# Patient Record
Sex: MALE | Race: WHITE | NOT HISPANIC OR LATINO | Employment: OTHER | URBAN - METROPOLITAN AREA
[De-identification: names, ages, dates, MRNs, and addresses within clinical notes are randomized per-mention and may not be internally consistent; named-entity substitution may affect disease eponyms.]

---

## 2017-01-03 ENCOUNTER — PATIENT OUTREACH (OUTPATIENT)
Dept: OTHER | Facility: OTHER | Age: 82
End: 2017-01-03
Payer: MEDICARE

## 2017-01-03 NOTE — PROGRESS NOTES
Last 5 Patient Entered Readings                                                               Current 30 Day Average: 140/72     Recent Readings 1/1/2017 12/31/2016 12/23/2016 12/21/2016 12/20/2016    Systolic BP (mmHg) 124 157 144 130 147    Diastolic BP (mmHg) 64 72 81 57 72        Outpatient Hypertension Medications as of 1/3/2017             amlodipine (NORVASC) 5 MG tablet Take 1 tablet (5 mg total) by mouth once daily.- QAM    losartan (COZAAR) 50 MG tablet Take 1 tablet (50 mg total) by mouth 2 (two) times daily.    nitroGLYCERIN (NITROSTAT) 0.4 MG SL tablet Place 1 tablet (0.4 mg total) under the tongue every 5 (five) minutes as needed for Chest pain.        Plan:   Called patient to follow up. Per current 30 day average, BP is controlled, borderline. BP has improved since patient restarted amlodipine 5mg about 1 month ago. Patient denies having questions or concerns. Will continue to monitor. WCB in 2 weeks.

## 2017-01-16 ENCOUNTER — PATIENT OUTREACH (OUTPATIENT)
Dept: OTHER | Facility: OTHER | Age: 82
End: 2017-01-16
Payer: MEDICARE

## 2017-01-16 NOTE — PROGRESS NOTES
Last 5 Patient Entered Readings                                                               Current 30 Day Average: 136/70     Recent Readings 1/13/2017 1/12/2017 1/10/2017 1/6/2017 1/1/2017    Systolic BP (mmHg) 138 119 131 143 124    Diastolic BP (mmHg) 64 68 92 71 64        Outpatient Hypertension Medications as of 1/16/2017             amlodipine (NORVASC) 5 MG tablet Take 1 tablet (5 mg total) by mouth once daily.- QAM    losartan (COZAAR) 50 MG tablet Take 1 tablet (50 mg total) by mouth 2 (two) times daily.    nitroGLYCERIN (NITROSTAT) 0.4 MG SL tablet Place 1 tablet (0.4 mg total) under the tongue every 5 (five) minutes as needed for Chest pain.         Plan:   Called patient to follow up. Per current 30 day average, BP is well controlled.   Patient admits to family stress.  Patient denies having questions or concerns. Will continue to monitor. WCB in 3 months, sooner if BP begins to trend up or down.

## 2017-01-19 ENCOUNTER — PATIENT OUTREACH (OUTPATIENT)
Dept: OTHER | Facility: OTHER | Age: 82
End: 2017-01-19
Payer: MEDICARE

## 2017-01-19 NOTE — PROGRESS NOTES
"Last 5 Patient Entered Readings                                                               Current 30 Day Average: 137/69     Recent Readings 1/18/2017 1/18/2017 1/17/2017 1/13/2017 1/12/2017    Systolic BP (mmHg) 120 142 146 138 119    Diastolic BP (mmHg) 76 64 73 64 68    Pulse 59 54 57 58 53        Follow up with Mr. Javier Valles completed. Patient is maintaining a low sodium diet and continuing his exercise regime. Mr. Valles states he "could not be better" and did not have any further questions or concerns. I will follow up in a few weeks to see how he is doing and progressing.      "

## 2017-01-20 ENCOUNTER — PATIENT MESSAGE (OUTPATIENT)
Dept: ORTHOPEDICS | Facility: CLINIC | Age: 82
End: 2017-01-20

## 2017-01-23 ENCOUNTER — PATIENT MESSAGE (OUTPATIENT)
Dept: ELECTROPHYSIOLOGY | Facility: CLINIC | Age: 82
End: 2017-01-23

## 2017-01-23 ENCOUNTER — PATIENT MESSAGE (OUTPATIENT)
Dept: CARDIOLOGY | Facility: CLINIC | Age: 82
End: 2017-01-23

## 2017-01-23 DIAGNOSIS — E78.5 HYPERLIPIDEMIA, UNSPECIFIED HYPERLIPIDEMIA TYPE: ICD-10-CM

## 2017-01-23 RX ORDER — ROSUVASTATIN CALCIUM 20 MG/1
20 TABLET, COATED ORAL NIGHTLY
Qty: 90 TABLET | Refills: 3
Start: 2017-01-23 | End: 2017-02-15 | Stop reason: SDUPTHER

## 2017-01-24 ENCOUNTER — OFFICE VISIT (OUTPATIENT)
Dept: ORTHOPEDICS | Facility: CLINIC | Age: 82
End: 2017-01-24
Payer: MEDICARE

## 2017-01-24 ENCOUNTER — TELEPHONE (OUTPATIENT)
Dept: ORTHOPEDICS | Facility: CLINIC | Age: 82
End: 2017-01-24

## 2017-01-24 VITALS — BODY MASS INDEX: 28.64 KG/M2 | WEIGHT: 189 LBS | HEIGHT: 68 IN

## 2017-01-24 DIAGNOSIS — G56.03 BILATERAL CARPAL TUNNEL SYNDROME: Primary | ICD-10-CM

## 2017-01-24 PROCEDURE — 1125F AMNT PAIN NOTED PAIN PRSNT: CPT | Mod: S$GLB,,, | Performed by: ORTHOPAEDIC SURGERY

## 2017-01-24 PROCEDURE — 1159F MED LIST DOCD IN RCRD: CPT | Mod: S$GLB,,, | Performed by: ORTHOPAEDIC SURGERY

## 2017-01-24 PROCEDURE — 99203 OFFICE O/P NEW LOW 30 MIN: CPT | Mod: S$GLB,,, | Performed by: ORTHOPAEDIC SURGERY

## 2017-01-24 PROCEDURE — 99999 PR PBB SHADOW E&M-EST. PATIENT-LVL II: CPT | Mod: PBBFAC,,, | Performed by: ORTHOPAEDIC SURGERY

## 2017-01-24 PROCEDURE — 1157F ADVNC CARE PLAN IN RCRD: CPT | Mod: S$GLB,,, | Performed by: ORTHOPAEDIC SURGERY

## 2017-01-24 PROCEDURE — 1160F RVW MEDS BY RX/DR IN RCRD: CPT | Mod: S$GLB,,, | Performed by: ORTHOPAEDIC SURGERY

## 2017-01-24 RX ORDER — GABAPENTIN 300 MG/1
300 CAPSULE ORAL NIGHTLY
Qty: 30 CAPSULE | Refills: 11 | Status: ON HOLD | OUTPATIENT
Start: 2017-01-24 | End: 2017-03-29

## 2017-01-24 NOTE — PROGRESS NOTES
INITIAL VISIT HISTORY:  An 86-year-old male presents for evaluation of bilateral   hand symptoms.  He is reporting stiffness and a numbness in both hands for the   past 9-10 months.  Symptoms are intermittent, seem to be worse with certain   activities, very occasionally at night, but not regularly at night.  He does   have difficulty gripping small objects.    PAST MEDICAL HISTORY:  Significant for atrial flutter, atrial fibrillation,   arthritis, carotid artery disease, kidney disease, DJD, elevated PSA, glaucoma,   hyperlipidemia, lumbar stenosis, previous pacemaker placement, peripheral   neuropathy.    PAST SURGICAL HISTORY:  Includes cardiac catheterization, pacemaker placement,   eye surgery, retinal surgery, tonsillectomy.    FAMILY HISTORY:  Positive for clotting disorder and diverticulitis.    SOCIAL HISTORY:  The patient is a former smoker, drinks 1-2 beers per week,   maybe twice a day sometimes.    REVIEW OF SYSTEMS:  Negative fever, chills, rashes.    CURRENT MEDICATIONS:  Reviewed on chart.    ALLERGIES:  ACE inhibitors, Lipitor, pravastatin and statins in general.    PHYSICAL EXAMINATION:  GENERAL:  Elderly male in no acute distress, alert and oriented x3.  EXTREMITIES:  Examination of the upper extremities significant for the hands,   demonstrating mild swelling volar compartment of each wrist.  Tinel's sign is   negative.  Phalen test is negative.  Range of motion of fingers is slightly   decreased.  He has difficulty making a composite fist in both hands.     strength decreased.  Sensation slightly decreased in all fingers.  Skin color   and temperature normal.  Pulses slightly diminished bilaterally.    X-RAYS REVIEWED:  AP and lateral hands demonstrates some mild degenerative   changes of both hands, mainly at the STT joint bilaterally, moderate in degree.    There is some involvement of the small joints as well.    IMPRESSION:  1.  Probable bilateral carpal tunnel syndrome.  2.   Osteoarthritis, bilateral hands, mild-to-moderate.    PLAN:  I think he is certainly having some stiffness related to the arthritis in   his fingers, but I think may be also some underlying carpal tunnel syndrome.    For treatment, I first recommended a nerve conduction study ordered today both   hands to check for carpal tunnel.  Additionally, we will start him on wrist   splints mainly for nighttime use and he might benefit from Neurontin at bedtime,   so I have ordered 300 mg at bedtime once a day.  Follow up after the nerve test   is complete.      EMILY  dd: 01/24/2017 08:41:12 (CST)  td: 01/24/2017 18:20:50 (CST)  Doc ID   #[id number]  Job ID #[id number]    CC:

## 2017-01-24 NOTE — LETTER
January 24, 2017        Thiago Gibbs MD  [address]  [facility] [address]             [facility]  [address] [place] [address] [address]  [facility] [address]  Phone: [phone]   Patient: Javier Valles   MR Number: [id number]   YOB: 1930   Date of Visit: 1/24/2017       Dear Dr. Gibbs:    Thank you for referring Javier Valles to me for evaluation. Below are the relevant portions of my assessment and plan of care.            If you have questions, please do not hesitate to call me. I look forward to following Javier along with you.    Sincerely,      Nam Chong Jr., MD           CC  No Recipients

## 2017-01-24 NOTE — TELEPHONE ENCOUNTER
----- Message from Kathya Gates sent at 1/24/2017 11:46 AM CST -----  Contact: [phone]/ self   Pt called stating the earliest appointment for a nerve study its 03/01/17 . Pt would like to know if there is something that can be done by Dr Chong to get him in sooner .Please advise

## 2017-01-24 NOTE — TELEPHONE ENCOUNTER
I spoke with the patient and informed him that we will have to give him a call back on tomorrow once we speak with Dr. Chong. He understood.

## 2017-01-30 ENCOUNTER — PATIENT MESSAGE (OUTPATIENT)
Dept: INTERNAL MEDICINE | Facility: CLINIC | Age: 82
End: 2017-01-30

## 2017-02-01 ENCOUNTER — PATIENT MESSAGE (OUTPATIENT)
Dept: OPHTHALMOLOGY | Facility: CLINIC | Age: 82
End: 2017-02-01

## 2017-02-02 ENCOUNTER — PATIENT MESSAGE (OUTPATIENT)
Dept: OPHTHALMOLOGY | Facility: CLINIC | Age: 82
End: 2017-02-02

## 2017-02-06 ENCOUNTER — PATIENT MESSAGE (OUTPATIENT)
Dept: OPHTHALMOLOGY | Facility: CLINIC | Age: 82
End: 2017-02-06

## 2017-02-08 ENCOUNTER — TELEPHONE (OUTPATIENT)
Dept: ORTHOPEDICS | Facility: CLINIC | Age: 82
End: 2017-02-08

## 2017-02-08 NOTE — TELEPHONE ENCOUNTER
----- Message from Tigist Otto sent at 2/8/2017  8:48 AM CST -----  Contact: self/[phone]  Patient would like to speak with you about information that he desperately----------- needs abut authorization for rehab on his hands and this is his third call.  Please advise

## 2017-02-08 NOTE — TELEPHONE ENCOUNTER
Spoke with pt and informed information was faxed [facility] to [facility] for [facility]. Understanding verbalized.

## 2017-02-13 ENCOUNTER — TELEPHONE (OUTPATIENT)
Dept: ORTHOPEDICS | Facility: CLINIC | Age: 82
End: 2017-02-13

## 2017-02-13 ENCOUNTER — TELEPHONE (OUTPATIENT)
Dept: ELECTROPHYSIOLOGY | Facility: CLINIC | Age: 82
End: 2017-02-13

## 2017-02-13 NOTE — TELEPHONE ENCOUNTER
Spoke with pt, pt informed [facility] rehab called and scheduled appt already. Understanding verbalized.

## 2017-02-14 ENCOUNTER — OFFICE VISIT (OUTPATIENT)
Dept: ELECTROPHYSIOLOGY | Facility: CLINIC | Age: 82
End: 2017-02-14
Payer: MEDICARE

## 2017-02-14 ENCOUNTER — HOSPITAL ENCOUNTER (OUTPATIENT)
Dept: CARDIOLOGY | Facility: CLINIC | Age: 82
Discharge: HOME | End: 2017-02-14
Payer: MEDICARE

## 2017-02-14 ENCOUNTER — CLINICAL SUPPORT (OUTPATIENT)
Dept: ELECTROPHYSIOLOGY | Facility: CLINIC | Age: 82
End: 2017-02-14
Payer: MEDICARE

## 2017-02-14 VITALS
SYSTOLIC BLOOD PRESSURE: 119 MMHG | BODY MASS INDEX: 30.27 KG/M2 | WEIGHT: 192.88 LBS | HEIGHT: 67 IN | DIASTOLIC BLOOD PRESSURE: 62 MMHG | HEART RATE: 71 BPM

## 2017-02-14 DIAGNOSIS — I48.91 ATRIAL FIBRILLATION: ICD-10-CM

## 2017-02-14 DIAGNOSIS — N18.30 CHRONIC KIDNEY DISEASE, STAGE III (MODERATE): ICD-10-CM

## 2017-02-14 DIAGNOSIS — I25.10 CAD IN NATIVE ARTERY: ICD-10-CM

## 2017-02-14 DIAGNOSIS — Z79.01 CURRENT USE OF LONG TERM ANTICOAGULATION: ICD-10-CM

## 2017-02-14 DIAGNOSIS — Z95.0 PACEMAKER: ICD-10-CM

## 2017-02-14 DIAGNOSIS — Z95.1 S/P CABG (CORONARY ARTERY BYPASS GRAFT): ICD-10-CM

## 2017-02-14 DIAGNOSIS — I49.5 SSS (SICK SINUS SYNDROME): ICD-10-CM

## 2017-02-14 DIAGNOSIS — Z98.890 S/P LUMBAR LAMINECTOMY: ICD-10-CM

## 2017-02-14 DIAGNOSIS — Z95.0 BIVENTRICULAR CARDIAC PACEMAKER IN SITU: ICD-10-CM

## 2017-02-14 DIAGNOSIS — E78.5 HYPERLIPIDEMIA, UNSPECIFIED HYPERLIPIDEMIA TYPE: ICD-10-CM

## 2017-02-14 DIAGNOSIS — I25.10 CORONARY ARTERY DISEASE INVOLVING NATIVE CORONARY ARTERY OF NATIVE HEART WITHOUT ANGINA PECTORIS: ICD-10-CM

## 2017-02-14 DIAGNOSIS — I73.9 PERIPHERAL VASCULAR DISEASE: ICD-10-CM

## 2017-02-14 DIAGNOSIS — I42.9 CARDIOMYOPATHY, UNSPECIFIED TYPE: ICD-10-CM

## 2017-02-14 DIAGNOSIS — I48.3 TYPICAL ATRIAL FLUTTER: Primary | ICD-10-CM

## 2017-02-14 DIAGNOSIS — I48.0 PAROXYSMAL ATRIAL FIBRILLATION: ICD-10-CM

## 2017-02-14 DIAGNOSIS — Z98.890 STATUS POST CATHETER ABLATION OF ATRIAL FLUTTER: ICD-10-CM

## 2017-02-14 DIAGNOSIS — I10 ESSENTIAL HYPERTENSION: ICD-10-CM

## 2017-02-14 DIAGNOSIS — E66.9 OBESITY, CLASS I, BMI 30-34.9: ICD-10-CM

## 2017-02-14 PROCEDURE — 99214 OFFICE O/P EST MOD 30 MIN: CPT | Mod: S$GLB,,, | Performed by: INTERNAL MEDICINE

## 2017-02-14 PROCEDURE — 99999 PR PBB SHADOW E&M-EST. PATIENT-LVL III: CPT | Mod: PBBFAC,,, | Performed by: INTERNAL MEDICINE

## 2017-02-14 PROCEDURE — 93000 ELECTROCARDIOGRAM COMPLETE: CPT | Mod: S$GLB,,, | Performed by: INTERNAL MEDICINE

## 2017-02-14 PROCEDURE — 93281 PM DEVICE PROGR EVAL MULTI: CPT | Mod: S$GLB,,, | Performed by: INTERNAL MEDICINE

## 2017-02-14 PROCEDURE — 1159F MED LIST DOCD IN RCRD: CPT | Mod: S$GLB,,, | Performed by: INTERNAL MEDICINE

## 2017-02-14 PROCEDURE — 1157F ADVNC CARE PLAN IN RCRD: CPT | Mod: S$GLB,,, | Performed by: INTERNAL MEDICINE

## 2017-02-14 PROCEDURE — 99499 UNLISTED E&M SERVICE: CPT | Mod: S$GLB,,, | Performed by: INTERNAL MEDICINE

## 2017-02-14 PROCEDURE — 1126F AMNT PAIN NOTED NONE PRSNT: CPT | Mod: S$GLB,,, | Performed by: INTERNAL MEDICINE

## 2017-02-14 PROCEDURE — 1160F RVW MEDS BY RX/DR IN RCRD: CPT | Mod: S$GLB,,, | Performed by: INTERNAL MEDICINE

## 2017-02-14 NOTE — MR AVS SNAPSHOT
[facility]  [address]  [place] [address]  Phone: [phone]  Fax: [phone]                  Javier Valles   2017 8:40 AM   Office Visit    Description:  Male : 1930   Provider:  Alcon Ly MD   Department:  [facility]           Reason for Visit     Atrial Fibrillation                To Do List           Future Appointments        Provider Department Dept Phone    2017 7:45 AM PARR, VISUAL BELLA [facility] [phone]    2017 8:15 AM Sena Schneider MD [facility] [phone]    3/1/2017 8:40 AM Thiago Chiu III, MD [facility] Neurology [phone]      Goals (5 Years of Data)              Today    17    Blood Pressure <= 140/90   119/62  145/82  157/86    Notes - Note created  2016  3:57 PM by Vannessa Torres, PharmD    It is important to consistently maintain a controlled blood pressure.      Exercise at least 150 minutes per week.           Maintain a low sodium diet           Take at least one BP reading per week at various times of the day           Weight (lb) < 83 kg (183 lb)   87.5 kg (192 lb 14.4 oz)        Notes - Note created  2016  2:35 PM by Vannessa Torres, PharmD    Goal: Lose 5% of body weight. Decrease weight by 5% in order to decrease cardiovasular risk factors.         [facility]     [facility] Care Line -  Assistance  Registered nurses in the [facility] provide clinical advisement, health education, appointment booking, and other advisory services.  Call for this free service at [phone].             Medications           Message regarding Medications     Verify the changes and/or additions to your medication regime listed below are the same as discussed with your clinician today.  If any of these changes or additions are incorrect, please notify your healthcare provider.             Verify that the below list of  medications is an accurate representation of the medications you are currently taking.  If none reported, the list may be blank. If incorrect, please contact your healthcare provider. Carry this list with you in case of emergency.           Current Medications     amlodipine (NORVASC) 5 MG tablet Take 1 tablet (5 mg total) by mouth once daily.    apixaban (ELIQUIS) 2.5 mg Tab Take 1 tablet (2.5 mg total) by mouth 2 (two) times daily.    ascorbic acid (VITAMIN C) 1000 MG tablet Take 1,000 mg by mouth every morning.     aspirin (ECOTRIN) 81 MG EC tablet Take 1 tablet (81 mg total) by mouth once daily.    brimonidine 0.2% (ALPHAGAN) 0.2 % Drop Place 1 drop into the left eye 2 (two) times daily. Disp 10 mls for 30 days    buPROPion (WELLBUTRIN XL) 150 MG TB24 tablet Take 1 tablet (150 mg total) by mouth once daily.    co-enzyme Q-10 30 mg capsule Take 30 mg by mouth once daily.     diclofenac sodium 1 % Gel Apply 2 g topically 4 (four) times daily.    donepezil (ARICEPT) 5 MG tablet Take 1 tablet (5 mg total) by mouth every evening.    erythromycin (ROMYCIN) ophthalmic ointment Apply to affected eye daily as needed.    gabapentin (NEURONTIN) 300 MG capsule Take 1 capsule (300 mg total) by mouth every evening.    ipratropium (ATROVENT) 0.03 % nasal spray 1-2 sprays by Nasal route 2 (two) times daily.    latanoprost 0.005 % ophthalmic solution Place 1 drop into the left eye every evening.    losartan (COZAAR) 50 MG tablet Take 1 tablet (50 mg total) by mouth 2 (two) times daily.    lutein 20 mg Cap Take by mouth.    MULTIVITAMINS,THER W-MINERALS (VITAMINS & MINERALS ORAL) Take 1 tablet by mouth every morning.     nitroGLYCERIN (NITROSTAT) 0.4 MG SL tablet Place 1 tablet (0.4 mg total) under the tongue every 5 (five) minutes as needed for Chest pain.    predniSONE (DELTASONE) 20 MG tablet 2 pills po daily for 4 days, then 1 pill po day for 4 days    rosuvastatin (CRESTOR) 20 MG tablet Take 1 tablet (20 mg total) by mouth  "every evening.           Clinical Reference Information           Your Vitals Were     BP Pulse Height Weight BMI    119/62 71 5' 7" (1.702 m) 87.5 kg (192 lb 14.4 oz) 30.21 kg/m2      Blood Pressure          Most Recent Value    BP  119/62      Allergies as of 2/14/2017     Ace Inhibitors    Lipitor [Atorvastatin]    Pravastatin    Statins-hmg-coa Reductase Inhibitors      Immunizations Administered on Date of Encounter - 2/14/2017     None      Language Assistance Services     ATTENTION: Language assistance services are available, free of charge. Please call [phone].      ATENCIÓN: Si habla español, tiene a arambula disposición servicios gratuitos de asistencia lingüística. Llame al [phone].     SARY Ý: N?u b?n nói Ti?ng Vi?t, có các d?ch v? h? tr? ngôn ng? mi?n phí dành cho b?n. G?i s? [phone].         [facility] complies with applicable Federal civil rights laws and does not discriminate on the basis of race, color, national origin, age, disability, or sex.        "

## 2017-02-14 NOTE — Clinical Note
La,  Could you help schedule an echo for Mr Valles. We placed in order in December for this visit but it never got scheduled. If you need another order let me know.  Thanks, MB

## 2017-02-14 NOTE — PROGRESS NOTES
"Subjective:    Patient ID:  Javier Valles is a 86 y.o. male who presents for follow-up of BiV Pacemaker Check.     HPI     Mr. Valles is an 86 year old M AFL, SSS s/p DC PM, here for follow up. 11/2/13 he was in the EP procedure room for AFL ablation. Prior to the ablation, he experience angina with anterior ST changes. He was sent urgently for a [facility] where he was found to have experienced an in-stent thrombosis of an OM1 stent. He underwent DC PPM placement prior to discharge secondary to bradycardic events on low dose beta blocker. He underwent DCCV post implantation. He was discharged on amiodarone, metoprolol, aspirin plavix, warfarin, rosuvastatin. His EF was normal on pre-CV MONICA. He subsequently developed L eye bruising and superficial bleeding, and warfarin was stopped 02/2014. He was subsequently started on rivaroxaban 15 mg qd without adverse effects until 06/2014 when he experienced excessive bleeding leading to its discontinuation. He subsequently went on a 3-week tour of [place] without any limitation. In November of 2015 his PPM LRL was set to 60 bpm with noted improvement in his energy level. At that time, Mr. Valles was noted to have an increase in his AF burden, and was also found to have AFL.     Mr. Valles has historically had a long hx of lower back pain; he underwent a successful lumbar laminectomy (04/25/16), which was complicated by persistent AFL post-op. At the time, he remained on aspirin, but had been on apixaban prior to his in-stent thrombosis event in 2013. Apixaban 2.5 mg twice daily was re-started. At that time, Mr. Valles reported experiencxing dyspnea and fatigue, with a significant decrease in his energy level. He was subsequently switched to coumadin (for the procedures), and underwent a successful combination of an AFL RFA and BiV PPM upgrade (11/01/16).     Since his last office visit, Mr. Valles reports feeling "great." He denies chest pain, SOB/ELIAS, dizziness, palpitations, or syncope. In " addition, he states that his energy level has been excellent since the procedures. He remains active without difficulty.     Recent cardiac studies:  Echo EF 35%, AFL since 4/16, 81% RV pacing  Device Interrogation (02/14/17) reveals stable lead and device function. AMS x 1 (episode duration 6 seconds); no NSVT noted. He RA paces 18% and BiV paces 88% of the time. Estimated battery longevity 5-6 years.     I reviewed today's ECG which demonstrated a BiV-paced rhythm at 71 bpm; , , and QTc 495.    Review of Systems   Constitution: Negative for diaphoresis, weakness and malaise/fatigue.   HENT: Negative for headaches and nosebleeds.    Eyes: Negative for double vision.   Cardiovascular: Negative for chest pain, claudication, cyanosis, dyspnea on exertion, irregular heartbeat, leg swelling, near-syncope, orthopnea, palpitations, paroxysmal nocturnal dyspnea and syncope.   Respiratory: Negative for shortness of breath.    Skin: Negative.    Musculoskeletal: Positive for stiffness.   Gastrointestinal: Negative for abdominal pain, hematemesis and hematochezia.   Genitourinary: Negative for hematuria.   Neurological: Negative for dizziness and light-headedness.   Psychiatric/Behavioral: Negative for altered mental status.        Objective:    Physical Exam   Constitutional: He is oriented to person, place, and time. He appears well-developed and well-nourished.   HENT:   Head: Normocephalic and atraumatic.   Eyes: Pupils are equal, round, and reactive to light.   Neck: Normal range of motion. Neck supple.   Cardiovascular: Normal rate, regular rhythm, normal heart sounds and intact distal pulses.    Pulmonary/Chest: Effort normal and breath sounds normal.   Abdominal: Soft. Bowel sounds are normal.   Musculoskeletal:   Antalgic gait noted.    Neurological: He is alert and oriented to person, place, and time.   Vitals reviewed.        Assessment:       1. Typical atrial flutter    2. Status post catheter  ablation of atrial flutter    3. Paroxysmal atrial fibrillation    4. Current use of long term anticoagulation    5. SSS (sick sinus syndrome): s/p PPM    6. Cardiomyopathy, unspecified type    7. Biventricular cardiac pacemaker in situ    8. Coronary artery disease involving native coronary artery of native heart without angina pectoris    9. S/P CABG (coronary artery bypass graft)    10. Peripheral vascular disease    11. Essential hypertension    12. Chronic kidney disease, stage III (moderate)    13. Hyperlipidemia, unspecified hyperlipidemia type    14. Obesity, Class I, BMI 30-34.9    15. S/P lumbar laminectomy         Plan:       In summary, Mr. Valles is a 86 y.o. male with a hx of AF, typical AFL s/p recent CTI RFA, CM, SSS s/p recent BiV PPM upgrade, CAD s/p CABG, PVD, HTN, CKD III, HLD, a BMI 29.80 kg/m2, and lumbar stenosis s/p lumbar laminectomy. Mr. Valles is doing well from a rhythm perspective with stable lead and device function without sustained arrhythmia noted; anticoagulated on Eliquis.     Echo now to evaluate EF s/p BiV PPM upgrade.   Continue current medication regimen and device settings.   Follow up in device clinic as scheduled.   Follow up in EP clinic in 1 year, sooner as needed.     Joi Ugarte, MN, APRN, FNP-C            86 yoM CAD s/p CABG, AFL s/p RFA, AVN disease s/p upgrade to CRT-P here for long term follow up. He has had excellent response to CRT pacing and NSR. Will reassess his EF via echo. RTC 6m.

## 2017-02-15 ENCOUNTER — PATIENT MESSAGE (OUTPATIENT)
Dept: CARDIOLOGY | Facility: CLINIC | Age: 82
End: 2017-02-15

## 2017-02-15 DIAGNOSIS — I25.10 CORONARY ARTERY DISEASE INVOLVING NATIVE CORONARY ARTERY WITHOUT ANGINA PECTORIS, UNSPECIFIED WHETHER NATIVE OR TRANSPLANTED HEART: ICD-10-CM

## 2017-02-15 DIAGNOSIS — E78.5 HYPERLIPIDEMIA, UNSPECIFIED HYPERLIPIDEMIA TYPE: ICD-10-CM

## 2017-02-15 RX ORDER — ROSUVASTATIN CALCIUM 20 MG/1
20 TABLET, COATED ORAL NIGHTLY
Qty: 90 TABLET | Refills: 3 | Status: SHIPPED | OUTPATIENT
Start: 2017-02-15 | End: 2018-02-08 | Stop reason: SDUPTHER

## 2017-02-15 RX ORDER — LOSARTAN POTASSIUM 50 MG/1
50 TABLET ORAL 2 TIMES DAILY
Qty: 180 TABLET | Refills: 3 | Status: SHIPPED | OUTPATIENT
Start: 2017-02-15 | End: 2018-01-05 | Stop reason: SDUPTHER

## 2017-02-16 RX ORDER — AMLODIPINE BESYLATE 5 MG/1
5 TABLET ORAL DAILY
Qty: 30 TABLET | Refills: 5 | Status: SHIPPED | OUTPATIENT
Start: 2017-02-16 | End: 2017-02-17 | Stop reason: SDUPTHER

## 2017-02-17 ENCOUNTER — PATIENT OUTREACH (OUTPATIENT)
Dept: OTHER | Facility: OTHER | Age: 82
End: 2017-02-17
Payer: MEDICARE

## 2017-02-17 DIAGNOSIS — H40.10X2 OPEN-ANGLE GLAUCOMA, MODERATE STAGE, UNSPECIFIED LATERALITY, UNSPECIFIED OPEN-ANGLE GLAUCOMA TYPE: ICD-10-CM

## 2017-02-17 NOTE — PROGRESS NOTES
"Last 5 Patient Entered Readings                                                               Current 30 Day Average: 134/70     Recent Readings 2/14/2017 2/13/2017 2/13/2017 2/11/2017 2/9/2017    Systolic BP (mmHg) 122 145 157 127 139    Diastolic BP (mmHg) 75 82 86 61 66    Pulse 59 49 49 32 31        Follow up with Mr. Javier Valles completed. Patient is maintaining a low sodium diet and continuing his activities, "couldn't be better". Patient did not have any further questions or concerns. I will follow up in a few weeks to see how he is doing and progressing.      "

## 2017-02-18 RX ORDER — AMLODIPINE BESYLATE 5 MG/1
5 TABLET ORAL DAILY
Qty: 30 TABLET | Refills: 5 | Status: SHIPPED | OUTPATIENT
Start: 2017-02-18 | End: 2017-05-15 | Stop reason: SDUPTHER

## 2017-02-20 ENCOUNTER — CLINICAL SUPPORT (OUTPATIENT)
Dept: OPHTHALMOLOGY | Facility: CLINIC | Age: 82
End: 2017-02-20
Payer: MEDICARE

## 2017-02-20 ENCOUNTER — OFFICE VISIT (OUTPATIENT)
Dept: OPHTHALMOLOGY | Facility: CLINIC | Age: 82
End: 2017-02-20
Payer: MEDICARE

## 2017-02-20 DIAGNOSIS — H40.1132 PRIMARY OPEN ANGLE GLAUCOMA OF BOTH EYES, MODERATE STAGE: Primary | ICD-10-CM

## 2017-02-20 DIAGNOSIS — H43.812 POSTERIOR VITREOUS DETACHMENT OF LEFT EYE: ICD-10-CM

## 2017-02-20 DIAGNOSIS — Z97.0 PROSTHETIC EYE GLOBE: ICD-10-CM

## 2017-02-20 DIAGNOSIS — H35.372 MACULAR PUCKERING OF RETINA, LEFT: ICD-10-CM

## 2017-02-20 DIAGNOSIS — H33.002 RHEGMATOGENOUS RETINAL DETACHMENT OF LEFT EYE: ICD-10-CM

## 2017-02-20 DIAGNOSIS — H40.10X2 OPEN-ANGLE GLAUCOMA, MODERATE STAGE: ICD-10-CM

## 2017-02-20 DIAGNOSIS — H35.342 LAMELLAR MACULAR HOLE, LEFT: ICD-10-CM

## 2017-02-20 DIAGNOSIS — H35.372 EPIRETINAL MEMBRANE, LEFT EYE: ICD-10-CM

## 2017-02-20 DIAGNOSIS — Z96.1 PSEUDOPHAKIA OF LEFT EYE: ICD-10-CM

## 2017-02-20 PROCEDURE — 92012 INTRM OPH EXAM EST PATIENT: CPT | Mod: S$GLB,,, | Performed by: OPHTHALMOLOGY

## 2017-02-20 PROCEDURE — 92134 CPTRZ OPH DX IMG PST SGM RTA: CPT | Mod: S$GLB,,, | Performed by: OPHTHALMOLOGY

## 2017-02-20 PROCEDURE — 99499 UNLISTED E&M SERVICE: CPT | Mod: S$GLB,,, | Performed by: OPHTHALMOLOGY

## 2017-02-20 PROCEDURE — 99999 PR PBB SHADOW E&M-EST. PATIENT-LVL III: CPT | Mod: PBBFAC,,, | Performed by: OPHTHALMOLOGY

## 2017-02-20 RX ORDER — BRIMONIDINE TARTRATE 2 MG/ML
1 SOLUTION/ DROPS OPHTHALMIC 3 TIMES DAILY
Qty: 10 ML | Refills: 12
Start: 2017-02-20 | End: 2017-07-24 | Stop reason: SDUPTHER

## 2017-02-20 RX ORDER — LATANOPROST 50 UG/ML
1 SOLUTION/ DROPS OPHTHALMIC NIGHTLY
Qty: 1 BOTTLE | Refills: 12 | Status: SHIPPED | OUTPATIENT
Start: 2017-02-20 | End: 2017-06-30 | Stop reason: SDUPTHER

## 2017-02-20 RX ORDER — BRIMONIDINE TARTRATE 2 MG/ML
1 SOLUTION/ DROPS OPHTHALMIC 3 TIMES DAILY
Qty: 10 ML | Refills: 12 | Status: SHIPPED | OUTPATIENT
Start: 2017-02-20 | End: 2017-03-28 | Stop reason: CLARIF

## 2017-02-20 NOTE — PROGRESS NOTES
HPI     Glaucoma    Additional comments: HRT review today           Comments   DLS: 8/18/16    1) POAG  2) PCIOL OS  3) Prosthesis OD  4) ERM OS  5) Hx Rhegmatogenous RD OS  6) Psuedo Mac Hole OS    MEDS:  Brimonidine TID OS = PT MOSTLY USES BID  Latanoprost QHS OS         Last edited by Teresa Gonzalez MA on 2/20/2017  8:15 AM. (History)            Assessment /Plan     For exam results, see Encounter Report.    Primary open angle glaucoma of both eyes, moderate stage    Epiretinal membrane, left eye    Posterior vitreous detachment of left eye    Prosthetic eye globe    Lamellar macular hole, left    Macular puckering of retina, left    Rhegmatogenous retinal detachment of left eye    Pseudophakia of left eye      Old pt of Dr. Goodrich    1. POAG   Followed at [facility] since 1997   followed by S[facility] for 30 years  First HVF 2014  gtts started - [facility] - 2012  First photos - ? 1997 - for RD os     Glaucoma meds -  latanoprost qhs os  H/O adverse rxn to glaucoma drops none  LASERS none  GLAUCOMA SURGERIES none  OTHER EYE SURGERIES - prosthesis od - (2/2 injury - 1940's) // Pnematic retinopexy OS 1997 - [place] // PC iol os - [place]  CDR - prosthesis / 0.75  Tbase   Tmax    Ttarget    HVF 3  test 2014 to 2016 OS:  ? Paracentral defect with early SAD/IAD  Gonio  +3-4 os   CCT - xx/492  OCT 2 test 2014 to 2016- RNFL - OD:not done // OS:dec s/bord T  HRT 3 test 2015 - 2017 -  MR -  xx od // xx os /// CDR dec S/I/N bord T  od // 0.80 os   Disc photos - mult old slides 1997 - 2006 // 2015 - OIS    Ttoday - prosthetic // 18  Test done today IOP/HRT/Gonio    2.  psk os  - monitor    3.  Monocular precautions  - prothetic od    4.  erm os  - stable, follows with arend  -on nevanac q day os - feels it helps vision - ?     5.  Hx of rhegmatogenous rrd os  - flat, follows with arend    6.  Pseudo hole os  - monitor     Pt on metoprolol xl    On xal qhs os,   alphagan os  - increase for bid to tid os (change to 2% generic 2/2  cost)   - (may be able to use BB as now has a pacemaker, but has a H/O low heart rate / and/or arrythmia)     -add EES ointment od - prosthesis - prn irritation     Saw [facility] - new glasses - has been restricted to daytime driving     F/U  6 months with HVF/DFE/Photos     Keep regular F/U with arend (november)     Glasses - [facility]     I have seen and personally examined the patient.  I agree with the findings, assessment and plan of the resident and/or fellow.     Sena Schneider MD

## 2017-02-20 NOTE — PROGRESS NOTES
HRT done-[facility]    Assessment /Plan     For exam results, see Encounter Report.    Open-angle glaucoma, moderate stage  -     [place] Retina Tomography (HRT) - OU - Both Eyes

## 2017-02-21 ENCOUNTER — PATIENT MESSAGE (OUTPATIENT)
Dept: CARDIOLOGY | Facility: CLINIC | Age: 82
End: 2017-02-21

## 2017-02-21 ENCOUNTER — HOSPITAL ENCOUNTER (OUTPATIENT)
Dept: CARDIOLOGY | Facility: CLINIC | Age: 82
Discharge: HOME | End: 2017-02-21
Payer: MEDICARE

## 2017-02-21 PROCEDURE — 93306 TTE W/DOPPLER COMPLETE: CPT | Mod: S$GLB,,, | Performed by: INTERNAL MEDICINE

## 2017-02-22 ENCOUNTER — TELEPHONE (OUTPATIENT)
Dept: ELECTROPHYSIOLOGY | Facility: CLINIC | Age: 82
End: 2017-02-22

## 2017-02-22 NOTE — TELEPHONE ENCOUNTER
----- Message from Joi Ugarte, KEITH sent at 2/22/2017 10:19 AM CST -----  Sujatha,   Please let Mr. Valles know that his recent Echo revealed an improved EF of 43% (up from 35%, 3 months ago). Thank you.     CM

## 2017-02-28 ENCOUNTER — PATIENT MESSAGE (OUTPATIENT)
Dept: INTERNAL MEDICINE | Facility: CLINIC | Age: 82
End: 2017-02-28

## 2017-03-01 ENCOUNTER — PATIENT MESSAGE (OUTPATIENT)
Dept: ORTHOPEDICS | Facility: CLINIC | Age: 82
End: 2017-03-01

## 2017-03-01 ENCOUNTER — PROCEDURE VISIT (OUTPATIENT)
Dept: NEUROLOGY | Facility: CLINIC | Age: 82
End: 2017-03-01
Payer: MEDICARE

## 2017-03-01 DIAGNOSIS — G56.03 BILATERAL CARPAL TUNNEL SYNDROME: ICD-10-CM

## 2017-03-01 PROCEDURE — 95911 NRV CNDJ TEST 9-10 STUDIES: CPT | Mod: S$GLB,,,

## 2017-03-01 PROCEDURE — 95886 MUSC TEST DONE W/N TEST COMP: CPT | Mod: S$GLB,,,

## 2017-03-01 NOTE — PROCEDURES
Procedures     See the copy of this report that has been scanned into patient's Epic Chart.     KEENAN LUCAS III, MD

## 2017-03-02 RX ORDER — BUPROPION HYDROCHLORIDE 150 MG/1
150 TABLET ORAL DAILY
Qty: 30 TABLET | Refills: 11 | Status: SHIPPED | OUTPATIENT
Start: 2017-03-02 | End: 2017-05-15 | Stop reason: SDUPTHER

## 2017-03-02 RX ORDER — DONEPEZIL HYDROCHLORIDE 5 MG/1
5 TABLET, FILM COATED ORAL NIGHTLY
Qty: 30 TABLET | Refills: 11 | Status: SHIPPED | OUTPATIENT
Start: 2017-03-02 | End: 2017-05-15 | Stop reason: SDUPTHER

## 2017-03-06 ENCOUNTER — PATIENT MESSAGE (OUTPATIENT)
Dept: OPHTHALMOLOGY | Facility: CLINIC | Age: 82
End: 2017-03-06

## 2017-03-10 ENCOUNTER — OFFICE VISIT (OUTPATIENT)
Dept: INTERNAL MEDICINE | Facility: CLINIC | Age: 82
End: 2017-03-10
Payer: MEDICARE

## 2017-03-10 VITALS
BODY MASS INDEX: 29.25 KG/M2 | HEIGHT: 68 IN | WEIGHT: 193 LBS | HEART RATE: 44 BPM | SYSTOLIC BLOOD PRESSURE: 119 MMHG | DIASTOLIC BLOOD PRESSURE: 62 MMHG

## 2017-03-10 DIAGNOSIS — I83.811 VARICOSE VEINS OF LEG WITH PAIN, RIGHT: ICD-10-CM

## 2017-03-10 DIAGNOSIS — M79.604 LEG PAIN, ANTERIOR, RIGHT: Primary | ICD-10-CM

## 2017-03-10 PROCEDURE — 1160F RVW MEDS BY RX/DR IN RCRD: CPT | Mod: S$GLB,,, | Performed by: INTERNAL MEDICINE

## 2017-03-10 PROCEDURE — 99999 PR PBB SHADOW E&M-EST. PATIENT-LVL IV: CPT | Mod: PBBFAC,,, | Performed by: INTERNAL MEDICINE

## 2017-03-10 PROCEDURE — 1157F ADVNC CARE PLAN IN RCRD: CPT | Mod: S$GLB,,, | Performed by: INTERNAL MEDICINE

## 2017-03-10 PROCEDURE — 1125F AMNT PAIN NOTED PAIN PRSNT: CPT | Mod: S$GLB,,, | Performed by: INTERNAL MEDICINE

## 2017-03-10 PROCEDURE — 1159F MED LIST DOCD IN RCRD: CPT | Mod: S$GLB,,, | Performed by: INTERNAL MEDICINE

## 2017-03-10 PROCEDURE — 99214 OFFICE O/P EST MOD 30 MIN: CPT | Mod: S$GLB,,, | Performed by: INTERNAL MEDICINE

## 2017-03-10 RX ORDER — HYDROCODONE BITARTRATE AND ACETAMINOPHEN 5; 325 MG/1; MG/1
1 TABLET ORAL EVERY 6 HOURS PRN
Qty: 30 TABLET | Refills: 0 | Status: SHIPPED | OUTPATIENT
Start: 2017-03-10 | End: 2017-06-09

## 2017-03-10 NOTE — MR AVS SNAPSHOT
[facility] [address]  [address]  [address]  [facility] [address]  Phone: [phone]                  Javier Valles   3/10/2017 3:45 PM   Office Visit    Description:  Male : 1930   Provider:  Thiago Gibbs MD   Department:  [facility] [address]           Reason for Visit     Leg Pain           Diagnoses this Visit        Comments    Leg pain, anterior, right    -  Primary     Varicose veins of leg with pain, right                To Do List           Future Appointments        Provider Department Dept Phone    3/13/2017 8:15 AM [facility] XR3 450 LB LIMIT [facility] [phone]    3/13/2017 3:00 PM Nam Chong Jr., MD [facility] [phone]    2017 8:00 AM [facility] [phone]      Goals (5 Years of Data)              Today    3/8/17    3/7/17    Blood Pressure <= 140/90   119/62  119/62  119/62    Notes - Note created  2016  3:57 PM by Vannessa Torres, PharmD    It is important to consistently maintain a controlled blood pressure.      Exercise at least 150 minutes per week.           Maintain a low sodium diet           Take at least one BP reading per week at various times of the day           Weight (lb) < 83 kg (183 lb)   87.5 kg (193 lb)        Notes - Note created  2016  2:35 PM by Vannessa Torres, PharmD    Goal: Lose 5% of body weight. Decrease weight by 5% in order to decrease cardiovasular risk factors.          These Medications        Disp Refills Start End    hydrocodone-acetaminophen 5-325mg (NORCO) 5-325 mg per tablet 30 tablet 0 3/10/2017     Take 1 tablet by mouth every 6 (six) hours as needed for Pain. - Oral    Pharm[facility] [address] Ph #: [phone]         [facility]     [facility] On Call Nurse Care Line -  Assistance  Registered nurses in the [facility] On Call Center provide clinical  advisement, health education, appointment booking, and other advisory services.  Call for this free service at [phone].             Medications           Message regarding Medications     Verify the changes and/or additions to your medication regime listed below are the same as discussed with your clinician today.  If any of these changes or additions are incorrect, please notify your healthcare provider.        START taking these NEW medications        Refills    hydrocodone-acetaminophen 5-325mg (NORCO) 5-325 mg per tablet 0    Sig: Take 1 tablet by mouth every 6 (six) hours as needed for Pain.    Class: Print    Route: Oral           Verify that the below list of medications is an accurate representation of the medications you are currently taking.  If none reported, the list may be blank. If incorrect, please contact your healthcare provider. Carry this list with you in case of emergency.           Current Medications     amlodipine (NORVASC) 5 MG tablet Take 1 tablet (5 mg total) by mouth once daily.    apixaban (ELIQUIS) 2.5 mg Tab Take 1 tablet (2.5 mg total) by mouth 2 (two) times daily.    ascorbic acid (VITAMIN C) 1000 MG tablet Take 1,000 mg by mouth every morning.     aspirin (ECOTRIN) 81 MG EC tablet Take 1 tablet (81 mg total) by mouth once daily.    brimonidine 0.2% (ALPHAGAN) 0.2 % Drop Place 1 drop into the left eye 3 (three) times daily. Disp 10 mls for 30 days    brimonidine 0.2% (ALPHAGAN) 0.2 % Drop Place 1 drop into both eyes 3 (three) times daily. Disp 10 mls for 30 days    buPROPion (WELLBUTRIN XL) 150 MG TB24 tablet Take 1 tablet (150 mg total) by mouth once daily.    co-enzyme Q-10 30 mg capsule Take 30 mg by mouth once daily.     diclofenac sodium 1 % Gel Apply 2 g topically 4 (four) times daily.    donepezil (ARICEPT) 5 MG tablet Take 1 tablet (5 mg total) by mouth every evening.    erythromycin (ROMYCIN) ophthalmic ointment Apply to affected eye daily as needed.    gabapentin  "(NEURONTIN) 300 MG capsule Take 1 capsule (300 mg total) by mouth every evening.    ipratropium (ATROVENT) 0.03 % nasal spray 1-2 sprays by Nasal route 2 (two) times daily.    latanoprost 0.005 % ophthalmic solution Place 1 drop into both eyes every evening.    losartan (COZAAR) 50 MG tablet Take 1 tablet (50 mg total) by mouth 2 (two) times daily.    lutein 20 mg Cap Take 20 mg by mouth once daily.     MULTIVITAMINS,THER W-MINERALS (VITAMINS & MINERALS ORAL) Take 1 tablet by mouth every morning.     nitroGLYCERIN (NITROSTAT) 0.4 MG SL tablet Place 1 tablet (0.4 mg total) under the tongue every 5 (five) minutes as needed for Chest pain.    rosuvastatin (CRESTOR) 20 MG tablet Take 1 tablet (20 mg total) by mouth every evening.    hydrocodone-acetaminophen 5-325mg (NORCO) 5-325 mg per tablet Take 1 tablet by mouth every 6 (six) hours as needed for Pain.           Clinical Reference Information           Your Vitals Were     BP Pulse Height Weight BMI    119/62 44 5' 8" (1.727 m) 87.5 kg (193 lb) 29.35 kg/m2      Blood Pressure          Most Recent Value    BP  119/62      Allergies as of 3/10/2017     Ace Inhibitors    Lipitor [Atorvastatin]    Pravastatin    Statins-hmg-coa Reductase Inhibitors      Immunizations Administered on Date of Encounter - 3/10/2017     None      Orders Placed During Today's Visit     Future Labs/Procedures Expected by Expires    X-Ray Tibia Fibula 2 View Right  3/10/2017 3/10/2018      Language Assistance Services     ATTENTION: Language assistance services are available, free of charge. Please call [phone].      ATENCIÓN: Si habla pako, tiene a arambula disposición servicios gratuitos de asistencia lingüística. Llame al [phone].     CHÚ Ý: N?u b?n nói Ti?ng Vi?t, có các d?ch v? h? tr? ngôn ng? mi?n phí dành cho b?n. G?i s? [phone].         [facility] [address] complies with applicable Federal civil rights laws and does not discriminate on the basis of race, " color, national origin, age, disability, or sex.
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
## 2017-03-13 ENCOUNTER — HOSPITAL ENCOUNTER (OUTPATIENT)
Dept: RADIOLOGY | Facility: HOSPITAL | Age: 82
Discharge: HOME | End: 2017-03-13
Attending: INTERNAL MEDICINE
Payer: MEDICARE

## 2017-03-13 ENCOUNTER — OFFICE VISIT (OUTPATIENT)
Dept: ORTHOPEDICS | Facility: CLINIC | Age: 82
End: 2017-03-13
Payer: MEDICARE

## 2017-03-13 VITALS — WEIGHT: 193 LBS | HEIGHT: 68 IN | BODY MASS INDEX: 29.25 KG/M2

## 2017-03-13 DIAGNOSIS — G56.03 BILATERAL CARPAL TUNNEL SYNDROME: Primary | ICD-10-CM

## 2017-03-13 DIAGNOSIS — I83.811 VARICOSE VEINS OF LEG WITH PAIN, RIGHT: ICD-10-CM

## 2017-03-13 DIAGNOSIS — M79.604 LEG PAIN, ANTERIOR, RIGHT: ICD-10-CM

## 2017-03-13 PROCEDURE — 73590 X-RAY EXAM OF LOWER LEG: CPT | Mod: 26,RT,, | Performed by: RADIOLOGY

## 2017-03-13 PROCEDURE — 1160F RVW MEDS BY RX/DR IN RCRD: CPT | Mod: S$GLB,,, | Performed by: ORTHOPAEDIC SURGERY

## 2017-03-13 PROCEDURE — 99214 OFFICE O/P EST MOD 30 MIN: CPT | Mod: 57,S$GLB,, | Performed by: ORTHOPAEDIC SURGERY

## 2017-03-13 PROCEDURE — 1157F ADVNC CARE PLAN IN RCRD: CPT | Mod: S$GLB,,, | Performed by: ORTHOPAEDIC SURGERY

## 2017-03-13 PROCEDURE — 99999 PR PBB SHADOW E&M-EST. PATIENT-LVL III: CPT | Mod: PBBFAC,,, | Performed by: ORTHOPAEDIC SURGERY

## 2017-03-13 PROCEDURE — 1159F MED LIST DOCD IN RCRD: CPT | Mod: S$GLB,,, | Performed by: ORTHOPAEDIC SURGERY

## 2017-03-13 PROCEDURE — 1125F AMNT PAIN NOTED PAIN PRSNT: CPT | Mod: S$GLB,,, | Performed by: ORTHOPAEDIC SURGERY

## 2017-03-13 RX ORDER — TRAMADOL HYDROCHLORIDE 50 MG/1
50 TABLET ORAL EVERY 6 HOURS PRN
Qty: 30 TABLET | Refills: 0 | Status: SHIPPED | OUTPATIENT
Start: 2017-03-13 | End: 2017-03-23

## 2017-03-13 NOTE — PROGRESS NOTES
OFFICE VISIT AND PREOP H AND P    CHIEF COMPLAINT:  Bilateral carpal tunnel syndrome.    HISTORY OF PRESENT ILLNESS:  An 86-year-old male with ongoing symptoms both   hands related to arthritis and superimposed carpal tunnel syndrome.  He recently   had nerve conduction studies both hands, which confirmed moderate bilateral   carpal tunnel syndrome.    PAST MEDICAL HISTORY:  Significant for atrial flutter, arthritis, atrial   fibrillation, kidney disease, carotid artery disease, degenerative disc disease,   glaucoma, hyperlipidemia, hypertension.    PAST SURGICAL HISTORY:  Includes heart bypass, eye surgery, skin biopsy,   tonsillectomy and cataract surgery.    FAMILY HISTORY:  Positive for clotting disorder, hypertension and   diverticulitis.    SOCIAL HISTORY:  The patient is a former smoker, drinks maybe 5-7 drinks per   week.    REVIEW OF SYSTEMS:  Negative fever, chills, rashes.    CURRENT MEDICATIONS:  Reviewed on chart.    ALLERGIES:  ACE inhibitors, Lipitor, pravastatin.    PHYSICAL EXAMINATION:  GENERAL:  Elderly male in no acute distress, alert and oriented x3.  HEENT:  Unremarkable.  LUNGS:  Clear to auscultation.  HEART:  Regular rate and rhythm.  ABDOMEN:  Soft, nontender.  EXTREMITIES:  Significant for the hands, demonstrating mild swelling volar   compartment and wrist bilaterally.  Positive Tinel sign.  Positive Phalen test   bilateral.    Nerve conduction study as noted above.    IMPRESSION:  Bilateral carpal tunnel syndrome.    PLAN:  The patient would like to go ahead and set up surgery for bilateral   carpal tunnel release done as a simultaneous procedure.  The risks and benefits   of surgery explained to the patient as well as the need for some assistance for   the first few days with eating and going to the bathroom, he understands.    Additionally, he does have some underlying arthritis in both hands, which will   continue to cause some symptoms of pain and stiffness even after surgery.  He    understands that as well.      MEILY  dd: 03/13/2017 16:10:58 (CDT)  td: 03/14/2017 08:10:41 (CDT)  Doc ID   #[id number]  Job ID #[id number]    CC:

## 2017-03-20 ENCOUNTER — TELEPHONE (OUTPATIENT)
Dept: NEUROSURGERY | Facility: CLINIC | Age: 82
End: 2017-03-20

## 2017-03-20 NOTE — TELEPHONE ENCOUNTER
----- Message from Henrietta Kapoor sent at 3/17/2017  2:40 PM CDT -----  Contact: Pt  Pt is calling to schedule an appt for continuous issues from previous procedure.  Pt can be reached at [phone].    Thank you

## 2017-03-20 NOTE — TELEPHONE ENCOUNTER
RN placed f/u call to patient; no answer. RN left message advising it is almost time for his 1 year f/u and appointment has been secured for 4/18/2017. Encouraged to call if the appointment needs to be changed.Clinic number provided. Appointment letter mailed.

## 2017-03-20 NOTE — TELEPHONE ENCOUNTER
----- Message from Andria Zeng sent at 3/20/2017  8:04 AM CDT -----  Contact: Patient [phone]  Patient is requesting a call back from nurse, to discuss some back problems he is having and he would like to get an appt to see him as soon as possible.

## 2017-03-27 ENCOUNTER — TELEPHONE (OUTPATIENT)
Dept: ORTHOPEDICS | Facility: CLINIC | Age: 82
End: 2017-03-27

## 2017-03-27 ENCOUNTER — PATIENT OUTREACH (OUTPATIENT)
Dept: OTHER | Facility: OTHER | Age: 82
End: 2017-03-27
Payer: MEDICARE

## 2017-03-27 NOTE — PROGRESS NOTES
Last 5 Patient Entered Readings                                                               Current 30 Day Average: 129/77     Recent Readings 3/14/2017 3/13/2017 3/8/2017 3/7/2017 2/28/2017    Systolic BP (mmHg) 137 134 132 132 121    Diastolic BP (mmHg) 66 76 88 80 70    Pulse 57 49 49 54 59        Follow up with Mr. Javier Valles completed. Patient is maintaining a low sodium diet and continuing his activities. He and his wife have been out of town for the last 10 days without his cuff, but he will resume readings soon. His wrist surgery is coming up this week as well. Patient did not have any further questions or concerns. I will follow up in a few weeks to see how he is doing and progressing.

## 2017-03-27 NOTE — TELEPHONE ENCOUNTER
I spoke with the patient and informed him that we needed him at the hospital for 6am. He was also instructed to d/c his blood thinners on today. He understood.

## 2017-03-28 ENCOUNTER — ANESTHESIA EVENT (OUTPATIENT)
Dept: SURGERY | Facility: OTHER | Age: 82
End: 2017-03-28
Payer: MEDICARE

## 2017-03-28 ENCOUNTER — HOSPITAL ENCOUNTER (OUTPATIENT)
Dept: PREADMISSION TESTING | Facility: OTHER | Age: 82
Discharge: HOME | End: 2017-03-28
Attending: ORTHOPAEDIC SURGERY
Payer: MEDICARE

## 2017-03-28 ENCOUNTER — DOCUMENTATION ONLY (OUTPATIENT)
Dept: ORTHOPEDICS | Facility: CLINIC | Age: 82
End: 2017-03-28

## 2017-03-28 VITALS
HEIGHT: 68 IN | HEART RATE: 57 BPM | DIASTOLIC BLOOD PRESSURE: 66 MMHG | OXYGEN SATURATION: 96 % | BODY MASS INDEX: 28.04 KG/M2 | TEMPERATURE: 98 F | WEIGHT: 185 LBS | SYSTOLIC BLOOD PRESSURE: 120 MMHG

## 2017-03-28 RX ORDER — SODIUM CHLORIDE, SODIUM LACTATE, POTASSIUM CHLORIDE, CALCIUM CHLORIDE 600; 310; 30; 20 MG/100ML; MG/100ML; MG/100ML; MG/100ML
INJECTION, SOLUTION INTRAVENOUS CONTINUOUS
Status: CANCELLED | OUTPATIENT
Start: 2017-03-28

## 2017-03-28 RX ORDER — LIDOCAINE HYDROCHLORIDE 10 MG/ML
1 INJECTION, SOLUTION EPIDURAL; INFILTRATION; INTRACAUDAL; PERINEURAL ONCE
Status: CANCELLED | OUTPATIENT
Start: 2017-03-28 | End: 2017-03-28

## 2017-03-28 NOTE — DISCHARGE INSTRUCTIONS
PRE-ADMIT TESTING -  [phone]    [address]  [facility]        OUTPATIENT SURGERY UNIT - [phone]    Your surgery has been scheduled at [facility]. We are pleased to have the opportunity to serve you. For Further Information please call [phone].    On the day of surgery please report to the Information Desk on the 1st floor.    CONTACT YOUR PHYSICIAN'S OFFICE THE DAY PRIOR TO YOUR SURGERY TO OBTAIN YOUR ARRIVAL TIME.     The evening before surgery do not eat anything after 9 p.m. ( this includes hard candy, chewing gum and mints).  You may have GATORADE, POWERADE AND WATER FROM 9 p.m. until leaving home to come to the hospital.   DO NOT DRINK ANY LIQUIDS ON THE WAY TO THE HOSPITAL.     SPECIAL MEDICATION INSTRUCTIONS: TAKE medications checked off by the Anesthesiologist on your Medication List.    Angiogram Patients: Take medications as instructed by your physician, including aspirin.     Surgery Patients:    If you take ASPIRIN - Your PHYSICIAN/SURGEON will need to inform you IF/OR when you need to stop taking aspirin prior to your surgery.     Do Not take any medications containing IBUPROFEN.  Do Not Wear any make-up or dark nail polish   (especially eye make-up) to surgery. If you come to surgery with makeup on you will be required to remove the makeup or nail polish.    Do not shave your surgical area at least 5 days prior to your surgery. The surgical prep will be performed at the hospital according to Infection Control regulations.    Leave all valuables at home.   Do Not wear any jewelry or watches, including any metal in body piercings.  Contact Lens must be removed before surgery. Either do not wear the contact lens or bring a case and solution for storage.  Please bring a container for eyeglasses or dentures as required.  Bring any paperwork your physician has provided, such as consent forms,  history and physicals, doctor's orders, etc.   Bring comfortable  clothes that are loose fitting to wear upon discharge. Take into consideration the type of surgery being performed.  Maintain your diet as advised per your physician the day prior to surgery.      Adequate rest the night before surgery is advised.   Park in the Parking lot behind the hospital or in the [facility] across the street from the parking lot. Parking is complimentary.  If you will be discharged the same day as your procedure, please arrange for a responsible adult to drive you home or to accompany you if traveling by taxi.   YOU WILL NOT BE PERMITTED TO DRIVE OR TO LEAVE THE HOSPITAL ALONE AFTER SURGERY.   It is strongly recommended that you arrange for someone to remain with you for the first 24 hrs following your surgery.       Thank you for your cooperation.  The Staff of [facility].        Bathing Instructions                                                                 Please shower the evening before and morning of your procedure with    ANTIBACTERIAL SOAP. ( DIAL, etc )  Concentrate on the surgical area   for at least 3 minutes and rinse completely. Dry off as usual.   Do not use any deodorant, powder, body lotions, perfume, after shave or    cologne.

## 2017-03-28 NOTE — ANESTHESIA PREPROCEDURE EVALUATION
03/28/2017  Javier Valles is a 86 y.o., male.    [facility] Anesthesia Evaluation    I have reviewed the Patient Summary Reports.    I have reviewed the Nursing Notes.   I have reviewed the Medications.     Review of Systems  Anesthesia Hx:  No problems with previous Anesthesia  History of prior surgery of interest to airway management or planning: Previous anesthesia: MAC  Ablation with MAC.    Social:  Non-Smoker    Hematology/Oncology:  Hematology Normal   Oncology Normal     EENT/Dental:EENT/Dental Normal   Cardiovascular:   Pacemaker Hypertension, well controlled Past MI CAD asymptomatic Dysrhythmias atrial fibrillation Ablation 1.0 yrs ago,now pacer rhythm.MI 1.5 yrs ago   Pulmonary:  Pulmonary Normal    Musculoskeletal:   Arthritis     Neurological:  Neurology Normal    Endocrine:  Endocrine Normal    Dermatological:  Skin Normal    Psych:  Psychiatric Normal           Physical Exam  General:  Well nourished      Dental:  Dental Findings: molar caps             Anesthesia Plan  Type of Anesthesia, risks & benefits discussed:  Anesthesia Type:  MAC  Patient's Preference:   Intra-op Monitoring Plan:   Intra-op Monitoring Plan Comments:   Post Op Pain Control Plan:   Post Op Pain Control Plan Comments:   Induction:    Beta Blocker:         Informed Consent: Patient understands risks and agrees with Anesthesia plan.  Questions answered. Anesthesia consent signed with patient.  ASA Score: 3     Day of Surgery Review of History & Physical:    H&P update referred to the surgeon.     Anesthesia Plan Notes: Neel CTR,needs antcub IV        Ready For Surgery From Anesthesia Perspective.

## 2017-03-28 NOTE — PROGRESS NOTES
"Spoke with MD, aware pt had not stopped taking ASA before s/p. Stated "ok" and wants to proceed with s/p. Dorene informed.   "

## 2017-03-28 NOTE — IP AVS SNAPSHOT
[facility] [place], operated by [facility]  [address] [place], [place] [address]  Phone: [phone]           Patient Discharge Instructions    Our goal is to set you up for success. This packet includes information on your condition, medications, and your home care. It will help you to care for yourself so you don't get sicker.     Please ask your nurse if you have any questions.        There are many details to remember when preparing for your surgery. Here is what you will need to do, please ask your nurse if there are more specific instructions and if you have any questions:    1. 24 hours before procedure Do not smoke or drink alcoholic beverages 24 hours prior to your procedure    2. Eating before procedure Do not eat or drink anything 8 hours before your procedure - this includes gum, mints, and candy.     3. Day of procedure Please remove all jewelry for the procedure. If you wear contact lenses, dentures, hearing aids or glasses, bring a container to put them in during your surgery and give to a family member for safekeeping.  If your doctor has scheduled you for an overnight stay, bring a small overnight bag with any personal items that you need.    4. After procedure Make arrangements in advance for transportation home by a responsible adult. It is not safe to drive a vehicle during the 24 hours following surgery.     PLEASE NOTE: You may be contacted the day before your surgery to confirm your surgery date and arrival time. The Surgery schedule has many variables which may affect the time of your surgery case. Family members should be available if your surgery time changes.                [facility]  Unless otherwise directed by your provider, please contact [facility], our nurse care line that is available for 24/7 assistance.     [phone] (toll-free)    Registered nurses in the [facility] provide clinical advisement, health education, appointment booking, and other  advisory services.                    ** Verify the list of medication(s) below is accurate and up to date. Carry this with you in case of emergency. If your medications have changed, please notify your healthcare provider.             Medication List      TAKE these medications        Additional Info                      amlodipine 5 MG tablet   Commonly known as:  NORVASC   Quantity:  30 tablet   Refills:  5   Dose:  5 mg    Instructions:  Take 1 tablet (5 mg total) by mouth once daily.     Begin Date    AM    Noon    PM    Bedtime       apixaban 2.5 mg Tab   Commonly known as:  ELIQUIS   Quantity:  180 tablet   Refills:  3   Dose:  2.5 mg    Instructions:  Take 1 tablet (2.5 mg total) by mouth 2 (two) times daily.     Begin Date    AM    Noon    PM    Bedtime       aspirin 81 MG EC tablet   Commonly known as:  ECOTRIN   Refills:  0   Dose:  81 mg    Instructions:  Take 1 tablet (81 mg total) by mouth once daily.     Begin Date    AM    Noon    PM    Bedtime       brimonidine 0.2% 0.2 % Drop   Commonly known as:  ALPHAGAN   Quantity:  10 mL   Refills:  12   Dose:  1 drop   What changed:  Another medication with the same name was removed. Continue taking this medication, and follow the directions you see here.    Instructions:  Place 1 drop into the left eye 3 (three) times daily. Disp 10 mls for 30 days     Begin Date    AM    Noon    PM    Bedtime       buPROPion 150 MG TB24 tablet   Commonly known as:  WELLBUTRIN XL   Quantity:  30 tablet   Refills:  11   Dose:  150 mg    Instructions:  Take 1 tablet (150 mg total) by mouth once daily.     Begin Date    AM    Noon    PM    Bedtime       co-enzyme Q-10 30 mg capsule   Refills:  0   Dose:  30 mg    Instructions:  Take 30 mg by mouth once daily.     Begin Date    AM    Noon    PM    Bedtime       donepezil 5 MG tablet   Commonly known as:  ARICEPT   Quantity:  30 tablet   Refills:  11   Dose:  5 mg    Instructions:  Take 1 tablet (5 mg total) by mouth every  evening.     Begin Date    AM    Noon    PM    Bedtime       erythromycin ophthalmic ointment   Commonly known as:  ROMYCIN   Quantity:  1 Tube   Refills:  6    Instructions:  Apply to affected eye daily as needed.     Begin Date    AM    Noon    PM    Bedtime       gabapentin 300 MG capsule   Commonly known as:  NEURONTIN   Quantity:  30 capsule   Refills:  11   Dose:  300 mg    Instructions:  Take 1 capsule (300 mg total) by mouth every evening.     Begin Date    AM    Noon    PM    Bedtime       hydrocodone-acetaminophen 5-325mg 5-325 mg per tablet   Commonly known as:  NORCO   Quantity:  30 tablet   Refills:  0   Dose:  1 tablet    Instructions:  Take 1 tablet by mouth every 6 (six) hours as needed for Pain.     Begin Date    AM    Noon    PM    Bedtime       ipratropium 0.03 % nasal spray   Commonly known as:  ATROVENT   Quantity:  30 mL   Refills:  1   Dose:  1-2 spray    Instructions:  1-2 sprays by Nasal route 2 (two) times daily.     Begin Date    AM    Noon    PM    Bedtime       latanoprost 0.005 % ophthalmic solution   Quantity:  1 Bottle   Refills:  12   Dose:  1 drop    Instructions:  Place 1 drop into both eyes every evening.     Begin Date    AM    Noon    PM    Bedtime       losartan 50 MG tablet   Commonly known as:  COZAAR   Quantity:  180 tablet   Refills:  3   Dose:  50 mg   Indications:  Hypertension    Instructions:  Take 1 tablet (50 mg total) by mouth 2 (two) times daily.     Begin Date    AM    Noon    PM    Bedtime       lutein 20 mg Cap   Refills:  0   Dose:  20 mg    Instructions:  Take 20 mg by mouth once daily.     Begin Date    AM    Noon    PM    Bedtime       nitroGLYCERIN 0.4 MG SL tablet   Commonly known as:  NITROSTAT   Quantity:  30 tablet   Refills:  5   Dose:  0.4 mg   Comments:  3 bottles per box    Instructions:  Place 1 tablet (0.4 mg total) under the tongue every 5 (five) minutes as needed for Chest pain.     Begin Date    AM    Noon    PM    Bedtime       rosuvastatin 20  MG tablet   Commonly known as:  CRESTOR   Quantity:  90 tablet   Refills:  3   Dose:  20 mg   Comments:  Patient wants generic    Instructions:  Take 1 tablet (20 mg total) by mouth every evening.     Begin Date    AM    Noon    PM    Bedtime       VITAMIN C 1000 MG tablet   Refills:  0   Dose:  1000 mg   Generic drug:  ascorbic acid (vitamin C)    Instructions:  Take 1,000 mg by mouth every morning.     Begin Date    AM    Noon    PM    Bedtime       VITAMINS & MINERALS ORAL   Refills:  0   Dose:  1 tablet    Instructions:  Take 1 tablet by mouth every morning.     Begin Date    AM    Noon    PM    Bedtime                  Please bring to all follow up appointments:    1. A copy of your discharge instructions.  2. All medicines you are currently taking in their original bottles.  3. Identification and insurance card.    Please arrive 15 minutes ahead of scheduled appointment time.    Please call 24 hours in advance if you must reschedule your appointment and/or time.        Your Scheduled Appointments     Apr 18, 2017  3:30 PM CDT   Back & Spine Established Patient with Florentin Stanford MD   [facility]    [address], [address]  [facility] [address]   [phone]            May 04, 2017  9:00 AM CDT   Established Patient Visit with Kaley Barlow MD   [place] [facility])    [address]  [place] [id number] [phone]            May 17, 2017  8:00 AM CDT   Remote Interrogation with HOME MONITOR DEVICE CHECK, [facility] ([facility] )    [address]  [place] [id number] [phone]            Jun 06, 2017  7:50 AM CDT   Fasting Lab with LAB, APPOINTMENT [place] [facility] ([facility])    [address] [id number] [phone]              Your Future Surgeries/Procedures     Mar 29, 2017   Surgery with Nam Chong Jr., MD   [facility])    [address]  [facility] [address]   [phone]                  Discharge Instructions       PRE-ADMIT TESTING -  [phone]    [address]  [facility]        OUTPATIENT SURGERY UNIT - [phone]    Your surgery has been scheduled at [facility]. We are pleased to have the opportunity to serve you. For Further Information please call [phone].    On the day of surgery please report to the Information Desk on the 1st floor.    CONTACT YOUR PHYSICIAN'S OFFICE THE DAY PRIOR TO YOUR SURGERY TO OBTAIN YOUR ARRIVAL TIME.     The evening before surgery do not eat anything after 9 p.m. ( this includes hard candy, chewing gum and mints).  You may have GATORADE, POWERADE AND WATER FROM 9 p.m. until leaving home to come to the hospital.   DO NOT DRINK ANY LIQUIDS ON THE WAY TO THE HOSPITAL.     SPECIAL MEDICATION INSTRUCTIONS: TAKE medications checked off by the Anesthesiologist on your Medication List.    Angiogram Patients: Take medications as instructed by your physician, including aspirin.     Surgery Patients:    If you take ASPIRIN - Your PHYSICIAN/SURGEON will need to inform you IF/OR when you need to stop taking aspirin prior to your surgery.     Do Not take any medications containing IBUPROFEN.  Do Not Wear any make-up or dark nail polish   (especially eye make-up) to surgery. If you come to surgery with makeup on you will be required to remove the makeup or nail polish.    Do not shave your surgical area at least 5 days prior to your surgery. The surgical prep will be performed at the hospital according to Infection Control regulations.    Leave all valuables at home.   Do Not wear any jewelry or watches, including any metal in body piercings.  Contact Lens must be removed before surgery. Either do not wear the contact lens or bring a case and solution for storage.  Please bring a container for eyeglasses or dentures as  "required.  Bring any paperwork your physician has provided, such as consent forms,  history and physicals, doctor's orders, etc.   Bring comfortable clothes that are loose fitting to wear upon discharge. Take into consideration the type of surgery being performed.  Maintain your diet as advised per your physician the day prior to surgery.      Adequate rest the night before surgery is advised.   Park in the Parking lot behind the hospital or in the [facility] across the street from the parking lot. Parking is complimentary.  If you will be discharged the same day as your procedure, please arrange for a responsible adult to drive you home or to accompany you if traveling by taxi.   YOU WILL NOT BE PERMITTED TO DRIVE OR TO LEAVE THE HOSPITAL ALONE AFTER SURGERY.   It is strongly recommended that you arrange for someone to remain with you for the first 24 hrs following your surgery.       Thank you for your cooperation.  The Staff of [facility].        Bathing Instructions                                                                 Please shower the evening before and morning of your procedure with    ANTIBACTERIAL SOAP. ( DIAL, etc )  Concentrate on the surgical area   for at least 3 minutes and rinse completely. Dry off as usual.   Do not use any deodorant, powder, body lotions, perfume, after shave or    cologne.                                                Admission Information     Date & Time Provider Department CSN    3/28/2017  8:30 AM Nam Chong Jr., MD [facility] [id number]      Care Providers     Provider Role Specialty Primary office phone    Nam Chong Jr., MD Attending Provider Hand Surgery [phone]      Your Vitals Were     BP Pulse Temp Height Weight SpO2    120/66 57 98.3 °F (36.8 °C) (Oral) 5' 8" (1.727 m) 83.9 kg (185 lb) 96%    BMI                28.13 kg/m2          Recent Lab Values        3/6/2008                           " 2:17 PM           A1C 6.2                       Allergies as of 3/28/2017        Reactions    Ace Inhibitors Other (See Comments)    Cough    Lipitor [Atorvastatin]     Myositis/elevated CPK    Pravastatin Other (See Comments)    Severe myalgias/elevated CPK    Statins-hmg-coa Reductase Inhibitors     Muscle pain and loss of muscle      Advance Directives     An advance directive is a document which, in the event you are no longer able to make decisions for yourself, tells your healthcare team what kind of treatment you do or do not want to receive, or who you would like to make those decisions for you.  If you do not currently have an advance directive, [facility] encourages you to create one.  For more information call:  [phone]), [phone] ([phone]),  or log on to [email or web address].        Smoking Cessation     If you would like to quit smoking:   You may be eligible for free services if you are a [place] resident and started smoking cigarettes before September 1, 1988.  Call the Smoking Cessation Trust (SCT) toll free at (535) 639-6178 or (476) 772-3883.   Call 5-820-QUIT-NOW if you do not meet the above criteria.            Language Assistance Services     ATTENTION: Language assistance services are available, free of charge. Please call [phone].      ATENCIÓN: Si habla español, tiene a arambula disposición servicios gratuitos de asistencia lingüística. Llame al [phone].     CHÚ Ý: N?u b?n nói Ti?ng Vi?t, có các d?ch v? h? tr? ngôn ng? mi?n phí dành cho b?n. G?i s? [phone].        Chronic Kindey Disease Education             [employer] [facility] complies with applicable Federal civil rights laws and does not discriminate on the basis of race, color, national origin, age, disability, or sex.

## 2017-03-29 ENCOUNTER — ANESTHESIA (OUTPATIENT)
Dept: SURGERY | Facility: OTHER | Age: 82
End: 2017-03-29
Payer: MEDICARE

## 2017-03-29 ENCOUNTER — SURGERY (OUTPATIENT)
Age: 82
End: 2017-03-29

## 2017-03-29 ENCOUNTER — HOSPITAL ENCOUNTER (OUTPATIENT)
Facility: OTHER | Age: 82
Discharge: HOME | End: 2017-03-29
Attending: ORTHOPAEDIC SURGERY | Admitting: ORTHOPAEDIC SURGERY
Payer: MEDICARE

## 2017-03-29 VITALS
TEMPERATURE: 98 F | DIASTOLIC BLOOD PRESSURE: 78 MMHG | SYSTOLIC BLOOD PRESSURE: 160 MMHG | HEART RATE: 50 BPM | RESPIRATION RATE: 16 BRPM | OXYGEN SATURATION: 100 % | WEIGHT: 185 LBS | BODY MASS INDEX: 28.04 KG/M2 | HEIGHT: 68 IN

## 2017-03-29 DIAGNOSIS — G56.03 BILATERAL CARPAL TUNNEL SYNDROME: ICD-10-CM

## 2017-03-29 PROCEDURE — 36000707: Performed by: ORTHOPAEDIC SURGERY

## 2017-03-29 PROCEDURE — 25000003 PHARM REV CODE 250: Performed by: ORTHOPAEDIC SURGERY

## 2017-03-29 PROCEDURE — 25000003 PHARM REV CODE 250: Performed by: ANESTHESIOLOGY

## 2017-03-29 PROCEDURE — 37000009 HC ANESTHESIA EA ADD 15 MINS: Performed by: ORTHOPAEDIC SURGERY

## 2017-03-29 PROCEDURE — 25000003 PHARM REV CODE 250: Performed by: NURSE ANESTHETIST, CERTIFIED REGISTERED

## 2017-03-29 PROCEDURE — 71000015 HC POSTOP RECOV 1ST HR: Performed by: ORTHOPAEDIC SURGERY

## 2017-03-29 PROCEDURE — 64721 CARPAL TUNNEL SURGERY: CPT | Mod: 50,,, | Performed by: ORTHOPAEDIC SURGERY

## 2017-03-29 PROCEDURE — 37000008 HC ANESTHESIA 1ST 15 MINUTES: Performed by: ORTHOPAEDIC SURGERY

## 2017-03-29 PROCEDURE — 63600175 PHARM REV CODE 636 W HCPCS: Performed by: NURSE ANESTHETIST, CERTIFIED REGISTERED

## 2017-03-29 PROCEDURE — 36000706: Performed by: ORTHOPAEDIC SURGERY

## 2017-03-29 PROCEDURE — 71000016 HC POSTOP RECOV ADDL HR: Performed by: ORTHOPAEDIC SURGERY

## 2017-03-29 PROCEDURE — 63600175 PHARM REV CODE 636 W HCPCS: Performed by: ORTHOPAEDIC SURGERY

## 2017-03-29 RX ORDER — LIDOCAINE HYDROCHLORIDE 10 MG/ML
1 INJECTION, SOLUTION EPIDURAL; INFILTRATION; INTRACAUDAL; PERINEURAL ONCE
Status: DISCONTINUED | OUTPATIENT
Start: 2017-03-29 | End: 2017-03-29 | Stop reason: HOSPADM

## 2017-03-29 RX ORDER — OXYCODONE HYDROCHLORIDE 5 MG/1
10 TABLET ORAL EVERY 4 HOURS PRN
Status: DISCONTINUED | OUTPATIENT
Start: 2017-03-29 | End: 2017-03-29 | Stop reason: HOSPADM

## 2017-03-29 RX ORDER — SODIUM CHLORIDE, SODIUM LACTATE, POTASSIUM CHLORIDE, CALCIUM CHLORIDE 600; 310; 30; 20 MG/100ML; MG/100ML; MG/100ML; MG/100ML
INJECTION, SOLUTION INTRAVENOUS CONTINUOUS
Status: DISCONTINUED | OUTPATIENT
Start: 2017-03-29 | End: 2017-03-29 | Stop reason: HOSPADM

## 2017-03-29 RX ORDER — LIDOCAINE HCL/PF 100 MG/5ML
SYRINGE (ML) INTRAVENOUS
Status: DISCONTINUED | OUTPATIENT
Start: 2017-03-29 | End: 2017-03-29

## 2017-03-29 RX ORDER — FENTANYL CITRATE 50 UG/ML
25 INJECTION, SOLUTION INTRAMUSCULAR; INTRAVENOUS EVERY 5 MIN PRN
Status: DISCONTINUED | OUTPATIENT
Start: 2017-03-29 | End: 2017-03-29 | Stop reason: HOSPADM

## 2017-03-29 RX ORDER — FENTANYL CITRATE 50 UG/ML
INJECTION, SOLUTION INTRAMUSCULAR; INTRAVENOUS
Status: DISCONTINUED | OUTPATIENT
Start: 2017-03-29 | End: 2017-03-29

## 2017-03-29 RX ORDER — CEFAZOLIN SODIUM 2 G/50ML
2 SOLUTION INTRAVENOUS
Status: DISCONTINUED | OUTPATIENT
Start: 2017-03-29 | End: 2017-03-29 | Stop reason: HOSPADM

## 2017-03-29 RX ORDER — KETOROLAC TROMETHAMINE 30 MG/ML
15 INJECTION, SOLUTION INTRAMUSCULAR; INTRAVENOUS ONCE
Status: DISCONTINUED | OUTPATIENT
Start: 2017-03-29 | End: 2017-03-29 | Stop reason: HOSPADM

## 2017-03-29 RX ORDER — MEPERIDINE HYDROCHLORIDE 50 MG/ML
12.5 INJECTION INTRAMUSCULAR; INTRAVENOUS; SUBCUTANEOUS ONCE AS NEEDED
Status: DISCONTINUED | OUTPATIENT
Start: 2017-03-29 | End: 2017-03-29 | Stop reason: HOSPADM

## 2017-03-29 RX ORDER — PROPOFOL 10 MG/ML
VIAL (ML) INTRAVENOUS CONTINUOUS PRN
Status: DISCONTINUED | OUTPATIENT
Start: 2017-03-29 | End: 2017-03-29

## 2017-03-29 RX ORDER — SODIUM CHLORIDE 9 MG/ML
INJECTION, SOLUTION INTRAVENOUS CONTINUOUS
Status: DISCONTINUED | OUTPATIENT
Start: 2017-03-29 | End: 2017-03-29 | Stop reason: HOSPADM

## 2017-03-29 RX ORDER — BUPIVACAINE HYDROCHLORIDE 5 MG/ML
INJECTION, SOLUTION EPIDURAL; INTRACAUDAL
Status: DISCONTINUED | OUTPATIENT
Start: 2017-03-29 | End: 2017-03-29 | Stop reason: HOSPADM

## 2017-03-29 RX ORDER — ONDANSETRON 2 MG/ML
INJECTION INTRAMUSCULAR; INTRAVENOUS
Status: DISCONTINUED | OUTPATIENT
Start: 2017-03-29 | End: 2017-03-29

## 2017-03-29 RX ORDER — HYDROCODONE BITARTRATE AND ACETAMINOPHEN 5; 325 MG/1; MG/1
1 TABLET ORAL EVERY 4 HOURS PRN
Qty: 30 TABLET | Refills: 0 | Status: SHIPPED | OUTPATIENT
Start: 2017-03-29 | End: 2017-05-01 | Stop reason: SDUPTHER

## 2017-03-29 RX ORDER — MIDAZOLAM HYDROCHLORIDE 1 MG/ML
INJECTION INTRAMUSCULAR; INTRAVENOUS
Status: DISCONTINUED | OUTPATIENT
Start: 2017-03-29 | End: 2017-03-29

## 2017-03-29 RX ORDER — ONDANSETRON 8 MG/1
8 TABLET, ORALLY DISINTEGRATING ORAL EVERY 8 HOURS PRN
Status: DISCONTINUED | OUTPATIENT
Start: 2017-03-29 | End: 2017-03-29 | Stop reason: HOSPADM

## 2017-03-29 RX ORDER — HYDROMORPHONE HYDROCHLORIDE 2 MG/ML
0.4 INJECTION, SOLUTION INTRAMUSCULAR; INTRAVENOUS; SUBCUTANEOUS EVERY 5 MIN PRN
Status: DISCONTINUED | OUTPATIENT
Start: 2017-03-29 | End: 2017-03-29 | Stop reason: HOSPADM

## 2017-03-29 RX ORDER — SODIUM CHLORIDE 0.9 % (FLUSH) 0.9 %
3 SYRINGE (ML) INJECTION
Status: DISCONTINUED | OUTPATIENT
Start: 2017-03-29 | End: 2017-03-29 | Stop reason: HOSPADM

## 2017-03-29 RX ORDER — ONDANSETRON 2 MG/ML
4 INJECTION INTRAMUSCULAR; INTRAVENOUS ONCE AS NEEDED
Status: DISCONTINUED | OUTPATIENT
Start: 2017-03-29 | End: 2017-03-29 | Stop reason: HOSPADM

## 2017-03-29 RX ORDER — ACETAMINOPHEN 325 MG/1
650 TABLET ORAL EVERY 4 HOURS PRN
Status: DISCONTINUED | OUTPATIENT
Start: 2017-03-29 | End: 2017-03-29 | Stop reason: HOSPADM

## 2017-03-29 RX ORDER — OXYCODONE HYDROCHLORIDE 5 MG/1
5 TABLET ORAL
Status: DISCONTINUED | OUTPATIENT
Start: 2017-03-29 | End: 2017-03-29 | Stop reason: HOSPADM

## 2017-03-29 RX ADMIN — ONDANSETRON 4 MG: 2 INJECTION INTRAMUSCULAR; INTRAVENOUS at 09:03

## 2017-03-29 RX ADMIN — MIDAZOLAM HYDROCHLORIDE 2 MG: 1 INJECTION, SOLUTION INTRAMUSCULAR; INTRAVENOUS at 08:03

## 2017-03-29 RX ADMIN — SODIUM CHLORIDE, SODIUM LACTATE, POTASSIUM CHLORIDE, AND CALCIUM CHLORIDE: 600; 310; 30; 20 INJECTION, SOLUTION INTRAVENOUS at 07:03

## 2017-03-29 RX ADMIN — LIDOCAINE HYDROCHLORIDE 50 MG: 20 INJECTION, SOLUTION INTRAVENOUS at 08:03

## 2017-03-29 RX ADMIN — BUPIVACAINE HYDROCHLORIDE 10 ML: 5 INJECTION, SOLUTION EPIDURAL; INTRACAUDAL; PERINEURAL at 08:03

## 2017-03-29 RX ADMIN — PROPOFOL 25 MCG/KG/MIN: 10 INJECTION, EMULSION INTRAVENOUS at 08:03

## 2017-03-29 RX ADMIN — FENTANYL CITRATE 50 MCG: 50 INJECTION, SOLUTION INTRAMUSCULAR; INTRAVENOUS at 08:03

## 2017-03-29 RX ADMIN — CEFAZOLIN SODIUM 2 G: 2 SOLUTION INTRAVENOUS at 08:03

## 2017-03-29 NOTE — ANESTHESIA POSTPROCEDURE EVALUATION
"Anesthesia Post Evaluation    Patient: Javier Valles    Procedure(s) Performed: Procedure(s) (LRB):  RELEASE-CARPAL TUNNEL (Bilateral)    Final Anesthesia Type: general  Patient location during evaluation: [facility]  Patient participation: Yes- Able to Participate  Level of consciousness: awake and alert  Post-procedure vital signs: reviewed and stable  Airway patency: patent  PONV status at discharge: No PONV  Anesthetic complications: no      Cardiovascular status: hemodynamically stable  Respiratory status: unassisted, spontaneous ventilation and room air  Hydration status: euvolemic  Follow-up not needed.        Visit Vitals    BP (!) 189/70 (BP Location: Left arm, Patient Position: Lying, BP Method: Automatic)    Pulse (!) 49    Temp 36.8 °C (98.3 °F) (Oral)    Resp 16    Ht 5' 8" (1.727 m)    Wt 83.9 kg (185 lb)    SpO2 98%    BMI 28.13 kg/m2       Pain/Yojana Score: Pain Assessment Performed: Yes (3/29/2017  6:30 AM)  Presence of Pain: complains of pain/discomfort (3/29/2017  6:30 AM)      "

## 2017-03-29 NOTE — DISCHARGE INSTRUCTIONS
Anesthesia: Monitored Anesthesia Care (MAC)  Youre due to have surgery. During surgery, youll be given medication called anesthesia. This will keep you comfortable and pain-free. Your surgeon will use monitored anesthesia care (MAC). This sheet tells you more about this type of anesthesia.    Discharge Instructions for Carpal Tunnel Release  You had a carpal tunnel release procedure to help relieve the symptoms of carpal tunnel syndrome. In carpal tunnel syndrome, a nerve in the wrist is compressed and irritated. This causes numbness and pain in the fingers and hand. Carpal tunnel release relieves the compression of the nerve. Here are instructions that will help you care for your arm and wrist when you are at home.  Home care  · Avoid gripping objects tightly or lifting with your affected arm.  · Wear your bandage, splint, or cast as directed by your doctor.  · Always keep the dressing, splint, or cast dry and clean.  · When showering, cover your hand and  wrist with plastic and use tape or rubberbands to keep the dressing, splint, or cast dry. Shower as necessary.    · Use an ice pack or bag of frozen peas -- or something similar -- wrapped in a thin towel on your wrist to reduce swelling for the first 48 hours. Leave the ice pack on for 20 minutes; then take it off for 20 minutes. Repeat as needed.  · Keep your arm elevated above your heart for 24 to 48 hours after surgery.  · Do the exercises you learned in the hospital, or as instructed by your doctor.  · Take pain medicine as directed.  · Dont drive until your doctor says its OK. Never drive while you are taking opioid pain medicine.  · Ask your doctor when you can return to work. If your job requires heavy lifting, you may not be able to begin working again for several weeks.  Follow-up care  Make a follow-up appointment as directed by your doctor.     When to seek medical care  Call 911 right away if you have any of the following:  · Chest  pain  · Shortness of breath  Otherwise, call your doctor immediately if you have any of the following:  · A splint, cast, or dressing that has gotten wet  · Increased bleeding or drainage from the incision (cut)  · Opening of the incision  · Fever above 100.4°F (38.9°C) taken by mouth, or shaking chills  · Any new numbness in the fingers or thumb  · Blue hand or fingers  · Increased pain with or without activity  · Increased redness, tenderness, or swelling of the incision   Date Last Reviewed: 11/15/2015  © 8230-6932 [employer]. [address] [place] [address]. All rights reserved. This information is not intended as a substitute for professional medical care. Always follow your healthcare professional's instructions.        What is monitored anesthesia care?  MAC keeps you very drowsy during surgery. You may be awake, but you will likely not remember much. And you wont feel pain. With MAC, medications are given through an IV line into a vein in your arm or hand. A local anesthetic will usually be injected into the skin and muscle around the surgical site to numb it. The anesthesia provider monitors you during the procedure. He or she checks your heart rate and rhythm, blood pressure, and blood oxygen level.  Anesthesia tools and medications that may be near you during your procedure  You will likely have:  · A pulse oximeter on the end of your finger. This measures your blood oxygen level.  · Electrocardiography leads (electrodes) on your chest. These record your heart rate and rhythm.  · Medications given through an IV. These relax you and prevent pain. You may be awake or sleep lightly. If you have local anesthetic, it is injected directly into your skin.  · A facemask to give you oxygen, if needed.  Risks and Possible Complications  MAC has some risks. These include:  · Breathing problems  · Nausea and vomiting  · Allergic reaction to the anesthetic    Anesthesia safety  · Follow all  instructions you are given for how long not to eat or drink before your procedure.  · Be sure your doctor knows what medications you take, especially any anti-inflammatory medication or blood thinners. This includes aspirin and any other over-the-counter medications, herbs, and supplements.  · Have an adult family member or friend drive you home after the procedure.  · For the first 24 hours after your surgery:  ¨ Do not drive or use heavy equipment.  ¨ Do not make important decisions or sign documents.  ¨ Avoid alcohol.  ¨ Have someone stay with you, if possible. They can watch for problems and help keep you safe.  Date Last Reviewed: 10/16/2014  © 5905-1557 [employer]. [address] [place] [address], [place] [address]. All rights reserved. This information is not intended as a substitute for professional medical care. Always follow your healthcare professional's instructions.

## 2017-03-29 NOTE — IP AVS SNAPSHOT
[facility] ([facility])  [address] [place], [place] [address]  Phone: [phone]           Patient Discharge Instructions   Our goal is to set you up for success. This packet includes information on your condition, medications, and your home care.  It will help you care for yourself to prevent having to return to the hospital.     Please ask your nurse if you have any questions.      There are many details to remember when preparing to leave the hospital. Here is what you will need to do:    1. Take your medicine. If you are prescribed medications, review your Medication List on the following pages. You may have new medications to  at the pharmacy and others that you'll need to stop taking. Review the instructions for how and when to take your medications. Talk with your doctor or nurses if you are unsure of what to do.     2. Go to your follow-up appointments. Specific follow-up information is listed in the following pages. Your may be contacted by a nurse or clinical provider about future appointments. Be sure we have all of the phone numbers to reach you. Please contact your provider's office if you are unable to make an appointment.     3. Watch for warning signs. Your doctor or nurse will give you detailed warning signs to watch for and when to call for assistance. These instructions may also include educational information about your condition. If you experience any of warning signs to your health, call your doctor.               ** Verify the list of medication(s) below is accurate and up to date. Carry this with you in case of emergency. If your medications have changed, please notify your healthcare provider.             Medication List      CHANGE how you take these medications        Additional Info                      * hydrocodone-acetaminophen 5-325mg 5-325 mg per tablet   Commonly known as:  NORCO   Quantity:  30 tablet   Refills:  0   Dose:  1 tablet   What changed:   Another medication with the same name was added. Make sure you understand how and when to take each.    Instructions:  Take 1 tablet by mouth every 6 (six) hours as needed for Pain.     Begin Date    AM    Noon    PM    Bedtime       * hydrocodone-acetaminophen 5-325mg 5-325 mg per tablet   Commonly known as:  NORCO   Quantity:  30 tablet   Refills:  0   Dose:  1 tablet   What changed:  You were already taking a medication with the same name, and this prescription was added. Make sure you understand how and when to take each.    Instructions:  Take 1 tablet by mouth every 4 (four) hours as needed for Pain.     Begin Date    AM    Noon    PM    Bedtime       * Notice:  This list has 2 medication(s) that are the same as other medications prescribed for you. Read the directions carefully, and ask your doctor or other care provider to review them with you.      CONTINUE taking these medications        Additional Info                      amlodipine 5 MG tablet   Commonly known as:  NORVASC   Quantity:  30 tablet   Refills:  5   Dose:  5 mg    Instructions:  Take 1 tablet (5 mg total) by mouth once daily.     Begin Date    AM    Noon    PM    Bedtime       apixaban 2.5 mg Tab   Commonly known as:  ELIQUIS   Quantity:  180 tablet   Refills:  3   Dose:  2.5 mg    Instructions:  Take 1 tablet (2.5 mg total) by mouth 2 (two) times daily.     Begin Date    AM    Noon    PM    Bedtime       aspirin 81 MG EC tablet   Commonly known as:  ECOTRIN   Refills:  0   Dose:  81 mg    Instructions:  Take 1 tablet (81 mg total) by mouth once daily.     Begin Date    AM    Noon    PM    Bedtime       brimonidine 0.2% 0.2 % Drop   Commonly known as:  ALPHAGAN   Quantity:  10 mL   Refills:  12   Dose:  1 drop    Instructions:  Place 1 drop into the left eye 3 (three) times daily. Disp 10 mls for 30 days     Begin Date    AM    Noon    PM    Bedtime       buPROPion 150 MG TB24 tablet   Commonly known as:  WELLBUTRIN XL   Quantity:  30 tablet    Refills:  11   Dose:  150 mg    Instructions:  Take 1 tablet (150 mg total) by mouth once daily.     Begin Date    AM    Noon    PM    Bedtime       co-enzyme Q-10 30 mg capsule   Refills:  0   Dose:  30 mg    Instructions:  Take 30 mg by mouth once daily.     Begin Date    AM    Noon    PM    Bedtime       donepezil 5 MG tablet   Commonly known as:  ARICEPT   Quantity:  30 tablet   Refills:  11   Dose:  5 mg    Instructions:  Take 1 tablet (5 mg total) by mouth every evening.     Begin Date    AM    Noon    PM    Bedtime       ipratropium 0.03 % nasal spray   Commonly known as:  ATROVENT   Quantity:  30 mL   Refills:  1   Dose:  1-2 spray    Instructions:  1-2 sprays by Nasal route 2 (two) times daily.     Begin Date    AM    Noon    PM    Bedtime       latanoprost 0.005 % ophthalmic solution   Quantity:  1 Bottle   Refills:  12   Dose:  1 drop    Instructions:  Place 1 drop into both eyes every evening.     Begin Date    AM    Noon    PM    Bedtime       losartan 50 MG tablet   Commonly known as:  COZAAR   Quantity:  180 tablet   Refills:  3   Dose:  50 mg   Indications:  Hypertension    Instructions:  Take 1 tablet (50 mg total) by mouth 2 (two) times daily.     Begin Date    AM    Noon    PM    Bedtime       lutein 20 mg Cap   Refills:  0   Dose:  20 mg    Instructions:  Take 20 mg by mouth once daily.     Begin Date    AM    Noon    PM    Bedtime       nitroGLYCERIN 0.4 MG SL tablet   Commonly known as:  NITROSTAT   Quantity:  30 tablet   Refills:  5   Dose:  0.4 mg   Comments:  3 bottles per box    Instructions:  Place 1 tablet (0.4 mg total) under the tongue every 5 (five) minutes as needed for Chest pain.     Begin Date    AM    Noon    PM    Bedtime       rosuvastatin 20 MG tablet   Commonly known as:  CRESTOR   Quantity:  90 tablet   Refills:  3   Dose:  20 mg   Comments:  Patient wants generic    Instructions:  Take 1 tablet (20 mg total) by mouth every evening.     Begin Date    AM    Noon    PM     Bedtime       VITAMIN C 1000 MG tablet   Refills:  0   Dose:  1000 mg   Generic drug:  ascorbic acid (vitamin C)    Instructions:  Take 1,000 mg by mouth every morning.     Begin Date    AM    Noon    PM    Bedtime       VITAMINS & MINERALS ORAL   Refills:  0   Dose:  1 tablet    Instructions:  Take 1 tablet by mouth every morning.     Begin Date    AM    Noon    PM    Bedtime            Where to Get Your Medications      You can get these medications from any pharmacy     Bring a paper prescription for each of these medications     hydrocodone-acetaminophen 5-325mg 5-325 mg per tablet                  Please bring to all follow up appointments:    1. A copy of your discharge instructions.  2. All medicines you are currently taking in their original bottles.  3. Identification and insurance card.    Please arrive 15 minutes ahead of scheduled appointment time.    Please call 24 hours in advance if you must reschedule your appointment and/or time.        Your Scheduled Appointments     Apr 18, 2017  3:30 PM CDT   Back & Spine Established Patient with Florentin Stanford MD   [facility]    [address], [address]  [facility] [address]   [phone]            May 04, 2017  9:00 AM CDT   Established Patient Visit with Kaley Barlow MD   [place] [facility] [place]    [address] [facility] [address] [facility]  [place] [id number] [phone]            May 17, 2017  8:00 AM CDT   Remote Interrogation with HOME MONITOR DEVICE CHECK, [facility] ([facility] )    [address]  [place] [address] [phone]            Jun 06, 2017  7:50 AM CDT   Fasting Lab with LAB, APPOINTMENT [place] [facility] ([facility])    [address] [facility] [address] [phone]              Follow-up Information     Follow up with [facility] In 1 week.    Specialty:  Orthopedics    Why:  For wound re-check     Contact information:    [address], [address]  [facility] [address] [phone]    Additional information:    [facility], [address]th [address], [address]        Discharge Instructions     Future Orders    Call MD for:  persistent nausea and vomiting     Call MD for:  severe uncontrolled pain     Call MD for:  temperature >100.4     Diet general     Questions:    Total calories:      Fat restriction, if any:      Protein restriction, if any:      Na restriction, if any:      Fluid restriction:      Additional restrictions:      Keep surgical extremity elevated     Remove dressing in 24 hours     Shower on day dressing removed (No bath)         Discharge Instructions         Anesthesia: Monitored Anesthesia Care (MAC)  Youre due to have surgery. During surgery, youll be given medication called anesthesia. This will keep you comfortable and pain-free. Your surgeon will use monitored anesthesia care (MAC). This sheet tells you more about this type of anesthesia.    Discharge Instructions for Carpal Tunnel Release  You had a carpal tunnel release procedure to help relieve the symptoms of carpal tunnel syndrome. In carpal tunnel syndrome, a nerve in the wrist is compressed and irritated. This causes numbness and pain in the fingers and hand. Carpal tunnel release relieves the compression of the nerve. Here are instructions that will help you care for your arm and wrist when you are at home.  Home care  · Avoid gripping objects tightly or lifting with your affected arm.  · Wear your bandage, splint, or cast as directed by your doctor.  · Always keep the dressing, splint, or cast dry and clean.  · When showering, cover your hand and  wrist with plastic and use tape or rubberbands to keep the dressing, splint, or cast dry. Shower as necessary.    · Use an ice pack or bag of frozen peas -- or something similar -- wrapped in a thin towel on your wrist to reduce swelling for the first 48 hours. Leave the  ice pack on for 20 minutes; then take it off for 20 minutes. Repeat as needed.  · Keep your arm elevated above your heart for 24 to 48 hours after surgery.  · Do the exercises you learned in the hospital, or as instructed by your doctor.  · Take pain medicine as directed.  · Dont drive until your doctor says its OK. Never drive while you are taking opioid pain medicine.  · Ask your doctor when you can return to work. If your job requires heavy lifting, you may not be able to begin working again for several weeks.  Follow-up care  Make a follow-up appointment as directed by your doctor.     When to seek medical care  Call 911 right away if you have any of the following:  · Chest pain  · Shortness of breath  Otherwise, call your doctor immediately if you have any of the following:  · A splint, cast, or dressing that has gotten wet  · Increased bleeding or drainage from the incision (cut)  · Opening of the incision  · Fever above 100.4°F (38.9°C) taken by mouth, or shaking chills  · Any new numbness in the fingers or thumb  · Blue hand or fingers  · Increased pain with or without activity  · Increased redness, tenderness, or swelling of the incision   Date Last Reviewed: 11/15/2015  © 6912-8652 [employer]. [address] [place] [address], [place] [address]. All rights reserved. This information is not intended as a substitute for professional medical care. Always follow your healthcare professional's instructions.        What is monitored anesthesia care?  MAC keeps you very drowsy during surgery. You may be awake, but you will likely not remember much. And you wont feel pain. With MAC, medications are given through an IV line into a vein in your arm or hand. A local anesthetic will usually be injected into the skin and muscle around the surgical site to numb it. The anesthesia provider monitors you during the procedure. He or she checks your heart rate and rhythm, blood pressure, and blood oxygen  level.  Anesthesia tools and medications that may be near you during your procedure  You will likely have:  · A pulse oximeter on the end of your finger. This measures your blood oxygen level.  · Electrocardiography leads (electrodes) on your chest. These record your heart rate and rhythm.  · Medications given through an IV. These relax you and prevent pain. You may be awake or sleep lightly. If you have local anesthetic, it is injected directly into your skin.  · A facemask to give you oxygen, if needed.  Risks and Possible Complications  MAC has some risks. These include:  · Breathing problems  · Nausea and vomiting  · Allergic reaction to the anesthetic    Anesthesia safety  · Follow all instructions you are given for how long not to eat or drink before your procedure.  · Be sure your doctor knows what medications you take, especially any anti-inflammatory medication or blood thinners. This includes aspirin and any other over-the-counter medications, herbs, and supplements.  · Have an adult family member or friend drive you home after the procedure.  · For the first 24 hours after your surgery:  ¨ Do not drive or use heavy equipment.  ¨ Do not make important decisions or sign documents.  ¨ Avoid alcohol.  ¨ Have someone stay with you, if possible. They can watch for problems and help keep you safe.  Date Last Reviewed: 10/16/2014  © 3637-4608 [employer]. [address] [place] [address], [place] [address]. All rights reserved. This information is not intended as a substitute for professional medical care. Always follow your healthcare professional's instructions.            Primary Diagnosis     Your primary diagnosis was:  Bilateral Carpal Tunnel Syndrome      Admission Information     Date & Time Provider Department [facility]    3/29/2017  6:14 AM Nam Chong Jr., MD [facility] [phone]      Care Providers     Provider Role Specialty Primary office phone    Nam Chong Jr., MD  "Attending Provider Hand Surgery [phone]    Nam Chong Jr., MD Surgeon  Hand Surgery [phone]      Your Vitals Were     BP Pulse Temp Resp Height Weight    154/83 (BP Location: Right arm, Patient Position: Lying, BP Method: Automatic) 50 98 °F (36.7 °C) (Oral) 16 5' 8" (1.727 m) 83.9 kg (185 lb)    SpO2 BMI             100% 28.13 kg/m2         Recent Lab Values        3/6/2008                           2:17 PM           A1C 6.2                       Allergies as of 3/29/2017        Reactions    Ace Inhibitors Other (See Comments)    Cough    Lipitor [Atorvastatin]     Myositis/elevated CPK    Pravastatin Other (See Comments)    Severe myalgias/elevated CPK    Statins-hmg-coa Reductase Inhibitors     Muscle pain and loss of muscle      [facility]     [facility] Nurse Care Line - 24/7 Assistance  Unless otherwise directed by your provider, please contact [facility], our nurse care line that is available for 24/7 assistance.     Registered nurses in the [facility] provide clinical advisement, health education, appointment booking, and other advisory services.  Call for this free service at [phone].        Advance Directives     An advance directive is a document which, in the event you are no longer able to make decisions for yourself, tells your healthcare team what kind of treatment you do or do not want to receive, or who you would like to make those decisions for you.  If you do not currently have an advance directive, [facility] encourages you to create one.  For more information call:  [phone] ([phone]), [phone] ([phone]),  or log on to [email or web address].        Smoking Cessation     If you would like to quit smoking:   You may be eligible for free services if you are a [place] resident and started smoking cigarettes before September 1, 1988.  Call the Smoking Cessation Trust (SCT) toll free at (721) 266-3730 or (825) 157-7320.   Call " 1-800-QUIT-NOW if you do not meet the above criteria.            Language Assistance Services     ATTENTION: Language assistance services are available, free of charge. Please call [phone].      ATENCIÓN: Si habjose e min, tiene a arambula disposición servicios gratuitos de asistencia lingüística. Llame al [phone].     CHÚ Ý: N?u b?n nói Ti?ng Vi?t, có các d?ch v? h? tr? ngôn ng? mi?n phí dành cho b?n. G?i s? [phone].        Chronic Kindey Disease Education             [employer] [facility] complies with applicable Federal civil rights laws and does not discriminate on the basis of race, color, national origin, age, disability, or sex.

## 2017-03-29 NOTE — BRIEF OP NOTE
[facility]  Brief Operative Note     SUMMARY     Surgery Date: 3/29/2017     Surgeon(s) and Role:     * Nam Chong Jr., MD - Primary    Assisting Surgeon: None    Pre-op Diagnosis:  Bilateral carpal tunnel syndrome [G56.03]    Post-op Diagnosis:  Post-Op Diagnosis Codes:     * Bilateral carpal tunnel syndrome [G56.03]    Procedure(s) (LRB):  RELEASE-CARPAL TUNNEL (Bilateral)    Anesthesia: Local MAC    Description of the findings of the procedure: Neel CTS    Findings/Key Components: NEEL CTR    Estimated Blood Loss: * No values recorded between 3/29/2017  8:24 AM and 3/29/2017  9:26 AM *         Specimens:   Specimen     None          Discharge Note    SUMMARY     Admit Date: 3/29/2017    Discharge Date and Time:  03/29/2017 9:26 AM    Hospital Course (synopsis of major diagnoses, care, treatment, and services provided during the course of the hospital stay): see op note     Final Diagnosis: Post-Op Diagnosis Codes:     * Bilateral carpal tunnel syndrome [G56.03]    Disposition: Home or Self Care    Follow Up/Patient Instructions:     Medications:  Reconciled Home Medications:   Current Discharge Medication List      START taking these medications    Details   !! hydrocodone-acetaminophen 5-325mg (NORCO) 5-325 mg per tablet Take 1 tablet by mouth every 4 (four) hours as needed for Pain.  Qty: 30 tablet, Refills: 0       !! - Potential duplicate medications found. Please discuss with provider.      CONTINUE these medications which have NOT CHANGED    Details   amlodipine (NORVASC) 5 MG tablet Take 1 tablet (5 mg total) by mouth once daily.  Qty: 30 tablet, Refills: 5      apixaban (ELIQUIS) 2.5 mg Tab Take 1 tablet (2.5 mg total) by mouth 2 (two) times daily.  Qty: 180 tablet, Refills: 3      ascorbic acid (VITAMIN C) 1000 MG tablet Take 1,000 mg by mouth every morning.       aspirin (ECOTRIN) 81 MG EC tablet Take 1 tablet (81 mg total) by mouth once daily.  Refills: 0      brimonidine 0.2%  (ALPHAGAN) 0.2 % Drop Place 1 drop into the left eye 3 (three) times daily. Disp 10 mls for 30 days  Qty: 10 mL, Refills: 12    Associated Diagnoses: Open-angle glaucoma, moderate stage, unspecified laterality, unspecified open-angle glaucoma type      buPROPion (WELLBUTRIN XL) 150 MG TB24 tablet Take 1 tablet (150 mg total) by mouth once daily.  Qty: 30 tablet, Refills: 11      co-enzyme Q-10 30 mg capsule Take 30 mg by mouth once daily.       donepezil (ARICEPT) 5 MG tablet Take 1 tablet (5 mg total) by mouth every evening.  Qty: 30 tablet, Refills: 11      !! hydrocodone-acetaminophen 5-325mg (NORCO) 5-325 mg per tablet Take 1 tablet by mouth every 6 (six) hours as needed for Pain.  Qty: 30 tablet, Refills: 0      ipratropium (ATROVENT) 0.03 % nasal spray 1-2 sprays by Nasal route 2 (two) times daily.  Qty: 30 mL, Refills: 1    Associated Diagnoses: Rhinorrhea      latanoprost 0.005 % ophthalmic solution Place 1 drop into both eyes every evening.  Qty: 1 Bottle, Refills: 12    Associated Diagnoses: Primary open angle glaucoma of both eyes, moderate stage      losartan (COZAAR) 50 MG tablet Take 1 tablet (50 mg total) by mouth 2 (two) times daily.  Qty: 180 tablet, Refills: 3    Associated Diagnoses: Coronary artery disease involving native coronary artery without angina pectoris, unspecified whether native or transplanted heart      lutein 20 mg Cap Take 20 mg by mouth once daily.       MULTIVITAMINS,THER W-MINERALS (VITAMINS & MINERALS ORAL) Take 1 tablet by mouth every morning.       rosuvastatin (CRESTOR) 20 MG tablet Take 1 tablet (20 mg total) by mouth every evening.  Qty: 90 tablet, Refills: 3    Comments: Patient wants generic  Associated Diagnoses: Hyperlipidemia, unspecified hyperlipidemia type      nitroGLYCERIN (NITROSTAT) 0.4 MG SL tablet Place 1 tablet (0.4 mg total) under the tongue every 5 (five) minutes as needed for Chest pain.  Qty: 30 tablet, Refills: 5    Comments: 3 bottles per box       !! -  Potential duplicate medications found. Please discuss with provider.          Discharge Procedure Orders  Diet general     Shower on day dressing removed (No bath)     Call MD for:  temperature >100.4     Call MD for:  persistent nausea and vomiting     Call MD for:  severe uncontrolled pain     Keep surgical extremity elevated     Remove dressing in 24 hours       Follow-up Information     Follow up with [facility] In 1 week.    Specialty:  Orthopedics    Why:  For wound re-check    Contact information:    [address], [address]  [facility] [address] [phone]    Additional information:    [facility], [address], [address]

## 2017-03-29 NOTE — OP NOTE
DATE OF PROCEDURE:  03/29/2017.    PREOPERATIVE DIAGNOSIS:  Bilateral carpal tunnel syndrome.    POSTOPERATIVE DIAGNOSIS:  Bilateral carpal tunnel syndrome.    OPERATIVE PROCEDURE:  Bilateral carpal tunnel release.    SURGEON:  Nam Chong Jr., M.D.    ANESTHESIA:  MAC.    ESTIMATED BLOOD LOSS:  Minimal.    COMPLICATIONS:  None.    SPECIMENS:  None.    BRIEF INDICATIONS:  An 86-year-old male with bilateral carpal tunnel syndrome,   taken to surgery for simultaneous procedures.    OPERATIVE PROCEDURE IN DETAIL:  Follows; after operative consent was obtained,   the patient was brought to the Operating Room, placed supine on the operating   room table.  Anesthesia by IV sedation followed by injection of Xylocaine and   Marcaine combination into each wrists.  Following this, tourniquet applied to   the left arm, but not utilized on the right arm because of the IV site.  Both   arms and prepped and draped out in the normal sterile fashion.  The left arm was   approached first.  The Esmarch used to exsanguinate the limb and the tourniquet   inflated to 225 mmHg.  A 2.5 cm incision made thenar crease left palm with a   #15 blade.  Palmar fascia divided and deep retractors placed.  Transverse carpal   ligament identified and carefully divided with the [place] blade.  The median   nerve identified and protected with a [place] elevator followed by division of   ligament proximally and distally under direct visualization.  After complete   release, the contents of the carpal canal were inspected and noted to be intact   including the recurrent branch.  The wound irrigated with antibiotic saline   solution.  Hemostasis achieved with the [place]ie.  Subcutaneous tissue closed with   4-0 Monocryl.  Dermabond on the skin.  A light wrap applied.  The tourniquet   deflated.  Attention then turned to the right wrist where the Esmarch was used   to create a tourniquet around the right forearm below the IV site.  Similar   incision made  thenar crease right palm a #15 blade.  Palmar fascia divided, deep   retractors placed.  Transverse carpal ligament identified and carefully divided   with the [place] blade.  The median nerve protected with a [place] elevator and   division ligament continued proximally and distally under direct visualization.    After complete release, contents were inspected including the recurrent branch.    The wound irrigated and closed with 4-0 Monocryl on the subcutaneous layer and   Dermabond on the skin.  Sterile dressing applied followed by light wrap.    Tourniquet/Esmarch removed.  The patient was brought to the Recovery Room in   stable condition.  All sponge and needle counts reported as correct.  No   complications.      ZULAY/  dd: 03/29/2017 09:29:17 (CDT)  td: 03/29/2017 10:41:40 (CDT)  Doc ID   #[id number]  Job ID #[id number]    CC:

## 2017-03-29 NOTE — INTERVAL H&P NOTE
The patient has been examined and the H&P has been reviewed:    I concur with the findings and no changes have occurred since H&P was written.    Anesthesia/Surgery risks, benefits and alternative options discussed and understood by patient/family.          Active Hospital Problems    Diagnosis  POA    Bilateral carpal tunnel syndrome [G56.03]  Yes      Resolved Hospital Problems    Diagnosis Date Resolved POA   No resolved problems to display.

## 2017-03-29 NOTE — PLAN OF CARE
Javier Valles has met all discharge criteria from Phase II. Vital Signs are stable, ambulating  without difficulty. Voided x1. Discharge instructions given, patient verbalized understanding. Discharged from facility via wheelchair in stable condition.

## 2017-03-29 NOTE — H&P (VIEW-ONLY)
OFFICE VISIT AND PREOP H AND P    CHIEF COMPLAINT:  Bilateral carpal tunnel syndrome.    HISTORY OF PRESENT ILLNESS:  An 86-year-old male with ongoing symptoms both   hands related to arthritis and superimposed carpal tunnel syndrome.  He recently   had nerve conduction studies both hands, which confirmed moderate bilateral   carpal tunnel syndrome.    PAST MEDICAL HISTORY:  Significant for atrial flutter, arthritis, atrial   fibrillation, kidney disease, carotid artery disease, degenerative disc disease,   glaucoma, hyperlipidemia, hypertension.    PAST SURGICAL HISTORY:  Includes heart bypass, eye surgery, skin biopsy,   tonsillectomy and cataract surgery.    FAMILY HISTORY:  Positive for clotting disorder, hypertension and   diverticulitis.    SOCIAL HISTORY:  The patient is a former smoker, drinks maybe 5-7 drinks per   week.    REVIEW OF SYSTEMS:  Negative fever, chills, rashes.    CURRENT MEDICATIONS:  Reviewed on chart.    ALLERGIES:  ACE inhibitors, Lipitor, pravastatin.    PHYSICAL EXAMINATION:  GENERAL:  Elderly male in no acute distress, alert and oriented x3.  HEENT:  Unremarkable.  LUNGS:  Clear to auscultation.  HEART:  Regular rate and rhythm.  ABDOMEN:  Soft, nontender.  EXTREMITIES:  Significant for the hands, demonstrating mild swelling volar   compartment and wrist bilaterally.  Positive Tinel sign.  Positive Phalen test   bilateral.    Nerve conduction study as noted above.    IMPRESSION:  Bilateral carpal tunnel syndrome.    PLAN:  The patient would like to go ahead and set up surgery for bilateral   carpal tunnel release done as a simultaneous procedure.  The risks and benefits   of surgery explained to the patient as well as the need for some assistance for   the first few days with eating and going to the bathroom, he understands.    Additionally, he does have some underlying arthritis in both hands, which will   continue to cause some symptoms of pain and stiffness even after surgery.  He    understands that as well.      EMILY  dd: 03/13/2017 16:10:58 (CDT)  td: 03/14/2017 08:10:41 (CDT)  Doc ID   #[id number]  Job ID #[id number]    CC:

## 2017-03-30 ENCOUNTER — TELEPHONE (OUTPATIENT)
Dept: ORTHOPEDICS | Facility: CLINIC | Age: 82
End: 2017-03-30

## 2017-03-30 NOTE — TELEPHONE ENCOUNTER
I spoke with the patient and made him a post op appointment. He was made aware of date, time and location.

## 2017-03-30 NOTE — TELEPHONE ENCOUNTER
----- Message from Aysha Diaz sent at 3/30/2017  9:40 AM CDT -----  Contact: MARGRET MARTIN [[id number]]  x_  1st Request  _  2nd Request  _  3rd Request        Who: MARGRET MARTIN [[id number]]    Why: Patient would like to schedule Post-Op appt. Thanks!    What Number to Call Back: [phone]    When to Expect a call back: (Before the end of the day)   -- if the call is after 12:00, the call back will be tomorrow.

## 2017-04-05 ENCOUNTER — OFFICE VISIT (OUTPATIENT)
Dept: ORTHOPEDICS | Facility: CLINIC | Age: 82
End: 2017-04-05
Attending: ORTHOPAEDIC SURGERY
Payer: MEDICARE

## 2017-04-05 VITALS — WEIGHT: 185 LBS | BODY MASS INDEX: 28.04 KG/M2 | HEIGHT: 68 IN

## 2017-04-05 DIAGNOSIS — G56.03 BILATERAL CARPAL TUNNEL SYNDROME: Primary | ICD-10-CM

## 2017-04-05 PROCEDURE — 99024 POSTOP FOLLOW-UP VISIT: CPT | Mod: S$GLB,,, | Performed by: ORTHOPAEDIC SURGERY

## 2017-04-05 PROCEDURE — 99999 PR PBB SHADOW E&M-EST. PATIENT-LVL III: CPT | Mod: PBBFAC,,, | Performed by: ORTHOPAEDIC SURGERY

## 2017-04-05 NOTE — PROGRESS NOTES
Mr. Valles is one week postop, bilateral carpal tunnel release.  He is doing very   well.  No problems reported.  No numbness reported.    PHYSICAL EXAMINATION:  Both hands look good.  Incisions are healing well   bilaterally.  A little bit of mild swelling.  A little bit of mild tenderness.    Range of motion of fingers good.  Sensation intact in all digits.  No evidence   of infection.    PLAN:  Routine wound care, soap and water is fine.  Neosporin as needed.  Light   bandage.  He can discontinue the wrist splints.  Start back regular activities   slowly.  No heavy lifting.  Follow up in two to three weeks.      ZULAY/STANLEY  dd: 04/05/2017 16:18:49 (CDT)  td: 04/06/2017 09:46:46 (CDT)  Doc ID   #[id number]  Job ID #[id number]    CC:

## 2017-04-06 ENCOUNTER — OFFICE VISIT (OUTPATIENT)
Dept: OPHTHALMOLOGY | Facility: CLINIC | Age: 82
End: 2017-04-06
Payer: MEDICARE

## 2017-04-06 VITALS — SYSTOLIC BLOOD PRESSURE: 121 MMHG | HEART RATE: 56 BPM | DIASTOLIC BLOOD PRESSURE: 74 MMHG

## 2017-04-06 DIAGNOSIS — H35.342 LAMELLAR MACULAR HOLE, LEFT: ICD-10-CM

## 2017-04-06 DIAGNOSIS — Z97.0 PROSTHETIC EYE GLOBE: ICD-10-CM

## 2017-04-06 DIAGNOSIS — H43.812 POSTERIOR VITREOUS DETACHMENT OF LEFT EYE: ICD-10-CM

## 2017-04-06 DIAGNOSIS — H33.312 RETINAL TEAR, LEFT: ICD-10-CM

## 2017-04-06 DIAGNOSIS — H35.372 EPIRETINAL MEMBRANE (ERM) OF LEFT EYE: Primary | ICD-10-CM

## 2017-04-06 DIAGNOSIS — Z96.1 PSEUDOPHAKIA OF LEFT EYE: ICD-10-CM

## 2017-04-06 PROCEDURE — 92014 COMPRE OPH EXAM EST PT 1/>: CPT | Mod: S$GLB,,, | Performed by: OPHTHALMOLOGY

## 2017-04-06 PROCEDURE — 92226 PR SPECIAL EYE EXAM, SUBSEQUENT: CPT | Mod: LT,S$GLB,, | Performed by: OPHTHALMOLOGY

## 2017-04-06 PROCEDURE — 99999 PR PBB SHADOW E&M-EST. PATIENT-LVL II: CPT | Mod: PBBFAC,,, | Performed by: OPHTHALMOLOGY

## 2017-04-06 PROCEDURE — 92134 CPTRZ OPH DX IMG PST SGM RTA: CPT | Mod: S$GLB,,, | Performed by: OPHTHALMOLOGY

## 2017-04-06 NOTE — PROGRESS NOTES
"HPI     epiretinal membrane    Additional comments: Pt here for follow up of epiretinal membrane           Comments   DLS: 11/7/2016 ---Dr Finnegan       Pt states" Vision is doing about the same no change since last exam.     POHX:  Pneumatic retinopexy - '97 [place]. Last seen 4/6/05 [facility] 20/30 (+2)  [place] with IOL - [facility].  Trauma - Handball injury -'48 while in college.      Meds: Alphagan TID OS              Xalatan OS QHS          Last edited by Shwetha Campbell MA on 4/6/2017  9:10 AM. (History)        OCT  OS: good quality, lamellar hole with intra-retinal cystic changes; stable from prior studies    Fundus photo OS: disc hemorrhage    Assessment /Plan     For exam results, see Encounter Report.    Epiretinal membrane (ERM) of left eye  -     OCT- Retina; Future    Posterior vitreous detachment of left eye    Pseudophakia of left eye    Prosthetic eye globe    Retinal tear, left    Lamellar macular hole, left        Retina stable OS, OCT with lamellar hole without sig change    Disc heme OS today.  Recommend revisit with Dr. Schneider.  ? If disc heme OS needs readdress of IOP  CPM with gtts until then  RTC retina 6 months sooner PRN             "

## 2017-04-07 ENCOUNTER — PATIENT OUTREACH (OUTPATIENT)
Dept: OTHER | Facility: OTHER | Age: 82
End: 2017-04-07
Payer: MEDICARE

## 2017-04-07 NOTE — PROGRESS NOTES
Last 5 Patient Entered Readings                                                               Current 30 Day Average: 132/74     Recent Readings 4/5/2017 4/4/2017 3/28/2017 3/27/2017 3/14/2017    Systolic BP (mmHg) 127 146 123 127 137    Diastolic BP (mmHg) 77 77 70 67 66    Pulse 60 56 55 56 57        Hypertension Medications             amlodipine (NORVASC) 5 MG tablet Take 1 tablet (5 mg total) by mouth once daily.    losartan (COZAAR) 50 MG tablet Take 1 tablet (50 mg total) by mouth 2 (two) times daily.    nitroGLYCERIN (NITROSTAT) 0.4 MG SL tablet Place 1 tablet (0.4 mg total) under the tongue every 5 (five) minutes as needed for Chest pain.        Plan:   Called patient to follow up. Per current 30 day average, BP is well controlled. LVM.   Will continue to monitor. WCB in 3 months, sooner if BP begins to trend up or down.

## 2017-04-10 ENCOUNTER — PATIENT MESSAGE (OUTPATIENT)
Dept: SPINE | Facility: CLINIC | Age: 82
End: 2017-04-10

## 2017-04-13 ENCOUNTER — PATIENT MESSAGE (OUTPATIENT)
Dept: OPHTHALMOLOGY | Facility: CLINIC | Age: 82
End: 2017-04-13

## 2017-04-18 ENCOUNTER — OFFICE VISIT (OUTPATIENT)
Dept: SPINE | Facility: CLINIC | Age: 82
End: 2017-04-18
Attending: NEUROLOGICAL SURGERY
Payer: MEDICARE

## 2017-04-18 VITALS
HEART RATE: 57 BPM | WEIGHT: 185 LBS | SYSTOLIC BLOOD PRESSURE: 144 MMHG | HEIGHT: 68 IN | BODY MASS INDEX: 28.04 KG/M2 | DIASTOLIC BLOOD PRESSURE: 65 MMHG

## 2017-04-18 DIAGNOSIS — Z98.890 S/P LUMBAR LAMINECTOMY: ICD-10-CM

## 2017-04-18 DIAGNOSIS — M48.061 LUMBAR SPINAL STENOSIS: ICD-10-CM

## 2017-04-18 DIAGNOSIS — M54.16 ACUTE LUMBAR RADICULOPATHY: Primary | ICD-10-CM

## 2017-04-18 PROCEDURE — 1159F MED LIST DOCD IN RCRD: CPT | Mod: S$GLB,,, | Performed by: NEUROLOGICAL SURGERY

## 2017-04-18 PROCEDURE — 1157F ADVNC CARE PLAN IN RCRD: CPT | Mod: S$GLB,,, | Performed by: NEUROLOGICAL SURGERY

## 2017-04-18 PROCEDURE — 99999 PR PBB SHADOW E&M-EST. PATIENT-LVL II: CPT | Mod: PBBFAC,,, | Performed by: NEUROLOGICAL SURGERY

## 2017-04-18 PROCEDURE — 1160F RVW MEDS BY RX/DR IN RCRD: CPT | Mod: S$GLB,,, | Performed by: NEUROLOGICAL SURGERY

## 2017-04-18 PROCEDURE — 99213 OFFICE O/P EST LOW 20 MIN: CPT | Mod: S$GLB,,, | Performed by: NEUROLOGICAL SURGERY

## 2017-04-18 PROCEDURE — 1125F AMNT PAIN NOTED PAIN PRSNT: CPT | Mod: S$GLB,,, | Performed by: NEUROLOGICAL SURGERY

## 2017-04-18 RX ORDER — GABAPENTIN 100 MG/1
100 CAPSULE ORAL 3 TIMES DAILY
Qty: 90 CAPSULE | Refills: 5 | Status: SHIPPED | OUTPATIENT
Start: 2017-04-18 | End: 2017-06-06

## 2017-04-18 NOTE — PROGRESS NOTES
CHIEF COMPLAINT:    1 year follow-up    HPI:    Javier Valles is a 86 y.o.-year-old male who presents today for post-operative follow-up. He is s/p L3/4 and ;4/5 laminectomy that was done by  on 4/25/16. Currently, the patient's symptoms are better since surgery. The patient is complaining of pain in the right hip. He reports any new onset of weakness. The patient describes the pain as sharp. The patient rates the pain 8 on the pain scale. The pain is worse with walking and better with sitting. Post-op medications the patient is currently taking are:medication list.    ROS:    NAD    PE:    AAOX3  PERRL  EOMI    Lumbar incision:  C/D/I    ROM:   Decreased secondary to pain    Sensation:  Intact to light touch (All 4 extremities)    Strength: Full strength      Deltoids Biceps Triceps Wrist Ext. Wrist Flex. Hand    RUE         LUE          Hip Flex. Knee Flex. Knee Ext. Dorsi Flex Plantar Flex EHL   RLE         LLE           Gait:  antalgic    DTR:  2+ and symmetric bilaterally    no abnormal reflexes    SLR- negative    IMAGING:    XRays: none    CT: none    MRI: none      ASSESSMENT:   Recurrent back pain, lumbar radiculopathy    PLAN:   Trial of spinal injection. Surgery is not an option given his age.

## 2017-04-21 ENCOUNTER — PATIENT MESSAGE (OUTPATIENT)
Dept: SPINE | Facility: CLINIC | Age: 82
End: 2017-04-21

## 2017-04-24 ENCOUNTER — TELEPHONE (OUTPATIENT)
Dept: ELECTROPHYSIOLOGY | Facility: CLINIC | Age: 82
End: 2017-04-24

## 2017-04-24 ENCOUNTER — PATIENT OUTREACH (OUTPATIENT)
Dept: OTHER | Facility: OTHER | Age: 82
End: 2017-04-24
Payer: MEDICARE

## 2017-04-24 ENCOUNTER — PATIENT MESSAGE (OUTPATIENT)
Dept: ELECTROPHYSIOLOGY | Facility: CLINIC | Age: 82
End: 2017-04-24

## 2017-04-24 NOTE — TELEPHONE ENCOUNTER
----- Message from Alcon Ly MD sent at 4/24/2017  7:44 AM CDT -----  I would hold for 2 days prior. MB  ----- Message -----     From: Sujatha Garcia RN     Sent: 4/20/2017  11:40 AM       To: Alcon Ly MD     Please advise on holding Eliquis for MARY with Dr. Staley--not scheduled yet.  ----- Message -----     From: Cathleen Peters MA     Sent: 4/20/2017   9:42 AM       To: Sujatha Garcia RN        ----- Message -----     From: Rosie Jackman     Sent: 4/20/2017   9:41 AM       To: Malachi Rondon Staff    Pt. Need clearance of Eliquis to schedule Lumbar Transforaminal Mary w/ Dr. oHdges, please advise.

## 2017-04-24 NOTE — PROGRESS NOTES
Last 5 Patient Entered Readings                                                               Current 30 Day Average: 129/73     Recent Readings 4/21/2017 4/19/2017 4/11/2017 4/5/2017 4/4/2017    Systolic BP (mmHg) 136 125 124 127 146    Diastolic BP (mmHg) 76 75 73 77 77    Pulse 56 64 60 60 56        Follow up with Mr. Javier Valles completed. Patient is maintaining a low sodium diet and continuing his exercise regime.He requested to be put on hiatus when they travel out of the country from [place]26 to [place]27. Patient did not have any further questions or concerns. I will follow up in a few weeks to see how he is doing and progressing.

## 2017-04-25 ENCOUNTER — TELEPHONE (OUTPATIENT)
Dept: PAIN MEDICINE | Facility: CLINIC | Age: 82
End: 2017-04-25

## 2017-04-25 NOTE — TELEPHONE ENCOUNTER
----- Message from Judit Feliz RN sent at 4/24/2017  2:59 PM CDT -----  Good Afternoon,     Please see below. OK for Pt to hold Eliquis two days prior to MARY with Dr. Hodges. Please call the Pt to inform him. Let us know if you need anything else.       Thanks!    Judit  ----- Message -----     From: Alcon Ly MD     Sent: 4/24/2017   7:44 AM       To: Sujatha Garcia RN    I would hold for 2 days prior. MB  ----- Message -----     From: Sujatha Garcia RN     Sent: 4/20/2017  11:40 AM       To: Alcon Ly MD     Please advise on holding Eliquis for MARY with Dr. Staley--not scheduled yet.  ----- Message -----     From: Cathleen Peters MA     Sent: 4/20/2017   9:42 AM       To: Sujatha Garcia RN        ----- Message -----     From: Rosie Jackman     Sent: 4/20/2017   9:41 AM       To: Malachi Rondon Staff    Pt. Need clearance of Eliquis to schedule Lumbar Transforaminal Mary w/ Dr. Hodges, please advise.

## 2017-04-26 ENCOUNTER — OFFICE VISIT (OUTPATIENT)
Dept: ORTHOPEDICS | Facility: CLINIC | Age: 82
End: 2017-04-26
Attending: ORTHOPAEDIC SURGERY
Payer: MEDICARE

## 2017-04-26 VITALS — WEIGHT: 185 LBS | HEIGHT: 68 IN | BODY MASS INDEX: 28.04 KG/M2

## 2017-04-26 DIAGNOSIS — M19.049 HAND ARTHRITIS: Primary | ICD-10-CM

## 2017-04-26 DIAGNOSIS — G56.03 BILATERAL CARPAL TUNNEL SYNDROME: ICD-10-CM

## 2017-04-26 PROCEDURE — 99999 PR PBB SHADOW E&M-EST. PATIENT-LVL III: CPT | Mod: PBBFAC,,, | Performed by: ORTHOPAEDIC SURGERY

## 2017-04-26 PROCEDURE — 99024 POSTOP FOLLOW-UP VISIT: CPT | Mod: S$GLB,,, | Performed by: ORTHOPAEDIC SURGERY

## 2017-04-26 RX ORDER — DICLOFENAC SODIUM 10 MG/G
2 GEL TOPICAL 2 TIMES DAILY
Qty: 1 TUBE | Refills: 3 | Status: SHIPPED | OUTPATIENT
Start: 2017-04-26 | End: 2017-06-09

## 2017-04-26 NOTE — PROGRESS NOTES
Mr. Valles in followup of bilateral carpal tunnel release.  He is about one month   postop.  He is doing excellent.  No problems reported.    PHYSICAL EXAMINATION:  Both hands look pretty good.  Incisions are well healed,   had a little bit of swelling in the left arm.  Mild tenderness.  Range of motion   of fingers slightly decreased.    PLAN:  I have given him a squeeze ball, recommended range of motion exercises   both hands.  He does have a little stiffness, probably related to some arthritic   changes of the fingers, I have also prescribed some Voltaren gel for both hands   for topical use b.i.d.  Follow up in one month.      EMILY  dd: 04/26/2017 15:32:26 (CDT)  td: 04/27/2017 09:24:55 (CDT)  Doc ID   #[id number]  Job ID #[id number]    CC:

## 2017-04-27 ENCOUNTER — TELEPHONE (OUTPATIENT)
Dept: PAIN MEDICINE | Facility: CLINIC | Age: 82
End: 2017-04-27

## 2017-04-27 NOTE — TELEPHONE ENCOUNTER
----- Message from Steff Francis sent at 4/27/2017  8:13 AM CDT -----  _  1st Request  _  2nd Request  _  3rd Request        Who: patient    Why: pt is calling to see if it is at all possible to move his procedure up sooner, he is in severe pain. Please call pt    What Number to Call Back:[phone]    When to Expect a call back: (Before the end of the day)   -- if the call is after 12:00, the call back will be tomorrow.

## 2017-04-27 NOTE — TELEPHONE ENCOUNTER
Spoke with patient and he rescheduled his procedure to 4-28-17 from 5-12-17.  Patient stated he last took his Eliquist on 4-25-17.

## 2017-04-28 ENCOUNTER — HOSPITAL ENCOUNTER (OUTPATIENT)
Facility: OTHER | Age: 82
Discharge: HOME | End: 2017-04-28
Attending: ANESTHESIOLOGY | Admitting: ANESTHESIOLOGY
Payer: MEDICARE

## 2017-04-28 VITALS
HEART RATE: 50 BPM | WEIGHT: 185 LBS | TEMPERATURE: 98 F | OXYGEN SATURATION: 95 % | BODY MASS INDEX: 28.04 KG/M2 | DIASTOLIC BLOOD PRESSURE: 80 MMHG | SYSTOLIC BLOOD PRESSURE: 142 MMHG | HEIGHT: 68 IN | RESPIRATION RATE: 18 BRPM

## 2017-04-28 DIAGNOSIS — M54.17 LUMBOSACRAL RADICULOPATHY: ICD-10-CM

## 2017-04-28 DIAGNOSIS — G89.29 CHRONIC PAIN: ICD-10-CM

## 2017-04-28 DIAGNOSIS — M51.36 DDD (DEGENERATIVE DISC DISEASE), LUMBAR: Primary | ICD-10-CM

## 2017-04-28 PROCEDURE — 99152 MOD SED SAME PHYS/QHP 5/>YRS: CPT | Mod: ,,, | Performed by: ANESTHESIOLOGY

## 2017-04-28 PROCEDURE — 63600175 PHARM REV CODE 636 W HCPCS: Performed by: ANESTHESIOLOGY

## 2017-04-28 PROCEDURE — 25000003 PHARM REV CODE 250: Performed by: ANESTHESIOLOGY

## 2017-04-28 PROCEDURE — 64493 INJ PARAVERT F JNT L/S 1 LEV: CPT | Performed by: ANESTHESIOLOGY

## 2017-04-28 PROCEDURE — 64483 NJX AA&/STRD TFRM EPI L/S 1: CPT | Mod: RT,,, | Performed by: ANESTHESIOLOGY

## 2017-04-28 PROCEDURE — 64483 NJX AA&/STRD TFRM EPI L/S 1: CPT | Performed by: ANESTHESIOLOGY

## 2017-04-28 PROCEDURE — 25500020 PHARM REV CODE 255: Performed by: ANESTHESIOLOGY

## 2017-04-28 RX ORDER — SODIUM CHLORIDE 9 MG/ML
500 INJECTION, SOLUTION INTRAVENOUS CONTINUOUS
Status: DISCONTINUED | OUTPATIENT
Start: 2017-04-28 | End: 2017-04-28 | Stop reason: HOSPADM

## 2017-04-28 RX ORDER — TRIAMCINOLONE ACETONIDE 40 MG/ML
INJECTION, SUSPENSION INTRA-ARTICULAR; INTRAMUSCULAR
Status: DISCONTINUED | OUTPATIENT
Start: 2017-04-28 | End: 2017-04-28 | Stop reason: HOSPADM

## 2017-04-28 RX ORDER — LIDOCAINE HYDROCHLORIDE 10 MG/ML
INJECTION INFILTRATION; PERINEURAL
Status: DISCONTINUED | OUTPATIENT
Start: 2017-04-28 | End: 2017-04-28 | Stop reason: HOSPADM

## 2017-04-28 RX ORDER — LIDOCAINE HYDROCHLORIDE 5 MG/ML
INJECTION, SOLUTION INFILTRATION; INTRAVENOUS
Status: DISCONTINUED | OUTPATIENT
Start: 2017-04-28 | End: 2017-04-28 | Stop reason: HOSPADM

## 2017-04-28 RX ORDER — MIDAZOLAM HYDROCHLORIDE 1 MG/ML
INJECTION INTRAMUSCULAR; INTRAVENOUS
Status: DISCONTINUED | OUTPATIENT
Start: 2017-04-28 | End: 2017-04-28 | Stop reason: HOSPADM

## 2017-04-28 RX ORDER — FENTANYL CITRATE 50 UG/ML
INJECTION, SOLUTION INTRAMUSCULAR; INTRAVENOUS
Status: DISCONTINUED | OUTPATIENT
Start: 2017-04-28 | End: 2017-04-28 | Stop reason: HOSPADM

## 2017-04-28 RX ADMIN — SODIUM CHLORIDE 500 ML: 0.9 INJECTION, SOLUTION INTRAVENOUS at 01:04

## 2017-04-28 NOTE — DISCHARGE INSTRUCTIONS
Home Care Instructions Pain Management:    1. DIET:   You may resume your normal diet today.   2. BATHING:   You may shower with luke warm water. No tub baths or anything that will soak injection sites under water for 24 hours.  3. DRESSING:   You may remove your bandage today.   4. ACTIVITY LEVEL:   You may resume your normal activities 24 hrs after your procedure. Nothing strenuous today.  5. MEDICATIONS:   You may resume your normal medications today. To restart blood thinners, ask your doctor.  6. SPECIAL INSTRUCTIONS:   No heat to the injection site for 24 hrs including, bath or shower, heating pad, moist heat, or hot tubs.    Use ice pack to injection site for any pain or discomfort.  Apply ice packs for 20 minute intervals as needed.     If you have received any sedatives by mouth today you may not drive for 12 hours.    If you have received any sedation through your IV, you may not drive for 24 hrs.     PLEASE CALL YOUR DOCTOR IF:  1. Redness or swelling around the injection site.  2. Fever of 101 degrees or more  3. Drainage (pus) from the injection site.  4. For any continuous bleeding (some dried blood over the incision is normal.)    FOR EMERGENCIES:   If any unusual problems or difficulties occur during clinic hours, call [phone] or [phone].

## 2017-04-28 NOTE — OP NOTE
Patient Name: Javier Valles  MRN: [id number]    INFORMED CONSENT: The procedure, risks, benefits and options were discussed with patient. There are no contraindications to the procedure. The patient expressed understanding and agreed to proceed. The personnel performing the procedure was discussed. I verify that I personally obtained Javier's consent prior to the start of the procedure and the signed consent can be found on the patient's chart.    Procedure Date: 04/28/2017    Anesthesia: Topical    Pre Procedure diagnosis:   1. DDD (degenerative disc disease), lumbar    2. Lumbosacral radiculopathy    3. Chronic pain        Post-Procedure diagnosis: same      Sedation: Yes - Fentanyl 50 mcg and Midazolam 2 mg    PROCEDURE:Right L5 SELECTIVE NERVE ROOT BLOCK INJECTION        DESCRIPTION OF PROCEDURE: The patient was brought to the procedure room. After performing time out IV access was obtained prior to the procedure. The patient was positioned prone on the fluoroscopy table. Continuous hemodynamic monitoring was initiated including blood pressure, EKG, and pulse oximetry. . The skin was prepped with chlorhexidine three times and draped in a sterile fashion. Skin anesthesia was achieved using 3 mL of lidocaine 1% over the respective injection site.     An oblique fluoroscopic view was obtained, with the superior articular process of the inferior vertebral body aligned with the pedicle. The tip of a 22-gauge 5-inch Quincke-type spinal needle was advanced toward the 6 oclock position of the pedicle under intermittent fluoroscopic guidance. Confirmation of proper needle position was made with AP, oblique, and lateral fluoroscopic views. Negative aspiration for blood or CSF was confirmed. 2 mL of Omnipaque 300 was injected. Live fluoroscopic imaging revealed a clear outline of the spinal nerve . A total combination of 3 mL of Lidocaine 0.5% and 40 mg kenalog was injected . There was no pain on injection. The needle was  removed and bleeding was nil.  A sterile dressing was applied. Javier was taken back to the recovery room for further observation.     Blood Loss: Nill  Specimen: None    James Hodges MD

## 2017-04-28 NOTE — IP AVS SNAPSHOT
[facility] Location ([facility])  [address] [place], [place] [address]  Phone: [phone]           Patient Discharge Instructions   Our goal is to set you up for success. This packet includes information on your condition, medications, and your home care.  It will help you care for yourself to prevent having to return to the hospital.     Please ask your nurse if you have any questions.      There are many details to remember when preparing to leave the hospital. Here is what you will need to do:    1. Take your medicine. If you are prescribed medications, review your Medication List on the following pages. You may have new medications to  at the pharmacy and others that you'll need to stop taking. Review the instructions for how and when to take your medications. Talk with your doctor or nurses if you are unsure of what to do.     2. Go to your follow-up appointments. Specific follow-up information is listed in the following pages. Your may be contacted by a nurse or clinical provider about future appointments. Be sure we have all of the phone numbers to reach you. Please contact your provider's office if you are unable to make an appointment.     3. Watch for warning signs. Your doctor or nurse will give you detailed warning signs to watch for and when to call for assistance. These instructions may also include educational information about your condition. If you experience any of warning signs to your health, call your doctor.           [facility]  Unless otherwise directed by your provider, please   contact [facility], our nurse care line   that is available for 24/7 assistance.     [phone] (toll-free)     Registered nurses in the [facility]   provide: appointment scheduling, clinical advisement, health education, and other advisory services.                  ** Verify the list of medication(s) below is accurate and up to date. Carry this with you in case of  emergency. If your medications have changed, please notify your healthcare provider.             Medication List      ASK your doctor about these medications        Additional Info                      amlodipine 5 MG tablet   Commonly known as:  NORVASC   Quantity:  30 tablet   Refills:  5   Dose:  5 mg    Instructions:  Take 1 tablet (5 mg total) by mouth once daily.     Begin Date    AM    Noon    PM    Bedtime       apixaban 2.5 mg Tab   Commonly known as:  ELIQUIS   Quantity:  180 tablet   Refills:  3   Dose:  2.5 mg    Instructions:  Take 1 tablet (2.5 mg total) by mouth 2 (two) times daily.     Begin Date    AM    Noon    PM    Bedtime       aspirin 81 MG EC tablet   Commonly known as:  ECOTRIN   Refills:  0   Dose:  81 mg    Instructions:  Take 1 tablet (81 mg total) by mouth once daily.     Begin Date    AM    Noon    PM    Bedtime       brimonidine 0.2% 0.2 % Drop   Commonly known as:  ALPHAGAN   Quantity:  10 mL   Refills:  12   Dose:  1 drop    Instructions:  Place 1 drop into the left eye 3 (three) times daily. Disp 10 mls for 30 days     Begin Date    AM    Noon    PM    Bedtime       buPROPion 150 MG TB24 tablet   Commonly known as:  WELLBUTRIN XL   Quantity:  30 tablet   Refills:  11   Dose:  150 mg    Instructions:  Take 1 tablet (150 mg total) by mouth once daily.     Begin Date    AM    Noon    PM    Bedtime       co-enzyme Q-10 30 mg capsule   Refills:  0   Dose:  30 mg    Instructions:  Take 30 mg by mouth once daily.     Begin Date    AM    Noon    PM    Bedtime       diclofenac sodium 1 % Gel   Commonly known as:  VOLTAREN   Quantity:  1 Tube   Refills:  3   Dose:  2 g    Instructions:  Apply 2 g topically 2 (two) times daily.     Begin Date    AM    Noon    PM    Bedtime       donepezil 5 MG tablet   Commonly known as:  ARICEPT   Quantity:  30 tablet   Refills:  11   Dose:  5 mg    Instructions:  Take 1 tablet (5 mg total) by mouth every evening.     Begin Date    AM    Noon    PM    Bedtime        gabapentin 100 MG capsule   Commonly known as:  NEURONTIN   Quantity:  90 capsule   Refills:  5   Dose:  100 mg    Instructions:  Take 1 capsule (100 mg total) by mouth 3 (three) times daily.     Begin Date    AM    Noon    PM    Bedtime       * hydrocodone-acetaminophen 5-325mg 5-325 mg per tablet   Commonly known as:  NORCO   Quantity:  30 tablet   Refills:  0   Dose:  1 tablet    Instructions:  Take 1 tablet by mouth every 6 (six) hours as needed for Pain.     Begin Date    AM    Noon    PM    Bedtime       * hydrocodone-acetaminophen 5-325mg 5-325 mg per tablet   Commonly known as:  NORCO   Quantity:  30 tablet   Refills:  0   Dose:  1 tablet    Instructions:  Take 1 tablet by mouth every 4 (four) hours as needed for Pain.     Begin Date    AM    Noon    PM    Bedtime       ipratropium 0.03 % nasal spray   Commonly known as:  ATROVENT   Quantity:  30 mL   Refills:  1   Dose:  1-2 spray    Instructions:  1-2 sprays by Nasal route 2 (two) times daily.     Begin Date    AM    Noon    PM    Bedtime       latanoprost 0.005 % ophthalmic solution   Quantity:  1 Bottle   Refills:  12   Dose:  1 drop    Instructions:  Place 1 drop into both eyes every evening.     Begin Date    AM    Noon    PM    Bedtime       losartan 50 MG tablet   Commonly known as:  COZAAR   Quantity:  180 tablet   Refills:  3   Dose:  50 mg   Indications:  Hypertension    Instructions:  Take 1 tablet (50 mg total) by mouth 2 (two) times daily.     Begin Date    AM    Noon    PM    Bedtime       lutein 20 mg Cap   Refills:  0   Dose:  20 mg    Instructions:  Take 20 mg by mouth once daily.     Begin Date    AM    Noon    PM    Bedtime       nitroGLYCERIN 0.4 MG SL tablet   Commonly known as:  NITROSTAT   Quantity:  30 tablet   Refills:  5   Dose:  0.4 mg   Comments:  3 bottles per box    Instructions:  Place 1 tablet (0.4 mg total) under the tongue every 5 (five) minutes as needed for Chest pain.     Begin Date    AM    Noon    PM    Bedtime        rosuvastatin 20 MG tablet   Commonly known as:  CRESTOR   Quantity:  90 tablet   Refills:  3   Dose:  20 mg   Comments:  Patient wants generic    Instructions:  Take 1 tablet (20 mg total) by mouth every evening.     Begin Date    AM    Noon    PM    Bedtime       VITAMIN C 1000 MG tablet   Refills:  0   Dose:  1000 mg   Generic drug:  ascorbic acid (vitamin C)    Instructions:  Take 1,000 mg by mouth every morning.     Begin Date    AM    Noon    PM    Bedtime       VITAMINS & MINERALS ORAL   Refills:  0   Dose:  1 tablet    Instructions:  Take 1 tablet by mouth every morning.     Begin Date    AM    Noon    PM    Bedtime       * Notice:  This list has 2 medication(s) that are the same as other medications prescribed for you. Read the directions carefully, and ask your doctor or other care provider to review them with you.               Please bring to all follow up appointments:    1. A copy of your discharge instructions.  2. All medicines you are currently taking in their original bottles.  3. Identification and insurance card.    Please arrive 15 minutes ahead of scheduled appointment time.    Please call 24 hours in advance if you must reschedule your appointment and/or time.        Your Scheduled Appointments     May 01, 2017  8:00 AM CDT   Established Patient Visit with MD Dane Victor - Ophthalmology ([facility] )    [address]  [place] [address] [phone]            May 04, 2017  9:00 AM CDT   Established Patient Visit with Kaley Barlow MD   [place] [facility]at[facility] ([facility] [place])    [address] [facility]  [place] [address] [phone]            May 17, 2017  8:00 AM CDT   Remote Interrogation with HOME MONITOR DEVICE CHECK, [facility]   Dane Hylton - Arrhythmia ([facility] )    [address]  [place] [address]   [phone]            May 31, 2017  1:40 PM CDT   Post OP with Nam Chong Jr., MD   [facility]  Hand Clinic ([facility])    [address], [address]  [facility] [id number] [phone]            Jun 06, 2017  7:50 AM CDT   Fasting Lab with LAB, APPOINTMENT [place] [facility] ([facility])    [address]  [facility] [address] [phone]              Follow-up Information     Call Mary Ford PA-C.    Specialties:  Neurosurgery, Spine Surgery    Why:  for follow up    Contact information:    [address]  [facility] [address]  [phone]            Discharge Instructions       Home Care Instructions Pain Management:    1. DIET:   You may resume your normal diet today.   2. BATHING:   You may shower with luke warm water. No tub baths or anything that will soak injection sites under water for 24 hours.  3. DRESSING:   You may remove your bandage today.   4. ACTIVITY LEVEL:   You may resume your normal activities 24 hrs after your procedure. Nothing strenuous today.  5. MEDICATIONS:   You may resume your normal medications today. To restart blood thinners, ask your doctor.  6. SPECIAL INSTRUCTIONS:   No heat to the injection site for 24 hrs including, bath or shower, heating pad, moist heat, or hot tubs.    Use ice pack to injection site for any pain or discomfort.  Apply ice packs for 20 minute intervals as needed.     If you have received any sedatives by mouth today you may not drive for 12 hours.    If you have received any sedation through your IV, you may not drive for 24 hrs.     PLEASE CALL YOUR DOCTOR IF:  1. Redness or swelling around the injection site.  2. Fever of 101 degrees or more  3. Drainage (pus) from the injection site.  4. For any continuous bleeding (some dried blood over the incision is normal.)    FOR EMERGENCIES:   If any unusual problems or difficulties occur during clinic hours, call [phone] or [phone].       Admission Information     Date & Time Provider Department CSN    4/28/2017  1:05 PM  "Maribel Cheney MD [facility] [id number]      Care Providers     Provider Role Specialty Primary office phone    Maribel Cheney MD Attending Provider Pain Medicine [phone]    James Hodges MD Surgeon  Pain Medicine [phone]      Your Vitals Were     BP Pulse Temp Resp Height Weight    164/68 57 98.4 °F (36.9 °C) (Oral) 17 5' 8" (1.727 m) 83.9 kg (185 lb)    SpO2 BMI             100% 28.13 kg/m2         Recent Lab Values        3/6/2008                           2:17 PM           A1C 6.2                       Allergies as of 4/28/2017        Reactions    Ace Inhibitors Other (See Comments)    Cough    Lipitor [Atorvastatin]     Myositis/elevated CPK    Pravastatin Other (See Comments)    Severe myalgias/elevated CPK    Statins-hmg-coa Reductase Inhibitors     Muscle pain and loss of muscle      Advance Directives     An advance directive is a document which, in the event you are no longer able to make decisions for yourself, tells your healthcare team what kind of treatment you do or do not want to receive, or who you would like to make those decisions for you.  If you do not currently have an advance directive, [facility] encourages you to create one.  For more information call:  [phone]), [phone] ([phone]),  or log on to [email or web address].        Smoking Cessation     If you would like to quit smoking:   You may be eligible for free services if you are a [place] resident and started smoking cigarettes before September 1, 1988.  Call the Smoking Cessation Trust (SCT) toll free at [phone] or [phone].   Call [phone] if you do not meet the above criteria.   Contact us via email: [email or web address]   View our website for more information: [email or web address]        Language Assistance Services     ATTENTION: Language assistance services are available, free of charge. Please call [phone].      ATENCIÓN: " Si habla español, tiene a arambula disposición servicios gratuitos de asistencia lingüística. Llmurray al [phone].     CHÚ Ý: N?u b?n nói Ti?ng Vi?t, có các d?ch v? h? tr? ngôn ng? mi?n phí dành cho b?n. G?i s? [phone].        Chronic Kindey Disease Education             [employer] [facility] complies with applicable Federal civil rights laws and does not discriminate on the basis of race, color, national origin, age, disability, or sex.

## 2017-04-28 NOTE — H&P
[facility]  History & Physical      SUBJECTIVE:     Chief Complaint/Reason for Admission: LBP    History of Present Illness: patient with known lumbar radiculopathy here today for rt L5 selective nerve root block    PTA Medications   Medication Sig    amlodipine (NORVASC) 5 MG tablet Take 1 tablet (5 mg total) by mouth once daily.    ascorbic acid (VITAMIN C) 1000 MG tablet Take 1,000 mg by mouth every morning.     aspirin (ECOTRIN) 81 MG EC tablet Take 1 tablet (81 mg total) by mouth once daily.    brimonidine 0.2% (ALPHAGAN) 0.2 % Drop Place 1 drop into the left eye 3 (three) times daily. Disp 10 mls for 30 days    buPROPion (WELLBUTRIN XL) 150 MG TB24 tablet Take 1 tablet (150 mg total) by mouth once daily.    co-enzyme Q-10 30 mg capsule Take 30 mg by mouth once daily.     diclofenac sodium (VOLTAREN) 1 % Gel Apply 2 g topically 2 (two) times daily.    donepezil (ARICEPT) 5 MG tablet Take 1 tablet (5 mg total) by mouth every evening.    gabapentin (NEURONTIN) 100 MG capsule Take 1 capsule (100 mg total) by mouth 3 (three) times daily.    ipratropium (ATROVENT) 0.03 % nasal spray 1-2 sprays by Nasal route 2 (two) times daily.    latanoprost 0.005 % ophthalmic solution Place 1 drop into both eyes every evening.    losartan (COZAAR) 50 MG tablet Take 1 tablet (50 mg total) by mouth 2 (two) times daily.    lutein 20 mg Cap Take 20 mg by mouth once daily.     MULTIVITAMINS,THER W-MINERALS (VITAMINS & MINERALS ORAL) Take 1 tablet by mouth every morning.     rosuvastatin (CRESTOR) 20 MG tablet Take 1 tablet (20 mg total) by mouth every evening.    apixaban (ELIQUIS) 2.5 mg Tab Take 1 tablet (2.5 mg total) by mouth 2 (two) times daily.    hydrocodone-acetaminophen 5-325mg (NORCO) 5-325 mg per tablet Take 1 tablet by mouth every 6 (six) hours as needed for Pain.    hydrocodone-acetaminophen 5-325mg (NORCO) 5-325 mg per tablet Take 1 tablet by mouth every 4 (four) hours as needed for  Pain.    nitroGLYCERIN (NITROSTAT) 0.4 MG SL tablet Place 1 tablet (0.4 mg total) under the tongue every 5 (five) minutes as needed for Chest pain.       Review of patient's allergies indicates:   Allergen Reactions    Ace inhibitors Other (See Comments)     Cough    Lipitor [atorvastatin]      Myositis/elevated CPK    Pravastatin Other (See Comments)     Severe myalgias/elevated CPK    Statins-hmg-coa reductase inhibitors      Muscle pain and loss of muscle        Past Medical History:   Diagnosis Date    *Atrial flutter 10/3/13    Anticoagulant long-term use     Aspirin only at this time    Arthritis     Atrial fibrillation     Atrial flutter     Blood transfusion     ?    BPH (benign prostatic hypertrophy)     Carotid artery disease     2008: [place] There is 40 - 49% right Internal Carotid stenosis.  There is 20 - 39% left Internal Carotid stenosis.       Chronic kidney disease     Coronary artery disease     DDD (degenerative disc disease) 6/25/2015    Degenerative disc disease     Elevated PSA     Glaucoma     Hyperlipidemia     Hypertension     Lumbar stenosis     lumbar spinal stenosis    NSTEMI (non-ST elevated myocardial infarction) 11/3/2013    Pacemaker 11/2013    Peripheral neuropathy     - S/P right ILIAC stent 1993;      Retinal detachment     Squamous cell carcinoma     left leg    Syncope and collapse: orthostatic with hypotension 10/9/2015     Past Surgical History:   Procedure Laterality Date    BACK SURGERY  04/25/2016    bypass 1991      CARDIAC CATHETERIZATION      CARDIAC ELECTROPHYSIOLOGY MAPPING AND ABLATION  11/2016    CARDIAC PACEMAKER PLACEMENT      CARDIAC PACEMAKER PLACEMENT  11/2016    CATARACT EXTRACTION W/  INTRAOCULAR LENS IMPLANT Left 1997    OS with     ENUCLEATION Right n/a    EYE SURGERY      retinal detachment repair left eye      RETINAL DETACHMENT SURGERY Left 1997    Dr. Cosme    SKIN BIOPSY      TONSILLECTOMY       Family  History   Problem Relation Age of Onset    Stroke Mother 69    Clotting disorder Mother      per patient no clotting disorder    Hypertension Mother     Diverticulitis Son     Cancer Neg Hx     Diabetes Neg Hx     Heart disease Neg Hx     Glaucoma Neg Hx     Amblyopia Neg Hx     Blindness Neg Hx     Cataracts Neg Hx     Macular degeneration Neg Hx     Retinal detachment Neg Hx     Strabismus Neg Hx      Social History   Substance Use Topics    Smoking status: Former Smoker     Quit date: 8/19/1963    Smokeless tobacco: None    Alcohol use 1.8 oz/week     1 Glasses of wine, 1 Cans of beer, 1 Shots of liquor per week      Comment: 2 a day       Review of Systems:  Constitution: Negative for chills, fever, malaise/fatigue, weight gain and weight loss.   HENT: Positive for hearing loss. Negative for ear pain, headaches and hoarse voice.   Eyes: Negative for blurred vision, pain and visual disturbance.   Cardiovascular: Negative for chest pain, dyspnea on exertion and irregular heartbeat.   Respiratory: Negative for cough, shortness of breath and wheezing.   Endocrine: Negative for cold intolerance and heat intolerance.   Hematologic/Lymphatic: Negative for adenopathy and bleeding problem. Does not bruise/bleed easily.   Skin: Negative for color change, itching and rash.   Musculoskeletal: Positive for back pain.   Gastrointestinal: Negative for change in bowel habit, diarrhea, hematemesis, hematochezia, melena and vomiting.   Genitourinary: Negative for flank pain, frequency, hematuria and urgency.   Neurological: Negative for difficulty with concentration, dizziness, loss of balance and seizures.   Psychiatric/Behavioral: Negative for altered mental status, depression and suicidal ideas. The patient is not nervous/anxious.   Allergic/Immunologic: Negative for HIV exposure.   All other systems reviewed and are negative.       OBJECTIVE:     Vital Signs (Most Recent):  Temp: 98.4 °F (36.9 °C) (04/28/17  1329)  Pulse: (!) 48 (17 1446)  Resp: 18 (17 1446)  BP: 139/83 (17 1446)  SpO2: 95 % (17 1446)    Physical Exam:  General     Constitutional: He is oriented to person, place, and time. He appears well-developed and well-nourished. No distress.   HENT:   Head: Normocephalic and atraumatic.   Cardiovascular:   No edema   Pulmonary/Chest: Effort normal. No respiratory distress.   Neurological: He is alert and oriented to person, place, and time.   Psychiatric: He has a normal mood and affect. His behavior is normal.      General Musculoskeletal Exam   Gait: normal         Right Hip Exam      Tenderness   The patient tender to palpation of the trochanteric bursa.     Tests   Pain w/ forced internal rotation (ABEBA): absent  Pain w/ forced external rotation (FADIR): absent     Comments: No SI joint tenderness   Left Hip Exam      Tenderness   The patient tender to palpation of the trochanteric bursa.     Tests   Pain w/ forced internal rotation (ABEBA): absent  Pain w/ forced external rotation (FADIR): absent     Comments: No SI joint tenderness        Back (L-Spine & T-Spine) / Neck (C-Spine) Exam      Back (L-Spine & T-Spine) Range of Motion   Extension: abnormal Back extension: limited, mild pain.   Flexion: normal      Spinal Sensation   Right Side Sensation  L-Spine Level: normal  S-Spine Level: normal  Left Side Sensation  L-Spine Level: normal  S-Spine Level: normal     Back (L-Spine & T-Spine) Tests   Right Side Tests  Straight leg raise:   Sitting SLR: > 70 degrees        Left Side Tests  Straight leg raise:   Sitting SLR: > 70 degrees              Comments: No tenderness with palpation in lumbar spine region     Muscle Strength   Right Lower Extremity   Hip Abduction: 5/5   Hip Flexion: 5/5   Quadriceps: 5/5   Hamstrin/5   Anterior tibial: 5/5/5  Gastrocsoleus: 5/5/5  Left Lower Extremity   Hip Abduction: 5/5   Hip Flexion: 5/5   Quadriceps: 5/5   Hamstrin/5   Anterior tibial:  5/5/5   Gastrocsoleus: 5/5/5     Reflexes      Left Side  Quadriceps: 1+  Achilles: 1+  Ankle Clonus: absent     Right Side   Quadriceps: 0  Achilles: 1+  Ankle Clonus: absent       ASSESSMENT/PLAN:     Active Hospital Problems    Diagnosis  POA    Chronic pain [G89.29]  Yes      Resolved Hospital Problems    Diagnosis Date Resolved POA   No resolved problems to display.           Plan: proceed with RT L5 SNRB    James Hodges MD

## 2017-05-01 ENCOUNTER — OFFICE VISIT (OUTPATIENT)
Dept: OPHTHALMOLOGY | Facility: CLINIC | Age: 82
End: 2017-05-01
Payer: MEDICARE

## 2017-05-01 DIAGNOSIS — Z96.1 PSEUDOPHAKIA OF LEFT EYE: ICD-10-CM

## 2017-05-01 DIAGNOSIS — Z97.0 PROSTHETIC EYE GLOBE: ICD-10-CM

## 2017-05-01 DIAGNOSIS — H35.372 EPIRETINAL MEMBRANE (ERM) OF LEFT EYE: ICD-10-CM

## 2017-05-01 DIAGNOSIS — H40.1122 PRIMARY OPEN-ANGLE GLAUCOMA, LEFT EYE, MODERATE STAGE: Primary | ICD-10-CM

## 2017-05-01 DIAGNOSIS — H33.002 RHEGMATOGENOUS RETINAL DETACHMENT OF LEFT EYE: ICD-10-CM

## 2017-05-01 DIAGNOSIS — H47.392 OPTIC DISC HEMORRHAGE, LEFT: ICD-10-CM

## 2017-05-01 DIAGNOSIS — H35.342 LAMELLAR MACULAR HOLE, LEFT: ICD-10-CM

## 2017-05-01 PROCEDURE — 99499 UNLISTED E&M SERVICE: CPT | Mod: S$GLB,,, | Performed by: OPHTHALMOLOGY

## 2017-05-01 PROCEDURE — 92012 INTRM OPH EXAM EST PATIENT: CPT | Mod: S$GLB,,, | Performed by: OPHTHALMOLOGY

## 2017-05-01 PROCEDURE — 99999 PR PBB SHADOW E&M-EST. PATIENT-LVL III: CPT | Mod: PBBFAC,,, | Performed by: OPHTHALMOLOGY

## 2017-05-01 NOTE — PROGRESS NOTES
HPI     Glaucoma    Additional comments: IOP ck today per Dr. Jacob           Comments   DLS: 2/20/17  **Pt saw Dr. Jacob on 4/6/17 and had disc heme OS. Dr. Jacob   requested pt be seen sooner.    1) POAG  2) PCIOL OS  3) Prosthesis OD  4) ERM OS  5) Hx Rhegmatogenous RD OS  6) Psuedo Mac Hole OS    MEDS:  Brimonidine TID OS   Latanoprost QHS OS         Last edited by Teresa Gonzalez MA on 5/1/2017  8:08 AM. (History)            Assessment /Plan     For exam results, see Encounter Report.    Primary open-angle glaucoma, left eye, moderate stage    Optic disc hemorrhage, left    Epiretinal membrane (ERM) of left eye    Rhegmatogenous retinal detachment of left eye    Lamellar macular hole, left    Pseudophakia of left eye    Prosthetic eye globe        Old pt of Dr. Goodrich    1. POAG   Follow[facility] at [facility]   followed by Sonia for 30 years  First HVF 2014  gtts started - [facility] - 2012  First photos - ? 1997 - for RD os     Glaucoma meds -  latanoprost qhs os / brimonidine os   H/O adverse rxn to glaucoma drops none  LASERS none  GLAUCOMA SURGERIES none  OTHER EYE SURGERIES - prosthesis od - (2/2 injury - 1940's) // Pnematic retinopexy OS 1997 - [place] // PC iol os - [place]  CDR - prosthesis / 0.75  Tbase  - ?? On gtts when started here  Tmax  - ?? Off gtts   Ttarget  - ?? 13 or less if possible // had a disc heme with IOP 15-18   HVF 3  test 2014 to 2016 OS:  ? Paracentral defect with early SAD/IAD  Gonio  +3-4 os   CCT - xx/492  OCT 2 test 2014 to 2016- RNFL - OD:not done // OS:dec s/bord T  HRT 3 test 2015 - 2017 -  MR -  xx od // xx os /// CDR dec S/I/N bord T  od // 0.80 os   Disc photos - mult old slides 1997 - 2006 // 2015, 2017 - w/ IT disc heme - OIS    Ttoday - prosthetic // 15  Test done today IOP/HRT/Gonio    2.  disc heme os    See photos 4/17/2017 - occurred with IOP's of  13-18    3.  Monocular precautions  - prothetic od    4.  erm os  - stable, follows with arend  -on  nevanac q day os - feels it helps vision - ?     5.  Hx of rhegmatogenous rrd os  - flat, follows with arend    6.  Pseudo hole os  - monitor     7. PC IOL OS     Pt on metoprolol xl    On xal and brimonidine   IOP seems ok / but has a new disc heme - rec lower IOP - try slt os (( ?? Target 13))   - (may be able to use BB as now has a pacemaker, but has a H/O low heart rate / and/or arrythmia)   - also can try topical SILVERIO prn     -add EES ointment od - prosthesis - prn irritation     Saw [facility] - new glasses - has been restricted to daytime driving     F/U  6 months with HVF/DFE/Photos     Keep regular F/U with arend (november)     Glasses - colgrove     I have seen and personally examined the patient.  I agree with the findings, assessment and plan of the resident and/or fellow.     Sena Schneider MD

## 2017-05-01 NOTE — LETTER
May 1, 2017      Thiago Jacob MD  [address]  [facility] [address]           [facility]  [address]  [facility] [address]  Phone: [phone]  Fax: [phone]          Patient: Javier Valles   MR Number: [id number]   YOB: 1930   Date of Visit: 5/1/2017       Dear Dr. Thiago Jacob:    Thank you for referring Javier Valles to me for evaluation. Attached you will find relevant portions of my assessment and plan of care.    If you have questions, please do not hesitate to call me. I look forward to following Javier Valles along with you.    Sincerely,    Sena Schnieder MD    Enclosure  CC:  No Recipients    If you would like to receive this communication electronically, please contact [email or web address] or [phone] to request more information on netomat Link access.    For providers and/or their staff who would like to refer a patient to [facility], please contact us through our one-stop-shop provider referral line, [facility], at [phone].    If you feel you have received this communication in error or would no longer like to receive these types of communications, please e-mail [email or web address]

## 2017-05-02 ENCOUNTER — PATIENT MESSAGE (OUTPATIENT)
Dept: PAIN MEDICINE | Facility: CLINIC | Age: 82
End: 2017-05-02

## 2017-05-02 PROBLEM — M54.16 ACUTE LUMBAR RADICULOPATHY: Status: ACTIVE | Noted: 2017-05-02

## 2017-05-02 PROBLEM — M48.061 LUMBAR SPINAL STENOSIS: Status: ACTIVE | Noted: 2017-05-02

## 2017-05-04 ENCOUNTER — OFFICE VISIT (OUTPATIENT)
Dept: DERMATOLOGY | Facility: CLINIC | Age: 82
End: 2017-05-04
Payer: MEDICARE

## 2017-05-04 VITALS — BODY MASS INDEX: 28.13 KG/M2 | WEIGHT: 185 LBS

## 2017-05-04 DIAGNOSIS — L82.1 SEBORRHEIC KERATOSES: Primary | ICD-10-CM

## 2017-05-04 DIAGNOSIS — L72.0 EPIDERMAL INCLUSION CYST: ICD-10-CM

## 2017-05-04 DIAGNOSIS — Z85.828 HISTORY OF SKIN CANCER: ICD-10-CM

## 2017-05-04 PROCEDURE — 1159F MED LIST DOCD IN RCRD: CPT | Mod: S$GLB,,, | Performed by: DERMATOLOGY

## 2017-05-04 PROCEDURE — 1160F RVW MEDS BY RX/DR IN RCRD: CPT | Mod: S$GLB,,, | Performed by: DERMATOLOGY

## 2017-05-04 PROCEDURE — 99999 PR PBB SHADOW E&M-EST. PATIENT-LVL II: CPT | Mod: PBBFAC,,, | Performed by: DERMATOLOGY

## 2017-05-04 PROCEDURE — 1157F ADVNC CARE PLAN IN RCRD: CPT | Mod: S$GLB,,, | Performed by: DERMATOLOGY

## 2017-05-04 PROCEDURE — 99213 OFFICE O/P EST LOW 20 MIN: CPT | Mod: S$GLB,,, | Performed by: DERMATOLOGY

## 2017-05-04 PROCEDURE — 1125F AMNT PAIN NOTED PAIN PRSNT: CPT | Mod: S$GLB,,, | Performed by: DERMATOLOGY

## 2017-05-04 NOTE — MR AVS SNAPSHOT
[place] - Dermatology   [facility]  [place] [address]  Phone: [phone]  Fax: [phone]                  Javier Valles   2017 9:00 AM   Office Visit    Description:  Male : 1930   Provider:  Kaley Barlow MD   Department:  [place] - Dermatology           Reason for Visit     Follow-up     Skin Check           Diagnoses this Visit        Comments    Seborrheic keratoses    -  Primary     Epidermal inclusion cyst         History of skin cancer     scc left leg removed mohs surgery            To Do List           Future Appointments        Provider Department Dept Phone    2017 8:00 AM HOME MONITOR DEVICE CHECK, [facility] - Arrhythmia [phone]    2017 10:30 AM Sena Schneider MD [facility] - Ophthalmology [phone]    2017 1:40 PM Nam Chong Jr., MD [facility] - Hand Clinic [phone]    2017 7:50 AM LAB, APPOINTMENT [place] [facility] [phone]    2017 9:20 AM Tad Curtis MD [facility]-Interventional Card [phone]      Goals (5 Years of Data)              Today    17    Blood Pressure <= 140/90     126/74  126/74    Notes - Note created  2016  3:57 PM by Vannessa Torres, PharmD    It is important to consistently maintain a controlled blood pressure.      Exercise at least 150 minutes per week.           Maintain a low sodium diet           Take at least one BP reading per week at various times of the day           Weight (lb) < 83 kg (183 lb)   83.9 kg (185 lb)    83.9 kg (185 lb)    Notes - Note created  2016  2:35 PM by Vannessa Torres, PharmD    Goal: Lose 5% of body weight. Decrease weight by 5% in order to decrease cardiovasular risk factors.         Follow-Up and Disposition     Return in about 6 months (around 2017).      [facility]     [facility] -  Assistance  Unless otherwise directed by your provider, please contact  [facility] On[facility], our nurse care line that is available for 24/7 assistance.     Registered nurses in the [facility] provide: appointment scheduling, clinical advisement, health education, and other advisory services.  Call: [phone] (toll free)               Medications           Message regarding Medications     Verify the changes and/or additions to your medication regime listed below are the same as discussed with your clinician today.  If any of these changes or additions are incorrect, please notify your healthcare provider.             Verify that the below list of medications is an accurate representation of the medications you are currently taking.  If none reported, the list may be blank. If incorrect, please contact your healthcare provider. Carry this list with you in case of emergency.           Current Medications     amlodipine (NORVASC) 5 MG tablet Take 1 tablet (5 mg total) by mouth once daily.    apixaban (ELIQUIS) 2.5 mg Tab Take 1 tablet (2.5 mg total) by mouth 2 (two) times daily.    ascorbic acid (VITAMIN C) 1000 MG tablet Take 1,000 mg by mouth every morning.     aspirin (ECOTRIN) 81 MG EC tablet Take 1 tablet (81 mg total) by mouth once daily.    brimonidine 0.2% (ALPHAGAN) 0.2 % Drop Place 1 drop into the left eye 3 (three) times daily. Disp 10 mls for 30 days    buPROPion (WELLBUTRIN XL) 150 MG TB24 tablet Take 1 tablet (150 mg total) by mouth once daily.    co-enzyme Q-10 30 mg capsule Take 30 mg by mouth once daily.     diclofenac sodium (VOLTAREN) 1 % Gel Apply 2 g topically 2 (two) times daily.    donepezil (ARICEPT) 5 MG tablet Take 1 tablet (5 mg total) by mouth every evening.    gabapentin (NEURONTIN) 100 MG capsule Take 1 capsule (100 mg total) by mouth 3 (three) times daily.    hydrocodone-acetaminophen 5-325mg (NORCO) 5-325 mg per tablet Take 1 tablet by mouth every 6 (six) hours as needed for Pain.    ipratropium (ATROVENT) 0.03 % nasal spray 1-2 sprays by Nasal  route 2 (two) times daily.    latanoprost 0.005 % ophthalmic solution Place 1 drop into both eyes every evening.    losartan (COZAAR) 50 MG tablet Take 1 tablet (50 mg total) by mouth 2 (two) times daily.    lutein 20 mg Cap Take 20 mg by mouth once daily.     MULTIVITAMINS,THER W-MINERALS (VITAMINS & MINERALS ORAL) Take 1 tablet by mouth every morning.     rosuvastatin (CRESTOR) 20 MG tablet Take 1 tablet (20 mg total) by mouth every evening.    nitroGLYCERIN (NITROSTAT) 0.4 MG SL tablet Place 1 tablet (0.4 mg total) under the tongue every 5 (five) minutes as needed for Chest pain.           Clinical Reference Information           Your Vitals Were     Weight BMI             83.9 kg (185 lb) 28.13 kg/m2         Allergies as of 5/4/2017     Ace Inhibitors    Lipitor [Atorvastatin]    Pravastatin    Statins-hmg-coa Reductase Inhibitors      Immunizations Administered on Date of Encounter - 5/4/2017     None      Language Assistance Services     ATTENTION: Language assistance services are available, free of charge. Please call [phone].      ATENCIÓN: Si yovanyla pako, tiene a arambula disposición servicios gratuitos de asistencia lingüística. Llame al [phone].     SARY Ý: N?u b?n nói Ti?ng Vi?t, có các d?ch v? h? tr? ngôn ng? mi?n phí dành cho b?n. G?i s? [phone].         [place] [facility] complies with applicable Federal civil rights laws and does not discriminate on the basis of race, color, national origin, age, disability, or sex.

## 2017-05-04 NOTE — PROGRESS NOTES
Subjective:       Patient ID:  Javier Valles is a 86 y.o. male who presents for   Chief Complaint   Patient presents with    Follow-up    Skin Check     UBSE     HPI Comments: History of Present Illness: The patient presents with chief complaint of spot.  Location: back  Duration: years  Signs/Symptoms: none    Prior treatments: none    Wife conerned about the lesion on his back not painful present for years.         Review of Systems   Constitutional: Negative for fever.   Skin: Negative for itching and rash.   Hematologic/Lymphatic: Bruises/bleeds easily.        Objective:    Physical Exam   Constitutional: He appears well-developed and well-nourished. No distress.   Neurological: He is alert and oriented to person, place, and time. He is not disoriented.   Psychiatric: He has a normal mood and affect.   Skin:   Areas Examined (abnormalities noted in diagram):   Scalp / Hair Palpated and Inspected  Head / Face Inspection Performed  Neck Inspection Performed  Chest / Axilla Inspection Performed  Abdomen Inspection Performed  Back Inspection Performed  RUE Inspected  LUE Inspection Performed              Diagram Legend       Movable subcutaneous cyst with punctum c/w epidermal inclusion cyst         Assessment / Plan:        Seborrheic keratoses  reassurance      Epidermal inclusion cyst  reassurance      History of skin cancer  Comments:  scc left leg removed mohs surgery              Return in about 6 months (around 11/4/2017).

## 2017-05-10 ENCOUNTER — PATIENT MESSAGE (OUTPATIENT)
Dept: SPINE | Facility: CLINIC | Age: 82
End: 2017-05-10

## 2017-05-10 ENCOUNTER — PATIENT MESSAGE (OUTPATIENT)
Dept: PAIN MEDICINE | Facility: CLINIC | Age: 82
End: 2017-05-10

## 2017-05-13 ENCOUNTER — PATIENT MESSAGE (OUTPATIENT)
Dept: INTERNAL MEDICINE | Facility: CLINIC | Age: 82
End: 2017-05-13

## 2017-05-13 ENCOUNTER — PATIENT MESSAGE (OUTPATIENT)
Dept: ELECTROPHYSIOLOGY | Facility: CLINIC | Age: 82
End: 2017-05-13

## 2017-05-13 DIAGNOSIS — I48.3 TYPICAL ATRIAL FLUTTER: Primary | ICD-10-CM

## 2017-05-15 ENCOUNTER — PATIENT MESSAGE (OUTPATIENT)
Dept: ADMINISTRATIVE | Facility: OTHER | Age: 82
End: 2017-05-15

## 2017-05-15 RX ORDER — AMLODIPINE BESYLATE 5 MG/1
5 TABLET ORAL DAILY
Qty: 30 TABLET | Refills: 11 | Status: SHIPPED | OUTPATIENT
Start: 2017-05-15 | End: 2018-04-24 | Stop reason: SDUPTHER

## 2017-05-15 RX ORDER — DONEPEZIL HYDROCHLORIDE 5 MG/1
5 TABLET, FILM COATED ORAL NIGHTLY
Qty: 30 TABLET | Refills: 11 | Status: SHIPPED | OUTPATIENT
Start: 2017-05-15 | End: 2018-03-16 | Stop reason: SDUPTHER

## 2017-05-15 RX ORDER — BUPROPION HYDROCHLORIDE 150 MG/1
150 TABLET ORAL DAILY
Qty: 30 TABLET | Refills: 11 | Status: SHIPPED | OUTPATIENT
Start: 2017-05-15 | End: 2018-04-26 | Stop reason: SDUPTHER

## 2017-05-17 ENCOUNTER — CLINICAL SUPPORT (OUTPATIENT)
Dept: ELECTROPHYSIOLOGY | Facility: CLINIC | Age: 82
End: 2017-05-17
Payer: MEDICARE

## 2017-05-17 DIAGNOSIS — I49.5 SSS (SICK SINUS SYNDROME): ICD-10-CM

## 2017-05-17 DIAGNOSIS — I48.91 ATRIAL FIBRILLATION, UNSPECIFIED TYPE: ICD-10-CM

## 2017-05-17 DIAGNOSIS — Z95.0 CARDIAC PACEMAKER IN SITU: ICD-10-CM

## 2017-05-17 DIAGNOSIS — I42.9 CARDIOMYOPATHY: ICD-10-CM

## 2017-05-17 PROCEDURE — 93294 REM INTERROG EVL PM/LDLS PM: CPT | Mod: S$GLB,,, | Performed by: INTERNAL MEDICINE

## 2017-05-17 PROCEDURE — 93296 REM INTERROG EVL PM/IDS: CPT | Mod: S$GLB,,, | Performed by: INTERNAL MEDICINE

## 2017-05-18 ENCOUNTER — OFFICE VISIT (OUTPATIENT)
Dept: PAIN MEDICINE | Facility: CLINIC | Age: 82
End: 2017-05-18
Payer: MEDICARE

## 2017-05-18 VITALS
WEIGHT: 192.88 LBS | HEIGHT: 68 IN | DIASTOLIC BLOOD PRESSURE: 77 MMHG | HEART RATE: 49 BPM | SYSTOLIC BLOOD PRESSURE: 133 MMHG | BODY MASS INDEX: 29.23 KG/M2 | TEMPERATURE: 98 F

## 2017-05-18 DIAGNOSIS — M47.816 LUMBAR FACET ARTHROPATHY: ICD-10-CM

## 2017-05-18 DIAGNOSIS — M54.16 BILATERAL LUMBAR RADICULOPATHY: ICD-10-CM

## 2017-05-18 DIAGNOSIS — M54.17 LUMBOSACRAL RADICULOPATHY: Primary | ICD-10-CM

## 2017-05-18 DIAGNOSIS — M51.36 DDD (DEGENERATIVE DISC DISEASE), LUMBAR: ICD-10-CM

## 2017-05-18 PROCEDURE — 1125F AMNT PAIN NOTED PAIN PRSNT: CPT | Mod: S$GLB,,, | Performed by: NURSE PRACTITIONER

## 2017-05-18 PROCEDURE — 99213 OFFICE O/P EST LOW 20 MIN: CPT | Mod: S$GLB,,, | Performed by: NURSE PRACTITIONER

## 2017-05-18 PROCEDURE — 99499 UNLISTED E&M SERVICE: CPT | Mod: S$GLB,,, | Performed by: NURSE PRACTITIONER

## 2017-05-18 PROCEDURE — 1157F ADVNC CARE PLAN IN RCRD: CPT | Mod: S$GLB,,, | Performed by: NURSE PRACTITIONER

## 2017-05-18 PROCEDURE — 1160F RVW MEDS BY RX/DR IN RCRD: CPT | Mod: S$GLB,,, | Performed by: NURSE PRACTITIONER

## 2017-05-18 PROCEDURE — 1159F MED LIST DOCD IN RCRD: CPT | Mod: S$GLB,,, | Performed by: NURSE PRACTITIONER

## 2017-05-18 PROCEDURE — 99999 PR PBB SHADOW E&M-EST. PATIENT-LVL III: CPT | Mod: PBBFAC,,, | Performed by: NURSE PRACTITIONER

## 2017-05-18 NOTE — PROGRESS NOTES
Chronic patient Established Note (Follow up visit)      SUBJECTIVE:    Javier Valles presents to the clinic for a follow-up appointment for lower back and bilateral leg pain.  He is s/p right L5 TF JOMAR on 4/28/17 with 100% relief of right leg pain.   The procedure was ordered by Dr. Stanford.  He is now reporting pain in similar location on the left side.  His pain is mainly in the left calf and lateral ankle.  The pain is burning and electric.  He denies numbness or tingling.  He denies any back pain at this time.  He also states that Dr. Hodges mentioned a spinal cord stimulator trial to him at his previous injection.  He would like information on this today.  Since the last visit, Javier Valles states the pain has been improving.  Current pain intensity is 7/10.    Pain Medications:  None    Tried in past: gabapentin 100 mg TID    - Anti-Coagulants: Eliquis (pacemaker)    Opioid Contract: no     report:  Not applicable    Pain Procedures:  Multiple JOMAR's with Dr. Lopez 4 yrs ago  4/28/17 Right L4-5 TF JOMAR- 100% relief     Spinal surgeries:  MIS laminectomy L3-4 and L4-5.     Physical Therapy/Home Exercise: no    Imaging:   3/24/16 Lumbar CT    Narrative   Comparison: 8/11/14    Technique: 2.5 mm axial images obtained of the lumbar spine without contrast with sagittal and coronal reformats.    Findings: There is no evidence of acute fracture or bone destruction.  There is intervertebral disc space narrowing at multiple levels which is most significant at L2-3 with prominent marginal osteophyte formation.  There is vacuum disc formation at L1-2 and L5-S1.  There is mild, 4 mm of anterolisthesis of L4 on L5 with partial uncovering of the disc.    T10-11: There is prominent facet arthropathy on the right which causes mass effect on the right posterior lateral aspect of the thecal sac.  This level is only partially imaged.    T11-12, T12-L1: There are degenerative Schmorl's nodes.  There is no significant  central canal stenosis or neural foraminal narrowing.    L1-L2: There is a circumferential disc bulge with ligamentum flavum thickening and facet arthropathy resulting in moderate central canal stenosis.  There is moderate bilateral neural foraminal narrowing left greater than right.    L2-3: There is a broad-based circumferential disc bulge with ligamentum flavum thickening and facet arthropathy which in combination results in moderate central canal stenosis.  There is severe bilateral neural foraminal narrowing.    L3-4: There is a diffuse disc bulge with facet arthropathy and ligamentum flavum thickening resulting in severe central canal stenosis and moderate bilateral neural foraminal narrowing.    L4-5: There is a circumferential posterior disc osteophyte complex with bilateral facet arthropathy and ligamentum flavum thickening resulting in moderate central canal stenosis.  There is mild anterolisthesis which is likely secondary to the prominent facet degenerative change.  The posterior disc osteophyte complex extends into the left neural foramen causing severe left neural foraminal narrowing.  There is moderate right neural foraminal narrowing.    L5-S1: There is a mild diffuse disc bulge with facet arthropathy resulting in no significant central canal stenosis.  There is a posterior disc osteophyte which causes severe right neural foraminal narrowing and moderate left neural foraminal narrowing.    Limited review of the retroperitoneal structures demonstrates advanced atherosclerosis of the aorta and branch vessels.   Impression       Advanced multilevel degenerative changes of the lumbar spine.  Specific details at each level are discussed above           REVIEW OF SYSTEMS:    GENERAL:  No weight loss, malaise or fevers.  HEENT:  Negative for frequent or significant headaches.  NECK:  Negative for lumps, goiter, pain and significant neck swelling.  RESPIRATORY:  Negative for cough, wheezing or shortness of  breath.  CARDIOVASCULAR:  Negative for chest pain, leg swelling or palpitations. Pacemaker placement.  A-fib.  GI:  Negative for abdominal discomfort, blood in stools or black stools or change in bowel habits.  MUSCULOSKELETAL:  See HPI.  SKIN:  Negative for lesions, rash, and itching.  PSYCH:  Negative for sleep disturbance, mood disorder and recent psychosocial stressors.  HEMATOLOGY/LYMPHOLOGY:  Negative for prolonged bleeding, bruising easily or swollen nodes.  NEURO:   No history of headaches, syncope, paralysis, seizures or tremors.  NEPH: CKD.  All other reviewed and negative other than HPI.    Past Medical History:  Past Medical History:   Diagnosis Date    *Atrial flutter 10/3/13    Anticoagulant long-term use     Aspirin only at this time    Arthritis     Atrial fibrillation     Atrial flutter     Blood transfusion     ?    BPH (benign prostatic hypertrophy)     Carotid artery disease     2008: [place] There is 40 - 49% right Internal Carotid stenosis.  There is 20 - 39% left Internal Carotid stenosis.       Chronic kidney disease     Coronary artery disease     DDD (degenerative disc disease) 6/25/2015    Degenerative disc disease     Elevated PSA     Glaucoma     Hyperlipidemia     Hypertension     Lumbar stenosis     lumbar spinal stenosis    NSTEMI (non-ST elevated myocardial infarction) 11/3/2013    Pacemaker 11/2013    Peripheral neuropathy     - S/P right ILIAC stent 1993;      Retinal detachment     Squamous cell carcinoma     left leg    Syncope and collapse: orthostatic with hypotension 10/9/2015       Past Surgical History:  Past Surgical History:   Procedure Laterality Date    BACK SURGERY  04/25/2016    bypass 1991      CARDIAC CATHETERIZATION      CARDIAC ELECTROPHYSIOLOGY MAPPING AND ABLATION  11/2016    CARDIAC PACEMAKER PLACEMENT      CARDIAC PACEMAKER PLACEMENT  11/2016    CATARACT EXTRACTION W/  INTRAOCULAR LENS IMPLANT Left 1997    OS with      "ENUCLEATION Right n/a    EYE SURGERY      retinal detachment repair left eye      RETINAL DETACHMENT SURGERY Left 1997    Dr. Cosme    SKIN BIOPSY      TONSILLECTOMY         Family History:  Family History   Problem Relation Age of Onset    Stroke Mother 69    Clotting disorder Mother      per patient no clotting disorder    Hypertension Mother     Diverticulitis Son     Cancer Neg Hx     Diabetes Neg Hx     Heart disease Neg Hx     Glaucoma Neg Hx     Amblyopia Neg Hx     Blindness Neg Hx     Cataracts Neg Hx     Macular degeneration Neg Hx     Retinal detachment Neg Hx     Strabismus Neg Hx        Social History:  Social History     Social History    Marital status:      Spouse name: Joanie    Number of children: N/A    Years of education: N/A     Occupational History    retired      Social History Main Topics    Smoking status: Former Smoker     Quit date: 8/19/1963    Smokeless tobacco: None    Alcohol use 1.8 oz/week     1 Glasses of wine, 1 Cans of beer, 1 Shots of liquor per week      Comment: 2 a day    Drug use: No    Sexual activity: Yes     Partners: Female     Other Topics Concern    None     Social History Narrative       OBJECTIVE:    /77  Pulse (!) 49  Temp 98.3 °F (36.8 °C)  Ht 5' 8" (1.727 m)  Wt 87.5 kg (192 lb 14.4 oz)  BMI 29.33 kg/m2    PHYSICAL EXAMINATION:    General appearance: Well appearing, in no acute distress, alert and oriented x3.  Psych: Mood and affect appropriate.  Skin: Skin color, texture, turgor normal, no rashes or lesions, in both upper and lower body.  Head/face: Normocephalic, atraumatic. No palpable lymph nodes.  Cor: RRR  Pulm: CTA  Back: Straight leg raising in the supine position is negative to radicular pain bilaterally.  Moderate pain to palpation over the spine. Normal range of motion with pain on flexion.  Negative facet loading.  Extremities: Peripheral joint ROM is full and pain free without obvious instability or " laxity in all four extremities. No deformities, edema, or skin discoloration. Good capillary refill.  Musculoskeletal: Hip and knee provocative maneuvers are negative. Bilateral lower extremity strength is normal and symmetric. No atrophy or tone abnormalities are noted.  Neuro: Bilateral lower extremity coordination and muscle stretch reflexes are physiologic and symmetric. Plantar response are downgoing. No loss of sensation is noted.  Gait: Normal.    ASSESSMENT: 87 y.o. year old male with lower back and leg pain, consistent with lumbar radiculopathy.     1. Lumbosacral radiculopathy     2. DDD (degenerative disc disease), lumbar     3. Lumbar facet arthropathy     4. Bilateral lumbar radiculopathy           PLAN:     - I have stressed the importance of physical activity and a home exercise plan to help with pain and improve health.    - Previous imaging was reviewed and discussed with the patient today.    - Will schedule for left L5-S1 TF JOMAR and symptoms are identical to those on right side relieved by procedure.    - I educated the patient about [facility] lumbar SCS today which he will consider if limited benefit from procedure.    - RTC 2 weeks following injection.    - Counseled patient regarding the importance of activity modification, constant sleeping habits and physical therapy.        The above plan and management options were discussed at length with patient. Patient is in agreement with the above and verbalized understanding.    Jeniffer Mueller  05/18/2017     I have personally reviewed the history and exam of this patient and agree with the resident's note as stated above.

## 2017-05-18 NOTE — MR AVS SNAPSHOT
[facility]  [address]  [place] [address]  Phone: [phone]  Fax: [phone]                  Javier Valles   2017 9:00 AM   Office Visit    Description:  Male : 1930   Provider:  KEITH Mcgraw   Department:  [facility]           Diagnoses this Visit        Comments    Lumbosacral radiculopathy    -  Primary     DDD (degenerative disc disease), lumbar         Lumbar facet arthropathy         Bilateral lumbar radiculopathy                To Do List           Future Appointments        Provider Department Dept Phone    2017 10:30 AM Sena Schneider MD [facility] - Ophthalmology [phone]    2017 7:50 AM LAB, APPOINT[place] [facility] [phone]    2017 9:20 AM Tad Curtis MD [facility]-Interventional Card [phone]      Goals (5 Years of Data)              Today    5/17/17    5/10/17    Blood Pressure <= 140/90   133/77  133/77  133/77    Notes - Note created  2016  3:57 PM by Vannessa Torres, De[place]    It is important to consistently maintain a controlled blood pressure.      Exercise at least 150 minutes per week.           Maintain a low sodium diet           Take at least one BP reading per week at various times of the day           Weight (lb) < 83 kg (183 lb)   87.5 kg (192 lb 14.4 oz)        Notes - Note created  2016  2:35 PM by Ean MelgozaD    Goal: Lose 5% of body weight. Decrease weight by 5% in order to decrease cardiovasular risk factors.         [facility]     [facility] Nurse Care Line -  Assistance  Unless otherwise directed by your provider, please contact [facility], our nurse care line that is available for  assistance.     Registered nurses in the [facility] provide: appointment scheduling, clinical advisement, health education, and other advisory services.  Call: [phone] (toll free)                Medications           Message regarding Medications     Verify the changes and/or additions to your medication regime listed below are the same as discussed with your clinician today.  If any of these changes or additions are incorrect, please notify your healthcare provider.             Verify that the below list of medications is an accurate representation of the medications you are currently taking.  If none reported, the list may be blank. If incorrect, please contact your healthcare provider. Carry this list with you in case of emergency.           Current Medications     amlodipine (NORVASC) 5 MG tablet Take 1 tablet (5 mg total) by mouth once daily.    apixaban (ELIQUIS) 2.5 mg Tab Take 1 tablet (2.5 mg total) by mouth 2 (two) times daily.    ascorbic acid (VITAMIN C) 1000 MG tablet Take 1,000 mg by mouth every morning.     aspirin (ECOTRIN) 81 MG EC tablet Take 1 tablet (81 mg total) by mouth once daily.    brimonidine 0.2% (ALPHAGAN) 0.2 % Drop Place 1 drop into the left eye 3 (three) times daily. Disp 10 mls for 30 days    buPROPion (WELLBUTRIN XL) 150 MG TB24 tablet Take 1 tablet (150 mg total) by mouth once daily.    co-enzyme Q-10 30 mg capsule Take 30 mg by mouth once daily.     donepezil (ARICEPT) 5 MG tablet Take 1 tablet (5 mg total) by mouth every evening.    gabapentin (NEURONTIN) 100 MG capsule Take 1 capsule (100 mg total) by mouth 3 (three) times daily.    hydrocodone-acetaminophen 5-325mg (NORCO) 5-325 mg per tablet Take 1 tablet by mouth every 6 (six) hours as needed for Pain.    ipratropium (ATROVENT) 0.03 % nasal spray 1-2 sprays by Nasal route 2 (two) times daily.    latanoprost 0.005 % ophthalmic solution Place 1 drop into both eyes every evening.    losartan (COZAAR) 50 MG tablet Take 1 tablet (50 mg total) by mouth 2 (two) times daily.    lutein 20 mg Cap Take 20 mg by mouth once daily.     MULTIVITAMINS,THER W-MINERALS (VITAMINS & MINERALS ORAL) Take 1 tablet by mouth every morning.   "   nitroGLYCERIN (NITROSTAT) 0.4 MG SL tablet Place 1 tablet (0.4 mg total) under the tongue every 5 (five) minutes as needed for Chest pain.    rosuvastatin (CRESTOR) 20 MG tablet Take 1 tablet (20 mg total) by mouth every evening.    diclofenac sodium (VOLTAREN) 1 % Gel Apply 2 g topically 2 (two) times daily.           Clinical Reference Information           Your Vitals Were     BP Pulse Temp Height Weight BMI    133/77 49 98.3 °F (36.8 °C) 5' 8" (1.727 m) 87.5 kg (192 lb 14.4 oz) 29.33 kg/m2      Blood Pressure          Most Recent Value    BP  133/77      Allergies as of 5/18/2017     Ace Inhibitors    Lipitor [Atorvastatin]    Pravastatin    Statins-hmg-coa Reductase Inhibitors      Immunizations Administered on Date of Encounter - 5/18/2017     None      Language Assistance Services     ATTENTION: Language assistance services are available, free of charge. Please call [phone].      ATENCIÓN: Si habla pako, tiene a arambula disposición servicios gratuitos de asistencia lingüística. Llame al [phone].     SARY Ý: N?u b?n nói Ti?ng Vi?t, có các d?ch v? h? tr? ngôn ng? mi?n phí dành cho b?n. G?i s? [phone].         [facility] complies with applicable Federal civil rights laws and does not discriminate on the basis of race, color, national origin, age, disability, or sex.        "

## 2017-05-19 ENCOUNTER — TELEPHONE (OUTPATIENT)
Dept: ELECTROPHYSIOLOGY | Facility: CLINIC | Age: 82
End: 2017-05-19

## 2017-05-19 DIAGNOSIS — I42.9 CARDIOMYOPATHY, UNSPECIFIED TYPE: Primary | ICD-10-CM

## 2017-05-20 ENCOUNTER — PATIENT MESSAGE (OUTPATIENT)
Dept: PAIN MEDICINE | Facility: CLINIC | Age: 82
End: 2017-05-20

## 2017-05-22 ENCOUNTER — TELEPHONE (OUTPATIENT)
Dept: PAIN MEDICINE | Facility: CLINIC | Age: 82
End: 2017-05-22

## 2017-05-22 NOTE — TELEPHONE ENCOUNTER
Spoke with patient and he did not want to wait until 6-16-17 to have procedure at [facility] with Dr. Cotto.  Patient offered [place] location and he has scheduled his procedure on 6-6-07 in [place] with Dr. Hodges Left L5-S1 TF JOMAR.  Message sent to Dr. Ly requesting clearance for patient to hold his Eliquis 3 days prior to procedure date.

## 2017-05-22 NOTE — TELEPHONE ENCOUNTER
----- Message from Steff Francis sent at 5/19/2017  1:38 PM CDT -----  _  1st Request  _  2nd Request  _  3rd Request        Who: patient    Why: please call pt concerning scheduling a procedure with Dr bean, pt was told someone from your office would be calling him  What Number to Call Back:[phone]    When to Expect a call back: (Before the end of the day)   -- if the call is after 12:00, the call back will be tomorrow.

## 2017-05-23 ENCOUNTER — PATIENT OUTREACH (OUTPATIENT)
Dept: OTHER | Facility: OTHER | Age: 82
End: 2017-05-23
Payer: MEDICARE

## 2017-05-23 NOTE — PROGRESS NOTES
Last 5 Patient Entered Readings                                                               Current 30 Day Average: 129/74     Recent Readings 5/19/2017 5/17/2017 5/10/2017 5/9/2017 5/8/2017    Systolic BP (mmHg) 136 111 127 137 129    Diastolic BP (mmHg) 81 77 75 69 55    Pulse 53 52 54 65 56        Follow up with Mr. Javier Valles completed. Patient is maintaining a low sodium diet and continuing his exercise regime. Patient did not have any further questions or concerns. I will follow up in a few weeks to see how he is doing and progressing.

## 2017-05-24 ENCOUNTER — TELEPHONE (OUTPATIENT)
Dept: PAIN MEDICINE | Facility: CLINIC | Age: 82
End: 2017-05-24

## 2017-05-24 NOTE — TELEPHONE ENCOUNTER
----- Message from Nikia Mansfield sent at 5/24/2017  8:47 AM CDT -----  _x  1st Request  _  2nd Request  _  3rd Request        Who: nichol    Why: pt. Returning phone call to speak with rosalio.please call to discuss    What Number to Call Back:[phone]    When to Expect a call back: (Before the end of the day)   -- if the call is after 12:00, the call back will be tomorrow.

## 2017-05-24 NOTE — TELEPHONE ENCOUNTER
Spoke with patient.  He would like his procedure as soon as possible with a different physician, as Dr. Hodges is out of town.  We will reschedule him to another available physician.

## 2017-05-24 NOTE — TELEPHONE ENCOUNTER
----- Message from Nikia Mansfield sent at 5/23/2017  4:27 PM CDT -----  _x  1st Request  _  2nd Request  _  3rd Request        Who: nichol    Why: pt. Stating he Would like to speak with rosalio only.    What Number to Call Back:[phone]    When to Expect a call back: (Before the end of the day)   -- if the call is after 12:00, the call back will be tomorrow.

## 2017-05-25 ENCOUNTER — OFFICE VISIT (OUTPATIENT)
Dept: OPHTHALMOLOGY | Facility: CLINIC | Age: 82
End: 2017-05-25
Payer: MEDICARE

## 2017-05-25 VITALS — HEART RATE: 59 BPM | SYSTOLIC BLOOD PRESSURE: 124 MMHG | DIASTOLIC BLOOD PRESSURE: 70 MMHG

## 2017-05-25 DIAGNOSIS — H33.002 RHEGMATOGENOUS RETINAL DETACHMENT OF LEFT EYE: ICD-10-CM

## 2017-05-25 DIAGNOSIS — H40.1122 PRIMARY OPEN-ANGLE GLAUCOMA, LEFT EYE, MODERATE STAGE: Primary | ICD-10-CM

## 2017-05-25 DIAGNOSIS — H35.372 EPIRETINAL MEMBRANE (ERM) OF LEFT EYE: ICD-10-CM

## 2017-05-25 DIAGNOSIS — Z97.0 PROSTHETIC EYE GLOBE: ICD-10-CM

## 2017-05-25 DIAGNOSIS — H35.342 LAMELLAR MACULAR HOLE, LEFT: ICD-10-CM

## 2017-05-25 DIAGNOSIS — H47.392 OPTIC DISC HEMORRHAGE, LEFT: ICD-10-CM

## 2017-05-25 DIAGNOSIS — Z96.1 PSEUDOPHAKIA OF LEFT EYE: ICD-10-CM

## 2017-05-25 PROCEDURE — 99499 UNLISTED E&M SERVICE: CPT | Mod: S$GLB,,, | Performed by: OPHTHALMOLOGY

## 2017-05-25 PROCEDURE — 65855 TRABECULOPLASTY LASER SURG: CPT | Mod: LT,S$GLB,, | Performed by: OPHTHALMOLOGY

## 2017-05-25 PROCEDURE — 99999 PR PBB SHADOW E&M-EST. PATIENT-LVL III: CPT | Mod: PBBFAC,,, | Performed by: OPHTHALMOLOGY

## 2017-05-25 NOTE — PROGRESS NOTES
HPI     DLS: 5/01/17    Pt here for SLT OS    Meds: Brimonidine tid os             Latanoprost qhs os    1. Primary open angle glaucoma, left eye, moderate stage  2. Optic disc hemorrhage, left  3. Epiretinal membrane (ERM) of left eye  4. Rhegmatogenous retinal detachment of left eye  5. Lamellar macular hole, left  6. Pseudophakia, left eye   7. Prosthetic eye globe    Last edited by Mariya Dixon on 5/25/2017 10:43 AM. (History)                     Assessment /Plan     For exam results, see Encounter Report.    Primary open-angle glaucoma, left eye, moderate stage  -     Argon Trabeculoplasty - OS - Left Eye    Optic disc hemorrhage, left    Epiretinal membrane (ERM) of left eye    Rhegmatogenous retinal detachment of left eye    Lamellar macular hole, left    Pseudophakia of left eye    Prosthetic eye globe        Old pt of Dr. Goodrich    1. POAG   Followed at [facility] since 1997   followed by [facility] for 30 years  First HVF 2014  gtts started - [facility] - 2012  First photos - ? 1997 - for RD os     Glaucoma meds -  latanoprost qhs os / brimonidine os   H/O adverse rxn to glaucoma drops none  LASERS - SLT os 5/25/2017 (270 degrees - too narrow inf.)   GLAUCOMA SURGERIES none  OTHER EYE SURGERIES - prosthesis od - (2/2 injury - 1940's) // Pnematic retinopexy OS 1997 - [place] // PC iol os - [place]  CDR - prosthesis / 0.75  Tbase  - ?? On gtts when started here  Tmax  - ?? Off gtts   Ttarget  - ?? 13 or less if possible // had a disc heme with IOP 15-18   HVF 3  test 2014 to 2016 OS:  ? Paracentral defect with early SAD/IAD  Gonio  +3-4 S/T/N // very narrow inf os   CCT - xx/492  OCT 2 test 2014 to 2016- RNFL - OD:not done // OS:dec s/bord T  HRT 3 test 2015 - 2017 -  MR -  xx od // xx os /// CDR dec S/I/N bord T  od // 0.80 os   Disc photos - mult old slides 1997 - 2006 // 2015, 2017 - w/ IT disc heme - OIS    Ttoday - prosthetic // 15  Test done today SLT - OS - to all open angle     2.  disc heme os    See  "photos 4/17/2017 - occurred with IOP's of  13-18    3.  Monocular precautions  - prothetic od    4.  erm os  - stable, follows with arend  -on nevanac q day os - feels it helps vision - ?     5.  Hx of rhegmatogenous rrd os  - flat, follows with arend    6.  Pseudo hole os  - monitor     7. PC IOL OS     Pt on metoprolol xl      Glaucoma os - monocular   Cont  xal  Cont brimonidine   IOP seems ok / but has a new disc heme - rec lower IOP - try slt os (( ?? Target 14))   SLT os - 5/25/2017 - S/T/N (too narrow inf. ) - steroid taper     - (may be able to use BB as now has a pacemaker, but has a H/O low heart rate / and/or arrythmia)   - also can try topical SILVERIO prn     -add EES ointment od - prosthesis - prn irritation     Sees [facility] - new glasses - has been restricted to daytime driving   Keep regular F/U with [facility] / [facility]    F/U 6 weeks for IOP check os post slt - update flow sheet if there is a photo file    Has gotten steroid injecton to lower back (10 days ago) - needs the "other side done"    Since steroids are already on board - ok to go ahead with the 2nd side  Monitor for increase in IOP 2/2 steroid injections         I have seen and personally examined the patient.  I agree with the findings, assessment and plan of the resident and/or fellow.     Sena Schneider MD  "

## 2017-05-29 ENCOUNTER — APPOINTMENT (OUTPATIENT)
Dept: RADIOLOGY | Facility: OTHER | Age: 82
End: 2017-05-29
Attending: ANESTHESIOLOGY
Payer: MEDICARE

## 2017-05-29 ENCOUNTER — SURGERY (OUTPATIENT)
Age: 82
End: 2017-05-29

## 2017-05-29 PROBLEM — M47.26 OSTEOARTHRITIS OF SPINE WITH RADICULOPATHY, LUMBAR REGION: Status: ACTIVE | Noted: 2017-05-29

## 2017-05-29 PROCEDURE — 25000003 PHARM REV CODE 250: Performed by: PHYSICAL MEDICINE & REHABILITATION

## 2017-05-29 PROCEDURE — 25500020 PHARM REV CODE 255: Performed by: ANESTHESIOLOGY

## 2017-05-29 PROCEDURE — 63600175 PHARM REV CODE 636 W HCPCS: Performed by: ANESTHESIOLOGY

## 2017-05-29 PROCEDURE — 64483 NJX AA&/STRD TFRM EPI L/S 1: CPT | Performed by: ANESTHESIOLOGY

## 2017-05-29 PROCEDURE — 25000003 PHARM REV CODE 250: Performed by: ANESTHESIOLOGY

## 2017-05-29 RX ADMIN — IOHEXOL 50 ML: 300 INJECTION, SOLUTION INTRAVENOUS at 08:05

## 2017-05-29 RX ADMIN — FENTANYL CITRATE 25 MCG: 50 INJECTION, SOLUTION INTRAMUSCULAR; INTRAVENOUS at 08:05

## 2017-05-29 RX ADMIN — MIDAZOLAM HYDROCHLORIDE 1 MG: 1 INJECTION, SOLUTION INTRAMUSCULAR; INTRAVENOUS at 08:05

## 2017-05-29 RX ADMIN — LIDOCAINE HYDROCHLORIDE 5 ML: 10 INJECTION, SOLUTION EPIDURAL; INFILTRATION; INTRACAUDAL; PERINEURAL at 08:05

## 2017-05-29 RX ADMIN — DEXAMETHASONE SODIUM PHOSPHATE 10 MG: 10 INJECTION INTRAMUSCULAR; INTRAVENOUS at 08:05

## 2017-05-31 ENCOUNTER — PATIENT MESSAGE (OUTPATIENT)
Dept: ELECTROPHYSIOLOGY | Facility: CLINIC | Age: 82
End: 2017-05-31

## 2017-06-05 ENCOUNTER — PATIENT MESSAGE (OUTPATIENT)
Dept: PAIN MEDICINE | Facility: CLINIC | Age: 82
End: 2017-06-05

## 2017-06-06 ENCOUNTER — LAB VISIT (OUTPATIENT)
Dept: LAB | Facility: HOSPITAL | Age: 82
End: 2017-06-06
Attending: INTERNAL MEDICINE
Payer: MEDICARE

## 2017-06-06 DIAGNOSIS — E78.5 HYPERLIPIDEMIA: ICD-10-CM

## 2017-06-06 LAB
CHOLEST/HDLC SERPL: 2.5 {RATIO}
HDL/CHOLESTEROL RATIO: 40.6 %
HDLC SERPL-MCNC: 128 MG/DL
HDLC SERPL-MCNC: 52 MG/DL
LDLC SERPL CALC-MCNC: 66 MG/DL
NONHDLC SERPL-MCNC: 76 MG/DL
TRIGL SERPL-MCNC: 50 MG/DL

## 2017-06-06 PROCEDURE — 36415 COLL VENOUS BLD VENIPUNCTURE: CPT

## 2017-06-06 PROCEDURE — 80061 LIPID PANEL: CPT

## 2017-06-06 RX ORDER — GABAPENTIN 300 MG/1
600 CAPSULE ORAL 3 TIMES DAILY
Qty: 180 CAPSULE | Refills: 1 | Status: SHIPPED | OUTPATIENT
Start: 2017-06-06 | End: 2017-06-12

## 2017-06-07 PROBLEM — I25.5 CARDIOMYOPATHY, ISCHEMIC: Chronic | Status: ACTIVE | Noted: 2017-06-07

## 2017-06-07 PROBLEM — I25.5 CARDIOMYOPATHY, ISCHEMIC: Status: ACTIVE | Noted: 2017-06-07

## 2017-06-09 ENCOUNTER — TELEPHONE (OUTPATIENT)
Dept: INTERNAL MEDICINE | Facility: CLINIC | Age: 82
End: 2017-06-09

## 2017-06-09 ENCOUNTER — OFFICE VISIT (OUTPATIENT)
Dept: CARDIOLOGY | Facility: CLINIC | Age: 82
End: 2017-06-09
Payer: MEDICARE

## 2017-06-09 ENCOUNTER — TELEPHONE (OUTPATIENT)
Dept: PAIN MEDICINE | Facility: CLINIC | Age: 82
End: 2017-06-09

## 2017-06-09 VITALS
SYSTOLIC BLOOD PRESSURE: 127 MMHG | BODY MASS INDEX: 28.54 KG/M2 | WEIGHT: 192.69 LBS | OXYGEN SATURATION: 98 % | HEIGHT: 69 IN | HEART RATE: 69 BPM | DIASTOLIC BLOOD PRESSURE: 59 MMHG

## 2017-06-09 DIAGNOSIS — Z95.0 CARDIAC PACEMAKER IN SITU: Primary | ICD-10-CM

## 2017-06-09 DIAGNOSIS — E78.5 HYPERLIPIDEMIA, UNSPECIFIED HYPERLIPIDEMIA TYPE: Primary | ICD-10-CM

## 2017-06-09 DIAGNOSIS — I10 ESSENTIAL HYPERTENSION: ICD-10-CM

## 2017-06-09 DIAGNOSIS — I25.10 CORONARY ARTERY DISEASE INVOLVING NATIVE CORONARY ARTERY OF NATIVE HEART WITHOUT ANGINA PECTORIS: Primary | Chronic | ICD-10-CM

## 2017-06-09 DIAGNOSIS — I25.5 ISCHEMIC CARDIOMYOPATHY: ICD-10-CM

## 2017-06-09 DIAGNOSIS — N18.30 CHRONIC KIDNEY DISEASE, STAGE III (MODERATE): ICD-10-CM

## 2017-06-09 DIAGNOSIS — I48.91 ATRIAL FIBRILLATION, UNSPECIFIED TYPE: ICD-10-CM

## 2017-06-09 DIAGNOSIS — E78.2 MIXED HYPERLIPIDEMIA: Chronic | ICD-10-CM

## 2017-06-09 DIAGNOSIS — I25.5 CARDIOMYOPATHY, ISCHEMIC: Chronic | ICD-10-CM

## 2017-06-09 DIAGNOSIS — I73.9 PERIPHERAL VASCULAR DISEASE: ICD-10-CM

## 2017-06-09 DIAGNOSIS — I49.5 SSS (SICK SINUS SYNDROME): ICD-10-CM

## 2017-06-09 PROCEDURE — 1125F AMNT PAIN NOTED PAIN PRSNT: CPT | Mod: S$GLB,,, | Performed by: INTERNAL MEDICINE

## 2017-06-09 PROCEDURE — 99213 OFFICE O/P EST LOW 20 MIN: CPT | Mod: S$GLB,,, | Performed by: INTERNAL MEDICINE

## 2017-06-09 PROCEDURE — 99499 UNLISTED E&M SERVICE: CPT | Mod: S$GLB,,, | Performed by: INTERNAL MEDICINE

## 2017-06-09 PROCEDURE — 1159F MED LIST DOCD IN RCRD: CPT | Mod: S$GLB,,, | Performed by: INTERNAL MEDICINE

## 2017-06-09 PROCEDURE — 1157F ADVNC CARE PLAN IN RCRD: CPT | Mod: S$GLB,,, | Performed by: INTERNAL MEDICINE

## 2017-06-09 PROCEDURE — 99999 PR PBB SHADOW E&M-EST. PATIENT-LVL V: CPT | Mod: PBBFAC,,, | Performed by: INTERNAL MEDICINE

## 2017-06-09 NOTE — TELEPHONE ENCOUNTER
----- Message from Marleen Coulter sent at 6/9/2017  2:51 PM CDT -----  Contact: Self/ [phone]   Type: Sooner appointment than  is able to schedule    When is the first available appointment? 06/21/2017     What is the nature of the appointment? Leg pain     What appointment type: ep     Comments: pt is calling to have a sooner appt. Please call and advise     Thank you

## 2017-06-09 NOTE — TELEPHONE ENCOUNTER
----- Message from Shady Mckeon sent at 6/9/2017 10:13 AM CDT -----  Contact: pt  x_ 1st Request   _ 2nd Request   _ 3rd Request     Who: MARGRET MARTIN [[id number]]    Why: pt is requesting a call back in reference to his pain and scheduling another procedure.    What Number to Call Back: [phone]    When to Expect a call back: (Before the end of the day)   -- if call after 3:00 call back will be tomorrow.

## 2017-06-09 NOTE — TELEPHONE ENCOUNTER
Left pt a  stating i have scheduled him an 11:30am appt on Monday per .       Please advise pt that were are at a new location , [facility] on the 4th floor.       Thanks you.    Please advise

## 2017-06-09 NOTE — PROGRESS NOTES
Subjective:   Patient ID:  Javier Valles is a 87 y.o. male who presents for follow up of Coronary Artery Disease; Cardiomyopathy; Atrial Fibrillation; Hypertension; and Hyperlipidemia      Assessment:     1. Coronary artery disease involving native coronary artery of native heart without angina pectoris    2. Cardiomyopathy, ischemic: Prior MI, CABG, EF 40-45%    3. Mixed hyperlipidemia : at goal   4. Chronic kidney disease, stage III (moderate)    5. Essential hypertension : controlled   6. Ischemic cardiomyopathy    7. Peripheral vascular disease        Plan:   Continue meds  RTC 1 year with fasting lipids and BMP      HPI: No angina or heart failure.  Is limited by LBP and has failed conservative therapy.  Discussed options including stimulator.    Review of Systems   Musculoskeletal: Positive for back pain.       Past Medical History:   Diagnosis Date    *Atrial flutter 10/3/13    Anticoagulant long-term use     Aspirin only at this time    Arthritis     Atrial fibrillation     Atrial flutter     Blood transfusion     ?    BPH (benign prostatic hypertrophy)     Carotid artery disease     2008: [place] There is 40 - 49% right Internal Carotid stenosis.  There is 20 - 39% left Internal Carotid stenosis.       Chronic kidney disease     Coronary artery disease     DDD (degenerative disc disease) 6/25/2015    Degenerative disc disease     Elevated PSA     Glaucoma     Hyperlipidemia     Hypertension     Lumbar stenosis     lumbar spinal stenosis    NSTEMI (non-ST elevated myocardial infarction) 11/3/2013    Pacemaker 11/2013    Peripheral neuropathy     - S/P right ILIAC stent 1993;      Retinal detachment     Squamous cell carcinoma     left leg    Syncope and collapse: orthostatic with hypotension 10/9/2015       Past Surgical History:   Procedure Laterality Date    BACK SURGERY  04/25/2016    bypass 1991      CARDIAC CATHETERIZATION      CARDIAC ELECTROPHYSIOLOGY MAPPING AND ABLATION   11/2016    CARDIAC PACEMAKER PLACEMENT      CARDIAC PACEMAKER PLACEMENT  11/2016    CATARACT EXTRACTION W/  INTRAOCULAR LENS IMPLANT Left 1997    OS with     ENUCLEATION Right n/a    EYE SURGERY      retinal detachment repair left eye      RETINAL DETACHMENT SURGERY Left 1997    Dr. Cosme    SKIN BIOPSY      TONSILLECTOMY         Social History   Substance Use Topics    Smoking status: Former Smoker     Quit date: 8/19/1963    Smokeless tobacco: Not on file    Alcohol use 1.8 oz/week     1 Glasses of wine, 1 Cans of beer, 1 Shots of liquor per week      Comment: 2 a day       Family History   Problem Relation Age of Onset    Stroke Mother 69    Clotting disorder Mother      per patient no clotting disorder    Hypertension Mother     Diverticulitis Son     Cancer Neg Hx     Diabetes Neg Hx     Heart disease Neg Hx     Glaucoma Neg Hx     Amblyopia Neg Hx     Blindness Neg Hx     Cataracts Neg Hx     Macular degeneration Neg Hx     Retinal detachment Neg Hx     Strabismus Neg Hx        Current Outpatient Prescriptions   Medication Sig    amlodipine (NORVASC) 5 MG tablet Take 1 tablet (5 mg total) by mouth once daily.    apixaban (ELIQUIS) 2.5 mg Tab Take 1 tablet (2.5 mg total) by mouth 2 (two) times daily.    ascorbic acid (VITAMIN C) 1000 MG tablet Take 1,000 mg by mouth every morning.     aspirin (ECOTRIN) 81 MG EC tablet Take 1 tablet (81 mg total) by mouth once daily.    brimonidine 0.2% (ALPHAGAN) 0.2 % Drop Place 1 drop into the left eye 3 (three) times daily. Disp 10 mls for 30 days    buPROPion (WELLBUTRIN XL) 150 MG TB24 tablet Take 1 tablet (150 mg total) by mouth once daily.    co-enzyme Q-10 30 mg capsule Take 30 mg by mouth once daily.     donepezil (ARICEPT) 5 MG tablet Take 1 tablet (5 mg total) by mouth every evening.    gabapentin (NEURONTIN) 300 MG capsule Take 2 capsules (600 mg total) by mouth 3 (three) times daily.    ipratropium (ATROVENT) 0.03 %  "nasal spray 1-2 sprays by Nasal route 2 (two) times daily.    latanoprost 0.005 % ophthalmic solution Place 1 drop into both eyes every evening.    losartan (COZAAR) 50 MG tablet Take 1 tablet (50 mg total) by mouth 2 (two) times daily.    lutein 20 mg Cap Take 20 mg by mouth once daily.     MULTIVITAMINS,THER W-MINERALS (VITAMINS & MINERALS ORAL) Take 1 tablet by mouth every morning.     rosuvastatin (CRESTOR) 20 MG tablet Take 1 tablet (20 mg total) by mouth every evening.    nitroGLYCERIN (NITROSTAT) 0.4 MG SL tablet Place 1 tablet (0.4 mg total) under the tongue every 5 (five) minutes as needed for Chest pain.     No current facility-administered medications for this visit.        Review of patient's allergies indicates:   Allergen Reactions    Ace inhibitors Other (See Comments)     Cough    Lipitor [atorvastatin]      Myositis/elevated CPK    Pravastatin Other (See Comments)     Severe myalgias/elevated CPK    Statins-hmg-coa reductase inhibitors      Muscle pain and loss of muscle       Objective:     BP (!) 127/59 (BP Location: Right arm, Patient Position: Sitting, BP Method: Automatic)   Pulse 69   Ht 5' 8.5" (1.74 m)   Wt 87.4 kg (192 lb 10.9 oz)   SpO2 98%   BMI 28.87 kg/m²     Physical Exam   Constitutional: He is oriented to person, place, and time. He appears well-developed and well-nourished.   HENT:   Head: Normocephalic and atraumatic.   Eyes: Conjunctivae and EOM are normal. Pupils are equal, round, and reactive to light.   Neck: Normal range of motion. Neck supple. No thyromegaly present.   Cardiovascular: Normal rate, regular rhythm and normal heart sounds.  Exam reveals no gallop and no friction rub.    No murmur heard.  Pulses:       Carotid pulses are on the right side with bruit.       Popliteal pulses are 1+ on the right side, and 1+ on the left side.        Dorsalis pedis pulses are 1+ on the right side, and 1+ on the left side.        Posterior tibial pulses are 1+ on the " right side, and 1+ on the left side.   Pulmonary/Chest: Effort normal and breath sounds normal. No respiratory distress. He has no wheezes. He has no rales. He exhibits no tenderness.   Abdominal: Soft. Bowel sounds are normal. He exhibits no distension. There is no tenderness.   Musculoskeletal: Normal range of motion.   Neurological: He is alert and oriented to person, place, and time. He has normal reflexes. No cranial nerve deficit. Coordination normal.   Skin: Skin is warm and dry.   Psychiatric: He has a normal mood and affect. His behavior is normal. Judgment and thought content normal.   Nursing note and vitals reviewed.        Chemistry        Component Value Date/Time     11/29/2016 0955    K 4.1 11/29/2016 0955     (H) 11/29/2016 0955    CO2 24 11/29/2016 0955    BUN 22 11/29/2016 0955    CREATININE 1.5 (H) 11/29/2016 0955    GLU 88 11/29/2016 0955        Component Value Date/Time    CALCIUM 9.4 11/29/2016 0955    ALKPHOS 74 11/29/2016 0955    AST 36 11/29/2016 0955    ALT 18 11/29/2016 0955    BILITOT 1.3 (H) 11/29/2016 0955            Lab Results   Component Value Date    CHOL 128 06/06/2017    CHOL 120 11/29/2016    CHOL 164 10/02/2015     Lab Results   Component Value Date    HDL 52 06/06/2017    HDL 36 (L) 11/29/2016    HDL 68 10/02/2015     Lab Results   Component Value Date    LDLCALC 66.0 06/06/2017    LDLCALC 66.8 11/29/2016    LDLCALC 84.2 10/02/2015     Lab Results   Component Value Date    TRIG 50 06/06/2017    TRIG 86 11/29/2016    TRIG 59 10/02/2015     Lab Results   Component Value Date    CHOLHDL 40.6 06/06/2017    CHOLHDL 30.0 11/29/2016    CHOLHDL 41.5 10/02/2015         Lab Results   Component Value Date     11/29/2016    K 4.1 11/29/2016     (H) 11/29/2016    CO2 24 11/29/2016    BUN 22 11/29/2016    CREATININE 1.5 (H) 11/29/2016    GLU 88 11/29/2016    HGBA1C 6.2 03/06/2008    MG 2.2 04/08/2015    AST 36 11/29/2016    ALT 18 11/29/2016    ALBUMIN 3.4 (L)  11/29/2016    PROT 6.5 11/29/2016    BILITOT 1.3 (H) 11/29/2016    WBC 6.68 11/29/2016    HGB 12.4 (L) 11/29/2016    HCT 38.2 (L) 11/29/2016    HCT 38 11/01/2013     (H) 11/29/2016     (L) 11/29/2016    INR 3.0 (H) 11/29/2016    PSA 10 (H) 06/30/2010    TSH 3.739 11/29/2016    CHOL 128 06/06/2017    HDL 52 06/06/2017    LDLCALC 66.0 06/06/2017    TRIG 50 06/06/2017     I provided a written prescription for Viagra 50 mg and instructed the patient about the dangers of nitroglycerin with the medication.

## 2017-06-09 NOTE — TELEPHONE ENCOUNTER
He want to talk to Dr Gibbs about some personal issues and about his treatment with his back pain and pain management. Refused appointment at the [facility] clinic.

## 2017-06-12 ENCOUNTER — LAB VISIT (OUTPATIENT)
Dept: LAB | Facility: HOSPITAL | Age: 82
End: 2017-06-12
Attending: INTERNAL MEDICINE
Payer: MEDICARE

## 2017-06-12 ENCOUNTER — OFFICE VISIT (OUTPATIENT)
Dept: INTERNAL MEDICINE | Facility: CLINIC | Age: 82
End: 2017-06-12
Payer: MEDICARE

## 2017-06-12 ENCOUNTER — PATIENT MESSAGE (OUTPATIENT)
Dept: ADMINISTRATIVE | Facility: OTHER | Age: 82
End: 2017-06-12

## 2017-06-12 VITALS
BODY MASS INDEX: 29.3 KG/M2 | DIASTOLIC BLOOD PRESSURE: 66 MMHG | SYSTOLIC BLOOD PRESSURE: 118 MMHG | HEIGHT: 68 IN | WEIGHT: 193.31 LBS | HEART RATE: 52 BPM

## 2017-06-12 DIAGNOSIS — R14.3 PASSING FLATUS: ICD-10-CM

## 2017-06-12 DIAGNOSIS — R14.3 PASSING FLATUS: Primary | ICD-10-CM

## 2017-06-12 DIAGNOSIS — G62.9 PERIPHERAL POLYNEUROPATHY: ICD-10-CM

## 2017-06-12 DIAGNOSIS — M54.16 CHRONIC LEFT LUMBAR RADICULOPATHY: ICD-10-CM

## 2017-06-12 DIAGNOSIS — R19.5 HEME POSITIVE STOOL: ICD-10-CM

## 2017-06-12 LAB
ALBUMIN SERPL BCP-MCNC: 3.7 G/DL
ALP SERPL-CCNC: 72 U/L
ALT SERPL W/O P-5'-P-CCNC: 18 U/L
ANION GAP SERPL CALC-SCNC: 8 MMOL/L
AST SERPL-CCNC: 30 U/L
BASOPHILS # BLD AUTO: 0.03 K/UL
BASOPHILS NFR BLD: 0.3 %
BILIRUB SERPL-MCNC: 1.1 MG/DL
BUN SERPL-MCNC: 27 MG/DL
CALCIUM SERPL-MCNC: 10 MG/DL
CHLORIDE SERPL-SCNC: 109 MMOL/L
CO2 SERPL-SCNC: 24 MMOL/L
CREAT SERPL-MCNC: 1.8 MG/DL
DIFFERENTIAL METHOD: ABNORMAL
EOSINOPHIL # BLD AUTO: 0.4 K/UL
EOSINOPHIL NFR BLD: 4.3 %
ERYTHROCYTE [DISTWIDTH] IN BLOOD BY AUTOMATED COUNT: 14.3 %
EST. GFR  (AFRICAN AMERICAN): 38.3 ML/MIN/1.73 M^2
EST. GFR  (NON AFRICAN AMERICAN): 33.1 ML/MIN/1.73 M^2
GLUCOSE SERPL-MCNC: 76 MG/DL
HCT VFR BLD AUTO: 39.6 %
HGB BLD-MCNC: 12.9 G/DL
LYMPHOCYTES # BLD AUTO: 1.2 K/UL
LYMPHOCYTES NFR BLD: 12 %
MCH RBC QN AUTO: 33.5 PG
MCHC RBC AUTO-ENTMCNC: 32.6 %
MCV RBC AUTO: 103 FL
MONOCYTES # BLD AUTO: 0.7 K/UL
MONOCYTES NFR BLD: 7 %
NEUTROPHILS # BLD AUTO: 7.4 K/UL
NEUTROPHILS NFR BLD: 76.4 %
PLATELET # BLD AUTO: 111 K/UL
PMV BLD AUTO: 10.3 FL
POTASSIUM SERPL-SCNC: 5.2 MMOL/L
PROT SERPL-MCNC: 6.7 G/DL
RBC # BLD AUTO: 3.85 M/UL
SODIUM SERPL-SCNC: 141 MMOL/L
TSH SERPL DL<=0.005 MIU/L-ACNC: 2.21 UIU/ML
VIT B12 SERPL-MCNC: 527 PG/ML
WBC # BLD AUTO: 9.68 K/UL

## 2017-06-12 PROCEDURE — 36415 COLL VENOUS BLD VENIPUNCTURE: CPT | Mod: PO

## 2017-06-12 PROCEDURE — 99214 OFFICE O/P EST MOD 30 MIN: CPT | Mod: S$GLB,,, | Performed by: INTERNAL MEDICINE

## 2017-06-12 PROCEDURE — 1125F AMNT PAIN NOTED PAIN PRSNT: CPT | Mod: S$GLB,,, | Performed by: INTERNAL MEDICINE

## 2017-06-12 PROCEDURE — 85025 COMPLETE CBC W/AUTO DIFF WBC: CPT | Mod: PO

## 2017-06-12 PROCEDURE — 1159F MED LIST DOCD IN RCRD: CPT | Mod: S$GLB,,, | Performed by: INTERNAL MEDICINE

## 2017-06-12 PROCEDURE — 82607 VITAMIN B-12: CPT

## 2017-06-12 PROCEDURE — 99999 PR PBB SHADOW E&M-EST. PATIENT-LVL III: CPT | Mod: PBBFAC,,, | Performed by: INTERNAL MEDICINE

## 2017-06-12 PROCEDURE — 80053 COMPREHEN METABOLIC PANEL: CPT

## 2017-06-12 PROCEDURE — 1157F ADVNC CARE PLAN IN RCRD: CPT | Mod: S$GLB,,, | Performed by: INTERNAL MEDICINE

## 2017-06-12 PROCEDURE — 99499 UNLISTED E&M SERVICE: CPT | Mod: ,,, | Performed by: INTERNAL MEDICINE

## 2017-06-12 PROCEDURE — 84443 ASSAY THYROID STIM HORMONE: CPT

## 2017-06-12 PROCEDURE — 99499 UNLISTED E&M SERVICE: CPT | Mod: S$GLB,,, | Performed by: INTERNAL MEDICINE

## 2017-06-12 RX ORDER — DULOXETIN HYDROCHLORIDE 30 MG/1
30 CAPSULE, DELAYED RELEASE ORAL DAILY
Qty: 30 CAPSULE | Refills: 11 | Status: SHIPPED | OUTPATIENT
Start: 2017-06-12 | End: 2017-11-01

## 2017-06-12 NOTE — PROGRESS NOTES
REASON FOR VISIT:  This is an 87-year-old male.  The reason for his appointment   is that recently he has just been noticing gas coming out of his rectum.  He has   no control, it just happens.  It is just a small amount.  It is something that   he feels, but he does not hear it and it has no smell.  It does not occur with a   bowel function.  There is no abdominal pain, belching, or burping.      He also notes that he still has ongoing pain involving his distal left lower   extremity, even despite getting an epidural steroid injection at L5.  It helped   his right leg, but not his left.  He does have lumbar spinal stenosis and he has   undergone a laminectomy back in 2016.    PAST MEDICAL HISTORY:   Coronary artery disease.  Hypertension.  Hyperlipidemia.  Peripheral vascular disease with stent involving the right iliac artery.  Chronic kidney disease.  BPH.  Carotid artery disease.  Lumbar spinal stenosis.  Pacemaker for sick sinus syndrome.    MEDICATIONS:  List reviewed and as per [employer].  He is on gabapentin 300 mg two   to three times a day.    PHYSICAL EXAMINATION:  VITAL SIGNS:  Weight is 193, pulse 60, blood pressure by me 136/62.  LUNGS:  Clear.  HEART:  Regular rate and rhythm.  ABDOMEN:  Normoactive bowel sounds, soft, is nontender.  EXTREMITIES:  He has 2+ pedal pulses and dorsalis pedis pulses.  Absent knee and   ankle jerk reflexes.  RECTAL:  On digital rectal examination, stool is brown, heme positive.    IMPRESSION:  1.  Increased flatus.  2.  Chronic lumbar radiculopathy.  3.  Probable peripheral neuropathy.  4.  Heme positive stool.    PLAN:  He is emphatic that his gabapentin has not been helpful.  We will put him   on the Duloxetine 30 mg to take once a day at night.  Today we will get labs of   chemistry, TSH, and CBC, which he has not had in a while.  I asked him to start   taking a probiotic once a day such as Culturelle.  Phone review to follow up   and he can stop the gabapentin.    /sc  [id number] review      JAM/IN  dd: 06/12/2017 12:45:32 (CDT)  td: 06/13/2017 08:20:21 (CDT)  Doc ID   #[id number]  Job ID #[id number]    CC:

## 2017-06-13 NOTE — PROGRESS NOTES
Patient, Javier Valles (MRN #[id number]), presented with a recent Platelet count less than 150 K/uL consistent with the definition of thrombocytopenia (ICD10 - D69.6).    Platelets   Date Value Ref Range Status   06/12/2017 111 (L) 150 - 350 K/uL Final     The patient's thrombocytopenia was monitored, evaluated, addressed and/or treated. This addendum to the medical record is made on 06/13/2017.

## 2017-06-15 ENCOUNTER — OFFICE VISIT (OUTPATIENT)
Dept: PAIN MEDICINE | Facility: CLINIC | Age: 82
End: 2017-06-15
Attending: ANESTHESIOLOGY
Payer: MEDICARE

## 2017-06-15 VITALS
HEART RATE: 35 BPM | HEIGHT: 68 IN | SYSTOLIC BLOOD PRESSURE: 144 MMHG | DIASTOLIC BLOOD PRESSURE: 83 MMHG | WEIGHT: 189 LBS | BODY MASS INDEX: 28.64 KG/M2 | TEMPERATURE: 98 F

## 2017-06-15 DIAGNOSIS — G89.4 CHRONIC PAIN SYNDROME: Primary | ICD-10-CM

## 2017-06-15 DIAGNOSIS — M96.1 POST LAMINECTOMY SYNDROME: ICD-10-CM

## 2017-06-15 DIAGNOSIS — M54.16 LEFT LUMBAR RADICULOPATHY: ICD-10-CM

## 2017-06-15 DIAGNOSIS — M47.816 LUMBAR FACET ARTHROPATHY: ICD-10-CM

## 2017-06-15 DIAGNOSIS — M51.36 LUMBAR DEGENERATIVE DISC DISEASE: ICD-10-CM

## 2017-06-15 PROCEDURE — 99499 UNLISTED E&M SERVICE: CPT | Mod: S$GLB,,, | Performed by: ANESTHESIOLOGY

## 2017-06-15 PROCEDURE — 99214 OFFICE O/P EST MOD 30 MIN: CPT | Mod: GC,S$GLB,, | Performed by: ANESTHESIOLOGY

## 2017-06-15 PROCEDURE — 1125F AMNT PAIN NOTED PAIN PRSNT: CPT | Mod: S$GLB,,, | Performed by: ANESTHESIOLOGY

## 2017-06-15 PROCEDURE — 99999 PR PBB SHADOW E&M-EST. PATIENT-LVL III: CPT | Mod: PBBFAC,,, | Performed by: ANESTHESIOLOGY

## 2017-06-15 PROCEDURE — 1157F ADVNC CARE PLAN IN RCRD: CPT | Mod: S$GLB,,, | Performed by: ANESTHESIOLOGY

## 2017-06-15 PROCEDURE — 1159F MED LIST DOCD IN RCRD: CPT | Mod: S$GLB,,, | Performed by: ANESTHESIOLOGY

## 2017-06-15 NOTE — PROGRESS NOTES
Chronic patient Established Note (Follow up visit)      SUBJECTIVE:    Javier Valles presents to the clinic for a follow-up appointment for low back pain. Since the last visit, Javier Valles states the pain has been worsening. Current pain intensity is 10/10. S/p Right TFESI (17) some improvement in Left upper leg pain however pain in left lower leg and calf has been persistent. Describes as burning pain feels radiates from his ankle up to his knee. Is constant 7-8/10 with intermittent sharp episodes that are 10/10 pain but only exist between knee and ankle. Pain is aggravated with most activity and interferes with ADLs. Relieved with rest and injections.     Pain Disability Index Review:  Last 3 PDI Scores 6/15/2017 2017 2015   Pain Disability Index (PDI) 29 0 20       Pain Medications:    - Adjuvant Medications: Cymbalta ( Duloxetine)  - Anti-Coagulants: Aspirin and Eliquis    Opioid Contract: no     report:  Reviewed and consistent with medication use as prescribed.    Pain Procedures:   2017:PROCEDURE: Left L5 transforaminal epidural steroid injection under fluoroscopy  2017: Right  L5 transforaminal epidural steroid injection under fluoroscopy       Physical Therapy/Home Exercise: no    Imagin2017 CT of L-Spine:Impression         Advanced multilevel degenerative changes of the lumbar spine.  Specific details at each level are discussed above.  ______________________________________          Allergies:   Review of patient's allergies indicates:   Allergen Reactions    Ace inhibitors Other (See Comments)     Cough    Lipitor [atorvastatin]      Myositis/elevated CPK    Pravastatin Other (See Comments)     Severe myalgias/elevated CPK    Statins-hmg-coa reductase inhibitors      Muscle pain and loss of muscle       Current Medications:   Current Outpatient Prescriptions   Medication Sig Dispense Refill    amlodipine (NORVASC) 5 MG tablet Take 1 tablet (5 mg total) by  mouth once daily. 30 tablet 11    apixaban (ELIQUIS) 2.5 mg Tab Take 1 tablet (2.5 mg total) by mouth 2 (two) times daily. 180 tablet 3    ascorbic acid (VITAMIN C) 1000 MG tablet Take 1,000 mg by mouth every morning.       aspirin (ECOTRIN) 81 MG EC tablet Take 1 tablet (81 mg total) by mouth once daily.  0    brimonidine 0.2% (ALPHAGAN) 0.2 % Drop Place 1 drop into the left eye 3 (three) times daily. Disp 10 mls for 30 days 10 mL 12    buPROPion (WELLBUTRIN XL) 150 MG TB24 tablet Take 1 tablet (150 mg total) by mouth once daily. 30 tablet 11    co-enzyme Q-10 30 mg capsule Take 30 mg by mouth once daily.       donepezil (ARICEPT) 5 MG tablet Take 1 tablet (5 mg total) by mouth every evening. 30 tablet 11    duloxetine (CYMBALTA) 30 MG capsule Take 1 capsule (30 mg total) by mouth once daily. 30 capsule 11    ipratropium (ATROVENT) 0.03 % nasal spray 1-2 sprays by Nasal route 2 (two) times daily. 30 mL 1    latanoprost 0.005 % ophthalmic solution Place 1 drop into both eyes every evening. 1 Bottle 12    losartan (COZAAR) 50 MG tablet Take 1 tablet (50 mg total) by mouth 2 (two) times daily. 180 tablet 3    lutein 20 mg Cap Take 20 mg by mouth once daily.       MULTIVITAMINS,THER W-MINERALS (VITAMINS & MINERALS ORAL) Take 1 tablet by mouth every morning.       nitroGLYCERIN (NITROSTAT) 0.4 MG SL tablet Place 1 tablet (0.4 mg total) under the tongue every 5 (five) minutes as needed for Chest pain. 30 tablet 5    rosuvastatin (CRESTOR) 20 MG tablet Take 1 tablet (20 mg total) by mouth every evening. 90 tablet 3     No current facility-administered medications for this visit.        REVIEW OF SYSTEMS:    GENERAL:  No weight loss, malaise or fevers.  HEENT:  Negative for frequent or significant headaches.  NECK:  Negative for lumps, goiter, pain and significant neck swelling.  RESPIRATORY:  Negative for cough, wheezing or shortness of breath.  CARDIOVASCULAR:  Negative for chest pain, leg swelling or  palpitations.  GI:  Negative for abdominal discomfort, blood in stools or black stools or change in bowel habits.  MUSCULOSKELETAL:  See HPI.  SKIN:  Negative for lesions, rash, and itching.  PSYCH: + for sleep disturbance, mood disorder and recent psychosocial stressors.  HEMATOLOGY/LYMPHOLOGY:  Negative for prolonged bleeding, bruising easily or swollen nodes.  NEURO:   No history of headaches, syncope, paralysis, seizures or tremors.  All other reviewed and negative other than HPI.    Past Medical History:  Past Medical History:   Diagnosis Date    *Atrial flutter 10/3/13    Anticoagulant long-term use     Aspirin only at this time    Arthritis     Atrial fibrillation     Atrial flutter     Blood transfusion     ?    BPH (benign prostatic hypertrophy)     Carotid artery disease     2008: [place] There is 40 - 49% right Internal Carotid stenosis.  There is 20 - 39% left Internal Carotid stenosis.       Chronic kidney disease     Coronary artery disease     DDD (degenerative disc disease) 6/25/2015    Degenerative disc disease     Elevated PSA     Glaucoma     Hyperlipidemia     Hypertension     Lumbar stenosis     lumbar spinal stenosis    NSTEMI (non-ST elevated myocardial infarction) 11/3/2013    Pacemaker 11/2013    Peripheral neuropathy     - S/P right ILIAC stent 1993;      Retinal detachment     Squamous cell carcinoma     left leg    Syncope and collapse: orthostatic with hypotension 10/9/2015       Past Surgical History:  Past Surgical History:   Procedure Laterality Date    BACK SURGERY  04/25/2016    bypass 1991      CARDIAC CATHETERIZATION      CARDIAC ELECTROPHYSIOLOGY MAPPING AND ABLATION  11/2016    CARDIAC PACEMAKER PLACEMENT      CARDIAC PACEMAKER PLACEMENT  11/2016    CATARACT EXTRACTION W/  INTRAOCULAR LENS IMPLANT Left 1997    OS with     ENUCLEATION Right n/a    EYE SURGERY      retinal detachment repair left eye      RETINAL DETACHMENT SURGERY Left 1997     "Dr. Cosme    SKIN BIOPSY      TONSILLECTOMY         Family History:  Family History   Problem Relation Age of Onset    Stroke Mother 69    Clotting disorder Mother      per patient no clotting disorder    Hypertension Mother     Diverticulitis Son     Cancer Neg Hx     Diabetes Neg Hx     Heart disease Neg Hx     Glaucoma Neg Hx     Amblyopia Neg Hx     Blindness Neg Hx     Cataracts Neg Hx     Macular degeneration Neg Hx     Retinal detachment Neg Hx     Strabismus Neg Hx        Social History:  Social History     Social History    Marital status:      Spouse name: Joanie    Number of children: N/A    Years of education: N/A     Occupational History    retired      Social History Main Topics    Smoking status: Former Smoker     Quit date: 8/19/1963    Smokeless tobacco: None    Alcohol use 1.8 oz/week     1 Glasses of wine, 1 Cans of beer, 1 Shots of liquor per week      Comment: 2 a day    Drug use: No    Sexual activity: Yes     Partners: Female     Other Topics Concern    None     Social History Narrative    None       OBJECTIVE:    BP (!) 144/83   Pulse (!) 35   Temp 98.1 °F (36.7 °C) (Oral)   Ht 5' 8" (1.727 m)   Wt 85.7 kg (189 lb)   BMI 28.74 kg/m²     PHYSICAL EXAMINATION:    General appearance: Well appearing, in no acute distress, alert and oriented x3.  Psych:  Mood and affect appropriate.  Skin: Skin color, texture, turgor normal, no rashes or lesions, in both upper and lower body.  Head/face:  Atraumatic, normocephalic. No palpable lymph nodes  Neck: No pain to palpation over the cervical paraspinous muscles. Spurling Negative. No pain with neck flexion, extension, or lateral flexion. .  Cor: RRR  Pulm: CTA  GI: Abdomen soft and non-tender.  Back: Straight leg raising in the sitting and supine positions is +ve to radicular pain. Moderate  pain to palpation over the spine or costovertebral angles. Normal range of motion without pain reproduction.Positive axial " loading test bilateral.  Positive FABERE,Ganselin and Yeoman's test on the both side.negative FADIR  Extremities: Peripheral joint ROM is full and pain free without obvious instability or laxity in all four extremities. No deformities, edema, or skin discoloration. Good capillary refill.  Musculoskeletal: Shoulder, hip, and knee provocative maneuvers are negative. Bilateral upper and lower extremity strength is normal and symmetric.  No atrophy or tone abnormalities are noted.  Neuro: Bilateral upper and lower extremity coordination and muscle stretch reflexes are physiologic and symmetric.  Plantar response are downgoing. No loss of sensation is noted.  Gait: Normal.    ASSESSMENT: 87 y.o. year old male with low back pain, consistent with      1. Chronic pain syndrome  Ambulatory consult to Psychology   2. Left lumbar radiculopathy     3. Lumbar facet arthropathy     4. Lumbar degenerative disc disease     5. Post laminectomy syndrome  Ambulatory consult to Psychology         PLAN:     - I have stressed the importance of physical activity and a home exercise plan to help with pain and improve health.  - Schedule for a Caudal Epidural Steroid Injection 1 week prior to trip to help with pain and progress with a Home exercise program.  - RTC when he returns from trip for potential SCS trial and implantation  - Counseled patient regarding the importance of activity modification and physical therapy.    The above plan and management options were discussed at length with patient. Patient is in agreement with the above and verbalized understanding.    Yury Valdez  06/15/2017   I have personally reviewed the history and exam of this patient and agree with the resident's note as stated above.    James Hodges MD

## 2017-06-19 ENCOUNTER — PATIENT MESSAGE (OUTPATIENT)
Dept: PAIN MEDICINE | Facility: CLINIC | Age: 82
End: 2017-06-19

## 2017-06-28 ENCOUNTER — PATIENT MESSAGE (OUTPATIENT)
Dept: INTERNAL MEDICINE | Facility: CLINIC | Age: 82
End: 2017-06-28

## 2017-06-28 ENCOUNTER — PATIENT MESSAGE (OUTPATIENT)
Dept: ADMINISTRATIVE | Facility: OTHER | Age: 82
End: 2017-06-28

## 2017-06-28 DIAGNOSIS — J34.89 RHINORRHEA: ICD-10-CM

## 2017-06-28 RX ORDER — IPRATROPIUM BROMIDE 21 UG/1
1-2 SPRAY, METERED NASAL 2 TIMES DAILY
Qty: 30 ML | Refills: 1 | Status: SHIPPED | OUTPATIENT
Start: 2017-06-28 | End: 2018-07-13 | Stop reason: SDUPTHER

## 2017-06-30 ENCOUNTER — PATIENT MESSAGE (OUTPATIENT)
Dept: OPHTHALMOLOGY | Facility: CLINIC | Age: 82
End: 2017-06-30

## 2017-06-30 DIAGNOSIS — H40.1132 PRIMARY OPEN ANGLE GLAUCOMA OF BOTH EYES, MODERATE STAGE: ICD-10-CM

## 2017-06-30 RX ORDER — LATANOPROST 50 UG/ML
1 SOLUTION/ DROPS OPHTHALMIC NIGHTLY
Qty: 1 BOTTLE | Refills: 12 | Status: SHIPPED | OUTPATIENT
Start: 2017-06-30 | End: 2017-07-24 | Stop reason: SDUPTHER

## 2017-07-05 ENCOUNTER — TELEPHONE (OUTPATIENT)
Dept: INTERNAL MEDICINE | Facility: CLINIC | Age: 82
End: 2017-07-05

## 2017-07-05 NOTE — TELEPHONE ENCOUNTER
----- Message from Dorenehank Uriarte sent at 7/5/2017  2:06 PM CDT -----  Contact: Pt [phone]  Pt called requesting to speak to you concerning a private matter.  Pt would not elaborate

## 2017-07-07 ENCOUNTER — PATIENT OUTREACH (OUTPATIENT)
Dept: OTHER | Facility: OTHER | Age: 82
End: 2017-07-07

## 2017-07-07 NOTE — PROGRESS NOTES
Last 5 Patient Entered Readings                                                               Current 30 Day Average: 118/70     Recent Readings 7/6/2017 6/28/2017 6/22/2017 6/19/2017 6/15/2017    Systolic BP (mmHg) 120 134 111 103 120    Diastolic BP (mmHg) 64 56 69 79 78    Pulse 65 31 57 65 65        Mr. Valles remains well controlled w/a BP average of 118/70. LVM requesting call back to discuss how he is doing. Will also check in again in a few weeks.

## 2017-07-07 NOTE — PROGRESS NOTES
Last 5 Patient Entered Readings                                                               Current 30 Day Average: 118/69     Recent Readings 7/6/2017 6/28/2017 6/22/2017 6/19/2017 6/15/2017    Systolic BP (mmHg) 120 134 111 103 120    Diastolic BP (mmHg) 64 56 69 79 78    Pulse 65 31 57 65 65        Hypertension Medications             amlodipine (NORVASC) 5 MG tablet Take 1 tablet (5 mg total) by mouth once daily.    losartan (COZAAR) 50 MG tablet Take 1 tablet (50 mg total) by mouth 2 (two) times daily.    nitroGLYCERIN (NITROSTAT) 0.4 MG SL tablet Place 1 tablet (0.4 mg total) under the tongue every 5 (five) minutes as needed for Chest pain.        Plan:   Called patient to follow up. Per current 30 day average, BP is well controlled. LVM.  Will continue to monitor. WCB in 4 months, sooner if BP begins to trend up or down.

## 2017-07-10 ENCOUNTER — PATIENT MESSAGE (OUTPATIENT)
Dept: PAIN MEDICINE | Facility: OTHER | Age: 82
End: 2017-07-10

## 2017-07-11 ENCOUNTER — CLINICAL SUPPORT (OUTPATIENT)
Dept: ELECTROPHYSIOLOGY | Facility: CLINIC | Age: 82
End: 2017-07-11
Payer: MEDICARE

## 2017-07-11 DIAGNOSIS — Z95.0 CARDIAC PACEMAKER IN SITU: ICD-10-CM

## 2017-07-11 DIAGNOSIS — I49.5 SSS (SICK SINUS SYNDROME): ICD-10-CM

## 2017-07-11 DIAGNOSIS — I48.91 ATRIAL FIBRILLATION, UNSPECIFIED TYPE: ICD-10-CM

## 2017-07-11 PROCEDURE — 93294 REM INTERROG EVL PM/LDLS PM: CPT | Mod: S$GLB,,, | Performed by: INTERNAL MEDICINE

## 2017-07-11 PROCEDURE — 93296 REM INTERROG EVL PM/IDS: CPT | Mod: S$GLB,,, | Performed by: INTERNAL MEDICINE

## 2017-07-17 ENCOUNTER — OFFICE VISIT (OUTPATIENT)
Dept: PSYCHIATRY | Facility: CLINIC | Age: 82
End: 2017-07-17
Payer: MEDICARE

## 2017-07-17 ENCOUNTER — TELEPHONE (OUTPATIENT)
Dept: PSYCHIATRY | Facility: CLINIC | Age: 82
End: 2017-07-17

## 2017-07-17 DIAGNOSIS — F43.21 ADJUSTMENT DISORDER WITH DEPRESSED MOOD: ICD-10-CM

## 2017-07-17 DIAGNOSIS — G89.4 CHRONIC PAIN SYNDROME: ICD-10-CM

## 2017-07-17 DIAGNOSIS — M96.1 POST LAMINECTOMY SYNDROME: ICD-10-CM

## 2017-07-17 DIAGNOSIS — Z01.818 PRE-OP EXAM: Primary | ICD-10-CM

## 2017-07-17 DIAGNOSIS — R41.3 MEMORY IMPAIRMENT: ICD-10-CM

## 2017-07-17 PROCEDURE — 96101 PR PSYCHOLOGIC TESTING BY PSYCH/PHYS: CPT | Mod: S$GLB,,, | Performed by: PSYCHOLOGIST

## 2017-07-17 PROCEDURE — 96102 PR PSYCHOLOGIC TESTING BY TECHNICIAN: CPT | Mod: 59,S$GLB,, | Performed by: PSYCHOLOGIST

## 2017-07-17 PROCEDURE — 90791 PSYCH DIAGNOSTIC EVALUATION: CPT | Mod: S$GLB,,, | Performed by: PSYCHOLOGIST

## 2017-07-17 NOTE — TELEPHONE ENCOUNTER
Report of Psychological Evaluation is completed. It can be accessed through the Chart Review activity under the Notes tab (11th tab to the right of the Encounters tab).  It is titled Psych Testing.    There are no contraindications to the spinal cord stimulator from the psychological perspective. PCP will continue to manage meds for memory and depression.  Thanks. YANG

## 2017-07-17 NOTE — PSYCH TESTING
[facility]  [address] [place]  [address]  [phone]    REPORT OF PSYCHOLOGICAL TESTING    NAME:   Javier Valles  OC #: [id number]  : 1930    REFERRED BY:  James Hodges M.D.    EVALUATED BY:  Roxy Arizmendi, Ph.D., Clinical Psychologist  Jam Costello M.S., Psychometrician    DATES OF EVALUATION: 2017 & 2017    EVALUATION PROCEDURES AND TIMES:  Conducted by Psychologist:  Interpretation and report of test data  Integration of information from diagnostic interview, medical record, and testing data  Conducted by Technician:  [place] Multiphasic Personality Xdrahkgnk-1-Iepwssioxkdl Form (MMPI-2-RF)  [facility] Behavioral Medicine Diagnostic (MBMD)  Ayala Depression Inventory - II (BDI-II)  CPT Codes and Time:  47398 - 2 hours; 60717 - 3 hours    EVALUATION FINDINGS:  The diagnostic interview revealed that Mr. Javier Valles is an 87 year-old white  male referred for Psychological Evaluation prior to surgical implantation of a spinal cord stimulator to address chronic pain. He reported he has had chronic pain in his lower back and legs for 2 ½ years. At this point the left lower leg is of most concern to him. He has had one surgery but is not a candidate for further surgery. Medications and injections have failed to provide sufficient relief. His activities are greatly limited. He often has to sit or lie down to get some relief. He is unable to play golf and cannot lift anything. On a recent trip with his wife to [facility], he had to sit while she went to the [employer]. He is now considering in a trial of the spinal cord stimulator device. He is familiar with the procedures involved.    Mr. Goins wife reported to his PCP Dr. Gibbs that he was forgetful and depressed about a year ago. As a result, Dr. Gibbs prescribed Wellbutrin and Aricept. Mr. Valles admits he may be mildly depressed related to limitations due to pain and significant visual problems (right  eye blindness, glaucoma in left). He has not noticed any change following initiation of medications. He was pleasant and cooperative in interview and appeared to be in good spirits. The Mental Status Exam suggested reduced recent memory and concentration. However, he drives and manages his medications and all the family finances without difficulty and reports no subjective memory impairment.    The medical record also revealed history of post-laminectomy syndrome, chronic pain syndrome, left lumbar radiculopathy, lumbar facet arthropathy, lumbar degenerative disc disease, atrial flutter, arthritis, BPH, carotid artery disease, chronic kidney disease, coronary artery disease, glaucoma, hyperlipidemia, HTN, NSTEMI, pacemaker, retinal detachment, right eye blindness, squamous cell carcinoma left leg, and syncope. CT of the head completed on 2/22/2016 revealed chronic microvascular changes.    TEST DATA:  Psychological Testing data revealed that he was fully cooperative and engaged in the assessment process.  He is a high school graduate with two additional years of college education. He is a retired independent . Effort on all tests was satisfactory to produce valid results. However, visual problems interfered with his ability to accurately read test items and record his answers on the answer sheet. He is blind in his right eye and has glaucoma in the left.    Review of some critical items from the MMPI-2-RF indicated that his poor eyesight interfered with his ability to complete this test in an accurate manner. No conclusions will be drawn from this profile as a result.    The MBMD suggested he was not indicating significant psychiatric distress.    The BDI-II revealed the presence of minimal depression with a total score of 6.     DIAGNOSTIC IMPRESSIONS:  Z01.818 Pre-op Exam  M96.1 Post laminectomy syndrome  G89.4 Chronic pain syndrome  R41.3 Memory impairment  F43.21 Adjustment disorder with  depressed mood    SUMMARY AND RECOMMENDATIONS:  Mr. Valles has a very recent history of mild depression and possible memory decline, which are being managed appropriately by his PCP. Poor eyesight limited his ability to complete one test validly, but other tests did not suggest the presence of adjustment difficulty. This evaluation revealed no contraindications to the spinal cord stimulator from the psychological perspective. His PCP will continue to manage medications for memory and depression.

## 2017-07-17 NOTE — PROGRESS NOTES
Psychiatry Initial Visit (PhD/LCSW)    Date: 7/17/2017    CPT Code: 28513    Referred by: James Hodges M.D.    Chief complaint/reason for encounter:  Psychological Evaluation prior to surgical implantation of a spinal cord stimulator to address chronic pain    Clinical status of patient:  Outpatient    Met with:  Patient    History of present illness: Mr. Javier Valles is an 87 year-old white  male referred for Psychological Evaluation prior to surgical implantation of a spinal cord stimulator to address chronic pain. He reported he has had chronic pain in his lower back and legs for 2 ½ years. At this point the left lower leg is of most concern. He has had one surgery but is not a candidate for further surgery. Medications and injections have failed to provide sufficient relief. His activities are greatly limited. He often has to sit or lie down to get some relief. He is unable to play golf and cannot lift anything. On a recent trip with his wife to [facility], he had to sit while she went to the [employer]. He is now considering in a trial of the spinal cord stimulator device. He is familiar with the procedures involved.    Mr. Goins wife reported to his PCP Dr. Gibbs that he was forgetful and depressed about a year ago. As a result, Dr. Gibbs prescribed Wellbutrin and Aricept. Mr. Valles admits he may be mildly depressed related to limitations due to pain and significant visual problems. He has not noticed any change following initiation of medications. He was pleasant and cooperative in interview and appeared to be in good spirits.     Pain scale:    5/10     Symptoms:  Mood: mildly depressed mood  Anxiety:  denied  Substance abuse: denied  Cognitive functioning: he thinks he is fine but wife thinks memory has declined    Psychiatric history: as above - PCP prescribes meds for memory and depression - past year      Medical history:  post-laminectomy syndrome, chronic pain syndrome, left lumbar  radiculopathy, lumbar facet arthropathy, lumbar degenerative disc disease, atrial flutter, arthritis, BPH, carotid artery disease, chronic kidney disease, coronary artery disease, glaucoma, hyperlipidemia, HTN, NSTEMI, pacemaker, retinal detachment, right eye blindness, squamous cell carcinoma left leg, and syncope. CT of the head completed on 2016 revealed chronic microvascular changes.                 Family history of psychiatric illness:  none    Social history (marriage, employment, etc.):  High school graduate with two years of college. Lost right eye in handball accident in college so did not complete college. No  service. 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  3 times.  First marriage produced 4 children and ended in divorce. Second wife had 4 children, which he took as his own. That wife . Now  to third wife. Live with wife. Helps wife in the garden, tinkers around the house, sees friends, cooks, goes out to eat. Cannot golf or do anything strenuous due to pain; cannot read due to poor vision.    Substance use:   Alcohol: 2 drinks per day   Drugs: none   Tobacco: quit smoking 1963   Caffeine: 1-2 cups coffee per day    Strengths and Liabilities:   Strength:  Pt is expressive/articulate. Pt has good social support.   Liability:  Pt has chronic pain.    Mental Status Exam:  General appearance:  appears stated age, neatly dressed, well groomed  Speech:  normal rate and tone  Level of cooperation:  cooperative  Thought processes:  logical, goal-directed  Mood:  euthymic  Thought content:  no illusions, no visual hallucinations, no auditory hallucinations, no delusions, no active or passive homicidal thoughts, no active or passive suicidal ideation, no obsessions, no compulsions, no violence  Affect:  appropriate  Orientation:  oriented to person, place, and time  Memory:  Recent memory:  2 of 3 objects  after brief delay.    Remote memory - intact  Attention span and concentration:  unable to spell HOUSE backwards  Fund of general knowledge: 4 of 4 recent presidents  Abstract reasoning:    Similarities: abstract.    Proverbs: abstract.  Judgment and insight: fair  Language:  intact    Diagnostic impressions:  Z01.818 Pre-op exam  M96.1 Post laminectomy syndrome  G89.4 Chronic pain syndrome  R41.3 Memory impairment  F43.21 Adjustment disorder with depressed mood    Plan:  Pt will complete Psychological testing.  Report of Psychological Evaluation will follow in the Notes folder in the patients chart in the encounter titled Psychological Testing.  His PCP will continue to manage medications for memory and depression.    Length of time:  45 minutes

## 2017-07-18 ENCOUNTER — TELEPHONE (OUTPATIENT)
Dept: PAIN MEDICINE | Facility: CLINIC | Age: 82
End: 2017-07-18

## 2017-07-21 ENCOUNTER — HOSPITAL ENCOUNTER (OUTPATIENT)
Facility: OTHER | Age: 82
Discharge: HOME | End: 2017-07-21
Attending: ANESTHESIOLOGY | Admitting: ANESTHESIOLOGY
Payer: MEDICARE

## 2017-07-21 ENCOUNTER — SURGERY (OUTPATIENT)
Age: 82
End: 2017-07-21

## 2017-07-21 VITALS
WEIGHT: 187 LBS | OXYGEN SATURATION: 98 % | HEIGHT: 68 IN | TEMPERATURE: 98 F | SYSTOLIC BLOOD PRESSURE: 148 MMHG | BODY MASS INDEX: 28.34 KG/M2 | HEART RATE: 50 BPM | RESPIRATION RATE: 16 BRPM | DIASTOLIC BLOOD PRESSURE: 96 MMHG

## 2017-07-21 DIAGNOSIS — G89.29 CHRONIC PAIN: ICD-10-CM

## 2017-07-21 DIAGNOSIS — G89.4 CHRONIC PAIN SYNDROME: ICD-10-CM

## 2017-07-21 DIAGNOSIS — M96.1 POST LAMINECTOMY SYNDROME: ICD-10-CM

## 2017-07-21 DIAGNOSIS — Z98.890 S/P LUMBAR LAMINECTOMY: Primary | ICD-10-CM

## 2017-07-21 DIAGNOSIS — M48.061 LUMBAR SPINAL STENOSIS: ICD-10-CM

## 2017-07-21 PROCEDURE — 63600175 PHARM REV CODE 636 W HCPCS: Performed by: ANESTHESIOLOGY

## 2017-07-21 PROCEDURE — 25000003 PHARM REV CODE 250: Performed by: ANESTHESIOLOGY

## 2017-07-21 PROCEDURE — 25000003 PHARM REV CODE 250

## 2017-07-21 PROCEDURE — 25500020 PHARM REV CODE 255: Performed by: ANESTHESIOLOGY

## 2017-07-21 PROCEDURE — 62322 NJX INTERLAMINAR LMBR/SAC: CPT | Performed by: ANESTHESIOLOGY

## 2017-07-21 PROCEDURE — 62323 NJX INTERLAMINAR LMBR/SAC: CPT | Mod: ,,, | Performed by: ANESTHESIOLOGY

## 2017-07-21 PROCEDURE — 62323 NJX INTERLAMINAR LMBR/SAC: CPT | Performed by: ANESTHESIOLOGY

## 2017-07-21 RX ORDER — LIDOCAINE HYDROCHLORIDE 10 MG/ML
INJECTION INFILTRATION; PERINEURAL
Status: DISCONTINUED | OUTPATIENT
Start: 2017-07-21 | End: 2017-07-21 | Stop reason: HOSPADM

## 2017-07-21 RX ORDER — SODIUM CHLORIDE 9 MG/ML
500 INJECTION, SOLUTION INTRAVENOUS CONTINUOUS
Status: DISCONTINUED | OUTPATIENT
Start: 2017-07-21 | End: 2017-07-21 | Stop reason: HOSPADM

## 2017-07-21 RX ORDER — LIDOCAINE HYDROCHLORIDE 5 MG/ML
INJECTION, SOLUTION INFILTRATION; INTRAVENOUS
Status: DISCONTINUED | OUTPATIENT
Start: 1840-12-31 | End: 2017-07-21 | Stop reason: HOSPADM

## 2017-07-21 RX ORDER — MIDAZOLAM HYDROCHLORIDE 1 MG/ML
INJECTION INTRAMUSCULAR; INTRAVENOUS
Status: DISCONTINUED | OUTPATIENT
Start: 2017-07-21 | End: 2017-07-21 | Stop reason: HOSPADM

## 2017-07-21 RX ORDER — TRIAMCINOLONE ACETONIDE 40 MG/ML
INJECTION, SUSPENSION INTRA-ARTICULAR; INTRAMUSCULAR
Status: DISCONTINUED | OUTPATIENT
Start: 2017-07-21 | End: 2017-07-21 | Stop reason: HOSPADM

## 2017-07-21 RX ADMIN — LIDOCAINE HYDROCHLORIDE 10 ML: 10 INJECTION, SOLUTION INFILTRATION; PERINEURAL at 08:07

## 2017-07-21 RX ADMIN — TRIAMCINOLONE ACETONIDE 40 MG: 40 INJECTION, SUSPENSION INTRA-ARTICULAR; INTRAMUSCULAR at 08:07

## 2017-07-21 RX ADMIN — IOHEXOL 5 ML: 300 INJECTION, SOLUTION INTRAVENOUS at 08:07

## 2017-07-21 RX ADMIN — MIDAZOLAM HYDROCHLORIDE 2 MG: 1 INJECTION, SOLUTION INTRAMUSCULAR; INTRAVENOUS at 08:07

## 2017-07-21 RX ADMIN — LIDOCAINE HYDROCHLORIDE 10 ML: 5 INJECTION, SOLUTION INFILTRATION; INTRAVENOUS at 08:07

## 2017-07-21 RX ADMIN — SODIUM CHLORIDE 500 ML: 0.9 INJECTION, SOLUTION INTRAVENOUS at 08:07

## 2017-07-21 NOTE — H&P
"HPI    88yo M who presents for caudal JOMAR. Last took Eliquis 7/18.    PMHx, PSHx, Allergies, Medications reviewed in epic    ROS negative except pain complaints in HPI    OBJECTIVE:    BP (!) 185/74   Pulse (!) 55   Temp 98.2 °F (36.8 °C) (Oral)   Resp 18   Ht 5' 8" (1.727 m)   Wt 84.8 kg (187 lb)   SpO2 95%   BMI 28.43 kg/m²     PHYSICAL EXAMINATION:    GENERAL: Well appearing, in no acute distress, alert and oriented x3.  PSYCH:  Mood and affect appropriate.  SKIN: Skin color, texture, turgor normal, no rashes or lesions.  CV: RRR with palpation of the radial artery.  PULM: No evidence of respiratory difficulty, symmetric chest rise. Clear to auscultation.  NEURO: Cranial nerves grossly intact.    Plan: Caudal JOMAR    Proceed with procedure as planned    Shawn Baron  07/21/2017    "

## 2017-07-21 NOTE — DISCHARGE SUMMARY
Discharge Note  Short Stay      SUMMARY     Admit Date: 7/21/2017    Attending Physician: James Hodges      Discharge Physician: James Hodges      Discharge Date: 7/21/2017 9:02 AM    Final Diagnosis:   1. S/P lumbar laminectomy    2. Chronic pain syndrome    3. Lumbar spinal stenosis    4. Post laminectomy syndrome    5. Chronic pain        Disposition: Home or self care    Patient Instructions:   Current Discharge Medication List      CONTINUE these medications which have NOT CHANGED    Details   amlodipine (NORVASC) 5 MG tablet Take 1 tablet (5 mg total) by mouth once daily.  Qty: 30 tablet, Refills: 11      apixaban (ELIQUIS) 2.5 mg Tab Take 1 tablet (2.5 mg total) by mouth 2 (two) times daily.  Qty: 180 tablet, Refills: 3    Associated Diagnoses: Typical atrial flutter      ascorbic acid (VITAMIN C) 1000 MG tablet Take 1,000 mg by mouth every morning.       aspirin (ECOTRIN) 81 MG EC tablet Take 1 tablet (81 mg total) by mouth once daily.  Refills: 0      brimonidine 0.2% (ALPHAGAN) 0.2 % Drop Place 1 drop into the left eye 3 (three) times daily. Disp 10 mls for 30 days  Qty: 10 mL, Refills: 12    Associated Diagnoses: Open-angle glaucoma, moderate stage, unspecified laterality, unspecified open-angle glaucoma type      buPROPion (WELLBUTRIN XL) 150 MG TB24 tablet Take 1 tablet (150 mg total) by mouth once daily.  Qty: 30 tablet, Refills: 11      co-enzyme Q-10 30 mg capsule Take 30 mg by mouth once daily.       donepezil (ARICEPT) 5 MG tablet Take 1 tablet (5 mg total) by mouth every evening.  Qty: 30 tablet, Refills: 11      duloxetine (CYMBALTA) 30 MG capsule Take 1 capsule (30 mg total) by mouth once daily.  Qty: 30 capsule, Refills: 11      ipratropium (ATROVENT) 0.03 % nasal spray 1-2 sprays by Nasal route 2 (two) times daily.  Qty: 30 mL, Refills: 1    Associated Diagnoses: Rhinorrhea      latanoprost 0.005 % ophthalmic solution Place 1 drop into both eyes every evening.  Qty: 1 Bottle, Refills:  12    Associated Diagnoses: Primary open angle glaucoma of both eyes, moderate stage      losartan (COZAAR) 50 MG tablet Take 1 tablet (50 mg total) by mouth 2 (two) times daily.  Qty: 180 tablet, Refills: 3    Associated Diagnoses: Coronary artery disease involving native coronary artery without angina pectoris, unspecified whether native or transplanted heart      lutein 20 mg Cap Take 20 mg by mouth once daily.       MULTIVITAMINS,THER W-MINERALS (VITAMINS & MINERALS ORAL) Take 1 tablet by mouth every morning.       nitroGLYCERIN (NITROSTAT) 0.4 MG SL tablet Place 1 tablet (0.4 mg total) under the tongue every 5 (five) minutes as needed for Chest pain.  Qty: 30 tablet, Refills: 5    Comments: 3 bottles per box      rosuvastatin (CRESTOR) 20 MG tablet Take 1 tablet (20 mg total) by mouth every evening.  Qty: 90 tablet, Refills: 3    Comments: Patient wants generic  Associated Diagnoses: Hyperlipidemia, unspecified hyperlipidemia type                 Discharge Diagnosis: Same as Pre and Post Procedure  Condition on Discharge: Stable.  Diet on Discharge: Same as before.  Activity: as per instruction sheet.  Discharge to: Home with a responsible adult.  Follow up: as per Discharge instructions.

## 2017-07-23 ENCOUNTER — PATIENT MESSAGE (OUTPATIENT)
Dept: PAIN MEDICINE | Facility: OTHER | Age: 82
End: 2017-07-23

## 2017-07-24 ENCOUNTER — TELEPHONE (OUTPATIENT)
Dept: ELECTROPHYSIOLOGY | Facility: CLINIC | Age: 82
End: 2017-07-24

## 2017-07-24 ENCOUNTER — PATIENT MESSAGE (OUTPATIENT)
Dept: PAIN MEDICINE | Facility: CLINIC | Age: 82
End: 2017-07-24

## 2017-07-24 ENCOUNTER — OFFICE VISIT (OUTPATIENT)
Dept: OPHTHALMOLOGY | Facility: CLINIC | Age: 82
End: 2017-07-24
Payer: MEDICARE

## 2017-07-24 DIAGNOSIS — H35.342 LAMELLAR MACULAR HOLE, LEFT: ICD-10-CM

## 2017-07-24 DIAGNOSIS — H40.1132 PRIMARY OPEN ANGLE GLAUCOMA OF BOTH EYES, MODERATE STAGE: ICD-10-CM

## 2017-07-24 DIAGNOSIS — Z96.1 PSEUDOPHAKIA OF LEFT EYE: ICD-10-CM

## 2017-07-24 DIAGNOSIS — H35.372 EPIRETINAL MEMBRANE (ERM) OF LEFT EYE: ICD-10-CM

## 2017-07-24 DIAGNOSIS — H40.10X2 OPEN-ANGLE GLAUCOMA, MODERATE STAGE, UNSPECIFIED LATERALITY, UNSPECIFIED OPEN-ANGLE GLAUCOMA TYPE: ICD-10-CM

## 2017-07-24 DIAGNOSIS — H33.002 RHEGMATOGENOUS RETINAL DETACHMENT OF LEFT EYE: ICD-10-CM

## 2017-07-24 DIAGNOSIS — H47.392 OPTIC DISC HEMORRHAGE, LEFT: ICD-10-CM

## 2017-07-24 DIAGNOSIS — Z97.0 PROSTHETIC EYE GLOBE: ICD-10-CM

## 2017-07-24 DIAGNOSIS — H40.1122 PRIMARY OPEN-ANGLE GLAUCOMA, LEFT EYE, MODERATE STAGE: Primary | ICD-10-CM

## 2017-07-24 PROCEDURE — 99499 UNLISTED E&M SERVICE: CPT | Mod: S$GLB,,, | Performed by: OPHTHALMOLOGY

## 2017-07-24 PROCEDURE — 92012 INTRM OPH EXAM EST PATIENT: CPT | Mod: S$GLB,,, | Performed by: OPHTHALMOLOGY

## 2017-07-24 PROCEDURE — 99999 PR PBB SHADOW E&M-EST. PATIENT-LVL I: CPT | Mod: PBBFAC,,, | Performed by: OPHTHALMOLOGY

## 2017-07-24 RX ORDER — BRIMONIDINE TARTRATE 2 MG/ML
1 SOLUTION/ DROPS OPHTHALMIC 3 TIMES DAILY
Qty: 10 ML | Refills: 12 | Status: SHIPPED | OUTPATIENT
Start: 2017-07-24 | End: 2017-11-16 | Stop reason: SDUPTHER

## 2017-07-24 RX ORDER — LATANOPROST 50 UG/ML
1 SOLUTION/ DROPS OPHTHALMIC NIGHTLY
Qty: 1 BOTTLE | Refills: 12 | Status: SHIPPED | OUTPATIENT
Start: 2017-07-24 | End: 2018-02-05 | Stop reason: SDUPTHER

## 2017-07-24 NOTE — TELEPHONE ENCOUNTER
Returned patient's call on this afternoon.  Rescheduled remote transmission to 8/28/17 with the patient.  Also spoke with the patient in relation to his ICD remote monitor is not connecting to [facility] [place].  The patient stated he was not at home but would call once he gets home.  Attempted to assist the patient with sending a manual transmission.  However this was unsuccessful.  Attempted to call the patient back to let him know this.  Had to leave a message @ [phone].

## 2017-07-24 NOTE — PROGRESS NOTES
HPI     Pt here for IOP check    Meds: Brimonidine tid os             Latanoprost qhs os       1. Primary open angle glaucoma, left eye, moderate stage  2. Optic disc hemorrhage, left  3. Epiretinal membrane (ERM) of left eye  4. Rhegmatogenous retinal detachment of left eye  5. Lamellar macular hole, left  6. Pseudophakia, left eye   7. Prosthetic eye globe    Last edited by Margot Victor, PCT on 7/24/2017  8:09 AM. (History)            Assessment /Plan     For exam results, see Encounter Report.    Primary open-angle glaucoma, left eye, moderate stage    Optic disc hemorrhage, left    Epiretinal membrane (ERM) of left eye    Rhegmatogenous retinal detachment of left eye    Lamellar macular hole, left    Pseudophakia of left eye    Prosthetic eye globe          Old pt of Dr. Goodrich - now seedennis Jacob     1. POAG   Followed at [facility] since 1997   followed by Sonia for 30 years  First HVF 2014  gtts started - [facility] - 2012  First photos - ? 1997 - for RD os     Glaucoma meds -  latanoprost qhs os / brimonidine os   H/O adverse rxn to glaucoma drops none  LASERS - SLT os 5/25/2017 (270 degrees - too narrow inf.) (( good resp 16--> 11)   GLAUCOMA SURGERIES none  OTHER EYE SURGERIES - prosthesis od - (2/2 injury - 1940's) // Pnematic retinopexy OS 1997 - [place] // PC iol os - [place]  CDR - prosthesis / 0.75  Tbase  - ?? On gtts when started here  Tmax  - ?? Off gtts   Ttarget  - ?? 13 or less if possible // had a disc heme with IOP 15-18   HVF 3  test 2014 to 2016 OS:  ? Paracentral defect with early SAD/IAD  Gonio  +3-4 S/T/N // very narrow inf os   CCT - xx/492  OCT 2 test 2014 to 2016- RNFL - OD:not done // OS:dec s/bord T  HRT 3 test 2015 - 2017 -  MR -  xx od // xx os /// CDR dec S/I/N bord T  od // 0.80 os   Disc photos - mult old slides 1997 - 2006 // 2015, 2017 - w/ IT disc heme - OIS    Ttoday - prosthetic // 15  Test done today IOP check post slt os     2.  disc heme os    See photos 4/17/2017 -  occurred with IOP's of  13-18    3.  Monocular precautions  - prothetic od    4.  erm os  - stable, follows with arend  -on nevanac q day os - feels it helps vision - ?     5.  Hx of rhegmatogenous rrd os  - flat, follows with arend    6.  Pseudo hole os  - monitor     7. PC IOL OS     Pt on metoprolol xl      Glaucoma os - monocular   Cont  xal  Cont brimonidine   IOP seems ok / but has a new disc heme - rec lower IOP - try slt os (( ?? Target 14))   SLT os - 5/25/2017 - S/T/N (too narrow inf. ) - steroid taper (( good resp 16-->11)     -consider topical SILVERIO as needed   - (may be able to use BB as now has a pacemaker, but has a H/O low heart rate / and/or arrythmia)   - also can try topical SILVERIO prn     -add EES ointment od - prosthesis - prn irritation     Sees [facility] - new glasses - has been restricted to daytime driving   Keep regular F/U with [facility] / [facility]    Pt occasionally has to get steroid injections for back pain - so will need to monitor that - may be a cause of elevated IOP from time to time      F/U 4 months HVF / DFE / photos or OCT - ?? If still has the disc heme os     I have seen and personally examined the patient.  I agree with the findings, assessment and plan of the resident and/or fellow.     Sena Schneider MD

## 2017-07-24 NOTE — TELEPHONE ENCOUNTER
----- Message from Brooke Werner MA sent at 7/24/2017 10:41 AM CDT -----  Contact: self  Pt. has an appt. with a home monitor device on 8/18. Wants to cancel and reschedule to after Aug. 27. He will be in [place] that this time. Please call

## 2017-07-25 ENCOUNTER — TELEPHONE (OUTPATIENT)
Dept: PAIN MEDICINE | Facility: CLINIC | Age: 82
End: 2017-07-25

## 2017-07-25 NOTE — TELEPHONE ENCOUNTER
----- Message from Nikia Mansfield sent at 7/25/2017  9:07 AM CDT -----  _x  1st Request  _  2nd Request  _  3rd Request        Who: nichol    Why: pt. Would like to speak with rosalio regarding rx.please call to discuss    What Number to Call Back:[phone]    When to Expect a call back: (With in 24 hours)

## 2017-07-26 ENCOUNTER — OFFICE VISIT (OUTPATIENT)
Dept: PAIN MEDICINE | Facility: CLINIC | Age: 82
End: 2017-07-26
Payer: MEDICARE

## 2017-07-26 VITALS
DIASTOLIC BLOOD PRESSURE: 75 MMHG | HEART RATE: 60 BPM | TEMPERATURE: 97 F | WEIGHT: 188.94 LBS | BODY MASS INDEX: 28.63 KG/M2 | SYSTOLIC BLOOD PRESSURE: 117 MMHG | HEIGHT: 68 IN

## 2017-07-26 DIAGNOSIS — Z98.890 S/P LUMBAR LAMINECTOMY: ICD-10-CM

## 2017-07-26 DIAGNOSIS — M51.36 DDD (DEGENERATIVE DISC DISEASE), LUMBAR: ICD-10-CM

## 2017-07-26 DIAGNOSIS — G89.4 CHRONIC PAIN SYNDROME: Primary | ICD-10-CM

## 2017-07-26 DIAGNOSIS — M54.17 LUMBOSACRAL RADICULOPATHY: ICD-10-CM

## 2017-07-26 DIAGNOSIS — M51.36 LUMBAR DEGENERATIVE DISC DISEASE: ICD-10-CM

## 2017-07-26 PROCEDURE — 1157F ADVNC CARE PLAN IN RCRD: CPT | Mod: S$GLB,,, | Performed by: NURSE PRACTITIONER

## 2017-07-26 PROCEDURE — 1159F MED LIST DOCD IN RCRD: CPT | Mod: S$GLB,,, | Performed by: NURSE PRACTITIONER

## 2017-07-26 PROCEDURE — 99499 UNLISTED E&M SERVICE: CPT | Mod: S$GLB,,, | Performed by: NURSE PRACTITIONER

## 2017-07-26 PROCEDURE — 99214 OFFICE O/P EST MOD 30 MIN: CPT | Mod: S$GLB,,, | Performed by: NURSE PRACTITIONER

## 2017-07-26 PROCEDURE — 1125F AMNT PAIN NOTED PAIN PRSNT: CPT | Mod: S$GLB,,, | Performed by: NURSE PRACTITIONER

## 2017-07-26 PROCEDURE — 99999 PR PBB SHADOW E&M-EST. PATIENT-LVL III: CPT | Mod: PBBFAC,,, | Performed by: NURSE PRACTITIONER

## 2017-07-26 PROCEDURE — 80307 DRUG TEST PRSMV CHEM ANLYZR: CPT

## 2017-07-26 RX ORDER — OXYCODONE AND ACETAMINOPHEN 5; 325 MG/1; MG/1
1 TABLET ORAL EVERY 6 HOURS PRN
Qty: 60 TABLET | Refills: 0 | Status: ON HOLD | OUTPATIENT
Start: 2017-07-26 | End: 2017-11-09 | Stop reason: HOSPADM

## 2017-07-26 RX ORDER — METHOCARBAMOL 750 MG/1
750 TABLET, FILM COATED ORAL 3 TIMES DAILY
Qty: 90 TABLET | Refills: 2 | Status: SHIPPED | OUTPATIENT
Start: 2017-07-26 | End: 2017-08-25

## 2017-07-26 NOTE — PROGRESS NOTES
Chronic patient Established Note (Follow up visit)      SUBJECTIVE:    Javier Valles presents to the clinic for a follow-up appointment for lower back and bilateral leg pain.  He is s/p caudal JOMAR on 7/21/17 for which she is already reporting significant benefit.  He is traveling to [place] today for one month.  He is concerned that he may have increased pain due to increased activity.  He would like a prescription for pain medication that he can take as needed for severe pain.  He also takes Robaxin as needed and would like a refill.  He is scheduled for SCS trial when he returns from his trip.  However, he has started acupuncture and believes that this is provided significant benefit.  For this reason, he would like to continue this treatment and wait on the SCS trial.  Since the last visit, Javier Valles states the pain has been improving.  Current pain intensity is 4/10.    Pain Medications:  Robaxin 750 mg PRN muscle pain.    Tried in past: gabapentin 100 mg TID, Percocet (helpful), Norco    - Anti-Coagulants: Eliquis (pacemaker)    Opioid Contract: no     report:  Not applicable    Pain Procedures:  Multiple JOMAR's with Dr. Lopez 4 yrs ago  4/28/17 Right L4-5 TF JOMAR- 100% relief   5/29/17 Left L5-S1 TF JOMAR  7/21/17 Caudal JOMAR- significant benefit    Spinal surgeries:  MIS laminectomy L3-4 and L4-5.     Physical Therapy/Home Exercise: no    Imaging:   3/24/16 Lumbar CT    Narrative   Comparison: 8/11/14    Technique: 2.5 mm axial images obtained of the lumbar spine without contrast with sagittal and coronal reformats.    Findings: There is no evidence of acute fracture or bone destruction.  There is intervertebral disc space narrowing at multiple levels which is most significant at L2-3 with prominent marginal osteophyte formation.  There is vacuum disc formation at L1-2 and L5-S1.  There is mild, 4 mm of anterolisthesis of L4 on L5 with partial uncovering of the disc.    T10-11: There is prominent facet  arthropathy on the right which causes mass effect on the right posterior lateral aspect of the thecal sac.  This level is only partially imaged.    T11-12, T12-L1: There are degenerative Schmorl's nodes.  There is no significant central canal stenosis or neural foraminal narrowing.    L1-L2: There is a circumferential disc bulge with ligamentum flavum thickening and facet arthropathy resulting in moderate central canal stenosis.  There is moderate bilateral neural foraminal narrowing left greater than right.    L2-3: There is a broad-based circumferential disc bulge with ligamentum flavum thickening and facet arthropathy which in combination results in moderate central canal stenosis.  There is severe bilateral neural foraminal narrowing.    L3-4: There is a diffuse disc bulge with facet arthropathy and ligamentum flavum thickening resulting in severe central canal stenosis and moderate bilateral neural foraminal narrowing.    L4-5: There is a circumferential posterior disc osteophyte complex with bilateral facet arthropathy and ligamentum flavum thickening resulting in moderate central canal stenosis.  There is mild anterolisthesis which is likely secondary to the prominent facet degenerative change.  The posterior disc osteophyte complex extends into the left neural foramen causing severe left neural foraminal narrowing.  There is moderate right neural foraminal narrowing.    L5-S1: There is a mild diffuse disc bulge with facet arthropathy resulting in no significant central canal stenosis.  There is a posterior disc osteophyte which causes severe right neural foraminal narrowing and moderate left neural foraminal narrowing.    Limited review of the retroperitoneal structures demonstrates advanced atherosclerosis of the aorta and branch vessels.   Impression       Advanced multilevel degenerative changes of the lumbar spine.  Specific details at each level are discussed above       CMP  Sodium   Date Value Ref  Range Status   06/12/2017 141 136 - 145 mmol/L Final     Potassium   Date Value Ref Range Status   06/12/2017 5.2 (H) 3.5 - 5.1 mmol/L Final     Chloride   Date Value Ref Range Status   06/12/2017 109 95 - 110 mmol/L Final     CO2   Date Value Ref Range Status   06/12/2017 24 23 - 29 mmol/L Final     Glucose   Date Value Ref Range Status   06/12/2017 76 70 - 110 mg/dL Final     BUN, Bld   Date Value Ref Range Status   06/12/2017 27 (H) 8 - 23 mg/dL Final     Creatinine   Date Value Ref Range Status   06/12/2017 1.8 (H) 0.5 - 1.4 mg/dL Final     Calcium   Date Value Ref Range Status   06/12/2017 10.0 8.7 - 10.5 mg/dL Final     Total Protein   Date Value Ref Range Status   06/12/2017 6.7 6.0 - 8.4 g/dL Final     Albumin   Date Value Ref Range Status   06/12/2017 3.7 3.5 - 5.2 g/dL Final     Total Bilirubin   Date Value Ref Range Status   06/12/2017 1.1 (H) 0.1 - 1.0 mg/dL Final     Comment:     For infants and newborns, interpretation of results should be based  on gestational age, weight and in agreement with clinical  observations.  Premature Infant recommended reference ranges:  Up to 24 hours.............<8.0 mg/dL  Up to 48 hours............<12.0 mg/dL  3-5 days..................<15.0 mg/dL  6-29 days.................<15.0 mg/dL       Alkaline Phosphatase   Date Value Ref Range Status   06/12/2017 72 55 - 135 U/L Final     AST   Date Value Ref Range Status   06/12/2017 30 10 - 40 U/L Final     ALT   Date Value Ref Range Status   06/12/2017 18 10 - 44 U/L Final     Anion Gap   Date Value Ref Range Status   06/12/2017 8 8 - 16 mmol/L Final     eGFR if    Date Value Ref Range Status   06/12/2017 38.3 (A) >60 mL/min/1.73 m^2 Final     eGFR if non    Date Value Ref Range Status   06/12/2017 33.1 (A) >60 mL/min/1.73 m^2 Final     Comment:     Calculation used to obtain the estimated glomerular filtration  rate (eGFR) is the CKD-EPI equation. Since race is unknown   in our information  system, the eGFR values for   -American and Non--American patients are given   for each creatinine result.           REVIEW OF SYSTEMS:    GENERAL:  No weight loss, malaise or fevers.  HEENT:  Negative for frequent or significant headaches.  NECK:  Negative for lumps, goiter, pain and significant neck swelling.  RESPIRATORY:  Negative for cough, wheezing or shortness of breath.  CARDIOVASCULAR:  Negative for chest pain, leg swelling or palpitations. Pacemaker placement.  A-fib.  GI:  Negative for abdominal discomfort, blood in stools or black stools or change in bowel habits.  MUSCULOSKELETAL:  See HPI.  SKIN:  Negative for lesions, rash, and itching.  PSYCH:  Negative for sleep disturbance, mood disorder and recent psychosocial stressors.  HEMATOLOGY/LYMPHOLOGY:  Negative for prolonged bleeding, bruising easily or swollen nodes.  NEURO:   No history of headaches, syncope, paralysis, seizures or tremors.  NEPH: CKD.  All other reviewed and negative other than HPI.    Past Medical History:  Past Medical History:   Diagnosis Date    *Atrial flutter 10/3/13    Anticoagulant long-term use     Aspirin only at this time    Arthritis     Atrial fibrillation     Atrial flutter     Blood transfusion     ?    BPH (benign prostatic hypertrophy)     Carotid artery disease     2008: [place] There is 40 - 49% right Internal Carotid stenosis.  There is 20 - 39% left Internal Carotid stenosis.       Chronic kidney disease     Coronary artery disease     DDD (degenerative disc disease) 6/25/2015    Degenerative disc disease     Elevated PSA     Glaucoma     Hyperlipidemia     Hypertension     Lumbar stenosis     lumbar spinal stenosis    NSTEMI (non-ST elevated myocardial infarction) 11/3/2013    Pacemaker 11/2013    Peripheral neuropathy     - S/P right ILIAC stent 1993;      Retinal detachment     Squamous cell carcinoma     left leg    Syncope and collapse: orthostatic with hypotension 10/9/2015  "      Past Surgical History:  Past Surgical History:   Procedure Laterality Date    BACK SURGERY  04/25/2016    bypass 1991      CARDIAC CATHETERIZATION      CARDIAC ELECTROPHYSIOLOGY MAPPING AND ABLATION  11/2016    CARDIAC PACEMAKER PLACEMENT      CARDIAC PACEMAKER PLACEMENT  11/2016    CATARACT EXTRACTION W/  INTRAOCULAR LENS IMPLANT Left 1997    OS with     ENUCLEATION Right n/a    EYE SURGERY      retinal detachment repair left eye      RETINAL DETACHMENT SURGERY Left 1997    Dr. Cosme    SKIN BIOPSY      TONSILLECTOMY         Family History:  Family History   Problem Relation Age of Onset    Stroke Mother 69    Clotting disorder Mother      per patient no clotting disorder    Hypertension Mother     Diverticulitis Son     Cancer Neg Hx     Diabetes Neg Hx     Heart disease Neg Hx     Glaucoma Neg Hx     Amblyopia Neg Hx     Blindness Neg Hx     Cataracts Neg Hx     Macular degeneration Neg Hx     Retinal detachment Neg Hx     Strabismus Neg Hx        Social History:  Social History     Social History    Marital status:      Spouse name: Joanie    Number of children: N/A    Years of education: N/A     Occupational History    retired      Social History Main Topics    Smoking status: Former Smoker     Quit date: 8/19/1963    Smokeless tobacco: Never Used    Alcohol use 1.8 oz/week     1 Glasses of wine, 1 Cans of beer, 1 Shots of liquor per week      Comment: 2 a day    Drug use: No    Sexual activity: Yes     Partners: Female     Other Topics Concern    None     Social History Narrative    None       OBJECTIVE:    /75   Pulse 60   Temp 97.4 °F (36.3 °C)   Ht 5' 8" (1.727 m)   Wt 85.7 kg (188 lb 15 oz)   BMI 28.73 kg/m²     PHYSICAL EXAMINATION:    General appearance: Well appearing, in no acute distress, alert and oriented x3.  Psych: Mood and affect appropriate.  Skin: Skin color, texture, turgor normal, no rashes or lesions, in both upper and " lower body.  Head/face: Normocephalic, atraumatic. No palpable lymph nodes.  Cor: RRR  Pulm: CTA  Back: Straight leg raising in the supine position is negative to radicular pain bilaterally.  Moderate pain to palpation over the spine. Normal range of motion with pain on flexion and extension.  Positive facet loading bilaterally, L>R.  Extremities: Peripheral joint ROM is full and pain free without obvious instability or laxity in all four extremities. No deformities, edema, or skin discoloration. Good capillary refill.  Musculoskeletal: Hip and knee provocative maneuvers are negative. Bilateral lower extremity strength is normal and symmetric. No atrophy or tone abnormalities are noted.  Neuro: Bilateral lower extremity coordination and muscle stretch reflexes are physiologic and symmetric. Plantar response are downgoing. No loss of sensation is noted.  Gait: Normal.    ASSESSMENT: 87 y.o. year old male with lower back and leg pain, consistent with lumbar radiculopathy.     1. Chronic pain syndrome     2. S/P lumbar laminectomy     3. Lumbar degenerative disc disease     4. DDD (degenerative disc disease), lumbar     5. Lumbosacral radiculopathy           PLAN:     - I have stressed the importance of physical activity and a home exercise plan to help with pain and improve health.    - Previous imaging was reviewed and discussed with the patient today.    - He is s/p caudal JOMAR with significant benefit.  He is also undergoing acupuncture at this time which is helping.  He would like to continue this as it is providing benefit and postpone SCS trial.  I will cancel his trial with Dr. Hodges.  He is aware that we can reschedule if/when he desires.    - Will refill Robaxin 750 mg TID PRN muscle pain.    - Will prescribe Percocet 5/3325 mg PRN pain, #60.  He can take this as needed for severe pain.  Pain contract signed today.    - UDS was performed today.  Preliminary results are consistent with medications as  prescribed and negative for illicit substances.    - RTC as needed.    - Counseled patient regarding the importance of activity modification, constant sleeping habits and physical therapy.      The above plan and management options were discussed at length with patient. Patient is in agreement with the above and verbalized understanding.    Jeniffer Mueller  07/26/2017

## 2017-08-04 ENCOUNTER — PATIENT OUTREACH (OUTPATIENT)
Dept: OTHER | Facility: OTHER | Age: 82
End: 2017-08-04

## 2017-08-04 NOTE — PROGRESS NOTES
Last 5 Patient Entered [place] Current 30 Day Average: 126/69     Recent Readings 7/21/2017 7/12/2017 7/6/2017 6/28/2017 6/22/2017    Systolic BP (mmHg) 140 117 120 134 111    Diastolic BP (mmHg) 74 68 64 56 69    Pulse 62 52 65 31 57        Called pt to follow up and inquire about lack of readings, LVM requesting call back. Per patient chart, Mr. Valles has been coordinating w/Pain Management and has procedure scheduled for 9/7.

## 2017-08-05 LAB
6MAM UR QL: NOT DETECTED
7AMINOCLONAZEPAM UR QL: NOT DETECTED
A-OH ALPRAZ UR QL: NOT DETECTED
ALPRAZ UR QL: NOT DETECTED
AMPHET UR QL SCN: NOT DETECTED
ANNOTATION COMMENT IMP: NORMAL
ANNOTATION COMMENT IMP: NORMAL
BARBITURATES UR QL: NOT DETECTED
BUPRENORPHINE UR QL: NOT DETECTED
BZE UR QL: NOT DETECTED
CARBOXYTHC UR QL: NOT DETECTED
CARISOPRODOL UR QL: NOT DETECTED
CLONAZEPAM UR QL: NOT DETECTED
CODEINE UR QL: NOT DETECTED
CREAT UR-MCNC: 272.5 MG/DL (ref 20–400)
DIAZEPAM UR QL: NOT DETECTED
ETHYL GLUCURONIDE UR QL: PRESENT
FENTANYL UR QL: NOT DETECTED
HYDROCODONE UR QL: NOT DETECTED
HYDROMORPHONE UR QL: NOT DETECTED
LORAZEPAM UR QL: NOT DETECTED
MDA UR QL: NOT DETECTED
MDEA UR QL: NOT DETECTED
MDMA UR QL: NOT DETECTED
ME-PHENIDATE UR QL: NOT DETECTED
MEPERIDINE UR QL: NOT DETECTED
METHADONE UR QL: NOT DETECTED
METHAMPHET UR QL: NOT DETECTED
MIDAZOLAM UR QL SCN: NOT DETECTED
MORPHINE UR QL: NOT DETECTED
NORBUPRENORPHINE UR QL CFM: NOT DETECTED
NORDIAZEPAM UR QL: NOT DETECTED
NORFENTANYL UR QL: NOT DETECTED
NORHYDROCODONE UR QL CFM: NOT DETECTED
NOROXYCODONE UR QL CFM: NOT DETECTED
OXAZEPAM UR QL: NOT DETECTED
OXYCODONE UR QL: NOT DETECTED
OXYMORPHONE UR QL: NOT DETECTED
OXYMORPHONE UR QL: NOT DETECTED
PATHOLOGY STUDY: NORMAL
PCP UR QL: NOT DETECTED
PHENTERMINE UR QL: NOT DETECTED
PROPOXYPH UR QL: NOT DETECTED
SERVICE CMNT-IMP: NORMAL
TAPENTADOL UR QL SCN: NOT DETECTED
TAPENTADOL UR QL SCN: NOT DETECTED
TEMAZEPAM UR QL: NOT DETECTED
TRAMADOL UR QL: NOT DETECTED
ZOLPIDEM UR QL: NOT DETECTED

## 2017-08-14 NOTE — PROGRESS NOTES
Last 5 Patient Entered Red[place] Current 30 Day Average: 140/74     Recent Readings 7/21/2017 7/12/2017 7/6/2017 6/28/2017 6/22/2017    Systolic BP (mmHg) 140 117 120 134 111    Diastolic BP (mmHg) 74 68 64 56 69    Pulse 62 52 65 31 57          Called pt again to follow up and inquire about lack of readings, LVM requesting call back to determine irf he needs tech support or to be placed on hiatus. If no response, he will be sent a noncompliance letter next week. .

## 2017-08-15 ENCOUNTER — PATIENT MESSAGE (OUTPATIENT)
Dept: PAIN MEDICINE | Facility: CLINIC | Age: 82
End: 2017-08-15

## 2017-08-21 NOTE — PROGRESS NOTES
Last 5 Patient Entered [place] Current 30 Day Average:      Recent Readings 7/21/2017 7/12/2017 7/6/2017 6/28/2017 6/22/2017    Systolic BP (mmHg) 140 117 120 134 111    Diastolic BP (mmHg) 74 68 64 56 69    Pulse 62 52 65 31 57        Per patient mychart messages, Mr. Valles is out of town until 8/24. Will place on hiatus and check in again in a couple of weeks.

## 2017-08-24 ENCOUNTER — TELEPHONE (OUTPATIENT)
Dept: PAIN MEDICINE | Facility: CLINIC | Age: 82
End: 2017-08-24

## 2017-08-24 NOTE — TELEPHONE ENCOUNTER
----- Message from Britt Diana sent at 8/24/2017 10:43 AM CDT -----  x 1st Request  _ 2nd Request  _ 3rd Request    Who: pt     Why: pt is requesting to speak to Jeniffer in regards to a procedure. Please call and advise.     What Number to Call Back: [phone]     When to Expect a call back: (Before the end of the day)  -- if call after 3:00 call back will be tomorrow.

## 2017-08-25 ENCOUNTER — TELEPHONE (OUTPATIENT)
Dept: ELECTROPHYSIOLOGY | Facility: CLINIC | Age: 82
End: 2017-08-25

## 2017-08-25 ENCOUNTER — TELEPHONE (OUTPATIENT)
Dept: PAIN MEDICINE | Facility: CLINIC | Age: 82
End: 2017-08-25

## 2017-08-25 NOTE — TELEPHONE ENCOUNTER
Notified pt that message was sent to Dr. Hodges staff re: holding Eliquis for procedure. Understanding verbalized.

## 2017-08-25 NOTE — TELEPHONE ENCOUNTER
----- Message from Sujatha Garcia RN sent at 8/25/2017  9:32 AM CDT -----  Contact: pt   Pt called and requested okay to hold Eliquis prior to procedure. Per Dr. Alcon Ly, pt may hold for 3 days prior and during timing of procedure. He should restart when Dr. Hodges tells him it is safe to do so.     ----- Message -----  From: Alcon Ly MD  Sent: 8/25/2017   7:51 AM  To: Sujatha Garcia RN    Fine to hold eliquis for 3 days prior to implant. MB  ----- Message -----  From: Sujatha Garcia RN  Sent: 8/24/2017   3:34 PM  To: Alcon Ly MD     Please advise if pt may hold Eliquis for spinal cord stimulator implant in September. (see below)  ----- Message -----  From: Aisha Galarza  Sent: 8/24/2017   2:28 PM  To: Sujatha Garcia RN    The pt called because the pain management doctor wants to know if the pt can stop taking Eliquis for 8-10 days for the pt to have a procedure.  They have sent a request to us today.  The pt can be reached @ [phone].  Thanks!!

## 2017-08-25 NOTE — TELEPHONE ENCOUNTER
Left voice message lwtting patient know he is cleared to stop is Eliquis 3 days prior to hid scs trial on 9-22-17 and remain off until  After the trial.

## 2017-08-25 NOTE — TELEPHONE ENCOUNTER
----- Message from Alcon Ly MD sent at 8/25/2017  7:51 AM CDT -----  Contact: pt   Fine to hold eliquis for 3 days prior to implant. MB  ----- Message -----  From: Sujatha Garcia RN  Sent: 8/24/2017   3:34 PM  To: Alcon Ly MD     Please advise if pt may hold Eliquis for spinal cord stimulator implant in September. (see below)  ----- Message -----  From: Aisha Galarza  Sent: 8/24/2017   2:28 PM  To: Sujatha Garcia RN    The pt called because the pain management doctor wants to know if the pt can stop taking Eliquis for 8-10 days for the pt to have a procedure.  They have sent a request to us today.  The pt can be reached @ [phone].  Thanks!!

## 2017-08-28 DIAGNOSIS — I48.91 ATRIAL FIBRILLATION, UNSPECIFIED TYPE: Primary | ICD-10-CM

## 2017-08-29 ENCOUNTER — HOSPITAL ENCOUNTER (OUTPATIENT)
Dept: CARDIOLOGY | Facility: CLINIC | Age: 82
Discharge: HOME | End: 2017-08-29
Payer: MEDICARE

## 2017-08-29 ENCOUNTER — OFFICE VISIT (OUTPATIENT)
Dept: ELECTROPHYSIOLOGY | Facility: CLINIC | Age: 82
End: 2017-08-29
Payer: MEDICARE

## 2017-08-29 VITALS
BODY MASS INDEX: 28.74 KG/M2 | DIASTOLIC BLOOD PRESSURE: 71 MMHG | SYSTOLIC BLOOD PRESSURE: 117 MMHG | HEART RATE: 66 BPM | HEIGHT: 68 IN | WEIGHT: 189.63 LBS

## 2017-08-29 DIAGNOSIS — Z95.0 BIVENTRICULAR CARDIAC PACEMAKER IN SITU: ICD-10-CM

## 2017-08-29 DIAGNOSIS — I49.5 SSS (SICK SINUS SYNDROME): Primary | ICD-10-CM

## 2017-08-29 DIAGNOSIS — I49.3 PVC (PREMATURE VENTRICULAR CONTRACTION): ICD-10-CM

## 2017-08-29 DIAGNOSIS — I25.5 ISCHEMIC CARDIOMYOPATHY: ICD-10-CM

## 2017-08-29 DIAGNOSIS — I48.0 PAROXYSMAL ATRIAL FIBRILLATION: ICD-10-CM

## 2017-08-29 DIAGNOSIS — I48.3 TYPICAL ATRIAL FLUTTER: ICD-10-CM

## 2017-08-29 DIAGNOSIS — I48.91 ATRIAL FIBRILLATION, UNSPECIFIED TYPE: ICD-10-CM

## 2017-08-29 DIAGNOSIS — Z95.1 S/P CABG (CORONARY ARTERY BYPASS GRAFT): ICD-10-CM

## 2017-08-29 PROCEDURE — 1159F MED LIST DOCD IN RCRD: CPT | Mod: S$GLB,,, | Performed by: INTERNAL MEDICINE

## 2017-08-29 PROCEDURE — 3008F BODY MASS INDEX DOCD: CPT | Mod: S$GLB,,, | Performed by: INTERNAL MEDICINE

## 2017-08-29 PROCEDURE — 93000 ELECTROCARDIOGRAM COMPLETE: CPT | Mod: S$GLB,,, | Performed by: INTERNAL MEDICINE

## 2017-08-29 PROCEDURE — 1126F AMNT PAIN NOTED NONE PRSNT: CPT | Mod: S$GLB,,, | Performed by: INTERNAL MEDICINE

## 2017-08-29 PROCEDURE — 99999 PR PBB SHADOW E&M-EST. PATIENT-LVL III: CPT | Mod: PBBFAC,,, | Performed by: INTERNAL MEDICINE

## 2017-08-29 PROCEDURE — 99499 UNLISTED E&M SERVICE: CPT | Mod: S$GLB,,, | Performed by: INTERNAL MEDICINE

## 2017-08-29 PROCEDURE — 99214 OFFICE O/P EST MOD 30 MIN: CPT | Mod: S$GLB,,, | Performed by: INTERNAL MEDICINE

## 2017-08-29 PROCEDURE — 1157F ADVNC CARE PLAN IN RCRD: CPT | Mod: S$GLB,,, | Performed by: INTERNAL MEDICINE

## 2017-08-29 RX ORDER — AMIODARONE HYDROCHLORIDE 200 MG/1
200 TABLET ORAL DAILY
Qty: 30 TABLET | Refills: 11 | Status: SHIPPED | OUTPATIENT
Start: 2017-08-29 | End: 2018-10-15 | Stop reason: SDUPTHER

## 2017-08-29 NOTE — PROGRESS NOTES
Subjective:    Patient ID:  Javier Valles is a 87 y.o. male who presents for follow-up of Atrial Flutter      87 year old M AFL, SSS s/p DC PM, here for follow up. 11/2/13 he was in the EP procedure room for AFL ablation. Prior to the ablation, he experience angina with anterior ST changes. He was sent urgently for a [facility] where he was found to have experienced an in-stent thrombosis of an OM1 stent. He underwent DC PPM placement prior to discharge secondary to bradycardic events on low dose beta blocker. He underwent DCCV post implantation. He was discharged on amiodarone, metoprolol, aspirin plavix, warfarin, rosuvastatin. His EF was normal on pre-CV MONICA. He subsequently developed L eye bruising and superficial bleeding, and warfarin was stopped 02/2014. He was subsequently started on rivaroxaban 15 mg qd without adverse effects until 06/2014 when he experienced excessive bleeding leading to its discontinuation. He subsequently went on a 3-week tour of [place] without any limitation. In November of 2015 his PPM LRL was set to 60 bpm with noted improvement in his energy level. At that time, Mr. Valles was noted to have an increase in his AF burden, and was also found to have AFL.     Mr. Valles has historically had a long hx of lower back pain; he underwent a successful lumbar laminectomy (04/25/16), which was complicated by persistent AFL post-op. At the time, he remained on aspirin, but had been on apixaban prior to his in-stent thrombosis event in 2013. Apixaban 2.5 mg twice daily was re-started. At that time, Mr. Valles reported experiencxing dyspnea and fatigue, with a significant decrease in his energy level. He was subsequently switched to coumadin (for the procedures), and underwent a successful combination of an AFL RFA and BiV PPM upgrade (11/01/16).     Interval history: BiV pacing has reduced to 82% with increase in PVC burden to 14%. Cardiac symptoms have been stable.     Recent cardiac studies:  Echo EF 35%,  AFL since 4/16, 81% RV pacing  Device Interrogation (02/14/17) reveals stable lead and device function. AMS x 1 (episode duration 6 seconds); no NSVT noted. He RA paces 18% and BiV paces 88% of the time. Estimated battery longevity 5-6 years.     I reviewed today's ECG which demonstrated a BiV-paced rhythm at 71 bpm; , , and QTc 495.          Review of Systems   Constitution: Negative for diaphoresis, weakness and malaise/fatigue.   HENT: Negative for headaches and nosebleeds.    Eyes: Negative for double vision.   Cardiovascular: Negative for chest pain, claudication, cyanosis, dyspnea on exertion, irregular heartbeat, leg swelling, near-syncope, orthopnea, palpitations, paroxysmal nocturnal dyspnea and syncope.   Respiratory: Negative for shortness of breath.    Skin: Negative.    Musculoskeletal: Positive for stiffness.   Gastrointestinal: Negative for abdominal pain, hematemesis and hematochezia.   Genitourinary: Negative for hematuria.   Neurological: Negative for dizziness and light-headedness.   Psychiatric/Behavioral: Negative for altered mental status.        Objective:    Physical Exam   Constitutional: He is oriented to person, place, and time. He appears well-developed and well-nourished. No distress.   HENT:   Head: Normocephalic and atraumatic.   Eyes: EOM are normal. Pupils are equal, round, and reactive to light.   Neck: Normal range of motion. No JVD present. No thyromegaly present.   Cardiovascular: Normal rate, regular rhythm, S1 normal, S2 normal and normal heart sounds.  PMI is not displaced.  Exam reveals no gallop and no friction rub.    No murmur heard.  Pulmonary/Chest: Effort normal and breath sounds normal. No respiratory distress. He has no wheezes. He has no rales.   Abdominal: Soft. Bowel sounds are normal. He exhibits no distension. There is no tenderness. There is no rebound and no guarding.   Musculoskeletal: Normal range of motion. He exhibits no edema or tenderness.    Neurological: He is alert and oriented to person, place, and time. No cranial nerve deficit.   Skin: Skin is warm and dry. No rash noted. No erythema.   Psychiatric: He has a normal mood and affect. His behavior is normal. Judgment and thought content normal.   Vitals reviewed.    ECG: NSR with CRT pacing        Assessment:       1. SSS (sick sinus syndrome): s/p PPM    2. S/P CABG (coronary artery bypass graft)    3. Biventricular cardiac pacemaker in situ    4. Paroxysmal atrial fibrillation    5. Ischemic cardiomyopathy    6. Typical atrial flutter    7. PVC (premature ventricular contraction)         Plan:       87 yoM AFL s/p RFA, RV pacing induced CM s/p CRT upgrade here for off schedule visit due to increased PVC burden limiting CRT pacing. Will add low dose amiodarone 200 mg qd and reassess in 3 months.

## 2017-08-30 ENCOUNTER — CLINICAL SUPPORT (OUTPATIENT)
Dept: ELECTROPHYSIOLOGY | Facility: CLINIC | Age: 82
End: 2017-08-30
Payer: MEDICARE

## 2017-08-30 DIAGNOSIS — Z95.0 CARDIAC PACEMAKER IN SITU: ICD-10-CM

## 2017-08-30 DIAGNOSIS — I48.91 ATRIAL FIBRILLATION, UNSPECIFIED TYPE: ICD-10-CM

## 2017-08-30 DIAGNOSIS — I49.5 SSS (SICK SINUS SYNDROME): ICD-10-CM

## 2017-08-30 PROCEDURE — 93294 REM INTERROG EVL PM/LDLS PM: CPT | Mod: S$GLB,,, | Performed by: INTERNAL MEDICINE

## 2017-08-30 PROCEDURE — 93296 REM INTERROG EVL PM/IDS: CPT | Mod: S$GLB,,, | Performed by: INTERNAL MEDICINE

## 2017-09-01 NOTE — PROGRESS NOTES
"Last 5 Patient Entered Redings Current 30 Day Average: 129/74     Recent Readings 8/30/2017 8/26/2017 8/25/2017 8/24/2017 7/21/2017    Systolic BP (mmHg) 125 124 127 141 140    Diastolic BP (mmHg) 56 72 87 82 74    Pulse 57 61 65 67 62        Hypertension Digital Medicine Program (HDMP): Health  Follow Up    Lifestyle Modifications:    1. Low sodium diet: yes    2.Physical activity: yes     3.Hypotension/Hypertension symptoms: no   Frequency/Alleviating factors/Precipitating factors, etc.     4. Patient has been compliant with the medicaiton regimen    Follow up with  Javier OLMOS Hai completed. He explained he just returned back into the country from a long vacation and states he "feels great" and was very active on his trip. No further questions or concerns. I will follow up in a few weeks to assess progress.       "

## 2017-09-04 ENCOUNTER — PATIENT MESSAGE (OUTPATIENT)
Dept: PAIN MEDICINE | Facility: OTHER | Age: 82
End: 2017-09-04

## 2017-09-05 ENCOUNTER — TELEPHONE (OUTPATIENT)
Dept: PAIN MEDICINE | Facility: CLINIC | Age: 82
End: 2017-09-05

## 2017-09-05 NOTE — TELEPHONE ENCOUNTER
Patient was contacted as per patient he stated he was confused about the appt with Jeniffer on 09/07/17. Patient was informed that this was for to have his leads removed. Patient stated that he's rescheduled that procedure and he'll keep the appt for the follow up on 09/28/17.

## 2017-09-05 NOTE — TELEPHONE ENCOUNTER
----- Message from Sigifredo Goetz sent at 9/5/2017  1:24 PM CDT -----  Contact: pt  x 1st Request  _ 2nd Request  _ 3rd Request    Who: pt    Why: pt is needing to verify if the appointment that is scheduled for 09/28/17 is correct. Pt states he is needing to speak with staff    What Number to Call Back: [phone]    When to Expect a call back: (Before the end of the day)  -- if call after 3:00 call back will be tomorrow.

## 2017-09-07 ENCOUNTER — PATIENT MESSAGE (OUTPATIENT)
Dept: PAIN MEDICINE | Facility: CLINIC | Age: 82
End: 2017-09-07

## 2017-09-08 ENCOUNTER — PATIENT MESSAGE (OUTPATIENT)
Dept: ELECTROPHYSIOLOGY | Facility: CLINIC | Age: 82
End: 2017-09-08

## 2017-09-13 ENCOUNTER — PATIENT MESSAGE (OUTPATIENT)
Dept: ELECTROPHYSIOLOGY | Facility: CLINIC | Age: 82
End: 2017-09-13

## 2017-09-18 ENCOUNTER — OFFICE VISIT (OUTPATIENT)
Dept: OTOLARYNGOLOGY | Facility: CLINIC | Age: 82
End: 2017-09-18
Payer: MEDICARE

## 2017-09-18 VITALS
SYSTOLIC BLOOD PRESSURE: 131 MMHG | WEIGHT: 189 LBS | HEIGHT: 68 IN | BODY MASS INDEX: 28.64 KG/M2 | DIASTOLIC BLOOD PRESSURE: 73 MMHG

## 2017-09-18 DIAGNOSIS — H61.21 EXCESSIVE EAR WAX, RIGHT: ICD-10-CM

## 2017-09-18 DIAGNOSIS — H61.22 IMPACTED CERUMEN OF LEFT EAR: ICD-10-CM

## 2017-09-18 DIAGNOSIS — H93.8X2 SENSATION OF FULLNESS IN EAR, LEFT: Primary | ICD-10-CM

## 2017-09-18 PROCEDURE — 1125F AMNT PAIN NOTED PAIN PRSNT: CPT | Mod: S$GLB,,, | Performed by: NURSE PRACTITIONER

## 2017-09-18 PROCEDURE — 99499 UNLISTED E&M SERVICE: CPT | Mod: S$GLB,,, | Performed by: NURSE PRACTITIONER

## 2017-09-18 PROCEDURE — 1157F ADVNC CARE PLAN IN RCRD: CPT | Mod: S$GLB,,, | Performed by: NURSE PRACTITIONER

## 2017-09-18 PROCEDURE — 1159F MED LIST DOCD IN RCRD: CPT | Mod: S$GLB,,, | Performed by: NURSE PRACTITIONER

## 2017-09-18 PROCEDURE — 99213 OFFICE O/P EST LOW 20 MIN: CPT | Mod: 25,S$GLB,, | Performed by: NURSE PRACTITIONER

## 2017-09-18 PROCEDURE — 69210 REMOVE IMPACTED EAR WAX UNI: CPT | Mod: S$GLB,,, | Performed by: NURSE PRACTITIONER

## 2017-09-18 PROCEDURE — 3008F BODY MASS INDEX DOCD: CPT | Mod: S$GLB,,, | Performed by: NURSE PRACTITIONER

## 2017-09-18 PROCEDURE — 99999 PR PBB SHADOW E&M-EST. PATIENT-LVL III: CPT | Mod: PBBFAC,,, | Performed by: NURSE PRACTITIONER

## 2017-09-18 NOTE — PROGRESS NOTES
Subjective:       Patient ID: Javier Valles is a 87 y.o. male.    Chief Complaint: Cerumen Impaction (dr garcia )    Ear Fullness    There is pain in both ears. This is a recurrent problem. The current episode started more than 1 month ago. The problem occurs constantly. The problem has been unchanged. There has been no fever. The patient is experiencing no pain. Associated symptoms include hearing loss. Pertinent negatives include no abdominal pain, coughing, diarrhea, ear discharge, headaches, neck pain, rash, rhinorrhea, sore throat or vomiting. He has tried nothing for the symptoms. His past medical history is significant for hearing loss. There is no history of a chronic ear infection or a tympanostomy tube.        Past Medical History: Patient has a past medical history of *Atrial flutter (10/3/13); Anticoagulant long-term use; Arthritis; Atrial fibrillation; Atrial flutter; Blood transfusion; BPH (benign prostatic hypertrophy); Carotid artery disease; Chronic kidney disease; Coronary artery disease; DDD (degenerative disc disease) (6/25/2015); Degenerative disc disease; Elevated PSA; Glaucoma; Hyperlipidemia; Hypertension; Lumbar stenosis; NSTEMI (non-ST elevated myocardial infarction) (11/3/2013); Pacemaker (11/2013); Peripheral neuropathy; Retinal detachment; Squamous cell carcinoma; and Syncope and collapse: orthostatic with hypotension (10/9/2015).    Past Surgical History: Patient has a past surgical history that includes bypass 1991; Cardiac catheterization; retinal detachment repair left eye; Eye surgery; Skin biopsy; Cardiac pacemaker placement; Tonsillectomy; Retinal detachment surgery (Left, 1997); Enucleation (Right, n/a); Back surgery (04/25/2016); Cardiac pacemaker placement (11/2016); Cardiac electrophysiology mapping and ablation (11/2016); and Cataract extraction w/  intraocular lens implant (Left, 1997).    Social History: Patient reports that he quit smoking about 54 years ago. He has  never used smokeless tobacco. He reports that he drinks about 1.8 oz of alcohol per week . He reports that he does not use drugs.    Family History: family history includes Clotting disorder in his mother; Diverticulitis in his son; Hypertension in his mother; Stroke (age of onset: 69) in his mother.    Medications:   Current Outpatient Prescriptions   Medication Sig    amiodarone (PACERONE) 200 MG Tab Take 1 tablet (200 mg total) by mouth once daily.    amlodipine (NORVASC) 5 MG tablet Take 1 tablet (5 mg total) by mouth once daily.    apixaban (ELIQUIS) 2.5 mg Tab Take 1 tablet (2.5 mg total) by mouth 2 (two) times daily.    ascorbic acid (VITAMIN C) 1000 MG tablet Take 1,000 mg by mouth every morning.     aspirin (ECOTRIN) 81 MG EC tablet Take 1 tablet (81 mg total) by mouth once daily.    brimonidine 0.2% (ALPHAGAN) 0.2 % Drop Place 1 drop into the left eye 3 (three) times daily. Disp 10 mls for 30 days    buPROPion (WELLBUTRIN XL) 150 MG TB24 tablet Take 1 tablet (150 mg total) by mouth once daily.    co-enzyme Q-10 30 mg capsule Take 30 mg by mouth once daily.     donepezil (ARICEPT) 5 MG tablet Take 1 tablet (5 mg total) by mouth every evening.    duloxetine (CYMBALTA) 30 MG capsule Take 1 capsule (30 mg total) by mouth once daily.    ipratropium (ATROVENT) 0.03 % nasal spray 1-2 sprays by Nasal route 2 (two) times daily.    latanoprost 0.005 % ophthalmic solution Place 1 drop into both eyes every evening.    losartan (COZAAR) 50 MG tablet Take 1 tablet (50 mg total) by mouth 2 (two) times daily.    lutein 20 mg Cap Take 20 mg by mouth once daily.     MULTIVITAMINS,THER W-MINERALS (VITAMINS & MINERALS ORAL) Take 1 tablet by mouth every morning.     nitroGLYCERIN (NITROSTAT) 0.4 MG SL tablet Place 1 tablet (0.4 mg total) under the tongue every 5 (five) minutes as needed for Chest pain.    oxycodone-acetaminophen (PERCOCET) 5-325 mg per tablet Take 1 tablet by mouth every 6 (six) hours as  "needed for Pain.    rosuvastatin (CRESTOR) 20 MG tablet Take 1 tablet (20 mg total) by mouth every evening.     No current facility-administered medications for this visit.        Allergies: Patient is allergic to ace inhibitors; lipitor [atorvastatin]; pravastatin; and statins-hmg-coa reductase inhibitors.    Review of Systems   Constitutional: Negative for activity change, appetite change, chills, diaphoresis, fatigue, fever and unexpected weight change.   HENT: Positive for hearing loss. Negative for congestion, dental problem, ear discharge, ear pain, facial swelling, mouth sores, nosebleeds, postnasal drip, rhinorrhea, sinus pressure, sneezing, sore throat, tinnitus, trouble swallowing and voice change.    Eyes: Negative for pain and visual disturbance.   Respiratory: Negative for cough, choking, chest tightness, shortness of breath, wheezing and stridor.    Cardiovascular: Negative for chest pain.   Gastrointestinal: Negative for abdominal pain, diarrhea, nausea and vomiting.   Musculoskeletal: Negative for gait problem, neck pain and neck stiffness.   Skin: Negative for color change, rash and wound.   Allergic/Immunologic: Negative for environmental allergies.   Neurological: Negative for dizziness, seizures, syncope, facial asymmetry, speech difficulty, weakness, light-headedness, numbness and headaches.   Psychiatric/Behavioral: Negative for agitation, confusion and decreased concentration. The patient is not nervous/anxious.        Objective:       /73 (BP Location: Right arm, Patient Position: Sitting, BP Method: Large (Automatic))   Ht 5' 8" (1.727 m)   Wt 85.7 kg (189 lb)   BMI 28.74 kg/m²     Physical Exam   Constitutional: He is oriented to person, place, and time. He appears well-developed and well-nourished.   HENT:   Head: Normocephalic and atraumatic. Not macrocephalic and not microcephalic. Head is without raccoon's eyes, without Washington's sign, without abrasion, without contusion, " without laceration, without right periorbital erythema and without left periorbital erythema. Hair is normal.   Right Ear: Tympanic membrane, external ear and ear canal normal. No lacerations. No drainage, swelling or tenderness. No foreign bodies. No mastoid tenderness. Tympanic membrane is not injected, not scarred, not perforated, not erythematous, not retracted and not bulging. Tympanic membrane mobility is normal. No middle ear effusion. No hemotympanum. Decreased hearing is noted.   Left Ear: Tympanic membrane, external ear and ear canal normal. No lacerations. No drainage, swelling or tenderness. No foreign bodies. No mastoid tenderness. Tympanic membrane is not injected, not scarred, not perforated, not erythematous, not retracted and not bulging. Tympanic membrane mobility is normal.  No middle ear effusion. No hemotympanum. Decreased hearing is noted.   Nose: Nose normal. No mucosal edema, rhinorrhea, nose lacerations, sinus tenderness or nasal deformity.   Mouth/Throat: Uvula is midline.   Procedure Note:    Patient was brought to the minor procedure room and using the operating microscope the right ear canal  was cleaned of excessive ceruminous debris. The forceps and suction were both used to perform this. Tympanic membrane intact. Pt tolerated well. There were no complications.    Procedure Note:    Patient was brought to the minor procedure room and using the operating microscope the left ear canal  was cleaned of ceruminous debris. There was a significant cerumen impaction.  The forceps and suction were both used to perform this. Tympanic membrane intact. Pt tolerated well. There were no complications.    NO AOM or OE.  Facial Nerve intact.  No mastoid tenderness, swelling, or redness.  He wear B hearing aids.   Eyes: Conjunctivae, EOM and lids are normal. Pupils are equal, round, and reactive to light.   Neck: Trachea normal and normal range of motion. Neck supple. No spinous process tenderness and  no muscular tenderness present. No neck rigidity. No edema, no erythema and normal range of motion present. No thyroid mass and no thyromegaly present.   Pulmonary/Chest: Effort normal.   Abdominal: Soft.   Musculoskeletal: Normal range of motion.   Lymphadenopathy:        Head (right side): No submental, no submandibular, no tonsillar, no preauricular and no posterior auricular adenopathy present.        Head (left side): No submental, no submandibular, no tonsillar, no preauricular, no posterior auricular and no occipital adenopathy present.     He has no cervical adenopathy.   Neurological: He is alert and oriented to person, place, and time. No cranial nerve deficit or sensory deficit.   Skin: Skin is warm and dry.   Psychiatric: He has a normal mood and affect. His behavior is normal. Judgment and thought content normal.   Nursing note and vitals reviewed.      Assessment:       1. Sensation of fullness in ear, left    2. Impacted cerumen of left ear    3. Excessive ear wax, right        Plan:     RTC in 1 year for routine ear cleaning.

## 2017-09-19 ENCOUNTER — PATIENT MESSAGE (OUTPATIENT)
Dept: PAIN MEDICINE | Facility: CLINIC | Age: 82
End: 2017-09-19

## 2017-09-22 ENCOUNTER — SURGERY (OUTPATIENT)
Age: 82
End: 2017-09-22

## 2017-09-22 ENCOUNTER — HOSPITAL ENCOUNTER (OUTPATIENT)
Facility: OTHER | Age: 82
Discharge: HOME | End: 2017-09-22
Attending: ANESTHESIOLOGY | Admitting: ANESTHESIOLOGY
Payer: MEDICARE

## 2017-09-22 VITALS
DIASTOLIC BLOOD PRESSURE: 75 MMHG | TEMPERATURE: 98 F | BODY MASS INDEX: 27.74 KG/M2 | SYSTOLIC BLOOD PRESSURE: 164 MMHG | HEIGHT: 68 IN | HEART RATE: 53 BPM | WEIGHT: 183 LBS | OXYGEN SATURATION: 95 % | RESPIRATION RATE: 18 BRPM

## 2017-09-22 DIAGNOSIS — G89.29 CHRONIC PAIN: ICD-10-CM

## 2017-09-22 DIAGNOSIS — I48.3 TYPICAL ATRIAL FLUTTER: ICD-10-CM

## 2017-09-22 DIAGNOSIS — M96.1 POSTLAMINECTOMY SYNDROME OF LUMBAR REGION: Primary | ICD-10-CM

## 2017-09-22 DIAGNOSIS — G89.4 CHRONIC PAIN SYNDROME: ICD-10-CM

## 2017-09-22 PROCEDURE — 99152 MOD SED SAME PHYS/QHP 5/>YRS: CPT | Mod: ,,, | Performed by: ANESTHESIOLOGY

## 2017-09-22 PROCEDURE — 25000003 PHARM REV CODE 250: Performed by: ANESTHESIOLOGY

## 2017-09-22 PROCEDURE — 63600175 PHARM REV CODE 636 W HCPCS: Performed by: ANESTHESIOLOGY

## 2017-09-22 PROCEDURE — 63650 IMPLANT NEUROELECTRODES: CPT | Performed by: ANESTHESIOLOGY

## 2017-09-22 PROCEDURE — 95971 ALYS SMPL SP/PN NPGT W/PRGRM: CPT | Mod: 59,,, | Performed by: ANESTHESIOLOGY

## 2017-09-22 PROCEDURE — 63650 IMPLANT NEUROELECTRODES: CPT | Mod: ,,, | Performed by: ANESTHESIOLOGY

## 2017-09-22 PROCEDURE — C1778 LEAD, NEUROSTIMULATOR: HCPCS | Performed by: ANESTHESIOLOGY

## 2017-09-22 RX ORDER — MIDAZOLAM HYDROCHLORIDE 1 MG/ML
INJECTION INTRAMUSCULAR; INTRAVENOUS
Status: DISCONTINUED | OUTPATIENT
Start: 2017-09-22 | End: 2017-09-22 | Stop reason: HOSPADM

## 2017-09-22 RX ORDER — LIDOCAINE HYDROCHLORIDE 10 MG/ML
INJECTION INFILTRATION; PERINEURAL
Status: DISCONTINUED | OUTPATIENT
Start: 2017-09-22 | End: 2017-09-22 | Stop reason: HOSPADM

## 2017-09-22 RX ORDER — CEFAZOLIN SODIUM 2 G/50ML
2 SOLUTION INTRAVENOUS ONCE
Status: COMPLETED | OUTPATIENT
Start: 2017-09-22 | End: 2017-09-22

## 2017-09-22 RX ORDER — SODIUM CHLORIDE 9 MG/ML
500 INJECTION, SOLUTION INTRAVENOUS CONTINUOUS
Status: DISCONTINUED | OUTPATIENT
Start: 2017-09-22 | End: 2017-09-22 | Stop reason: HOSPADM

## 2017-09-22 RX ORDER — BUPIVACAINE HYDROCHLORIDE 2.5 MG/ML
INJECTION, SOLUTION EPIDURAL; INFILTRATION; INTRACAUDAL
Status: DISCONTINUED | OUTPATIENT
Start: 2017-09-22 | End: 2017-09-22 | Stop reason: HOSPADM

## 2017-09-22 RX ORDER — FENTANYL CITRATE 50 UG/ML
INJECTION, SOLUTION INTRAMUSCULAR; INTRAVENOUS
Status: DISCONTINUED | OUTPATIENT
Start: 2017-09-22 | End: 2017-09-22 | Stop reason: HOSPADM

## 2017-09-22 RX ADMIN — MIDAZOLAM HYDROCHLORIDE 1 MG: 1 INJECTION, SOLUTION INTRAMUSCULAR; INTRAVENOUS at 09:09

## 2017-09-22 RX ADMIN — BUPIVACAINE HYDROCHLORIDE 10 ML: 2.5 INJECTION, SOLUTION EPIDURAL; INFILTRATION; INTRACAUDAL; PERINEURAL at 09:09

## 2017-09-22 RX ADMIN — SODIUM CHLORIDE 500 ML: 900 INJECTION, SOLUTION INTRAVENOUS at 08:09

## 2017-09-22 RX ADMIN — LIDOCAINE HYDROCHLORIDE 10 ML: 10 INJECTION, SOLUTION INFILTRATION; PERINEURAL at 09:09

## 2017-09-22 RX ADMIN — FENTANYL CITRATE 50 MCG: 50 INJECTION, SOLUTION INTRAMUSCULAR; INTRAVENOUS at 09:09

## 2017-09-22 RX ADMIN — CEFAZOLIN SODIUM 2 G: 2 SOLUTION INTRAVENOUS at 08:09

## 2017-09-22 NOTE — H&P
CC: back pain    HPI 88yo WM with PMH of CAD s/p CABG, ischemic cardiomyopathy ( EF 45%), Afib, HTN, PVD, who presents for SCS trial for post laminectomy syndrome.  Pt last took his Eliquis on Monday and denies any recent fever/abx use/infection/hospitalizations.      Plt 111 from 6/12/17.       PMHx, PSHx, Allergies, Medications reviewed in epic    ROS negative except pain complaints in HPI    OBJECTIVE:    Vitals:    09/22/17 0831   BP: (!) 194/80   Pulse: (!) 50   Resp: 18   Temp: 98.4 °F (36.9 °C)         PHYSICAL EXAMINATION:    GENERAL: Well appearing, in no acute distress, alert and oriented x3.  PSYCH:  Mood and affect appropriate.  SKIN: Skin color, texture, turgor normal, no rashes or lesions.  CV: RRR with palpation of the radial artery.  PULM: No evidence of respiratory difficulty, symmetric chest rise. Clear to auscultation.  NEURO: Cranial nerves grossly intact.    Plan:    Proceed with procedure as planned    Aysha Child  09/22/2017

## 2017-09-22 NOTE — OP NOTE
Patient Name: Javier Valles  MRN: [id number]    INFORMED CONSENT: The procedure, risks, benefits and options were discussed with patient. There are no contraindications to the procedure. The patient expressed understanding and agreed to proceed. The personnel performing the procedure was discussed. I verify that I personally obtained Javier's consent prior to the start of the procedure and the signed consent can be found on the patient's chart.        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 Postlaminectomy syndrome of lumbar region    2. Chronic pain syndrome    3. Chronic pain    4. Typical atrial flutter      Post-Procedure diagnosis: SAME    Sedation: Yes - Fentanyl 50 mcg and Midazolam 2 mg    PROCEDURE:Thoracolumbar SPINAL CORD STIMULATOR TRIAL (S[facility])    PREOPERATIVE ANTIBIOTICS: 2 g ancef IV given 30 minutes prior to procedure start, see anesthesia record for time.  DESCRIPTION OF PROCEDURE:  The patient was brought to the fluoroscopy suite. IV access was obtained prior to the procedure. The patient was positioned prone on the fluoroscopy table. Continuous hemodynamic monitoring was initiated including blood pressure and pulse oximetry. IV sedation was administered incrementally to allow the patient to remain comfortable and conversant throughout the procedure. The lumbar spine area was prepped with chlorhexidine  three times and draped in a sterile fashion. Skin anesthesia was achieved using 5 cc of lidocaine 1%. A 14 3 1/2 inch Tuohy needle was inserted at the L1-2 interspace using a paramedian approach. The needle was slowly advanced in a 45-degree angle down towards the ligamentum flavum.  Once the ligamentum flavum was reached, a loss of resistance syringe was attached.  The needle was slowly advanced into the dorsal epidural space.  After loss of resistance was  noted, negative aspiration for blood or CSF was confirmed . A 8 contacts percutaneous lead was inserted through the needle and slowly advanced in the epidural space under fluoroscopy. The lead was eventually placed at top of T8. A second lead was then placed in a similar fashion and placed immediately adjacent to the first lead. Both leads were then connected to allow for test stimulation. The patient reported stimulation covering the painful areas.  The lead was sutured in place with a Silk suture. A sterile dressing was applied. The patient was transferred to the postoperative area in stable condition.  The patient's postoperative neurological examination showed no evidence of lower extremity weakness with normal bowel and bladder function. no specimens collected. PATIENT was taken to the Post-block Recovery Area for further observation.       Blood Loss: Nill  Specimen: None    James Hodges MD

## 2017-09-22 NOTE — DISCHARGE SUMMARY
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  Qty: 30 tablet, Refills: 11    Associated Diagnoses: SSS (sick sinus syndrome); S/P CABG (coronary artery bypass graft); Ischemic cardiomyopathy; PVC (premature ventricular contraction)      amlodipine (NORVASC) 5 MG tablet Take 1 tablet (5 mg total) by mouth once daily.  Qty: 30 tablet, Refills: 11      ascorbic acid (VITAMIN C) 1000 MG tablet Take 1,000 mg by mouth every morning.       aspirin (ECOTRIN) 81 MG EC tablet Take 1 tablet (81 mg total) by mouth once daily.  Refills: 0      brimonidine 0.2% (ALPHAGAN) 0.2 % Drop Place 1 drop into the left eye 3 (three) times daily. Disp 10 mls for 30 days  Qty: 10 mL, Refills: 12    Associated Diagnoses: Primary open-angle glaucoma, left eye, moderate stage      buPROPion (WELLBUTRIN XL) 150 MG TB24 tablet Take 1 tablet (150 mg total) by mouth once daily.  Qty: 30 tablet, Refills: 11      co-enzyme Q-10 30 mg capsule Take 30 mg by mouth once daily.       donepezil (ARICEPT) 5 MG tablet Take 1 tablet (5 mg total) by mouth every evening.  Qty: 30 tablet, Refills: 11      duloxetine (CYMBALTA) 30 MG capsule Take 1 capsule (30 mg total) by mouth once daily.  Qty: 30 capsule, Refills: 11      ipratropium (ATROVENT) 0.03 % nasal spray 1-2 sprays by Nasal route 2 (two) times daily.  Qty: 30 mL, Refills: 1    Associated Diagnoses: Rhinorrhea      latanoprost 0.005 % ophthalmic solution Place 1 drop into both eyes every evening.  Qty: 1 Bottle, Refills: 12     Associated Diagnoses: Primary open-angle glaucoma, left eye, moderate stage      losartan (COZAAR) 50 MG tablet Take 1 tablet (50 mg total) by mouth 2 (two) times daily.  Qty: 180 tablet, Refills: 3    Associated Diagnoses: Coronary artery disease involving native coronary artery without angina pectoris, unspecified whether native or transplanted heart      lutein 20 mg Cap Take 20 mg by mouth once daily.       MULTIVITAMINS,THER W-MINERALS (VITAMINS & MINERALS ORAL) Take 1 tablet by mouth every morning.       nitroGLYCERIN (NITROSTAT) 0.4 MG SL tablet Place 1 tablet (0.4 mg total) under the tongue every 5 (five) minutes as needed for Chest pain.  Qty: 30 tablet, Refills: 5    Comments: 3 bottles per box      oxycodone-acetaminophen (PERCOCET) 5-325 mg per tablet Take 1 tablet by mouth every 6 (six) hours as needed for Pain.  Qty: 60 tablet, Refills: 0      rosuvastatin (CRESTOR) 20 MG tablet Take 1 tablet (20 mg total) by mouth every evening.  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  Diet on Discharge: Same as before.  Activity: as per instruction sheet.  Discharge to: Home with a responsible adult.  Follow up: as per Discharge instructions.

## 2017-09-22 NOTE — DISCHARGE INSTRUCTIONS
Home Care Instructions Pain Management:    1. DIET:   You may resume your normal diet today.   2. BATHING:   Sponge baths ONLY. No showers or tub baths with Spinal Cord Stimulator (SCS) in.   3. DRESSING:   Do not remove bandage. Doctor will remove at follow up visit. You can reinforce edges with band aids or skin tape if they start to come up.  4. ACTIVITY LEVEL:   Nothing strenuous with SCS in place. No bending of the back, no lifting anything heavier than a gallon of milk.    5. MEDICATIONS:   You may resume your normal medications today. DO NOT take any blood thinners with SCS in place.    6. SPECIAL INSTRUCTIONS:   No heat or ice  to the injection site while SCS is in place     If you have received any sedation through your IV, you may not drive for 24 hrs.     PLEASE CALL YOUR DOCTOR IF:  1. Redness or swelling around the injection site.  2. Fever of 101 degrees or more  3. Drainage (pus) from the injection site.  4. For any continuous bleeding (some dried blood over the incision is normal.)    FOR EMERGENCIES:   If any unusual problems or difficulties occur during clinic hours, call [phone] or [phone].   Procedural Sedation  Procedural sedation is medicine to ease discomfort, pain, and anxiety during a procedure. The medicine is often given through an intravenous (IV) line in your arm or hand. In some cases, the medicine may be taken by mouth or inhaled. While you are under sedation, you will likely be awake. But you may not remember anything afterward.  Why procedural sedation is used  Sedation is used for many types of procedures. The goal is to reduce pain, anxiety, and stressful memories of a procedure. It can also help your health care provider treat you. For example, having a broken bone fixed may be easier if you feel relaxed.  Procedural sedation is used only for short, basic procedures. It is not used for complex surgeries. Some procedures that use this type of sedation include:  · Dental  surgery  · Breast biopsy, to take a sample of breast tissue  · Endoscopy, to look at gastrointestinal problems  · Bronchoscopy, to check for lung problems  · Bone or joint realignment, to fix a broken bone or dislocated joint  · Minor foot or skin surgery  · Electrical cardioversion, to restore a normal heart rhythm  · Lumbar puncture, to assess neurological disease  Risks of procedural sedation  Procedural sedation has some risks and possible side effects, such as:  · Headache  · Nausea and vomiting  · Unpleasant memory of the procedure  · Lowered rate of breathing  · Changes in heart rate and blood pressure (rare)  · Inhalation of stomach contents into your lungs (rare)  Side effects will likely go away shortly after the procedure. Your health care team will watch your heart rate and breathing during your sedation. This is to help prevent problems.  Your own risks may vary based on your age and your overall health. They also depend on the type of sedation you are given. Talk with your health care provider about the risks that apply most to you.  Getting ready for procedural sedation  Talk with your health care provider how to get ready for your procedure. Tell him or her about all the medicines you take. This includes over-the-counter medicines such as ibuprofen. It also includes vitamins, herbs, and other supplements. You may need to stop taking some medicines before the procedure, such as blood thinners and aspirin. If you smoke, you may need to stop. Talk with your health care provider if you need help to stop smoking.  Tell your health care provider if you:  · Have had any problems in the past with sedation or anesthesia  · Have had any recent changes in your health, such as an infection or fever  · Are pregnant or think you may be pregnant  Also, make sure to:  · Ask a family member or friend to take you home after the procedure. You cannot drive on the day you receive sedation.  · Not eat or drink after  midnight the night before your procedure, if advised.  · Follow all other instructions from your health care provider.  During your procedural sedation  You may have your procedure in a hospital or a medical clinic. Sedation is done by a trained health care provider. In general, you can expect the following:  · You will be given medicine through an IV line in your arm or hand. Or you may receive a shot. The medicine may also be given by mouth. Or you may inhale it through a mask.  · If you receive medicine through an IV, you may feel the effects very quickly. You will start to feel relaxed and drowsy.  · During the procedure, your heart rate, breathing, and blood pressure will be closely watched. Your breathing and blood pressure may decrease a little. But you will likely not need help with your breathing. You may receive a little extra oxygen through a mask.  · You will probably be awake the entire time. If you do fall asleep, you should be easy to wake up, if needed. You should feel little or no pain.  · When your procedure is over, the sedative medicine will be stopped.  After your procedural sedation  You will begin to feel more awake and aware. But you will likely be drowsy for a while afterward. You will be closely watched as you become more alert. You may have a faint memory of the procedure. Or you may not remember it at all.  You should be able to return home within an hour or two after your procedure. Plan to have someone stay with you for a few hours. Side effects such as headache and nausea may go away quickly. Tell your health care provider if they continue.  Dont drive or make any important decisions for at least 24 hours. Be sure to follow all after-care instructions.      When to call your health care provider  Have someone call your health care provider right away if you have any of these:  · Drowsiness that gets worse  · Weakness or dizziness that gets worse  · Repeated vomiting  · You cant be  awakened   Date Last Reviewed: 2/6/2015  © 1841-4804 [email or web address]. [address] [place] [address], [place] [address]. All rights reserved. This information is not intended as a substitute for professional medical care. Always follow your healthcare professional's instructions.

## 2017-09-25 ENCOUNTER — PATIENT MESSAGE (OUTPATIENT)
Dept: PAIN MEDICINE | Facility: CLINIC | Age: 82
End: 2017-09-25

## 2017-09-28 ENCOUNTER — TELEPHONE (OUTPATIENT)
Dept: PAIN MEDICINE | Facility: CLINIC | Age: 82
End: 2017-09-28

## 2017-09-28 ENCOUNTER — PATIENT MESSAGE (OUTPATIENT)
Dept: OPHTHALMOLOGY | Facility: CLINIC | Age: 82
End: 2017-09-28

## 2017-09-28 ENCOUNTER — PATIENT OUTREACH (OUTPATIENT)
Dept: OTHER | Facility: OTHER | Age: 82
End: 2017-09-28

## 2017-09-28 ENCOUNTER — OFFICE VISIT (OUTPATIENT)
Dept: PAIN MEDICINE | Facility: CLINIC | Age: 82
End: 2017-09-28
Payer: MEDICARE

## 2017-09-28 VITALS
BODY MASS INDEX: 28.95 KG/M2 | HEART RATE: 57 BPM | TEMPERATURE: 98 F | SYSTOLIC BLOOD PRESSURE: 135 MMHG | DIASTOLIC BLOOD PRESSURE: 69 MMHG | WEIGHT: 191 LBS | HEIGHT: 68 IN

## 2017-09-28 DIAGNOSIS — Z98.890 S/P LUMBAR LAMINECTOMY: ICD-10-CM

## 2017-09-28 DIAGNOSIS — M51.36 DDD (DEGENERATIVE DISC DISEASE), LUMBAR: ICD-10-CM

## 2017-09-28 DIAGNOSIS — M96.1 FAILED BACK SURGICAL SYNDROME: ICD-10-CM

## 2017-09-28 DIAGNOSIS — G89.4 CHRONIC PAIN SYNDROME: Primary | ICD-10-CM

## 2017-09-28 DIAGNOSIS — M51.36 LUMBAR DEGENERATIVE DISC DISEASE: ICD-10-CM

## 2017-09-28 DIAGNOSIS — M96.1 POSTLAMINECTOMY SYNDROME OF LUMBAR REGION: ICD-10-CM

## 2017-09-28 DIAGNOSIS — M54.16 LUMBAR RADICULOPATHY: ICD-10-CM

## 2017-09-28 DIAGNOSIS — M54.17 LUMBOSACRAL RADICULOPATHY: ICD-10-CM

## 2017-09-28 PROCEDURE — 99499 UNLISTED E&M SERVICE: CPT | Mod: S$GLB,,, | Performed by: NURSE PRACTITIONER

## 2017-09-28 PROCEDURE — 99999 PR PBB SHADOW E&M-EST. PATIENT-LVL III: CPT | Mod: PBBFAC,,, | Performed by: NURSE PRACTITIONER

## 2017-09-28 PROCEDURE — 99024 POSTOP FOLLOW-UP VISIT: CPT | Mod: S$GLB,,, | Performed by: NURSE PRACTITIONER

## 2017-09-28 NOTE — PROGRESS NOTES
"Last 5 Patient Entered Redings Current 30 Day Average: 131/70     Recent Readings 9/25/2017 9/23/2017 9/23/2017 9/15/2017 9/13/2017    Systolic BP (mmHg) 127 146 150 121 125    Diastolic BP (mmHg) 71 73 76 71 70    Pulse 59 58 57 54 59        Hypertension Digital Medicine Program (HDMP): Health  Follow Up    Lifestyle Modifications:    1.Low sodium diet: no - states he hasn't changed his diet "in many, many years and whatever is it is, it is". He states he's "doing just fine".      2.Physical activity: yes - pt states he stays active each day    3.Hypotension/Hypertension symptoms: no   Frequency/Alleviating factors/Precipitating factors, etc.     4.Patient has been compliant with the medication regimen.     Follow up with Mr. Javier Valles completed. No further questions or concerns. I will follow up in a few weeks to assess progress.       "

## 2017-09-28 NOTE — PROGRESS NOTES
Chronic patient Established Note (Follow up visit)      SUBJECTIVE:    Javier Valles presents to the clinic for a follow-up appointment for lower back and bilateral leg pain.  He is s/p lumbar SCS trial on 9/22/17 with 90% pain relief.  He is very satisfied with the results.  During his trial period, he was able to get around better with minimal pain.  He went to the grocery store multiple times without difficulty.  He denies any drainage from the area.  He denies any fever or malaise.  He has held his Eliquis during the trial period.  Since the last visit, Javier Valles states the pain has been improving.  Current pain intensity is 4/10.    Pain Medications:  Robaxin 750 mg PRN muscle pain.    Tried in past: gabapentin 100 mg TID, Percocet (helpful), Norco    - Anti-Coagulants: Eliquis (pacemaker)    Opioid Contract: no     report:  Not applicable    Pain Procedures:  Multiple JOMAR's with Dr. Lopez 4 yrs ago  4/28/17 Right L4-5 TF JOMAR- 100% relief   5/29/17 Left L5-S1 TF JOMAR  7/21/17 Caudal JOMAR- significant benefit  9/22/17 Lumbar SCS trial- 90% relief    Spinal surgeries:  MIS laminectomy L3-4 and L4-5.     Physical Therapy/Home Exercise: no    Imaging:   3/24/16 Lumbar CT    Narrative   Comparison: 8/11/14    Technique: 2.5 mm axial images obtained of the lumbar spine without contrast with sagittal and coronal reformats.    Findings: There is no evidence of acute fracture or bone destruction.  There is intervertebral disc space narrowing at multiple levels which is most significant at L2-3 with prominent marginal osteophyte formation.  There is vacuum disc formation at L1-2 and L5-S1.  There is mild, 4 mm of anterolisthesis of L4 on L5 with partial uncovering of the disc.    T10-11: There is prominent facet arthropathy on the right which causes mass effect on the right posterior lateral aspect of the thecal sac.  This level is only partially imaged.    T11-12, T12-L1: There are degenerative Schmorl's nodes.   There is no significant central canal stenosis or neural foraminal narrowing.    L1-L2: There is a circumferential disc bulge with ligamentum flavum thickening and facet arthropathy resulting in moderate central canal stenosis.  There is moderate bilateral neural foraminal narrowing left greater than right.    L2-3: There is a broad-based circumferential disc bulge with ligamentum flavum thickening and facet arthropathy which in combination results in moderate central canal stenosis.  There is severe bilateral neural foraminal narrowing.    L3-4: There is a diffuse disc bulge with facet arthropathy and ligamentum flavum thickening resulting in severe central canal stenosis and moderate bilateral neural foraminal narrowing.    L4-5: There is a circumferential posterior disc osteophyte complex with bilateral facet arthropathy and ligamentum flavum thickening resulting in moderate central canal stenosis.  There is mild anterolisthesis which is likely secondary to the prominent facet degenerative change.  The posterior disc osteophyte complex extends into the left neural foramen causing severe left neural foraminal narrowing.  There is moderate right neural foraminal narrowing.    L5-S1: There is a mild diffuse disc bulge with facet arthropathy resulting in no significant central canal stenosis.  There is a posterior disc osteophyte which causes severe right neural foraminal narrowing and moderate left neural foraminal narrowing.    Limited review of the retroperitoneal structures demonstrates advanced atherosclerosis of the aorta and branch vessels.   Impression       Advanced multilevel degenerative changes of the lumbar spine.  Specific details at each level are discussed above       CMP  Sodium   Date Value Ref Range Status   06/12/2017 141 136 - 145 mmol/L Final     Potassium   Date Value Ref Range Status   06/12/2017 5.2 (H) 3.5 - 5.1 mmol/L Final     Chloride   Date Value Ref Range Status   06/12/2017 109 95 -  110 mmol/L Final     CO2   Date Value Ref Range Status   06/12/2017 24 23 - 29 mmol/L Final     Glucose   Date Value Ref Range Status   06/12/2017 76 70 - 110 mg/dL Final     BUN, Bld   Date Value Ref Range Status   06/12/2017 27 (H) 8 - 23 mg/dL Final     Creatinine   Date Value Ref Range Status   06/12/2017 1.8 (H) 0.5 - 1.4 mg/dL Final     Calcium   Date Value Ref Range Status   06/12/2017 10.0 8.7 - 10.5 mg/dL Final     Total Protein   Date Value Ref Range Status   06/12/2017 6.7 6.0 - 8.4 g/dL Final     Albumin   Date Value Ref Range Status   06/12/2017 3.7 3.5 - 5.2 g/dL Final     Total Bilirubin   Date Value Ref Range Status   06/12/2017 1.1 (H) 0.1 - 1.0 mg/dL Final     Comment:     For infants and newborns, interpretation of results should be based  on gestational age, weight and in agreement with clinical  observations.  Premature Infant recommended reference ranges:  Up to 24 hours.............<8.0 mg/dL  Up to 48 hours............<12.0 mg/dL  3-5 days..................<15.0 mg/dL  6-29 days.................<15.0 mg/dL       Alkaline Phosphatase   Date Value Ref Range Status   06/12/2017 72 55 - 135 U/L Final     AST   Date Value Ref Range Status   06/12/2017 30 10 - 40 U/L Final     ALT   Date Value Ref Range Status   06/12/2017 18 10 - 44 U/L Final     Anion Gap   Date Value Ref Range Status   06/12/2017 8 8 - 16 mmol/L Final     eGFR if    Date Value Ref Range Status   06/12/2017 38.3 (A) >60 mL/min/1.73 m^2 Final     eGFR if non    Date Value Ref Range Status   06/12/2017 33.1 (A) >60 mL/min/1.73 m^2 Final     Comment:     Calculation used to obtain the estimated glomerular filtration  rate (eGFR) is the CKD-EPI equation. Since race is unknown   in our information system, the eGFR values for   -American and Non--American patients are given   for each creatinine result.           REVIEW OF SYSTEMS:    GENERAL:  No weight loss, malaise or fevers.  HEENT:   Negative for frequent or significant headaches.  NECK:  Negative for lumps, goiter, pain and significant neck swelling.  RESPIRATORY:  Negative for cough, wheezing or shortness of breath.  CARDIOVASCULAR:  Negative for chest pain, leg swelling or palpitations. Pacemaker placement.  A-fib.  GI:  Negative for abdominal discomfort, blood in stools or black stools or change in bowel habits.  MUSCULOSKELETAL:  See HPI.  SKIN:  Negative for lesions, rash, and itching.  PSYCH:  Negative for sleep disturbance, mood disorder and recent psychosocial stressors.  HEMATOLOGY/LYMPHOLOGY:  Negative for prolonged bleeding, bruising easily or swollen nodes.  NEURO:   No history of headaches, syncope, paralysis, seizures or tremors.  NEPH: CKD.  All other reviewed and negative other than HPI.    Past Medical History:  Past Medical History:   Diagnosis Date    *Atrial flutter 10/3/13    Anticoagulant long-term use     Aspirin only at this time    Arthritis     Atrial fibrillation     Atrial flutter     Blood transfusion     ?    BPH (benign prostatic hypertrophy)     Carotid artery disease     2008: [place] There is 40 - 49% right Internal Carotid stenosis.  There is 20 - 39% left Internal Carotid stenosis.       Chronic kidney disease     Coronary artery disease     DDD (degenerative disc disease) 6/25/2015    Degenerative disc disease     Elevated PSA     Glaucoma     Hyperlipidemia     Hypertension     Lumbar stenosis     lumbar spinal stenosis    NSTEMI (non-ST elevated myocardial infarction) 11/3/2013    Pacemaker 11/2013    Peripheral neuropathy     - S/P right ILIAC stent 1993;      Retinal detachment     Squamous cell carcinoma     left leg    Syncope and collapse: orthostatic with hypotension 10/9/2015       Past Surgical History:  Past Surgical History:   Procedure Laterality Date    BACK SURGERY  04/25/2016    bypass 1991      CARDIAC CATHETERIZATION      CARDIAC ELECTROPHYSIOLOGY MAPPING AND ABLATION   "11/2016    CARDIAC PACEMAKER PLACEMENT      CARDIAC PACEMAKER PLACEMENT  11/2016    CATARACT EXTRACTION W/  INTRAOCULAR LENS IMPLANT Left 1997    OS with     ENUCLEATION Right n/a    EYE SURGERY      retinal detachment repair left eye      RETINAL DETACHMENT SURGERY Left 1997    Dr. Cosme    SKIN BIOPSY      TONSILLECTOMY         Family History:  Family History   Problem Relation Age of Onset    Stroke Mother 69    Clotting disorder Mother      per patient no clotting disorder    Hypertension Mother     Diverticulitis Son     Cancer Neg Hx     Diabetes Neg Hx     Heart disease Neg Hx     Glaucoma Neg Hx     Amblyopia Neg Hx     Blindness Neg Hx     Cataracts Neg Hx     Macular degeneration Neg Hx     Retinal detachment Neg Hx     Strabismus Neg Hx        Social History:  Social History     Social History    Marital status:      Spouse name: Joanie    Number of children: N/A    Years of education: N/A     Occupational History    retired      Social History Main Topics    Smoking status: Former Smoker     Quit date: 8/19/1963    Smokeless tobacco: Never Used    Alcohol use 1.8 oz/week     1 Glasses of wine, 1 Cans of beer, 1 Shots of liquor per week      Comment: 2 a day    Drug use: No    Sexual activity: Yes     Partners: Female     Other Topics Concern    None     Social History Narrative    None       OBJECTIVE:    /69   Pulse (!) 57   Temp 98 °F (36.7 °C) (Oral)   Ht 5' 8" (1.727 m)   Wt 86.6 kg (191 lb)   BMI 29.04 kg/m²     PHYSICAL EXAMINATION:    General appearance: Well appearing, in no acute distress, alert and oriented x3.  Psych: Mood and affect appropriate.  Skin: Skin color, texture, turgor normal, no rashes or lesions, in both upper and lower body.  SCS site without drainage or signs of infection.  Leads removed without difficulty with tips intact.  Area cleaned with alcohol and Bacitracin applied.  Head/face: Normocephalic, atraumatic. No " palpable lymph nodes.  Cor: RRR  Pulm: CTA  Neuro: Bilateral lower extremity coordination and muscle stretch reflexes are physiologic and symmetric. Plantar response are downgoing. No loss of sensation is noted.  Gait: Normal.            ASSESSMENT: 87 y.o. year old male with lower back and leg pain, consistent with lumbar radiculopathy.     1. Chronic pain syndrome     2. S/P lumbar laminectomy     3. DDD (degenerative disc disease), lumbar     4. Lumbosacral radiculopathy     5. Lumbar degenerative disc disease     6. Postlaminectomy syndrome of lumbar region           PLAN:     - I have stressed the importance of physical activity and a home exercise plan to help with pain and improve health.    - He is s/p lumbar SCS trial with 90% relief.  Will schedule for implant with Dr. Hodges.  Leads removed today without difficulty.  No sings of infection or complication.  He can resume Eliquis at his night time dose today.    - RTC after implant.    - Counseled patient regarding the importance of activity modification, constant sleeping habits and physical therapy.    - Dr. Hodges was consulted on the patient and agrees with this plan.      The above plan and management options were discussed at length with patient. Patient is in agreement with the above and verbalized understanding.    Jeniffer Mueller  09/28/2017

## 2017-09-28 NOTE — TELEPHONE ENCOUNTER
Spoke with patient and scheduled him for a consult with Dr. Andrzej Toribio on 10-9-17,per Dr. Hodges patient needs paddle lead with his scs implant  So he was referred to neurosurgery.

## 2017-10-02 ENCOUNTER — OFFICE VISIT (OUTPATIENT)
Dept: OPHTHALMOLOGY | Facility: CLINIC | Age: 82
End: 2017-10-02
Payer: MEDICARE

## 2017-10-02 DIAGNOSIS — H35.342 LAMELLAR MACULAR HOLE, LEFT: ICD-10-CM

## 2017-10-02 DIAGNOSIS — H47.392 OPTIC DISC HEMORRHAGE, LEFT: ICD-10-CM

## 2017-10-02 DIAGNOSIS — Z97.0 PROSTHETIC EYE GLOBE: ICD-10-CM

## 2017-10-02 DIAGNOSIS — H40.1122 PRIMARY OPEN-ANGLE GLAUCOMA, LEFT EYE, MODERATE STAGE: Primary | ICD-10-CM

## 2017-10-02 DIAGNOSIS — H33.002 RHEGMATOGENOUS RETINAL DETACHMENT OF LEFT EYE: ICD-10-CM

## 2017-10-02 DIAGNOSIS — H35.372 EPIRETINAL MEMBRANE (ERM) OF LEFT EYE: ICD-10-CM

## 2017-10-02 DIAGNOSIS — Z96.1 PSEUDOPHAKIA OF LEFT EYE: ICD-10-CM

## 2017-10-02 PROCEDURE — 99499 UNLISTED E&M SERVICE: CPT | Mod: S$GLB,,, | Performed by: OPHTHALMOLOGY

## 2017-10-02 PROCEDURE — 99999 PR PBB SHADOW E&M-EST. PATIENT-LVL II: CPT | Mod: PBBFAC,,, | Performed by: OPHTHALMOLOGY

## 2017-10-02 PROCEDURE — 92012 INTRM OPH EXAM EST PATIENT: CPT | Mod: S$GLB,,, | Performed by: OPHTHALMOLOGY

## 2017-10-02 NOTE — PROGRESS NOTES
HPI     Glaucoma    Additional comments: IOP ck today           Comments   DLS: 7/24/17    1) POAG  2) PCIOL OS  3) Prosthesis OD  4) ERM OS  5) Hx Rhegmatogenous RD OS  6) Psuedo Mac Hole OS    MEDS:  Brimonidine TID OS   Latanoprost QHS OS         Last edited by Teresa Gonzalez MA on 10/2/2017  8:10 AM. (History)          Assessment /Plan     For exam results, see Encounter Report.    Primary open-angle glaucoma, left eye, moderate stage    Optic disc hemorrhage, left    Epiretinal membrane (ERM) of left eye    Rhegmatogenous retinal detachment of left eye    Lamellar macular hole, left    Pseudophakia of left eye    Prosthetic eye globe        Old pt of Dr. Goodrich - now seedennis Jacob     1. POAG   Followed at [facility] since 1997   followed by [facility] for 30 years  First HVF 2014  gtts started - [facility] - 2012  First photos - ? 1997 - for RD os     Glaucoma meds -  latanoprost qhs os / brimonidine os   H/O adverse rxn to glaucoma drops none  LASERS - SLT os 5/25/2017 (270 degrees - too narrow inf.) (( good resp 16--> 11)   GLAUCOMA SURGERIES none  OTHER EYE SURGERIES - prosthesis od - (2/2 injury - 1940's) // Pnematic retinopexy OS 1997 - [place] // PC iol os - [place]  CDR - prosthesis / 0.75  Tbase  - ?? On gtts when started here  Tmax  - ?? Off gtts   Ttarget  - ?? 13 or less if possible // had a disc heme with IOP 15-18   HVF 3  test 2014 to 2016 OS:  ? Paracentral defect with early SAD/IAD  Gonio  +3-4 S/T/N // very narrow inf os   CCT - xx/492  OCT 2 test 2014 to 2016- RNFL - OD:not done // OS:dec s/bord T  HRT 3 test 2015 - 2017 -  MR -  xx od // xx os /// CDR dec S/I/N bord T  od // 0.80 os   Disc photos - mult old slides 1997 - 2006 // 2015, 2017 - w/ IT disc heme - OIS    Ttoday - prosthetic // 09  Test done today IOP check post slt os     2.  disc heme os    See photos 4/17/2017 - occurred with IOP's of  13-18   Resolved by 10/2/107    3.  Monocular precautions  - prothetic od    4.  erm os  - stable,  follows with arend  -on nevanac q day os - feels it helps vision - ?     5.  Hx of rhegmatogenous rrd os  - flat, follows with arend    6.  Pseudo hole os  - monitor     7. PC IOL OS     Pt on metoprolol xl      Glaucoma os - monocular   Cont  xal  Cont brimonidine   IOP seems ok / but has a new disc heme - rec lower IOP - try slt os (( ?? Target 14))   SLT os - 5/25/2017 - S/T/N (too narrow inf. ) - steroid taper (( good resp 16-->11)     -consider topical SILVERIO as needed   - (may be able to use BB as now has a pacemaker, but has a H/O low heart rate / and/or arrythmia)   - also can try topical SILVERIO prn     -add EES ointment od - prosthesis - prn irritation     Sees [facility] - new glasses - has been restricted to daytime driving   Keep regular F/U with [facility] / [facility]    Pt occasionally has to get steroid injections for back pain - so will need to monitor that - may be a cause of elevated IOP from time to time      F/U 4 months HVF - OS / DFE /  OCT -     I have seen and personally examined the patient.  I agree with the findings, assessment and plan of the resident and/or fellow.     Sena Schneider MD

## 2017-10-03 ENCOUNTER — PATIENT MESSAGE (OUTPATIENT)
Dept: NEUROSURGERY | Facility: CLINIC | Age: 82
End: 2017-10-03

## 2017-10-03 ENCOUNTER — TELEPHONE (OUTPATIENT)
Dept: OTOLARYNGOLOGY | Facility: CLINIC | Age: 82
End: 2017-10-03

## 2017-10-09 ENCOUNTER — TELEPHONE (OUTPATIENT)
Dept: NEUROSURGERY | Facility: CLINIC | Age: 82
End: 2017-10-09

## 2017-10-09 ENCOUNTER — PATIENT MESSAGE (OUTPATIENT)
Dept: INTERNAL MEDICINE | Facility: CLINIC | Age: 82
End: 2017-10-09

## 2017-10-09 ENCOUNTER — SURGICAL CONSULT (OUTPATIENT)
Dept: NEUROSURGERY | Facility: CLINIC | Age: 82
End: 2017-10-09
Payer: MEDICARE

## 2017-10-09 VITALS
DIASTOLIC BLOOD PRESSURE: 81 MMHG | HEART RATE: 57 BPM | BODY MASS INDEX: 28.64 KG/M2 | SYSTOLIC BLOOD PRESSURE: 130 MMHG | WEIGHT: 189 LBS | HEIGHT: 68 IN

## 2017-10-09 DIAGNOSIS — G89.4 CHRONIC PAIN SYNDROME: Primary | ICD-10-CM

## 2017-10-09 DIAGNOSIS — M96.1 POST LAMINECTOMY SYNDROME: ICD-10-CM

## 2017-10-09 DIAGNOSIS — Z96.89 STATUS POST INSERTION OF SPINAL CORD STIMULATOR: ICD-10-CM

## 2017-10-09 PROCEDURE — 99499 UNLISTED E&M SERVICE: CPT | Mod: S$GLB,,, | Performed by: NEUROLOGICAL SURGERY

## 2017-10-09 PROCEDURE — 99215 OFFICE O/P EST HI 40 MIN: CPT | Mod: S$GLB,,, | Performed by: NEUROLOGICAL SURGERY

## 2017-10-09 NOTE — H&P
NEUROSURGICAL OUTPATIENT CONSULTATION NOTE    DATE OF SERVICE:  10/09/2017    ATTENDING PHYSICIAN:  Andrzej Toribio MD    CONSULT REQUESTED BY:  Dr Hodges    REASON FOR CONSULT:  Chronic pain syndrome    SUBJECTIVE:    HISTORY OF PRESENT ILLNESS:  This is a very pleasant 87 y.o. male who has lumbar spondylosis with foraminal stenosis. He had a L3-4 and L4-5 MIS laminectomy with Dr Stanford about 2 years ago with leg pain improvement. The leg pain has worsened and now he has low back pain. The low back pain is constant and worse when standing. He walks leaning forward. The bilateral legs pain are triggered by standing up and relieved by sitting. He had a recent trial of spinal cord stimulator. His low back and legs pain decreased from 10/10 to 1.5/10 and he was able to walk more and be more active. Denies having significant legs weakness or numbness.             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    Chronic kidney disease, stage III (moderate) 11/27/2012    Benign hypertensive renal disease without renal failure 11/27/2012    Gout, unspecified 11/27/2012    Acquired cyst of kidney 11/27/2012    Idiopathic progressive polyneuropathy 12/17/2012    Atherosclerosis of aorta 10/09/2013    Epiretinal membrane, left eye 10/28/2013    Posterior vitreous detachment of left eye 10/28/2013    Pseudophakia of left eye 10/28/2013    Prosthetic eye globe 10/28/2013    Open-angle glaucoma 10/28/2013    Retinal tear 10/28/2013    Rhegmatogenous retinal detachment 10/28/2013    Atrial fibrillation 11/03/2013    History of Urinary  retention 11/07/2013    Biventricular cardiac pacemaker in situ 12/12/2013    SSS (sick sinus syndrome): s/p PPM 12/12/2013    Macular edema, cystoid 06/11/2014    Lamellar macular hole 06/11/2014    Left lumbar radiculopathy 06/25/2015    Lumbar facet arthropathy 06/25/2015    Spondylosis without myelopathy 06/25/2015    Lumbar degenerative disc disease 06/25/2015    Gait apraxia 10/09/2015    Contusion of rib on left side 10/28/2015    Spinal stenosis, lumbar 04/13/2016    Obesity, Class I, BMI 30-34.9 04/21/2016    Coronary artery disease involving native coronary artery of native heart without angina pectoris 04/21/2016    S/P CABG (coronary artery bypass graft) 04/21/2016    Varicose veins of both lower extremities- Rt more than Left 04/21/2016    Edema 04/21/2016    Thrombocytopenia 04/21/2016    Total bilirubin, elevated 04/21/2016    Lumbar stenosis with neurogenic claudication 04/25/2016    S/P lumbar laminectomy     Encounter for postoperative wound check 05/24/2016    Peripheral venous insufficiency     Typical atrial flutter 09/21/2016    Current use of long term anticoagulation 09/21/2016    Cardiomyopathy 09/21/2016    Status post catheter ablation of atrial flutter 12/14/2016    Bilateral carpal tunnel syndrome 01/24/2017    Chronic pain 04/28/2017    Lumbar spinal stenosis 05/02/2017    Acute lumbar radiculopathy 05/02/2017    Osteoarthritis of spine with radiculopathy, lumbar region 05/29/2017    Cardiomyopathy, ischemic: Prior MI, CABG, EF 40-45% 06/07/2017    Post laminectomy syndrome 06/15/2017    PVC (premature ventricular contraction) 08/29/2017     Resolved Ambulatory Problems     Diagnosis Date Noted    Coronary artery disease     Atrial flutter 10/03/2013    Lamellar macular hole of left eye 10/28/2013    Lamellar macular hole 10/28/2013    Unstable angina 11/01/2013    NSTEMI (non-ST elevated myocardial infarction) 11/03/2013    Cardiogenic shock  11/03/2013    Ventricular fibrillation 11/03/2013    Acute systolic heart failure 11/03/2013    Syncope and collapse: orthostatic with hypotension 10/09/2015    Lumbar myelopathy 04/29/2016     Past Medical History:   Diagnosis Date    *Atrial flutter 10/3/13    Anticoagulant long-term use     Arthritis     Atrial fibrillation     Atrial flutter     Blood transfusion     BPH (benign prostatic hypertrophy)     Carotid artery disease     Chronic kidney disease     Coronary artery disease     DDD (degenerative disc disease) 6/25/2015    Degenerative disc disease     Elevated PSA     Glaucoma     Hyperlipidemia     Hypertension     Lumbar stenosis     NSTEMI (non-ST elevated myocardial infarction) 11/3/2013    Pacemaker 11/2013    Peripheral neuropathy     Retinal detachment     Squamous cell carcinoma     Syncope and collapse: orthostatic with hypotension 10/9/2015       PAST SURGICAL HISTORY:  Past Surgical History:   Procedure Laterality Date    BACK SURGERY  04/25/2016    bypass 1991      CARDIAC CATHETERIZATION      CARDIAC ELECTROPHYSIOLOGY MAPPING AND ABLATION  11/2016    CARDIAC PACEMAKER PLACEMENT      CARDIAC PACEMAKER PLACEMENT  11/2016    CATARACT EXTRACTION W/  INTRAOCULAR LENS IMPLANT Left 1997    OS with     ENUCLEATION Right n/a    EYE SURGERY      retinal detachment repair left eye      RETINAL DETACHMENT SURGERY Left 1997    Dr. Cosme    SKIN BIOPSY      TONSILLECTOMY         SOCIAL HISTORY:   Social History     Social History    Marital status:      Spouse name: Joanie    Number of children: N/A    Years of education: N/A     Occupational History    retired      Social History Main Topics    Smoking status: Former Smoker     Quit date: 8/19/1963    Smokeless tobacco: Never Used    Alcohol use 1.8 oz/week     1 Glasses of wine, 1 Cans of beer, 1 Shots of liquor per week      Comment: 2 a day    Drug use: No    Sexual activity: Yes      Partners: Female     Other Topics Concern    Not on file     Social History Narrative    No narrative on file       FAMILY HISTORY:  Family History   Problem Relation Age of Onset    Stroke Mother 69    Clotting disorder Mother      per patient no clotting disorder    Hypertension Mother     Diverticulitis Son     Cancer Neg Hx     Diabetes Neg Hx     Heart disease Neg Hx     Glaucoma Neg Hx     Amblyopia Neg Hx     Blindness Neg Hx     Cataracts Neg Hx     Macular degeneration Neg Hx     Retinal detachment Neg Hx     Strabismus Neg Hx        CURRENTS MEDICATIONS:  Current Outpatient Prescriptions on File Prior to Visit   Medication Sig Dispense Refill    amiodarone (PACERONE) 200 MG Tab Take 1 tablet (200 mg total) by mouth once daily. 30 tablet 11    amlodipine (NORVASC) 5 MG tablet Take 1 tablet (5 mg total) by mouth once daily. 30 tablet 11    ascorbic acid (VITAMIN C) 1000 MG tablet Take 1,000 mg by mouth every morning.       aspirin (ECOTRIN) 81 MG EC tablet Take 1 tablet (81 mg total) by mouth once daily.  0    brimonidine 0.2% (ALPHAGAN) 0.2 % Drop Place 1 drop into the left eye 3 (three) times daily. Disp 10 mls for 30 days 10 mL 12    buPROPion (WELLBUTRIN XL) 150 MG TB24 tablet Take 1 tablet (150 mg total) by mouth once daily. 30 tablet 11    co-enzyme Q-10 30 mg capsule Take 30 mg by mouth once daily.       donepezil (ARICEPT) 5 MG tablet Take 1 tablet (5 mg total) by mouth every evening. 30 tablet 11    duloxetine (CYMBALTA) 30 MG capsule Take 1 capsule (30 mg total) by mouth once daily. 30 capsule 11    ipratropium (ATROVENT) 0.03 % nasal spray 1-2 sprays by Nasal route 2 (two) times daily. 30 mL 1    latanoprost 0.005 % ophthalmic solution Place 1 drop into both eyes every evening. 1 Bottle 12    losartan (COZAAR) 50 MG tablet Take 1 tablet (50 mg total) by mouth 2 (two) times daily. 180 tablet 3    lutein 20 mg Cap Take 20 mg by mouth once daily.       MULTIVITAMINS,THER  W-MINERALS (VITAMINS & MINERALS ORAL) Take 1 tablet by mouth every morning.       nitroGLYCERIN (NITROSTAT) 0.4 MG SL tablet Place 1 tablet (0.4 mg total) under the tongue every 5 (five) minutes as needed for Chest pain. 30 tablet 5    oxycodone-acetaminophen (PERCOCET) 5-325 mg per tablet Take 1 tablet by mouth every 6 (six) hours as needed for Pain. 60 tablet 0    rosuvastatin (CRESTOR) 20 MG tablet Take 1 tablet (20 mg total) by mouth every evening. 90 tablet 3     No current facility-administered medications on file prior to visit.        ALLERGIES:  Review of patient's allergies indicates:   Allergen Reactions    Pravastatin Other (See Comments)     Severe myalgias/elevated CPK    Lipitor [atorvastatin]      Myositis/elevated CPK    Statins-hmg-coa reductase inhibitors      Muscle pain and loss of muscle    Ace inhibitors      Cough       REVIEW OF SYSTEMS:  Review of Systems   Constitutional: Negative for diaphoresis, fever and weight loss.   Respiratory: Negative for shortness of breath.    Cardiovascular: Negative for chest pain.   Gastrointestinal: Negative for blood in stool.   Genitourinary: Negative for hematuria.   Endo/Heme/Allergies: Does not bruise/bleed easily.   All other systems reviewed and are negative.      OBJECTIVE:    PHYSICAL EXAMINATION:   Vitals:    10/09/17 0801   BP: 130/81   Pulse: (!) 57       Physical Exam:  Vitals reviewed.    Constitutional: He appears well-developed and well-nourished.     Eyes: Pupils are equal, round, and reactive to light. Conjunctivae and EOM are normal.     Cardiovascular: Normal distal pulses and no edema.     Abdominal: Soft.     Skin: Skin displays no rash on trunk and no rash on extremities. Skin displays no lesions on trunk and no lesions on extremities.     Psych/Behavior: He is alert. He is oriented to person, place, and time. He has a normal mood and affect.     Musculoskeletal:        Neck: Range of motion is full.     Neurological:         DTRs: Tricep reflexes are 2+ on the right side and 2+ on the left side. Bicep reflexes are 2+ on the right side and 2+ on the left side. Brachioradialis reflexes are 2+ on the right side and 2+ on the left side. Patellar reflexes are 2+ on the right side and 2+ on the left side. Achilles reflexes are 0 on the right side and 0 on the left side.       Back Exam     Tenderness   The patient is experiencing tenderness in the lumbar (L5-S1 bilateral tenderness to palpation).    Range of Motion   Extension: abnormal   Flexion: normal     Muscle Strength   Right Quadriceps:  5/5   Left Quadriceps:  5/5   Right Hamstrings:  5/5   Left Hamstrings:  5/5     Other   Toe Walk: normal  Heel Walk: normal            SI joint:   Palpation at the right and left SI joints not painful  ABEBA test is negative bilaterally  Gaenslen test is negative bilaterally  Thigh thrust test is negative bilaterally    Neurologic Exam     Mental Status   Oriented to person, place, and time.   Speech: speech is normal   Level of consciousness: alert    Cranial Nerves   Cranial nerves II through XII intact.     CN III, IV, VI   Pupils are equal, round, and reactive to light.  Extraocular motions are normal.     Motor Exam   Muscle bulk: normal  Overall muscle tone: normal    Strength   Right deltoid: 5/5  Left deltoid: 5/5  Right biceps: 5/5  Left biceps: 5/5  Right triceps: 5/5  Left triceps: 5/5  Right wrist flexion: 5/5  Left wrist flexion: 5/5  Right wrist extension: 5/5  Left wrist extension: 5/5  Right interossei: 5/5  Left interossei: 5/5  Right iliopsoas: 5/5  Left iliopsoas: 5/5  Right quadriceps: 5/5  Left quadriceps: 5/5  Right hamstrin/5  Left hamstrin/5  Right anterior tibial: 5/5  Left anterior tibial: 5/5  Right posterior tibial: 5/5  Left posterior tibial: 5/5  Right peroneal: 5/5  Left peroneal: 5/5  Right gastroc: 5/5  Left gastroc: 5/5    Sensory Exam   Light touch normal.   Pinprick normal.     Gait, Coordination, and  Reflexes     Gait  Gait: (leaning forward)    Coordination   Finger to nose coordination: normal  Tandem walking coordination: normal    Reflexes   Right brachioradialis: 2+  Left brachioradialis: 2+  Right biceps: 2+  Left biceps: 2+  Right triceps: 2+  Left triceps: 2+  Right patellar: 2+  Left patellar: 2+  Right achilles: 0  Left achilles: 0  Right plantar: normal  Left plantar: normal  Right Tucker: absent  Left Tucker: absent  Right ankle clonus: absent  Left ankle clonus: absent        DIAGNOSTIC DATA:  I personally reviewed the following imaging:   CT lumbar 2014 pre-laminectomy: diffuse severe stenosis worse at L3-4 and L4-5, left L4-5 and right L5-S1 severe foraminal stenosis    ASSESMENT:  This is a 87 y.o. male with     Problem List Items Addressed This Visit        Neuro    Chronic pain - Primary       Orthopedic    Post laminectomy syndrome      Other Visit Diagnoses    None.         PLAN:  I explained the natural history of the disease and all treatment options. I recommended a T10-11 laminotomy and insertion of T9-10 paddle lead spinal cord stimulator and left, below the belt, pulse generator placement using the [employer] system.     We have discussed the risks of surgery including bleeding, infection, failure of surgery, CSF leak, nerve root injury, spinal cord injury, weakness, paralysis, peripheral neuropathy, malplaced hardware, migration of hardware, need for reoperation. Patient understands the risks and would like to proceed with surgery.      The patient has increased perioperative risks because of these comorbidities: CAD, hypertension.         Andrzej Toribio MD  Pager: [phone]

## 2017-10-10 ENCOUNTER — TELEPHONE (OUTPATIENT)
Dept: INTERNAL MEDICINE | Facility: CLINIC | Age: 82
End: 2017-10-10

## 2017-10-10 NOTE — TELEPHONE ENCOUNTER
----- Message from Bri Cardenas sent at 10/10/2017  4:00 PM CDT -----  Contact: self/[phone]  Patient called in regards needing to talk with Dr Gibbs about his [facility] notice from last week - pre surgery examination. Thank you!!!

## 2017-10-27 ENCOUNTER — PATIENT MESSAGE (OUTPATIENT)
Dept: SURGERY | Facility: HOSPITAL | Age: 82
End: 2017-10-27

## 2017-10-31 ENCOUNTER — PATIENT OUTREACH (OUTPATIENT)
Dept: OTHER | Facility: OTHER | Age: 82
End: 2017-10-31

## 2017-10-31 NOTE — PROGRESS NOTES
"Last 5 Patient Entered Readings Current 30 Day Average: 138/79     Recent Readings 10/25/2017 10/17/2017 10/13/2017 10/12/2017 10/9/2017    Systolic BP (mmHg) 136 138 134 140 142    Diastolic BP (mmHg) 81 74 76 90 76    Pulse 51 55 55 52 54          Hypertension Digital Medicine Program (HDMP): Health  Follow Up    Lifestyle Modifications:    1.Low sodium diet: no - pt has stated he "eats what he eats" and does not intend to change his diet. "Doing just fine".     2.Physical activity: yes - stays active at home.     3.Hypotension/Hypertension symptoms: no   Frequency/Alleviating factors/Precipitating factors, etc.     4.Patient has been compliant with the medication regimen.     Follow up with Mr. Javier Valles completed. No further questions or concerns. I will follow up in a few weeks to assess progress.     "

## 2017-11-01 ENCOUNTER — HOSPITAL ENCOUNTER (OUTPATIENT)
Dept: RADIOLOGY | Facility: HOSPITAL | Age: 82
Discharge: HOME | End: 2017-11-01
Attending: INTERNAL MEDICINE
Payer: MEDICARE

## 2017-11-01 ENCOUNTER — OFFICE VISIT (OUTPATIENT)
Dept: INTERNAL MEDICINE | Facility: CLINIC | Age: 82
End: 2017-11-01
Payer: MEDICARE

## 2017-11-01 ENCOUNTER — TELEPHONE (OUTPATIENT)
Dept: INTERNAL MEDICINE | Facility: CLINIC | Age: 82
End: 2017-11-01

## 2017-11-01 VITALS
DIASTOLIC BLOOD PRESSURE: 62 MMHG | BODY MASS INDEX: 29.47 KG/M2 | SYSTOLIC BLOOD PRESSURE: 109 MMHG | HEIGHT: 68 IN | HEART RATE: 56 BPM | WEIGHT: 194.44 LBS

## 2017-11-01 DIAGNOSIS — I10 ESSENTIAL HYPERTENSION: ICD-10-CM

## 2017-11-01 DIAGNOSIS — Z95.0 BIVENTRICULAR CARDIAC PACEMAKER IN SITU: ICD-10-CM

## 2017-11-01 DIAGNOSIS — I48.20 CHRONIC ATRIAL FIBRILLATION: ICD-10-CM

## 2017-11-01 DIAGNOSIS — I49.5 SSS (SICK SINUS SYNDROME): ICD-10-CM

## 2017-11-01 DIAGNOSIS — N18.30 CHRONIC KIDNEY DISEASE, STAGE 3 (MODERATE): ICD-10-CM

## 2017-11-01 DIAGNOSIS — I25.810 CORONARY ARTERY DISEASE INVOLVING CORONARY BYPASS GRAFT OF NATIVE HEART WITHOUT ANGINA PECTORIS: Primary | ICD-10-CM

## 2017-11-01 DIAGNOSIS — I25.810 CORONARY ARTERY DISEASE INVOLVING CORONARY BYPASS GRAFT OF NATIVE HEART WITHOUT ANGINA PECTORIS: ICD-10-CM

## 2017-11-01 LAB
BACTERIA #/AREA URNS AUTO: NORMAL /HPF
BILIRUB UR QL STRIP: NEGATIVE
CLARITY UR REFRACT.AUTO: ABNORMAL
COLOR UR AUTO: ABNORMAL
GLUCOSE UR QL STRIP: NEGATIVE
HGB UR QL STRIP: NEGATIVE
HYALINE CASTS UR QL AUTO: 0 /LPF
KETONES UR QL STRIP: ABNORMAL
LEUKOCYTE ESTERASE UR QL STRIP: NEGATIVE
MICROSCOPIC COMMENT: NORMAL
NITRITE UR QL STRIP: NEGATIVE
PH UR STRIP: 5 [PH] (ref 5–8)
PROT UR QL STRIP: ABNORMAL
RBC #/AREA URNS AUTO: 0 /HPF (ref 0–4)
SP GR UR STRIP: 1.03 (ref 1–1.03)
SQUAMOUS #/AREA URNS AUTO: 0 /HPF
URN SPEC COLLECT METH UR: ABNORMAL
UROBILINOGEN UR STRIP-ACNC: 2 EU/DL
WBC #/AREA URNS AUTO: 1 /HPF (ref 0–5)

## 2017-11-01 PROCEDURE — 99999 PR PBB SHADOW E&M-EST. PATIENT-LVL IV: CPT | Mod: PBBFAC,,, | Performed by: INTERNAL MEDICINE

## 2017-11-01 PROCEDURE — 71020 XR CHEST PA AND LATERAL: CPT | Mod: TC,PO

## 2017-11-01 PROCEDURE — 99214 OFFICE O/P EST MOD 30 MIN: CPT | Mod: S$GLB,,, | Performed by: INTERNAL MEDICINE

## 2017-11-01 PROCEDURE — 81001 URINALYSIS AUTO W/SCOPE: CPT

## 2017-11-01 PROCEDURE — 71020 XR CHEST PA AND LATERAL: CPT | Mod: 26,,, | Performed by: RADIOLOGY

## 2017-11-01 PROCEDURE — 99499 UNLISTED E&M SERVICE: CPT | Mod: S$GLB,,, | Performed by: INTERNAL MEDICINE

## 2017-11-01 PROCEDURE — 93010 ELECTROCARDIOGRAM REPORT: CPT | Mod: S$GLB,,, | Performed by: INTERNAL MEDICINE

## 2017-11-01 PROCEDURE — 93005 ELECTROCARDIOGRAM TRACING: CPT | Mod: S$GLB,,, | Performed by: INTERNAL MEDICINE

## 2017-11-01 NOTE — PROGRESS NOTES
REASON FOR VISIT:  Mr. Valles is 87 and is scheduled on November 9 with Dr. Toribio   to have a spinal cord stimulator implant.  Last month he underwent a spinal cord   stimulator trial and it relieved his pain 95% of the time, which is chronic low   back pain that extends down the buttocks and legs.  He has had a history of   lumbar spinal stenosis with status post lumbar surgery-laminectomy in April 2016   and then recently underwent a number of epidural steroid procedures.    He says overall he feels well.  The only limiting factor is his back pain and   leg pain and the fact that for the past two years, he has been in chronic pain.    He has not been able to do much, so he also feels that his leg muscles are   weak.    PAST MEDICAL HISTORY:   Coronary artery disease with previous bypass surgery, stents in 2013, and then a   subsequent non-ST MI due to in-stent stenosis of obtuse marginal for which   angioplasty was performed.  Hypertension.  Hyperlipidemia.  Peripheral vascular disease with stent of the right iliac.  Chronic kidney disease with secondary hyperparathyroid.  BPH with elevated PSA.  Carotid artery disease with left 67% stenosis.  Osteoarthritis of the knees  Arrhythmia disorder for which he is status post pacemaker and defibrillator for   sick sinus syndrome, status post radiofrequency ablation for atrial flutter, and   on amiodarone for atrial flutter/fibrillation/PVCs.  Gout.  Bilateral inguinal hernia repair.  Left knee surgery.  Glaucoma.    SOCIAL HISTORY:  Tobacco use, none.  Alcohol use, a couple of drinks a day.    CURRENT MEDICATIONS:  Amiodarone 200 mg daily.  Amlodipine 5 mg daily.  Aspirin 81 mg daily.  Bupropion 150 mg daily.  Aricept 5 mg daily.  Atrovent nasal spray as needed.  Losartan 50 mg daily.  Crestor 20 mg daily.  He has a prescription for oxycodone, which he has not been taking.  He is also on Eliquis 2.5 mg twice a day, which he was told to stop a few days   prior to  procedure.    REVIEW OF SYMPTOMS:  He reports no pains in the chest, palpation, shortness of   breath, or abdominal pain.  The patient has regular bowel function.  No   difficulty urination, though it is frequent.  Urine flow is good.    PHYSICAL EXAMINATION:  VITAL SIGNS:  His weight is 194, pulse rate 56, blood pressure 110/62.  NECK:  No thyromegaly.  No masses.  LUNGS:  Clear breath sounds, good effort.  HEART:  Regular rate and rhythm, I/VI systolic murmur at the base.  ABDOMEN:  Active bowel sounds, soft, nontender.  No hepatosplenomegaly or   abdominal masses.  PULSES:  2+ carotid pulses, no bruits.  EXTREMITIES:  No edema.    CHART REVIEW:  A 2D echo in February 2017 showed ejection fraction of 43%, which   was improved compared to before and actually carotid Doppler in May 2016   revealed bilateral 20-39% stenosis.    IMPRESSION:  1.  Hypertension, optimal control.  2.  Coronary artery disease, clinically stable.  3.  Arrhythmia disorder with pacemaker/defibrillator.  4.  Chronic kidney disease.    PLAN:  Today we will get a chemistry, CBC, urinalysis, coagulation factors,   chest x-ray, and EKG.  He has to go through anesthesia in surgery.  Unless   otherwise recommended by Anesthesia, we will ask him to only take his amiodarone   and amlodipine medicines on the morning of surgery and resume the other ones   postop.      JAM/HN  dd: 11/01/2017 10:39:12 (CDT)  td: 11/02/2017 01:51:47 (CDT)  Doc ID   #[id number]  Job ID #[id number]    CC:

## 2017-11-01 NOTE — TELEPHONE ENCOUNTER
Called pt back and informed him that they did not collect enough blood for his PT and APTT so he needs to com back and get it drawn again he said that he would back to get it redrawn

## 2017-11-06 ENCOUNTER — ANESTHESIA EVENT (OUTPATIENT)
Dept: SURGERY | Facility: HOSPITAL | Age: 82
End: 2017-11-06
Payer: MEDICARE

## 2017-11-06 ENCOUNTER — PATIENT MESSAGE (OUTPATIENT)
Dept: SURGERY | Facility: HOSPITAL | Age: 82
End: 2017-11-06

## 2017-11-06 ENCOUNTER — HOSPITAL ENCOUNTER (OUTPATIENT)
Dept: PREADMISSION TESTING | Facility: HOSPITAL | Age: 82
Discharge: HOME | End: 2017-11-06
Attending: NEUROLOGICAL SURGERY
Payer: MEDICARE

## 2017-11-06 VITALS
HEART RATE: 64 BPM | OXYGEN SATURATION: 99 % | RESPIRATION RATE: 16 BRPM | DIASTOLIC BLOOD PRESSURE: 60 MMHG | SYSTOLIC BLOOD PRESSURE: 116 MMHG | BODY MASS INDEX: 28.79 KG/M2 | WEIGHT: 190 LBS | HEIGHT: 68 IN

## 2017-11-06 DIAGNOSIS — Z01.818 PRE-OP TESTING: Primary | ICD-10-CM

## 2017-11-06 RX ORDER — LIDOCAINE HYDROCHLORIDE 10 MG/ML
1 INJECTION, SOLUTION EPIDURAL; INFILTRATION; INTRACAUDAL; PERINEURAL ONCE
Status: CANCELLED | OUTPATIENT
Start: 2017-11-06 | End: 2017-11-06

## 2017-11-06 RX ORDER — SODIUM CHLORIDE, SODIUM LACTATE, POTASSIUM CHLORIDE, CALCIUM CHLORIDE 600; 310; 30; 20 MG/100ML; MG/100ML; MG/100ML; MG/100ML
INJECTION, SOLUTION INTRAVENOUS CONTINUOUS
Status: CANCELLED | OUTPATIENT
Start: 2017-11-06

## 2017-11-06 NOTE — PRE-PROCEDURE INSTRUCTIONS
Wife - [phone] - Joanie    Allergies, medical, surgical, family and psychosocial histories reviewed with patient. Periop plan of care reviewed. Preop instructions given, including medications to take and to hold. Time allotted for questions to be addressed.  Patient verbalized understanding.

## 2017-11-06 NOTE — DISCHARGE INSTRUCTIONS
Your surgery is scheduled for 11/9/17.    Please report to Outpatient Surgery Intake Office on the 2nd FLOOR at 10:00 a.m.          INSTRUCTIONS IMPORTANT!!!  ¨ Do not eat or drink after 12 midnight-including water. OK to brush teeth, no   gum, candy or mints!    ¨ Take only these medicines with a small swallow of water-morning of surgery: Losartan, amioderone and amlodipine        ____  Proceed to [facility] on 11/6/17 for additional blood test.        ____  No powder, lotions or creams to surgical area.  ____  Please remove all jewelry, including piercings and leave at home.  ____  No money or valuables needed. Please leave at home.  ____  Please bring any documents given by your doctor.  ____  If going home the same day, arrange for a ride home. You will not be able to             drive if Anesthesia was used.  ____  Wear loose fitting clothing. Allow for dressings, bandages.  ____  Stop Aspirin, Ibuprofen, Motrin and Aleve at least 3-5 days before surgery, unless otherwise instructed by your doctor, or the nurse.   You MAY use Tylenol/acetaminophen until day of surgery.  ____  Wash the surgical area with Hibiclens the night before surgery, and again the             morning of surgery.  Be sure to rinse hibiclens off completely (if instructed by   nurse).  ____  If you take diabetic medication, do not take am of surgery unless instructed by Doctor.  ____  Call MD for temperature above 101 degrees.  ____ Stop taking any Fish Oil supplement or any Vitamins that contain Vitamin E at least 5 days prior to surgery.  ____ Do Not wear your contact lenses the day of your procedure.  You may wear your glasses.        I have read or had read and explained to me, and understand the above information.  Additional comments or instructions:  For additional questions call [phone]     Take a Hibiclens shower twice a day for 3 days prior to surgery, including the morning of surgery.   Gargle with Listerine twice  a day for 3 days prior to surgery, including the morning of surgery.      Pre-Op Bathing Instructions    Before surgery, you can play an important role in your own health.    Because skin is not sterile, we need to be sure that your skin is as free of germs as possible. By following the instructions below, you can reduce the number of germs on your skin before surgery.    IMPORTANT: You will need to shower with a special soap called Hibiclens*, available at any pharmacy.  If you are allergic to Chlorhexidine (the antiseptic in Hibiclens), use an antibacterial soap such as Dial Soap for your preoperative shower.  You will shower with Hibiclens both the night before your surgery and the morning of your surgery.  Do not use Hibiclens on the head, face or genitals to avoid injury to those areas.    STEP #1: THE NIGHT BEFORE YOUR SURGERY     1. Do not shave the area of your body where your surgery will be performed.  2. Shower and wash your hair and body as usual with your normal soap and shampoo.  3. Rinse your hair and body thoroughly after you shower to remove all soap residue.  4. With your hand, apply one packet of Hibiclens soap to the surgical site.   5. Wash the site gently for five (5) minutes. Do not scrub your skin too hard.   6. Do not wash with your regular soap after Hibiclens is used.  7. Rinse your body thoroughly.  8. Pat yourself dry with a clean, soft towel.  9. Do not use lotion, cream, or powder.  10. Wear clean clothes.    STEP #2: THE MORNING OF YOUR SURGERY     1. Repeat Step #1.    * Not to be used by people allergic to Chlorhexidine.        Spinal Cord Stimulation    Pain messages travel over nerve pathways to the spinal cord, inside the spine. The spinal cord carries the messages to the brain. Constant pain messages can cause long-term pain that is hard to treat. This is known as chronic pain. Spinal cord stimulation uses a medical device to send signals to the nerve pathways inside your figueroa  cord. These signals help block the pain.  Keeping a pain log  Your doctor may ask you to keep a pain log for a certain amount of time. In it, you may answer certain questions: When do you feel pain? What does it feel like? How long does the pain last? What makes it better or worse? When the stimulation is on, is your pain relieved? Your answers help show how well spinal cord stimulation may work for you. Your doctor will give you guidelines for your pain log. During the time you write the log, you may not be able to take pain medications. Be sure to discuss this with your doctor.  Spinal cord stimulation may help  Spinal cord stimulation is one treatment for chronic pain. Certain criteria need to be met to be a good candidate for spinal cord stimulation. A small medical device sends signals to your spinal cord. These signals keep the chronic pain messages from being sent to your brain. Instead, you may feel tingling from the electrical signals. A trial stimulator that is worn outside the body in tried first to see if it will work. If it does, the permanent stimulator system may be used. This device can be removed at any time.  The stimulator system  The stimulator system has several parts. A power source makes the signals. This power source may be worn outside the body or implanted under the skin on your abdomen or buttocks. One or more leads (flexible, plastic-covered wires or paddle) are placed inside the body to carry the signals to the spinal cord. Your doctor can explain the system youll be using in more detail.  Risks and possible complications  · Infection  · Bleeding  · Nerve damage  · Spinal cord damage  · Failure to relieve pain   Date Last Reviewed: 10/19/2015  © 6040-6130 [employer]. [address] [place] [address], [place] [address]. All rights reserved. This information is not intended as a substitute for professional medical care. Always follow your healthcare professional's  instructions.

## 2017-11-06 NOTE — ANESTHESIA PREPROCEDURE EVALUATION
11/06/2017  Javier Valles is a 87 y.o., male with history of AFl s/p ablation, SSS with biventricular pacer in placed 11/2016 and with hx of CABG as well as LHC in 2016.  Pt is scheduled for placement of SCS T10-T11 under GETA on 11/09/2017.    PCP H&P with recommendation for continuing amiodarone and amlodipine DOS in EPIC on 11/01/2017 and 11/07/2017.       [employer] Pacemaker Pace Quadra Allure MP RF  S/N: [id number]  Date of placement:  11/01/2016  Indication for placement:  SSS, ischemic cardiomyopathy  Last interrogation: 8/30/2017 (reviewed by Alcon Ly MD, 9/18/2017 11:33)  Paced RV/LV  Leads 95%  [employer] representative Vipul Georges contacted and a rep will be present for procedure.    Past Surgical History:   Procedure Laterality Date    BACK SURGERY  04/25/2016    bypass 1991      CARDIAC CATHETERIZATION      CARDIAC ELECTROPHYSIOLOGY MAPPING AND ABLATION  11/2016    CARDIAC PACEMAKER PLACEMENT      CARDIAC PACEMAKER PLACEMENT  11/2016    CATARACT EXTRACTION W/  INTRAOCULAR LENS IMPLANT Left 1997    OS with     ENUCLEATION Right n/a    EYE SURGERY      retinal detachment repair left eye      RETINAL DETACHMENT SURGERY Left 1997    Dr. Cosme    SKIN BIOPSY      TONSILLECTOMY           Anesthesia Evaluation    I have reviewed the Patient Summary Reports.    I have reviewed the Nursing Notes.   I have reviewed the Medications.   Steroids Taken In Past Year: Cortisone    Review of Systems  Anesthesia Hx:  No problems with previous Anesthesia  History of prior surgery of interest to airway management or planning: Previous anesthesia: General, MAC  colonoscopy with MAC.  Procedure performed at an [facility].  Denies Personal Hx of Anesthesia complications.   Social:  Former Smoker, Alcohol Use    Hematology/Oncology:  Hematology Normal       -- Thrombocytopenia:  chronic  Hematology Comments: On eliquis and pt holding per Dr. Toribio since 11/05/2017.  Will hold ASA starting 11/06/2017; pt will resume 24 hours after procedure or per Dr. 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 biventricular pacemaker; indication for placement: SSS) Hypertension, well controlled Past MI CAD asymptomatic CABG/stent Dysrhythmias (hx of atrial flutter)   Denies Angina. hyperlipidemia     NYHA Classification II ECG has been reviewed.   Functional Capacity 4 METS, limited by low back pain    Pulmonary:   Denies Shortness of breath.    Renal/:   Chronic Renal Disease, CRI  Kidney Function/Disease, Chronic Kidney Disease (CKD) , CKD Stage III (GFR 30-59)    Hepatic/GI:   GERD, well controlled    Musculoskeletal:   Arthritis   Spine Disorders: lumbar Disc disease, Degenerative disease and Chronic Pain    Neurological:  Dementia mild    Endocrine:  Endocrine Normal        Physical Exam  General:  Well nourished, Large Beard    Airway/Jaw/Neck:  Airway Findings: Mouth Opening: Normal Tongue: Normal  General Airway Assessment: Adult  Mallampati: II  TM Distance: Normal, at least 6 cm  Jaw/Neck Findings:  Neck ROM: Extension Decreased, Mild  Neck Findings: Normal    Eyes/Ears/Nose:  Eyes/Ears/Nose Findings: Right eye prosthesis    Dental:  Dental Findings: In tact   Chest/Lungs:  Chest/Lungs Clear    Heart/Vascular:  Heart Findings: Normal Heart Murmur  Systolic Heart Murmur Grade: Grade I  Other Heart Findings: Pacemaker left chest  Vascular Findings:     Abdomen:  Abdomen Findings: Normal      Mental Status:  Mental Status Findings: Normal      EKG 11/01/2017  Sinus rhythm  Atrial-sensed ventricular-paced rhythm  Biventricular pacemaker detected  Abnormal ECG  When compared with ECG of 29-AUG-2017  09:13,  Vent. rate has decreased BY  15 BPM  Confirmed by RIAN CARABALLO, ALPHONSO ([id number]) on 11/2/2017 11:37:00 AM    2D Echo w/CFD 2/21/2017   8mo ago   EF 43     Mitral Valve Regurgitation  TRIVIAL TO MILD    Diastolic Dysfunction No    Est. PA Systolic Pressure 28    Mitral Valve Mobility  SYSTOLIC FLATTENING    Tricuspid Valve Regurgitation MILD    Resulting Agency [facility]     Date of Procedure: 02/21/2017  This document has been electronically    SIGNED BY: Jose Pimentel MD On: 02/21/2017 15:32                Anesthesia Plan  Type of Anesthesia, risks & benefits discussed:  Anesthesia Type:  general  Patient's Preference:   Intra-op Monitoring Plan:   Intra-op Monitoring Plan Comments:   Post Op Pain Control Plan:   Post Op Pain Control Plan Comments:   Induction:    Beta Blocker:  Patient is not currently on a Beta-Blocker (No further documentation required).       Informed Consent: Patient understands risks and agrees with Anesthesia plan.  Questions answered.   ASA Score: 3     Day of Surgery Review of History & Physical: I have interviewed and examined the patient. I have reviewed the patient's H&P dated:        Anesthesia Plan Notes: Anesthesia consent will be obtained prior to procedure on 11/09/2017.    [employer] Pacemaker Pace Quadra Allure MP RF  S/N: [id number]  Date of placement:  11/01/2016  Indication for placement:  SSS, ischemic cardiomyopathy  Last interrogation: 8/30/2017 (reviewed by Alcon Ly MD, 9/18/2017 11:33)  Paced RV/LV  Leads 95%  [employer] representative Vipul Georges contacted and a rep will be present for procedure.    PCP H&P with recommendation for continuing amiodarone and amlodipine DOS in EPIC on 11/01/2017 and 11/07/2017.             Ready For Surgery From Anesthesia Perspective.

## 2017-11-07 ENCOUNTER — PATIENT OUTREACH (OUTPATIENT)
Dept: OTHER | Facility: OTHER | Age: 82
End: 2017-11-07

## 2017-11-07 ENCOUNTER — DOCUMENTATION ONLY (OUTPATIENT)
Dept: INTERNAL MEDICINE | Facility: CLINIC | Age: 82
End: 2017-11-07

## 2017-11-07 NOTE — PROGRESS NOTES
Last 5 Patient Entered Readings Current 30 Day Average: 135/74     Recent Readings 11/7/2017 10/31/2017 10/25/2017 10/17/2017 10/13/2017    Systolic BP (mmHg) 119 133 136 138 134    Diastolic BP (mmHg) 56 63 81 74 76    Pulse 53 54 51 55 55        Hypertension Medications             amlodipine (NORVASC) 5 MG tablet Take 1 tablet (5 mg total) by mouth once daily.    losartan (COZAAR) 50 MG tablet Take 1 tablet (50 mg total) by mouth 2 (two) times daily.    nitroGLYCERIN (NITROSTAT) 0.4 MG SL tablet Place 1 tablet (0.4 mg total) under the tongue every 5 (five) minutes as needed for Chest pain.        Assessment/ Plan:   Called patient to follow up. Per current 30 day average, BP is well controlled.  Patient requests to be placed on hiatus for the rest of the month, going out of town.  Patient denies having questions or concerns. Instructed patient to call if he has any questions or concerns, patient confirms understanding.   Will continue to monitor. WCB in 4 months, sooner if BP begins to trend up or down.

## 2017-11-09 ENCOUNTER — HOSPITAL ENCOUNTER (OUTPATIENT)
Facility: HOSPITAL | Age: 82
Discharge: HOME | End: 2017-11-09
Attending: NEUROLOGICAL SURGERY | Admitting: NEUROLOGICAL SURGERY
Payer: MEDICARE

## 2017-11-09 ENCOUNTER — ANESTHESIA (OUTPATIENT)
Dept: SURGERY | Facility: HOSPITAL | Age: 82
End: 2017-11-09
Payer: MEDICARE

## 2017-11-09 DIAGNOSIS — G89.4 CHRONIC PAIN SYNDROME: Primary | ICD-10-CM

## 2017-11-09 DIAGNOSIS — M96.1 POST LAMINECTOMY SYNDROME: ICD-10-CM

## 2017-11-09 LAB
ABO + RH BLD: NORMAL
BLD GP AB SCN CELLS X3 SERPL QL: NORMAL

## 2017-11-09 PROCEDURE — 71000016 HC POSTOP RECOV ADDL HR: Performed by: NEUROLOGICAL SURGERY

## 2017-11-09 PROCEDURE — C1778 LEAD, NEUROSTIMULATOR: HCPCS | Performed by: NEUROLOGICAL SURGERY

## 2017-11-09 PROCEDURE — 86900 BLOOD TYPING SEROLOGIC ABO: CPT

## 2017-11-09 PROCEDURE — 71000033 HC RECOVERY, INTIAL HOUR: Performed by: NEUROLOGICAL SURGERY

## 2017-11-09 PROCEDURE — 63600175 PHARM REV CODE 636 W HCPCS: Performed by: NEUROLOGICAL SURGERY

## 2017-11-09 PROCEDURE — 63685 INS/RPLC SPI NPG/RCVR POCKET: CPT | Mod: 51,AS,, | Performed by: PHYSICIAN ASSISTANT

## 2017-11-09 PROCEDURE — C1751 CATH, INF, PER/CENT/MIDLINE: HCPCS | Performed by: NURSE ANESTHETIST, CERTIFIED REGISTERED

## 2017-11-09 PROCEDURE — C1767 GENERATOR, NEURO NON-RECHARG: HCPCS | Performed by: NEUROLOGICAL SURGERY

## 2017-11-09 PROCEDURE — 63600175 PHARM REV CODE 636 W HCPCS: Performed by: NURSE ANESTHETIST, CERTIFIED REGISTERED

## 2017-11-09 PROCEDURE — 36415 COLL VENOUS BLD VENIPUNCTURE: CPT

## 2017-11-09 PROCEDURE — 63600175 PHARM REV CODE 636 W HCPCS: Performed by: PHYSICIAN ASSISTANT

## 2017-11-09 PROCEDURE — 27201423 OPTIME MED/SURG SUP & DEVICES STERILE SUPPLY: Performed by: NEUROLOGICAL SURGERY

## 2017-11-09 PROCEDURE — 71000015 HC POSTOP RECOV 1ST HR: Performed by: NEUROLOGICAL SURGERY

## 2017-11-09 PROCEDURE — 25000003 PHARM REV CODE 250: Performed by: NURSE PRACTITIONER

## 2017-11-09 PROCEDURE — 27200677 HC TRANSDUCER MONITOR KIT SINGLE: Performed by: NURSE ANESTHETIST, CERTIFIED REGISTERED

## 2017-11-09 PROCEDURE — 36000710: Performed by: NEUROLOGICAL SURGERY

## 2017-11-09 PROCEDURE — 63655 IMPLANT NEUROELECTRODES: CPT | Mod: ,,, | Performed by: NEUROLOGICAL SURGERY

## 2017-11-09 PROCEDURE — 25000003 PHARM REV CODE 250: Performed by: NURSE ANESTHETIST, CERTIFIED REGISTERED

## 2017-11-09 PROCEDURE — 37000008 HC ANESTHESIA 1ST 15 MINUTES: Performed by: NEUROLOGICAL SURGERY

## 2017-11-09 PROCEDURE — 25000003 PHARM REV CODE 250: Performed by: PHYSICIAN ASSISTANT

## 2017-11-09 PROCEDURE — 36000711: Performed by: NEUROLOGICAL SURGERY

## 2017-11-09 PROCEDURE — 25000003 PHARM REV CODE 250: Performed by: NEUROLOGICAL SURGERY

## 2017-11-09 PROCEDURE — 37000009 HC ANESTHESIA EA ADD 15 MINS: Performed by: NEUROLOGICAL SURGERY

## 2017-11-09 PROCEDURE — 63685 INS/RPLC SPI NPG/RCVR POCKET: CPT | Mod: 51,,, | Performed by: NEUROLOGICAL SURGERY

## 2017-11-09 PROCEDURE — 63655 IMPLANT NEUROELECTRODES: CPT | Mod: AS,,, | Performed by: PHYSICIAN ASSISTANT

## 2017-11-09 PROCEDURE — 86850 RBC ANTIBODY SCREEN: CPT

## 2017-11-09 DEVICE — PROCLAIM ELITE 5 PATIENT CTRL: Type: IMPLANTABLE DEVICE | Site: SPINE THORACIC | Status: FUNCTIONAL

## 2017-11-09 DEVICE — LEAD 3MM 60CM PENTA: Type: IMPLANTABLE DEVICE | Site: SPINE THORACIC | Status: FUNCTIONAL

## 2017-11-09 RX ORDER — ONDANSETRON 2 MG/ML
INJECTION INTRAMUSCULAR; INTRAVENOUS
Status: DISCONTINUED | OUTPATIENT
Start: 2017-11-09 | End: 2017-11-09

## 2017-11-09 RX ORDER — BACITRACIN 50000 [IU]/1
INJECTION, POWDER, FOR SOLUTION INTRAMUSCULAR
Status: DISCONTINUED | OUTPATIENT
Start: 2017-11-09 | End: 2017-11-09 | Stop reason: HOSPADM

## 2017-11-09 RX ORDER — ROCURONIUM BROMIDE 10 MG/ML
INJECTION, SOLUTION INTRAVENOUS
Status: DISCONTINUED | OUTPATIENT
Start: 2017-11-09 | End: 2017-11-09

## 2017-11-09 RX ORDER — GLYCOPYRROLATE 0.2 MG/ML
INJECTION INTRAMUSCULAR; INTRAVENOUS
Status: DISCONTINUED | OUTPATIENT
Start: 2017-11-09 | End: 2017-11-09

## 2017-11-09 RX ORDER — LIDOCAINE HYDROCHLORIDE 10 MG/ML
1 INJECTION, SOLUTION EPIDURAL; INFILTRATION; INTRACAUDAL; PERINEURAL ONCE
Status: COMPLETED | OUTPATIENT
Start: 2017-11-09 | End: 2017-11-09

## 2017-11-09 RX ORDER — ETOMIDATE 2 MG/ML
INJECTION INTRAVENOUS
Status: DISCONTINUED | OUTPATIENT
Start: 2017-11-09 | End: 2017-11-09

## 2017-11-09 RX ORDER — HYDROMORPHONE HYDROCHLORIDE 2 MG/ML
0.5 INJECTION, SOLUTION INTRAMUSCULAR; INTRAVENOUS; SUBCUTANEOUS EVERY 5 MIN PRN
Status: DISCONTINUED | OUTPATIENT
Start: 2017-11-09 | End: 2017-11-09 | Stop reason: HOSPADM

## 2017-11-09 RX ORDER — OXYCODONE HYDROCHLORIDE 5 MG/1
5 TABLET ORAL EVERY 6 HOURS PRN
Qty: 30 TABLET | Refills: 0 | Status: SHIPPED | OUTPATIENT
Start: 2017-11-09 | End: 2018-02-05

## 2017-11-09 RX ORDER — NEOSTIGMINE METHYLSULFATE 1 MG/ML
INJECTION, SOLUTION INTRAVENOUS
Status: DISCONTINUED | OUTPATIENT
Start: 2017-11-09 | End: 2017-11-09

## 2017-11-09 RX ORDER — OXYCODONE HCL 10 MG/1
10 TABLET, FILM COATED, EXTENDED RELEASE ORAL
Status: COMPLETED | OUTPATIENT
Start: 2017-11-09 | End: 2017-11-09

## 2017-11-09 RX ORDER — ONDANSETRON 2 MG/ML
4 INJECTION INTRAMUSCULAR; INTRAVENOUS DAILY PRN
Status: DISCONTINUED | OUTPATIENT
Start: 2017-11-09 | End: 2017-11-09 | Stop reason: HOSPADM

## 2017-11-09 RX ORDER — LIDOCAINE HCL/PF 100 MG/5ML
SYRINGE (ML) INTRAVENOUS
Status: DISCONTINUED | OUTPATIENT
Start: 2017-11-09 | End: 2017-11-09

## 2017-11-09 RX ORDER — VANCOMYCIN HYDROCHLORIDE 1 G/20ML
INJECTION, POWDER, LYOPHILIZED, FOR SOLUTION INTRAVENOUS
Status: DISCONTINUED | OUTPATIENT
Start: 2017-11-09 | End: 2017-11-09 | Stop reason: HOSPADM

## 2017-11-09 RX ORDER — ACETAMINOPHEN 10 MG/ML
1000 INJECTION, SOLUTION INTRAVENOUS
Status: COMPLETED | OUTPATIENT
Start: 2017-11-09 | End: 2017-11-09

## 2017-11-09 RX ORDER — BUPIVACAINE HYDROCHLORIDE 2.5 MG/ML
INJECTION, SOLUTION EPIDURAL; INFILTRATION; INTRACAUDAL
Status: DISCONTINUED | OUTPATIENT
Start: 2017-11-09 | End: 2017-11-09 | Stop reason: HOSPADM

## 2017-11-09 RX ORDER — PHENYLEPHRINE HYDROCHLORIDE 10 MG/ML
INJECTION INTRAVENOUS
Status: DISCONTINUED | OUTPATIENT
Start: 2017-11-09 | End: 2017-11-09

## 2017-11-09 RX ORDER — FENTANYL CITRATE 50 UG/ML
INJECTION, SOLUTION INTRAMUSCULAR; INTRAVENOUS
Status: DISCONTINUED | OUTPATIENT
Start: 2017-11-09 | End: 2017-11-09

## 2017-11-09 RX ORDER — SODIUM CHLORIDE 0.9 % (FLUSH) 0.9 %
3 SYRINGE (ML) INJECTION
Status: DISCONTINUED | OUTPATIENT
Start: 2017-11-09 | End: 2017-11-09 | Stop reason: HOSPADM

## 2017-11-09 RX ORDER — DEXAMETHASONE SODIUM PHOSPHATE 4 MG/ML
INJECTION, SOLUTION INTRA-ARTICULAR; INTRALESIONAL; INTRAMUSCULAR; INTRAVENOUS; SOFT TISSUE
Status: DISCONTINUED | OUTPATIENT
Start: 2017-11-09 | End: 2017-11-09

## 2017-11-09 RX ORDER — ONDANSETRON 8 MG/1
8 TABLET, ORALLY DISINTEGRATING ORAL EVERY 6 HOURS PRN
Status: DISCONTINUED | OUTPATIENT
Start: 2017-11-09 | End: 2017-11-09 | Stop reason: HOSPADM

## 2017-11-09 RX ORDER — MUPIROCIN 20 MG/G
1 OINTMENT TOPICAL
Status: COMPLETED | OUTPATIENT
Start: 2017-11-09 | End: 2017-11-09

## 2017-11-09 RX ORDER — SODIUM CHLORIDE 0.9 % (FLUSH) 0.9 %
3 SYRINGE (ML) INJECTION EVERY 8 HOURS
Status: DISCONTINUED | OUTPATIENT
Start: 2017-11-09 | End: 2017-11-09 | Stop reason: HOSPADM

## 2017-11-09 RX ORDER — GENTAMICIN SULFATE 80 MG/100ML
80 INJECTION, SOLUTION INTRAVENOUS
Status: DISCONTINUED | OUTPATIENT
Start: 2017-11-09 | End: 2017-11-09 | Stop reason: HOSPADM

## 2017-11-09 RX ORDER — SODIUM CHLORIDE, SODIUM LACTATE, POTASSIUM CHLORIDE, CALCIUM CHLORIDE 600; 310; 30; 20 MG/100ML; MG/100ML; MG/100ML; MG/100ML
INJECTION, SOLUTION INTRAVENOUS CONTINUOUS
Status: DISCONTINUED | OUTPATIENT
Start: 2017-11-09 | End: 2017-11-09 | Stop reason: HOSPADM

## 2017-11-09 RX ORDER — OXYCODONE HYDROCHLORIDE 5 MG/1
5 TABLET ORAL EVERY 6 HOURS PRN
Status: DISCONTINUED | OUTPATIENT
Start: 2017-11-09 | End: 2017-11-09 | Stop reason: HOSPADM

## 2017-11-09 RX ADMIN — MUPIROCIN 1 G: 20 OINTMENT TOPICAL at 10:11

## 2017-11-09 RX ADMIN — ETOMIDATE 20 MG: 2 INJECTION, SOLUTION INTRAVENOUS at 01:11

## 2017-11-09 RX ADMIN — PHENYLEPHRINE HYDROCHLORIDE 50 MCG: 10 INJECTION INTRAVENOUS at 03:11

## 2017-11-09 RX ADMIN — FENTANYL CITRATE 50 MCG: 50 INJECTION, SOLUTION INTRAMUSCULAR; INTRAVENOUS at 03:11

## 2017-11-09 RX ADMIN — PHENYLEPHRINE HYDROCHLORIDE 100 MCG: 10 INJECTION INTRAVENOUS at 02:11

## 2017-11-09 RX ADMIN — GENTAMICIN SULFATE 80 MG: 80 INJECTION, SOLUTION INTRAVENOUS at 01:11

## 2017-11-09 RX ADMIN — LIDOCAINE HYDROCHLORIDE 80 MG: 20 INJECTION, SOLUTION INTRAVENOUS at 01:11

## 2017-11-09 RX ADMIN — PHENYLEPHRINE HYDROCHLORIDE 50 MCG: 10 INJECTION INTRAVENOUS at 02:11

## 2017-11-09 RX ADMIN — ACETAMINOPHEN 1000 MG: 10 INJECTION, SOLUTION INTRAVENOUS at 09:11

## 2017-11-09 RX ADMIN — OXYCODONE HYDROCHLORIDE 10 MG: 10 TABLET, FILM COATED, EXTENDED RELEASE ORAL at 09:11

## 2017-11-09 RX ADMIN — FENTANYL CITRATE 100 MCG: 50 INJECTION, SOLUTION INTRAMUSCULAR; INTRAVENOUS at 01:11

## 2017-11-09 RX ADMIN — SODIUM CHLORIDE, SODIUM LACTATE, POTASSIUM CHLORIDE, AND CALCIUM CHLORIDE: .6; .31; .03; .02 INJECTION, SOLUTION INTRAVENOUS at 09:11

## 2017-11-09 RX ADMIN — NEOSTIGMINE METHYLSULFATE 3 MG: 1 INJECTION INTRAVENOUS at 03:11

## 2017-11-09 RX ADMIN — SODIUM CHLORIDE, SODIUM LACTATE, POTASSIUM CHLORIDE, AND CALCIUM CHLORIDE: .6; .31; .03; .02 INJECTION, SOLUTION INTRAVENOUS at 12:11

## 2017-11-09 RX ADMIN — PHENYLEPHRINE HYDROCHLORIDE 50 MCG: 10 INJECTION INTRAVENOUS at 01:11

## 2017-11-09 RX ADMIN — LIDOCAINE HYDROCHLORIDE 10 MG: 10 INJECTION, SOLUTION EPIDURAL; INFILTRATION; INTRACAUDAL; PERINEURAL at 09:11

## 2017-11-09 RX ADMIN — DEXAMETHASONE SODIUM PHOSPHATE 4 MG: 4 INJECTION, SOLUTION INTRAMUSCULAR; INTRAVENOUS at 01:11

## 2017-11-09 RX ADMIN — ONDANSETRON 4 MG: 2 INJECTION, SOLUTION INTRAMUSCULAR; INTRAVENOUS at 03:11

## 2017-11-09 RX ADMIN — ONDANSETRON 4 MG: 2 INJECTION, SOLUTION INTRAMUSCULAR; INTRAVENOUS at 01:11

## 2017-11-09 RX ADMIN — Medication 1000 MG: at 01:11

## 2017-11-09 RX ADMIN — GLYCOPYRROLATE 0.4 MG: 0.2 INJECTION, SOLUTION INTRAMUSCULAR; INTRAVENOUS at 03:11

## 2017-11-09 RX ADMIN — ROCURONIUM BROMIDE 30 MG: 10 INJECTION, SOLUTION INTRAVENOUS at 01:11

## 2017-11-09 NOTE — TRANSFER OF CARE
"Anesthesia Transfer of Care Note    Patient: Javier Valles    Procedure(s) Performed: Procedure(s) (LRB):  PLACEMENT OF SPINAL CORD STIMULATOR T10-T11 laminotomyh for placement of spinal cord stimulator at T9-10, left below the belt line pulse generator (N/A)  LAMINOTOMY    Patient location: PACU    Anesthesia Type: general    Transport from OR: Transported from OR on 6-10 L/min O2 by face mask with adequate spontaneous ventilation    Post pain: adequate analgesia    Post assessment: no apparent anesthetic complications and tolerated procedure well    Post vital signs: stable    Level of consciousness: awake, oriented and alert    Nausea/Vomiting: no nausea/vomiting    Complications: none    Transfer of care protocol was followed      Last vitals:   Visit Vitals  BP (!) 185/86   Pulse 68   Temp 36.5 °C (97.7 °F) (Skin)   Resp 13   Ht 5' 8" (1.727 m)   Wt 86.2 kg (190 lb)   SpO2 99%   BMI 28.89 kg/m²     "

## 2017-11-09 NOTE — OP NOTE
DATE OF PROCEDURE: 11/09/2017      PREOPERATIVE DIAGNOSES:     1. Chronic pain syndrome  2. Post-laminectomy syndrome      POSTOPERATIVE DIAGNOSES:     1. Chronic pain syndrome  2. Post-laminectomy syndrome      NAME OF OPERATION:      1.  T9-10 left laminotomy for paddle lead and spinal cord stimulator placement   2. Placement of a Abbott spinal cord stimulator paddle centered at T8  3. Placement of a left-sided below the belt line Abbott implantable pulse generator (IPG)  4. Fluoroscopic localization.      PRIMARY SURGEON: Andrzej Toribio MD      ASSISTANT: Tomeka VILLA who was the first assistant because no qualified resident was available to assist.       INDICATION: This is a 87 male who has refractory low back pain and bilateral legs pain. He had a previous laminectomy. He had a SCS trial with more than 75% pain relief with increased functional status. Risks, benefits, alternatives and limitation were discussed with the patient and her family. The risks bleeding, infections, hardware failure and subsidence that requires reoperation, as well as hardware malpositioning. The patient wanted to proceed, and the consent was obtained.      DESCRIPTION OF PROCEDURES: The patient was intubated under general anesthesia    and was placed prone on the Gino table. Using fluoroscopy, 1 x 18 mm incision was planned at between T9-10 15 mm left of the midline. All pressure points were  carefully padded. The patient was prepped and draped in a typical sterile fashion. Local anesthesia with 0.25% marcaine mixed with epi (10cc). The incision was made with a #15 blade. Subcutaneous tissue hemostasis was completed and the thoracolumbar fascia was opened with a #15 blade. We then inserted the serial dilators and a final 18 mm x 5 cm tubular retractor was docked at the junction of the T9 and T10 lamina  and the retractor was fixed on the table and then using a microscope, the multifidus muscle was subperiosteally dissected using  the  monopolar. Using the high speed navneet, we drilled the base of the spinous process of T9, the superior T10 lamina to reveal the yellow ligament. The yellow ligament inferior insertion was exposed and the ligament was resected using Kerrison bilaterally. The dura was exposed and hemostasis was  completed by using gelfoam powder soaked in thrombin in the epidural space.       We copiously irrigated with physiological  solution and hemostasis was completed with gel foam powder. We then inserted the Abbott paddle lead under the lamina of T8 and the paddle was pushed rostrally and then pulled caudally and centered at T8. A pocket site was identified just below the left iliac crest on the left sidet.  Local anesthesia with 0.25% marcaine mixed with epi (10cc).  A 4 centimeter incision was made vertically posterolaterally on the left side. A subcutaneous pocket was then created with the Bovie and blunt dissection technique for placement of the implantable pulse generator. The tunneling was then done between the lead and the pocket site. The tunneling tool was introduced subcutaneously. The leads were inserted into the tunneling catheter and advanced to the pocket site. The leads were then inserted into the implantable pulse generator and the screws were tightened with the hexwrench. Impedance check was done by the representative. The generator was then introduced into the pocket and remote tested to ensure adequate impedance and placement of the leads into the IPG . We then secured the lead to the fascia by closing the fascia with a 0 silk. Both surgical wounds were then irrigated with antibiotic solution and the thoracolumbar fascia was closed with interrupted 0 vicryl. We put vancomycin powder in each incision. The dermal layer were closed with interrupted inverted #2-0 Vicryl suture. This was followed-up with a subcuticular #4-0 Monocryl running stitch. A sterile dressing was applied. No specimens collected.      The  blood loss was 20 mL. The final count was completed and nothing was missing.

## 2017-11-09 NOTE — PLAN OF CARE
Patient aaox3. Vss. Denies any pain. Patient wide awake since entering pacu. Dr. Sameera gomez to release patient from PACU. Will assume care for patient in phase 2 recovery.

## 2017-11-09 NOTE — DISCHARGE SUMMARY
[facility]  Neurosurgery  Discharge Summary      Patient Name: Javier Valles  MRN: [id number]  Admission Date: 11/9/2017  Hospital Length of Stay: 0 days  Discharge Date and Time: 11/9/2017  4:59 PM  Attending Physician: Andrzej Toribio MD   Discharging Provider: Tomeka Garcia PA-C  Primary Care Provider: Thiago Gibbs MD     HPI: Javier Valles is a 87 y.o. male who has refractory low back pain and bilateral legs pain. He had a previous laminectomy. He had a SCS trial with more than 75% pain relief with increased functional status. Risks, benefits, alternatives and limitation were discussed with the patient and her family. The risks bleeding, infections, hardware failure and subsidence that requires reoperation, as well as hardware malpositioning. The patient wanted to proceed, and the consent was obtained.    Procedure(s) (LRB):  PLACEMENT OF SPINAL CORD STIMULATOR T10-T11 laminotomyh for placement of spinal cord stimulator at T9-10, left below the belt line pulse generator (N/A)  LAMINOTOMY     DATE OF PROCEDURE: 11/09/2017      PREOPERATIVE DIAGNOSES:     1. Chronic pain syndrome  2. Post-laminectomy syndrome      POSTOPERATIVE DIAGNOSES:     1. Chronic pain syndrome  2. Post-laminectomy syndrome      NAME OF OPERATION:      1.  T9-10 left laminotomy for paddle lead and spinal cord stimulator placement   2. Placement of a Abbott spinal cord stimulator paddle centered at T8  3. Placement of a left-sided below the belt line Abbott implantable pulse generator (IPG)  4. Fluoroscopic localization.    Hospital Course: Javier Valles presented to [facility] on 11/9/2017 for the above stated procedure. he tolerated the procedure well and there were no intra-operative complications. he recovered in PACU and was then transferred to outpatient recovery, where his pain was controlled. On 11/9/2017, he was discharged home with pain medication and follow up appointments. Regular diet. Activity as tolerated. At  the time of discharge, vital signs were stable, patient was afebrile and neurologically stable. Discharge instructions were given verbally/written to the patient and his family and all of their questions were answered. Patient and family voiced understanding. They were encouraged to call the clinic with any questions they might have prior to the follow up appointments.    Consults: None    Significant Diagnostic Studies: None    Pending Diagnostic Studies:     None        Final Active Diagnoses:    Diagnosis Date Noted POA    PRINCIPAL PROBLEM:  Chronic pain syndrome [G89.4] 11/09/2017 Yes      Problems Resolved During this Admission:    Diagnosis Date Noted Date Resolved POA      Discharged Condition: good    Disposition: Home or Self Care    Follow Up:  Follow-up Information     Tomeka Garcia PA-C On 11/28/2017.    Specialty:  Neurosurgery  Why:  xray prior to appt  Contact information:  [address] [address] [phone]                 Patient Instructions:     Diet general     Call MD for:  temperature >100.4     Call MD for:  persistent nausea and vomiting     Call MD for:  severe uncontrolled pain     Call MD for:  difficulty breathing, headache or visual disturbances     Call MD for:  redness, tenderness, or signs of infection (pain, swelling, redness, odor or green/yellow discharge around incision site)     Call MD for:  hives     Call MD for:  persistent dizziness or light-headedness     Call MD for:  extreme fatigue     Remove dressing in 48 hours       Medications:  Reconciled Home Medications:   Current Discharge Medication List      START taking these medications    Details   oxyCODONE (ROXICODONE) 5 MG immediate release tablet Take 1 tablet (5 mg total) by mouth every 6 (six) hours as needed for Pain.  Qty: 30 tablet, Refills: 0         CONTINUE these medications which have NOT CHANGED    Details   amiodarone (PACERONE) 200 MG Tab Take 1 tablet (200 mg total) by mouth  once daily.  Qty: 30 tablet, Refills: 11    Associated Diagnoses: SSS (sick sinus syndrome); S/P CABG (coronary artery bypass graft); Ischemic cardiomyopathy; PVC (premature ventricular contraction)      ascorbic acid (VITAMIN C) 1000 MG tablet Take 1,000 mg by mouth every morning.       brimonidine 0.2% (ALPHAGAN) 0.2 % Drop Place 1 drop into the left eye 3 (three) times daily. Disp 10 mls for 30 days  Qty: 10 mL, Refills: 12    Associated Diagnoses: Primary open-angle glaucoma, left eye, moderate stage      buPROPion (WELLBUTRIN XL) 150 MG TB24 tablet Take 1 tablet (150 mg total) by mouth once daily.  Qty: 30 tablet, Refills: 11      co-enzyme Q-10 30 mg capsule Take 30 mg by mouth once daily.       donepezil (ARICEPT) 5 MG tablet Take 1 tablet (5 mg total) by mouth every evening.  Qty: 30 tablet, Refills: 11      MULTIVITAMINS,THER W-MINERALS (VITAMINS & MINERALS ORAL) Take 1 tablet by mouth every morning.       rosuvastatin (CRESTOR) 20 MG tablet Take 1 tablet (20 mg total) by mouth every evening.  Qty: 90 tablet, Refills: 3    Comments: Patient wants generic  Associated Diagnoses: Hyperlipidemia, unspecified hyperlipidemia type      amlodipine (NORVASC) 5 MG tablet Take 1 tablet (5 mg total) by mouth once daily.  Qty: 30 tablet, Refills: 11      FLUZONE HIGH-DOSE 2017-18, PF, 180 mcg/0.5 mL vaccine       ipratropium (ATROVENT) 0.03 % nasal spray 1-2 sprays by Nasal route 2 (two) times daily.  Qty: 30 mL, Refills: 1    Associated Diagnoses: Rhinorrhea      latanoprost 0.005 % ophthalmic solution Place 1 drop into both eyes every evening.  Qty: 1 Bottle, Refills: 12    Associated Diagnoses: Primary open-angle glaucoma, left eye, moderate stage      losartan (COZAAR) 50 MG tablet Take 1 tablet (50 mg total) by mouth 2 (two) times daily.  Qty: 180 tablet, Refills: 3    Associated Diagnoses: Coronary artery disease involving native coronary artery without angina pectoris, unspecified whether native or transplanted  heart      lutein 20 mg Cap Take 20 mg by mouth once daily.       nitroGLYCERIN (NITROSTAT) 0.4 MG SL tablet Place 1 tablet (0.4 mg total) under the tongue every 5 (five) minutes as needed for Chest pain.  Qty: 30 tablet, Refills: 5    Comments: 3 bottles per box         STOP taking these medications       oxycodone-acetaminophen (PERCOCET) 5-325 mg per tablet Comments:   Reason for Stopping:               Tomeka Garcia PA-C  Neurosurgery  [facility]

## 2017-11-09 NOTE — DISCHARGE INSTRUCTIONS
Patient Information  -No driving while taking narcotic pain medications  -Do not take any OTC products containing acetaminophen at the same time as you take your narcotic pain medication. Medications that may contain acetaminophen include but are not limited to: Excedrin and other headache medications, arthritis medications, cold and sinus medications, etc. Please review the list of active ingredients on any OTC medication prior to taking it.  -Do not take any Aspirin or Aspirin containing products for 2 weeks  -Do not take any Aleeve, Naprosyn, Naproxen, Ibuprofen, Advil or any other NSAID for 6 weeks.   -Do not consume any alcoholic beverages until released by your Neurosurgeon  -Do not perform any excessive bending over or leaning forward as this is a fall hazard.  -Do not perform any heavy lifting or lifting more than 10 lbs from the ground level as this is a fall hazard.    Contact the Neurosurgery Office immediately if:  -If you begin to notice any neurologic changes such as:           -Sudden onset of lethargy or sleepiness           -Sudden confusion, trouble speaking, or understanding            -Sudden trouble seeing in one or both eyes            -Sudden trouble walking, dizziness, loss of coordination            -Sudden severe headache with no known cause            -Sudden onset of numbness or weakness     Wound Care:  Remove bandage on the 2nd day after surgery, then keep your incision open to air. There are white tape strips called steri strips under your bandage. These will fall off on their own. Do not remove them. You may shower on Day 2. Have the stream of water hit you opposite from the incision. Do not scrub the incision. Pat the incision dry after your shower. You cannot take a bath/swim/submerge the incision until 8 weeks after surgery.        Call your doctor or go to the Emergency Room for any signs of infection including: increased redness, drainage, pain or fever (temperature greater than  or equal to 101.4).       Miscellaneous:  -Follow up with Neurosurgery in 2 weeks for wound check      Neurosurgery Office:   [address] [facility], [address]  [place] [address]  Telephone [phone]  DIET: You may resume your home diet. If nausea is present, increase your diet gradually with fluids and bland foods    ACTIVITY LEVEL: You have received sedation or an anesthetic, you may feel sleepy for several hours. Rest until you are more awake. Gradually resume your normal activities    Medications: Pain medication should be taken only if needed and as directed. If antibiotics are prescribed, the medication should be taken until completed. You will be given an updated list of you medications.    No driving, alcoholic beverages or signing legal documents for next 24 hours or while taking pain medication.       CALL THE DOCTOR:    For any obvious bleeding (some dried blood over the incision is normal).      Redness, swelling, foul smell around incision or fever over 101.   Shortness of breath, Coughing up Bloody sputum, Pains or Swelling in your Calves .   Persistent pain or nausea not relieved by medication.    If any unusual problems or difficulties occur contact your doctor. If you cannot contact your doctor but feel your signs and symptoms warrant a physicians attention return to the emergency room.      Discharge Instructions: After Your Surgery  Youve just had surgery. During surgery, you were given medicine called anesthesia to keep you relaxed and free of pain. After surgery, you may have some pain or nausea. This is common. Here are some tips for feeling better and getting well after surgery.     Stay on schedule with your medicine.   Going home  Your healthcare provider will show you how to take care of yourself when you go home. He or she will also answer your questions. Have an adult family member or friend drive you home. For the first 24 hours after your surgery:  · Do not drive or use heavy  equipment.  · Do not make important decisions or sign legal papers.  · Do not drink alcohol.  · Have someone stay with you, if needed. He or she can watch for problems and help keep you safe.  Be sure to go to all follow-up visits with your healthcare provider. And rest after your surgery for as long as your healthcare provider tells you to.  Coping with pain  If you have pain after surgery, pain medicine will help you feel better. Take it as told, before pain becomes severe. Also, ask your healthcare provider or pharmacist about other ways to control pain. This might be with heat, ice, or relaxation. And follow any other instructions your surgeon or nurse gives you.  Tips for taking pain medicine  To get the best relief possible, remember these points:  · Pain medicines can upset your stomach. Taking them with a little food may help.  · Most pain relievers taken by mouth need at least 20 to 30 minutes to start to work.  · Taking medicine on a schedule can help you remember to take it. Try to time your medicine so that you can take it before starting an activity. This might be before you get dressed, go for a walk, or sit down for dinner.  · Constipation is a common side effect of pain medicines. Call your healthcare provider before taking any medicines such as laxatives or stool softeners to help ease constipation. Also ask if you should skip any foods. Drinking lots of fluids and eating foods such as fruits and vegetables that are high in fiber can also help. Remember, do not take laxatives unless your surgeon has prescribed them.  · Drinking alcohol and taking pain medicine can cause dizziness and slow your breathing. It can even be deadly. Do not drink alcohol while taking pain medicine.  · Pain medicine can make you react more slowly to things. Do not drive or run machinery while taking pain medicine.  Your healthcare provider may tell you to take acetaminophen to help ease your pain. Ask him or her how much  you are supposed to take each day. Acetaminophen or other pain relievers may interact with your prescription medicines or other over-the-counter (OTC) medicines. Some prescription medicines have acetaminophen and other ingredients. Using both prescription and OTC acetaminophen for pain can cause you to overdose. Read the labels on your OTC medicines with care. This will help you to clearly know the list of ingredients, how much to take, and any warnings. It may also help you not take too much acetaminophen. If you have questions or do not understand the information, ask your pharmacist or healthcare provider to explain it to you before you take the OTC medicine.  Managing nausea  Some people have an upset stomach after surgery. This is often because of anesthesia, pain, or pain medicine, or the stress of surgery. These tips will help you handle nausea and eat healthy foods as you get better. If you were on a special food plan before surgery, ask your healthcare provider if you should follow it while you get better. These tips may help:  · Do not push yourself to eat. Your body will tell you when to eat and how much.  · Start off with clear liquids and soup. They are easier to digest.  · Next try semi-solid foods, such as mashed potatoes, applesauce, and gelatin, as you feel ready.  · Slowly move to solid foods. Dont eat fatty, rich, or spicy foods at first.  · Do not force yourself to have 3 large meals a day. Instead eat smaller amounts more often.  · Take pain medicines with a small amount of solid food, such as crackers or toast, to avoid nausea.     Call your surgeon if  · You still have pain an hour after taking medicine. The medicine may not be strong enough.  · You feel too sleepy, dizzy, or groggy. The medicine may be too strong.  · You have side effects like nausea, vomiting, or skin changes, such as rash, itching, or hives.       If you have obstructive sleep apnea  You were given anesthesia medicine  during surgery to keep you comfortable and free of pain. After surgery, you may have more apnea spells because of this medicine and other medicines you were given. The spells may last longer than usual.   At home:  · Keep using the continuous positive airway pressure (CPAP) device when you sleep. Unless your healthcare provider tells you not to, use it when you sleep, day or night. CPAP is a common device used to treat obstructive sleep apnea.  · Talk with your provider before taking any pain medicine, muscle relaxants, or sedatives. Your provider will tell you about the possible dangers of taking these medicines.  Date Last Reviewed: 12/1/2016  © 1301-5635 [employer]. [address] [place] [address], [place] [address]. All rights reserved. This information is not intended as a substitute for professional medical care. Always follow your healthcare professional's instructions.

## 2017-11-09 NOTE — PLAN OF CARE
Patient ambulated in the room and dressed, spouse at bedside. AAOX3. VSS. Denies any pain. Provided paper prescription to patients spouse. Instructed the patients wife to call cardiologist tomorrow and ask when to resume eliquis, verbalized understanding. Instructions given to the patient and his spouse per MD orders with time for questions, verbalized understanding. Patient discharged via wheelchair to the car with spouse.

## 2017-11-09 NOTE — H&P
[facility]  Neurosurgery  History & Physical    Patient Name: Javier Valles  MRN: [id number]  Admission Date: 11/9/2017  Attending Physician: Andrzej Toribio MD   Primary Care Provider: Thiago Gibbs MD    Patient information was obtained from patient and past medical records.     Subjective:     Chief Complaint/Reason for Admission: chronic pain syndrome     History of Present Illness: Javier Valles is a 87 y.o. male who has lumbar spondylosis with foraminal stenosis. He had a L3-4 and L4-5 MIS laminectomy with Dr Stanford about 2 years ago with leg pain improvement. The leg pain has worsened and now he has low back pain. The low back pain is constant and worse when standing. He walks leaning forward. The bilateral legs pain are triggered by standing up and relieved by sitting. He had a recent trial of spinal cord stimulator. His low back and legs pain decreased from 10/10 to 1.5/10 and he was able to walk more and be more active. Denies having significant legs weakness or numbness.     PTA Medications   Medication Sig    amiodarone (PACERONE) 200 MG Tab Take 1 tablet (200 mg total) by mouth once daily.    ascorbic acid (VITAMIN C) 1000 MG tablet Take 1,000 mg by mouth every morning.     brimonidine 0.2% (ALPHAGAN) 0.2 % Drop Place 1 drop into the left eye 3 (three) times daily. Disp 10 mls for 30 days    buPROPion (WELLBUTRIN XL) 150 MG TB24 tablet Take 1 tablet (150 mg total) by mouth once daily.    co-enzyme Q-10 30 mg capsule Take 30 mg by mouth once daily.     donepezil (ARICEPT) 5 MG tablet Take 1 tablet (5 mg total) by mouth every evening.    MULTIVITAMINS,THER W-MINERALS (VITAMINS & MINERALS ORAL) Take 1 tablet by mouth every morning.     rosuvastatin (CRESTOR) 20 MG tablet Take 1 tablet (20 mg total) by mouth every evening.    amlodipine (NORVASC) 5 MG tablet Take 1 tablet (5 mg total) by mouth once daily.    FLUZONE HIGH-DOSE 2017-18, PF, 180 mcg/0.5 mL vaccine      ipratropium (ATROVENT) 0.03 % nasal spray 1-2 sprays by Nasal route 2 (two) times daily.    latanoprost 0.005 % ophthalmic solution Place 1 drop into both eyes every evening.    losartan (COZAAR) 50 MG tablet Take 1 tablet (50 mg total) by mouth 2 (two) times daily.    lutein 20 mg Cap Take 20 mg by mouth once daily.     nitroGLYCERIN (NITROSTAT) 0.4 MG SL tablet Place 1 tablet (0.4 mg total) under the tongue every 5 (five) minutes as needed for Chest pain.    oxycodone-acetaminophen (PERCOCET) 5-325 mg per tablet Take 1 tablet by mouth every 6 (six) hours as needed for Pain.       Review of patient's allergies indicates:   Allergen Reactions    Pravastatin Other (See Comments)     Severe myalgias/elevated CPK    Lipitor [atorvastatin]      Myositis/elevated CPK    Statins-hmg-coa reductase inhibitors      Muscle pain and loss of muscle    Ace inhibitors      Cough       Past Medical History:   Diagnosis Date    *Atrial flutter 10/3/13    Anticoagulant long-term use     Aspirin only at this time    Arthritis     Atrial fibrillation     Atrial flutter     Blood transfusion     ?    BPH (benign prostatic hypertrophy)     Carotid artery disease     2008: [place] There is 40 - 49% right Internal Carotid stenosis.  There is 20 - 39% left Internal Carotid stenosis.       Chronic kidney disease     Coronary artery disease     DDD (degenerative disc disease) 6/25/2015    Degenerative disc disease     Elevated PSA     Glaucoma     Hyperlipidemia     Hypertension     Lumbar stenosis     lumbar spinal stenosis    NSTEMI (non-ST elevated myocardial infarction) 11/3/2013    Pacemaker 11/2013    Peripheral neuropathy     - S/P right ILIAC stent 1993;      Retinal detachment     Squamous cell carcinoma     left leg    Syncope and collapse: orthostatic with hypotension 10/9/2015     Past Surgical History:   Procedure Laterality Date    [facility]TERIZATION      [facility]    CARDIAC ELECTROPHYSIOLOGY  MAPPING AND ABLATION  11/2016    CARDIAC PACEMAKER PLACEMENT  11/2016    CATARACT EXTRACTION W/  INTRAOCULAR LENS IMPLANT Left 1997    OS with     CORONARY ARTERY BYPASS GRAFT  1991    ENUCLEATION Right 1949    LUMBAR LAMINECTOMY  04/25/2016    RETINAL DETACHMENT SURGERY Left 1997    Dr. Cosme    SKIN BIOPSY      SPINAL CORD STIMULATOR TRIAL W/ LAMINOTOMY  10/2017    TONSILLECTOMY       Family History     Problem Relation (Age of Onset)    Clotting disorder Mother    Diverticulitis Son    Hypertension Mother    Stroke Mother (69)        Social History Main Topics    Smoking status: Former Smoker     Quit date: 8/19/1963    Smokeless tobacco: Never Used    Alcohol use 1.8 oz/week     1 Glasses of wine, 1 Cans of beer, 1 Shots of liquor per week      Comment: 2 a day    Drug use: No    Sexual activity: Yes     Partners: Female     Review of Systems   Constitutional: Negative for diaphoresis, fever and weight loss.   Respiratory: Negative for shortness of breath.    Cardiovascular: Negative for chest pain.   Gastrointestinal: Negative for blood in stool.   Genitourinary: Negative for hematuria.   Endo/Heme/Allergies: Does not bruise/bleed easily.   All other systems reviewed and are negative.  Objective:     Weight: 86.2 kg (190 lb)  Body mass index is 28.89 kg/m².  Vital Signs (Most Recent):  Temp: 97.7 °F (36.5 °C) (11/09/17 0921)  Pulse: (!) 18 (11/09/17 0921)  Resp: 18 (11/09/17 0921)  BP: 135/67 (11/09/17 0921)  SpO2: 97 % (11/09/17 0921) Vital Signs (24h Range):  Temp:  [97.7 °F (36.5 °C)] 97.7 °F (36.5 °C)  Pulse:  [18] 18  Resp:  [18] 18  SpO2:  [97 %] 97 %  BP: (135)/(67) 135/67         Neurosurgery Physical Exam     Constitutional: He appears well-developed and well-nourished.      Eyes: Pupils are equal, round, and reactive to light. Conjunctivae and EOM are normal.      Cardiovascular: Normal distal pulses and no edema.      Abdominal: Soft.      Skin: Skin displays no rash on trunk  and no rash on extremities. Skin displays no lesions on trunk and no lesions on extremities.     Psych/Behavior: He is alert. He is oriented to person, place, and time. He has a normal mood and affect.     Musculoskeletal:        Neck: Range of motion is full.     Neurological:        DTRs: Tricep reflexes are 2+ on the right side and 2+ on the left side. Bicep reflexes are 2+ on the right side and 2+ on the left side. Brachioradialis reflexes are 2+ on the right side and 2+ on the left side. Patellar reflexes are 2+ on the right side and 2+ on the left side. Achilles reflexes are 0 on the right side and 0 on the left side.         Back Exam      Tenderness   The patient is experiencing tenderness in the lumbar (L5-S1 bilateral tenderness to palpation).     Range of Motion   Extension: abnormal   Flexion: normal      Muscle Strength   Right Quadriceps:  5/5   Left Quadriceps:  5/5   Right Hamstrings:  5/5   Left Hamstrings:  5/5      Other   Toe Walk: normal  Heel Walk: normal              SI joint:   Palpation at the right and left SI joints not painful  ABEBA test is negative bilaterally  Gaenslen test is negative bilaterally  Thigh thrust test is negative bilaterally     Neurologic Exam      Mental Status   Oriented to person, place, and time.   Speech: speech is normal   Level of consciousness: alert     Cranial Nerves   Cranial nerves II through XII intact.      CN III, IV, VI   Pupils are equal, round, and reactive to light.  Extraocular motions are normal.      Motor Exam   Muscle bulk: normal  Overall muscle tone: normal     Strength   Right deltoid: 5/5  Left deltoid: 5/5  Right biceps: 5/5  Left biceps: 5/5  Right triceps: 5/5  Left triceps: 5/5  Right wrist flexion: 5/5  Left wrist flexion: 5/5  Right wrist extension: 5/5  Left wrist extension: 5/5  Right interossei: 5/5  Left interossei: 5/5  Right iliopsoas: 5/5  Left iliopsoas: 5/5  Right quadriceps: 5/5  Left quadriceps: 5/5  Right hamstring:  5/5  Left hamstrin/5  Right anterior tibial: 5/5  Left anterior tibial: 5/5  Right posterior tibial: 5/5  Left posterior tibial: 5/5  Right peroneal: 5/5  Left peroneal: 5/5  Right gastroc: 5/5  Left gastroc: 5/5     Sensory Exam   Light touch normal.   Pinprick normal.      Gait, Coordination, and Reflexes      Gait  Gait: (leaning forward)     Coordination   Finger to nose coordination: normal  Tandem walking coordination: normal     Reflexes   Right brachioradialis: 2+  Left brachioradialis: 2+  Right biceps: 2+  Left biceps: 2+  Right triceps: 2+  Left triceps: 2+  Right patellar: 2+  Left patellar: 2+  Right achilles: 0  Left achilles: 0  Right plantar: normal  Left plantar: normal  Right Tucker: absent  Left Tucker: absent  Right ankle clonus: absent  Left ankle clonus: absent    Significant Labs:  No results for input(s): GLU, NA, K, CL, CO2, BUN, CREATININE, CALCIUM, MG in the last 48 hours.  No results for input(s): WBC, HGB, HCT, PLT in the last 48 hours.  No results for input(s): LABPT, INR, APTT in the last 48 hours.  Microbiology Results (last 7 days)     ** No results found for the last 168 hours. **          Significant Diagnostics:  N/A     Assessment/Plan:     86 yo male with chronic pain syndrome     Dr. Toribio explained the natural history of the disease and all treatment options. He recommended a T10-11 laminotomy and insertion of T9-10 paddle lead spinal cord stimulator and left, below the belt, pulse generator placement using the [employer] system.      Dr. Toribio and the patient have discussed the risks of surgery including bleeding, infection, failure of surgery, CSF leak, nerve root injury, spinal cord injury, weakness, paralysis, peripheral neuropathy, malplaced hardware, migration of hardware, need for reoperation. Patient understands the risks and would like to proceed with surgery.      The patient has increased perioperative risks because of these comorbidities: CAD, hypertension.      Tomeka Garcia PA-C  Neurosurgery  [facility]

## 2017-11-09 NOTE — ANESTHESIA POSTPROCEDURE EVALUATION
"Anesthesia Post Evaluation    Patient: Javier Valles    Procedure(s) Performed: Procedure(s) (LRB):  PLACEMENT OF SPINAL CORD STIMULATOR T10-T11 laminotomyh for placement of spinal cord stimulator at T9-10, left below the belt line pulse generator (N/A)  LAMINOTOMY    Final Anesthesia Type: general  Patient location during evaluation: PACU  Level of consciousness: awake  Post-procedure vital signs: reviewed and stable  Pain management: adequate  Airway patency: patent    Anesthetic complications: no      Cardiovascular status: stable  Respiratory status: room air  Hydration status: euvolemic  Follow-up not needed.        Visit Vitals  BP (!) 166/78   Pulse (!) 53   Temp 36.6 °C (97.9 °F) (Oral)   Resp 16   Ht 5' 8" (1.727 m)   Wt 86.2 kg (190 lb)   SpO2 99%   BMI 28.89 kg/m²       Pain/Yojana Score: Pain Assessment Performed: Yes (11/9/2017  4:36 PM)  Presence of Pain: denies (11/9/2017  4:36 PM)  Pain Rating Prior to Med Admin: 3 (11/9/2017  9:55 AM)  Yojana Score: 10 (11/9/2017  4:36 PM)      "

## 2017-11-09 NOTE — ANESTHESIA PROCEDURE NOTES
Arterial    Diagnosis: Chronic Pain  Doctor requesting consult: Catarino    Patient location during procedure: done in OR  Procedure start time: 11/9/2017 1:10 PM  Timeout: 11/9/2017 1:09 PM  Procedure end time: 11/9/2017 1:12 PM  Staffing  Anesthesiologist: NEELAM DURAND  Resident/CRNA: FLORI CHANDLER  Performed: resident/CRNA   Anesthesiologist was present at the time of the procedure.  Preanesthetic Checklist  Completed: patient identified, site marked, surgical consent, pre-op evaluation, timeout performed, IV checked, risks and benefits discussed, monitors and equipment checked and anesthesia consent givenArterial  Skin Prep: chlorhexidine gluconate  Local Infiltration: none  Orientation: right  Location: radial  Catheter Size: 20 G  Catheter placement by Anatomical landmarks. Heme positive aspiration all ports.Insertion Attempts: 1  Assessment  Dressing: secured with tape and tegaderm

## 2017-11-09 NOTE — H&P
NEUROSURGICAL H&P NOTE     DATE OF SERVICE:  11/09/2017     ATTENDING PHYSICIAN:  Andrzej Toribio MD     CONSULT REQUESTED BY:  Dr Hodges     REASON FOR CONSULT:  Chronic pain syndrome     SUBJECTIVE:     HISTORY OF PRESENT ILLNESS:  This is a very pleasant 87 y.o. male who has lumbar spondylosis with foraminal stenosis. He had a L3-4 and L4-5 MIS laminectomy with Dr Stanford about 2 years ago with leg pain improvement. The leg pain has worsened and now he has low back pain. The low back pain is constant and worse when standing. He walks leaning forward. The bilateral legs pain are triggered by standing up and relieved by sitting. He had a recent trial of spinal cord stimulator. His low back and legs pain decreased from 10/10 to 1.5/10 and he was able to walk more and be more active. Denies having significant legs weakness or numbness.               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    Chronic kidney disease, stage III (moderate) 11/27/2012    Benign hypertensive renal disease without renal failure 11/27/2012    Gout, unspecified 11/27/2012    Acquired cyst of kidney 11/27/2012    Idiopathic progressive polyneuropathy 12/17/2012    Atherosclerosis of aorta 10/09/2013    Epiretinal membrane, left eye 10/28/2013    Posterior vitreous detachment of left eye 10/28/2013    Pseudophakia of left eye 10/28/2013    Prosthetic eye globe 10/28/2013    Open-angle glaucoma 10/28/2013    Retinal tear 10/28/2013    Rhegmatogenous retinal detachment 10/28/2013    Atrial fibrillation 11/03/2013    History of Urinary  retention 11/07/2013    Biventricular cardiac pacemaker in situ 12/12/2013    SSS (sick sinus syndrome): s/p PPM 12/12/2013    Macular edema, cystoid 06/11/2014    Lamellar macular hole 06/11/2014    Left lumbar radiculopathy 06/25/2015    Lumbar facet arthropathy 06/25/2015    Spondylosis without myelopathy 06/25/2015    Lumbar degenerative disc disease 06/25/2015    Gait apraxia 10/09/2015    Contusion of rib on left side 10/28/2015    Spinal stenosis, lumbar 04/13/2016    Obesity, Class I, BMI 30-34.9 04/21/2016    Coronary artery disease involving native coronary artery of native heart without angina pectoris 04/21/2016    S/P CABG (coronary artery bypass graft) 04/21/2016    Varicose veins of both lower extremities- Rt more than Left 04/21/2016    Edema 04/21/2016    Thrombocytopenia 04/21/2016    Total bilirubin, elevated 04/21/2016    Lumbar stenosis with neurogenic claudication 04/25/2016    S/P lumbar laminectomy      Encounter for postoperative wound check 05/24/2016    Peripheral venous insufficiency      Typical atrial flutter 09/21/2016    Current use of long term anticoagulation 09/21/2016    Cardiomyopathy 09/21/2016    Status post catheter ablation of atrial flutter 12/14/2016    Bilateral carpal tunnel syndrome 01/24/2017    Chronic pain 04/28/2017    Lumbar spinal stenosis 05/02/2017    Acute lumbar radiculopathy 05/02/2017    Osteoarthritis of spine with radiculopathy, lumbar region 05/29/2017    Cardiomyopathy, ischemic: Prior MI, CABG, EF 40-45% 06/07/2017    Post laminectomy syndrome 06/15/2017    PVC (premature ventricular contraction) 08/29/2017      Resolved Ambulatory Problems     Diagnosis Date Noted    Coronary artery disease      Atrial flutter 10/03/2013    Lamellar macular hole of left eye 10/28/2013    Lamellar macular hole 10/28/2013    Unstable angina 11/01/2013    NSTEMI (non-ST elevated myocardial infarction) 11/03/2013    Cardiogenic shock  11/03/2013    Ventricular fibrillation 11/03/2013    Acute systolic heart failure 11/03/2013    Syncope and collapse: orthostatic with hypotension 10/09/2015    Lumbar myelopathy 04/29/2016           Past Medical History:   Diagnosis Date    *Atrial flutter 10/3/13    Anticoagulant long-term use      Arthritis      Atrial fibrillation      Atrial flutter      Blood transfusion      BPH (benign prostatic hypertrophy)      Carotid artery disease      Chronic kidney disease      Coronary artery disease      DDD (degenerative disc disease) 6/25/2015    Degenerative disc disease      Elevated PSA      Glaucoma      Hyperlipidemia      Hypertension      Lumbar stenosis      NSTEMI (non-ST elevated myocardial infarction) 11/3/2013    Pacemaker 11/2013    Peripheral neuropathy      Retinal detachment      Squamous cell carcinoma      Syncope and collapse: orthostatic with hypotension 10/9/2015         PAST SURGICAL HISTORY:        Past Surgical History:   Procedure Laterality Date    BACK SURGERY   04/25/2016    bypass 1991        CARDIAC CATHETERIZATION        CARDIAC ELECTROPHYSIOLOGY MAPPING AND ABLATION   11/2016    CARDIAC PACEMAKER PLACEMENT        CARDIAC PACEMAKER PLACEMENT   11/2016    CATARACT EXTRACTION W/  INTRAOCULAR LENS IMPLANT Left 1997     OS with     ENUCLEATION Right n/a    EYE SURGERY        retinal detachment repair left eye        RETINAL DETACHMENT SURGERY Left 1997     Dr. Cosme    SKIN BIOPSY        TONSILLECTOMY             SOCIAL HISTORY:   Social History   Social History            Social History    Marital status:        Spouse name: Joanie    Number of children: N/A    Years of education: N/A           Occupational History    retired               Social History Main Topics    Smoking status: Former Smoker       Quit date: 8/19/1963    Smokeless tobacco: Never Used    Alcohol use 1.8 oz/week       1 Glasses of wine, 1 Cans of beer,  1 Shots of liquor per week         Comment: 2 a day    Drug use: No    Sexual activity: Yes       Partners: Female           Other Topics Concern    Not on file          Social History Narrative    No narrative on file            FAMILY HISTORY:         Family History   Problem Relation Age of Onset    Stroke Mother 69    Clotting disorder Mother         per patient no clotting disorder    Hypertension Mother      Diverticulitis Son      Cancer Neg Hx      Diabetes Neg Hx      Heart disease Neg Hx      Glaucoma Neg Hx      Amblyopia Neg Hx      Blindness Neg Hx      Cataracts Neg Hx      Macular degeneration Neg Hx      Retinal detachment Neg Hx      Strabismus Neg Hx           CURRENTS MEDICATIONS:         Current Outpatient Prescriptions on File Prior to Visit   Medication Sig Dispense Refill    amiodarone (PACERONE) 200 MG Tab Take 1 tablet (200 mg total) by mouth once daily. 30 tablet 11    amlodipine (NORVASC) 5 MG tablet Take 1 tablet (5 mg total) by mouth once daily. 30 tablet 11    ascorbic acid (VITAMIN C) 1000 MG tablet Take 1,000 mg by mouth every morning.         aspirin (ECOTRIN) 81 MG EC tablet Take 1 tablet (81 mg total) by mouth once daily.   0    brimonidine 0.2% (ALPHAGAN) 0.2 % Drop Place 1 drop into the left eye 3 (three) times daily. Disp 10 mls for 30 days 10 mL 12    buPROPion (WELLBUTRIN XL) 150 MG TB24 tablet Take 1 tablet (150 mg total) by mouth once daily. 30 tablet 11    co-enzyme Q-10 30 mg capsule Take 30 mg by mouth once daily.         donepezil (ARICEPT) 5 MG tablet Take 1 tablet (5 mg total) by mouth every evening. 30 tablet 11    duloxetine (CYMBALTA) 30 MG capsule Take 1 capsule (30 mg total) by mouth once daily. 30 capsule 11    ipratropium (ATROVENT) 0.03 % nasal spray 1-2 sprays by Nasal route 2 (two) times daily. 30 mL 1    latanoprost 0.005 % ophthalmic solution Place 1 drop into both eyes every evening. 1 Bottle 12    losartan (COZAAR) 50 MG  tablet Take 1 tablet (50 mg total) by mouth 2 (two) times daily. 180 tablet 3    lutein 20 mg Cap Take 20 mg by mouth once daily.         MULTIVITAMINS,THER W-MINERALS (VITAMINS & MINERALS ORAL) Take 1 tablet by mouth every morning.         nitroGLYCERIN (NITROSTAT) 0.4 MG SL tablet Place 1 tablet (0.4 mg total) under the tongue every 5 (five) minutes as needed for Chest pain. 30 tablet 5    oxycodone-acetaminophen (PERCOCET) 5-325 mg per tablet Take 1 tablet by mouth every 6 (six) hours as needed for Pain. 60 tablet 0    rosuvastatin (CRESTOR) 20 MG tablet Take 1 tablet (20 mg total) by mouth every evening. 90 tablet 3      No current facility-administered medications on file prior to visit.          ALLERGIES:        Review of patient's allergies indicates:   Allergen Reactions    Pravastatin Other (See Comments)       Severe myalgias/elevated CPK    Lipitor [atorvastatin]         Myositis/elevated CPK    Statins-hmg-coa reductase inhibitors         Muscle pain and loss of muscle    Ace inhibitors         Cough         REVIEW OF SYSTEMS:  Review of Systems   Constitutional: Negative for diaphoresis, fever and weight loss.   Respiratory: Negative for shortness of breath.    Cardiovascular: Negative for chest pain.   Gastrointestinal: Negative for blood in stool.   Genitourinary: Negative for hematuria.   Endo/Heme/Allergies: Does not bruise/bleed easily.   All other systems reviewed and are negative.        OBJECTIVE:     PHYSICAL EXAMINATION:       Vitals:     10/09/17 0801   BP: 130/81   Pulse: (!) 57         Physical Exam:  Vitals reviewed.     Constitutional: He appears well-developed and well-nourished.      Eyes: Pupils are equal, round, and reactive to light. Conjunctivae and EOM are normal.      Cardiovascular: Normal distal pulses and no edema.      Abdominal: Soft.      Skin: Skin displays no rash on trunk and no rash on extremities. Skin displays no lesions on trunk and no lesions on  extremities.     Psych/Behavior: He is alert. He is oriented to person, place, and time. He has a normal mood and affect.     Musculoskeletal:        Neck: Range of motion is full.     Neurological:        DTRs: Tricep reflexes are 2+ on the right side and 2+ on the left side. Bicep reflexes are 2+ on the right side and 2+ on the left side. Brachioradialis reflexes are 2+ on the right side and 2+ on the left side. Patellar reflexes are 2+ on the right side and 2+ on the left side. Achilles reflexes are 0 on the right side and 0 on the left side.         Back Exam      Tenderness   The patient is experiencing tenderness in the lumbar (L5-S1 bilateral tenderness to palpation).     Range of Motion   Extension: abnormal   Flexion: normal      Muscle Strength   Right Quadriceps:  5/5   Left Quadriceps:  5/5   Right Hamstrings:  5/5   Left Hamstrings:  5/5      Other   Toe Walk: normal  Heel Walk: normal              SI joint:   Palpation at the right and left SI joints not painful  ABEBA test is negative bilaterally  Gaenslen test is negative bilaterally  Thigh thrust test is negative bilaterally     Neurologic Exam      Mental Status   Oriented to person, place, and time.   Speech: speech is normal   Level of consciousness: alert     Cranial Nerves   Cranial nerves II through XII intact.      CN III, IV, VI   Pupils are equal, round, and reactive to light.  Extraocular motions are normal.      Motor Exam   Muscle bulk: normal  Overall muscle tone: normal     Strength   Right deltoid: 5/5  Left deltoid: 5/5  Right biceps: 5/5  Left biceps: 5/5  Right triceps: 5/5  Left triceps: 5/5  Right wrist flexion: 5/5  Left wrist flexion: 5/5  Right wrist extension: 5/5  Left wrist extension: 5/5  Right interossei: 5/5  Left interossei: 5/5  Right iliopsoas: 5/5  Left iliopsoas: 5/5  Right quadriceps: 5/5  Left quadriceps: 5/5  Right hamstrin/5  Left hamstrin/5  Right anterior tibial: 5/5  Left anterior tibial: 5/5  Right  posterior tibial: 5/5  Left posterior tibial: 5/5  Right peroneal: 5/5  Left peroneal: 5/5  Right gastroc: 5/5  Left gastroc: 5/5     Sensory Exam   Light touch normal.   Pinprick normal.      Gait, Coordination, and Reflexes      Gait  Gait: (leaning forward)     Coordination   Finger to nose coordination: normal  Tandem walking coordination: normal     Reflexes   Right brachioradialis: 2+  Left brachioradialis: 2+  Right biceps: 2+  Left biceps: 2+  Right triceps: 2+  Left triceps: 2+  Right patellar: 2+  Left patellar: 2+  Right achilles: 0  Left achilles: 0  Right plantar: normal  Left plantar: normal  Right Tucker: absent  Left Tucker: absent  Right ankle clonus: absent  Left ankle clonus: absent           DIAGNOSTIC DATA:  I personally reviewed the following imaging:   CT lumbar 2014 pre-laminectomy: diffuse severe stenosis worse at L3-4 and L4-5, left L4-5 and right L5-S1 severe foraminal stenosis     ASSESMENT:  This is a 87 y.o. male with          Problem List Items Addressed This Visit               Neuro     Chronic pain - Primary          Orthopedic     Post laminectomy syndrome       Other Visit Diagnoses    None.            PLAN:  I explained the natural history of the disease and all treatment options. I recommended a T9-10 laminotomy and insertion of T8-9 paddle lead spinal cord stimulator and left, below the belt, pulse generator placement using the [employer] system.      We have discussed the risks of surgery including bleeding, infection, failure of surgery, CSF leak, nerve root injury, spinal cord injury, weakness, paralysis, peripheral neuropathy, malplaced hardware, migration of hardware, need for reoperation. Patient understands the risks and would like to proceed with surgery.        The patient has increased perioperative risks because of these comorbidities: CAD, hypertension.           Andrzej Toribio MD  Pager: [phone]

## 2017-11-10 ENCOUNTER — PATIENT MESSAGE (OUTPATIENT)
Dept: OPHTHALMOLOGY | Facility: CLINIC | Age: 82
End: 2017-11-10

## 2017-11-10 ENCOUNTER — PATIENT MESSAGE (OUTPATIENT)
Dept: NEUROSURGERY | Facility: CLINIC | Age: 82
End: 2017-11-10

## 2017-11-10 VITALS
SYSTOLIC BLOOD PRESSURE: 166 MMHG | RESPIRATION RATE: 16 BRPM | DIASTOLIC BLOOD PRESSURE: 78 MMHG | HEIGHT: 68 IN | OXYGEN SATURATION: 99 % | WEIGHT: 190 LBS | HEART RATE: 53 BPM | TEMPERATURE: 98 F | BODY MASS INDEX: 28.79 KG/M2

## 2017-11-10 RX ORDER — TAMSULOSIN HYDROCHLORIDE 0.4 MG/1
0.4 CAPSULE ORAL DAILY
Qty: 30 CAPSULE | Refills: 11 | Status: SHIPPED | OUTPATIENT
Start: 2017-11-10 | End: 2017-11-15 | Stop reason: SDUPTHER

## 2017-11-10 NOTE — TELEPHONE ENCOUNTER
Dr. Schneider spoke to pt and advised pt that if floaters were that bad, then he can go to the ED and see the on call resident. Pt stated that he wants to wait until Monday and see if it gets better or worse. Pt will have to see triage or retina. Pt is an established retina pt already. Pt advised that if problem worsens then he should not wait until Monday since it is his only eye. Pt should go to the ED immediately. Pt understood.

## 2017-11-13 ENCOUNTER — INITIAL CONSULT (OUTPATIENT)
Dept: OPHTHALMOLOGY | Facility: CLINIC | Age: 82
End: 2017-11-13
Payer: MEDICARE

## 2017-11-13 ENCOUNTER — TELEPHONE (OUTPATIENT)
Dept: OPHTHALMOLOGY | Facility: CLINIC | Age: 82
End: 2017-11-13

## 2017-11-13 ENCOUNTER — PATIENT MESSAGE (OUTPATIENT)
Dept: NEUROSURGERY | Facility: CLINIC | Age: 82
End: 2017-11-13

## 2017-11-13 DIAGNOSIS — H35.372 EPIRETINAL MEMBRANE, LEFT EYE: ICD-10-CM

## 2017-11-13 DIAGNOSIS — H35.342 LAMELLAR MACULAR HOLE OF LEFT EYE: ICD-10-CM

## 2017-11-13 DIAGNOSIS — H43.812 POSTERIOR VITREOUS DETACHMENT OF LEFT EYE: Primary | ICD-10-CM

## 2017-11-13 PROCEDURE — 92225 PR SPECIAL EYE EXAM, INITIAL: CPT | Mod: LT,S$GLB,, | Performed by: OPHTHALMOLOGY

## 2017-11-13 PROCEDURE — 92014 COMPRE OPH EXAM EST PT 1/>: CPT | Mod: S$GLB,,, | Performed by: OPHTHALMOLOGY

## 2017-11-13 PROCEDURE — 99999 PR PBB SHADOW E&M-EST. PATIENT-LVL III: CPT | Mod: PBBFAC,,, | Performed by: OPHTHALMOLOGY

## 2017-11-13 NOTE — TELEPHONE ENCOUNTER
"New onset black floaters. Pt states that looks like a "swarm" of black akash's. Apt booked today with Dr. Diallo.   Pt verbalized understanding.   "

## 2017-11-13 NOTE — PROGRESS NOTES
HPI     Eye Problem    Additional comments: check per [place]/ floaters           Comments   DLS 4/6/17 Cecil -- 2 weeks ago he started noticing increase in floaters OS. At times it looks like a swarm of birds. He also is noticing that he needs more light for reading    1) ERM OS    2) PVD OS  Vitreous base dehisced ST - no new breaks or tears    3) PCIOL OS    4) Prosthetic eye OD    5) Retinal tear OS    6) Lamellar macular hole OS        RD precautions      6 months OCT

## 2017-11-13 NOTE — PROGRESS NOTES
HPI     Eye Problem    Additional comments: check per [place]/ floaters           Comments   DLS 4/6/17 José Luiso -- 2 weeks ago he started noticing increase in   floaters OS. At times it looks like a swarm of birds. He also is noticing   that he needs more light for reading    1) ERM OS    2) PVD OS    3) PCIOL OS    4) Prosthetic eye OD    5) Retinal tear OS    6) Lamellar macular hole OS       Last edited by Noemí Bowden on 11/13/2017  1:54 PM. (History)            Assessment /Plan     For exam results, see Encounter Report.    There are no diagnoses linked to this encounter.  ***

## 2017-11-13 NOTE — TELEPHONE ENCOUNTER
----- Message from Cleo Minor sent at 11/13/2017  8:04 AM CST -----  Contact: Nichol  [facility]  OPHTHALMOLOGY TRIAGE CALL    Date:  11/13/2017   Time:  8:04 AM  Person Calling: nichol  Phone Number:  [phone] (home)   Call received by:  Cleo Minor  Patient in Clinic now:  No  Which eye:  Left  Name of Patient's Eye :  Jennie  Date Last Seen:    Disposition of patient:pt was advise by Dr. Schneider office to call the triage nurse to get scheduled with retina  Massive floaters  For How Long:10 days    Additional Comments:

## 2017-11-13 NOTE — LETTER
November 13, 2017      Sena Schneider MD  [address]  [facility] [address]           [facility]hthal[facility]  [address]  [facility] [address]  Phone: [phone]  Fax: [phone]          Patient: Javier Valles   MR Number: [id number]   YOB: 1930   Date of Visit: 11/13/2017       Dear Dr. Sena Schneider:    Thank you for referring Javier Valles to me for evaluation. Attached you will find relevant portions of my assessment and plan of care.    If you have questions, please do not hesitate to call me. I look forward to following Javier Valles along with you.    Sincerely,    JORGE Sandoval MD    Enclosure  CC:  No Recipients    If you would like to receive this communication electronically, please contact [email or web address] or [phone] to request more information on [employer] Link access.    For providers and/or their staff who would like to refer a patient to [facility], please contact us through our one-stop-shop provider referral line, [facility], at [phone].    If you feel you have received this communication in error or would no longer like to receive these types of communications, please e-mail [email or web address]

## 2017-11-15 ENCOUNTER — OFFICE VISIT (OUTPATIENT)
Dept: UROLOGY | Facility: CLINIC | Age: 82
End: 2017-11-15
Payer: MEDICARE

## 2017-11-15 VITALS
SYSTOLIC BLOOD PRESSURE: 121 MMHG | DIASTOLIC BLOOD PRESSURE: 66 MMHG | HEART RATE: 55 BPM | HEIGHT: 68 IN | RESPIRATION RATE: 18 BRPM | WEIGHT: 194 LBS | BODY MASS INDEX: 29.4 KG/M2

## 2017-11-15 DIAGNOSIS — R33.9 URINARY RETENTION: Primary | ICD-10-CM

## 2017-11-15 DIAGNOSIS — R39.198 DIFFICULTY URINATING: ICD-10-CM

## 2017-11-15 DIAGNOSIS — R39.14 BENIGN PROSTATIC HYPERPLASIA WITH INCOMPLETE BLADDER EMPTYING: ICD-10-CM

## 2017-11-15 DIAGNOSIS — R33.9 URINARY RETENTION: ICD-10-CM

## 2017-11-15 DIAGNOSIS — N40.1 BENIGN PROSTATIC HYPERPLASIA WITH INCOMPLETE BLADDER EMPTYING: ICD-10-CM

## 2017-11-15 LAB
BILIRUB SERPL-MCNC: NORMAL MG/DL
BLOOD URINE, POC: NORMAL
COLOR, POC UA: NORMAL
GLUCOSE UR QL STRIP: NORMAL
KETONES UR QL STRIP: NORMAL
LEUKOCYTE ESTERASE URINE, POC: NORMAL
NITRITE, POC UA: NORMAL
PH, POC UA: 6
POC RESIDUAL URINE VOLUME: 832 ML (ref 0–100)
PROTEIN, POC: NORMAL
SPECIFIC GRAVITY, POC UA: 1.01
UROBILINOGEN, POC UA: NORMAL

## 2017-11-15 PROCEDURE — 81002 URINALYSIS NONAUTO W/O SCOPE: CPT | Mod: S$GLB,,, | Performed by: NURSE PRACTITIONER

## 2017-11-15 PROCEDURE — 99499 UNLISTED E&M SERVICE: CPT | Mod: S$GLB,,, | Performed by: NURSE PRACTITIONER

## 2017-11-15 PROCEDURE — 99213 OFFICE O/P EST LOW 20 MIN: CPT | Mod: 25,S$GLB,, | Performed by: NURSE PRACTITIONER

## 2017-11-15 PROCEDURE — 99999 PR PBB SHADOW E&M-EST. PATIENT-LVL V: CPT | Mod: PBBFAC,,, | Performed by: NURSE PRACTITIONER

## 2017-11-15 PROCEDURE — 51798 US URINE CAPACITY MEASURE: CPT | Mod: S$GLB,,, | Performed by: NURSE PRACTITIONER

## 2017-11-15 RX ORDER — SULFAMETHOXAZOLE AND TRIMETHOPRIM 800; 160 MG/1; MG/1
1 TABLET ORAL ONCE
Status: CANCELLED | OUTPATIENT
Start: 2017-11-15 | End: 2017-11-15

## 2017-11-15 RX ORDER — TAMSULOSIN HYDROCHLORIDE 0.4 MG/1
0.4 CAPSULE ORAL DAILY
Qty: 30 CAPSULE | Refills: 11 | Status: SHIPPED | OUTPATIENT
Start: 2017-11-15 | End: 2018-08-16

## 2017-11-15 RX ORDER — LIDOCAINE HYDROCHLORIDE 20 MG/ML
JELLY TOPICAL ONCE
Status: CANCELLED | OUTPATIENT
Start: 2017-11-15 | End: 2017-11-15

## 2017-11-15 NOTE — PATIENT INSTRUCTIONS
Urodynamics Studies     The bladder holds urine until it leaves the body through the urethra.     Urodynamics studies are a series of tests that give your doctor a close look at the working of your bladder and urethra. The tests can help your doctor learn about any problems storing urine or voiding (eliminating) urine from your body.  Understanding the lower urinary tract  The lower part of the urinary tract has several parts.  · The bladder stores urine until youre ready to release it.  · The urethra is the tube that carries urine from the bladder out of the body.  · The sphincter is made up of muscles around the opening of the bladder. The sphincter muscles tighten to hold urine in the bladder. They relax to let urine flow. Signals from the brain tell the sphincter when to tighten and relax. These signals also tell the bladder when to contract to let urine flow out of the body.  Why you need a urodynamics study  This test may be ordered if you:  · Are incontinent (leak urine)  · Have a bladder that does not empty all the way.  · Have symptoms such as the need to urinate often or a constant strong need to urinate  · Have intermittent or weak urine stream  · Have persistent urinary tract infections  Preparing for the study  · Tell your doctor about any medicine youre taking. Ask if you should stop them before the study.  · Keep a diary of your bathroom habits. Do this for a few days before the study. This diary can be a helpful part of the evaluation.  · Ask if you need to arrive for the study with a full bladder.  Date Last Reviewed: 1/1/2017  © 6045-1022 [employer]. [address] [place] [address], [place] [address]. All rights reserved. This information is not intended as a substitute for professional medical care. Always follow your healthcare professional's instructions.        Cystoscopy    Cystoscopy is a procedure that lets your doctor look directly inside your urethra and bladder. It can be used  to:  · Help diagnose a problem with your urethra, bladder, or kidneys.  · Take a sample (biopsy) of bladder or urethral tissue.  · Treat certain problems (such as removing kidney stones).  · Place a stent to bypass an obstruction.  · Take special X-rays of the kidneys.  Based on the findings, your doctor may recommend other tests or treatments.  What is a cystoscope?  A cystoscope is a telescope-like instrument that contains lenses and fiberoptics (small glass wires that make bright light). The cystoscope may be straight and rigid, or flexible to bend around curves in the urethra. The doctor may look directly into the cystoscope, or project the image onto a monitor.  Getting ready  · Ask your doctor if you should stop taking any medicines before the procedure.  · Ask whether you should avoid eating or drinking anything after midnight before the procedure.  · Follow any other instructions your doctor gives you.  Tell your doctor before the exam if you:  · Take any medicines, such as aspirin or blood thinners  · Have allergies to any medicines  · Are pregnant   The procedure  Cystoscopy is done in the doctors office, surgery center, or hospital. The doctor and a nurse are present during the procedure. It takes only a few minutes, longer if a biopsy, X-ray, or treatment needs to be done.  During the procedure:  · You lie on an exam table on your back, knees bent and legs apart. You are covered with a drape.  · Your urethra and the area around it are washed. Anesthetic jelly may be applied to numb the urethra. Other pain medicine is usually not needed. In some cases, you may be offered a mild sedative to help you relax. If a more extensive procedure is to be done, such as a biopsy or kidney stone removal, general anesthesia may be needed.  · The cystoscope is inserted. A sterile fluid is put into the bladder to expand it. You may feel pressure from this fluid.  · When the procedure is done, the cystoscope is  removed.  After the procedure  If you had a sedative, general anesthesia, or spinal anesthesia, you must have someone drive you home. Once youre home:  · Drink plenty of fluids.  · You may have burning or light bleeding when you urinate--this is normal.  · Medicines may be prescribed to ease any discomfort or prevent infection. Take these as directed.  · Call your doctor if you have heavy bleeding or blood clots, burning that lasts more than a day, a fever over 100°F  (38° C), or trouble urinating.  Date Last Reviewed: 1/1/2017  © 6294-3529 [employer]. [address] [place] [address], [place] [address]. All rights reserved. This information is not intended as a substitute for professional medical care. Always follow your healthcare professional's instructions.

## 2017-11-15 NOTE — PROGRESS NOTES
CHIEF COMPLAINT:    Mr. Valles is a 87 y.o. male presenting for difficulty urinating.   PRESENTING ILLNESS:    Javier Valles is a 87 y.o. male with a PMH of BPH, elevated PSA, and urinary retention who presents for difficulty urianting.   Last seen in 2013 w/ ISRAEL Hallman and Dr. Barba.     He presents today for difficulty urinating after his laminotomy and spinal cord stimulator T9-T10 placement on 11/10/17. Denies dysuria and hematuria. Denies fever and chills. He restarted flomax on 11/13/17. He reports having a hx of urinary retention following surgery and he had to perform CIC. This occurred 2 years ago. No suds/ cysto performed at that time.   Prior to surgery, he had nocturia x 1-2. Reported a good FOS and compete bladder emptying.     11/10/17 Procedure(s) (LRB):  PLACEMENT OF SPINAL CORD STIMULATOR T10-T11 laminotomyh for placement of spinal cord stimulator at T9-10, left below the belt line pulse generator (N/A)  LAMINOTOMY      REVIEW OF SYSTEMS:    Review of Systems    Constitutional: Negative for fever and chills.   HENT: Negative for hearing loss.   Eyes: Negative for visual disturbance.   Respiratory: Negative for shortness of breath.   Cardiovascular: Negative for chest pain.   Gastrointestinal: Negative for nausea, vomiting, and constipation.   Genitourinary:  See above  Neurological: Negative for dizziness.   Hematological: Does not bruise/bleed easily.   Psychiatric/Behavioral: Negative for confusion.       PATIENT HISTORY:    Past Medical History:   Diagnosis Date    *Atrial flutter 10/3/13    Anticoagulant long-term use     Aspirin only at this time    Arthritis     Atrial fibrillation     Atrial flutter     Blood transfusion     ?    BPH (benign prostatic hypertrophy)     Carotid artery disease     2008: [place] There is 40 - 49% right Internal Carotid stenosis.  There is 20 - 39% left Internal Carotid stenosis.       Chronic kidney disease     Coronary artery disease     DDD (degenerative disc  disease) 6/25/2015    Degenerative disc disease     Elevated PSA     Glaucoma     Hyperlipidemia     Hypertension     Lumbar stenosis     lumbar spinal stenosis    NSTEMI (non-ST elevated myocardial infarction) 11/3/2013    Pacemaker 11/2013    Peripheral neuropathy     - S/P right ILIAC stent 1993;      Retinal detachment     Squamous cell carcinoma     left leg    Syncope and collapse: orthostatic with hypotension 10/9/2015       Past Surgical History:   Procedure Laterality Date    [facility]      [facility]    CARDIAC ELECTROPHYSIOLOGY MAPPING AND ABLATION  11/2016    CARDIAC PACEMAKER PLACEMENT  11/2016    CATARACT EXTRACTION W/  INTRAOCULAR LENS IMPLANT Left 1997    OS with     CORONARY ARTERY BYPASS GRAFT  1991    ENUCLEATION Right 1949    LUMBAR LAMINECTOMY  04/25/2016    RETINAL DETACHMENT SURGERY Left 1997    Dr. Cosme    SKIN BIOPSY      SPINAL CORD STIMULATOR TRIAL W/ LAMINOTOMY  10/2017    TONSILLECTOMY         Family History   Problem Relation Age of Onset    Stroke Mother 69    Clotting disorder Mother      per patient no clotting disorder    Hypertension Mother     Diverticulitis Son     Cancer Neg Hx     Diabetes Neg Hx     Heart disease Neg Hx     Glaucoma Neg Hx     Amblyopia Neg Hx     Blindness Neg Hx     Cataracts Neg Hx     Macular degeneration Neg Hx     Retinal detachment Neg Hx     Strabismus Neg Hx        Social History     Social History    Marital status:      Spouse name: Joanie    Number of children: N/A    Years of education: N/A     Occupational History    retired      Social History Main Topics    Smoking status: Former Smoker     Quit date: 8/19/1963    Smokeless tobacco: Never Used    Alcohol use 1.8 oz/week     1 Glasses of wine, 1 Cans of beer, 1 Shots of liquor per week      Comment: 2 a day    Drug use: No    Sexual activity: Yes     Partners: Female     Other Topics Concern    Not on file     Social History  Narrative    No narrative on file       Allergies:  Pravastatin; Lipitor [atorvastatin]; Statins-hmg-coa reductase inhibitors; and Ace inhibitors    Medications:    Current Outpatient Prescriptions:     amiodarone (PACERONE) 200 MG Tab, Take 1 tablet (200 mg total) by mouth once daily., Disp: 30 tablet, Rfl: 11    amlodipine (NORVASC) 5 MG tablet, Take 1 tablet (5 mg total) by mouth once daily., Disp: 30 tablet, Rfl: 11    ascorbic acid (VITAMIN C) 1000 MG tablet, Take 1,000 mg by mouth every morning. , Disp: , Rfl:     brimonidine 0.2% (ALPHAGAN) 0.2 % Drop, Place 1 drop into the left eye 3 (three) times daily. Disp 10 mls for 30 days, Disp: 10 mL, Rfl: 12    buPROPion (WELLBUTRIN XL) 150 MG TB24 tablet, Take 1 tablet (150 mg total) by mouth once daily., Disp: 30 tablet, Rfl: 11    co-enzyme Q-10 30 mg capsule, Take 30 mg by mouth once daily. , Disp: , Rfl:     donepezil (ARICEPT) 5 MG tablet, Take 1 tablet (5 mg total) by mouth every evening., Disp: 30 tablet, Rfl: 11    FLUZONE HIGH-DOSE 2017-18, PF, 180 mcg/0.5 mL vaccine, , Disp: , Rfl:     ipratropium (ATROVENT) 0.03 % nasal spray, 1-2 sprays by Nasal route 2 (two) times daily., Disp: 30 mL, Rfl: 1    latanoprost 0.005 % ophthalmic solution, Place 1 drop into both eyes every evening., Disp: 1 Bottle, Rfl: 12    losartan (COZAAR) 50 MG tablet, Take 1 tablet (50 mg total) by mouth 2 (two) times daily., Disp: 180 tablet, Rfl: 3    lutein 20 mg Cap, Take 20 mg by mouth once daily. , Disp: , Rfl:     MULTIVITAMINS,THER W-MINERALS (VITAMINS & MINERALS ORAL), Take 1 tablet by mouth every morning. , Disp: , Rfl:     nitroGLYCERIN (NITROSTAT) 0.4 MG SL tablet, Place 1 tablet (0.4 mg total) under the tongue every 5 (five) minutes as needed for Chest pain., Disp: 30 tablet, Rfl: 5    oxyCODONE (ROXICODONE) 5 MG immediate release tablet, Take 1 tablet (5 mg total) by mouth every 6 (six) hours as needed for Pain., Disp: 30 tablet, Rfl: 0    rosuvastatin  (CRESTOR) 20 MG tablet, Take 1 tablet (20 mg total) by mouth every evening., Disp: 90 tablet, Rfl: 3    tamsulosin (FLOMAX) 0.4 mg Cp24, Take 1 capsule (0.4 mg total) by mouth once daily., Disp: 30 capsule, Rfl: 11    PHYSICAL EXAMINATION:    Constitutional: He is oriented to person, place, and time. He appears well-developed and well-nourished.  He is in no apparent distress.    Neck: No tracheal deviation present.     Cardiovascular: Normal rate.      Pulmonary/Chest: Effort normal. No respiratory distress.     Abdominal:  He exhibits no distension.  There is no CVA tenderness.     Lymphadenopathy:        Right: No supraclavicular adenopathy present.        Left: No supraclavicular adenopathy present.     Neurological: He is alert and oriented to person, place, and time.     Skin: Skin is warm and dry.     Extremities: No pitting edema noted in lower extremities bilaterally    Psych: Cooperative with normal affect.    Genitourinary: The prostate is ~50 g.  Prostate is smooth, non tender with no nodules noted.    Physical Exam      LABS:  PVR today was 832 cc.  U/a today showed no blood or bacteria.   Lab Results   Component Value Date    PSA 10 (H) 06/30/2010    PSA 8.2 (H) 07/08/2009    PSA 7.6 (H) 08/22/2008    PSATOTAL 4.6 06/26/2007    PSATOTAL text 07/20/2006    PSATOTAL 5.2 (H) 05/03/2005    PSAFREE 1.3 06/26/2007    PSAFREE text 07/20/2006    PSAFREE 2.30 05/03/2005    PSAFREEPCT 0.29 06/26/2007    PSAFREEPCT text 07/20/2006    PSAFREEPCT 44.23 05/03/2005       IMPRESSION:    Encounter Diagnoses   Name Primary?    History of Urinary retention Yes    Difficulty urinating     Benign prostatic hyperplasia with incomplete bladder emptying     Urinary retention          PLAN:  -Discussed urinary retention,   -Educated and discussed CIC. Pt demonstrated CIC without problems.   -Continue CIC with 14 fr cath 3-4 x daily for urinary retention indefinitely.   -Discussed suds/cysto to be scheduled.  -Discussed  side effects, indications, and MOA for flomax. Prescription sent to the pharmacy. Pt verbalized understanding.   -RTC 10 days prior to  Suds/cysto for UC or prn.     I encouraged him or any of his family members to call or email me with questions and/or concerns.      Lilia Chase, KEITH-C

## 2017-11-16 ENCOUNTER — PATIENT MESSAGE (OUTPATIENT)
Dept: OPHTHALMOLOGY | Facility: CLINIC | Age: 82
End: 2017-11-16

## 2017-11-16 DIAGNOSIS — H40.1122 PRIMARY OPEN-ANGLE GLAUCOMA, LEFT EYE, MODERATE STAGE: ICD-10-CM

## 2017-11-16 RX ORDER — BRIMONIDINE TARTRATE 2 MG/ML
1 SOLUTION/ DROPS OPHTHALMIC 3 TIMES DAILY
Qty: 10 ML | Refills: 12 | Status: SHIPPED | OUTPATIENT
Start: 2017-11-16 | End: 2018-02-05 | Stop reason: SDUPTHER

## 2017-11-17 ENCOUNTER — PATIENT MESSAGE (OUTPATIENT)
Dept: UROLOGY | Facility: CLINIC | Age: 82
End: 2017-11-17

## 2017-11-28 ENCOUNTER — HOSPITAL ENCOUNTER (OUTPATIENT)
Dept: RADIOLOGY | Facility: HOSPITAL | Age: 82
Discharge: HOME | End: 2017-11-28
Attending: NEUROLOGICAL SURGERY
Payer: MEDICARE

## 2017-11-28 ENCOUNTER — OFFICE VISIT (OUTPATIENT)
Dept: NEUROSURGERY | Facility: CLINIC | Age: 82
End: 2017-11-28
Payer: MEDICARE

## 2017-11-28 VITALS
SYSTOLIC BLOOD PRESSURE: 109 MMHG | DIASTOLIC BLOOD PRESSURE: 61 MMHG | HEART RATE: 57 BPM | WEIGHT: 194 LBS | BODY MASS INDEX: 29.5 KG/M2

## 2017-11-28 DIAGNOSIS — Z96.89 STATUS POST INSERTION OF SPINAL CORD STIMULATOR: ICD-10-CM

## 2017-11-28 DIAGNOSIS — Z96.89 S/P INSERTION OF SPINAL CORD STIMULATOR: Primary | ICD-10-CM

## 2017-11-28 PROCEDURE — 99024 POSTOP FOLLOW-UP VISIT: CPT | Mod: S$GLB,,, | Performed by: PHYSICIAN ASSISTANT

## 2017-11-28 PROCEDURE — 72070 X-RAY EXAM THORAC SPINE 2VWS: CPT | Mod: 26,,, | Performed by: RADIOLOGY

## 2017-11-28 PROCEDURE — 99999 PR PBB SHADOW E&M-EST. PATIENT-LVL III: CPT | Mod: PBBFAC,,, | Performed by: PHYSICIAN ASSISTANT

## 2017-11-28 PROCEDURE — 72070 X-RAY EXAM THORAC SPINE 2VWS: CPT | Mod: TC

## 2017-11-28 NOTE — PROGRESS NOTES
[facility] - Neurosurgery  Progress Note      SUBJECTIVE:     Chief Complaint/Reason for Visit: 2 week post op follow up     History of Present Illness:  Javier Valles is a 87 y.o. male who is 2 weeks status post SCS placement at T8. Patient reports he is doing well since surgery. Reports pain relief above 90%. He is becoming more active since surgery. He still has some low back pain, but reports he can live with that. Overall, very satisfied with the outcome.     Low Back Pain Scale  R Low Back-Pain Score: 3  R Low Back-Pain Intensity: Pain killers give complete relief from pain  R Low Back-Pain Score: I can look after myself normally without causing extra pain  Low Back-Lifting: I can only lift very light weights   Low Back-Walking: Pain does not prevent me from walking any distance   Low Back-Sitting: I can sit in any chair as long as I like   Low Back-Standing: I can stand as long as I want without pain   Low Back-Sleeping: I have no pain in bed   Low Back-Social Life: My social life is normal and give me no pain   Low Back-Traveling: I have no pain when traveling   Low Back-Changing Degree of Pain: My pain is rapidly getting better         Review of patient's allergies indicates:   Allergen Reactions    Pravastatin Other (See Comments)     Severe myalgias/elevated CPK    Lipitor [atorvastatin]      Myositis/elevated CPK    Statins-hmg-coa reductase inhibitors      Muscle pain and loss of muscle    Ace inhibitors      Cough         OBJECTIVE:     Vital Signs (Most Recent):  Pulse: (!) 57 (11/28/17 1543)  BP: 109/61 (11/28/17 1543)    Physical Exam:  General: well developed, well nourished, no distress  Neurologic: Alert and oriented. Thought content appropriate.   GCS: Motor: 6/Verbal: 5/Eyes: 4 GCS Total: 15   Mental Status: Awake, Alert, Oriented x3   Motor Strength: moves all extremities with good strength and tone   Sensation: response to light touch throughout  No gait disturbances   Incision is clean, dry  and intact with no signs of erythema, swelling or purulent drainage. Dissolvable sutures are intact. All skin edges are completely approximated.       Diagnostic Results:  I have independently reviewed the following imaging:  X-ray thoracic spine: stable positioning of paddle lead at T8     ASSESSMENT/PLAN:     87 y.o. male 2 weeks s/p SCS placement with significant pain relief.     - Follow up in 4 weeks with Dr. Toribio   - Keep incision open to air.  - Can shower and get incision wet, just pat dry and no vigorous scrubbing. Do not submerge incision for another 6 weeks.   - No lifting more than 10 lbs or excessive bending/twisting.   - Encouraged patient to call if they have any questions or concerns prior to next follow up appt.      Tomeka Garcia PA-C  Neurosurgery

## 2017-11-30 ENCOUNTER — OFFICE VISIT (OUTPATIENT)
Dept: DERMATOLOGY | Facility: CLINIC | Age: 82
End: 2017-11-30
Payer: MEDICARE

## 2017-11-30 VITALS — WEIGHT: 194 LBS | BODY MASS INDEX: 29.5 KG/M2

## 2017-11-30 DIAGNOSIS — Z85.828 HISTORY OF SKIN CANCER: ICD-10-CM

## 2017-11-30 DIAGNOSIS — L81.4 LENTIGINES: ICD-10-CM

## 2017-11-30 DIAGNOSIS — L82.1 SEBORRHEIC KERATOSES: Primary | ICD-10-CM

## 2017-11-30 PROCEDURE — 99999 PR PBB SHADOW E&M-EST. PATIENT-LVL III: CPT | Mod: PBBFAC,,, | Performed by: DERMATOLOGY

## 2017-11-30 PROCEDURE — 99213 OFFICE O/P EST LOW 20 MIN: CPT | Mod: S$GLB,,, | Performed by: DERMATOLOGY

## 2017-11-30 NOTE — PROGRESS NOTES
Subjective:       Patient ID:  Javier Valles is a 87 y.o. male who presents for   Chief Complaint   Patient presents with    Follow-up     UBSE    Spot     under left arm      History of Present Illness: The patient presents with chief complaint of spot.  Location: left leg  Duration: months  Signs/Symptoms: irritated    Prior treatments: none          Review of Systems   Constitutional: Negative for fever.   Skin: Negative for itching and rash.   Hematologic/Lymphatic: Does not bruise/bleed easily.        Objective:    Physical Exam   Constitutional: He appears well-developed and well-nourished. No distress.   Neurological: He is alert and oriented to person, place, and time. He is not disoriented.   Psychiatric: He has a normal mood and affect.   Skin:   Areas Examined (abnormalities noted in diagram):   Head / Face Inspection Performed  Neck Inspection Performed  Chest / Axilla Inspection Performed  Back Inspection Performed  RUE Inspected  LUE Inspection Performed              Diagram Legend     Erythematous scaling macule/papule c/w actinic keratosis       Vascular papule c/w angioma      Pigmented verrucoid papule/plaque c/w seborrheic keratosis      Yellow umbilicated papule c/w sebaceous hyperplasia      Irregularly shaped tan macule c/w lentigo     1-2 mm smooth white papules consistent with Milia      Movable subcutaneous cyst with punctum c/w epidermal inclusion cyst      Subcutaneous movable cyst c/w pilar cyst      Firm pink to brown papule c/w dermatofibroma      Pedunculated fleshy papule(s) c/w skin tag(s)      Evenly pigmented macule c/w junctional nevus     Mildly variegated pigmented, slightly irregular-bordered macule c/w mildly atypical nevus      Flesh colored to evenly pigmented papule c/w intradermal nevus       Pink pearly papule/plaque c/w basal cell carcinoma      Erythematous hyperkeratotic cursted plaque c/w SCC      Surgical scar with no sign of skin cancer recurrence      Open and  closed comedones      Inflammatory papules and pustules      Verrucoid papule consistent consistent with wart     Erythematous eczematous patches and plaques     Dystrophic onycholytic nail with subungual debris c/w onychomycosis     Umbilicated papule    Erythematous-base heme-crusted tan verrucoid plaque consistent with inflamed seborrheic keratosis     Erythematous Silvery Scaling Plaque c/w Psoriasis     See annotation      Assessment / Plan:        Seborrheic keratoses  reassurance      History of skin cancer  Comments:  scc left leg removed mohs surgery 2016    Lentigines             Return in about 6 months (around 5/30/2018).

## 2017-12-04 ENCOUNTER — CLINICAL SUPPORT (OUTPATIENT)
Dept: ELECTROPHYSIOLOGY | Facility: CLINIC | Age: 82
End: 2017-12-04
Payer: MEDICARE

## 2017-12-04 DIAGNOSIS — I49.5 SSS (SICK SINUS SYNDROME): ICD-10-CM

## 2017-12-04 DIAGNOSIS — I48.91 ATRIAL FIBRILLATION, UNSPECIFIED TYPE: ICD-10-CM

## 2017-12-04 DIAGNOSIS — Z95.0 CARDIAC PACEMAKER IN SITU: ICD-10-CM

## 2017-12-04 PROCEDURE — 93296 REM INTERROG EVL PM/IDS: CPT | Mod: S$GLB,,, | Performed by: INTERNAL MEDICINE

## 2017-12-04 PROCEDURE — 93294 REM INTERROG EVL PM/LDLS PM: CPT | Mod: S$GLB,,, | Performed by: INTERNAL MEDICINE

## 2017-12-05 ENCOUNTER — OFFICE VISIT (OUTPATIENT)
Dept: ELECTROPHYSIOLOGY | Facility: CLINIC | Age: 82
End: 2017-12-05
Payer: MEDICARE

## 2017-12-05 ENCOUNTER — HOSPITAL ENCOUNTER (OUTPATIENT)
Dept: CARDIOLOGY | Facility: CLINIC | Age: 82
Discharge: HOME | End: 2017-12-05
Payer: MEDICARE

## 2017-12-05 VITALS
DIASTOLIC BLOOD PRESSURE: 61 MMHG | SYSTOLIC BLOOD PRESSURE: 129 MMHG | WEIGHT: 189 LBS | BODY MASS INDEX: 27.99 KG/M2 | HEART RATE: 54 BPM | HEIGHT: 69 IN

## 2017-12-05 DIAGNOSIS — I48.0 PAROXYSMAL ATRIAL FIBRILLATION: ICD-10-CM

## 2017-12-05 DIAGNOSIS — I49.5 SSS (SICK SINUS SYNDROME): ICD-10-CM

## 2017-12-05 DIAGNOSIS — I48.3 TYPICAL ATRIAL FLUTTER: Primary | ICD-10-CM

## 2017-12-05 DIAGNOSIS — Z95.0 BIVENTRICULAR CARDIAC PACEMAKER IN SITU: ICD-10-CM

## 2017-12-05 DIAGNOSIS — Z95.1 S/P CABG (CORONARY ARTERY BYPASS GRAFT): ICD-10-CM

## 2017-12-05 DIAGNOSIS — I49.3 PVC (PREMATURE VENTRICULAR CONTRACTION): ICD-10-CM

## 2017-12-05 DIAGNOSIS — I48.91 ATRIAL FIBRILLATION, UNSPECIFIED TYPE: ICD-10-CM

## 2017-12-05 DIAGNOSIS — I25.5 CARDIOMYOPATHY, ISCHEMIC: Chronic | ICD-10-CM

## 2017-12-05 PROCEDURE — 99999 PR PBB SHADOW E&M-EST. PATIENT-LVL III: CPT | Mod: PBBFAC,,, | Performed by: INTERNAL MEDICINE

## 2017-12-05 PROCEDURE — 99214 OFFICE O/P EST MOD 30 MIN: CPT | Mod: S$GLB,,, | Performed by: INTERNAL MEDICINE

## 2017-12-05 PROCEDURE — 93000 ELECTROCARDIOGRAM COMPLETE: CPT | Mod: S$GLB,,, | Performed by: INTERNAL MEDICINE

## 2017-12-05 PROCEDURE — 99499 UNLISTED E&M SERVICE: CPT | Mod: S$GLB,,, | Performed by: INTERNAL MEDICINE

## 2017-12-05 NOTE — ASSESSMENT & PLAN NOTE
Worsened with RV pacing 2016, upgrade to CRT-P 11/16 with return to 40-45% EF  Managed medically by referring providers

## 2017-12-05 NOTE — PROGRESS NOTES
Subjective:    Patient ID:  Javier Valles is a 87 y.o. male who presents for follow-up of sick sinus syndrome      87 year old M AFL, SSS s/p DC PM, here for follow up. 11/2/13 he was in the EP procedure room for AFL ablation. Prior to the ablation, he experience angina with anterior ST changes. He was sent urgently for a [place] where he was found to have experienced an in-stent thrombosis of an OM1 stent. He underwent DC PPM placement prior to discharge secondary to bradycardic events on low dose beta blocker. He underwent DCCV post implantation. He was discharged on amiodarone, metoprolol, aspirin plavix, warfarin, rosuvastatin. His EF was normal on pre-CV MONICA. He subsequently developed L eye bruising and superficial bleeding, and warfarin was stopped 02/2014. He was subsequently started on rivaroxaban 15 mg qd without adverse effects until 06/2014 when he experienced excessive bleeding leading to its discontinuation. He subsequently went on a 3-week tour of [place] without any limitation. In November of 2015 his PPM LRL was set to 60 bpm with noted improvement in his energy level. At that time, Mr. Valles was noted to have an increase in his AF burden, and was also found to have AFL.     Mr. Valles has historically had a long hx of lower back pain; he underwent a successful lumbar laminectomy (04/25/16), which was complicated by persistent AFL post-op. At the time, he remained on aspirin, but had been on apixaban prior to his in-stent thrombosis event in 2013. Apixaban 2.5 mg twice daily was re-started. At that time, Mr. Valles reported experiencxing dyspnea and fatigue, with a significant decrease in his energy level. He was subsequently switched to coumadin (for the procedures), and underwent a successful combination of an AFL RFA and BiV PPM upgrade (11/01/16).     8/17: BiV pacing has reduced to 82% with increase in PVC burden to 14%. Cardiac symptoms have been stable.     Interval history: Amiodarone started with CRT  paced burden 92%, PVCs ~4%. Symptoms are stable.     Past Medical History:  10/3/13: *Atrial flutter  No date: Anticoagulant long-term use      Comment: Aspirin only at this time  No date: Arthritis  No date: Atrial fibrillation  No date: Atrial flutter  No date: Blood transfusion      Comment: ?  No date: BPH (benign prostatic hypertrophy)  No date: Carotid artery disease      Comment: 2008: [place] There is 40 - 49% right Internal                Carotid stenosis.  There is 20 - 39% left                Internal Carotid stenosis.     No date: Chronic kidney disease  No date: Coronary artery disease  6/25/2015: DDD (degenerative disc disease)  No date: Degenerative disc disease  No date: Elevated PSA  No date: Glaucoma  No date: Hyperlipidemia  No date: Hypertension  No date: Lumbar stenosis      Comment: lumbar spinal stenosis  11/3/2013: NSTEMI (non-ST elevated myocardial infarction)  11/2013: Pacemaker  No date: Peripheral neuropathy      Comment: - S/P right ILIAC stent 1993;    No date: Retinal detachment  No date: Squamous cell carcinoma      Comment: left leg  10/9/2015: Syncope and collapse: orthostatic with hypoten*    Past Surgical History:  No date: CARDIAC CATHETERIZATION      Comment: [facility]  11/2016: CARDIAC ELECTROPHYSIOLOGY MAPPING AND ABLATION  11/2016: CARDIAC PACEMAKER PLACEMENT  1997: CATARACT EXTRACTION W/  INTRAOCULAR LENS IMPLA* Left      Comment: OS with   1991: CORONARY ARTERY BYPASS GRAFT  1949: ENUCLEATION Right  04/25/2016: LUMBAR LAMINECTOMY  1997: RETINAL DETACHMENT SURGERY Left      Comment: Dr. Cosme  No date: SKIN BIOPSY  10/2017: SPINAL CORD STIMULATOR TRIAL W/ LAMINOTOMY  No date: TONSILLECTOMY    Social History    Marital status:              Spouse name: Joanie                  Years of education:                 Number of children:               Occupational History  Occupation          Employer            Comment               retired                                      Social History Main Topics    Smoking status: Former Smoker                                                                Packs/day: 0.00      Years: 0.00           Quit date: 8/19/1963    Smokeless tobacco: Never Used                        Alcohol use: Yes           1.8 oz/week       Glasses of wine: 1, Cans of beer: 1, Shots of liquor: 1 per week       Comment: 2 a day    Drug use: No              Sexual activity: Yes               Partners with: Female    Other Topics            Concern    None on file    Social History Narrative    None on file    Review of patient's family history indicates:    Stroke                         Mother                    Clotting disorder              Mother                      Comment: per patient no clotting disorder    Hypertension                   Mother                    Diverticulitis                 Son                       Cancer                         Neg Hx                    Diabetes                       Neg Hx                    Heart disease                  Neg Hx                    Glaucoma                       Neg Hx                    Amblyopia                      Neg Hx                    Blindness                      Neg Hx                    Cataracts                      Neg Hx                    Macular degeneration           Neg Hx                    Retinal detachment             Neg Hx                    Strabismus                     Neg Hx                            Review of Systems   Constitution: Negative for diaphoresis, weakness and malaise/fatigue.   HENT: Negative for nosebleeds.    Eyes: Negative for double vision.   Cardiovascular: Negative for chest pain, claudication, cyanosis, dyspnea on exertion, irregular heartbeat, leg swelling, near-syncope, orthopnea, palpitations, paroxysmal nocturnal dyspnea and syncope.   Respiratory: Negative for shortness of breath.    Skin: Negative.    Musculoskeletal: Positive for stiffness.    Gastrointestinal: Negative for abdominal pain, hematemesis and hematochezia.   Genitourinary: Negative for hematuria.   Neurological: Negative for dizziness, headaches and light-headedness.   Psychiatric/Behavioral: Negative for altered mental status.        Objective:    Physical Exam   Constitutional: He is oriented to person, place, and time. He appears well-developed and well-nourished. No distress.   HENT:   Head: Normocephalic and atraumatic.   Eyes: EOM are normal. Pupils are equal, round, and reactive to light.   Neck: Normal range of motion. No JVD present. No thyromegaly present.   Cardiovascular: Normal rate, regular rhythm, S1 normal, S2 normal and normal heart sounds.  PMI is not displaced.  Exam reveals no gallop and no friction rub.    No murmur heard.  Pulmonary/Chest: Effort normal and breath sounds normal. No respiratory distress. He has no wheezes. He has no rales.   L upper chest pocket with well healed wound and underlying generator   Abdominal: Soft. Bowel sounds are normal. He exhibits no distension. There is no tenderness. There is no rebound and no guarding.   Musculoskeletal: Normal range of motion. He exhibits no edema or tenderness.   Neurological: He is alert and oriented to person, place, and time. No cranial nerve deficit.   Skin: Skin is warm and dry. No rash noted. No erythema.   Psychiatric: He has a normal mood and affect. His behavior is normal. Judgment and thought content normal.   Vitals reviewed.    ECG: NSR nl MT, CRT paced QRS        Assessment:       1. Typical atrial flutter    2. SSS (sick sinus syndrome): s/p PPM    3. PVC (premature ventricular contraction)    4. S/P CABG (coronary artery bypass graft)    5. Biventricular cardiac pacemaker in situ    6. Paroxysmal atrial fibrillation    7. Cardiomyopathy, ischemic: Prior MI, CABG, EF 40-45%         Plan:       Typical atrial flutter  Ablation attempt 11/13 aborted pre-procedure due to in-stent restenosis  Repeat  ablation performed 11/1/16 in combination with CRT upgrade  Not on OAC due to recurrent bleeding/bruising    SSS (sick sinus syndrome): s/p PPM  S/p DC PM 11/13  Upgrade to CRT-P 11/1/16 for RV pacing induced CM  BIV paced burden <90% due to PVCs, >90% on amiodarone    Biventricular cardiac pacemaker in situ  [place] CRT-P 11/1/16      Cardiomyopathy, ischemic: Prior MI, CABG, EF 40-45%  Worsened with RV pacing 2016, upgrade to CRT-P 11/16 with return to 40-45% EF  Managed medically by referring providers        87 yoM here for arrhythmia and PM follow up. Normal CRT-P function with no arrhythmias and >90% CRT pacing since starting amiodarone for PVC suppression. Continue current therapy. RTC 6m

## 2017-12-05 NOTE — ASSESSMENT & PLAN NOTE
S/p DC PM 11/13  Upgrade to CRT-P 11/1/16 for RV pacing induced CM  BIV paced burden <90% due to PVCs, >90% on amiodarone

## 2017-12-05 NOTE — ASSESSMENT & PLAN NOTE
Ablation attempt 11/13 aborted pre-procedure due to in-stent restenosis  Repeat ablation performed 11/1/16 in combination with CRT upgrade  Not on OAC due to recurrent bleeding/bruising

## 2017-12-06 DIAGNOSIS — Z95.0 CARDIAC PACEMAKER IN SITU: Primary | ICD-10-CM

## 2017-12-06 DIAGNOSIS — I48.3 TYPICAL ATRIAL FLUTTER: ICD-10-CM

## 2017-12-06 DIAGNOSIS — I49.5 SSS (SICK SINUS SYNDROME): ICD-10-CM

## 2017-12-11 ENCOUNTER — PATIENT OUTREACH (OUTPATIENT)
Dept: OTHER | Facility: OTHER | Age: 82
End: 2017-12-11

## 2017-12-11 NOTE — PROGRESS NOTES
Last 5 Patient Entered Readings Current 30 Day Average: 129/77     Recent Readings 11/20/2017 11/13/2017 11/10/2017 11/7/2017 10/31/2017    Systolic BP (mmHg) 131 127 152 119 133    Diastolic BP (mmHg) 85 69 77 56 63    Pulse 66 65 53 53 54        Messaging patient regarding lack of readings.

## 2017-12-26 ENCOUNTER — HOSPITAL ENCOUNTER (OUTPATIENT)
Dept: RADIOLOGY | Facility: HOSPITAL | Age: 82
Discharge: HOME | End: 2017-12-26
Attending: NEUROLOGICAL SURGERY
Payer: MEDICARE

## 2017-12-26 ENCOUNTER — TELEPHONE (OUTPATIENT)
Dept: NEUROSURGERY | Facility: CLINIC | Age: 82
End: 2017-12-26

## 2017-12-26 ENCOUNTER — OFFICE VISIT (OUTPATIENT)
Dept: NEUROSURGERY | Facility: CLINIC | Age: 82
End: 2017-12-26
Payer: MEDICARE

## 2017-12-26 VITALS
HEART RATE: 62 BPM | WEIGHT: 197.31 LBS | SYSTOLIC BLOOD PRESSURE: 125 MMHG | DIASTOLIC BLOOD PRESSURE: 74 MMHG | BODY MASS INDEX: 29.14 KG/M2

## 2017-12-26 DIAGNOSIS — Z96.89 STATUS POST INSERTION OF SPINAL CORD STIMULATOR: ICD-10-CM

## 2017-12-26 DIAGNOSIS — Z96.89 S/P INSERTION OF SPINAL CORD STIMULATOR: Primary | ICD-10-CM

## 2017-12-26 PROCEDURE — 72070 X-RAY EXAM THORAC SPINE 2VWS: CPT | Mod: 26,,, | Performed by: RADIOLOGY

## 2017-12-26 PROCEDURE — 72070 X-RAY EXAM THORAC SPINE 2VWS: CPT | Mod: TC

## 2017-12-26 PROCEDURE — 99024 POSTOP FOLLOW-UP VISIT: CPT | Mod: S$GLB,,, | Performed by: NEUROLOGICAL SURGERY

## 2017-12-26 PROCEDURE — 99999 PR PBB SHADOW E&M-EST. PATIENT-LVL III: CPT | Mod: PBBFAC,,, | Performed by: NEUROLOGICAL SURGERY

## 2017-12-26 NOTE — PROGRESS NOTES
Last 5 Patient Entered Readings Current 30 Day Average: 137/75     Recent Readings 12/18/2017 12/14/2017 11/20/2017 11/13/2017 11/10/2017    SBP (mmHg) 132 142 131 127 152    DBP (mmHg) 73 77 85 69 77    Pulse 55 56 66 65 53        Hypertension Medications             amlodipine (NORVASC) 5 MG tablet Take 1 tablet (5 mg total) by mouth once daily.    losartan (COZAAR) 50 MG tablet Take 1 tablet (50 mg total) by mouth 2 (two) times daily.    nitroGLYCERIN (NITROSTAT) 0.4 MG SL tablet Place 1 tablet (0.4 mg total) under the tongue every 5 (five) minutes as needed for Chest pain.        Plan:   Called patient to follow up. Per newly released 2017 ACC/ AHA HTN guidelines  (goal of BP < 130/80), current 30-day average is uncontrolled.  LVM, requested patient call back at his convenience if he has any questions or concerns, and to continue to take at least weekly BP readings.   Will continue to monitor. WCB in 1 month.       Patient called back. Per newly released 2017 ACC/ AHA HTN guidelines (goal of BP < 130/80), current 30-day average is uncontrolled.  Discussed new goals with patient. Requested patient take more frequent readings so I can better determine whether or not we need to adjust he HTN medication regimen, patient confirms understanding.   Patient denies having questions or concerns. Instructed patient to call if he has any questions or concerns, patient confirms understanding.   Will continue to monitor. WCB in 1 month

## 2017-12-26 NOTE — PROGRESS NOTES
NEUROSURGICAL POST-OPERATIVE PROGRESS NOTE    DATE OF SERVICE:  12/26/2017      ATTENDING PHYSICIAN:  Andrzej Toribio MD    SUBJECTIVE:    INTERIM HISTORY:    This is a very pleasant 87 y.o. y.o. male, who is status T9 SCS using the [employer] system 6 weeks ago. He still complete pain relief in his legs. Minimal back pain. Has stayed sedentary. No significant walking yet.      Low Back Pain Scale  R Low Back-Pain Score: 3  R Low Back-Pain Intensity: I can tolerate the pain I have without having to use pain killers  R Low Back-Pain Score: I can look after myself normally without causing extra pain   Low Back-Walking: Pain does not prevent me from walking any distance   Low Back-Sitting: I can sit in any chair as long as I like   Low Back-Standing: I have some pain on standing but it does not increase with time   Low Back-Sleeping: I have no pain in bed         OBJECTIVE:    PHYSICAL EXAMINATION:   Vitals:    12/26/17 1444   BP: 125/74   Pulse: 62           Neurosurgery Physical Exam    Ortho Exam    Neurologic Exam    Wound has healed.    DIAGNOSTIC DATA:    X-ray of the thoracic spine, AP and lateral views reveals the hardware is intact. No migration of hardware.    ASSESMENT:    This is a 87 y.o. male who is s/p 6 weeks T9 SCS placement. Excellent pain relief.    PLAN:    Mild cardiovascular activities on a regular basis  Follow-up in 3 months        Andrzej Toribio MD  Pager: [phone]

## 2017-12-29 ENCOUNTER — OFFICE VISIT (OUTPATIENT)
Dept: URGENT CARE | Facility: CLINIC | Age: 82
End: 2017-12-29
Payer: MEDICARE

## 2017-12-29 VITALS
BODY MASS INDEX: 28.79 KG/M2 | WEIGHT: 190 LBS | SYSTOLIC BLOOD PRESSURE: 122 MMHG | TEMPERATURE: 99 F | OXYGEN SATURATION: 98 % | HEART RATE: 65 BPM | RESPIRATION RATE: 18 BRPM | HEIGHT: 68 IN | DIASTOLIC BLOOD PRESSURE: 69 MMHG

## 2017-12-29 DIAGNOSIS — S60.222A CONTUSION OF LEFT HAND, INITIAL ENCOUNTER: ICD-10-CM

## 2017-12-29 DIAGNOSIS — S69.92XA HAND INJURY, LEFT, INITIAL ENCOUNTER: Primary | ICD-10-CM

## 2017-12-29 PROCEDURE — 99214 OFFICE O/P EST MOD 30 MIN: CPT | Mod: S$GLB,,, | Performed by: PHYSICIAN ASSISTANT

## 2017-12-29 NOTE — PROGRESS NOTES
"Subjective:       Patient ID: Javier Valles is a 87 y.o. male.    Vitals:  height is 5' 8" (1.727 m) and weight is 86.2 kg (190 lb). His tympanic temperature is 98.8 °F (37.1 °C). His blood pressure is 122/69 and his pulse is 65. His respiration is 18 and oxygen saturation is 98%.     Chief Complaint: Hand Pain    Hand Pain    The incident occurred 5 to 7 days ago. The incident occurred at home. The injury mechanism was a fall. The pain is present in the left hand. Quality: "sore and swollen" pt. also reports of having numbness in first 3 fingers. The pain does not radiate. The pain is at a severity of 7/10. The pain is moderate. The pain has been constant since the incident. Pertinent negatives include no chest pain. Nothing aggravates the symptoms. The treatment provided no relief.     Review of Systems   Constitution: Negative for chills and fever.   HENT: Negative for sore throat.    Eyes: Negative for blurred vision.   Cardiovascular: Negative for chest pain.   Respiratory: Negative for shortness of breath.    Skin: Positive for color change. Negative for rash.   Musculoskeletal: Positive for joint pain and joint swelling. Negative for back pain.   Gastrointestinal: Negative for abdominal pain, diarrhea, nausea and vomiting.   Neurological: Negative for headaches.   Psychiatric/Behavioral: The patient is not nervous/anxious.        Objective:      Physical Exam   Constitutional: He is oriented to person, place, and time. He appears well-developed and well-nourished. No distress.   HENT:   Head: Normocephalic and atraumatic.   Eyes: Conjunctivae are normal.   Neck: Normal range of motion. Neck supple.   Cardiovascular: Normal rate and regular rhythm.  Exam reveals no gallop and no friction rub.    No murmur heard.  Pulmonary/Chest: Effort normal and breath sounds normal. He has no wheezes. He has no rales.   Musculoskeletal:        Left hand: He exhibits decreased range of motion (index finger), tenderness " (index finger) and swelling.        Hands:  Neurological: He is alert and oriented to person, place, and time.   Skin: Skin is warm and dry. No rash noted. No erythema.   Psychiatric: He has a normal mood and affect. His behavior is normal. Judgment and thought content normal.   Nursing note and vitals reviewed.      3:35 PM - Hand xray shows no acute fracture.    Assessment:       1. Hand injury, left, initial encounter    2. Contusion of left hand, initial encounter        Plan:         Hand injury, left, initial encounter  -     X-Ray Hand Complete Left; Future; Expected date: 12/29/2017    Contusion of left hand, initial encounter      Javier was seen today for hand pain.    Diagnoses and all orders for this visit:    Hand injury, left, initial encounter  -     X-Ray Hand Complete Left; Future    Contusion of left hand, initial encounter      Patient Instructions   - Rest.    - Drink plenty of fluids.    - Tylenol as directed as needed for fever/pain.    - Follow up with your PCP or specialty clinic as directed in the next 1-2 weeks if not improved or as needed.  You can call [phone] to schedule an appointment with the appropriate provider.    - Go to the ED if your symptoms worsen.    Hand Contusion  You have a contusion. This is also called a bruise. There is swelling and some bleeding under the skin, but no broken bones. This injury generally takes a few days to a few weeks to heal.  During that time, the bruise will typically change in color from reddish, to purple-blue, to greenish-yellow, then to yellow-brown.  Home care  · Elevate the hand to reduce pain and swelling. As much as possible, sit or lie down with the hand raised about the level of your heart. This is especially important during the first 48 hours.  · Ice the hand to help reduce pain and swelling. Wrap a cold source (ice pack or ice cubes in a plastic bag) in a thin towel. Apply to the bruised area for 20 minutes every 1 to 2 hours the  first day. Continue this 3 to 4 times a day until the pain and swelling goes away.  · Unless another medicine was prescribed, you can take acetaminophen, ibuprofen, or naproxen to control pain. (If you have chronic liver or kidney disease or ever had a stomach ulcer or gastrointestinal bleeding, talk with your doctor before using these medicines.)  Follow up  Follow up with your healthcare provider or our staff as advised. Call if you are not improving within 1 to 2 weeks.  When to seek medical advice   Call your healthcare provider right away if you have any of the following:  · Increased pain or swelling  · Arm becomes cold, blue, numb or tingly  · Signs of infection: Warmth, drainage, or increased redness or pain around the bruise  · Inability to move the injured hand   · Frequent bruising for unknown reasons  Date Last Reviewed: 2/1/2017  © 7746-4467 [employer]. [address] [place] [address]. All rights reserved. This information is not intended as a substitute for professional medical care. Always follow your healthcare professional's instructions.

## 2017-12-29 NOTE — PATIENT INSTRUCTIONS
- Rest.    - Drink plenty of fluids.    - Tylenol as directed as needed for fever/pain.    - Follow up with your PCP or specialty clinic as directed in the next 1-2 weeks if not improved or as needed.  You can call [phone] to schedule an appointment with the appropriate provider.    - Go to the ED if your symptoms worsen.    Hand Contusion  You have a contusion. This is also called a bruise. There is swelling and some bleeding under the skin, but no broken bones. This injury generally takes a few days to a few weeks to heal.  During that time, the bruise will typically change in color from reddish, to purple-blue, to greenish-yellow, then to yellow-brown.  Home care  · Elevate the hand to reduce pain and swelling. As much as possible, sit or lie down with the hand raised about the level of your heart. This is especially important during the first 48 hours.  · Ice the hand to help reduce pain and swelling. Wrap a cold source (ice pack or ice cubes in a plastic bag) in a thin towel. Apply to the bruised area for 20 minutes every 1 to 2 hours the first day. Continue this 3 to 4 times a day until the pain and swelling goes away.  · Unless another medicine was prescribed, you can take acetaminophen, ibuprofen, or naproxen to control pain. (If you have chronic liver or kidney disease or ever had a stomach ulcer or gastrointestinal bleeding, talk with your doctor before using these medicines.)  Follow up  Follow up with your healthcare provider or our staff as advised. Call if you are not improving within 1 to 2 weeks.  When to seek medical advice   Call your healthcare provider right away if you have any of the following:  · Increased pain or swelling  · Arm becomes cold, blue, numb or tingly  · Signs of infection: Warmth, drainage, or increased redness or pain around the bruise  · Inability to move the injured hand   · Frequent bruising for unknown reasons  Date Last Reviewed: 2/1/2017  © 8397-4260 [employer]. [address] [place] [address], [place] [address]. All rights reserved. This information is not intended as a substitute for professional medical care. Always follow your healthcare professional's instructions.

## 2018-01-05 DIAGNOSIS — I25.10 CORONARY ARTERY DISEASE INVOLVING NATIVE CORONARY ARTERY WITHOUT ANGINA PECTORIS, UNSPECIFIED WHETHER NATIVE OR TRANSPLANTED HEART: ICD-10-CM

## 2018-01-05 RX ORDER — LOSARTAN POTASSIUM 50 MG/1
50 TABLET ORAL 2 TIMES DAILY
Qty: 180 TABLET | Refills: 3 | Status: SHIPPED | OUTPATIENT
Start: 2018-01-05 | End: 2019-01-07 | Stop reason: SDUPTHER

## 2018-01-10 ENCOUNTER — PATIENT OUTREACH (OUTPATIENT)
Dept: OTHER | Facility: OTHER | Age: 83
End: 2018-01-10

## 2018-01-10 NOTE — PROGRESS NOTES
"Last 5 Patient Entered Readings Current 30 Day Average: 137/75     Recent Readings 12/18/2017 12/14/2017 11/20/2017 11/13/2017 11/10/2017    SBP (mmHg) 132 142 131 127 152    DBP (mmHg) 73 77 85 69 77    Pulse 55 56 66 65 53        Hypertension Digital Medicine Program (HDMP): Health  Follow Up    Lifestyle Modifications:    1.Low sodium diet: no - no changes in diet reported.     2.Physical activity: yes - states even w/his hurt hand, he has been "up, around, and very active" for his age.     3.Hypotension/Hypertension symptoms: no   Frequency/Alleviating factors/Precipitating factors, etc.     4.Patient has been compliant with the medication regimen.     Follow up with Mr. Javier Valles completed. He has been taking BP readings but states he was not aware he had to log in to [employer] taylor to transfer readings. Once he did so, all readings came through to chart. rNo further questions or concerns. I will follow up in a few weeks to assess progress.     "

## 2018-01-23 ENCOUNTER — PATIENT OUTREACH (OUTPATIENT)
Dept: OTHER | Facility: OTHER | Age: 83
End: 2018-01-23

## 2018-01-25 ENCOUNTER — TELEPHONE (OUTPATIENT)
Dept: UROLOGY | Facility: CLINIC | Age: 83
End: 2018-01-25

## 2018-01-25 NOTE — TELEPHONE ENCOUNTER
Pt reports that he is urinating without difficulties. He had to do CIC about 7 x after our visit but has not had to perform since that time. He states he is pleased with his urination. He wants to cancel the suds. I verbalized understanding and cancelled for him.

## 2018-01-29 ENCOUNTER — TELEPHONE (OUTPATIENT)
Dept: NEUROSURGERY | Facility: CLINIC | Age: 83
End: 2018-01-29

## 2018-01-29 NOTE — TELEPHONE ENCOUNTER
----- Message from Hailey Pantoja sent at 1/26/2018  4:11 PM CST -----  Contact: [phone]/self  Patient is requesting a call back. He is having severe back pain. New and different pain. Please advise.

## 2018-01-29 NOTE — TELEPHONE ENCOUNTER
Spoke to patient and he is having severe back pain across the back. It is a new and different pain. Appoinmtent given to patient on 2/6/18

## 2018-02-04 ENCOUNTER — PATIENT MESSAGE (OUTPATIENT)
Dept: DERMATOLOGY | Facility: CLINIC | Age: 83
End: 2018-02-04

## 2018-02-04 ENCOUNTER — PATIENT MESSAGE (OUTPATIENT)
Dept: NEUROSURGERY | Facility: CLINIC | Age: 83
End: 2018-02-04

## 2018-02-05 ENCOUNTER — OFFICE VISIT (OUTPATIENT)
Dept: OPHTHALMOLOGY | Facility: CLINIC | Age: 83
End: 2018-02-05
Attending: OPHTHALMOLOGY
Payer: MEDICARE

## 2018-02-05 DIAGNOSIS — Z97.0 PROSTHETIC EYE GLOBE: ICD-10-CM

## 2018-02-05 DIAGNOSIS — H40.89 OTHER GLAUCOMA OF LEFT EYE: ICD-10-CM

## 2018-02-05 DIAGNOSIS — Z96.1 PSEUDOPHAKIA OF LEFT EYE: ICD-10-CM

## 2018-02-05 DIAGNOSIS — H47.392 OPTIC DISC HEMORRHAGE, LEFT: ICD-10-CM

## 2018-02-05 DIAGNOSIS — H40.1122 PRIMARY OPEN-ANGLE GLAUCOMA, LEFT EYE, MODERATE STAGE: Primary | ICD-10-CM

## 2018-02-05 DIAGNOSIS — H40.1132 PRIMARY OPEN ANGLE GLAUCOMA OF BOTH EYES, MODERATE STAGE: ICD-10-CM

## 2018-02-05 DIAGNOSIS — H43.812 POSTERIOR VITREOUS DETACHMENT OF LEFT EYE: ICD-10-CM

## 2018-02-05 DIAGNOSIS — H35.372 EPIRETINAL MEMBRANE, LEFT EYE: ICD-10-CM

## 2018-02-05 PROCEDURE — 92133 CPTRZD OPH DX IMG PST SGM ON: CPT | Mod: S$GLB,,, | Performed by: OPHTHALMOLOGY

## 2018-02-05 PROCEDURE — 99499 UNLISTED E&M SERVICE: CPT | Mod: S$GLB,,, | Performed by: OPHTHALMOLOGY

## 2018-02-05 PROCEDURE — 92014 COMPRE OPH EXAM EST PT 1/>: CPT | Mod: S$GLB,,, | Performed by: OPHTHALMOLOGY

## 2018-02-05 PROCEDURE — 99999 PR PBB SHADOW E&M-EST. PATIENT-LVL II: CPT | Mod: PBBFAC,,, | Performed by: OPHTHALMOLOGY

## 2018-02-05 PROCEDURE — 92083 EXTENDED VISUAL FIELD XM: CPT | Mod: S$GLB,,, | Performed by: OPHTHALMOLOGY

## 2018-02-05 RX ORDER — BRIMONIDINE TARTRATE 2 MG/ML
1 SOLUTION/ DROPS OPHTHALMIC 3 TIMES DAILY
Qty: 10 ML | Refills: 12 | Status: SHIPPED | OUTPATIENT
Start: 2018-02-05 | End: 2019-03-18 | Stop reason: SDUPTHER

## 2018-02-05 RX ORDER — LATANOPROST 50 UG/ML
1 SOLUTION/ DROPS OPHTHALMIC NIGHTLY
Qty: 1 BOTTLE | Refills: 12 | Status: SHIPPED | OUTPATIENT
Start: 2018-02-05 | End: 2019-03-14 | Stop reason: SDUPTHER

## 2018-02-05 NOTE — PROGRESS NOTES
HPI     Glaucoma    Additional comments: HVF and OCT review today           Comments   DLS: 10/2/17    1) POAG  2) PCIOL OS  3) Prosthesis OD  4) ERM OS  5) Hx Rhegmatogenous RD OS  6) Psuedo Mac Hole OS    MEDS:  Brimonidine TID OS   Latanoprost QHS OS         Last edited by Teresa Gonzalez MA on 2/5/2018 10:13 AM. (History)        Assessment /Plan     For exam results, see Encounter Report.    Primary open-angle glaucoma, left eye, moderate stage  -     latanoprost 0.005 % ophthalmic solution; Place 1 drop into the left eye every evening.  Dispense: 1 Bottle; Refill: 12  -     brimonidine 0.2% (ALPHAGAN) 0.2 % Drop; Place 1 drop into the left eye 3 (three) times daily.  Dispense: 10 mL; Refill: 12    Posterior vitreous detachment of left eye    Epiretinal membrane, left eye    Optic disc hemorrhage, left    Pseudophakia of left eye    Prosthetic eye globe        Old pt of Dr. Goodrich - now seedennis Jacob     1. POAG   Followed at [facility] since 1997   followed by [facility] for 30 years  First HVF 2014  gtts started - [facility] - 2012  First photos - ? 1997 - for RD os     Glaucoma meds -  latanoprost qhs os / brimonidine os   H/O adverse rxn to glaucoma drops none  LASERS - SLT os 5/25/2017 (270 degrees - too narrow inf.) (( good resp 16--> 11)   GLAUCOMA SURGERIES none  OTHER EYE SURGERIES - prosthesis od - (2/2 injury - 1940's) // Pnematic retinopexy OS 1997 - [place] // PC iol os - [place]  CDR - prosthesis / 0.75  Tbase  - ?? On gtts when started here  Tmax  - ?? Off gtts   Ttarget  - ?? 13 or less if possible // had a disc heme with IOP 15-18   HVF 4  test 2014 to 2017 OS:  ? Paracentral defect with early SAD/IAD  Gonio  +3-4 S/T/N // very narrow inf os   CCT - xx/492  OCT 2 test 2014 to 2016- RNFL - OD:not done // OS:dec s/bord T  HRT 3 test 2015 - 2017 -  MR -  xx od // xx os /// CDR dec S/I/N bord T  od // 0.80 os   Disc photos - mult old slides 1997 - 2006 // 2015, 2017 - w/ IT disc heme - OIS    Ttoday -  prosthetic // 13  Test done today HVF / DFE / OCT    2.  disc heme os    See photos 4/17/2017 - occurred with IOP's of  13-18   Resolved by 10/2/107    3.  Monocular precautions  - prothetic od    4.  erm os  - stable, follows with philippe  -on nevanac q day os - feels it helps vision - ?     5.  Hx of rhegmatogenous rrd os  - flat, follows with philippe    6.  Mac hole os  - monitor     7. PC IOL OS     8. In past Dr Goodrich has filled out form allowing him to get a drivers license - may need again soon     Pt on metoprolol xl      Glaucoma os - monocular   Cont  xal  Cont brimonidine   IOP seems ok / but has a new disc heme - rec lower IOP - try slt os (( ?? Target 14))   SLT os - 5/25/2017 - S/T/N (too narrow inf. ) - steroid taper (( good resp 16-->11)     -consider topical SILVERIO as needed   - (may be able to use BB as now has a pacemaker, but has a H/O low heart rate / and/or arrythmia)   - also can try topical SILVERIO prn     -add EES ointment od - prosthesis - prn irritation     Sees [facility] - new glasses - has been restricted to daytime driving   Keep regular F/U with philippe / rere    Pt occasionally has to get steroid injections for back pain - so will need to monitor that - may be a cause of elevated IOP from time to time      F/U 4 months with HRT os     I have seen and personally examined the patient.  I agree with the findings, assessment and plan of the resident and/or fellow.     Sena Schneider MD

## 2018-02-07 ENCOUNTER — PATIENT OUTREACH (OUTPATIENT)
Dept: OTHER | Facility: OTHER | Age: 83
End: 2018-02-07

## 2018-02-07 NOTE — PROGRESS NOTES
Last 5 Patient Entered Readings                                      Current 30 Day Average: 129/70     Recent Readings 1/29/2018 1/29/2018 1/22/2018 1/21/2018 1/17/2018    SBP (mmHg) 127 141 111 130 142    DBP (mmHg) 62 68 59 73 71    Pulse 55 53 55 55 58          Hypertension Digital Medicine Program (HDMP): Health  Follow Up    Lifestyle Modifications:    1.Low sodium diet: Deferred    2.Physical activity: Deferred    3.Hypotension/Hypertension symptoms: no   Frequency/Alleviating factors/Precipitating factors, etc.     4.Patient has been compliant with the medication regimen.     Introduced myself as the patients temporary HC.  Patient informed me that he took a reading yesterday so he is not sure why it did not come through. I requested that he makes sure to open his [employer] taylor on his phone to make sure he is still logged in. He stated that he will do that and he will call me with any questions or concerns.     Follow up with Mr. Javier Valles completed. No further questions or concerns. I will follow up in a few weeks to assess progress.

## 2018-02-08 ENCOUNTER — PATIENT MESSAGE (OUTPATIENT)
Dept: CARDIOLOGY | Facility: CLINIC | Age: 83
End: 2018-02-08

## 2018-02-08 DIAGNOSIS — E78.5 HYPERLIPIDEMIA, UNSPECIFIED HYPERLIPIDEMIA TYPE: ICD-10-CM

## 2018-02-08 RX ORDER — ROSUVASTATIN CALCIUM 20 MG/1
20 TABLET, COATED ORAL NIGHTLY
Qty: 90 TABLET | Refills: 3 | Status: SHIPPED | OUTPATIENT
Start: 2018-02-08 | End: 2019-01-07 | Stop reason: SDUPTHER

## 2018-02-15 ENCOUNTER — TELEPHONE (OUTPATIENT)
Dept: OPTOMETRY | Facility: CLINIC | Age: 83
End: 2018-02-15

## 2018-02-15 NOTE — TELEPHONE ENCOUNTER
----- Message from Cleo Minor sent at 2/15/2018  3:17 PM CST -----  Contact: nichol  Mr. Valles would like to schedule a low vision appointment in early March. I was unable to get the dates to match. He can be reached at [phone].

## 2018-02-28 ENCOUNTER — PATIENT MESSAGE (OUTPATIENT)
Dept: ADMINISTRATIVE | Facility: OTHER | Age: 83
End: 2018-02-28

## 2018-03-01 ENCOUNTER — OFFICE VISIT (OUTPATIENT)
Dept: DERMATOLOGY | Facility: CLINIC | Age: 83
End: 2018-03-01
Payer: MEDICARE

## 2018-03-01 VITALS — BODY MASS INDEX: 28.89 KG/M2 | WEIGHT: 190 LBS

## 2018-03-01 DIAGNOSIS — R20.9 DISTURBANCE OF SKIN SENSATION: ICD-10-CM

## 2018-03-01 DIAGNOSIS — L81.4 LENTIGINES: ICD-10-CM

## 2018-03-01 DIAGNOSIS — Z85.828 HISTORY OF SKIN CANCER: ICD-10-CM

## 2018-03-01 DIAGNOSIS — L82.1 SEBORRHEIC KERATOSES: Primary | ICD-10-CM

## 2018-03-01 PROCEDURE — 99999 PR PBB SHADOW E&M-EST. PATIENT-LVL III: CPT | Mod: PBBFAC,,, | Performed by: DERMATOLOGY

## 2018-03-01 PROCEDURE — 17110 DESTRUCTION B9 LES UP TO 14: CPT | Mod: S$GLB,,, | Performed by: DERMATOLOGY

## 2018-03-01 PROCEDURE — 99213 OFFICE O/P EST LOW 20 MIN: CPT | Mod: 25,S$GLB,, | Performed by: DERMATOLOGY

## 2018-03-01 RX ORDER — APIXABAN 2.5 MG/1
TABLET, FILM COATED ORAL
COMMUNITY
Start: 2018-01-02 | End: 2018-05-16

## 2018-03-01 NOTE — PROGRESS NOTES
Subjective:       Patient ID:  Javier Valles is a 87 y.o. male who presents for   Chief Complaint   Patient presents with    Spot     chest    Skin Check     UBSE     History of Present Illness: The patient presents with chief complaint of spot.  Location: left chest  Duration: several weeks  Signs/Symptoms: irritated    Prior treatments: none          Review of Systems   Constitutional: Negative for fever.   Skin: Negative for itching and rash.   Hematologic/Lymphatic: Bruises/bleeds easily.        Objective:    Physical Exam   Constitutional: He appears well-developed and well-nourished. No distress.   Neurological: He is alert and oriented to person, place, and time. He is not disoriented.   Psychiatric: He has a normal mood and affect.   Skin:   Areas Examined (abnormalities noted in diagram):   Head / Face Inspection Performed  Neck Inspection Performed  Chest / Axilla Inspection Performed  Abdomen Inspection Performed  Back Inspection Performed  RUE Inspected  LUE Inspection Performed  RLE Inspected  LLE Inspection Performed              Diagram Legend     Erythematous scaling macule/papule c/w actinic keratosis       Vascular papule c/w angioma      Pigmented verrucoid papule/plaque c/w seborrheic keratosis      Yellow umbilicated papule c/w sebaceous hyperplasia      Irregularly shaped tan macule c/w lentigo     1-2 mm smooth white papules consistent with Milia      Movable subcutaneous cyst with punctum c/w epidermal inclusion cyst      Subcutaneous movable cyst c/w pilar cyst      Firm pink to brown papule c/w dermatofibroma      Pedunculated fleshy papule(s) c/w skin tag(s)      Evenly pigmented macule c/w junctional nevus     Mildly variegated pigmented, slightly irregular-bordered macule c/w mildly atypical nevus      Flesh colored to evenly pigmented papule c/w intradermal nevus       Pink pearly papule/plaque c/w basal cell carcinoma      Erythematous hyperkeratotic cursted plaque c/w SCC       "Surgical scar with no sign of skin cancer recurrence      Open and closed comedones      Inflammatory papules and pustules      Verrucoid papule consistent consistent with wart     Erythematous eczematous patches and plaques     Dystrophic onycholytic nail with subungual debris c/w onychomycosis     Umbilicated papule    Erythematous-base heme-crusted tan verrucoid plaque consistent with inflamed seborrheic keratosis     Erythematous Silvery Scaling Plaque c/w Psoriasis     See annotation      Assessment / Plan:        Seborrheic keratoses  Reassurance  Cryosurgery procedure note:  Left chest   Verbal consent from the patient is obtained. Liquid nitrogen cryosurgery is applied to 1 lesion to produce a freeze injury. The patient is aware that blisters may form and is instructed on wound care with gentle cleansing and use of vaseline ointment to keep moist until healed.         Lentigines  The "ABCD" rules to observe pigmented lesions were reviewed.      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  "

## 2018-03-02 ENCOUNTER — OFFICE VISIT (OUTPATIENT)
Dept: OPTOMETRY | Facility: CLINIC | Age: 83
End: 2018-03-02
Payer: MEDICARE

## 2018-03-02 DIAGNOSIS — H52.4 MYOPIA OF BOTH EYES WITH ASTIGMATISM AND PRESBYOPIA: Primary | ICD-10-CM

## 2018-03-02 DIAGNOSIS — H52.203 MYOPIA OF BOTH EYES WITH ASTIGMATISM AND PRESBYOPIA: Primary | ICD-10-CM

## 2018-03-02 DIAGNOSIS — H52.13 MYOPIA OF BOTH EYES WITH ASTIGMATISM AND PRESBYOPIA: Primary | ICD-10-CM

## 2018-03-02 PROCEDURE — 99499 UNLISTED E&M SERVICE: CPT | Mod: S$GLB,,, | Performed by: OPTOMETRIST

## 2018-03-02 PROCEDURE — 99999 PR PBB SHADOW E&M-EST. PATIENT-LVL II: CPT | Mod: PBBFAC,,, | Performed by: OPTOMETRIST

## 2018-03-02 PROCEDURE — 92015 DETERMINE REFRACTIVE STATE: CPT | Mod: S$GLB,,, | Performed by: OPTOMETRIST

## 2018-03-02 NOTE — PROGRESS NOTES
Assessment /Plan     For exam results, see Encounter Report.    Myopia of both eyes with astigmatism and presbyopia      1. See low vision exam same date.

## 2018-03-06 ENCOUNTER — PATIENT OUTREACH (OUTPATIENT)
Dept: OTHER | Facility: OTHER | Age: 83
End: 2018-03-06

## 2018-03-06 ENCOUNTER — CLINICAL SUPPORT (OUTPATIENT)
Dept: ELECTROPHYSIOLOGY | Facility: CLINIC | Age: 83
End: 2018-03-06
Attending: INTERNAL MEDICINE
Payer: MEDICARE

## 2018-03-06 DIAGNOSIS — Z95.0 CARDIAC PACEMAKER IN SITU: ICD-10-CM

## 2018-03-06 DIAGNOSIS — I48.3 TYPICAL ATRIAL FLUTTER: ICD-10-CM

## 2018-03-06 DIAGNOSIS — I49.5 SSS (SICK SINUS SYNDROME): ICD-10-CM

## 2018-03-06 PROCEDURE — 93281 PM DEVICE PROGR EVAL MULTI: CPT | Mod: S$GLB,,, | Performed by: INTERNAL MEDICINE

## 2018-03-06 NOTE — PROGRESS NOTES
Last 5 Patient Entered Readings                                      Current 30 Day Average: 140/75     Recent Readings 2/26/2018 2/23/2018 2/20/2018 2/19/2018 2/16/2018    SBP (mmHg) 155 146 143 146 143    DBP (mmHg) 81 73 74 68 83    Pulse 51 54 64 56 52          Hypertension Digital Medicine Program (HDMP): Health  Follow Up    Lifestyle Modifications:    1.Low sodium diet: Deferred    2.Physical activity: Deferred    3.Hypotension/Hypertension symptoms: no   Frequency/Alleviating factors/Precipitating factors, etc.     4.Patient has been compliant with the medication regimen.     Patient stated that his readings have a lot to do with his emotions. He gets frustrated/upset very easily and when this happens, his BP rises. He stated that he has no symptoms and has felt better in the last 6 months then ever before. He will let us know if anything changes or if he notices any symptoms.  It appears that he gets logged out of his [employer] taylor sometimes and that's why his readings stopping coming through. He will log back in to make sure his reading from last week transmits.     Follow up with Mr. Javier Valles completed. No further questions or concerns. I will follow up in a few weeks to assess progress.     Goal is < 140/90

## 2018-03-08 ENCOUNTER — PATIENT MESSAGE (OUTPATIENT)
Dept: OTHER | Facility: OTHER | Age: 83
End: 2018-03-08

## 2018-03-16 RX ORDER — DONEPEZIL HYDROCHLORIDE 5 MG/1
5 TABLET, FILM COATED ORAL NIGHTLY
Qty: 30 TABLET | Refills: 11 | Status: SHIPPED | OUTPATIENT
Start: 2018-03-16 | End: 2018-06-15 | Stop reason: SDUPTHER

## 2018-04-02 ENCOUNTER — OFFICE VISIT (OUTPATIENT)
Dept: NEUROSURGERY | Facility: CLINIC | Age: 83
End: 2018-04-02
Payer: MEDICARE

## 2018-04-02 VITALS
WEIGHT: 197.06 LBS | HEART RATE: 57 BPM | BODY MASS INDEX: 29.97 KG/M2 | DIASTOLIC BLOOD PRESSURE: 82 MMHG | SYSTOLIC BLOOD PRESSURE: 131 MMHG

## 2018-04-02 DIAGNOSIS — Z96.89 S/P INSERTION OF SPINAL CORD STIMULATOR: Primary | ICD-10-CM

## 2018-04-02 PROCEDURE — 99212 OFFICE O/P EST SF 10 MIN: CPT | Mod: S$GLB,,, | Performed by: NEUROLOGICAL SURGERY

## 2018-04-02 PROCEDURE — 99999 PR PBB SHADOW E&M-EST. PATIENT-LVL III: CPT | Mod: PBBFAC,,, | Performed by: NEUROLOGICAL SURGERY

## 2018-04-02 PROCEDURE — 99499 UNLISTED E&M SERVICE: CPT | Mod: S$GLB,,, | Performed by: NEUROLOGICAL SURGERY

## 2018-04-02 NOTE — PROGRESS NOTES
NEUROSURGICAL PROGRESS NOTE    DATE OF SERVICE:  04/02/2018    ATTENDING PHYSICIAN:  Andrzej Toribio MD    SUBJECTIVE:    INTERIM HISTORY:    This is a very pleasant 87 y.o. male, who is status post T9 SCS with the [employer] system about 4 1/2 months ago. Doing well. Sustained back pain relief. No leg pain. More active. No issues with the IPG. Very satisfied with his outcome.     Low Back Pain Scale  R Low Back-Pain Score: 2  R Low Back-Pain Intensity: I can tolerate the pain I have without having to use pain killers  R Low Back-Pain Score: I can look after myself normally without causing extra pain  Low Back-Lifting: I can lift heavy weights but it gives extra pain   Low Back-Walking: Pain does not prevent me from walking any distance   Low Back-Sitting: I can sit in any chair as long as I like   Low Back-Standing: I can stand as long as I want without pain   Low Back-Sleeping: I have no pain in bed   Low Back-Social Life: Pain has no significant effect on my social life apart from limiting my more en   Low Back-Traveling: I have no pain when traveling   Low Back-Changing Degree of Pain: My pain fluctuates but is definitely getting better         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    Chronic kidney disease, stage 3 (moderate) 11/27/2012    Benign hypertensive renal disease without renal failure 11/27/2012    Gout, unspecified 11/27/2012    Acquired cyst of kidney 11/27/2012    Idiopathic progressive polyneuropathy 12/17/2012    Atherosclerosis of aorta 10/09/2013    Epiretinal membrane, left eye 10/28/2013    Posterior  vitreous detachment of left eye 10/28/2013    Pseudophakia of left eye 10/28/2013    Prosthetic eye globe 10/28/2013    Open-angle glaucoma 10/28/2013    Retinal tear 10/28/2013    Rhegmatogenous retinal detachment 10/28/2013    Atrial fibrillation 11/03/2013    History of Urinary retention 11/07/2013    Biventricular cardiac pacemaker in situ 12/12/2013    SSS (sick sinus syndrome): s/p PPM 12/12/2013    Macular edema, cystoid 06/11/2014    Lamellar macular hole 06/11/2014    Left lumbar radiculopathy 06/25/2015    Lumbar facet arthropathy 06/25/2015    Spondylosis without myelopathy 06/25/2015    Lumbar degenerative disc disease 06/25/2015    Gait apraxia 10/09/2015    Contusion of rib on left side 10/28/2015    Spinal stenosis, lumbar 04/13/2016    Obesity, Class I, BMI 30-34.9 04/21/2016    Coronary artery disease involving native coronary artery of native heart without angina pectoris 04/21/2016    S/P CABG (coronary artery bypass graft) 04/21/2016    Varicose veins of both lower extremities- Rt more than Left 04/21/2016    Edema 04/21/2016    Thrombocytopenia 04/21/2016    Total bilirubin, elevated 04/21/2016    Lumbar stenosis with neurogenic claudication 04/25/2016    S/P lumbar laminectomy     Encounter for postoperative wound check 05/24/2016    Peripheral venous insufficiency     Typical atrial flutter 09/21/2016    Current use of long term anticoagulation 09/21/2016    Cardiomyopathy 09/21/2016    Status post catheter ablation of atrial flutter 12/14/2016    Bilateral carpal tunnel syndrome 01/24/2017    Chronic pain 04/28/2017    Lumbar spinal stenosis 05/02/2017    Acute lumbar radiculopathy 05/02/2017    Osteoarthritis of spine with radiculopathy, lumbar region 05/29/2017    Cardiomyopathy, ischemic: Prior MI, CABG, EF 40-45% 06/07/2017    Post laminectomy syndrome 06/15/2017    PVC (premature ventricular contraction) 08/29/2017    Chronic pain syndrome  11/09/2017     Resolved Ambulatory Problems     Diagnosis Date Noted    Coronary artery disease     Atrial flutter 10/03/2013    Lamellar macular hole of left eye 10/28/2013    Lamellar macular hole 10/28/2013    Unstable angina 11/01/2013    NSTEMI (non-ST elevated myocardial infarction) 11/03/2013    Cardiogenic shock 11/03/2013    Ventricular fibrillation 11/03/2013    Acute systolic heart failure 11/03/2013    Syncope and collapse: orthostatic with hypotension 10/09/2015    Lumbar myelopathy 04/29/2016     Past Medical History:   Diagnosis Date    *Atrial flutter 10/3/13    Anticoagulant long-term use     Arthritis     Atrial fibrillation     Atrial flutter     Blood transfusion     BPH (benign prostatic hypertrophy)     Carotid artery disease     Chronic kidney disease     Coronary artery disease     DDD (degenerative disc disease) 6/25/2015    Degenerative disc disease     Elevated PSA     Glaucoma     Hyperlipidemia     Hypertension     Lumbar stenosis     NSTEMI (non-ST elevated myocardial infarction) 11/3/2013    Pacemaker 11/2013    Peripheral neuropathy     Retinal detachment     Squamous cell carcinoma     Syncope and collapse: orthostatic with hypotension 10/9/2015       PAST SURGICAL HISTORY:  Past Surgical History:   Procedure Laterality Date    [facility]AC [facility]TERIZATION      [facility]    CARDIAC ELECTROPHYSIOLOGY MAPPING AND ABLATION  11/2016    CARDIAC PACEMAKER PLACEMENT  11/2016    CATARACT EXTRACTION W/  INTRAOCULAR LENS IMPLANT Left 1997    OS with     CORONARY ARTERY BYPASS GRAFT  1991    ENUCLEATION Right 1949    LUMBAR LAMINECTOMY  04/25/2016    RETINAL DETACHMENT SURGERY Left 1997    Dr. Cosme    SKIN BIOPSY      SPINAL CORD STIMULATOR TRIAL W/ LAMINOTOMY  10/2017    TONSILLECTOMY         SOCIAL HISTORY:   Social History     Social History    Marital status:      Spouse name: Joanie    Number of children: N/A    Years of education: N/A      Occupational History    retired      Social History Main Topics    Smoking status: Former Smoker     Quit date: 8/19/1963    Smokeless tobacco: Never Used    Alcohol use 1.8 oz/week     1 Glasses of wine, 1 Cans of beer, 1 Shots of liquor per week      Comment: 2 a day    Drug use: No    Sexual activity: Yes     Partners: Female     Other Topics Concern    Not on file     Social History Narrative    No narrative on file       FAMILY HISTORY:  Family History   Problem Relation Age of Onset    Stroke Mother 69    Clotting disorder Mother      per patient no clotting disorder    Hypertension Mother     Diverticulitis Son     Cancer Neg Hx     Diabetes Neg Hx     Heart disease Neg Hx     Glaucoma Neg Hx     Amblyopia Neg Hx     Blindness Neg Hx     Cataracts Neg Hx     Macular degeneration Neg Hx     Retinal detachment Neg Hx     Strabismus Neg Hx        CURRENTS MEDICATIONS:  Current Outpatient Prescriptions on File Prior to Visit   Medication Sig Dispense Refill    amiodarone (PACERONE) 200 MG Tab Take 1 tablet (200 mg total) by mouth once daily. 30 tablet 11    amlodipine (NORVASC) 5 MG tablet Take 1 tablet (5 mg total) by mouth once daily. 30 tablet 11    apixaban (ELIQUIS) 5 mg Tab Take 5 mg by mouth 2 (two) times daily.      ascorbic acid (VITAMIN C) 1000 MG tablet Take 1,000 mg by mouth every morning.       brimonidine 0.2% (ALPHAGAN) 0.2 % Drop Place 1 drop into the left eye 3 (three) times daily. 10 mL 12    buPROPion (WELLBUTRIN XL) 150 MG TB24 tablet Take 1 tablet (150 mg total) by mouth once daily. 30 tablet 11    co-enzyme Q-10 30 mg capsule Take 30 mg by mouth once daily.       donepezil (ARICEPT) 5 MG tablet Take 1 tablet (5 mg total) by mouth every evening. 30 tablet 11    ELIQUIS 2.5 mg Tab       ipratropium (ATROVENT) 0.03 % nasal spray 1-2 sprays by Nasal route 2 (two) times daily. 30 mL 1    latanoprost 0.005 % ophthalmic solution Place 1 drop into the left eye  every evening. 1 Bottle 12    losartan (COZAAR) 50 MG tablet Take 1 tablet (50 mg total) by mouth 2 (two) times daily. 180 tablet 3    lutein 20 mg Cap Take 20 mg by mouth once daily.       MULTIVITAMINS,THER W-MINERALS (VITAMINS & MINERALS ORAL) Take 1 tablet by mouth every morning.       nitroGLYCERIN (NITROSTAT) 0.4 MG SL tablet Place 1 tablet (0.4 mg total) under the tongue every 5 (five) minutes as needed for Chest pain. 30 tablet 5    rosuvastatin (CRESTOR) 20 MG tablet Take 1 tablet (20 mg total) by mouth every evening. 90 tablet 3    tamsulosin (FLOMAX) 0.4 mg Cp24 Take 1 capsule (0.4 mg total) by mouth once daily. 30 capsule 11     No current facility-administered medications on file prior to visit.        ALLERGIES:  Review of patient's allergies indicates:   Allergen Reactions    Pravastatin      Severe myalgias/elevated CPK    Lipitor [atorvastatin]      Myositis/elevated CPK    Statins-hmg-coa reductase inhibitors      Muscle pain and loss of muscle    Ace inhibitors      Cough       REVIEW OF SYSTEMS:  Review of Systems   Constitutional: Negative for diaphoresis, fever and weight loss.   Respiratory: Negative for shortness of breath.    Cardiovascular: Negative for chest pain.   Gastrointestinal: Negative for blood in stool.   Genitourinary: Negative for hematuria.   Endo/Heme/Allergies: Does not bruise/bleed easily.   All other systems reviewed and are negative.        OBJECTIVE:    PHYSICAL EXAMINATION:   Vitals:    04/02/18 1021   BP: 131/82   Pulse: (!) 57       Physical Exam:    Psych/Behavior: He is oriented to person, place, and time.     Neurological:        DTRs: Tricep reflexes are 2+ on the right side and 2+ on the left side. Bicep reflexes are 2+ on the right side and 2+ on the left side. Brachioradialis reflexes are 2+ on the right side and 2+ on the left side. Patellar reflexes are 2+ on the right side and 2+ on the left side. Achilles reflexes are 2+ on the right side and 2+ on  the left side.       Back Exam     Muscle Strength   Right Quadriceps:  5/5   Left Quadriceps:  5/5   Right Hamstrings:  5/5   Left Hamstrings:  5/5             SI joint:   Palpation at the right and left SI joints not painful  ABEBA test is negative bilaterally  Gaenslen test is negative bilaterally  Thigh thrust test is negative bilaterally    Neurologic Exam     Mental Status   Oriented to person, place, and time.   Speech: speech is normal   Level of consciousness: alert    Cranial Nerves   Cranial nerves II through XII intact.     Motor Exam   Muscle bulk: normal  Overall muscle tone: normal    Strength   Right deltoid: 5/5  Left deltoid: 5/5  Right biceps: 5/5  Left biceps: 5/5  Right triceps: 5/5  Left triceps: 5/5  Right wrist flexion: 5/5  Left wrist flexion: 5/5  Right wrist extension: 5/5  Left wrist extension: 5/5  Right interossei: 5/5  Left interossei: 5/5  Right iliopsoas: 5/5  Left iliopsoas: 5/5  Right quadriceps: 5/5  Left quadriceps: 5/5  Right hamstrin/5  Left hamstrin/5  Right anterior tibial: 5/5  Left anterior tibial: 5/5  Right posterior tibial: 5/5  Left posterior tibial: 5/5  Right peroneal: 5/5  Left peroneal: 5/5  Right gastroc: 5/5  Left gastroc: 5/5    Sensory Exam   Light touch normal.   Pinprick normal.     Gait, Coordination, and Reflexes     Gait  Gait: normal    Coordination   Finger to nose coordination: normal  Tandem walking coordination: normal    Reflexes   Right brachioradialis: 2+  Left brachioradialis: 2+  Right biceps: 2+  Left biceps: 2+  Right triceps: 2+  Left triceps: 2+  Right patellar: 2+  Left patellar: 2+  Right achilles: 2+  Left achilles: 2+  Right plantar: normal  Left plantar: normal  Right Tucker: absent  Left Tucker: absent  Right ankle clonus: absent  Left ankle clonus: absent        DIAGNOSTIC DATA:  I personally reviewed the following imaging:   NA    ASSESMENT:  This is a 87 y.o. male with     Problem List Items Addressed This Visit     None       Visit Diagnoses     S/P insertion of spinal cord stimulator    -  Primary            PLAN:  Follow-up in one year  Will reach out to me if SCS stop functioning.              Andrzej Toribio MD  Pager: [phone]

## 2018-04-17 ENCOUNTER — PATIENT OUTREACH (OUTPATIENT)
Dept: OTHER | Facility: OTHER | Age: 83
End: 2018-04-17

## 2018-04-17 NOTE — PROGRESS NOTES
Last 5 Patient Entered Readings                                      Current 30 Day Average: 134/74     Recent Readings 4/14/2018 4/13/2018 4/10/2018 4/6/2018 4/6/2018    SBP (mmHg) 112 126 127 141 166    DBP (mmHg) 65 100 63 74 88    Pulse 52 52 61 59 55        Hypertension Medications             amlodipine (NORVASC) 5 MG tablet Take 1 tablet (5 mg total) by mouth once daily.    losartan (COZAAR) 50 MG tablet Take 1 tablet (50 mg total) by mouth 2 (two) times daily.    nitroGLYCERIN (NITROSTAT) 0.4 MG SL tablet Place 1 tablet (0.4 mg total) under the tongue every 5 (five) minutes as needed for Chest pain.        Plan:   Called patient to follow up. Current 30-day average is controlled, goal < 140/90.   LVM, requested patient call back at his convenience if he has any questions or concerns.  Will continue to monitor. WCB in 3 months, sooner if BP begins to trend up or down.

## 2018-04-24 RX ORDER — AMLODIPINE BESYLATE 5 MG/1
TABLET ORAL
Qty: 30 TABLET | Refills: 11 | Status: SHIPPED | OUTPATIENT
Start: 2018-04-24 | End: 2018-05-22 | Stop reason: SDUPTHER

## 2018-04-24 NOTE — TELEPHONE ENCOUNTER
----- Message from Jeniffer Macedo sent at 4/24/2018  9:34 AM CDT -----  Contact: Patient [phone]  Rx Name:amlodipine (NORVASC) 5 MG tablet    Refill: yes    Pharmacy: [facility] [id number] [facility] [address]    Comments:    Please call and advise.    Thank You

## 2018-04-26 ENCOUNTER — PATIENT MESSAGE (OUTPATIENT)
Dept: OTHER | Facility: OTHER | Age: 83
End: 2018-04-26

## 2018-04-26 ENCOUNTER — PATIENT MESSAGE (OUTPATIENT)
Dept: ELECTROPHYSIOLOGY | Facility: CLINIC | Age: 83
End: 2018-04-26

## 2018-04-26 RX ORDER — BUPROPION HYDROCHLORIDE 150 MG/1
TABLET ORAL
Qty: 30 TABLET | Refills: 11 | Status: SHIPPED | OUTPATIENT
Start: 2018-04-26 | End: 2019-05-22

## 2018-05-09 ENCOUNTER — PES CALL (OUTPATIENT)
Dept: ADMINISTRATIVE | Facility: CLINIC | Age: 83
End: 2018-05-09

## 2018-05-09 ENCOUNTER — PATIENT MESSAGE (OUTPATIENT)
Dept: ADMINISTRATIVE | Facility: OTHER | Age: 83
End: 2018-05-09

## 2018-05-15 ENCOUNTER — PATIENT MESSAGE (OUTPATIENT)
Dept: INTERNAL MEDICINE | Facility: CLINIC | Age: 83
End: 2018-05-15

## 2018-05-15 ENCOUNTER — OFFICE VISIT (OUTPATIENT)
Dept: INTERNAL MEDICINE | Facility: CLINIC | Age: 83
End: 2018-05-15
Payer: MEDICARE

## 2018-05-15 ENCOUNTER — TELEPHONE (OUTPATIENT)
Dept: INTERNAL MEDICINE | Facility: CLINIC | Age: 83
End: 2018-05-15

## 2018-05-15 VITALS
DIASTOLIC BLOOD PRESSURE: 62 MMHG | HEIGHT: 68 IN | SYSTOLIC BLOOD PRESSURE: 111 MMHG | HEART RATE: 55 BPM | BODY MASS INDEX: 29.54 KG/M2 | WEIGHT: 194.88 LBS

## 2018-05-15 DIAGNOSIS — M54.12 CERVICAL RADICULOPATHY: Primary | ICD-10-CM

## 2018-05-15 PROCEDURE — 99499 UNLISTED E&M SERVICE: CPT | Mod: S$GLB,,, | Performed by: INTERNAL MEDICINE

## 2018-05-15 PROCEDURE — 99999 PR PBB SHADOW E&M-EST. PATIENT-LVL IV: CPT | Mod: PBBFAC,,, | Performed by: INTERNAL MEDICINE

## 2018-05-15 PROCEDURE — 96372 THER/PROPH/DIAG INJ SC/IM: CPT | Mod: S$GLB,,, | Performed by: INTERNAL MEDICINE

## 2018-05-15 PROCEDURE — 99214 OFFICE O/P EST MOD 30 MIN: CPT | Mod: 25,S$GLB,, | Performed by: INTERNAL MEDICINE

## 2018-05-15 RX ORDER — TRIAMCINOLONE ACETONIDE 40 MG/ML
60 INJECTION, SUSPENSION INTRA-ARTICULAR; INTRAMUSCULAR
Status: COMPLETED | OUTPATIENT
Start: 2018-05-15 | End: 2018-05-15

## 2018-05-15 RX ORDER — HYDROCODONE BITARTRATE AND ACETAMINOPHEN 5; 325 MG/1; MG/1
1 TABLET ORAL EVERY 6 HOURS PRN
Qty: 30 TABLET | Refills: 0 | Status: SHIPPED | OUTPATIENT
Start: 2018-05-15 | End: 2018-06-05

## 2018-05-15 RX ADMIN — TRIAMCINOLONE ACETONIDE 60 MG: 40 INJECTION, SUSPENSION INTRA-ARTICULAR; INTRAMUSCULAR at 12:05

## 2018-05-15 NOTE — TELEPHONE ENCOUNTER
Yesterday AM  woke up with a slight discomfort in my neck om right side . I took a muscle  relaxer med I had but it did no good, pain increased during  the day to sever pain and i cpild not move my head at all. About  3PM II called and had a message  sent  to you or your Nurse asking either to call me. DID YOUR OFFICE RECEIVE THIS MESSAGE AS I DID NOT RECEIVE A CALL.     i Had HYDROCODIDACETA    5-325mg med   and have taken this  since  last evening finding temporary relief for 3-4 hours. I wquld request advice on what  ourse to follow to get relief. Also, I have a fery limited supply of this  ed left and would like to refill . I await your reply.  My phone # is [phone].  Linus hammond         Spoke to pt and offered an appt for today. Pt says if you think he needs to come in he will.      Please advise

## 2018-05-15 NOTE — PROGRESS NOTES
REASON FOR VISIT:  This is an 88-year-old male.  Since yesterday, he has been   having pain over his right posterior cervical region that might extend a little   bit up to occipital and to the shoulder.  He has had leftover Vicodin 5/325 and   he has taken a couple of pills, which has helped and methocarbamol, but he is   still having the pain that is persistent.  There is no pain or numbness or   paresthesia involving the upper extremity.  He can feel it a little bit when   turning his neck.  No pain lifting up his arm.    PAST MEDICAL HISTORY AND MEDICATIONS:  As outlined in 11/01/2017 note and   MedCard.  New medicine now is Flomax 0.4 mg a day and he responded well to the   spinal cord stimulator implant back in November.    PHYSICAL EXAMINATION:  VITAL SIGNS:  Per EPIC, but blood pressure by me 130/62.  LUNGS:  Clear.  HEART:  Regular rate and rhythm.  MUSCULOSKELETAL:  1+ biceps, triceps reflexes.  No pain when I abduct or rotate   the arms at the shoulder joint other than he feels some pain in the posterior   neck.    IMPRESSION:  Acute cervical radiculopathy.    PLAN:  Kenalog 60 mg IM, Vicodin 5/325 one every 4-6 hours as needed,   methocarbamol 500 mg three times a day for the next few days.  Cannot give him   an anti-inflammatory because of chronic kidney disease stage III and being on   Eliquis.            /dimitris [id number] review        MARY/STANLEY  dd: 05/15/2018 12:01:07 (CDT)  td: 05/16/2018 03:37:21 (CDT)  Doc ID   #[id number]  Job ID #[id number]    CC:

## 2018-05-15 NOTE — TELEPHONE ENCOUNTER
----- Message from Chris Frias sent at 5/14/2018  1:53 PM CDT -----  Contact: self/[phone]  Pt is calling to speak with someone in the office in regards to getting a call back he states that he is having really bad neck discomfort. Please advise.      Thanks

## 2018-05-16 ENCOUNTER — PATIENT MESSAGE (OUTPATIENT)
Dept: INTERNAL MEDICINE | Facility: CLINIC | Age: 83
End: 2018-05-16

## 2018-05-16 ENCOUNTER — OFFICE VISIT (OUTPATIENT)
Dept: OPTOMETRY | Facility: CLINIC | Age: 83
End: 2018-05-16
Payer: MEDICARE

## 2018-05-16 DIAGNOSIS — H53.10 SUBJECTIVE VISUAL DISTURBANCE OF LEFT EYE: Primary | ICD-10-CM

## 2018-05-16 DIAGNOSIS — H04.123 BILATERAL DRY EYES: ICD-10-CM

## 2018-05-16 PROCEDURE — 99999 PR PBB SHADOW E&M-EST. PATIENT-LVL III: CPT | Mod: PBBFAC,,, | Performed by: OPTOMETRIST

## 2018-05-16 PROCEDURE — 92014 COMPRE OPH EXAM EST PT 1/>: CPT | Mod: S$GLB,,, | Performed by: OPTOMETRIST

## 2018-05-16 RX ORDER — METHYLPREDNISOLONE 4 MG/1
TABLET ORAL
Qty: 1 PACKAGE | Refills: 0 | Status: SHIPPED | OUTPATIENT
Start: 2018-05-16 | End: 2018-06-29

## 2018-05-16 NOTE — PROGRESS NOTES
HPI     Mr. Valles is here for an urgent visit.    He reports occasional shadow that comes down over entire field of vision   (like a shade being pulled down). Onset about 1 month ago. Lasts a split   second then fully resolves. Occurs about 1-2 times per day, at any time   with no pattern. No flashes, new floaters, or transient vision loss. He   does have floaters, but he says they have been stable for at least 2   years.     (+)drops: Brimonidine BID-TID OS, Latanoprost QHS OS    OCULAR HISTORY  Last Eye Exam 03/02/18 with Dr. Hampton (low vision exam)  Trauma - Handball injury -'48 while in my[place]  Pneumatic retinopexy - '97 [place]  Phaco with IOL OS - [place]  ERM OS  PVD OS (Vitreous base dehisced ST)  H/o RRD OS  Retinal tear OS  Lamellar macular hole OS   POAG OS - managed by Dr. Schneider  Prosthetic eye OD (3 years old)           Last edited by Guadalupe Singh, OD on 5/16/2018  2:51 PM. (History)            Assessment /Plan     For exam results, see Encounter Report.    Subjective visual disturbance of left eye  Bilateral dry eyes   Transient shadow over vision possibly related to dry eyes. Recommended artificial tears and warm compresses.   Pt is due for 6- month f/u with retina specialist, scheduled for 06/05/18.      RTC prn

## 2018-05-16 NOTE — TELEPHONE ENCOUNTER
The shot on yesterday and the Meds. have reduced the pain very much about 85%. I believe with continued  Meds for  a few days will ciorrect.  If not corrected I willl contact  you. Thanks

## 2018-05-16 NOTE — PATIENT INSTRUCTIONS
FLASHES AND FLOATERS    You've noticed something new in your field of vision, possibly one or more movable spots or squiggles that you can't remember seeing before. Here are some points to follow and some information about this condition:    Located in the hollow interior of the eye is a transparent gel called the VITREOUS. It is composed of microscopic fibers, large molecules, and fluid-like water.  Most people also have small pieces of material suspended within the vitreous, which under ideal lighting conditions will cast a shadow on the retina and become visible as moving spots, which are called vitreous floaters. Floaters become more common with age.    During life the vitreous fibers condense together and the gel becomes more fluid. Fibrous clumps can form which may become large enough to be seen as floaters. As the fibers condense the liquid portion escapes, allowing the vitreous to shrink, separate from the retina, and collapse forward in the eye. While shrinking, the vitreous gel can pull against the retina causing episodes of light flashes or arcs of light.  The retina does not have pain sensors, and its only response is flashes of light. People often describe these flashes as lightning streaks or fireworks.     A concern is that traction on the retina from the shrinking vitreous may pull a tear in the retina, which could evolve into a retinal detachment.  Therefore persistent flashes do require urgent evaluation. Other symptoms that require urgent evaluation are a large shadow, curtain, or blank spot in your vision, or a sudden shower of dozens or hundreds of tiny floaters resembling pepper or soot.    To summarize, nearly everyone has floaters, but a sudden shower of dots, persistent light flashes in one eye, or a curtain or shadow in your vision require urgent consultation.  If these occur, please let us know immediately.      ==============================================    DRY EYES     Dry eyes is a  "common condition. It can be caused by an insufficient volume of tears, or by tears that do not spread evenly over the cornea. An uneven tear film can also cause vision to blur intermittently.   Dry eyes are often associated with burning, stinging, and/or red eyes.As we age, the eyes usually produce fewer tears and result in decreased normal eye lubrication. Paradoxically, dry eye can make eyes water. When they water, that watery production actually makes the dry eye situation worse because the watery tears do not lubricate the eye well at all. Watery tears really serve to flush foreign material out of the eye, not to lubricate. Unfortunately, when the eyes get really dry they become irritated and stimulate the formation of watery tears.   Lubricants, in the form of drops or ointments, are the principal treatment for dry eyes. The following are recommendations for managing your dry eye condition:    1) Use over-the-counter artificial tears or lubricants (such as Systane Balance, Soothe XP, Refresh Optive, Blink, or Genteal), 1 drop in each eye 3 to 4 times a day. Please avoid drops that advertise redness relief since these can be unhealthy for your eyes and can cause permanent redness if used long-term.     It is best to take artificial tears on a schedule, like you would for a medication. Taking them routinely at breakfast, lunch, and dinner for example will provide better relief and better use of the tear drop than taking them whenever your eyes "feel" dry.    2) Optional use of over-the-counter lubricating gels (such as Genteal Gel, Systane Gel, or Refresh PM) applied to the inside of the lower lid of both eyes at bedtime. This gel is very thick and may cause blurred vision, so we recommend you use it before bedtime. In the morning you can gently wipe your eyes with a damp washcloth to remove any residual gel.    3) Warm compresses applied to the closed eyelids twice per day, followed with lid massage.    4) " Always drink an adequate amount of water daily (4-6 cups a day).    5) 1000 mg of good quality fish oil (labeled DHA and/or EPA). Flaxseed oil can also be beneficial. Do not take fish oil if you are currently taking a medication called warfarin or coumadin.    6) Use a humidifier in your bedroom.    7) If the dryness continues there may be additional options of punctal plugs, or prescription dry eye drops such as Restasis or Xiidra. Punctal plugs are medical devices inserted into the puncta or the drain of the eye to block some of your natural tears from draining off the eye. Restasis and Xiidra are prescription eye drops that, over time, may help your body make more tears naturally.    It is important to maintain this treatment plan until your symptoms have improved. Once improved, you can reduce the frequency of lubricants and warm compresses. If the symptoms recur, then apply as needed for comfort.

## 2018-05-22 ENCOUNTER — PATIENT MESSAGE (OUTPATIENT)
Dept: INTERNAL MEDICINE | Facility: CLINIC | Age: 83
End: 2018-05-22

## 2018-05-22 ENCOUNTER — TELEPHONE (OUTPATIENT)
Dept: INTERNAL MEDICINE | Facility: CLINIC | Age: 83
End: 2018-05-22

## 2018-05-22 ENCOUNTER — PATIENT OUTREACH (OUTPATIENT)
Dept: OTHER | Facility: OTHER | Age: 83
End: 2018-05-22

## 2018-05-22 RX ORDER — AMLODIPINE BESYLATE 5 MG/1
5 TABLET ORAL DAILY
Qty: 90 TABLET | Refills: 3 | Status: SHIPPED | OUTPATIENT
Start: 2018-05-22 | End: 2019-04-04 | Stop reason: SDUPTHER

## 2018-05-22 NOTE — TELEPHONE ENCOUNTER
Pt called to ask if the prescription refill that he requested this morning will be ok'd soon as he is leaving to go out of town early this afternoon and needs his meds. Pt doesn't remember the name of the medication/ only that it's for blood pressure        Left pt barney  to give office a call back

## 2018-05-22 NOTE — PROGRESS NOTES
"Last 5 Patient Entered Readings                                      Current 30 Day Average: 137/70     Recent Readings 5/16/2018 5/9/2018 5/3/2018 4/26/2018 4/23/2018    SBP (mmHg) 127 132 127 131 168    DBP (mmHg) 66 75 69 67 71    Pulse 50 53 51 55 51          Digital Medicine: Health  Follow Up    Lifestyle Modifications:    1.Dietary Modifications (Sodium intake <2,000mg/day, food labels, dining out): Deferred    2.Physical Activity: Patient stated that he has been staying active and getting around well. He is happy with his activity level. He is actually going to [place] for a week at the end of August so he is looking forward to that.     3.Medication Therapy: Patient has been compliant with the medication regimen.    4.Patient has the following medication side effects/concerns:   (Frequency/Alleviating factors/Precipitating factors, etc.)     Patient stated that he is doing well and feeling "65 again". He is very pleased with his BP readings and progress.     Follow up with Mr. Javier Valles completed. No further questions or concerns. Will continue follow up to achieve health goals.        "

## 2018-05-22 NOTE — TELEPHONE ENCOUNTER
----- Message from Kayleen Sampson sent at 5/22/2018 10:30 AM CDT -----  Contact: patient [phone]  Pt called to ask if the prescription refill that he requested this morning will be ok'd soon as he is leaving to go out of town early this afternoon and needs his meds. Pt doesn't remember the name of the medication/ only that it's for blood pressure.

## 2018-06-05 ENCOUNTER — OFFICE VISIT (OUTPATIENT)
Dept: OPHTHALMOLOGY | Facility: CLINIC | Age: 83
End: 2018-06-05
Payer: MEDICARE

## 2018-06-05 DIAGNOSIS — H43.812 POSTERIOR VITREOUS DETACHMENT OF LEFT EYE: Primary | ICD-10-CM

## 2018-06-05 DIAGNOSIS — H33.002 RHEGMATOGENOUS RETINAL DETACHMENT OF LEFT EYE: ICD-10-CM

## 2018-06-05 DIAGNOSIS — H35.372 EPIRETINAL MEMBRANE, LEFT EYE: ICD-10-CM

## 2018-06-05 PROCEDURE — 92226 PR SPECIAL EYE EXAM, SUBSEQUENT: CPT | Mod: LT,S$GLB,, | Performed by: OPHTHALMOLOGY

## 2018-06-05 PROCEDURE — 99999 PR PBB SHADOW E&M-EST. PATIENT-LVL III: CPT | Mod: PBBFAC,,, | Performed by: OPHTHALMOLOGY

## 2018-06-05 PROCEDURE — 92134 CPTRZ OPH DX IMG PST SGM RTA: CPT | Mod: S$GLB,,, | Performed by: OPHTHALMOLOGY

## 2018-06-05 PROCEDURE — 92014 COMPRE OPH EXAM EST PT 1/>: CPT | Mod: S$GLB,,, | Performed by: OPHTHALMOLOGY

## 2018-06-05 NOTE — PROGRESS NOTES
HPI     6 mo / OCT  DLS-11/13/2017 Dr. Sandoval     Pt sts unsure of much change in [place] in general but when driving noticed like a shade over vision happens any time not particularly with light does not last long about 1-2 sec at a time.   (-)Flashes (+)Floaters normal   (+)Photophobia  (+)Glare    Gtts   Brimonidine TID OS   Latanoprost QHS OS      A/P    1) ERM OS    2) PVD OS  Vitreous base dehisced ST - no new breaks or tears    3) PCIOL OS    4) Prosthetic eye OD    5) Retinal tear OS    6) Lamellar macular hole OS        RD precautions      12 months OCT

## 2018-06-11 ENCOUNTER — PATIENT MESSAGE (OUTPATIENT)
Dept: INTERNAL MEDICINE | Facility: CLINIC | Age: 83
End: 2018-06-11

## 2018-06-11 DIAGNOSIS — Z95.0 CARDIAC PACEMAKER IN SITU: Primary | ICD-10-CM

## 2018-06-11 DIAGNOSIS — I49.5 SSS (SICK SINUS SYNDROME): ICD-10-CM

## 2018-06-11 DIAGNOSIS — I48.0 PAROXYSMAL ATRIAL FIBRILLATION: ICD-10-CM

## 2018-06-11 DIAGNOSIS — M47.816 LUMBAR SPONDYLOSIS: Primary | ICD-10-CM

## 2018-06-11 DIAGNOSIS — I25.10 CORONARY ARTERY DISEASE, ANGINA PRESENCE UNSPECIFIED, UNSPECIFIED VESSEL OR LESION TYPE, UNSPECIFIED WHETHER NATIVE OR TRANSPLANTED HEART: Primary | ICD-10-CM

## 2018-06-11 RX ORDER — METHOCARBAMOL 500 MG/1
500 TABLET, FILM COATED ORAL 3 TIMES DAILY PRN
Qty: 90 TABLET | Refills: 0 | Status: SHIPPED | OUTPATIENT
Start: 2018-06-11 | End: 2019-08-12 | Stop reason: SDUPTHER

## 2018-06-15 RX ORDER — DONEPEZIL HYDROCHLORIDE 5 MG/1
5 TABLET, FILM COATED ORAL NIGHTLY
Qty: 30 TABLET | Refills: 11 | Status: SHIPPED | OUTPATIENT
Start: 2018-06-15 | End: 2019-06-18 | Stop reason: SDUPTHER

## 2018-06-19 ENCOUNTER — PATIENT OUTREACH (OUTPATIENT)
Dept: OTHER | Facility: OTHER | Age: 83
End: 2018-06-19

## 2018-06-19 ENCOUNTER — TELEPHONE (OUTPATIENT)
Dept: ELECTROPHYSIOLOGY | Facility: CLINIC | Age: 83
End: 2018-06-19

## 2018-06-19 NOTE — PROGRESS NOTES
Last 5 Patient Entered Readings                                      Current 30 Day Average: 129/65     Recent Readings 6/18/2018 6/8/2018 6/3/2018 5/31/2018 5/31/2018    SBP (mmHg) 129 136 114 141 136    DBP (mmHg) 66 61 63 65 75    Pulse 54 54 54 57 59          Digital Medicine: Health  Follow Up    Lifestyle Modifications:    1.Dietary Modifications (Sodium intake <2,000mg/day, food labels, dining out): Deferred    2.Physical Activity: Patient continues staying active by doing things around the house and running errands. He is getting closer to his "[employer] trip which is at the end of August. He is looking forward to this very much. He will be going with his wife.     3.Medication Therapy: Patient has been compliant with the medication regimen. Patient reports that he is doing well on his current BP medication regimen.     4.Patient has the following medication side effects/concerns:   (Frequency/Alleviating factors/Precipitating factors, etc.)     Patient, again, reports that he is feeling great and very pleased with his BP readings and how good he is feeling overall.     Follow up with Mr. Javier Valles completed. No further questions or concerns. Will continue follow up to achieve health goals.  
no

## 2018-06-19 NOTE — TELEPHONE ENCOUNTER
Scheduled Remote ICD/Pacemaker transmission on 6/11/18 was not received.  Contacted the patient on this morning in relation to his monitor not communicating.  Patient stated he keeps his monitor on the desk in his office which is not the same room he sleeps in.  Informed the patient the monitor is unable to read his device if it is in a different room than he sleeps in.  Patient stated he was currently driving and was unable to write down the Device Clinic contact #.  Informed the patient I could call his cell and him let it go to voice mail to allow me to leave the # for him.  Patient stated that would be good.  Clinic contact # left on the voice mail.

## 2018-06-27 ENCOUNTER — LAB VISIT (OUTPATIENT)
Dept: LAB | Facility: HOSPITAL | Age: 83
End: 2018-06-27
Attending: INTERNAL MEDICINE
Payer: MEDICARE

## 2018-06-27 DIAGNOSIS — I25.10 CORONARY ARTERY DISEASE, ANGINA PRESENCE UNSPECIFIED, UNSPECIFIED VESSEL OR LESION TYPE, UNSPECIFIED WHETHER NATIVE OR TRANSPLANTED HEART: ICD-10-CM

## 2018-06-27 DIAGNOSIS — E78.5 HYPERLIPIDEMIA, UNSPECIFIED HYPERLIPIDEMIA TYPE: ICD-10-CM

## 2018-06-27 LAB
ANION GAP SERPL CALC-SCNC: 7 MMOL/L
BUN SERPL-MCNC: 32 MG/DL
CALCIUM SERPL-MCNC: 10.5 MG/DL
CHLORIDE SERPL-SCNC: 112 MMOL/L
CHOLEST SERPL-MCNC: 124 MG/DL
CHOLEST/HDLC SERPL: 2 {RATIO}
CO2 SERPL-SCNC: 25 MMOL/L
CREAT SERPL-MCNC: 2 MG/DL
EST. GFR  (AFRICAN AMERICAN): 33.5 ML/MIN/1.73 M^2
EST. GFR  (NON AFRICAN AMERICAN): 28.9 ML/MIN/1.73 M^2
GLUCOSE SERPL-MCNC: 97 MG/DL
HDLC SERPL-MCNC: 61 MG/DL
HDLC SERPL: 49.2 %
LDLC SERPL CALC-MCNC: 54.2 MG/DL
NONHDLC SERPL-MCNC: 63 MG/DL
POTASSIUM SERPL-SCNC: 5.1 MMOL/L
SODIUM SERPL-SCNC: 144 MMOL/L
TRIGL SERPL-MCNC: 44 MG/DL

## 2018-06-27 PROCEDURE — 36415 COLL VENOUS BLD VENIPUNCTURE: CPT

## 2018-06-27 PROCEDURE — 80061 LIPID PANEL: CPT

## 2018-06-27 PROCEDURE — 80048 BASIC METABOLIC PNL TOTAL CA: CPT

## 2018-06-27 NOTE — PROGRESS NOTES
Subjective:   Patient ID:  Javier Valles is a 88 y.o. male who presents for follow up of Cardiomyopathy (Ischemic); Coronary Artery Disease; Atrial Fibrillation; Hyperlipidemia; Hypertension; Peripheral Arterial Disease; Carotid Artery Disease; and Chronic Kidney Disease (III)      Assessment:     1. Coronary artery disease involving native coronary artery of native heart without angina pectoris    2. Cardiomyopathy, ischemic: Prior MI, CABG, EF 40-45%    3. Mixed hyperlipidemia : LDL at goal.   4. Chronic kidney disease, stage 3 (moderate) : worsening Cr 2.0   5. Essential hypertension : at goal   6. Peripheral vascular disease    7. Bilateral carotid artery disease: Asx, non-obstructive 20%-30%        Plan:     1. Nephrology consult for worsening Cr.  2. Cognitive neurology consult for pschometric assessment of dementia.  3. Cont meds.  4. Increase activity.  5. RTC 1 year BMP, FLP, carotid US.      HPI: Geovanni feels much better with significant relief of back pain with stimulator implant.  No angina or chf symptoms.         Review of Systems   Cardiovascular: Negative for chest pain, claudication, cyanosis, dyspnea on exertion, irregular heartbeat, leg swelling, near-syncope, orthopnea, palpitations, paroxysmal nocturnal dyspnea and syncope.   Respiratory: Negative for shortness of breath.    Neurological: Negative for brief paralysis, dizziness and focal weakness.       Past Medical History:   Diagnosis Date    *Atrial flutter 10/3/13    Anticoagulant long-term use     Aspirin only at this time    Arthritis     Atrial fibrillation     Atrial flutter     Blood transfusion     ?    BPH (benign prostatic hypertrophy)     Carotid artery disease     2008: [place] There is 40 - 49% right Internal Carotid stenosis.  There is 20 - 39% left Internal Carotid stenosis.       Chronic kidney disease     Coronary artery disease     DDD (degenerative disc disease) 6/25/2015    Degenerative disc disease     Elevated  PSA     Glaucoma     Hyperlipidemia     Hypertension     Lumbar stenosis     lumbar spinal stenosis    NSTEMI (non-ST elevated myocardial infarction) 11/3/2013    Pacemaker 11/2013    Peripheral neuropathy     - S/P right ILIAC stent 1993;      Retinal detachment     Squamous cell carcinoma     left leg    Syncope and collapse: orthostatic with hypotension 10/9/2015       Past Surgical History:   Procedure Laterality Date    [facility]    CARDIAC ELECTROPHYSIOLOGY MAPPING AND ABLATION  11/2016    CARDIAC PACEMAKER PLACEMENT  11/2016    CATARACT EXTRACTION W/  INTRAOCULAR LENS IMPLANT Left 1997    OS with     CORONARY ARTERY BYPASS GRAFT  1991    ENUCLEATION Right 1949    LUMBAR LAMINECTOMY  04/25/2016    RETINAL DETACHMENT SURGERY Left 1997    Dr. Cosme    SKIN BIOPSY      SPINAL CORD STIMULATOR TRIAL W/ LAMINOTOMY  10/2017    TONSILLECTOMY         Social History   Substance Use Topics    Smoking status: Former Smoker     Quit date: 8/19/1963    Smokeless tobacco: Never Used    Alcohol use 1.8 oz/week     1 Glasses of wine, 1 Cans of beer, 1 Shots of liquor per week      Comment: 2 a day       Family History   Problem Relation Age of Onset    Stroke Mother 69    Clotting disorder Mother         per patient no clotting disorder    Hypertension Mother     Diverticulitis Son     Cancer Neg Hx     Diabetes Neg Hx     Heart disease Neg Hx     Glaucoma Neg Hx     Amblyopia Neg Hx     Blindness Neg Hx     Cataracts Neg Hx     Macular degeneration Neg Hx     Retinal detachment Neg Hx     Strabismus Neg Hx        Current Outpatient Prescriptions   Medication Sig    amiodarone (PACERONE) 200 MG Tab Take 1 tablet (200 mg total) by mouth once daily.    amLODIPine (NORVASC) 5 MG tablet Take 1 tablet (5 mg total) by mouth once daily.    apixaban (ELIQUIS) 5 mg Tab Take 5 mg by mouth 2 (two) times daily.    ascorbic acid (VITAMIN C) 1000 MG tablet Take  "1,000 mg by mouth every morning.     brimonidine 0.2% (ALPHAGAN) 0.2 % Drop Place 1 drop into the left eye 3 (three) times daily.    buPROPion (WELLBUTRIN XL) 150 MG TB24 tablet TAKE ONE TABLET BY MOUTH ONCE DAILY    co-enzyme Q-10 30 mg capsule Take 30 mg by mouth once daily.     donepezil (ARICEPT) 5 MG tablet Take 1 tablet (5 mg total) by mouth every evening.    ipratropium (ATROVENT) 0.03 % nasal spray 1-2 sprays by Nasal route 2 (two) times daily.    latanoprost 0.005 % ophthalmic solution Place 1 drop into the left eye every evening.    losartan (COZAAR) 50 MG tablet Take 1 tablet (50 mg total) by mouth 2 (two) times daily.    lutein 20 mg Cap Take 20 mg by mouth once daily.     MULTIVITAMINS,THER W-MINERALS (VITAMINS & MINERALS ORAL) Take 1 tablet by mouth every morning.     nitroGLYCERIN (NITROSTAT) 0.4 MG SL tablet Place 1 tablet (0.4 mg total) under the tongue every 5 (five) minutes as needed for Chest pain.    rosuvastatin (CRESTOR) 20 MG tablet Take 1 tablet (20 mg total) by mouth every evening.    tamsulosin (FLOMAX) 0.4 mg Cp24 Take 1 capsule (0.4 mg total) by mouth once daily.     No current facility-administered medications for this visit.        Review of patient's allergies indicates:   Allergen Reactions    Pravastatin      Severe myalgias/elevated CPK    Lipitor [atorvastatin]      Myositis/elevated CPK    Statins-hmg-coa reductase inhibitors      Muscle pain and loss of muscle    Ace inhibitors      Cough       Objective:     /66 (BP Location: Left arm, Patient Position: Sitting, BP Method: Large (Automatic))   Pulse (!) 54   Ht 5' 8" (1.727 m)   Wt 86.4 kg (190 lb 7.6 oz)   SpO2 (!) 94%   BMI 28.96 kg/m²     Physical Exam   Constitutional: He is oriented to person, place, and time. He appears well-developed and well-nourished.   HENT:   Head: Normocephalic and atraumatic.   Eyes: Conjunctivae and EOM are normal. Pupils are equal, round, and reactive to light.   Neck: " Normal range of motion. Neck supple. No thyromegaly present.   Cardiovascular: Normal rate, regular rhythm and normal heart sounds.  Exam reveals no gallop and no friction rub.    No murmur heard.  Pulmonary/Chest: Effort normal and breath sounds normal. No respiratory distress. He has no wheezes. He has no rales. He exhibits no tenderness.       Abdominal: Soft. Bowel sounds are normal. He exhibits no distension. There is no tenderness.   Musculoskeletal: Normal range of motion.   Neurological: He is alert and oriented to person, place, and time. He has normal reflexes. No cranial nerve deficit. Coordination normal.   Skin: Skin is warm and dry.   Psychiatric: He has a normal mood and affect. His behavior is normal. Judgment and thought content normal.         Chemistry        Component Value Date/Time     06/27/2018 0713    K 5.1 06/27/2018 0713     (H) 06/27/2018 0713    CO2 25 06/27/2018 0713    BUN 32 (H) 06/27/2018 0713    CREATININE 2.0 (H) 06/27/2018 0713    GLU 97 06/27/2018 0713        Component Value Date/Time    CALCIUM 10.5 06/27/2018 0713    ALKPHOS 72 06/12/2017 1300    AST 30 06/12/2017 1300    ALT 18 06/12/2017 1300    BILITOT 1.1 (H) 06/12/2017 1300    ESTGFRAFRICA 33.5 (A) 06/27/2018 0713    EGFRNONAA 28.9 (A) 06/27/2018 0713            Lab Results   Component Value Date    CHOL 124 06/27/2018    CHOL 128 06/06/2017    CHOL 120 11/29/2016     Lab Results   Component Value Date    HDL 61 06/27/2018    HDL 52 06/06/2017    HDL 36 (L) 11/29/2016     Lab Results   Component Value Date    LDLCALC 54.2 (L) 06/27/2018    LDLCALC 66.0 06/06/2017    LDLCALC 66.8 11/29/2016     Lab Results   Component Value Date    TRIG 44 06/27/2018    TRIG 50 06/06/2017    TRIG 86 11/29/2016     Lab Results   Component Value Date    CHOLHDL 49.2 06/27/2018    CHOLHDL 40.6 06/06/2017    CHOLHDL 30.0 11/29/2016         Lab Results   Component Value Date     06/27/2018    K 5.1 06/27/2018     (H)  06/27/2018    CO2 25 06/27/2018    BUN 32 (H) 06/27/2018    CREATININE 2.0 (H) 06/27/2018    GLU 97 06/27/2018    HGBA1C 6.2 03/06/2008    MG 2.2 04/08/2015    AST 30 06/12/2017    ALT 18 06/12/2017    ALBUMIN 3.7 06/12/2017    PROT 6.7 06/12/2017    BILITOT 1.1 (H) 06/12/2017    WBC 7.08 11/01/2017    HGB 13.3 (L) 11/01/2017    HCT 40.8 11/01/2017    HCT 38 11/01/2013     (H) 11/01/2017     (L) 11/01/2017    INR 1.0 11/01/2017    PSA 10 (H) 06/30/2010    TSH 2.209 06/12/2017    CHOL 124 06/27/2018    HDL 61 06/27/2018    LDLCALC 54.2 (L) 06/27/2018    TRIG 44 06/27/2018

## 2018-06-29 ENCOUNTER — OFFICE VISIT (OUTPATIENT)
Dept: CARDIOLOGY | Facility: CLINIC | Age: 83
End: 2018-06-29
Payer: MEDICARE

## 2018-06-29 ENCOUNTER — CLINICAL SUPPORT (OUTPATIENT)
Dept: ELECTROPHYSIOLOGY | Facility: CLINIC | Age: 83
End: 2018-06-29
Attending: INTERNAL MEDICINE
Payer: MEDICARE

## 2018-06-29 ENCOUNTER — TELEPHONE (OUTPATIENT)
Dept: ELECTROPHYSIOLOGY | Facility: CLINIC | Age: 83
End: 2018-06-29

## 2018-06-29 ENCOUNTER — PATIENT MESSAGE (OUTPATIENT)
Dept: ELECTROPHYSIOLOGY | Facility: CLINIC | Age: 83
End: 2018-06-29

## 2018-06-29 VITALS
BODY MASS INDEX: 28.87 KG/M2 | SYSTOLIC BLOOD PRESSURE: 124 MMHG | DIASTOLIC BLOOD PRESSURE: 66 MMHG | WEIGHT: 190.5 LBS | HEIGHT: 68 IN | HEART RATE: 54 BPM | OXYGEN SATURATION: 94 %

## 2018-06-29 DIAGNOSIS — N28.9 WORSENING RENAL FUNCTION: Primary | ICD-10-CM

## 2018-06-29 DIAGNOSIS — E78.5 HYPERLIPIDEMIA, UNSPECIFIED HYPERLIPIDEMIA TYPE: Primary | ICD-10-CM

## 2018-06-29 DIAGNOSIS — F03.90 DEMENTIA WITHOUT BEHAVIORAL DISTURBANCE, UNSPECIFIED DEMENTIA TYPE: Primary | ICD-10-CM

## 2018-06-29 DIAGNOSIS — I77.9 BILATERAL CAROTID ARTERY DISEASE: ICD-10-CM

## 2018-06-29 DIAGNOSIS — I73.9 PERIPHERAL VASCULAR DISEASE: ICD-10-CM

## 2018-06-29 DIAGNOSIS — Z95.0 CARDIAC PACEMAKER IN SITU: ICD-10-CM

## 2018-06-29 DIAGNOSIS — I25.5 CARDIOMYOPATHY, ISCHEMIC: Chronic | ICD-10-CM

## 2018-06-29 DIAGNOSIS — I48.91 ATRIAL FIBRILLATION, UNSPECIFIED TYPE: ICD-10-CM

## 2018-06-29 DIAGNOSIS — I49.5 SSS (SICK SINUS SYNDROME): ICD-10-CM

## 2018-06-29 DIAGNOSIS — N18.30 CHRONIC KIDNEY DISEASE, STAGE 3 (MODERATE): ICD-10-CM

## 2018-06-29 DIAGNOSIS — I48.3 TYPICAL ATRIAL FLUTTER: Primary | ICD-10-CM

## 2018-06-29 DIAGNOSIS — E78.2 MIXED HYPERLIPIDEMIA: Chronic | ICD-10-CM

## 2018-06-29 DIAGNOSIS — I25.10 CORONARY ARTERY DISEASE INVOLVING NATIVE CORONARY ARTERY OF NATIVE HEART WITHOUT ANGINA PECTORIS: Primary | ICD-10-CM

## 2018-06-29 DIAGNOSIS — I10 ESSENTIAL HYPERTENSION: ICD-10-CM

## 2018-06-29 PROCEDURE — 93296 REM INTERROG EVL PM/IDS: CPT | Mod: S$GLB,,, | Performed by: INTERNAL MEDICINE

## 2018-06-29 PROCEDURE — 99213 OFFICE O/P EST LOW 20 MIN: CPT | Mod: S$GLB,,, | Performed by: INTERNAL MEDICINE

## 2018-06-29 PROCEDURE — 99999 PR PBB SHADOW E&M-EST. PATIENT-LVL IV: CPT | Mod: PBBFAC,,, | Performed by: INTERNAL MEDICINE

## 2018-06-29 PROCEDURE — 99499 UNLISTED E&M SERVICE: CPT | Mod: S$GLB,,, | Performed by: INTERNAL MEDICINE

## 2018-06-29 PROCEDURE — 93294 REM INTERROG EVL PM/LDLS PM: CPT | Mod: S$GLB,,, | Performed by: INTERNAL MEDICINE

## 2018-06-29 NOTE — TELEPHONE ENCOUNTER
Pt calling to report he rec'd Rx refill for 5 mg Eliquis, but he was usually taking 2.5 mg BID. Checked with Dr. Ly who verified he should take 2.5 mg. Instructed pt to cut this supply of pills and take 1/2 tablet BID until he runs out. Will correct med on pt's list.

## 2018-06-29 NOTE — TELEPHONE ENCOUNTER
----- Message from Shwetha Marlow RN sent at 6/28/2018  5:10 PM CDT -----  Contact: patient called      ----- Message -----  From: Aisha Robles MA  Sent: 6/28/2018   4:09 PM  To: [facility] Arrhythmia Device Staff    Please call the patient [phone] he need to talk to someone about his device. Thank you.

## 2018-06-29 NOTE — TELEPHONE ENCOUNTER
Spoke to the patient prior to his clinic appointment on this morning.  Patient was given a cell adapter for his remote ICD monitor and instructed to call the Device Clinic @ # provided when he returns home for further assistance.  Patient and wife verbalized understanding.

## 2018-07-02 ENCOUNTER — PATIENT MESSAGE (OUTPATIENT)
Dept: INTERNAL MEDICINE | Facility: CLINIC | Age: 83
End: 2018-07-02

## 2018-07-03 ENCOUNTER — NURSE TRIAGE (OUTPATIENT)
Dept: ADMINISTRATIVE | Facility: CLINIC | Age: 83
End: 2018-07-03

## 2018-07-03 ENCOUNTER — TELEPHONE (OUTPATIENT)
Dept: INTERNAL MEDICINE | Facility: CLINIC | Age: 83
End: 2018-07-03

## 2018-07-03 ENCOUNTER — LAB VISIT (OUTPATIENT)
Dept: LAB | Facility: HOSPITAL | Age: 83
End: 2018-07-03
Attending: INTERNAL MEDICINE
Payer: MEDICARE

## 2018-07-03 DIAGNOSIS — K62.5 RECTAL BLEEDING: ICD-10-CM

## 2018-07-03 DIAGNOSIS — K62.5 RECTAL BLEEDING: Primary | ICD-10-CM

## 2018-07-03 DIAGNOSIS — N18.30 STAGE 3 CHRONIC KIDNEY DISEASE: ICD-10-CM

## 2018-07-03 LAB
ANION GAP SERPL CALC-SCNC: 8 MMOL/L
BASOPHILS # BLD AUTO: 0.07 K/UL
BASOPHILS NFR BLD: 1 %
BUN SERPL-MCNC: 29 MG/DL
CALCIUM SERPL-MCNC: 9.8 MG/DL
CHLORIDE SERPL-SCNC: 113 MMOL/L
CO2 SERPL-SCNC: 25 MMOL/L
CREAT SERPL-MCNC: 1.9 MG/DL
DIFFERENTIAL METHOD: ABNORMAL
EOSINOPHIL # BLD AUTO: 0.5 K/UL
EOSINOPHIL NFR BLD: 6.7 %
ERYTHROCYTE [DISTWIDTH] IN BLOOD BY AUTOMATED COUNT: 14.6 %
EST. GFR  (AFRICAN AMERICAN): 35.6 ML/MIN/1.73 M^2
EST. GFR  (NON AFRICAN AMERICAN): 30.8 ML/MIN/1.73 M^2
GLUCOSE SERPL-MCNC: 93 MG/DL
HCT VFR BLD AUTO: 37 %
HGB BLD-MCNC: 11.8 G/DL
LYMPHOCYTES # BLD AUTO: 1 K/UL
LYMPHOCYTES NFR BLD: 13.9 %
MCH RBC QN AUTO: 34.2 PG
MCHC RBC AUTO-ENTMCNC: 31.9 G/DL
MCV RBC AUTO: 107 FL
MONOCYTES # BLD AUTO: 0.8 K/UL
MONOCYTES NFR BLD: 11.3 %
NEUTROPHILS # BLD AUTO: 4.6 K/UL
NEUTROPHILS NFR BLD: 65.8 %
NRBC BLD-RTO: 0 /100 WBC
PLATELET # BLD AUTO: 122 K/UL
PMV BLD AUTO: 11.1 FL
POTASSIUM SERPL-SCNC: 4.8 MMOL/L
RBC # BLD AUTO: 3.45 M/UL
SODIUM SERPL-SCNC: 146 MMOL/L
WBC # BLD AUTO: 7 K/UL

## 2018-07-03 PROCEDURE — 80048 BASIC METABOLIC PNL TOTAL CA: CPT

## 2018-07-03 PROCEDURE — 85025 COMPLETE CBC W/AUTO DIFF WBC: CPT

## 2018-07-03 PROCEDURE — 36415 COLL VENOUS BLD VENIPUNCTURE: CPT

## 2018-07-03 RX ORDER — HYDROCORTISONE ACETATE 25 MG/1
25 SUPPOSITORY RECTAL 2 TIMES DAILY
Qty: 6 SUPPOSITORY | Refills: 0 | Status: SHIPPED | OUTPATIENT
Start: 2018-07-03 | End: 2018-07-13

## 2018-07-03 NOTE — TELEPHONE ENCOUNTER
There is a message from the patient about having rectal bleeding    in talking with him this is been going on for week    There  is no rectal pain, no abdominal pain, the feces the stool itself is brown    He is on Eliquis    Recently saw cardiology      Appointment also is been set up to meet with nephrology due to a trending upward creatinine or developing worsening chronic kidney disease      Because of his other health conditions    tonight will make arrangements for him to get a CBC and basic metabolic profile      I suggested that the only way to look at this further currently is to go to emergency room   however he declines     the only change in status recommendations will be dependent upon the lab testing, to make sure days significantly anemic      Meantime Anusol HC suppository was sent in       tomorrow is July 4   the clinic is closed      will need to follow his status Thursday, July 5 and probable referral to colorectal surgery

## 2018-07-03 NOTE — TELEPHONE ENCOUNTER
----- Message from Jason Yates sent at 7/3/2018  4:12 PM CDT -----  Contact: Patient [phone]  Patient would like to be seen this Thursday or Friday at any time of this week 07/02/18 stating issue is a serious matter and would like to be seen soon as possible. Internal Bleeding/Rectal Bleeding    Got patient over to nurse on call to triage.    Please call and advise  Thank you

## 2018-07-03 NOTE — TELEPHONE ENCOUNTER
Patient called to report the following:     -rectal bleeding   -denies clots  -small amount of bright red blood     Education completed per [facility] On Call Care Advice including follow up with provider today. Since office is closed, patient advised to report to ED or Urgent care.   Patient verbalized understating, but states he will wait to hear from Dr. Gibbs office. Can you please advise.       Reason for Disposition   Taking Coumadin (warfarin) or other strong blood thinner, or known bleeding disorder (e.g., thrombocytopenia)    Protocols used: ST RECTAL BLEEDING-A-[place]

## 2018-07-05 ENCOUNTER — PATIENT MESSAGE (OUTPATIENT)
Dept: INTERNAL MEDICINE | Facility: CLINIC | Age: 83
End: 2018-07-05

## 2018-07-05 DIAGNOSIS — I48.3 TYPICAL ATRIAL FLUTTER: ICD-10-CM

## 2018-07-07 NOTE — PROGRESS NOTES
Review of his labs were acceptable      it was a technical delay in getting the Anusol suppository      he actually started last night    He is seeing less of the rectal bleeding and will keep me informed

## 2018-07-10 ENCOUNTER — PATIENT OUTREACH (OUTPATIENT)
Dept: OTHER | Facility: OTHER | Age: 83
End: 2018-07-10

## 2018-07-10 NOTE — PROGRESS NOTES
Last 5 Patient Entered Readings                                      Current 30 Day Average: 133/74     Recent Readings 7/4/2018 6/25/2018 6/25/2018 6/18/2018 6/8/2018    SBP (mmHg) 130 140 159 129 136    DBP (mmHg) 73 84 81 66 61    Pulse 54 55 57 54 54        Hypertension Medications             amLODIPine (NORVASC) 5 MG tablet Take 1 tablet (5 mg total) by mouth once daily.    losartan (COZAAR) 50 MG tablet Take 1 tablet (50 mg total) by mouth 2 (two) times daily.    nitroGLYCERIN (NITROSTAT) 0.4 MG SL tablet Place 1 tablet (0.4 mg total) under the tongue every 5 (five) minutes as needed for Chest pain.        Assessment/ Plan:   Called patient to follow up.   Current 30-day average is well controlled, goal < 140/90.   Patient denies having questions or concerns. Instructed patient to call if he has any questions or concerns, patient confirms understanding.   Will continue to monitor. WCB in 6 months, sooner if BP begins to trend up or down.

## 2018-07-11 ENCOUNTER — PATIENT MESSAGE (OUTPATIENT)
Dept: INTERNAL MEDICINE | Facility: CLINIC | Age: 83
End: 2018-07-11

## 2018-07-13 ENCOUNTER — PATIENT MESSAGE (OUTPATIENT)
Dept: INTERNAL MEDICINE | Facility: CLINIC | Age: 83
End: 2018-07-13

## 2018-07-13 DIAGNOSIS — J34.89 RHINORRHEA: ICD-10-CM

## 2018-07-13 RX ORDER — IPRATROPIUM BROMIDE 21 UG/1
1-2 SPRAY, METERED NASAL 2 TIMES DAILY
Qty: 30 ML | Refills: 1 | Status: SHIPPED | OUTPATIENT
Start: 2018-07-13 | End: 2019-02-12 | Stop reason: SDUPTHER

## 2018-07-17 ENCOUNTER — CLINICAL SUPPORT (OUTPATIENT)
Dept: OPHTHALMOLOGY | Facility: CLINIC | Age: 83
End: 2018-07-17
Payer: MEDICARE

## 2018-07-17 ENCOUNTER — OFFICE VISIT (OUTPATIENT)
Dept: OPHTHALMOLOGY | Facility: CLINIC | Age: 83
End: 2018-07-17
Payer: MEDICARE

## 2018-07-17 ENCOUNTER — TELEPHONE (OUTPATIENT)
Dept: NEPHROLOGY | Facility: CLINIC | Age: 83
End: 2018-07-17

## 2018-07-17 DIAGNOSIS — H40.1122 PRIMARY OPEN-ANGLE GLAUCOMA, LEFT EYE, MODERATE STAGE: Primary | ICD-10-CM

## 2018-07-17 DIAGNOSIS — H40.89 OTHER GLAUCOMA OF LEFT EYE: ICD-10-CM

## 2018-07-17 DIAGNOSIS — H43.812 POSTERIOR VITREOUS DETACHMENT OF LEFT EYE: ICD-10-CM

## 2018-07-17 DIAGNOSIS — Z97.0 PROSTHETIC EYE GLOBE: ICD-10-CM

## 2018-07-17 DIAGNOSIS — Z96.1 PSEUDOPHAKIA OF LEFT EYE: ICD-10-CM

## 2018-07-17 DIAGNOSIS — H35.372 EPIRETINAL MEMBRANE, LEFT EYE: ICD-10-CM

## 2018-07-17 DIAGNOSIS — H33.002 RHEGMATOGENOUS RETINAL DETACHMENT OF LEFT EYE: ICD-10-CM

## 2018-07-17 DIAGNOSIS — H40.1122 PRIMARY OPEN-ANGLE GLAUCOMA, LEFT EYE, MODERATE STAGE: ICD-10-CM

## 2018-07-17 PROCEDURE — 99999 PR PBB SHADOW E&M-EST. PATIENT-LVL II: CPT | Mod: PBBFAC,,, | Performed by: OPHTHALMOLOGY

## 2018-07-17 PROCEDURE — 92012 INTRM OPH EXAM EST PATIENT: CPT | Mod: S$GLB,,, | Performed by: OPHTHALMOLOGY

## 2018-07-17 PROCEDURE — 92133 CPTRZD OPH DX IMG PST SGM ON: CPT | Mod: S$GLB,,, | Performed by: OPHTHALMOLOGY

## 2018-07-17 RX ORDER — DORZOLAMIDE HCL 20 MG/ML
1 SOLUTION/ DROPS OPHTHALMIC 3 TIMES DAILY
Qty: 30 ML | Refills: 3 | Status: SHIPPED | OUTPATIENT
Start: 2018-07-17 | End: 2019-08-01 | Stop reason: SDUPTHER

## 2018-07-17 NOTE — PROGRESS NOTES
HPI     Glaucoma    Additional comments: HRT review today           Comments   DLS: 2/5/18    1) POAG  2) PCIOL OS  3) Prosthesis OD  4) ERM OS  5) Hx Rhegmatogenous RD OS  6) Psuedo Mac Hole OS    MEDS:  Brimonidine TID OS   Latanoprost QHS OS         Last edited by Teresa Gonzalez MA on 7/17/2018  3:04 PM. (History)            Assessment /Plan     For exam results, see Encounter Report.    Primary open-angle glaucoma, left eye, moderate stage    Posterior vitreous detachment of left eye    Epiretinal membrane, left eye    Rhegmatogenous retinal detachment of left eye    Pseudophakia of left eye    Prosthetic eye globe    Other glaucoma of left eye         Old pt of Dr. Goodrich - now sees Cecil     1. POAG   Followed at [facility] since 1997   followed by Sonia for 30 years  First HVF 2014  gtts started - [facility] - 2012  First photos - ? 1997 - for RD os     Glaucoma meds -  latanoprost qhs os / brimonidine os   H/O adverse rxn to glaucoma drops none  LASERS - SLT os 5/25/2017 (270 degrees - too narrow inf.) (( good resp 16--> 11)   GLAUCOMA SURGERIES none  OTHER EYE SURGERIES - prosthesis od - (2/2 injury - 1940's) // Pnematic retinopexy OS 1997 - [place] // PC iol os - [place]  CDR - prosthesis / 0.75  Tbase  - ?? On gtts when started here  Tmax  - ?? Off gtts   Ttarget  - ?? 13 or less if possible // had a disc heme with IOP 15-18   HVF 4  test 2014 to 2017 OS:  ? Paracentral defect with early SAD/IAD  Gonio  +3-4 S/T/N // very narrow inf os   CCT - xx/492  OCT 2 test 2014 to 2016- RNFL - OD:not done // OS:dec s/bord T  HRT 3 test 2015 - 2018 -  MR -  For OS dec G,TS, N, NS, NI, bord T //CDR// 0.78 os   Disc photos - mult old slides 1997 - 2006 // 2015, 2017 - w/ IT disc heme - OIS    Ttoday - prosthetic // 16  Test done today HRT    2.  disc heme os    See photos 4/17/2017 - occurred with IOP's of  13-18   Resolved by 10/2/107    3.  Monocular precautions  - prothetic od    4.  erm os  - stable, follows with  philippe  -on nevanac q day os - feels it helps vision - ?     5.  Hx of rhegmatogenous rrd os  - flat, follows with philippe    6.  Mac hole os  - monitor     7. PC IOL OS     8. In past Dr Goodrich has filled out form allowing him to get a drivers license - may need again soon     Pt on metoprolol xl      Glaucoma os - monocular   Cont  xalatan  Cont brimonidine   IOP is not quite at goal// he had disc heme - rec lower IOP - consider slt os (( ?? Target 14))   SLT os - 5/25/2017 - S/T/N (too narrow inf. ) - steroid taper (( good resp 16-->11)   Add Dorzolamide, tid  -hold BB due to below  - (may be able to use BB as now has a pacemaker, but has a H/O low heart rate / and/or arrythmia)       -continue EES ointment od - prosthesis - prn irritation     Sees [facility] - new glasses - has been restricted to daytime driving   Keep regular F/U with philippe / rere    Pt occasionally has to get steroid injections for back pain - so will need to monitor that - may be a cause of elevated IOP from time to time      F/U 4 months with gonio - IOP check with addition of dorzolamide os tid (written for ou - but use os )     I have seen and personally examined the patient.  I agree with the findings, assessment and plan of the resident and/or fellow.     Sena Schneider MD

## 2018-07-18 ENCOUNTER — PATIENT OUTREACH (OUTPATIENT)
Dept: OTHER | Facility: OTHER | Age: 83
End: 2018-07-18

## 2018-07-18 NOTE — PROGRESS NOTES
Last 5 Patient Entered Readings                                      Current 30 Day Average: 130/73     Recent Readings 7/17/2018 7/12/2018 7/10/2018 7/4/2018 6/25/2018    SBP (mmHg) 124 137 117 130 140    DBP (mmHg) 71 78 68 73 84    Pulse 60 64 63 54 55          Digital Medicine: Health  Follow Up    Lifestyle Modifications:    1.Dietary Modifications (Sodium intake <2,000mg/day, food labels, dining out): Patient continues monitoring his sodium intake. No major changes that would cause an increase in his sodium at this time. He is on his way to [place] right now so his diet may be off a little while on vacation.     2.Physical Activity: Deferred    3.Medication Therapy: Patient has been compliant with the medication regimen.    4.Patient has the following medication side effects/concerns:   (Frequency/Alleviating factors/Precipitating factors, etc.)     Patient will be in [place] until Sunday 7/22. He asked to go on hiatus until he returns.   He is also going to [place] from August 24 - September 16. I will check in with him before his trip.     Follow up with Mr. Javier Valles completed. No further questions or concerns. Will continue follow up to achieve health goals.

## 2018-07-26 ENCOUNTER — OFFICE VISIT (OUTPATIENT)
Dept: NEPHROLOGY | Facility: CLINIC | Age: 83
End: 2018-07-26
Payer: MEDICARE

## 2018-07-26 ENCOUNTER — LAB VISIT (OUTPATIENT)
Dept: LAB | Facility: HOSPITAL | Age: 83
End: 2018-07-26
Payer: MEDICARE

## 2018-07-26 VITALS
HEIGHT: 68 IN | BODY MASS INDEX: 29.17 KG/M2 | DIASTOLIC BLOOD PRESSURE: 60 MMHG | HEART RATE: 65 BPM | SYSTOLIC BLOOD PRESSURE: 110 MMHG | OXYGEN SATURATION: 96 % | WEIGHT: 192.44 LBS

## 2018-07-26 DIAGNOSIS — E55.9 VITAMIN D DEFICIENCY, UNSPECIFIED: ICD-10-CM

## 2018-07-26 DIAGNOSIS — N18.30 CHRONIC KIDNEY DISEASE, STAGE 3 (MODERATE): Primary | ICD-10-CM

## 2018-07-26 DIAGNOSIS — N18.30 CHRONIC KIDNEY DISEASE, STAGE 3 (MODERATE): ICD-10-CM

## 2018-07-26 DIAGNOSIS — N28.1 ACQUIRED CYST OF KIDNEY: ICD-10-CM

## 2018-07-26 DIAGNOSIS — I10 ESSENTIAL HYPERTENSION: ICD-10-CM

## 2018-07-26 LAB
BACTERIA #/AREA URNS AUTO: ABNORMAL /HPF
BILIRUB UR QL STRIP: NEGATIVE
CLARITY UR REFRACT.AUTO: ABNORMAL
COLOR UR AUTO: ABNORMAL
CREAT UR-MCNC: 299 MG/DL
GLUCOSE UR QL STRIP: NEGATIVE
HGB UR QL STRIP: NEGATIVE
HYALINE CASTS UR QL AUTO: 32 /LPF
KETONES UR QL STRIP: ABNORMAL
LEUKOCYTE ESTERASE UR QL STRIP: ABNORMAL
MICROSCOPIC COMMENT: ABNORMAL
NITRITE UR QL STRIP: NEGATIVE
PH UR STRIP: 5 [PH] (ref 5–8)
PROT UR QL STRIP: ABNORMAL
PROT UR-MCNC: 146 MG/DL
PROT/CREAT RATIO, UR: 0.49
RBC #/AREA URNS AUTO: 2 /HPF (ref 0–4)
SP GR UR STRIP: >=1.03 (ref 1–1.03)
SQUAMOUS #/AREA URNS AUTO: 1 /HPF
URN SPEC COLLECT METH UR: ABNORMAL
UROBILINOGEN UR STRIP-ACNC: NEGATIVE EU/DL
WBC #/AREA URNS AUTO: >100 /HPF (ref 0–5)
WBC CLUMPS UR QL AUTO: ABNORMAL

## 2018-07-26 PROCEDURE — 99213 OFFICE O/P EST LOW 20 MIN: CPT | Mod: GC,S$GLB,, | Performed by: GENERAL PRACTICE

## 2018-07-26 PROCEDURE — 84156 ASSAY OF PROTEIN URINE: CPT

## 2018-07-26 PROCEDURE — 99999 PR PBB SHADOW E&M-EST. PATIENT-LVL IV: CPT | Mod: PBBFAC,GC,, | Performed by: GENERAL PRACTICE

## 2018-07-26 PROCEDURE — 81001 URINALYSIS AUTO W/SCOPE: CPT

## 2018-07-26 NOTE — PATIENT INSTRUCTIONS
- Patient oriented.    - UA today with urine spot creat and prot ratio   - Labs for next visit in 4-6 months (CBC, Renal Panel, U/a, Urine spot creatinine + protein ratio, Vit D-OH levels, Uric acid levels)  - Renal Diet

## 2018-07-26 NOTE — PROGRESS NOTES
Progress Note  Nephrology      Consult Requested By: PCP  Reason for Consult: Evaluation of CKD Stage 3; elevated Creatinine in labs. Referred by his cardiologist for further evaluation.      SUBJECTIVE:     84 y/o man with CKD stage 3, BPH, HTN, Dyslipidemia, CAD s/p CABG (1991), Pacemaker (1.5 yrs ago)  came to [facility]rology Clinics after his cardiologist recommended f/u with us due to elevated creatinine levels.  At the moment, patient denies any SOB, fever, nausea/vomiting/diarrhea, dysuria, low back pain, trauma, chest pain,  or any associated symptoms.      Patient Active Problem List   Diagnosis    Hereditary and idiopathic peripheral neuropathy    Peripheral vascular disease    Hypertension    Hyperlipidemia    Peripheral neuropathy    Bilateral carotid artery disease: Asx, non-obstructive 20%-30%    Degenerative disc disease    Elevated PSA    Myositis    Statin-induced myositis    Chronic kidney disease, stage 3 (moderate)    Gout, unspecified    Acquired cyst of kidney    Idiopathic progressive polyneuropathy    Atherosclerosis of aorta    Epiretinal membrane, left eye    Posterior vitreous detachment of left eye    Pseudophakia of left eye    Prosthetic eye globe    Open-angle glaucoma    Retinal tear    Rhegmatogenous retinal detachment    Atrial fibrillation    History of Urinary retention    Biventricular cardiac pacemaker in situ    SSS (sick sinus syndrome): s/p PPM    Macular edema, cystoid    Lamellar macular hole    Left lumbar radiculopathy    Lumbar facet arthropathy    Spondylosis without myelopathy    Lumbar degenerative disc disease    Gait apraxia    Contusion of rib on left side    Spinal stenosis, lumbar    Obesity, Class I, BMI 30-34.9    Coronary artery disease involving native coronary artery of native heart without angina pectoris    S/P CABG (coronary artery bypass graft)    Varicose veins of both lower extremities- Rt more than Left    Edema     Thrombocytopenia    Total bilirubin, elevated    Lumbar stenosis with neurogenic claudication    S/P lumbar laminectomy    Encounter for postoperative wound check    Peripheral venous insufficiency    Typical atrial flutter    Current use of long term anticoagulation    Status post catheter ablation of atrial flutter    Bilateral carpal tunnel syndrome    Chronic pain    Lumbar spinal stenosis    Acute lumbar radiculopathy    Osteoarthritis of spine with radiculopathy, lumbar region    Cardiomyopathy, ischemic: Prior MI, CABG, EF 40-45%    Post laminectomy syndrome    PVC (premature ventricular contraction)    Chronic pain syndrome     Past Surgical History:   Procedure Laterality Date    CARDIAC CATHETERIZATION         [facility]    CARDIAC ELECTROPHYSIOLOGY MAPPING AND ABLATION   11/2016    CARDIAC PACEMAKER PLACEMENT   11/2016    CATARACT EXTRACTION W/  INTRAOCULAR LENS IMPLANT Left 1997     OS with     CORONARY ARTERY BYPASS GRAFT   1991    ENUCLEATION Right 1949    LUMBAR LAMINECTOMY   04/25/2016    RETINAL DETACHMENT SURGERY Left 1997     Dr. Cosme    SKIN BIOPSY        SPINAL CORD STIMULATOR TRIAL W/ LAMINOTOMY   10/2017    TONSILLECTOMY                    Family History   Problem Relation Age of Onset    Stroke Mother 69    Clotting disorder Mother         per patient no clotting disorder    Hypertension Mother      Diverticulitis Son      Cancer Neg Hx      Diabetes Neg Hx      Heart disease Neg Hx      Glaucoma Neg Hx      Amblyopia Neg Hx      Blindness Neg Hx      Cataracts Neg Hx      Macular degeneration Neg Hx      Retinal detachment Neg Hx      Strabismus Neg Hx           Social History   Social History            Social History    Marital status:        Spouse name: Joanie    Number of children: N/A    Years of education: N/A           Occupational History    retired               Social History Main Topics    Smoking status: Former Smoker        Quit date: 8/19/1963    Smokeless tobacco: Never Used    Alcohol use 1.8 oz/week       1 Glasses of wine, 1 Cans of beer, 1 Shots of liquor per week         Comment: 2 a day    Drug use: No    Sexual activity: Yes       Partners: Female           Other Topics Concern    Not on file          Social History Narrative    No narrative on file            Review of patient's allergies indicates:   Allergen Reactions    Pravastatin Other (See Comments)       Severe myalgias/elevated CPK    Lipitor [atorvastatin]         Myositis/elevated CPK    Statins-hmg-coa reductase inhibitors         Muscle pain and loss of muscle    Ace inhibitors         Cough           Review of Systems:  Review of Systems   Constitutional: Negative for chills and fever.   HENT: Negative for facial swelling.    Eyes: Negative for visual disturbance.   Respiratory: Negative for cough, shortness of breath and wheezing.    Cardiovascular: Negative for chest pain and palpitations.   Gastrointestinal: Negative for abdominal distention, abdominal pain, diarrhea, nausea and vomiting.   Genitourinary: Negative for difficulty urinating, dysuria, flank pain and scrotal swelling.   Musculoskeletal: Negative for arthralgias, back pain, gait problem and joint swelling.   Skin: Negative for pallor.   Neurological: Negative for dizziness, syncope and light-headedness.   Psychiatric/Behavioral: Negative for behavioral problems and confusion.           OBJECTIVE:     Medications:  Current Outpatient Prescriptions on File Prior to Visit   Medication Sig Dispense Refill    amiodarone (PACERONE) 200 MG Tab Take 1 tablet (200 mg total) by mouth once daily. 30 tablet 11    amLODIPine (NORVASC) 5 MG tablet Take 1 tablet (5 mg total) by mouth once daily. 90 tablet 3    apixaban 2.5 mg Tab Take 1 tablet (2.5 mg total) by mouth 2 (two) times daily. 180 tablet 3    ascorbic acid (VITAMIN C) 1000 MG tablet Take 1,000 mg by mouth every morning.        brimonidine 0.2% (ALPHAGAN) 0.2 % Drop Place 1 drop into the left eye 3 (three) times daily. 10 mL 12    buPROPion (WELLBUTRIN XL) 150 MG TB24 tablet TAKE ONE TABLET BY MOUTH ONCE DAILY 30 tablet 11    co-enzyme Q-10 30 mg capsule Take 30 mg by mouth once daily.       donepezil (ARICEPT) 5 MG tablet Take 1 tablet (5 mg total) by mouth every evening. 30 tablet 11    dorzolamide (TRUSOPT) 2 % ophthalmic solution Place 1 drop into both eyes 3 (three) times daily. Please fill today - this evening if possible - pt is leaving town early tomorrow 30 mL 3    ipratropium (ATROVENT) 0.03 % nasal spray 1-2 sprays by Nasal route 2 (two) times daily. 30 mL 1    latanoprost 0.005 % ophthalmic solution Place 1 drop into the left eye every evening. 1 Bottle 12    losartan (COZAAR) 50 MG tablet Take 1 tablet (50 mg total) by mouth 2 (two) times daily. 180 tablet 3    lutein 20 mg Cap Take 20 mg by mouth once daily.       MULTIVITAMINS,THER W-MINERALS (VITAMINS & MINERALS ORAL) Take 1 tablet by mouth every morning.       rosuvastatin (CRESTOR) 20 MG tablet Take 1 tablet (20 mg total) by mouth every evening. 90 tablet 3    tamsulosin (FLOMAX) 0.4 mg Cp24 Take 1 capsule (0.4 mg total) by mouth once daily. 30 capsule 11     No current facility-administered medications on file prior to visit.          Vitals:    07/26/18 1355   BP: 110/60   Pulse: 65     Wt Readings from Last 3 Encounters:   07/26/18 87.3 kg (192 lb 7.4 oz)   06/29/18 86.4 kg (190 lb 7.6 oz)   05/15/18 88.4 kg (194 lb 14.2 oz)     Temp Readings from Last 3 Encounters:   12/29/17 98.8 °F (37.1 °C) (Tympanic)   11/09/17 97.9 °F (36.6 °C) (Oral)   09/28/17 98 °F (36.7 °C) (Oral)     BP Readings from Last 3 Encounters:   07/26/18 110/60   06/29/18 124/66   05/15/18 111/62     Pulse Readings from Last 3 Encounters:   07/26/18 65   06/29/18 (!) 54   05/15/18 (!) 55       Physical Exam:  Physical Exam   Constitutional: He is oriented to person, place, and time. He  appears well-developed and well-nourished. No distress.   HENT:   Head: Normocephalic and atraumatic.   Eyes: Conjunctivae and EOM are normal. Pupils are equal, round, and reactive to light.   Neck: Normal range of motion. Neck supple. No JVD present. No tracheal deviation present.   Cardiovascular: Normal rate, regular rhythm and normal heart sounds.  Exam reveals no gallop and no friction rub.    No murmur heard.  Pulmonary/Chest: Effort normal and breath sounds normal. No respiratory distress. He has no wheezes. He has no rales.   Abdominal: Soft. Bowel sounds are normal. He exhibits no distension. There is no tenderness. There is no guarding.   Musculoskeletal: Normal range of motion. He exhibits no edema or tenderness.   Neurological: He is alert and oriented to person, place, and time.   Skin: Skin is warm and dry. No erythema. No pallor.   Psychiatric: He has a normal mood and affect. His behavior is normal.           Laboratory:  Lab Results   Component Value Date    WBC 7.00 07/03/2018    HGB 11.8 (L) 07/03/2018    HCT 37.0 (L) 07/03/2018     (H) 07/03/2018     (L) 07/03/2018     Sodium   Date Value Ref Range Status   07/03/2018 146 (H) 136 - 145 mmol/L Final     Potassium   Date Value Ref Range Status   07/03/2018 4.8 3.5 - 5.1 mmol/L Final     Chloride   Date Value Ref Range Status   07/03/2018 113 (H) 95 - 110 mmol/L Final     CO2   Date Value Ref Range Status   07/03/2018 25 23 - 29 mmol/L Final     Glucose   Date Value Ref Range Status   07/03/2018 93 70 - 110 mg/dL Final     BUN, Bld   Date Value Ref Range Status   07/03/2018 29 (H) 8 - 23 mg/dL Final     Creatinine   Date Value Ref Range Status   07/03/2018 1.9 (H) 0.5 - 1.4 mg/dL Final     Calcium   Date Value Ref Range Status   07/03/2018 9.8 8.7 - 10.5 mg/dL Final     Total Protein   Date Value Ref Range Status   06/12/2017 6.7 6.0 - 8.4 g/dL Final     Albumin   Date Value Ref Range Status   06/12/2017 3.7 3.5 - 5.2 g/dL Final      Total Bilirubin   Date Value Ref Range Status   06/12/2017 1.1 (H) 0.1 - 1.0 mg/dL Final     Comment:     For infants and newborns, interpretation of results should be based  on gestational age, weight and in agreement with clinical  observations.  Premature Infant recommended reference ranges:  Up to 24 hours.............<8.0 mg/dL  Up to 48 hours............<12.0 mg/dL  3-5 days..................<15.0 mg/dL  6-29 days.................<15.0 mg/dL       Alkaline Phosphatase   Date Value Ref Range Status   06/12/2017 72 55 - 135 U/L Final     AST   Date Value Ref Range Status   06/12/2017 30 10 - 40 U/L Final     ALT   Date Value Ref Range Status   06/12/2017 18 10 - 44 U/L Final     Anion Gap   Date Value Ref Range Status   07/03/2018 8 8 - 16 mmol/L Final     eGFR if    Date Value Ref Range Status   07/03/2018 35.6 (A) >60 mL/min/1.73 m^2 Final     eGFR if non    Date Value Ref Range Status   07/03/2018 30.8 (A) >60 mL/min/1.73 m^2 Final     Comment:     Calculation used to obtain the estimated glomerular filtration  rate (eGFR) is the CKD-EPI equation.        Color, UA   Date Value Ref Range Status   11/15/2017 orange  Final   11/01/2017 Kika Yellow, Straw, Kika Final     Appearance, UA   Date Value Ref Range Status   11/01/2017 Hazy (A) Clear Final     pH, UA   Date Value Ref Range Status   11/15/2017 6  Final   11/01/2017 5.0 5.0 - 8.0 Final     Specific Gravity, UA   Date Value Ref Range Status   11/01/2017 1.030 1.005 - 1.030 Final     Spec Grav UA   Date Value Ref Range Status   11/15/2017 1.015  Final     Protein, UA   Date Value Ref Range Status   11/01/2017 3+ (A) Negative Final     Comment:     Recommend a 24 hour urine protein or a urine   protein/creatinine ratio if globulin induced proteinuria is  clinically suspected.       Glucose, UA   Date Value Ref Range Status   11/01/2017 Negative Negative Final     Ketones, UA   Date Value Ref Range Status   11/15/2017 n   Final   11/01/2017 Trace (A) Negative Final     Bilirubin (UA)   Date Value Ref Range Status   11/01/2017 Negative Negative Final     Occult Blood UA   Date Value Ref Range Status   11/01/2017 Negative Negative Final     Nitrite, UA   Date Value Ref Range Status   11/15/2017 n  Final   11/01/2017 Negative Negative Final     Urobilinogen, UA   Date Value Ref Range Status   11/15/2017 n  Final   11/01/2017 2.0 <2.0 EU/dL Final     Leukocytes, UA   Date Value Ref Range Status   11/01/2017 Negative Negative Final     RBC, UA   Date Value Ref Range Status   11/01/2017 0 0 - 4 /hpf Final     WBC, UA   Date Value Ref Range Status   11/01/2017 1 0 - 5 /hpf Final     Bacteria, UA   Date Value Ref Range Status   11/01/2017 None None-Occ /hpf Final     Microscopic Comment   Date Value Ref Range Status   11/01/2017 SEE COMMENT  Final     Comment:     Other formed elements not mentioned in the report are not   present in the microscopic examination.        Protein, Urine Random   Date Value Ref Range Status   12/28/2015 129 (H) 0 - 15 mg/dL Final     Comment:     The random urine reference ranges provided were established   for 24 hour urine collections.  No reference ranges exist for  random urine specimens.  Correlate clinically.       Creatinine, Random Ur   Date Value Ref Range Status   07/26/2017 272.5 20.0 - 400.0 mg/dL Final     Prot/Creat Ratio, Ur   Date Value Ref Range Status   12/28/2015 0.31 (H) 0.00 - 0.20 Final       Labs reviewed  Diagnostic Results:  X-Ray: Reviewed  US: Reviewed  Echo: Reviewed  ACCESS    ASSESSMENT/PLAN:     Chronic Kidney Disease Stage 3b most likely secondary to longstanding arterial hypertension with proteinuria  Plan:  - U/A with prot creat ratio today   - Labs for next visit in 4-6 months (if today's UA unremarkable; if results come back with nephrotic range proteinuria, will schedule within 1 month for evaluation and more tests)  - Cr has been stable for last year (Creat 6/2017 1.8, 7/18  1.9)  - Renal US for next visit.    Arterial Hypertension:  Continue current antihypertensive medications.  Follow up with cardiologist.        Rod Jones MD  Nephrology  [facility]

## 2018-07-26 NOTE — PROGRESS NOTES
Progress Note  Nephrology      Consult Requested By: No att. providers found  Reason for Consult:     87 y/o man CKD Stage 3, BPH, CAD s/p CABG (1991), s/p Pacemaker (1.5 yrs ago), came to Nephrology Clinics for evaluation of elevated creatinine level.    SUBJECTIVE:     Overnight events      Patient Active Problem List   Diagnosis    Hereditary and idiopathic peripheral neuropathy    Peripheral vascular disease    Hypertension    Hyperlipidemia    Peripheral neuropathy    Bilateral carotid artery disease: Asx, non-obstructive 20%-30%    Degenerative disc disease    Elevated PSA    Myositis    Statin-induced myositis    Chronic kidney disease, stage 3 (moderate)    Gout, unspecified    Acquired cyst of kidney    Idiopathic progressive polyneuropathy    Atherosclerosis of aorta    Epiretinal membrane, left eye    Posterior vitreous detachment of left eye    Pseudophakia of left eye    Prosthetic eye globe    Open-angle glaucoma    Retinal tear    Rhegmatogenous retinal detachment    Atrial fibrillation    History of Urinary retention    Biventricular cardiac pacemaker in situ    SSS (sick sinus syndrome): s/p PPM    Macular edema, cystoid    Lamellar macular hole    Left lumbar radiculopathy    Lumbar facet arthropathy    Spondylosis without myelopathy    Lumbar degenerative disc disease    Gait apraxia    Contusion of rib on left side    Spinal stenosis, lumbar    Obesity, Class I, BMI 30-34.9    Coronary artery disease involving native coronary artery of native heart without angina pectoris    S/P CABG (coronary artery bypass graft)    Varicose veins of both lower extremities- Rt more than Left    Edema    Thrombocytopenia    Total bilirubin, elevated    Lumbar stenosis with neurogenic claudication    S/P lumbar laminectomy    Encounter for postoperative wound check    Peripheral venous insufficiency    Typical atrial flutter    Current use of long term  anticoagulation    Status post catheter ablation of atrial flutter    Bilateral carpal tunnel syndrome    Chronic pain    Lumbar spinal stenosis    Acute lumbar radiculopathy    Osteoarthritis of spine with radiculopathy, lumbar region    Cardiomyopathy, ischemic: Prior MI, CABG, EF 40-45%    Post laminectomy syndrome    PVC (premature ventricular contraction)    Chronic pain syndrome           Review of Systems:  Review of Systems    Past Medical, Social, Surgical, and Family history reviewed with patient.     OBJECTIVE:     Medications:        Vitals:    07/26/18 1355   BP: 110/60   Pulse: 65     Wt Readings from Last 3 Encounters:   07/26/18 87.3 kg (192 lb 7.4 oz)   06/29/18 86.4 kg (190 lb 7.6 oz)   05/15/18 88.4 kg (194 lb 14.2 oz)     Temp Readings from Last 3 Encounters:   12/29/17 98.8 °F (37.1 °C) (Tympanic)   11/09/17 97.9 °F (36.6 °C) (Oral)   09/28/17 98 °F (36.7 °C) (Oral)     BP Readings from Last 3 Encounters:   07/26/18 110/60   06/29/18 124/66   05/15/18 111/62     Pulse Readings from Last 3 Encounters:   07/26/18 65   06/29/18 (!) 54   05/15/18 (!) 55       Physical Exam:  Physical Exam        Laboratory:  Lab Results   Component Value Date    WBC 7.00 07/03/2018    HGB 11.8 (L) 07/03/2018    HCT 37.0 (L) 07/03/2018     (H) 07/03/2018     (L) 07/03/2018     Sodium   Date Value Ref Range Status   07/03/2018 146 (H) 136 - 145 mmol/L Final     Potassium   Date Value Ref Range Status   07/03/2018 4.8 3.5 - 5.1 mmol/L Final     Chloride   Date Value Ref Range Status   07/03/2018 113 (H) 95 - 110 mmol/L Final     CO2   Date Value Ref Range Status   07/03/2018 25 23 - 29 mmol/L Final     Glucose   Date Value Ref Range Status   07/03/2018 93 70 - 110 mg/dL Final     BUN, Bld   Date Value Ref Range Status   07/03/2018 29 (H) 8 - 23 mg/dL Final     Creatinine   Date Value Ref Range Status   07/03/2018 1.9 (H) 0.5 - 1.4 mg/dL Final     Calcium   Date Value Ref Range Status    07/03/2018 9.8 8.7 - 10.5 mg/dL Final     Total Protein   Date Value Ref Range Status   06/12/2017 6.7 6.0 - 8.4 g/dL Final     Albumin   Date Value Ref Range Status   06/12/2017 3.7 3.5 - 5.2 g/dL Final     Total Bilirubin   Date Value Ref Range Status   06/12/2017 1.1 (H) 0.1 - 1.0 mg/dL Final     Comment:     For infants and newborns, interpretation of results should be based  on gestational age, weight and in agreement with clinical  observations.  Premature Infant recommended reference ranges:  Up to 24 hours.............<8.0 mg/dL  Up to 48 hours............<12.0 mg/dL  3-5 days..................<15.0 mg/dL  6-29 days.................<15.0 mg/dL       Alkaline Phosphatase   Date Value Ref Range Status   06/12/2017 72 55 - 135 U/L Final     AST   Date Value Ref Range Status   06/12/2017 30 10 - 40 U/L Final     ALT   Date Value Ref Range Status   06/12/2017 18 10 - 44 U/L Final     Anion Gap   Date Value Ref Range Status   07/03/2018 8 8 - 16 mmol/L Final     eGFR if    Date Value Ref Range Status   07/03/2018 35.6 (A) >60 mL/min/1.73 m^2 Final     eGFR if non    Date Value Ref Range Status   07/03/2018 30.8 (A) >60 mL/min/1.73 m^2 Final     Comment:     Calculation used to obtain the estimated glomerular filtration  rate (eGFR) is the CKD-EPI equation.        Color, UA   Date Value Ref Range Status   11/15/2017 orange  Final   11/01/2017 Kika Yellow, Straw, Kika Final     Appearance, UA   Date Value Ref Range Status   11/01/2017 Hazy (A) Clear Final     pH, UA   Date Value Ref Range Status   11/15/2017 6  Final   11/01/2017 5.0 5.0 - 8.0 Final     Specific Gravity, UA   Date Value Ref Range Status   11/01/2017 1.030 1.005 - 1.030 Final     Spec Grav UA   Date Value Ref Range Status   11/15/2017 1.015  Final     Protein, UA   Date Value Ref Range Status   11/01/2017 3+ (A) Negative Final     Comment:     Recommend a 24 hour urine protein or a urine   protein/creatinine ratio  if globulin induced proteinuria is  clinically suspected.       Glucose, UA   Date Value Ref Range Status   11/01/2017 Negative Negative Final     Ketones, UA   Date Value Ref Range Status   11/15/2017 n  Final   11/01/2017 Trace (A) Negative Final     Bilirubin (UA)   Date Value Ref Range Status   11/01/2017 Negative Negative Final     Occult Blood UA   Date Value Ref Range Status   11/01/2017 Negative Negative Final     Nitrite, UA   Date Value Ref Range Status   11/15/2017 n  Final   11/01/2017 Negative Negative Final     Urobilinogen, UA   Date Value Ref Range Status   11/15/2017 n  Final   11/01/2017 2.0 <2.0 EU/dL Final     Leukocytes, UA   Date Value Ref Range Status   11/01/2017 Negative Negative Final     RBC, UA   Date Value Ref Range Status   11/01/2017 0 0 - 4 /hpf Final     WBC, UA   Date Value Ref Range Status   11/01/2017 1 0 - 5 /hpf Final     Bacteria, UA   Date Value Ref Range Status   11/01/2017 None None-Occ /hpf Final     Microscopic Comment   Date Value Ref Range Status   11/01/2017 SEE COMMENT  Final     Comment:     Other formed elements not mentioned in the report are not   present in the microscopic examination.        Protein, Urine Random   Date Value Ref Range Status   12/28/2015 129 (H) 0 - 15 mg/dL Final     Comment:     The random urine reference ranges provided were established   for 24 hour urine collections.  No reference ranges exist for  random urine specimens.  Correlate clinically.       Creatinine, Random Ur   Date Value Ref Range Status   07/26/2017 272.5 20.0 - 400.0 mg/dL Final     Prot/Creat Ratio, Ur   Date Value Ref Range Status   12/28/2015 0.31 (H) 0.00 - 0.20 Final       Labs reviewed  Diagnostic Results:  X-Ray: Reviewed  US: Reviewed  Echo: Reviewed  ACCESS    ASSESSMENT/PLAN:     Patient Active Problem List   Diagnosis    Hereditary and idiopathic peripheral neuropathy    Peripheral vascular disease    Hypertension    Hyperlipidemia    Peripheral neuropathy     Bilateral carotid artery disease: Asx, non-obstructive 20%-30%    Degenerative disc disease    Elevated PSA    Myositis    Statin-induced myositis    Chronic kidney disease, stage 3 (moderate)    Gout, unspecified    Acquired cyst of kidney    Idiopathic progressive polyneuropathy    Atherosclerosis of aorta    Epiretinal membrane, left eye    Posterior vitreous detachment of left eye    Pseudophakia of left eye    Prosthetic eye globe    Open-angle glaucoma    Retinal tear    Rhegmatogenous retinal detachment    Atrial fibrillation    History of Urinary retention    Biventricular cardiac pacemaker in situ    SSS (sick sinus syndrome): s/p PPM    Macular edema, cystoid    Lamellar macular hole    Left lumbar radiculopathy    Lumbar facet arthropathy    Spondylosis without myelopathy    Lumbar degenerative disc disease    Gait apraxia    Contusion of rib on left side    Spinal stenosis, lumbar    Obesity, Class I, BMI 30-34.9    Coronary artery disease involving native coronary artery of native heart without angina pectoris    S/P CABG (coronary artery bypass graft)    Varicose veins of both lower extremities- Rt more than Left    Edema    Thrombocytopenia    Total bilirubin, elevated    Lumbar stenosis with neurogenic claudication    S/P lumbar laminectomy    Encounter for postoperative wound check    Peripheral venous insufficiency    Typical atrial flutter    Current use of long term anticoagulation    Status post catheter ablation of atrial flutter    Bilateral carpal tunnel syndrome    Chronic pain    Lumbar spinal stenosis    Acute lumbar radiculopathy    Osteoarthritis of spine with radiculopathy, lumbar region    Cardiomyopathy, ischemic: Prior MI, CABG, EF 40-45%    Post laminectomy syndrome    PVC (premature ventricular contraction)    Chronic pain syndrome       Plan:   No problem-specific Assessment & Plan notes found for this  encounter.          Rod Jones MD  Nephrology  [facility]

## 2018-07-27 ENCOUNTER — PATIENT MESSAGE (OUTPATIENT)
Dept: INTERNAL MEDICINE | Facility: CLINIC | Age: 83
End: 2018-07-27

## 2018-07-27 DIAGNOSIS — K62.5 RECTAL BLEEDING: Primary | ICD-10-CM

## 2018-07-30 ENCOUNTER — OFFICE VISIT (OUTPATIENT)
Dept: SURGERY | Facility: CLINIC | Age: 83
End: 2018-07-30
Payer: MEDICARE

## 2018-07-30 VITALS
WEIGHT: 193.81 LBS | HEART RATE: 68 BPM | SYSTOLIC BLOOD PRESSURE: 115 MMHG | BODY MASS INDEX: 29.37 KG/M2 | DIASTOLIC BLOOD PRESSURE: 58 MMHG | HEIGHT: 68 IN

## 2018-07-30 DIAGNOSIS — K64.8 INTERNAL HEMORRHOIDS: ICD-10-CM

## 2018-07-30 DIAGNOSIS — K59.00 CONSTIPATION, UNSPECIFIED CONSTIPATION TYPE: ICD-10-CM

## 2018-07-30 DIAGNOSIS — K62.5 RECTAL BLEEDING: Primary | ICD-10-CM

## 2018-07-30 PROCEDURE — 99999 PR PBB SHADOW E&M-EST. PATIENT-LVL II: CPT | Mod: PBBFAC,,, | Performed by: COLON & RECTAL SURGERY

## 2018-07-30 PROCEDURE — 46600 DIAGNOSTIC ANOSCOPY SPX: CPT | Mod: S$GLB,,, | Performed by: COLON & RECTAL SURGERY

## 2018-07-30 PROCEDURE — 99203 OFFICE O/P NEW LOW 30 MIN: CPT | Mod: 25,S$GLB,, | Performed by: COLON & RECTAL SURGERY

## 2018-07-30 NOTE — LETTER
August 7, 2018      Thiago Gibbs MD  [address]  [facility] [address]           [facility]  [address]  [facility] [address]  Phone: [phone]          Patient: Javier Valles   MR Number: [id number]   YOB: 1930   Date of Visit: 7/30/2018       Dear Dr. Thiago Gibbs:    Thank you for referring Javier Valles to me for evaluation. Attached you will find relevant portions of my assessment and plan of care.    If you have questions, please do not hesitate to call me. I look forward to following Javier Valles along with you.    Sincerely,    Rudolph Manuel MD    Enclosure  CC:  No Recipients    If you would like to receive this communication electronically, please contact [email or web address] or [phone] to request more information on [facility] [employer] access.    For providers and/or their staff who would like to refer a patient to [facility], please contact us through our one-stop-shop provider referral line, [facility], at [phone].    If you feel you have received this communication in error or would no longer like to receive these types of communications, please e-mail [email or web address]

## 2018-07-30 NOTE — PROGRESS NOTES
Subjective:       Patient ID: Javier Valles is a 88 y.o. male.    Chief Complaint: Rectal Bleeding    HPI  89yo M with complaints of rectal bleeding.  He reports intermittent bright red blood on the surface of the stool.  He says that in recent months his stool is much harder and more difficult to evacuate.  He does not take any fiber supplementation, stool softeners or laxatives.  He has lost 15 lb in the past few months but this has been intentional with diet and exercise.  He is on Eliquis for atrial flutter.  He has tried suppositories for the rectal bleeding without effect.  He is not sure when his last colonoscopy was but gases it was 12-15 years ago.  He denies any abdominal pain, nausea, vomiting, or tenesmus.    No family hx of CRC or IBD.      Review of patient's allergies indicates:   Allergen Reactions    Pravastatin      Severe myalgias/elevated CPK    Lipitor [atorvastatin]      Myositis/elevated CPK    Statins-hmg-coa reductase inhibitors      Muscle pain and loss of muscle    Ace inhibitors      Cough       Past Medical History:   Diagnosis Date    *Atrial flutter 10/3/13    Anticoagulant long-term use     Aspirin only at this time    Arthritis     Atrial fibrillation     Atrial flutter     Blood transfusion     ?    BPH (benign prostatic hypertrophy)     Carotid artery disease     2008: [place] There is 40 - 49% right Internal Carotid stenosis.  There is 20 - 39% left Internal Carotid stenosis.       Chronic kidney disease     Coronary artery disease     DDD (degenerative disc disease) 6/25/2015    Degenerative disc disease     Elevated PSA     Glaucoma     Hyperlipidemia     Hypertension     Lumbar stenosis     lumbar spinal stenosis    NSTEMI (non-ST elevated myocardial infarction) 11/3/2013    Pacemaker 11/2013    Peripheral neuropathy     - S/P right ILIAC stent 1993;      Retinal detachment     Squamous cell carcinoma     left leg    Syncope and collapse: orthostatic with  hypotension 10/9/2015       Past Surgical History:   Procedure Laterality Date    [facility]TERIZATION      [facility]    CARDIAC ELECTROPHYSIOLOGY MAPPING AND ABLATION  11/2016    CARDIAC PACEMAKER PLACEMENT  11/2016    CATARACT EXTRACTION W/  INTRAOCULAR LENS IMPLANT Left 1997        CORONARY ARTERY BYPASS GRAFT  1991    ENUCLEATION Right 1949    LUMBAR LAMINECTOMY  04/25/2016    RETINAL DETACHMENT SURGERY Left 1997    Dr. Cosme    SKIN BIOPSY      SPINAL CORD STIMULATOR TRIAL W/ LAMINOTOMY  10/2017    TONSILLECTOMY         Current Outpatient Prescriptions   Medication Sig Dispense Refill    amiodarone (PACERONE) 200 MG Tab Take 1 tablet (200 mg total) by mouth once daily. 30 tablet 11    amLODIPine (NORVASC) 5 MG tablet Take 1 tablet (5 mg total) by mouth once daily. 90 tablet 3    apixaban 2.5 mg Tab Take 1 tablet (2.5 mg total) by mouth 2 (two) times daily. 180 tablet 3    ascorbic acid (VITAMIN C) 1000 MG tablet Take 1,000 mg by mouth every morning.       brimonidine 0.2% (ALPHAGAN) 0.2 % Drop Place 1 drop into the left eye 3 (three) times daily. 10 mL 12    buPROPion (WELLBUTRIN XL) 150 MG TB24 tablet TAKE ONE TABLET BY MOUTH ONCE DAILY 30 tablet 11    co-enzyme Q-10 30 mg capsule Take 30 mg by mouth once daily.       donepezil (ARICEPT) 5 MG tablet Take 1 tablet (5 mg total) by mouth every evening. 30 tablet 11    dorzolamide (TRUSOPT) 2 % ophthalmic solution Place 1 drop into both eyes 3 (three) times daily. Please fill today - this evening if possible - pt is leaving town early tomorrow 30 mL 3    ipratropium (ATROVENT) 0.03 % nasal spray 1-2 sprays by Nasal route 2 (two) times daily. 30 mL 1    latanoprost 0.005 % ophthalmic solution Place 1 drop into the left eye every evening. 1 Bottle 12    losartan (COZAAR) 50 MG tablet Take 1 tablet (50 mg total) by mouth 2 (two) times daily. 180 tablet 3    lutein 20 mg Cap Take 20 mg by mouth once daily.       MULTIVITAMINS,THER  W-MINERALS (VITAMINS & MINERALS ORAL) Take 1 tablet by mouth every morning.       rosuvastatin (CRESTOR) 20 MG tablet Take 1 tablet (20 mg total) by mouth every evening. 90 tablet 3    tamsulosin (FLOMAX) 0.4 mg Cp24 Take 1 capsule (0.4 mg total) by mouth once daily. 30 capsule 11    sertraline (ZOLOFT) 50 MG tablet Take 1 tablet (50 mg total) by mouth once daily. 30 tablet 6     No current facility-administered medications for this visit.        Family History   Problem Relation Age of Onset    Stroke Mother 69    Clotting disorder Mother         per patient no clotting disorder    Hypertension Mother     Diverticulitis Son     Cancer Neg Hx     Diabetes Neg Hx     Heart disease Neg Hx     Glaucoma Neg Hx     Amblyopia Neg Hx     Blindness Neg Hx     Cataracts Neg Hx     Macular degeneration Neg Hx     Retinal detachment Neg Hx     Strabismus Neg Hx        Social History     Social History    Marital status:      Spouse name: Joanie    Number of children: N/A    Years of education: N/A     Occupational History    retired      Social History Main Topics    Smoking status: Former Smoker     Quit date: 8/19/1963    Smokeless tobacco: Never Used    Alcohol use 1.8 oz/week     1 Glasses of wine, 1 Cans of beer, 1 Shots of liquor per week      Comment: 2 a day    Drug use: No    Sexual activity: Yes     Partners: Female     Other Topics Concern    Not on file     Social History Narrative    No narrative on file       Review of Systems   Constitutional: Negative for chills and fever.   HENT: Negative for congestion and sinus pressure.    Eyes: Negative for visual disturbance.   Respiratory: Negative for cough and shortness of breath.    Cardiovascular: Negative for chest pain and palpitations.   Gastrointestinal: Positive for anal bleeding. Negative for abdominal distention, abdominal pain, blood in stool, constipation, diarrhea, nausea, rectal pain and vomiting.   Endocrine: Negative for  cold intolerance and heat intolerance.   Genitourinary: Negative for dysuria and frequency.   Musculoskeletal: Positive for back pain. Negative for arthralgias.   Skin: Negative for rash.   Allergic/Immunologic: Negative for immunocompromised state.   Neurological: Negative for dizziness, light-headedness and headaches.   Psychiatric/Behavioral: Negative for confusion. The patient is not nervous/anxious.        Objective:      Physical Exam   Constitutional: He is oriented to person, place, and time. He appears well-developed and well-nourished.   HENT:   Head: Normocephalic and atraumatic.   Eyes: Conjunctivae are normal.   Pulmonary/Chest: Effort normal. No respiratory distress.   Abdominal: Soft. He exhibits no distension and no mass. There is no tenderness. There is no rebound and no guarding.   Genitourinary:   Genitourinary Comments: Perineum - normal perianal skin, no mass, no fissure, no external hemorrhoids  SHAKILA - good tone, no mass  Anoscopy - Grade 1-2 internal hemorrhoids with moderate inflammation     Neurological: He is alert and oriented to person, place, and time.   Skin: Skin is warm and dry. No erythema.         Lab Results   Component Value Date    WBC 7.00 07/03/2018    HGB 11.8 (L) 07/03/2018    HCT 37.0 (L) 07/03/2018     (H) 07/03/2018     (L) 07/03/2018     BMP  Lab Results   Component Value Date     (H) 07/03/2018    K 4.8 07/03/2018     (H) 07/03/2018    CO2 25 07/03/2018    BUN 29 (H) 07/03/2018    CREATININE 1.9 (H) 07/03/2018    CALCIUM 9.8 07/03/2018    ANIONGAP 8 07/03/2018    ESTGFRAFRICA 35.6 (A) 07/03/2018    EGFRNONAA 30.8 (A) 07/03/2018     CMP  Sodium   Date Value Ref Range Status   07/03/2018 146 (H) 136 - 145 mmol/L Final     Potassium   Date Value Ref Range Status   07/03/2018 4.8 3.5 - 5.1 mmol/L Final     Chloride   Date Value Ref Range Status   07/03/2018 113 (H) 95 - 110 mmol/L Final     CO2   Date Value Ref Range Status   07/03/2018 25 23 - 29  mmol/L Final     Glucose   Date Value Ref Range Status   07/03/2018 93 70 - 110 mg/dL Final     BUN, Bld   Date Value Ref Range Status   07/03/2018 29 (H) 8 - 23 mg/dL Final     Creatinine   Date Value Ref Range Status   07/03/2018 1.9 (H) 0.5 - 1.4 mg/dL Final     Calcium   Date Value Ref Range Status   07/03/2018 9.8 8.7 - 10.5 mg/dL Final     Total Protein   Date Value Ref Range Status   06/12/2017 6.7 6.0 - 8.4 g/dL Final     Albumin   Date Value Ref Range Status   06/12/2017 3.7 3.5 - 5.2 g/dL Final     Total Bilirubin   Date Value Ref Range Status   06/12/2017 1.1 (H) 0.1 - 1.0 mg/dL Final     Comment:     For infants and newborns, interpretation of results should be based  on gestational age, weight and in agreement with clinical  observations.  Premature Infant recommended reference ranges:  Up to 24 hours.............<8.0 mg/dL  Up to 48 hours............<12.0 mg/dL  3-5 days..................<15.0 mg/dL  6-29 days.................<15.0 mg/dL       Alkaline Phosphatase   Date Value Ref Range Status   06/12/2017 72 55 - 135 U/L Final     AST   Date Value Ref Range Status   06/12/2017 30 10 - 40 U/L Final     ALT   Date Value Ref Range Status   06/12/2017 18 10 - 44 U/L Final     Anion Gap   Date Value Ref Range Status   07/03/2018 8 8 - 16 mmol/L Final     eGFR if    Date Value Ref Range Status   07/03/2018 35.6 (A) >60 mL/min/1.73 m^2 Final     eGFR if non    Date Value Ref Range Status   07/03/2018 30.8 (A) >60 mL/min/1.73 m^2 Final     Comment:     Calculation used to obtain the estimated glomerular filtration  rate (eGFR) is the CKD-EPI equation.        No results found for: CEA        Assessment:       1. Rectal bleeding    2. Internal hemorrhoids    3. Constipation, unspecified constipation type        Plan:   Discussed pathophysiology of hemorrhoidal disease and constipation.  Will start with conservative measures:   -Increased fiber intake (20-25 grams/day) and fluid  intake (8-10 glasses water/day)   -Daily fiber supplement   -Colace 100 mg bid   -MiraLax as needed for constipation.   -Avoid excessive trauma/straining if possible   -Avoid sitting on toilet for long periods  RTO 2 weeks - if still bleeding, hold Eliquis 2 days prior in anticipation of possible EBL.          Rudolph Manuel MD, FACS, FASCRS  Staff Surgeon  Department of Colon & Rectal Surgery

## 2018-07-31 DIAGNOSIS — N39.0 UTI (URINARY TRACT INFECTION), UNCOMPLICATED: Primary | ICD-10-CM

## 2018-07-31 DIAGNOSIS — N18.4 CKD (CHRONIC KIDNEY DISEASE), STAGE IV: ICD-10-CM

## 2018-08-03 ENCOUNTER — PATIENT MESSAGE (OUTPATIENT)
Dept: ELECTROPHYSIOLOGY | Facility: CLINIC | Age: 83
End: 2018-08-03

## 2018-08-03 ENCOUNTER — PATIENT MESSAGE (OUTPATIENT)
Dept: INTERNAL MEDICINE | Facility: CLINIC | Age: 83
End: 2018-08-03

## 2018-08-03 RX ORDER — SERTRALINE HYDROCHLORIDE 50 MG/1
50 TABLET, FILM COATED ORAL DAILY
Qty: 30 TABLET | Refills: 6 | Status: SHIPPED | OUTPATIENT
Start: 2018-08-03 | End: 2018-08-16

## 2018-08-08 ENCOUNTER — PATIENT MESSAGE (OUTPATIENT)
Dept: SURGERY | Facility: CLINIC | Age: 83
End: 2018-08-08

## 2018-08-08 ENCOUNTER — OFFICE VISIT (OUTPATIENT)
Dept: NEUROLOGY | Facility: CLINIC | Age: 83
End: 2018-08-08
Payer: MEDICARE

## 2018-08-08 VITALS
DIASTOLIC BLOOD PRESSURE: 77 MMHG | BODY MASS INDEX: 29.2 KG/M2 | WEIGHT: 192.69 LBS | HEART RATE: 56 BPM | HEIGHT: 68 IN | SYSTOLIC BLOOD PRESSURE: 150 MMHG

## 2018-08-08 DIAGNOSIS — R41.3 MEMORY LOSS: Primary | ICD-10-CM

## 2018-08-08 PROCEDURE — 99999 PR PBB SHADOW E&M-EST. PATIENT-LVL IV: CPT | Mod: PBBFAC,GC,, | Performed by: STUDENT IN AN ORGANIZED HEALTH CARE EDUCATION/TRAINING PROGRAM

## 2018-08-09 NOTE — PROGRESS NOTES
NEUROLOGY OUTPATIENT CLINIC NOTE    Patient Name:  Javier Valles  Patient MRN:  [id number]    HPI:  Patient is a 88 y.o. male with PMHx of CAD s/p CABG 1991, HTN, HLD, CKD III, A fib on apixaban, and lumbar spinal stenosis s/p spinal stimulator placement who presents to [facility] 8/9/2018 for memory loss evaluation.  Patient states that he is here because his wife made him come in.  She became more concerned when he was talking to someone about the treatment of his previous (2nd) wife's leukemia and left out something that she thought was a very important part of her treatment.  She thought this was out of character for him as he takes a lot of pride in having cared for his late wife.  Patient otherwise does cite trouble with recall occasionally and states that he sometimes loses his train of thought.  He denies issues with ADLs, still capable of grooming and dressing himself.  Drives himself without issues, does not get lost.  Restricts himself to night-driving due to some vision loss in his L eye (lost R eye as a young man in a game of handball at [facility]).  Manages some of the finances but states that his wife primarily manages their bills.  She also sets up his medication for him in a pill box.  He notes some recent depression related to health issues but states that he expects to get back to golfing soon as his spinal stimulator has taken away ~ 95% of his pain and helped him to feel better overall.  He and his wife enjoy seeing many friends, but they had to move to [facility] to live in a one-level house since his wife had a bad fall ~ 3 years ago.  Mr. Valles feels that this has made them more isolated but notes that they still get together with friends fairly often and he still sees his golf buddies for a meal once a month even though he has not been able to play for the last two years.  He denies SI, SIB, VH, AH.  Sleeps well.  Stays fairly active.  Does not fell like he lacks energy or has trouble  concentrating.  Patient was previously on sertraline and did not want to take it anymore 2/2 feeling like he takes too much medication anyway.  Was recently prescribed some sertraline again, but feels that he may not need it and side effects may do more harm than good.  Agreed and asked that he discuss this with his PCP who knows him better.  Feels that he is doing well overall for his age.  Denies appetite change, cravings, gait disturbance, tremors.      ROS:  General:  No fever, no chills, no fatigue, no change in weight  HEENT:  No headache, +worsening vision--follows w/ Ophthalmology  Respiratory:  No cough, no SOB  Cardiovascular:  No chest pain, no palpitations  GI:  No abdominal pain, no n/v/c/d  Skin:  No rashes, no pruritus, no wounds  Musculoskeletal:  No myalgias, no arthralgias  Hematologic:  No easy bruising or bleeding  Neuro:  No tremors, no focal weakness, no paresthesias  Psych:  No anxiety, +mild depression    PMHx:  Patient Active Problem List   Diagnosis    Hereditary and idiopathic peripheral neuropathy    Peripheral vascular disease    Hypertension    Hyperlipidemia    Peripheral neuropathy    Bilateral carotid artery disease: Asx, non-obstructive 20%-30%    Degenerative disc disease    Elevated PSA    Myositis    Statin-induced myositis    Chronic kidney disease, stage 3 (moderate)    Gout, unspecified    Acquired cyst of kidney    Idiopathic progressive polyneuropathy    Atherosclerosis of aorta    Epiretinal membrane, left eye    Posterior vitreous detachment of left eye    Pseudophakia of left eye    Prosthetic eye globe    Open-angle glaucoma    Retinal tear    Rhegmatogenous retinal detachment    Atrial fibrillation    History of Urinary retention    Biventricular cardiac pacemaker in situ    SSS (sick sinus syndrome): s/p PPM    Macular edema, cystoid    Lamellar macular hole    Left lumbar radiculopathy    Lumbar facet arthropathy    Spondylosis without  myelopathy    Lumbar degenerative disc disease    Gait apraxia    Contusion of rib on left side    Spinal stenosis, lumbar    Obesity, Class I, BMI 30-34.9    Coronary artery disease involving native coronary artery of native heart without angina pectoris    S/P CABG (coronary artery bypass graft)    Varicose veins of both lower extremities- Rt more than Left    Edema    Thrombocytopenia    Total bilirubin, elevated    Lumbar stenosis with neurogenic claudication    S/P lumbar laminectomy    Encounter for postoperative wound check    Peripheral venous insufficiency    Typical atrial flutter    Current use of long term anticoagulation    Status post catheter ablation of atrial flutter    Bilateral carpal tunnel syndrome    Chronic pain    Lumbar spinal stenosis    Acute lumbar radiculopathy    Osteoarthritis of spine with radiculopathy, lumbar region    Cardiomyopathy, ischemic: Prior MI, CABG, EF 40-45%    Post laminectomy syndrome    PVC (premature ventricular contraction)    Chronic pain syndrome     PSHx:  Past Surgical History:   Procedure Laterality Date    [facility]      [facility]    CARDIAC ELECTROPHYSIOLOGY MAPPING AND ABLATION  11/2016    CARDIAC PACEMAKER PLACEMENT  11/2016    CATARACT EXTRACTION W/  INTRAOCULAR LENS IMPLANT Left 1997        CORONARY ARTERY BYPASS GRAFT  1991    ENUCLEATION Right 1949    LUMBAR LAMINECTOMY  04/25/2016    RETINAL DETACHMENT SURGERY Left 1997    Dr. Cosme    SKIN BIOPSY      SPINAL CORD STIMULATOR TRIAL W/ LAMINOTOMY  10/2017    TONSILLECTOMY       Medications:  Current Outpatient Prescriptions on File Prior to Visit   Medication Sig Dispense Refill    amiodarone (PACERONE) 200 MG Tab Take 1 tablet (200 mg total) by mouth once daily. 30 tablet 11    amLODIPine (NORVASC) 5 MG tablet Take 1 tablet (5 mg total) by mouth once daily. 90 tablet 3    apixaban 2.5 mg Tab Take 1 tablet (2.5 mg total) by mouth 2 (two) times  daily. 180 tablet 3    ascorbic acid (VITAMIN C) 1000 MG tablet Take 1,000 mg by mouth every morning.       brimonidine 0.2% (ALPHAGAN) 0.2 % Drop Place 1 drop into the left eye 3 (three) times daily. 10 mL 12    buPROPion (WELLBUTRIN XL) 150 MG TB24 tablet TAKE ONE TABLET BY MOUTH ONCE DAILY 30 tablet 11    co-enzyme Q-10 30 mg capsule Take 30 mg by mouth once daily.       donepezil (ARICEPT) 5 MG tablet Take 1 tablet (5 mg total) by mouth every evening. 30 tablet 11    dorzolamide (TRUSOPT) 2 % ophthalmic solution Place 1 drop into both eyes 3 (three) times daily. Please fill today - this evening if possible - pt is leaving town early tomorrow 30 mL 3    ipratropium (ATROVENT) 0.03 % nasal spray 1-2 sprays by Nasal route 2 (two) times daily. 30 mL 1    latanoprost 0.005 % ophthalmic solution Place 1 drop into the left eye every evening. 1 Bottle 12    losartan (COZAAR) 50 MG tablet Take 1 tablet (50 mg total) by mouth 2 (two) times daily. 180 tablet 3    lutein 20 mg Cap Take 20 mg by mouth once daily.       MULTIVITAMINS,THER W-MINERALS (VITAMINS & MINERALS ORAL) Take 1 tablet by mouth every morning.       rosuvastatin (CRESTOR) 20 MG tablet Take 1 tablet (20 mg total) by mouth every evening. 90 tablet 3    sertraline (ZOLOFT) 50 MG tablet Take 1 tablet (50 mg total) by mouth once daily. 30 tablet 6    tamsulosin (FLOMAX) 0.4 mg Cp24 Take 1 capsule (0.4 mg total) by mouth once daily. 30 capsule 11     No current facility-administered medications on file prior to visit.      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  He is  to his third wife.  Has many  "daughters and two sons from his first two marriages as well as grandchildren and great-grandchildren.  His family is spread out but he gets to see the grandchildren and great-grandchildren on a semi-annual basis.  He and his wife remain very active socially.  They both drive and he is hoping to get back to playing golf with his regular group since having the spinal stimulator placed.  He drinks 1-2 drinks if he and his wife are out.  Never smoker.  Denies illicits.    Physical Exam:  BP (!) 150/77   Pulse (!) 56   Ht 5' 8" (1.727 m)   Wt 87.4 kg (192 lb 10.9 oz)   BMI 29.30 kg/m²   General:  Well-developed, well-nourished, nad  HEENT:  NCAT. Missing R eye, L pupil reactive to light w/ EOMI on L. Oropharygneal membranes non-erythematous/without exudate  Neck:  Supple, normal ROM w/o nuchal rigidity  Respiratory:  Symmetric expansion, no increased wob  CVS:  No LE edema.  Extremities warm, well-perfused  GI:  Abd soft, non-distended, non-tender to palpation  Skin:  No visible rashes or wounds  Psych:  Pleasant, cooperative with exam.  Speech and thought content appropriate.  Neurologic Exam:  Mental Status:  AAOx3.  Converses easily.  MoCA shown below, 27/30.  Cranial Nerves:  Missing R eye, L pupil reactive to light w/ EOMI on L. Facial movement intact and symmetric.  Tongue protrudes midline, palate raises symmetrically.  Trapezius 5/5 bilaterally.  Motor:  Normal muscle bulk and tone.  Strength 5/5 throughout.  Sensory:  Sensation intact to light touch at all extremities.  Vibratory sensation intact and symmetric at BUE/BLE digits.  Reflexes:  Reflexes 2+--biceps, brachioradialis, patellar.  No ankle clonus.  Coordination:  No resting tremor or myoclonus.  FTN, HTS, JUMANA wnl--no ataxia, dysmetria, or dysdiadochokinesia.  Gait:  Appropriate gait, appropriate arm swing.  No shuffling, magnetic gait, imbalance, weakness, or foot drop noted.      Labs:  Results: CBC:   Lab Results   Component Value Date/Time    WBC " 7.00 07/03/2018 05:41 PM    RBC 3.45 (L) 07/03/2018 05:41 PM    HGB 11.8 (L) 07/03/2018 05:41 PM    HCT 37.0 (L) 07/03/2018 05:41 PM    HCT 38 11/01/2013 09:22 AM     (L) 07/03/2018 05:41 PM     (H) 07/03/2018 05:41 PM    MCH 34.2 (H) 07/03/2018 05:41 PM    MCHC 31.9 (L) 07/03/2018 05:41 PM     CMP:   Lab Results   Component Value Date/Time    GLU 93 07/03/2018 05:41 PM    CALCIUM 9.8 07/03/2018 05:41 PM    ALBUMIN 3.7 06/12/2017 01:00 PM    PROT 6.7 06/12/2017 01:00 PM     (H) 07/03/2018 05:41 PM    K 4.8 07/03/2018 05:41 PM    CO2 25 07/03/2018 05:41 PM     (H) 07/03/2018 05:41 PM    BUN 29 (H) 07/03/2018 05:41 PM    CREATININE 1.9 (H) 07/03/2018 05:41 PM    ALKPHOS 72 06/12/2017 01:00 PM    ALT 18 06/12/2017 01:00 PM    AST 30 06/12/2017 01:00 PM    BILITOT 1.1 (H) 06/12/2017 01:00 PM     Imaging:  Imaging Results    None       Additional Diagnotic Testing:  N/a    ASSESSMENT/PLAN:  Patient is a 88 y.o. male with a PMHx of  CAD s/p CABG 1991, HTN, HLD, CKD III, and lumbar spinal stenosis s/p spinal stimulator placement who [facility]/9/2018 due to concern for memory loss.    Concern for memory loss  -Patient with minor recall issues, otherwise ADLs intact and very functional  -Some concern for pseudodementia given reported sadness about his health   -Asked that patient discuss sertraline with his PCP due to his desire not to take another pill  -MoCA 27/30.  Patient can follow up in 1 year to confirm no significant change.  -Otherwise no concern for organic cause of dementia, exam consistent with normal aging.    Sigrid Vinson MD  Pager:  [phone] [place] [address]  [phone]

## 2018-08-10 ENCOUNTER — TELEPHONE (OUTPATIENT)
Dept: NEPHROLOGY | Facility: CLINIC | Age: 83
End: 2018-08-10

## 2018-08-10 NOTE — TELEPHONE ENCOUNTER
----- Message from Heidi Sanon sent at 8/10/2018  8:34 AM CDT -----  Contact: Pt  Pt called to speak to the nurse regarding his care and to find out about blood work and a urine that has been order. Pt would like a call back this morning asap.    Pt can be reached at [phone].    Thanks    Called pt in regards to message above and l/m

## 2018-08-14 ENCOUNTER — LAB VISIT (OUTPATIENT)
Dept: LAB | Facility: HOSPITAL | Age: 83
End: 2018-08-14
Attending: INTERNAL MEDICINE
Payer: MEDICARE

## 2018-08-14 ENCOUNTER — OFFICE VISIT (OUTPATIENT)
Dept: SURGERY | Facility: CLINIC | Age: 83
End: 2018-08-14
Payer: MEDICARE

## 2018-08-14 VITALS
DIASTOLIC BLOOD PRESSURE: 65 MMHG | SYSTOLIC BLOOD PRESSURE: 132 MMHG | HEART RATE: 53 BPM | WEIGHT: 191.38 LBS | BODY MASS INDEX: 29.1 KG/M2

## 2018-08-14 DIAGNOSIS — K64.8 HEMORRHOIDS, INTERNAL, WITH BLEEDING: Primary | ICD-10-CM

## 2018-08-14 DIAGNOSIS — N39.0 UTI (URINARY TRACT INFECTION), UNCOMPLICATED: ICD-10-CM

## 2018-08-14 DIAGNOSIS — N18.4 CKD (CHRONIC KIDNEY DISEASE), STAGE IV: ICD-10-CM

## 2018-08-14 LAB
BACTERIA #/AREA URNS AUTO: ABNORMAL /HPF
BILIRUB UR QL STRIP: NEGATIVE
CLARITY UR REFRACT.AUTO: ABNORMAL
COLOR UR AUTO: YELLOW
GLUCOSE UR QL STRIP: NEGATIVE
HGB UR QL STRIP: NEGATIVE
HYALINE CASTS UR QL AUTO: 0 /LPF
KETONES UR QL STRIP: ABNORMAL
LEUKOCYTE ESTERASE UR QL STRIP: ABNORMAL
MICROSCOPIC COMMENT: ABNORMAL
NITRITE UR QL STRIP: NEGATIVE
PH UR STRIP: 5 [PH] (ref 5–8)
PROT UR QL STRIP: ABNORMAL
RBC #/AREA URNS AUTO: 0 /HPF (ref 0–4)
SP GR UR STRIP: 1.03 (ref 1–1.03)
URN SPEC COLLECT METH UR: ABNORMAL
UROBILINOGEN UR STRIP-ACNC: NEGATIVE EU/DL
WBC #/AREA URNS AUTO: >100 /HPF (ref 0–5)
WBC CLUMPS UR QL AUTO: ABNORMAL

## 2018-08-14 PROCEDURE — 81001 URINALYSIS AUTO W/SCOPE: CPT

## 2018-08-14 PROCEDURE — 99999 PR PBB SHADOW E&M-EST. PATIENT-LVL III: CPT | Mod: PBBFAC,,, | Performed by: COLON & RECTAL SURGERY

## 2018-08-14 PROCEDURE — 99213 OFFICE O/P EST LOW 20 MIN: CPT | Mod: 25,S$GLB,, | Performed by: COLON & RECTAL SURGERY

## 2018-08-14 PROCEDURE — 46600 DIAGNOSTIC ANOSCOPY SPX: CPT | Mod: S$GLB,,, | Performed by: COLON & RECTAL SURGERY

## 2018-08-14 NOTE — PROGRESS NOTES
Subjective:       Patient ID: Javier Valles is a 88 y.o. male.    Chief Complaint: Rectal Bleeding    Rectal Bleeding   Pertinent negatives include no abdominal pain, arthralgias, chest pain, chills, congestion, coughing, fever, headaches, nausea, rash or vomiting.     87yo M seen 7/30/18 with complaints of rectal bleeding.  He reports intermittent bright red blood on the surface of the stool.  He says that in recent months his stool is much harder and more difficult to evacuate.  He does not take any fiber supplementation, stool softeners or laxatives.  He has lost 15 lb in the past few months but this has been intentional with diet and exercise.  He is on Eliquis for atrial flutter.  He has tried suppositories for the rectal bleeding without effect.  He is not sure when his last colonoscopy was but gases it was 12-15 years ago.  He denies any abdominal pain, nausea, vomiting, or tenesmus.    No family hx of CRC or IBD.    Exam revealed grade 1-2 internal hemorrhoids with moderate inflammation.  He was treated conservatively with topical hydrocortisone and increased fluid and fiber intake, and asked to return in 2 weeks.  He was asked to hold his Eliquis a few days prior to the office visit if he was still experiencing bleeding so that we could consider EBL.    He returns today for follow-up.  He has not had any bleeding for the past 4-5 days.  He has been using stool softeners and Maalox (not MiraLax) daily.  He reports that his stool is soft and formed.  He did hold his Eliquis beginning 2 days ago.    Review of patient's allergies indicates:   Allergen Reactions    Pravastatin      Severe myalgias/elevated CPK    Lipitor [atorvastatin]      Myositis/elevated CPK    Statins-hmg-coa reductase inhibitors      Muscle pain and loss of muscle    Ace inhibitors      Cough       Past Medical History:   Diagnosis Date    *Atrial flutter 10/3/13    Anticoagulant long-term use     Aspirin only at this time     Arthritis     Atrial fibrillation     Atrial flutter     Blood transfusion     ?    BPH (benign prostatic hypertrophy)     Carotid artery disease     2008: [place] There is 40 - 49% right Internal Carotid stenosis.  There is 20 - 39% left Internal Carotid stenosis.       Chronic kidney disease     Coronary artery disease     DDD (degenerative disc disease) 6/25/2015    Degenerative disc disease     Elevated PSA     Glaucoma     Hyperlipidemia     Hypertension     Lumbar stenosis     lumbar spinal stenosis    NSTEMI (non-ST elevated myocardial infarction) 11/3/2013    Pacemaker 11/2013    Peripheral neuropathy     - S/P right ILIAC stent 1993;      Retinal detachment     Squamous cell carcinoma     left leg    Syncope and collapse: orthostatic with hypotension 10/9/2015       Past Surgical History:   Procedure Laterality Date    [facility]    CARDIAC ELECTROPHYSIOLOGY MAPPING AND ABLATION  11/2016    CARDIAC PACEMAKER PLACEMENT  11/2016    CATARACT EXTRACTION W/  INTRAOCULAR LENS IMPLANT Left 1997        CORONARY ARTERY BYPASS GRAFT  1991    ENUCLEATION Right 1949    LUMBAR LAMINECTOMY  04/25/2016    RETINAL DETACHMENT SURGERY Left 1997    Dr. Cosme    SKIN BIOPSY      SPINAL CORD STIMULATOR TRIAL W/ LAMINOTOMY  10/2017    TONSILLECTOMY         Current Outpatient Medications   Medication Sig Dispense Refill    amiodarone (PACERONE) 200 MG Tab Take 1 tablet (200 mg total) by mouth once daily. 30 tablet 11    amLODIPine (NORVASC) 5 MG tablet Take 1 tablet (5 mg total) by mouth once daily. 90 tablet 3    apixaban 2.5 mg Tab Take 1 tablet (2.5 mg total) by mouth 2 (two) times daily. 180 tablet 3    ascorbic acid (VITAMIN C) 1000 MG tablet Take 1,000 mg by mouth every morning.       brimonidine 0.2% (ALPHAGAN) 0.2 % Drop Place 1 drop into the left eye 3 (three) times daily. 10 mL 12    buPROPion (WELLBUTRIN XL) 150 MG TB24 tablet TAKE ONE TABLET BY MOUTH  ONCE DAILY 30 tablet 11    co-enzyme Q-10 30 mg capsule Take 30 mg by mouth once daily.       donepezil (ARICEPT) 5 MG tablet Take 1 tablet (5 mg total) by mouth every evening. 30 tablet 11    dorzolamide (TRUSOPT) 2 % ophthalmic solution Place 1 drop into both eyes 3 (three) times daily. Please fill today - this evening if possible - pt is leaving town early tomorrow 30 mL 3    ipratropium (ATROVENT) 0.03 % nasal spray 1-2 sprays by Nasal route 2 (two) times daily. 30 mL 1    latanoprost 0.005 % ophthalmic solution Place 1 drop into the left eye every evening. 1 Bottle 12    losartan (COZAAR) 50 MG tablet Take 1 tablet (50 mg total) by mouth 2 (two) times daily. 180 tablet 3    lutein 20 mg Cap Take 20 mg by mouth once daily.       MULTIVITAMINS,THER W-MINERALS (VITAMINS & MINERALS ORAL) Take 1 tablet by mouth every morning.       rosuvastatin (CRESTOR) 20 MG tablet Take 1 tablet (20 mg total) by mouth every evening. 90 tablet 3    sertraline (ZOLOFT) 50 MG tablet Take 1 tablet (50 mg total) by mouth once daily. 30 tablet 6    tamsulosin (FLOMAX) 0.4 mg Cp24 Take 1 capsule (0.4 mg total) by mouth once daily. 30 capsule 11     No current facility-administered medications for this visit.        Family History   Problem Relation Age of Onset    Stroke Mother 69    Clotting disorder Mother         per patient no clotting disorder    Hypertension Mother     Diverticulitis Son     Cancer Neg Hx     Diabetes Neg Hx     Heart disease Neg Hx     Glaucoma Neg Hx     Amblyopia Neg Hx     Blindness Neg Hx     Cataracts Neg Hx     Macular degeneration Neg Hx     Retinal detachment Neg Hx     Strabismus Neg Hx        Social History     Socioeconomic History    Marital status:      Spouse name: Joanie    Number of children: None    Years of education: None    Highest education level: None   Social Needs    Financial resource strain: None    Food insecurity - worry: None    Food insecurity -  inability: None    Transportation needs - medical: None    Transportation needs - non-medical: None   Occupational History    Occupation: retired   Tobacco Use    Smoking status: Former Smoker     Last attempt to quit: 1963     Years since quittin.0    Smokeless tobacco: Never Used   Substance and Sexual Activity    Alcohol use: Yes     Alcohol/week: 1.8 oz     Types: 1 Glasses of wine, 1 Cans of beer, 1 Shots of liquor per week     Comment: 2 a day    Drug use: No    Sexual activity: Yes     Partners: Female   Other Topics Concern    None   Social History Narrative    None       Review of Systems   Constitutional: Negative for chills and fever.   HENT: Negative for congestion.    Respiratory: Negative for cough.    Cardiovascular: Negative for chest pain.   Gastrointestinal: Positive for hematochezia. Negative for abdominal pain, nausea and vomiting.   Musculoskeletal: Negative for arthralgias.   Skin: Negative for rash.   Neurological: Negative for headaches.   NOTE:  Hematochezia has now resolved      Objective:      Physical Exam   Constitutional: He is oriented to person, place, and time. He appears well-developed and well-nourished.   HENT:   Head: Normocephalic and atraumatic.   Eyes: Conjunctivae are normal.   Pulmonary/Chest: Effort normal. No respiratory distress.   Abdominal: Soft. He exhibits no distension and no mass. There is no tenderness. There is no rebound and no guarding.   Genitourinary:   Genitourinary Comments: Perineum - normal perianal skin, no mass, no fissure, no external hemorrhoids  SHAKILA - good tone, no mass  Anoscopy - Grade 1 internal hemorrhoids with no significant inflammation     Neurological: He is alert and oriented to person, place, and time.   Skin: Skin is warm and dry. No erythema.         Lab Results   Component Value Date    WBC 7.00 2018    HGB 11.8 (L) 2018    HCT 37.0 (L) 2018     (H) 2018     (L) 2018      BMP  Lab Results   Component Value Date     (H) 07/03/2018    K 4.8 07/03/2018     (H) 07/03/2018    CO2 25 07/03/2018    BUN 29 (H) 07/03/2018    CREATININE 1.9 (H) 07/03/2018    CALCIUM 9.8 07/03/2018    ANIONGAP 8 07/03/2018    ESTGFRAFRICA 35.6 (A) 07/03/2018    EGFRNONAA 30.8 (A) 07/03/2018     CMP  Sodium   Date Value Ref Range Status   07/03/2018 146 (H) 136 - 145 mmol/L Final     Potassium   Date Value Ref Range Status   07/03/2018 4.8 3.5 - 5.1 mmol/L Final     Chloride   Date Value Ref Range Status   07/03/2018 113 (H) 95 - 110 mmol/L Final     CO2   Date Value Ref Range Status   07/03/2018 25 23 - 29 mmol/L Final     Glucose   Date Value Ref Range Status   07/03/2018 93 70 - 110 mg/dL Final     BUN, Bld   Date Value Ref Range Status   07/03/2018 29 (H) 8 - 23 mg/dL Final     Creatinine   Date Value Ref Range Status   07/03/2018 1.9 (H) 0.5 - 1.4 mg/dL Final     Calcium   Date Value Ref Range Status   07/03/2018 9.8 8.7 - 10.5 mg/dL Final     Total Protein   Date Value Ref Range Status   06/12/2017 6.7 6.0 - 8.4 g/dL Final     Albumin   Date Value Ref Range Status   06/12/2017 3.7 3.5 - 5.2 g/dL Final     Total Bilirubin   Date Value Ref Range Status   06/12/2017 1.1 (H) 0.1 - 1.0 mg/dL Final     Comment:     For infants and newborns, interpretation of results should be based  on gestational age, weight and in agreement with clinical  observations.  Premature Infant recommended reference ranges:  Up to 24 hours.............<8.0 mg/dL  Up to 48 hours............<12.0 mg/dL  3-5 days..................<15.0 mg/dL  6-29 days.................<15.0 mg/dL       Alkaline Phosphatase   Date Value Ref Range Status   06/12/2017 72 55 - 135 U/L Final     AST   Date Value Ref Range Status   06/12/2017 30 10 - 40 U/L Final     ALT   Date Value Ref Range Status   06/12/2017 18 10 - 44 U/L Final     Anion Gap   Date Value Ref Range Status   07/03/2018 8 8 - 16 mmol/L Final     eGFR if     Date Value Ref Range Status   07/03/2018 35.6 (A) >60 mL/min/1.73 m^2 Final     eGFR if non    Date Value Ref Range Status   07/03/2018 30.8 (A) >60 mL/min/1.73 m^2 Final     Comment:     Calculation used to obtain the estimated glomerular filtration  rate (eGFR) is the CKD-EPI equation.        No results found for: CEA        Assessment:       1. Hemorrhoids, internal, with bleeding      bleeding now resolved    Plan:   Continue increased fiber intake (20-25 grams/day) and fluid intake (8-10 glasses water/day)  Daily fiber supplement  Colace 100 mg bid  Resume Eliquis   RTO prn    Rudolph Manuel MD, FACS, FASCRS  Staff Surgeon  Department of Colon & Rectal Surgery

## 2018-08-14 NOTE — LETTER
August 14, 2018      Thiago Gibbs MD  [address]  [facility] [address]           [facility]  [address]  [facility] [address]  Phone: [phone]          Patient: Javier Valles   MR Number: [id number]   YOB: 1930   Date of Visit: 8/14/2018       Dear Dr. Grove:    Thank you for referring Javier Valles to me for evaluation. Attached you will find relevant portions of my assessment and plan of care.    If you have questions, please do not hesitate to call me. I look forward to following Javier Valles along with you.    Sincerely,    Rudolph Manuel MD    Enclosure  CC:  No Recipients    If you would like to receive this communication electronically, please contact [email or web address] or [phone] to request more information on [employer] access.    For providers and/or their staff who would like to refer a patient to [facility], please contact us through our one-stop-shop provider referral line, [facility], at [phone].    If you feel you have received this communication in error or would no longer like to receive these types of communications, please e-mail [email or web address]

## 2018-08-16 ENCOUNTER — OFFICE VISIT (OUTPATIENT)
Dept: INTERNAL MEDICINE | Facility: CLINIC | Age: 83
End: 2018-08-16
Payer: MEDICARE

## 2018-08-16 ENCOUNTER — PATIENT MESSAGE (OUTPATIENT)
Dept: INTERNAL MEDICINE | Facility: CLINIC | Age: 83
End: 2018-08-16

## 2018-08-16 ENCOUNTER — LAB VISIT (OUTPATIENT)
Dept: LAB | Facility: HOSPITAL | Age: 83
End: 2018-08-16
Attending: INTERNAL MEDICINE
Payer: MEDICARE

## 2018-08-16 VITALS
OXYGEN SATURATION: 98 % | HEIGHT: 68 IN | BODY MASS INDEX: 28.49 KG/M2 | WEIGHT: 188 LBS | SYSTOLIC BLOOD PRESSURE: 131 MMHG | HEART RATE: 52 BPM | DIASTOLIC BLOOD PRESSURE: 76 MMHG

## 2018-08-16 DIAGNOSIS — R82.81 PYURIA: ICD-10-CM

## 2018-08-16 DIAGNOSIS — R10.9 LEFT SIDED ABDOMINAL PAIN: Primary | ICD-10-CM

## 2018-08-16 DIAGNOSIS — R10.9 LEFT SIDED ABDOMINAL PAIN: ICD-10-CM

## 2018-08-16 LAB
ANION GAP SERPL CALC-SCNC: 8 MMOL/L
BASOPHILS # BLD AUTO: 0.07 K/UL
BASOPHILS NFR BLD: 0.9 %
BUN SERPL-MCNC: 29 MG/DL
CALCIUM SERPL-MCNC: 9.8 MG/DL
CHLORIDE SERPL-SCNC: 109 MMOL/L
CO2 SERPL-SCNC: 23 MMOL/L
CREAT SERPL-MCNC: 2 MG/DL
CRP SERPL-MCNC: 67.8 MG/L
DIFFERENTIAL METHOD: ABNORMAL
EOSINOPHIL # BLD AUTO: 0.3 K/UL
EOSINOPHIL NFR BLD: 3.5 %
ERYTHROCYTE [DISTWIDTH] IN BLOOD BY AUTOMATED COUNT: 14.5 %
EST. GFR  (AFRICAN AMERICAN): 33.5 ML/MIN/1.73 M^2
EST. GFR  (NON AFRICAN AMERICAN): 28.9 ML/MIN/1.73 M^2
GLUCOSE SERPL-MCNC: 83 MG/DL
HCT VFR BLD AUTO: 37.6 %
HGB BLD-MCNC: 12.3 G/DL
IMM GRANULOCYTES # BLD AUTO: 0.04 K/UL
IMM GRANULOCYTES NFR BLD AUTO: 0.5 %
LYMPHOCYTES # BLD AUTO: 0.9 K/UL
LYMPHOCYTES NFR BLD: 11.1 %
MCH RBC QN AUTO: 35.3 PG
MCHC RBC AUTO-ENTMCNC: 32.7 G/DL
MCV RBC AUTO: 108 FL
MONOCYTES # BLD AUTO: 0.8 K/UL
MONOCYTES NFR BLD: 9.8 %
NEUTROPHILS # BLD AUTO: 5.7 K/UL
NEUTROPHILS NFR BLD: 74.2 %
NRBC BLD-RTO: 0 /100 WBC
PLATELET # BLD AUTO: 118 K/UL
PMV BLD AUTO: 11.6 FL
POTASSIUM SERPL-SCNC: 4.4 MMOL/L
RBC # BLD AUTO: 3.48 M/UL
SODIUM SERPL-SCNC: 140 MMOL/L
WBC # BLD AUTO: 7.74 K/UL

## 2018-08-16 PROCEDURE — 99499 UNLISTED E&M SERVICE: CPT | Mod: S$PBB,,, | Performed by: INTERNAL MEDICINE

## 2018-08-16 PROCEDURE — 99999 PR PBB SHADOW E&M-EST. PATIENT-LVL IV: CPT | Mod: PBBFAC,,, | Performed by: INTERNAL MEDICINE

## 2018-08-16 PROCEDURE — 86140 C-REACTIVE PROTEIN: CPT

## 2018-08-16 PROCEDURE — 36415 COLL VENOUS BLD VENIPUNCTURE: CPT | Mod: PO

## 2018-08-16 PROCEDURE — 85025 COMPLETE CBC W/AUTO DIFF WBC: CPT

## 2018-08-16 PROCEDURE — 99214 OFFICE O/P EST MOD 30 MIN: CPT | Mod: S$GLB,,, | Performed by: INTERNAL MEDICINE

## 2018-08-16 PROCEDURE — 80048 BASIC METABOLIC PNL TOTAL CA: CPT

## 2018-08-16 RX ORDER — CIPROFLOXACIN 500 MG/1
500 TABLET ORAL 2 TIMES DAILY
Qty: 28 TABLET | Refills: 0 | Status: SHIPPED | OUTPATIENT
Start: 2018-08-16 | End: 2018-08-30

## 2018-08-16 NOTE — PROGRESS NOTES
PAST MEDICAL HISTORY:   Coronary artery disease with previous bypass surgery, stents in 2013, and then a   subsequent non-ST MI due to in-stent stenosis of obtuse marginal for which   angioplasty was performed.  Hypertension.  Hyperlipidemia.  Peripheral vascular disease with stent of the right iliac.  Chronic kidney disease with secondary hyperparathyroid.  BPH with elevated PSA.  Carotid artery disease with left 67% stenosis.  Osteoarthritis of the knees  Arrhythmia disorder for which he is status post pacemaker and defibrillator for   sick sinus syndrome, status post radiofrequency ablation for atrial flutter, and   on amiodarone for atrial flutter/fibrillation/PVCs.  Gout.  Bilateral inguinal hernia repair.  Left knee surgery.  Glaucoma.    SOCIAL HISTORY:  Tobacco use, none.  Alcohol use, a couple of drinks a day.    CURRENT MEDICATIONS:  Amiodarone 200 mg daily.  Amlodipine 5 mg daily.  Aspirin 81 mg daily.  Bupropion 150 mg daily.  Aricept 5 mg daily.  Atrovent nasal spray as needed.  Losartan 50 mg daily.  Crestor 20 mg daily.  Eliquis 2.5 mg twice a day    REASON FOR VISIT:  This is an 88-year-old male.  For about four days now, he has   been having a pain in the left mid quadrant of his abdomen.  He says he can   feel when pressed against, it just came on, has not progressed.  There has been   no change in bowel and urine function.  As a matter of fact, his bowel movements   are better, taking Metamucil once a day and Colace once a day.  He is not   having any more rectal bleeding from internal hemorrhoids and he is defecating   easier.  No associated fever, chills, or nausea.    Medical history is outlined above as well as medication list.    He is doing well since having a spinal stimulator for his lumbar spondylosis and   recently Neurology had a neuropsychological evaluation, which showed memory   loss from aging, no signs of any dementia.    PHYSICAL EXAMINATION:  VITAL SIGNS:  Weight is 188 pounds,  pulse 52, blood pressure 100/62.  LUNGS:  Clear.  HEART:  Regular rate and rhythm.  ABDOMEN:  Active bowel sounds, soft.  He is tender in the left mid quadrant and   the left lateral wall.    Also noted he has been evaluated by Urology regarding chronic kidney disease.    Recent urinalysis that showed pyuria, white blood cells he had one yesterday.    Urine culture is pending, but he is reporting no dysuria or increased urinary   frequency.    IMPRESSION:  1.  Left-sided abdominal pain could be that of diverticulitis or pyelonephritis.  2.  Pyuria.    PLAN:  Today while he is here, we will get a CBC, a basic metabolic profile and   C-reactive protein and then phone review to follow up.  May need to be placed on   an antibiotic.            /dimitris [id number] blank(s)        JAM/STANLEY  dd: 08/16/2018 11:01:18 (CDT)  td: 08/16/2018 19:16:39 (CDT)  Doc ID   #[id number]  Job ID #[id number]    CC:

## 2018-08-16 NOTE — PROGRESS NOTES
Test was also discuss     C-reactive protein is high and clinically I am suspicious of diverticulitis    Will avoid the use of the CT scan mainly because of chronic kidney disease    Will start ciprofloxacin 500 mg twice a day for 2 weeks

## 2018-08-21 ENCOUNTER — PATIENT MESSAGE (OUTPATIENT)
Dept: ADMINISTRATIVE | Facility: OTHER | Age: 83
End: 2018-08-21

## 2018-08-21 ENCOUNTER — PATIENT OUTREACH (OUTPATIENT)
Dept: OTHER | Facility: OTHER | Age: 83
End: 2018-08-21

## 2018-08-21 NOTE — PROGRESS NOTES
Last 5 Patient Entered Readings                                      Current 30 Day Average: 130/75     Recent Readings 8/11/2018 8/10/2018 7/31/2018 7/26/2018 7/24/2018    SBP (mmHg) 125 142 139 107 137    DBP (mmHg) 76 79 80 63 75    Pulse 50 56 53 61 50          Digital Medicine: Health  Follow Up    Lifestyle Modifications:    1.Dietary Modifications (Sodium intake <2,000mg/day, food labels, dining out): Deferred    2.Physical Activity: Deferred    3.Medication Therapy: Patient has been compliant with the medication regimen.    4.Patient has the following medication side effects/concerns:   (Frequency/Alleviating factors/Precipitating factors, etc.)     Patient is going to [place] from Aug 24 - Sept 17. I will place him on hiatus during that time.  Patient continues having issues with getting his BP readings to transmit. He opens his [employer] taylor every time he takes a BP readings on his [employer] taylor and he always sees the reading in [employer] but they do not always come through to us. I will place a task and have our IT rep reach out and assist further.     Follow up with Mr. Javier Valles completed. No further questions or concerns. Will continue follow up to achieve health goals.

## 2018-09-17 ENCOUNTER — PATIENT MESSAGE (OUTPATIENT)
Dept: OTHER | Facility: OTHER | Age: 83
End: 2018-09-17

## 2018-09-18 ENCOUNTER — OFFICE VISIT (OUTPATIENT)
Dept: SURGERY | Facility: CLINIC | Age: 83
End: 2018-09-18
Payer: MEDICARE

## 2018-09-18 ENCOUNTER — PATIENT MESSAGE (OUTPATIENT)
Dept: SURGERY | Facility: CLINIC | Age: 83
End: 2018-09-18

## 2018-09-18 VITALS
SYSTOLIC BLOOD PRESSURE: 122 MMHG | DIASTOLIC BLOOD PRESSURE: 67 MMHG | WEIGHT: 195.56 LBS | HEIGHT: 68 IN | HEART RATE: 57 BPM | BODY MASS INDEX: 29.64 KG/M2

## 2018-09-18 DIAGNOSIS — K62.89 ANAL IRRITATION: ICD-10-CM

## 2018-09-18 DIAGNOSIS — K64.8 INTERNAL HEMORRHOIDS: Primary | ICD-10-CM

## 2018-09-18 PROCEDURE — 99213 OFFICE O/P EST LOW 20 MIN: CPT | Mod: S$PBB,25,, | Performed by: NURSE PRACTITIONER

## 2018-09-18 PROCEDURE — 99213 OFFICE O/P EST LOW 20 MIN: CPT | Mod: PBBFAC | Performed by: NURSE PRACTITIONER

## 2018-09-18 PROCEDURE — 46600 DIAGNOSTIC ANOSCOPY SPX: CPT | Mod: S$PBB,,, | Performed by: NURSE PRACTITIONER

## 2018-09-18 PROCEDURE — 99999 PR PBB SHADOW E&M-EST. PATIENT-LVL III: CPT | Mod: PBBFAC,,, | Performed by: NURSE PRACTITIONER

## 2018-09-18 PROCEDURE — 1101F PT FALLS ASSESS-DOCD LE1/YR: CPT | Mod: CPTII,,, | Performed by: NURSE PRACTITIONER

## 2018-09-18 PROCEDURE — 46600 DIAGNOSTIC ANOSCOPY SPX: CPT | Mod: PBBFAC | Performed by: NURSE PRACTITIONER

## 2018-09-18 NOTE — PROGRESS NOTES
"Subjective:       Patient ID: Javier Valles is a 88 y.o. male.    Chief Complaint: No chief complaint on file.    HPI   88 M, patient of Dr Randall, presents to clinic with rectal bleeding and pain. He recently returned from a 3 week trip abroad, he wasn't consistently taking his metamucil and stool softners. The bleeding returned, just a small amount of toilet paper. He also is having rectal discomfort, not necessarily painful, more "soreness". Not associated with BMs. Slight itching. He is on eliquis.     No family history of CRC  Due for colonoscopy   No prior rectal surgeries       Review of Systems   Constitutional: Negative for appetite change, chills, fatigue, fever and unexpected weight change.   Respiratory: Negative for shortness of breath.    Cardiovascular: Negative for chest pain.   Gastrointestinal: Positive for blood in stool and rectal pain. Negative for abdominal distention, abdominal pain, anal bleeding, constipation, diarrhea, nausea and vomiting.       Objective:      Physical Exam   Constitutional: He is oriented to person, place, and time. He appears well-developed and well-nourished.   Eyes: Conjunctivae and EOM are normal.   Pulmonary/Chest: Effort normal. No respiratory distress.   Abdominal: Soft. He exhibits no distension. There is no tenderness.   Genitourinary: Rectal exam shows no mass and no tenderness.   Genitourinary Comments: Stool particles surrounding anus. Excoriated skin.    eversion of anus revealed no abnormality or fissure, SHAKILA revealed no masses, blood or stool in vault, normal sphincter tone, anoscopy revealed grade II internal hemorrhoids with no bleeding or stigmata of same.      Musculoskeletal: Normal range of motion.   Neurological: He is alert and oriented to person, place, and time.   Skin: Skin is warm and dry.   Psychiatric: He has a normal mood and affect. His behavior is normal.       Assessment:       1. Internal hemorrhoids    2. Anal irritation        Plan:     "   Stop taking stool softeners due some fecal seepage  Continue fiber supplement, patient reports non compliance due to powder form (Metamucil). Suggested switching to Fiber Con pills (2 in AM)  High Fiber Diet  Calmoseptine ointment   Colonoscopy

## 2018-09-19 ENCOUNTER — PATIENT MESSAGE (OUTPATIENT)
Dept: ELECTROPHYSIOLOGY | Facility: CLINIC | Age: 83
End: 2018-09-19

## 2018-09-20 ENCOUNTER — PATIENT OUTREACH (OUTPATIENT)
Dept: OTHER | Facility: OTHER | Age: 83
End: 2018-09-20

## 2018-09-20 ENCOUNTER — PES CALL (OUTPATIENT)
Dept: ADMINISTRATIVE | Facility: CLINIC | Age: 83
End: 2018-09-20

## 2018-09-20 NOTE — PROGRESS NOTES
Last 5 Patient Entered Readings                                      Current 30 Day Average:      Recent Readings 8/11/2018 8/10/2018 7/31/2018 7/26/2018 7/24/2018    SBP (mmHg) 125 142 139 107 137    DBP (mmHg) 76 79 80 63 75    Pulse 50 56 53 61 50          Digital Medicine: Health  Follow Up    Lifestyle Modifications:    1.Dietary Modifications (Sodium intake <2,000mg/day, food labels, dining out): Patient continues monitoring his sodium intake. No major changes that would cause an increase in salt intake.     2.Physical Activity: Patient remains active by doing yard work and other things around his home. Patient is 88 years old and gets around very well even given his age.     3.Medication Therapy: Patient has been compliant with the medication regimen. Patient is doing well on his current regimen. He hopes to get off of some of his medication in the future.     4.Patient has the following medication side effects/concerns:   (Frequency/Alleviating factors/Precipitating factors, etc.)     Patient just returned from his trip to [place]. He stated that he has the best time. He mentioned that he saw the [place] and described how beautiful it was.   He is feeling great right now and is very pleased with his BP readings.  I put an update in the IT task to let the IT team know that the patient is back home and work with them on getting his cuff up and running again.     Follow up with Mr. Javier Valles completed. No further questions or concerns. Will continue to follow up to achieve health goals.

## 2018-09-20 NOTE — PROGRESS NOTES
Last 5 Patient Entered Readings                                      Current 30 Day Average:      Recent Readings 8/11/2018 8/10/2018 7/31/2018 7/26/2018 7/24/2018    SBP (mmHg) 125 142 139 107 137    DBP (mmHg) 76 79 80 63 75    Pulse 50 56 53 61 50            Digital Medicine: Health  Follow Up    Left voicemail to follow up with Mr. Javier Valles.  Patient just returned from [place]. Noted this on the IT task so they are aware in order to continue assisting with his tech issues.

## 2018-09-24 ENCOUNTER — PATIENT MESSAGE (OUTPATIENT)
Dept: INTERNAL MEDICINE | Facility: CLINIC | Age: 83
End: 2018-09-24

## 2018-09-24 ENCOUNTER — PATIENT MESSAGE (OUTPATIENT)
Dept: SURGERY | Facility: CLINIC | Age: 83
End: 2018-09-24

## 2018-09-25 ENCOUNTER — PATIENT MESSAGE (OUTPATIENT)
Dept: INTERNAL MEDICINE | Facility: CLINIC | Age: 83
End: 2018-09-25

## 2018-09-25 ENCOUNTER — TELEPHONE (OUTPATIENT)
Dept: ENDOSCOPY | Facility: HOSPITAL | Age: 83
End: 2018-09-25

## 2018-09-25 NOTE — TELEPHONE ENCOUNTER
This pt needs to be scheduled for a colonoscopy, not booked yet, he is on Eliquis and we need to know if he can hold med 2 days prior to colonoscopy. Please message back with response for documentation.

## 2018-09-27 NOTE — PROGRESS NOTES
Patient, Javier Valles (MRN #[id number]), presented with a recent Platelet count less than 150 K/uL consistent with the definition of thrombocytopenia (ICD10 - D69.6).    Platelets   Date Value Ref Range Status   08/16/2018 118 (L) 150 - 350 K/uL Final     The patient's thrombocytopenia was monitored, evaluated, addressed and/or treated. This addendum to the medical record is made on 09/26/2018.

## 2018-10-01 ENCOUNTER — OFFICE VISIT (OUTPATIENT)
Dept: SURGERY | Facility: CLINIC | Age: 83
End: 2018-10-01
Payer: MEDICARE

## 2018-10-01 VITALS
DIASTOLIC BLOOD PRESSURE: 56 MMHG | SYSTOLIC BLOOD PRESSURE: 116 MMHG | BODY MASS INDEX: 29.46 KG/M2 | HEART RATE: 60 BPM | WEIGHT: 193.81 LBS

## 2018-10-01 DIAGNOSIS — K64.8 INTERNAL HEMORRHOIDS: Primary | ICD-10-CM

## 2018-10-01 PROCEDURE — 1101F PT FALLS ASSESS-DOCD LE1/YR: CPT | Mod: CPTII,,, | Performed by: COLON & RECTAL SURGERY

## 2018-10-01 PROCEDURE — 46600 DIAGNOSTIC ANOSCOPY SPX: CPT | Mod: PBBFAC | Performed by: COLON & RECTAL SURGERY

## 2018-10-01 PROCEDURE — 99213 OFFICE O/P EST LOW 20 MIN: CPT | Mod: PBBFAC | Performed by: COLON & RECTAL SURGERY

## 2018-10-01 PROCEDURE — 99213 OFFICE O/P EST LOW 20 MIN: CPT | Mod: 25,S$PBB,, | Performed by: COLON & RECTAL SURGERY

## 2018-10-01 PROCEDURE — 99999 PR PBB SHADOW E&M-EST. PATIENT-LVL III: CPT | Mod: PBBFAC,,, | Performed by: COLON & RECTAL SURGERY

## 2018-10-01 PROCEDURE — 46600 DIAGNOSTIC ANOSCOPY SPX: CPT | Mod: S$PBB,,, | Performed by: COLON & RECTAL SURGERY

## 2018-10-08 ENCOUNTER — OFFICE VISIT (OUTPATIENT)
Dept: PODIATRY | Facility: CLINIC | Age: 83
End: 2018-10-08
Payer: MEDICARE

## 2018-10-08 ENCOUNTER — PATIENT MESSAGE (OUTPATIENT)
Dept: CARDIOLOGY | Facility: CLINIC | Age: 83
End: 2018-10-08

## 2018-10-08 ENCOUNTER — PATIENT MESSAGE (OUTPATIENT)
Dept: ELECTROPHYSIOLOGY | Facility: CLINIC | Age: 83
End: 2018-10-08

## 2018-10-08 VITALS
HEIGHT: 70 IN | WEIGHT: 193 LBS | SYSTOLIC BLOOD PRESSURE: 134 MMHG | DIASTOLIC BLOOD PRESSURE: 66 MMHG | BODY MASS INDEX: 27.63 KG/M2 | HEART RATE: 53 BPM

## 2018-10-08 DIAGNOSIS — M20.42 HAMMER TOE OF LEFT FOOT: ICD-10-CM

## 2018-10-08 DIAGNOSIS — M20.32 HALLUX MALLEUS OF LEFT FOOT: Primary | ICD-10-CM

## 2018-10-08 PROCEDURE — 99203 OFFICE O/P NEW LOW 30 MIN: CPT | Mod: S$PBB,,, | Performed by: PODIATRIST

## 2018-10-08 PROCEDURE — 1101F PT FALLS ASSESS-DOCD LE1/YR: CPT | Mod: CPTII,,, | Performed by: PODIATRIST

## 2018-10-08 PROCEDURE — 99213 OFFICE O/P EST LOW 20 MIN: CPT | Mod: PBBFAC | Performed by: PODIATRIST

## 2018-10-08 PROCEDURE — 99999 PR PBB SHADOW E&M-EST. PATIENT-LVL III: CPT | Mod: PBBFAC,,, | Performed by: PODIATRIST

## 2018-10-09 NOTE — PROGRESS NOTES
Subjective:       Patient ID: Javier Valles is a 88 y.o. male.    Chief Complaint: Rectal Bleeding    Rectal Bleeding   Pertinent negatives include no abdominal pain, arthralgias, chest pain, chills, congestion, coughing, fever, headaches, nausea, rash or vomiting.     87yo M seen 7/30/18 with complaints of rectal bleeding.  He reports intermittent bright red blood on the surface of the stool.  He says that in recent months his stool is much harder and more difficult to evacuate.  He does not take any fiber supplementation, stool softeners or laxatives.  He has lost 15 lb in the past few months but this has been intentional with diet and exercise.  He is on Eliquis for atrial flutter.  He has tried suppositories for the rectal bleeding without effect.  He is not sure when his last colonoscopy was but gases it was 12-15 years ago.  He denies any abdominal pain, nausea, vomiting, or tenesmus.    No family hx of CRC or IBD.    Exam revealed grade 1-2 internal hemorrhoids with moderate inflammation.  He was treated conservatively with topical hydrocortisone and increased fluid and fiber intake, and asked to return in 2 weeks.  He was asked to hold his Eliquis a few days prior to the office visit if he was still experiencing bleeding so that we could consider EBL.    He returned 8/14/2018 for follow-up.  He had not had any bleeding for the prior 4-5 days.  He had been using stool softeners and Maalox (not MiraLax) daily.  He reports that his stool is soft and formed.  He did hold his Eliquis beginning 2 days ago.  On examination he had some grade 1 internal hemorrhoids with no significant inflammation.  No intervention was performed at that time.    He then returned on 09/18/2018 to see 1 of our nurse practitioners with complaints of recurrence rectal bleeding and pain.  He had recently returned from a trip abroad in was not using his fiber supplements and stool softeners regularly.  Exam showed grade 2 internal  hemorrhoids without bleeding or stigmata of bleeding.      He returns today for follow-up with me.  Overall he is doing much better.  He has small amounts of bleeding only when he is very firm stool.  He is no longer using MiraLax but is taking FiberCon 2 tablets a day.  He does have occasional mucoid seepage.  He denies any pain with his bowel movements.    Review of patient's allergies indicates:   Allergen Reactions    Pravastatin      Severe myalgias/elevated CPK    Lipitor [atorvastatin]      Myositis/elevated CPK    Statins-hmg-coa reductase inhibitors      Muscle pain and loss of muscle    Ace inhibitors      Cough       Past Medical History:   Diagnosis Date    *Atrial flutter 10/3/13    Anticoagulant long-term use     Aspirin only at this time    Arthritis     Atrial fibrillation     Atrial flutter     Blood transfusion     ?    BPH (benign prostatic hypertrophy)     Carotid artery disease     2008: [place] There is 40 - 49% right Internal Carotid stenosis.  There is 20 - 39% left Internal Carotid stenosis.       Chronic kidney disease     Coronary artery disease     DDD (degenerative disc disease) 6/25/2015    Degenerative disc disease     Elevated PSA     Glaucoma     Hyperlipidemia     Hypertension     Lumbar stenosis     lumbar spinal stenosis    NSTEMI (non-ST elevated myocardial infarction) 11/3/2013    Pacemaker 11/2013    Peripheral neuropathy     - S/P right ILIAC stent 1993;      Retinal detachment     Squamous cell carcinoma     left leg    Syncope and collapse: orthostatic with hypotension 10/9/2015       Past Surgical History:   Procedure Laterality Date    ABLATION N/A 11/1/2016    Performed by [facility]con Ly [facility] at [facility] CATH LAB    ABLATION N/A 11/1/2013    Performed by [facility]con [facility] at [facility] CATH LAB    CARDIAC CATHETERIZATION      [facility]    CARDIAC ELECTROPHYSIOLOGY MAPPING AND ABLATION  11/2016    CARDIAC PACEMAKER PLACEMENT  11/2016    CARDIOVERSION  N/A 11/18/2013    Performed by [facility]as Nathan [facility] at [facility] CATH LAB    CATARACT EXTRACTION W/  INTRAOCULAR LENS IMPLANT Left 1997        CATHETERIZATION, HEART, LEFT Left 11/1/2013    Performed by [facility]ovi [facility]illermo [facility] at [facility] CATH LAB    CORONARY ARTERY BYPASS GRAFT  1991    ECHOCARDIOGRAM-TRANSESOPHAGEAL N/A 11/18/2013    Performed by Lola Surgeon at [facility] LOLA    ENUCLEATION Right 1949    JOMAR-TRANSFORAMINAL Left 8/7/2015    [facility] Hod[facility] at [facility] PAIN MGT    INJECTION-STEROID-EPIDURAL-CAUDAL N/A 7/21/2017    Per[facility] by [facility] at [facility] PAIN MGT    INJECTION-STEROID-EPIDURAL-LUMBAR N/A 8/28/2015    Performed by [facility] at [facility] PAIN MGT    INJECTION-STEROID-EPIDURAL-TRANSFORAMINAL Left 5/29/2017    Performed by Albert Graff MD at [facility] PAIN MGT    INJECTION-STEROID-EPIDURAL-TRANSFORAMINAL Right 4/28/2017    Performed by [facility] at [facility] PAIN MGT    INJECTION-STEROID-EPIDURAL-TRANSFORAMINAL Left 7/10/2015    Performed by [facility] at [facility] PAIN MGT    GLXJHRKHQ-ZAFHNGUBV-KRGOGHLARKJXK N/A 11/1/2016    Performed by [facility]con Ly [facility] at [facility] CATH LAB    LAMINECTOMY-LUMBAR N/A 4/25/2016    Performed by Florentin Stanford MD at [facility] OR 2ND FLR    LAMINOTOMY  11/9/2017    Performed [facility]j [facility] at [facility] OR    LUMBAR LAMINECTOMY  04/25/2016    PLACEMENT OF SPINAL CORD STIMULATOR T10-T11 laminotomyh for placement of spinal cord stimulator at T9-10, left below the belt line pulse generator N/A 11/9/2017    Performed by [facility]j [facility]io [facility] at [facility] OR    RELEASE-CARPAL TUNNEL Bilateral 3/29/2017    Performed by Nam Chong Jr., MD at [facility] OR    RETINAL DETACHMENT SURGERY Left 1997    Dr. Cosme    SKIN BIOPSY      SPINAL CORD STIMULATOR TRIAL W/ LAMINOTOMY  10/2017    TONSILLECTOMY      TRANSESOPHAGEAL ECHOCARDIOGRAM (MONICA) N/A 11/1/2013    [facility]     TRIAL-STIMULATOR-DORSAL COLUMN N/A 9/22/2017    Performed by James Hodges MD at [facility]       Current Outpatient Medications   Medication Sig Dispense Refill    amiodarone (PACERONE) 200 MG Tab Take 1 tablet (200 mg total) by mouth once daily. 30 tablet 11    amLODIPine (NORVASC) 5 MG tablet Take 1 tablet (5 mg total) by mouth once daily. 90 tablet 3    apixaban 2.5 mg Tab Take 1 tablet (2.5 mg total) by mouth 2 (two) times daily. 180 tablet 3    ascorbic acid (VITAMIN C) 1000 MG tablet Take 1,000 mg by mouth every morning.       brimonidine 0.2% (ALPHAGAN) 0.2 % Drop Place 1 drop into the left eye 3 (three) times daily. 10 mL 12    buPROPion (WELLBUTRIN XL) 150 MG TB24 tablet TAKE ONE TABLET BY MOUTH ONCE DAILY 30 tablet 11    co-enzyme Q-10 30 mg capsule Take 30 mg by mouth once daily.       donepezil (ARICEPT) 5 MG tablet Take 1 tablet (5 mg total) by mouth every evening. 30 tablet 11    dorzolamide (TRUSOPT) 2 % ophthalmic solution Place 1 drop into both eyes 3 (three) times daily. Please fill today - this evening if possible - pt is leaving town early tomorrow 30 mL 3    ipratropium (ATROVENT) 0.03 % nasal spray 1-2 sprays by Nasal route 2 (two) times daily. 30 mL 1    latanoprost 0.005 % ophthalmic solution Place 1 drop into the left eye every evening. 1 Bottle 12    losartan (COZAAR) 50 MG tablet Take 1 tablet (50 mg total) by mouth 2 (two) times daily. 180 tablet 3    lutein 20 mg Cap Take 20 mg by mouth once daily.       MULTIVITAMINS,THER W-MINERALS (VITAMINS & MINERALS ORAL) Take 1 tablet by mouth every morning.       polycarbophil (FIBERCON) 625 mg tablet Take 625 mg by mouth 2 (two) times daily.      rosuvastatin (CRESTOR) 20 MG tablet Take 1 tablet (20 mg total) by mouth every evening. 90 tablet 3     No current facility-administered medications for this visit.        Family History   Problem Relation Age of Onset    Stroke Mother 69    Clotting disorder Mother         per  patient no clotting disorder    Hypertension Mother     Diverticulitis Son     Cancer Neg Hx     Diabetes Neg Hx     Heart disease Neg Hx     Glaucoma Neg Hx     Amblyopia Neg Hx     Blindness Neg Hx     Cataracts Neg Hx     Macular degeneration Neg Hx     Retinal detachment Neg Hx     Strabismus Neg Hx        Social History     Socioeconomic History    Marital status:      Spouse name: Joanie    Number of children: None    Years of education: None    Highest education level: None   Social Needs    Financial resource strain: None    Food insecurity - worry: None    Food insecurity - inability: None    Transportation needs - medical: None    Transportation needs - non-medical: None   Occupational History    Occupation: retired   Tobacco Use    Smoking status: Former Smoker     Last attempt to quit: 1963     Years since quittin.1    Smokeless tobacco: Never Used   Substance and Sexual Activity    Alcohol use: Yes     Alcohol/week: 1.8 oz     Types: 1 Glasses of wine, 1 Cans of beer, 1 Shots of liquor per week     Comment: 2 a day    Drug use: No    Sexual activity: Yes     Partners: Female   Other Topics Concern    None   Social History Narrative    None       Review of Systems   Constitutional: Negative for chills and fever.   HENT: Negative for congestion.    Respiratory: Negative for cough.    Cardiovascular: Negative for chest pain.   Gastrointestinal: Positive for hematochezia. Negative for abdominal pain, nausea and vomiting.   Musculoskeletal: Negative for arthralgias.   Skin: Negative for rash.   Neurological: Negative for headaches.   NOTE:  Hematochezia has now mostly resolved      Objective:      Physical Exam   Constitutional: He is oriented to person, place, and time. He appears well-developed and well-nourished.   HENT:   Head: Normocephalic and atraumatic.   Eyes: Conjunctivae are normal.   Pulmonary/Chest: Effort normal. No respiratory distress.   Abdominal:  Soft. He exhibits no distension and no mass. There is no tenderness. There is no rebound and no guarding.   Genitourinary:   Genitourinary Comments: Perineum - normal perianal skin, no mass, no fissure, no external hemorrhoids  SHAKILA - good tone, no mass  Anoscopy - Grade 1 internal hemorrhoids with no significant inflammation     Neurological: He is alert and oriented to person, place, and time.   Skin: Skin is warm and dry. No erythema.         Lab Results   Component Value Date    WBC 7.74 08/16/2018    HGB 12.3 (L) 08/16/2018    HCT 37.6 (L) 08/16/2018     (H) 08/16/2018     (L) 08/16/2018     BMP  Lab Results   Component Value Date     08/16/2018    K 4.4 08/16/2018     08/16/2018    CO2 23 08/16/2018    BUN 29 (H) 08/16/2018    CREATININE 2.0 (H) 08/16/2018    CALCIUM 9.8 08/16/2018    ANIONGAP 8 08/16/2018    ESTGFRAFRICA 33.5 (A) 08/16/2018    EGFRNONAA 28.9 (A) 08/16/2018     CMP  Sodium   Date Value Ref Range Status   08/16/2018 140 136 - 145 mmol/L Final     Potassium   Date Value Ref Range Status   08/16/2018 4.4 3.5 - 5.1 mmol/L Final     Chloride   Date Value Ref Range Status   08/16/2018 109 95 - 110 mmol/L Final     CO2   Date Value Ref Range Status   08/16/2018 23 23 - 29 mmol/L Final     Glucose   Date Value Ref Range Status   08/16/2018 83 70 - 110 mg/dL Final     BUN, Bld   Date Value Ref Range Status   08/16/2018 29 (H) 8 - 23 mg/dL Final     Creatinine   Date Value Ref Range Status   08/16/2018 2.0 (H) 0.5 - 1.4 mg/dL Final     Calcium   Date Value Ref Range Status   08/16/2018 9.8 8.7 - 10.5 mg/dL Final     Total Protein   Date Value Ref Range Status   06/12/2017 6.7 6.0 - 8.4 g/dL Final     Albumin   Date Value Ref Range Status   06/12/2017 3.7 3.5 - 5.2 g/dL Final     Total Bilirubin   Date Value Ref Range Status   06/12/2017 1.1 (H) 0.1 - 1.0 mg/dL Final     Comment:     For infants and newborns, interpretation of results should be based  on gestational age, weight  and in agreement with clinical  observations.  Premature Infant recommended reference ranges:  Up to 24 hours.............<8.0 mg/dL  Up to 48 hours............<12.0 mg/dL  3-5 days..................<15.0 mg/dL  6-29 days.................<15.0 mg/dL       Alkaline Phosphatase   Date Value Ref Range Status   06/12/2017 72 55 - 135 U/L Final     AST   Date Value Ref Range Status   06/12/2017 30 10 - 40 U/L Final     ALT   Date Value Ref Range Status   06/12/2017 18 10 - 44 U/L Final     Anion Gap   Date Value Ref Range Status   08/16/2018 8 8 - 16 mmol/L Final     eGFR if    Date Value Ref Range Status   08/16/2018 33.5 (A) >60 mL/min/1.73 m^2 Final     eGFR if non    Date Value Ref Range Status   08/16/2018 28.9 (A) >60 mL/min/1.73 m^2 Final     Comment:     Calculation used to obtain the estimated glomerular filtration  rate (eGFR) is the CKD-EPI equation.        No results found for: CEA        Assessment:       1. Internal hemorrhoids        Plan:   Continue increased fiber intake (20-25 grams/day) and fluid intake (8-10 glasses water/day)  Daily fiber supplement  Restarted MiraLax daily.  Colace 100 mg bid as needed    Rudolph Manuel MD, FACS, FASCRS  Staff Surgeon  Department of Colon & Rectal Surgery

## 2018-10-10 ENCOUNTER — PATIENT MESSAGE (OUTPATIENT)
Dept: ELECTROPHYSIOLOGY | Facility: CLINIC | Age: 83
End: 2018-10-10

## 2018-10-11 ENCOUNTER — TELEPHONE (OUTPATIENT)
Dept: ELECTROPHYSIOLOGY | Facility: CLINIC | Age: 83
End: 2018-10-11

## 2018-10-11 NOTE — TELEPHONE ENCOUNTER
----- Message from Ramiro Hugo sent at 10/11/2018  9:17 AM CDT -----  Patient's last transmission that was received was on 7/17/18.  Patient's monitor did update on 10/10/18.  He certainly can send a manual if he would like.     Ramiro Spangler  ----- Message -----  From: Lilia Davenport RN  Sent: 10/10/2018   2:30 PM  To: [facility] Arrhythmia Device Staff    Trista didn't realize Jeniffer wasn't here and sent a message to her inbasket on 10/8/18 to look at his device to see if there was anything showing up to explain his sob. He is really upset that no one called him back. Can someone please take a look at his device and let me know if it is showing anything noteworthy?  Thanks so much!!

## 2018-10-11 NOTE — TELEPHONE ENCOUNTER
Spoke with patient and advised that transmissions did not reveal any arrhythmias to explain his fatigue and sob. Patient was very appreciative of the call, he had already spoken with Jeniffer. He is going to follow up with cards and primary care. Advised to call us for any questions or concerns.

## 2018-10-12 ENCOUNTER — TELEPHONE (OUTPATIENT)
Dept: ELECTROPHYSIOLOGY | Facility: CLINIC | Age: 83
End: 2018-10-12

## 2018-10-12 ENCOUNTER — PATIENT MESSAGE (OUTPATIENT)
Dept: INTERNAL MEDICINE | Facility: CLINIC | Age: 83
End: 2018-10-12

## 2018-10-12 NOTE — TELEPHONE ENCOUNTER
Reviewed patients merlin home transmission in relation to Mr Valles's symptoms of fatigue and SOB. Informed nurse KESHAWN Davenport that patients remote monitor is connected and no arrhythmias noted.

## 2018-10-15 ENCOUNTER — PATIENT MESSAGE (OUTPATIENT)
Dept: ELECTROPHYSIOLOGY | Facility: CLINIC | Age: 83
End: 2018-10-15

## 2018-10-15 DIAGNOSIS — I49.3 PVC (PREMATURE VENTRICULAR CONTRACTION): ICD-10-CM

## 2018-10-15 DIAGNOSIS — I49.5 SSS (SICK SINUS SYNDROME): ICD-10-CM

## 2018-10-15 DIAGNOSIS — Z95.1 S/P CABG (CORONARY ARTERY BYPASS GRAFT): ICD-10-CM

## 2018-10-15 DIAGNOSIS — I25.5 ISCHEMIC CARDIOMYOPATHY: ICD-10-CM

## 2018-10-16 ENCOUNTER — OFFICE VISIT (OUTPATIENT)
Dept: INTERNAL MEDICINE | Facility: CLINIC | Age: 83
End: 2018-10-16
Payer: MEDICARE

## 2018-10-16 ENCOUNTER — TELEPHONE (OUTPATIENT)
Dept: ELECTROPHYSIOLOGY | Facility: CLINIC | Age: 83
End: 2018-10-16

## 2018-10-16 ENCOUNTER — CLINICAL SUPPORT (OUTPATIENT)
Dept: ELECTROPHYSIOLOGY | Facility: CLINIC | Age: 83
End: 2018-10-16
Attending: INTERNAL MEDICINE
Payer: MEDICARE

## 2018-10-16 ENCOUNTER — IMMUNIZATION (OUTPATIENT)
Dept: INTERNAL MEDICINE | Facility: CLINIC | Age: 83
End: 2018-10-16
Payer: MEDICARE

## 2018-10-16 ENCOUNTER — LAB VISIT (OUTPATIENT)
Dept: LAB | Facility: HOSPITAL | Age: 83
End: 2018-10-16
Attending: INTERNAL MEDICINE
Payer: MEDICARE

## 2018-10-16 VITALS
DIASTOLIC BLOOD PRESSURE: 67 MMHG | BODY MASS INDEX: 28.73 KG/M2 | SYSTOLIC BLOOD PRESSURE: 134 MMHG | OXYGEN SATURATION: 98 % | WEIGHT: 194 LBS | HEIGHT: 69 IN | HEART RATE: 50 BPM

## 2018-10-16 DIAGNOSIS — R35.1 BENIGN PROSTATIC HYPERPLASIA WITH NOCTURIA: ICD-10-CM

## 2018-10-16 DIAGNOSIS — N40.1 BENIGN PROSTATIC HYPERPLASIA WITH NOCTURIA: ICD-10-CM

## 2018-10-16 DIAGNOSIS — R53.83 FATIGUE, UNSPECIFIED TYPE: Primary | ICD-10-CM

## 2018-10-16 DIAGNOSIS — I25.810 CORONARY ARTERY DISEASE INVOLVING CORONARY BYPASS GRAFT OF NATIVE HEART WITHOUT ANGINA PECTORIS: ICD-10-CM

## 2018-10-16 DIAGNOSIS — Z95.0 BIVENTRICULAR CARDIAC PACEMAKER IN SITU: ICD-10-CM

## 2018-10-16 DIAGNOSIS — I48.91 ATRIAL FIBRILLATION, UNSPECIFIED TYPE: ICD-10-CM

## 2018-10-16 DIAGNOSIS — N18.30 CHRONIC KIDNEY DISEASE, STAGE 3 (MODERATE): ICD-10-CM

## 2018-10-16 DIAGNOSIS — I10 ESSENTIAL HYPERTENSION: ICD-10-CM

## 2018-10-16 DIAGNOSIS — I49.5 SSS (SICK SINUS SYNDROME): ICD-10-CM

## 2018-10-16 DIAGNOSIS — R53.83 FATIGUE, UNSPECIFIED TYPE: ICD-10-CM

## 2018-10-16 DIAGNOSIS — R06.09 DOE (DYSPNEA ON EXERTION): ICD-10-CM

## 2018-10-16 DIAGNOSIS — Z95.0 CARDIAC PACEMAKER IN SITU: ICD-10-CM

## 2018-10-16 DIAGNOSIS — Z79.899 OTHER LONG TERM (CURRENT) DRUG THERAPY: ICD-10-CM

## 2018-10-16 LAB
ALBUMIN SERPL BCP-MCNC: 4.1 G/DL
ALP SERPL-CCNC: 71 U/L
ALT SERPL W/O P-5'-P-CCNC: 20 U/L
ANION GAP SERPL CALC-SCNC: 7 MMOL/L
AST SERPL-CCNC: 33 U/L
BASOPHILS # BLD AUTO: 0.07 K/UL
BASOPHILS NFR BLD: 1.1 %
BILIRUB SERPL-MCNC: 1.2 MG/DL
BNP SERPL-MCNC: 252 PG/ML
BUN SERPL-MCNC: 27 MG/DL
CALCIUM SERPL-MCNC: 10.1 MG/DL
CHLORIDE SERPL-SCNC: 111 MMOL/L
CO2 SERPL-SCNC: 24 MMOL/L
CREAT SERPL-MCNC: 1.7 MG/DL
DIFFERENTIAL METHOD: ABNORMAL
EOSINOPHIL # BLD AUTO: 0.3 K/UL
EOSINOPHIL NFR BLD: 4.9 %
ERYTHROCYTE [DISTWIDTH] IN BLOOD BY AUTOMATED COUNT: 13.4 %
EST. GFR  (AFRICAN AMERICAN): 40.7 ML/MIN/1.73 M^2
EST. GFR  (NON AFRICAN AMERICAN): 35.2 ML/MIN/1.73 M^2
GLUCOSE SERPL-MCNC: 86 MG/DL
HCT VFR BLD AUTO: 40.5 %
HGB BLD-MCNC: 12.6 G/DL
IMM GRANULOCYTES # BLD AUTO: 0.03 K/UL
IMM GRANULOCYTES NFR BLD AUTO: 0.5 %
LYMPHOCYTES # BLD AUTO: 0.9 K/UL
LYMPHOCYTES NFR BLD: 14.2 %
MCH RBC QN AUTO: 34.1 PG
MCHC RBC AUTO-ENTMCNC: 31.1 G/DL
MCV RBC AUTO: 110 FL
MONOCYTES # BLD AUTO: 0.6 K/UL
MONOCYTES NFR BLD: 9.6 %
NEUTROPHILS # BLD AUTO: 4.5 K/UL
NEUTROPHILS NFR BLD: 69.7 %
NRBC BLD-RTO: 0 /100 WBC
PLATELET # BLD AUTO: 94 K/UL
PMV BLD AUTO: 12.9 FL
POTASSIUM SERPL-SCNC: 4.1 MMOL/L
PROT SERPL-MCNC: 7.2 G/DL
RBC # BLD AUTO: 3.7 M/UL
SODIUM SERPL-SCNC: 142 MMOL/L
T4 FREE SERPL-MCNC: 1.13 NG/DL
TSH SERPL DL<=0.005 MIU/L-ACNC: 4.07 UIU/ML
VIT B12 SERPL-MCNC: 516 PG/ML
WBC # BLD AUTO: 6.49 K/UL

## 2018-10-16 PROCEDURE — 99214 OFFICE O/P EST MOD 30 MIN: CPT | Mod: S$PBB,,, | Performed by: INTERNAL MEDICINE

## 2018-10-16 PROCEDURE — 82607 VITAMIN B-12: CPT

## 2018-10-16 PROCEDURE — 84439 ASSAY OF FREE THYROXINE: CPT

## 2018-10-16 PROCEDURE — 99214 OFFICE O/P EST MOD 30 MIN: CPT | Mod: PBBFAC,PO,25 | Performed by: INTERNAL MEDICINE

## 2018-10-16 PROCEDURE — 93296 REM INTERROG EVL PM/IDS: CPT | Mod: PBBFAC | Performed by: INTERNAL MEDICINE

## 2018-10-16 PROCEDURE — 90662 IIV NO PRSV INCREASED AG IM: CPT | Mod: PBBFAC,PO

## 2018-10-16 PROCEDURE — 1101F PT FALLS ASSESS-DOCD LE1/YR: CPT | Mod: CPTII,,, | Performed by: INTERNAL MEDICINE

## 2018-10-16 PROCEDURE — 80053 COMPREHEN METABOLIC PANEL: CPT

## 2018-10-16 PROCEDURE — 99999 PR PBB SHADOW E&M-EST. PATIENT-LVL IV: CPT | Mod: PBBFAC,,, | Performed by: INTERNAL MEDICINE

## 2018-10-16 PROCEDURE — 93294 REM INTERROG EVL PM/LDLS PM: CPT | Mod: ,,, | Performed by: INTERNAL MEDICINE

## 2018-10-16 PROCEDURE — 83880 ASSAY OF NATRIURETIC PEPTIDE: CPT

## 2018-10-16 PROCEDURE — 36415 COLL VENOUS BLD VENIPUNCTURE: CPT | Mod: PO

## 2018-10-16 PROCEDURE — 85025 COMPLETE CBC W/AUTO DIFF WBC: CPT

## 2018-10-16 PROCEDURE — 84443 ASSAY THYROID STIM HORMONE: CPT

## 2018-10-16 RX ORDER — AMIODARONE HYDROCHLORIDE 200 MG/1
200 TABLET ORAL DAILY
Qty: 30 TABLET | Refills: 11 | Status: SHIPPED | OUTPATIENT
Start: 2018-10-16 | End: 2019-06-18 | Stop reason: SDUPTHER

## 2018-10-16 NOTE — PROGRESS NOTES
PAST MEDICAL HISTORY:   Coronary artery disease with previous bypass surgery, stents in 2013, and then a  subsequent non-ST MI due to in-stent stenosis of obtuse marginal for which angioplasty was performed.  Hypertension.  Hyperlipidemia.  Peripheral vascular disease with stent of the right iliac.  Venous Reflux  Chronic kidney disease with secondary hyperparathyroid.  BPH with elevated PSA.  Carotid artery disease Osteoarthritis of the knees  Arrhythmia disorder for which he is status post pacemaker and defibrillator for sick sinus syndrome, status post radiofrequency ablation for atrial flutter, and on amiodarone for atrial flutter/fibrillation/PVCs.  Gout.  Bilateral inguinal hernia repair.  Left knee surgery.  Glaucoma.    SOCIAL HISTORY:  Tobacco use, none.  Alcohol use, a couple of drinks a day.    CURRENT MEDICATIONS:  Amiodarone 200 mg daily.  Amlodipine 5 mg daily.  Aspirin 81 mg daily.  Bupropion 150 mg daily.  Aricept 5 mg daily.  Atrovent nasal spray as needed.  Losartan 50 mg daily.  Crestor 20 mg daily.  Eliquis 2.5 mg twice a day    REASON FOR VISIT:  This is an 88-year-old male.  Within a month, he himself   having no energy at all.  During the day if he is not doing anything, he can   easily fall asleep.  He finds that he might be getting more out of breath when   doing any physical activity.  He endorses no chest pain.  No abdominal pain.  He   reports no problems with having bowel or urine function.    Regarding sleep, he will go to bed around 9:30-10:00.  No problems going to   sleep, wake up around 2:00 a.m. to 2:30 to urinate.  Afterwards he finds it is   very difficult for him to go to sleep.  He usually might wake up at 5:00-5:30.    He states that he is doing well with the spinal cord stimulator with his lumbar   back pain and disc disease and also has had his pacemaker checked, which is   fine.    PHYSICAL EXAMINATION:  VITAL SIGNS:  His weight is 194 pounds, which is currently stable;  pulse rate of   56, blood pressure 132/62.  NECK:  No thyromegaly.  LUNGS:  Clear.  HEART:  Regular rate and rhythm.  ABDOMEN:  Active bowel sounds, soft, and nontender.  No masses.  EXTREMITIES:  No edema.  PULSES:  2+ carotid pulses, no bruits.    IMPRESSION:  1.  Dyspnea on exertion.  2.  Fatigue.  3.  Coronary artery disease.  4.  Hypertension.  5.  Chronic kidney disease, stage III.  6.  BPH.    PLAN:  Labs of a CBC, chemistry, and arrange for TSH and a B12 level.  Phone   review to follow up.  One option depending on test results, we may need to do a   2D echo.  As far as medicines, maybe putting him on Flomax or tamsulosin to see   if this will prevent him from getting up at night or a trial of low-dose   zaleplon to take in the middle of the night.            /dimitris [id number] review        JAM/STANLEY  dd: 10/16/2018 11:16:26 (CDT)  td: 10/16/2018 23:38:48 (CDT)  Doc ID   #[id number]  Job ID #[id number]    CC:

## 2018-10-16 NOTE — PROGRESS NOTES
Subjective:      Patient ID: Javier Valles is a 88 y.o. male.    Chief Complaint: Foot Pain (left ft )    Javier is a 88 y.o. male who presents to the podiatry clinic  with complaint of  left foot pain. Onset of the symptoms was several years ago. Precipitating event: none known. Current symptoms include: ability to bear weight, but with some pain and stiffness. Aggravating factors: any weight bearing. Symptoms have stabilized. Patient has had no prior foot problems. Evaluation to date: none. Treatment to date: avoidance of offending activity. Patients rates pain 2/10 on pain scale.        Review of Systems   Constitution: Negative for chills, fever and malaise/fatigue.   HENT: Negative for hearing loss.    Cardiovascular: Negative for claudication.   Respiratory: Negative for shortness of breath.    Skin: Positive for color change and dry skin. Negative for flushing and rash.   Musculoskeletal: Positive for joint pain. Negative for myalgias.   Neurological: Negative for loss of balance, numbness, paresthesias and sensory change.   Psychiatric/Behavioral: Negative for altered mental status.           Objective:      Physical Exam   Constitutional: He is oriented to person, place, and time. He appears well-developed and well-nourished.   Cardiovascular:   Pulses:       Dorsalis pedis pulses are 2+ on the right side, and 2+ on the left side.        Posterior tibial pulses are 2+ on the right side, and 2+ on the left side.   no edema noted to b/L LEs   Musculoskeletal:        Right knee: He exhibits no swelling and no ecchymosis.        Left knee: He exhibits no swelling and no ecchymosis.        Right ankle: He exhibits normal range of motion, no swelling, no ecchymosis and normal pulse. No lateral malleolus, no medial malleolus and no head of 5th metatarsal tenderness found. Achilles tendon exhibits no pain, no defect and normal Najera's test results.        Left ankle: He exhibits normal range of motion, no  swelling, no ecchymosis and normal pulse. No lateral malleolus, no medial malleolus and no head of 5th metatarsal tenderness found. Achilles tendon exhibits no pain and normal Najera's test results.        Right lower leg: He exhibits no tenderness, no bony tenderness, no swelling, no edema and no deformity.        Left lower leg: He exhibits no tenderness, no swelling and no edema.        Right foot: There is normal range of motion and no deformity.        Left foot: There is normal range of motion and no deformity.     Hallux malleus noted to left hallux, painful in shoes.   Hammertoes noted, digits 2-5 b/L    with adductovarus rotation of b/L   fifth digit.       Feet:   Right Foot:   Protective Sensation: 5 sites tested. 5 sites sensed.   Left Foot:   Protective Sensation: 5 sites tested. 5 sites sensed.   Neurological: He is alert and oriented to person, place, and time.   Gross sensation intact to b/L lower extremities   Skin: Skin is warm. Capillary refill takes more than 3 seconds. No abrasion, no bruising, no burn and no ecchymosis noted.   No open lesions noted to b/L lower extremities.   Nails x10 thickened and dystrophic     Psychiatric: He has a normal mood and affect. His speech is normal and behavior is normal. He is attentive.             Assessment:       Encounter Diagnoses   Name Primary?    Hallux malleus of left foot Yes    Hammer toe of left foot          Plan:       Javier was seen today for foot pain.    Diagnoses and all orders for this visit:    Hallux malleus of left foot    Hammer toe of left foot      I counseled the patient on his conditions, their implications and medical management.      Shoe modification advised for the pt. Pt was advised to obtain shoes will accommodate foot deformities.     Sx options discussed with pt to correct hammertoes and hallux.   Pt states he is interested in RTC as Proc-B for toenail trimming.   .

## 2018-10-16 NOTE — TELEPHONE ENCOUNTER
10/16/18 Spoke with patient in regards to getting home monitor reconnected and sending a manual transmission. Will check back to see if the transmission came through.

## 2018-10-17 ENCOUNTER — TELEPHONE (OUTPATIENT)
Dept: INTERNAL MEDICINE | Facility: CLINIC | Age: 83
End: 2018-10-17

## 2018-10-17 DIAGNOSIS — I51.9 LEFT VENTRICULAR DYSFUNCTION: Primary | ICD-10-CM

## 2018-10-17 DIAGNOSIS — I34.0 NON-RHEUMATIC MITRAL REGURGITATION: ICD-10-CM

## 2018-10-17 DIAGNOSIS — R06.02 SOB (SHORTNESS OF BREATH): ICD-10-CM

## 2018-10-17 NOTE — PROGRESS NOTES
Lab testing reviewed    Noted on the CBC was thrombocytopenia and macrocytosis which could bring up the possibility of myelodysplasia syndrome      BNP is slightly elevated and will follow up with 2D echo with Doppler disposition to follow

## 2018-10-19 ENCOUNTER — HOSPITAL ENCOUNTER (OUTPATIENT)
Dept: CARDIOLOGY | Facility: CLINIC | Age: 83
Discharge: HOME | End: 2018-10-19
Attending: INTERNAL MEDICINE
Payer: MEDICARE

## 2018-10-19 ENCOUNTER — PATIENT OUTREACH (OUTPATIENT)
Dept: OTHER | Facility: OTHER | Age: 83
End: 2018-10-19

## 2018-10-19 DIAGNOSIS — I51.9 LEFT VENTRICULAR DYSFUNCTION: ICD-10-CM

## 2018-10-19 DIAGNOSIS — I34.0 NON-RHEUMATIC MITRAL REGURGITATION: ICD-10-CM

## 2018-10-19 DIAGNOSIS — R06.02 SOB (SHORTNESS OF BREATH): ICD-10-CM

## 2018-10-19 LAB
ESTIMATED PA SYSTOLIC PRESSURE: 26.62
MITRAL VALVE MOBILITY: NORMAL
MITRAL VALVE REGURGITATION: NORMAL
RETIRED EF AND QEF - SEE NOTES: 53 (ref 55–65)
TRICUSPID VALVE REGURGITATION: NORMAL

## 2018-10-19 PROCEDURE — 93306 TTE W/DOPPLER COMPLETE: CPT | Mod: PBBFAC | Performed by: INTERNAL MEDICINE

## 2018-10-19 NOTE — PROGRESS NOTES
Last 5 Patient Entered Readings                                      Current 30 Day Average: 124/67     Recent Readings 10/19/2018 10/15/2018 10/7/2018 9/30/2018 9/25/2018    SBP (mmHg) 161 117 136 112 110    DBP (mmHg) 52 69 70 65 67    Pulse 49 49 53 59 50          Digital Medicine: Health  Follow Up    Lifestyle Modifications:    1.Dietary Modifications (Sodium intake <2,000mg/day, food labels, dining out): Deferred    2.Physical Activity: Deferred    3.Medication Therapy: Patient has been compliant with the medication regimen. Patient is doing well on his current regimen. He denies symptoms/side effects.     4.Patient has the following medication side effects/concerns:   (Frequency/Alleviating factors/Precipitating factors, etc.)     Patient informed me that he got his cuff working again once he went to the [facility] but he recently received a notice saying that his BP readings were not transmitting. The last reading he took was on 10/15 and that reading did transmit in real time to us. Patient is going to take another BP reading today and he will call me to have me check and see if the reading transmitted. If not, I will have IT look into this more.     Follow up with Mr. Herrera NICKOLAS Valles completed. No further questions or concerns. Will continue to follow up to achieve health goals.

## 2018-10-20 ENCOUNTER — DOCUMENTATION ONLY (OUTPATIENT)
Dept: INTERNAL MEDICINE | Facility: CLINIC | Age: 83
End: 2018-10-20

## 2018-10-20 NOTE — PROGRESS NOTES
Regarding test results   in general all lab for fine    The 2D echo showed an improved ejection fraction of,, 53%    mild-to-moderate mitral regurgitation    He will retry Flomax once a day to see if this may prevent him from getting up as much at night and maybe less disrupted sleep

## 2018-10-23 ENCOUNTER — PES CALL (OUTPATIENT)
Dept: ADMINISTRATIVE | Facility: CLINIC | Age: 83
End: 2018-10-23

## 2018-10-26 ENCOUNTER — TELEPHONE (OUTPATIENT)
Dept: OPHTHALMOLOGY | Facility: CLINIC | Age: 83
End: 2018-10-26

## 2018-10-26 RX ORDER — BRINZOLAMIDE 10 MG/ML
1 SUSPENSION/ DROPS OPHTHALMIC 3 TIMES DAILY
COMMUNITY
Start: 2018-10-26 | End: 2021-09-13 | Stop reason: ALTCHOICE

## 2018-10-26 NOTE — TELEPHONE ENCOUNTER
[facility] called stating dorzolamide is on back order and needs alternative. Pt will switch to Azopt TID OU. Rx called into pharmacy.

## 2018-10-31 ENCOUNTER — PATIENT MESSAGE (OUTPATIENT)
Dept: OPTOMETRY | Facility: CLINIC | Age: 83
End: 2018-10-31

## 2018-10-31 ENCOUNTER — TELEPHONE (OUTPATIENT)
Dept: OPHTHALMOLOGY | Facility: CLINIC | Age: 83
End: 2018-10-31

## 2018-11-06 ENCOUNTER — OFFICE VISIT (OUTPATIENT)
Dept: OPHTHALMOLOGY | Facility: CLINIC | Age: 83
End: 2018-11-06
Payer: MEDICARE

## 2018-11-06 DIAGNOSIS — H33.002 RHEGMATOGENOUS RETINAL DETACHMENT OF LEFT EYE: ICD-10-CM

## 2018-11-06 DIAGNOSIS — Z96.1 PSEUDOPHAKIA OF LEFT EYE: ICD-10-CM

## 2018-11-06 DIAGNOSIS — H35.372 EPIRETINAL MEMBRANE, LEFT EYE: ICD-10-CM

## 2018-11-06 DIAGNOSIS — Z97.0 PROSTHETIC EYE GLOBE: ICD-10-CM

## 2018-11-06 DIAGNOSIS — H43.812 POSTERIOR VITREOUS DETACHMENT OF LEFT EYE: ICD-10-CM

## 2018-11-06 DIAGNOSIS — H40.1122 PRIMARY OPEN-ANGLE GLAUCOMA, LEFT EYE, MODERATE STAGE: Primary | ICD-10-CM

## 2018-11-06 PROCEDURE — 92012 INTRM OPH EXAM EST PATIENT: CPT | Mod: S$GLB,,, | Performed by: OPHTHALMOLOGY

## 2018-11-06 PROCEDURE — 99999 PR PBB SHADOW E&M-EST. PATIENT-LVL II: CPT | Mod: PBBFAC,,, | Performed by: OPHTHALMOLOGY

## 2018-11-06 NOTE — PROGRESS NOTES
HPI     DLS: 7/17/18    Pt here for 4 month check;    Meds:    Brimonidine TID OS   Latanoprost QHS OS  Dorzolamide TID OS     1) POAG   2) PCIOL OS   3) Prosthesis OD   4) ERM OS   5) Hx Rhegmatogenous RD OS   6) Psuedo Mac Hole OS     Last edited by Mariya Dixon on 11/6/2018  8:11 AM. (History)            Assessment /Plan     For exam results, see Encounter Report.    Primary open-angle glaucoma, left eye, moderate stage    Posterior vitreous detachment of left eye    Epiretinal membrane, left eye    Rhegmatogenous retinal detachment of left eye    Pseudophakia of left eye    Prosthetic eye globe        Old pt of Dr. Goodrich - now seedennis Jacob     1. POAG   Followed at [facility] since 1997   followed by Sonia for 30 years  First HVF 2014  gtts started - [facility] - 2012  First photos - ? 1997 - for RD os     Glaucoma meds -  latanoprost qhs os / brimonidine os   H/O adverse rxn to glaucoma drops none  LASERS - SLT os 5/25/2017 (270 degrees - too narrow inf.) (( good resp 16--> 11)   GLAUCOMA SURGERIES none  OTHER EYE SURGERIES - prosthesis od - (2/2 injury - 1940's) // Pnematic retinopexy OS 1997 - [place] // PC iol os - [place]  CDR - prosthesis / 0.75  Tbase  - ?? On gtts when started here  Tmax  - ?? Off gtts   Ttarget  - ?? 13 or less if possible // had a disc heme with IOP 15-18   HVF 4  test 2014 to 2017 OS:  ? Paracentral defect with early SAD/IAD  Gonio  +3-4 S/T/N // very narrow inf os   CCT - xx/492  OCT 2 test 2014 to 2016- RNFL - OD:not done // OS:dec s/bord T  HRT 3 test 2015 - 2018 -  MR -  For OS dec G,TS, N, NS, NI, bord T //CDR// 0.78 os   Disc photos - mult old slides 1997 - 2006 // 2015, 2017 - w/ IT disc heme - OIS    Ttoday - prosthetic // 16  Test done today HRT    2.  disc heme os    See photos 4/17/2017 - occurred with IOP's of  13-18   Resolved by 10/2/107    3.  Monocular precautions  - prothetic od    4.  erm os  - stable, follows with arend  -on nevanac q day os - feels it helps  vision - ?     5.  Hx of rhegmatogenous rrd os  - flat, follows with philippe    6.  Mac hole os  - monitor     7. PC IOL OS     8. In past Dr Goodrich has filled out form allowing him to get a drivers license - may need again soon     Pt on metoprolol xl      Glaucoma os - monocular   Cont  xalatan  Cont brimonidine   IOP is not quite at goal// he had disc heme - rec lower IOP - consider slt os (( ?? Target 14))   SLT os - 5/25/2017 - S/T/N (too narrow inf. ) - steroid taper (( good resp 16-->11)   Add Dorzolamide, tid  -hold BB due to below  - (may be able to use BB as now has a pacemaker, but has a H/O low heart rate / and/or arrythmia)       -continue EES ointment od - prosthesis - prn irritation     Sees [facility] - new glasses - has been restricted to daytime driving   Keep regular F/U with philippe / rere    Pt occasionally has to get steroid injections for back pain - so will need to monitor that - may be a cause of elevated IOP from time to time      F/U 4 months with HVF - 24-2 ss os only /Photos  I have seen and personally examined the patient.  I agree with the findings, assessment and plan of the resident and/or fellow.     Sena Schneider MD

## 2018-11-06 NOTE — PROGRESS NOTES
HPI     DLS: 7/17/18    Pt here for 4 month check;    Meds:    Brimonidine TID OS   Latanoprost QHS OS  Dorzolamide TID OS     1) POAG   2) PCIOL OS   3) Prosthesis OD   4) ERM OS   5) Hx Rhegmatogenous RD OS   6) Psuedo Mac Hole OS     Last edited by Mariya Dixon on 11/6/2018  8:11 AM. (History)            Assessment /Plan     For exam results, see Encounter Report.    Primary open-angle glaucoma, left eye, moderate stage    Posterior vitreous detachment of left eye    Epiretinal membrane, left eye    Rhegmatogenous retinal detachment of left eye    Pseudophakia of left eye    Prosthetic eye globe      ***

## 2018-11-20 ENCOUNTER — PATIENT OUTREACH (OUTPATIENT)
Dept: OTHER | Facility: OTHER | Age: 83
End: 2018-11-20

## 2018-11-21 ENCOUNTER — PATIENT MESSAGE (OUTPATIENT)
Dept: PAIN MEDICINE | Facility: CLINIC | Age: 83
End: 2018-11-21

## 2018-11-21 NOTE — TELEPHONE ENCOUNTER
Contacted and spoke with pt regarding new symptoms. Pt was offered and scheduled for next available date and time

## 2018-12-03 ENCOUNTER — TELEPHONE (OUTPATIENT)
Dept: ELECTROPHYSIOLOGY | Facility: CLINIC | Age: 83
End: 2018-12-03

## 2018-12-03 ENCOUNTER — OFFICE VISIT (OUTPATIENT)
Dept: PAIN MEDICINE | Facility: CLINIC | Age: 83
End: 2018-12-03
Attending: ANESTHESIOLOGY
Payer: MEDICARE

## 2018-12-03 VITALS
WEIGHT: 194 LBS | BODY MASS INDEX: 28.73 KG/M2 | TEMPERATURE: 97 F | SYSTOLIC BLOOD PRESSURE: 116 MMHG | DIASTOLIC BLOOD PRESSURE: 70 MMHG | RESPIRATION RATE: 18 BRPM | HEIGHT: 69 IN | HEART RATE: 50 BPM

## 2018-12-03 DIAGNOSIS — G89.4 CHRONIC PAIN SYNDROME: ICD-10-CM

## 2018-12-03 DIAGNOSIS — M43.06 SPONDYLOLYSIS OF LUMBAR REGION: Primary | ICD-10-CM

## 2018-12-03 DIAGNOSIS — M96.1 FAILED BACK SURGICAL SYNDROME: ICD-10-CM

## 2018-12-03 DIAGNOSIS — M47.816 LUMBAR SPONDYLOSIS: Primary | ICD-10-CM

## 2018-12-03 DIAGNOSIS — Z98.890 S/P LUMBAR LAMINECTOMY: ICD-10-CM

## 2018-12-03 DIAGNOSIS — M79.10 MYALGIA: ICD-10-CM

## 2018-12-03 DIAGNOSIS — M54.17 LUMBOSACRAL RADICULOPATHY: ICD-10-CM

## 2018-12-03 DIAGNOSIS — M51.36 DDD (DEGENERATIVE DISC DISEASE), LUMBAR: ICD-10-CM

## 2018-12-03 DIAGNOSIS — M54.16 LUMBAR RADICULOPATHY: ICD-10-CM

## 2018-12-03 PROCEDURE — 99499 UNLISTED E&M SERVICE: CPT | Mod: S$GLB,,, | Performed by: NURSE PRACTITIONER

## 2018-12-03 PROCEDURE — 99999 PR PBB SHADOW E&M-EST. PATIENT-LVL III: CPT | Mod: PBBFAC,HCNC,, | Performed by: NURSE PRACTITIONER

## 2018-12-03 PROCEDURE — 1101F PT FALLS ASSESS-DOCD LE1/YR: CPT | Mod: CPTII,HCNC,S$GLB, | Performed by: NURSE PRACTITIONER

## 2018-12-03 PROCEDURE — 99213 OFFICE O/P EST LOW 20 MIN: CPT | Mod: HCNC,S$GLB,, | Performed by: NURSE PRACTITIONER

## 2018-12-03 NOTE — TELEPHONE ENCOUNTER
----- Message from Alcon Ly MD sent at 12/3/2018  4:32 PM CST -----  Yes good to go.    MB  ----- Message -----  From: Lilia Davenport RN  Sent: 12/3/2018   4:25 PM  To: Alcon Ly MD    Can patient be cleared to hold his Eliquis 2.5mg (see below)?  1. Typical atrial flutter   2. SSS (sick sinus syndrome): s/p PPM   3. PVC (premature ventricular contraction)   4. S/P CABG (coronary artery bypass graft)   5. Biventricular cardiac pacemaker in situ   6. Paroxysmal atrial fibrillation   7. Cardiomyopathy, ischemic: Prior MI, CABG, EF 40-45%            ----- Message -----  From: Kenna Khalil MA  Sent: 12/3/2018   3:18 PM  To: Lilia Davenport RN        ----- Message -----  From: Rosie Jackman  Sent: 12/3/2018   3:12 PM  To: Malachi Rondon Staff    Requesting clearance of  Eliquis 3 days to schedule Bilateral L2-L3-L4-L5 Medial Branch block with Dr. Hodges, please advise.

## 2018-12-03 NOTE — PROGRESS NOTES
Chronic patient Established Note (Follow up visit)      SUBJECTIVE:    Javier Valles presents to the clinic for a follow-up appointment for lower back and bilateral leg pain.  Since he last OV with us, he had SCS implant by Dr. Toribio on 11/9/17.  He reports that this is helping significant relief of his original pain, which was shooting and burning.  Today, his pain is across the lower back.  He is not having radiation.  He also has pain around the IPG site.  He does not notice any temporal involvement because of it.  Activity worsens the pain.  Sitting and laying down helps.  He continues to take Eliquis.  Since the last visit, Javier Valles states the pain has been improving.  Current pain intensity is 4/10.    Pain Medications:  Robaxin 750 mg PRN muscle pain.    Tried in past: gabapentin 100 mg TID, Percocet (helpful), Norco    - Anti-Coagulants: Eliquis (pacemaker)    Opioid Contract: no     report:  Not applicable    Pain Procedures:  Multiple JOMAR's with Dr. Lopez 4 yrs ago  4/28/17 Right L4-5 TF JOMAR- 100% relief   5/29/17 Left L5-S1 TF JOMAR  7/21/17 Caudal JOMAR- significant benefit  9/22/17 Lumbar SCS trial- 90% relief    Spinal surgeries:  MIS laminectomy L3-4 and L4-5.     Physical Therapy/Home Exercise: no    Imaging:   3/24/16 Lumbar CT    Narrative   Comparison: 8/11/14    Technique: 2.5 mm axial images obtained of the lumbar spine without contrast with sagittal and coronal reformats.    Findings: There is no evidence of acute fracture or bone destruction.  There is intervertebral disc space narrowing at multiple levels which is most significant at L2-3 with prominent marginal osteophyte formation.  There is vacuum disc formation at L1-2 and L5-S1.  There is mild, 4 mm of anterolisthesis of L4 on L5 with partial uncovering of the disc.    T10-11: There is prominent facet arthropathy on the right which causes mass effect on the right posterior lateral aspect of the thecal sac.  This level is only  partially imaged.    T11-12, T12-L1: There are degenerative Schmorl's nodes.  There is no significant central canal stenosis or neural foraminal narrowing.    L1-L2: There is a circumferential disc bulge with ligamentum flavum thickening and facet arthropathy resulting in moderate central canal stenosis.  There is moderate bilateral neural foraminal narrowing left greater than right.    L2-3: There is a broad-based circumferential disc bulge with ligamentum flavum thickening and facet arthropathy which in combination results in moderate central canal stenosis.  There is severe bilateral neural foraminal narrowing.    L3-4: There is a diffuse disc bulge with facet arthropathy and ligamentum flavum thickening resulting in severe central canal stenosis and moderate bilateral neural foraminal narrowing.    L4-5: There is a circumferential posterior disc osteophyte complex with bilateral facet arthropathy and ligamentum flavum thickening resulting in moderate central canal stenosis.  There is mild anterolisthesis which is likely secondary to the prominent facet degenerative change.  The posterior disc osteophyte complex extends into the left neural foramen causing severe left neural foraminal narrowing.  There is moderate right neural foraminal narrowing.    L5-S1: There is a mild diffuse disc bulge with facet arthropathy resulting in no significant central canal stenosis.  There is a posterior disc osteophyte which causes severe right neural foraminal narrowing and moderate left neural foraminal narrowing.    Limited review of the retroperitoneal structures demonstrates advanced atherosclerosis of the aorta and branch vessels.   Impression       Advanced multilevel degenerative changes of the lumbar spine.  Specific details at each level are discussed above       CMP  Sodium   Date Value Ref Range Status   10/16/2018 142 136 - 145 mmol/L Final     Potassium   Date Value Ref Range Status   10/16/2018 4.1 3.5 - 5.1 mmol/L  Final     Chloride   Date Value Ref Range Status   10/16/2018 111 (H) 95 - 110 mmol/L Final     CO2   Date Value Ref Range Status   10/16/2018 24 23 - 29 mmol/L Final     Glucose   Date Value Ref Range Status   10/16/2018 86 70 - 110 mg/dL Final     BUN, Bld   Date Value Ref Range Status   10/16/2018 27 (H) 8 - 23 mg/dL Final     Creatinine   Date Value Ref Range Status   10/16/2018 1.7 (H) 0.5 - 1.4 mg/dL Final     Calcium   Date Value Ref Range Status   10/16/2018 10.1 8.7 - 10.5 mg/dL Final     Total Protein   Date Value Ref Range Status   10/16/2018 7.2 6.0 - 8.4 g/dL Final     Albumin   Date Value Ref Range Status   10/16/2018 4.1 3.5 - 5.2 g/dL Final     Total Bilirubin   Date Value Ref Range Status   10/16/2018 1.2 (H) 0.1 - 1.0 mg/dL Final     Comment:     For infants and newborns, interpretation of results should be based  on gestational age, weight and in agreement with clinical  observations.  Premature Infant recommended reference ranges:  Up to 24 hours.............<8.0 mg/dL  Up to 48 hours............<12.0 mg/dL  3-5 days..................<15.0 mg/dL  6-29 days.................<15.0 mg/dL       Alkaline Phosphatase   Date Value Ref Range Status   10/16/2018 71 55 - 135 U/L Final     AST   Date Value Ref Range Status   10/16/2018 33 10 - 40 U/L Final     ALT   Date Value Ref Range Status   10/16/2018 20 10 - 44 U/L Final     Anion Gap   Date Value Ref Range Status   10/16/2018 7 (L) 8 - 16 mmol/L Final     eGFR if    Date Value Ref Range Status   10/16/2018 40.7 (A) >60 mL/min/1.73 m^2 Final     eGFR if non    Date Value Ref Range Status   10/16/2018 35.2 (A) >60 mL/min/1.73 m^2 Final     Comment:     Calculation used to obtain the estimated glomerular filtration  rate (eGFR) is the CKD-EPI equation.            REVIEW OF SYSTEMS:    GENERAL:  No weight loss, malaise or fevers.  HEENT:  Negative for frequent or significant headaches.  NECK:  Negative for lumps, goiter,  pain and significant neck swelling.  RESPIRATORY:  Negative for cough, wheezing or shortness of breath.  CARDIOVASCULAR:  Negative for chest pain, leg swelling or palpitations. Pacemaker placement.  A-fib.  GI:  Negative for abdominal discomfort, blood in stools or black stools or change in bowel habits.  MUSCULOSKELETAL:  See HPI.  SKIN:  Negative for lesions, rash, and itching.  PSYCH:  Negative for sleep disturbance, mood disorder and recent psychosocial stressors.  HEMATOLOGY/LYMPHOLOGY:  Negative for prolonged bleeding, bruising easily or swollen nodes.  NEURO:   No history of headaches, syncope, paralysis, seizures or tremors.  NEPH: CKD.  All other reviewed and negative other than HPI.    Past Medical History:  Past Medical History:   Diagnosis Date    *Atrial flutter 10/3/13    Anticoagulant long-term use     Aspirin only at this time    Arthritis     Atrial fibrillation     Atrial flutter     Blood transfusion     ?    BPH (benign prostatic hypertrophy)     Carotid artery disease     2008: [place] There is 40 - 49% right Internal Carotid stenosis.  There is 20 - 39% left Internal Carotid stenosis.       Chronic kidney disease     Coronary artery disease     DDD (degenerative disc disease) 6/25/2015    Degenerative disc disease     Elevated PSA     Glaucoma     Hyperlipidemia     Hypertension     Lumbar stenosis     lumbar spinal stenosis    NSTEMI (non-ST elevated myocardial infarction) 11/3/2013    Pacemaker 11/2013    Peripheral neuropathy     - S/P right ILIAC stent 1993;      Retinal detachment     Squamous cell carcinoma     left leg    Syncope and collapse: orthostatic with hypotension 10/9/2015       Past Surgical History:  Past Surgical History:   Procedure Laterality Date    ABLATION N/A 11/1/2016    [facility] C[facility]    ABLATION N/A 11/1/2013    [facility] by [facility] at [facility]    CARDIAC CATHETERIZATION      [facility]     CARDIAC ELECTROPHYSIOLOGY MAPPING AND ABLATION  11/2016    CARDIAC PACEMAKER PLACEMENT  11/2016    CARDIOVERSION N/A 11/18/2013    [facility]    CATARACT EXTRACTION W/  INTRAOCULAR LENS IMPLANT Left 1997        CATHETERIZATION, HEART, LEFT Left 11/1/2013    Per[facility]ermo [facility] at [facility] CATH [facility]    CORONARY ARTERY BYPASS GRAFT  1991    ECHOCARDIOGRAM-TRANSESOPHAGEAL N/A 11/18/2013    Performed by Lola Surgeon at [facility] LOLA    ENUCLEATION Right 1949    JOMAR-TRANSFORAMINAL Left 8/7/2015    Performed by James Hodges MD at [facility] [place] PAIN MGT    INJECTION-STEROID-EPIDURAL-CAUDAL N/A 7/21/2017    Performed by James Hodges MD at [facility] [place] PAIN MGT    INJECTION-STEROID-EPIDURAL-LUMBAR N/A 8/28/2015    Performed by James Hodges MD at [facility] [place] PAIN [facility]GT    INJECTION-STEROID-EPIDURAL-TRANSFORAMINAL Left 5/29/2017    Performed by Albert Graff MD at [facility] [place] PAIN MGT    INJECTION-STEROID-EPIDURAL-TRANSFORAMINAL Right 4/28/2017    Performed by James Hodges MD at [facility] [place] PAIN MGT    INJECTION-STEROID-EPIDURAL-TRANSFORAMINAL Left 7/10/2015    Performed by James Hodges MD at [facility] [place] PAIN MGT    ZBVICTLTC-[id number] N/A 11/1/2016    Per[facility] at [facility] CATH LAB    LAMINECTOMY-LUMBAR N/A 4/25/2016    Performed by Florentin Stanford MD at [facility] OR 2ND FLR    LAMINOTOMY  11/9/2017    Performed by Andrzej Toribio MD at [facility] OR    LUMBAR LAMINECTOMY  04/25/2016    PLACEMENT OF SPINAL CORD STIMULATOR T10-T11 laminotomyh for placement of spinal cord stimulator at T9-10, left below the belt line pulse generator N/A 11/9/2017    Performed by [facility]j [facility]io MD at [facility] OR    RELEASE-CARPAL TUNNEL Bilateral 3/29/2017    Performed by Nam Chong Jr., MD at [facility] [place] OR    RETINAL DETACHMENT SURGERY Left 1997    Dr. Cosme    SKIN BIOPSY      SPINAL CORD STIMULATOR TRIAL W/ LAMINOTOMY  10/2017    TONSILLECTOMY       "TRANSESOPHAGEAL ECHOCARDIOGRAM (MONICA) N/A 2013    [facility]ATH LAB    TRIAL-STIMULATOR-DORSAL COLUMN N/A 2017    [facility]       Family History:  Family History   Problem Relation Age of Onset    Stroke Mother 69    Clotting disorder Mother         per patient no clotting disorder    Hypertension Mother     Diverticulitis Son     Cancer Neg Hx     Diabetes Neg Hx     Heart disease Neg Hx     Glaucoma Neg Hx     Amblyopia Neg Hx     Blindness Neg Hx     Cataracts Neg Hx     Macular degeneration Neg Hx     Retinal detachment Neg Hx     Strabismus Neg Hx        Social History:  Social History     Socioeconomic History    Marital status:      Spouse name: Joanie    Number of children: None    Years of education: None    Highest education level: None   Social Needs    Financial resource strain: None    Food insecurity - worry: None    Food insecurity - inability: None    Transportation needs - medical: None    Transportation needs - non-medical: None   Occupational History    Occupation: retired   Tobacco Use    Smoking status: Former Smoker     Last attempt to quit: 1963     Years since quittin.3    Smokeless tobacco: Never Used   Substance and Sexual Activity    Alcohol use: Yes     Alcohol/week: 1.8 oz     Types: 1 Glasses of wine, 1 Cans of beer, 1 Shots of liquor per week     Comment: 2 a day    Drug use: No    Sexual activity: Yes     Partners: Female   Other Topics Concern    None   Social History Narrative    None       OBJECTIVE:    /70   Pulse (!) 50   Temp 97.1 °F (36.2 °C) (Oral)   Resp 18   Ht 5' 9" (1.753 m)   Wt 88 kg (194 lb)   BMI 28.65 kg/m²     PHYSICAL EXAMINATION:    General appearance: Well appearing, in no acute distress, alert and oriented x3.  Psych: Mood and affect appropriate.  Skin: Skin color, texture, turgor normal, no rashes or lesions, in both " upper and lower body.  SCS sites well healed.  Head/face: Normocephalic, atraumatic. No palpable lymph nodes.  Cor: RRR  Pulm: CTA  Back: Straight leg raising in the supine position is negative to radicular pain bilaterally.  There is pain to palpation over the lumbar facet joints on the left.  Limited ROM with pain on extension.  Positive facet loading bilaterally, L>R.  Extremities: Peripheral joint ROM is full and pain free without obvious instability or laxity in all four extremities. No deformities, edema, or skin discoloration. Good capillary refill.  Musculoskeletal: There is pain with palpation to IPG.  5/5 strength in right ankle with plantar and dorsiflexion. 5/5 strength in left ankle with plantar and dorsiflexion. 5/5 strength with right knee flexion and 4/5 on extension. 5/5 strength with left knee flexion and extension. 5/5 strength in right EHL, 5/5 strength in left EHL.  Left hip flexion is 4/5, right is 5/5.  No atrophy or tone abnormalities are noted.  Neuro: Bilateral lower extremity coordination and muscle stretch reflexes are physiologic and symmetric. Plantar response are downgoing. No loss of sensation is noted.  Gait: Antalgic.      ASSESSMENT: 88 y.o. year old male with lower back and leg pain, consistent with lumbar radiculopathy.     1. Spondylolysis of lumbar region  Ambulatory Referral to Physical/Occupational Therapy   2. Chronic pain syndrome  Ambulatory Referral to Physical/Occupational Therapy   3. Failed back surgical syndrome  Ambulatory Referral to Physical/Occupational Therapy   4. DDD (degenerative disc disease), lumbar  Ambulatory Referral to Physical/Occupational Therapy   5. Lumbosacral radiculopathy  Ambulatory Referral to Physical/Occupational Therapy   6. S/P lumbar laminectomy  Ambulatory Referral to Physical/Occupational Therapy   7. Lumbar radiculopathy  Ambulatory Referral to Physical/Occupational Therapy   8. Myalgia  Ambulatory Referral to Physical/Occupational  Therapy         PLAN:     - I have stressed the importance of physical activity and a home exercise plan to help with pain and improve health.    - His pain today is consistent with lumbar facet arthropathy.  Will schedule for bilateral L2,3,4,5 MBB.  The patient will call with relief and if diagnostic, we can schedule radiofrequency ablation of affected nerves, one side followed by the other 2 weeks apart.    - His SCS is providing complete relief of radicular symptoms.    - Start PT for weakness and pain.  He would like to go to [facility] so I provided him with an external referral.    - RTC after completion of procedures.    - Counseled patient regarding the importance of activity modification, constant sleeping habits and physical therapy.        The above plan and management options were discussed at length with patient. Patient is in agreement with the above and verbalized understanding.    Jeniffer Mueller  12/03/2018

## 2018-12-03 NOTE — H&P (VIEW-ONLY)
Chronic patient Established Note (Follow up visit)      SUBJECTIVE:    Javier Valles presents to the clinic for a follow-up appointment for lower back and bilateral leg pain.  Since he last OV with us, he had SCS implant by Dr. Toribio on 11/9/17.  He reports that this is helping significant relief of his original pain, which was shooting and burning.  Today, his pain is across the lower back.  He is not having radiation.  He also has pain around the IPG site.  He does not notice any temporal involvement because of it.  Activity worsens the pain.  Sitting and laying down helps.  He continues to take Eliquis.  Since the last visit, Javier Valles states the pain has been improving.  Current pain intensity is 4/10.    Pain Medications:  Robaxin 750 mg PRN muscle pain.    Tried in past: gabapentin 100 mg TID, Percocet (helpful), Norco    - Anti-Coagulants: Eliquis (pacemaker)    Opioid Contract: no     report:  Not applicable    Pain Procedures:  Multiple JOMAR's with Dr. Lopez 4 yrs ago  4/28/17 Right L4-5 TF JOMAR- 100% relief   5/29/17 Left L5-S1 TF JOMAR  7/21/17 Caudal JOMAR- significant benefit  9/22/17 Lumbar SCS trial- 90% relief    Spinal surgeries:  MIS laminectomy L3-4 and L4-5.     Physical Therapy/Home Exercise: no    Imaging:   3/24/16 Lumbar CT    Narrative   Comparison: 8/11/14    Technique: 2.5 mm axial images obtained of the lumbar spine without contrast with sagittal and coronal reformats.    Findings: There is no evidence of acute fracture or bone destruction.  There is intervertebral disc space narrowing at multiple levels which is most significant at L2-3 with prominent marginal osteophyte formation.  There is vacuum disc formation at L1-2 and L5-S1.  There is mild, 4 mm of anterolisthesis of L4 on L5 with partial uncovering of the disc.    T10-11: There is prominent facet arthropathy on the right which causes mass effect on the right posterior lateral aspect of the thecal sac.  This level is only  partially imaged.    T11-12, T12-L1: There are degenerative Schmorl's nodes.  There is no significant central canal stenosis or neural foraminal narrowing.    L1-L2: There is a circumferential disc bulge with ligamentum flavum thickening and facet arthropathy resulting in moderate central canal stenosis.  There is moderate bilateral neural foraminal narrowing left greater than right.    L2-3: There is a broad-based circumferential disc bulge with ligamentum flavum thickening and facet arthropathy which in combination results in moderate central canal stenosis.  There is severe bilateral neural foraminal narrowing.    L3-4: There is a diffuse disc bulge with facet arthropathy and ligamentum flavum thickening resulting in severe central canal stenosis and moderate bilateral neural foraminal narrowing.    L4-5: There is a circumferential posterior disc osteophyte complex with bilateral facet arthropathy and ligamentum flavum thickening resulting in moderate central canal stenosis.  There is mild anterolisthesis which is likely secondary to the prominent facet degenerative change.  The posterior disc osteophyte complex extends into the left neural foramen causing severe left neural foraminal narrowing.  There is moderate right neural foraminal narrowing.    L5-S1: There is a mild diffuse disc bulge with facet arthropathy resulting in no significant central canal stenosis.  There is a posterior disc osteophyte which causes severe right neural foraminal narrowing and moderate left neural foraminal narrowing.    Limited review of the retroperitoneal structures demonstrates advanced atherosclerosis of the aorta and branch vessels.   Impression       Advanced multilevel degenerative changes of the lumbar spine.  Specific details at each level are discussed above       CMP  Sodium   Date Value Ref Range Status   10/16/2018 142 136 - 145 mmol/L Final     Potassium   Date Value Ref Range Status   10/16/2018 4.1 3.5 - 5.1 mmol/L  Final     Chloride   Date Value Ref Range Status   10/16/2018 111 (H) 95 - 110 mmol/L Final     CO2   Date Value Ref Range Status   10/16/2018 24 23 - 29 mmol/L Final     Glucose   Date Value Ref Range Status   10/16/2018 86 70 - 110 mg/dL Final     BUN, Bld   Date Value Ref Range Status   10/16/2018 27 (H) 8 - 23 mg/dL Final     Creatinine   Date Value Ref Range Status   10/16/2018 1.7 (H) 0.5 - 1.4 mg/dL Final     Calcium   Date Value Ref Range Status   10/16/2018 10.1 8.7 - 10.5 mg/dL Final     Total Protein   Date Value Ref Range Status   10/16/2018 7.2 6.0 - 8.4 g/dL Final     Albumin   Date Value Ref Range Status   10/16/2018 4.1 3.5 - 5.2 g/dL Final     Total Bilirubin   Date Value Ref Range Status   10/16/2018 1.2 (H) 0.1 - 1.0 mg/dL Final     Comment:     For infants and newborns, interpretation of results should be based  on gestational age, weight and in agreement with clinical  observations.  Premature Infant recommended reference ranges:  Up to 24 hours.............<8.0 mg/dL  Up to 48 hours............<12.0 mg/dL  3-5 days..................<15.0 mg/dL  6-29 days.................<15.0 mg/dL       Alkaline Phosphatase   Date Value Ref Range Status   10/16/2018 71 55 - 135 U/L Final     AST   Date Value Ref Range Status   10/16/2018 33 10 - 40 U/L Final     ALT   Date Value Ref Range Status   10/16/2018 20 10 - 44 U/L Final     Anion Gap   Date Value Ref Range Status   10/16/2018 7 (L) 8 - 16 mmol/L Final     eGFR if    Date Value Ref Range Status   10/16/2018 40.7 (A) >60 mL/min/1.73 m^2 Final     eGFR if non    Date Value Ref Range Status   10/16/2018 35.2 (A) >60 mL/min/1.73 m^2 Final     Comment:     Calculation used to obtain the estimated glomerular filtration  rate (eGFR) is the CKD-EPI equation.            REVIEW OF SYSTEMS:    GENERAL:  No weight loss, malaise or fevers.  HEENT:  Negative for frequent or significant headaches.  NECK:  Negative for lumps, goiter,  pain and significant neck swelling.  RESPIRATORY:  Negative for cough, wheezing or shortness of breath.  CARDIOVASCULAR:  Negative for chest pain, leg swelling or palpitations. Pacemaker placement.  A-fib.  GI:  Negative for abdominal discomfort, blood in stools or black stools or change in bowel habits.  MUSCULOSKELETAL:  See HPI.  SKIN:  Negative for lesions, rash, and itching.  PSYCH:  Negative for sleep disturbance, mood disorder and recent psychosocial stressors.  HEMATOLOGY/LYMPHOLOGY:  Negative for prolonged bleeding, bruising easily or swollen nodes.  NEURO:   No history of headaches, syncope, paralysis, seizures or tremors.  NEPH: CKD.  All other reviewed and negative other than HPI.    Past Medical History:  Past Medical History:   Diagnosis Date    *Atrial flutter 10/3/13    Anticoagulant long-term use     Aspirin only at this time    Arthritis     Atrial fibrillation     Atrial flutter     Blood transfusion     ?    BPH (benign prostatic hypertrophy)     Carotid artery disease     2008: [place] There is 40 - 49% right Internal Carotid stenosis.  There is 20 - 39% left Internal Carotid stenosis.       Chronic kidney disease     Coronary artery disease     DDD (degenerative disc disease) 6/25/2015    Degenerative disc disease     Elevated PSA     Glaucoma     Hyperlipidemia     Hypertension     Lumbar stenosis     lumbar spinal stenosis    NSTEMI (non-ST elevated myocardial infarction) 11/3/2013    Pacemaker 11/2013    Peripheral neuropathy     - S/P right ILIAC stent 1993;      Retinal detachment     Squamous cell carcinoma     left leg    Syncope and collapse: orthostatic with hypotension 10/9/2015       Past Surgical History:  Past Surgical History:   Procedure Laterality Date    ABLATION N/A 11/1/2016    [facility] CATH LAB    ABLATION N/A 11/1/2013    [facility] at [facility] CATH LAB    CARDIAC CATHETERIZATION      [facility]     CARDIAC ELECTROPHYSIOLOGY MAPPING AND ABLATION  11/2016    CARDIAC PACEMAKER PLACEMENT  11/2016    CARDIOVERSION N/A 11/18/2013    Performed by [facility] Nathan [facility] at [facility] CATH LAB    CATARACT EXTRACTION W/  INTRAOCULAR LENS IMPLANT Left 1997        CATHETERIZATION, HEART, LEFT Left 11/1/2013    Performed by Jovi Guillermo MD at [facility] CATH LAB    CORONARY ARTERY BYPASS GRAFT  1991    ECHOCARDIOGRAM-TRANSESOPHAGEAL N/A 11/18/2013    Performed by Lola Surgeon at [facility] LOLA    ENUCLEATION Right 1949    JOMAR-TRANSFORAMINAL Left 8/7/2015    [facility] at [facility] PAIN MGT    INJECTION-STEROID-EPIDURAL-CAUDAL N/A 7/21/2017    [facility]formed by [facility] at [facility] PAIN MGT    INJECTION-STEROID-EPIDURAL-LUMBAR N/A 8/28/2015    [facility]formed by [facility] at [facility] PAIN MGT    INJECTION-STEROID-EPIDURAL-TRANSFORAMINAL Left 5/29/2017    Performed by Albert Graff MD at [facility] PAIN MGT    INJECTION-STEROID-EPIDURAL-TRANSFORAMINAL Right 4/28/2017    Per[facility] at [facility] PAIN MGT    INJECTION-STEROID-EPIDURAL-TRANSFORAMINAL Left 7/10/2015    [facility]formed by [facility] at [facility] PAIN MGT    HOVMROJEZ-XEINMZSTW-[id number] N/A 11/1/2016    Performed by [facility]con Ly [facility] at [facility] CATH LAB    LAMINECTOMY-LUMBAR N/A 4/25/2016    Performed by Florentin Stanford MD at [facility] OR 2ND FLR    LAMINOTOMY  11/9/2017    [facility] at [facility] OR    LUMBAR LAMINECTOMY  04/25/2016    PLACEMENT OF SPINAL CORD STIMULATOR T10-T11 laminotomyh for placement of spinal cord stimulator at T9-10, left below the belt line pulse generator N/A 11/9/2017    [facility] at [facility] OR    RELEASE-CARPAL TUNNEL Bilateral 3/29/2017    Performed by Nam Chong Jr., MD at [facility] OR    RETINAL DETACHMENT SURGERY Left 1997    Dr. Cosme    SKIN BIOPSY      SPINAL CORD STIMULATOR TRIAL W/ LAMINOTOMY  10/2017    TONSILLECTOMY       "TRANSESOPHAGEAL ECHOCARDIOGRAM (MONICA) N/A 2013    [facility] CATH [facility]    TRIAL-STIMULATOR-DORSAL COLUMN N/A 2017    [facility]       Family History:  Family History   Problem Relation Age of Onset    Stroke Mother 69    Clotting disorder Mother         per patient no clotting disorder    Hypertension Mother     Diverticulitis Son     Cancer Neg Hx     Diabetes Neg Hx     Heart disease Neg Hx     Glaucoma Neg Hx     Amblyopia Neg Hx     Blindness Neg Hx     Cataracts Neg Hx     Macular degeneration Neg Hx     Retinal detachment Neg Hx     Strabismus Neg Hx        Social History:  Social History     Socioeconomic History    Marital status:      Spouse name: Joanie    Number of children: None    Years of education: None    Highest education level: None   Social Needs    Financial resource strain: None    Food insecurity - worry: None    Food insecurity - inability: None    Transportation needs - medical: None    Transportation needs - non-medical: None   Occupational History    Occupation: retired   Tobacco Use    Smoking status: Former Smoker     Last attempt to quit: 1963     Years since quittin.3    Smokeless tobacco: Never Used   Substance and Sexual Activity    Alcohol use: Yes     Alcohol/week: 1.8 oz     Types: 1 Glasses of wine, 1 Cans of beer, 1 Shots of liquor per week     Comment: 2 a day    Drug use: No    Sexual activity: Yes     Partners: Female   Other Topics Concern    None   Social History Narrative    None       OBJECTIVE:    /70   Pulse (!) 50   Temp 97.1 °F (36.2 °C) (Oral)   Resp 18   Ht 5' 9" (1.753 m)   Wt 88 kg (194 lb)   BMI 28.65 kg/m²     PHYSICAL EXAMINATION:    General appearance: Well appearing, in no acute distress, alert and oriented x3.  Psych: Mood and affect appropriate.  Skin: Skin color, texture, turgor normal, no rashes or lesions, in both " upper and lower body.  SCS sites well healed.  Head/face: Normocephalic, atraumatic. No palpable lymph nodes.  Cor: RRR  Pulm: CTA  Back: Straight leg raising in the supine position is negative to radicular pain bilaterally.  There is pain to palpation over the lumbar facet joints on the left.  Limited ROM with pain on extension.  Positive facet loading bilaterally, L>R.  Extremities: Peripheral joint ROM is full and pain free without obvious instability or laxity in all four extremities. No deformities, edema, or skin discoloration. Good capillary refill.  Musculoskeletal: There is pain with palpation to IPG.  5/5 strength in right ankle with plantar and dorsiflexion. 5/5 strength in left ankle with plantar and dorsiflexion. 5/5 strength with right knee flexion and 4/5 on extension. 5/5 strength with left knee flexion and extension. 5/5 strength in right EHL, 5/5 strength in left EHL.  Left hip flexion is 4/5, right is 5/5.  No atrophy or tone abnormalities are noted.  Neuro: Bilateral lower extremity coordination and muscle stretch reflexes are physiologic and symmetric. Plantar response are downgoing. No loss of sensation is noted.  Gait: Antalgic.      ASSESSMENT: 88 y.o. year old male with lower back and leg pain, consistent with lumbar radiculopathy.     1. Spondylolysis of lumbar region  Ambulatory Referral to Physical/Occupational Therapy   2. Chronic pain syndrome  Ambulatory Referral to Physical/Occupational Therapy   3. Failed back surgical syndrome  Ambulatory Referral to Physical/Occupational Therapy   4. DDD (degenerative disc disease), lumbar  Ambulatory Referral to Physical/Occupational Therapy   5. Lumbosacral radiculopathy  Ambulatory Referral to Physical/Occupational Therapy   6. S/P lumbar laminectomy  Ambulatory Referral to Physical/Occupational Therapy   7. Lumbar radiculopathy  Ambulatory Referral to Physical/Occupational Therapy   8. Myalgia  Ambulatory Referral to Physical/Occupational  Therapy         PLAN:     - I have stressed the importance of physical activity and a home exercise plan to help with pain and improve health.    - His pain today is consistent with lumbar facet arthropathy.  Will schedule for bilateral L2,3,4,5 MBB.  The patient will call with relief and if diagnostic, we can schedule radiofrequency ablation of affected nerves, one side followed by the other 2 weeks apart.    - His SCS is providing complete relief of radicular symptoms.    - Start PT for weakness and pain.  He would like to go to [facility] so I provided him with an external referral.    - RTC after completion of procedures.    - Counseled patient regarding the importance of activity modification, constant sleeping habits and physical therapy.        The above plan and management options were discussed at length with patient. Patient is in agreement with the above and verbalized understanding.    Jeniffer Mueller  12/03/2018

## 2018-12-04 ENCOUNTER — TELEPHONE (OUTPATIENT)
Dept: ELECTROPHYSIOLOGY | Facility: CLINIC | Age: 83
End: 2018-12-04

## 2018-12-04 ENCOUNTER — TELEPHONE (OUTPATIENT)
Dept: PAIN MEDICINE | Facility: CLINIC | Age: 83
End: 2018-12-04

## 2018-12-04 NOTE — TELEPHONE ENCOUNTER
----- Message from Kenna Khalil MA sent at 12/4/2018  1:48 PM CST -----  Contact: Patient      ----- Message -----  From: Karon Dean  Sent: 12/4/2018   1:34 PM  To: Malachi Rondon Staff    The Pt is calling to get a clearance for a procedure in pain management- Dr. James Hodges and fax to [phone]. Thanks, Karon

## 2018-12-04 NOTE — TELEPHONE ENCOUNTER
----- Message from Rosie Jackman sent at 12/4/2018  9:56 AM CST -----  Thanks for informing me.   ----- Message -----  From: Brie Rivera RN  Sent: 12/3/2018   4:45 PM  To: Rosie Jackman        ----- Message -----  From: Alcon Ly MD  Sent: 12/3/2018   4:32 PM  To: Lilia Davenport RN    Yes good to go.    MB  ----- Message -----  From: Lilia Davenport RN  Sent: 12/3/2018   4:25 PM  To: Alcon Ly MD    Can patient be cleared to hold his Eliquis 2.5mg (see below)?  1. Typical atrial flutter   2. SSS (sick sinus syndrome): s/p PPM   3. PVC (premature ventricular contraction)   4. S/P CABG (coronary artery bypass graft)   5. Biventricular cardiac pacemaker in situ   6. Paroxysmal atrial fibrillation   7. Cardiomyopathy, ischemic: Prior MI, CABG, EF 40-45%            ----- Message -----  From: Kenna Khalil MA  Sent: 12/3/2018   3:18 PM  To: Lilia Davenport RN        ----- Message -----  From: Rosie Jackman  Sent: 12/3/2018   3:12 PM  To: Malachi Rondon Staff    Requesting clearance of  Eliquis 3 days to schedule Bilateral L2-L3-L4-L5 Medial Branch block with Dr. Hodges, please advise.

## 2018-12-04 NOTE — TELEPHONE ENCOUNTER
Spoke with patient and advised of Dr Ly's recommendations to hold Eliquis for 3 days prior to procedure. Message also sent to MD office.

## 2018-12-04 NOTE — TELEPHONE ENCOUNTER
----- Message from Lilia Davenport RN sent at 12/4/2018  2:28 PM CST -----  Contact: Patient  Dr Ly has cleared patient to hold Eliquis for 3 days prior to procedure and resume at MD discretion.  thanks  ----- Message -----  From: Kenna Khalil MA  Sent: 12/4/2018   1:48 PM  To: Lilia Davenport RN        ----- Message -----  From: Karon Dean  Sent: 12/4/2018   1:34 PM  To: Malachi Rondon Staff    The Pt is calling to get a clearance for a procedure in pain management- Dr. James Hodges and fax to [phone]. Thanks, Karon

## 2018-12-06 ENCOUNTER — TELEPHONE (OUTPATIENT)
Dept: PAIN MEDICINE | Facility: CLINIC | Age: 83
End: 2018-12-06

## 2018-12-06 NOTE — TELEPHONE ENCOUNTER
----- Message from Karen Nettles sent at 12/6/2018  9:46 AM CST -----  Contact: nichol  Name of Who is Calling: nichol      What is the request in detail: Patient is requesting a call back concerning his treatment he states he has been approved for his blood thinners     Can the clinic reply by [facility]: yes      What Number to Call Back if not in [facility]: [phone]

## 2018-12-13 ENCOUNTER — TELEPHONE (OUTPATIENT)
Dept: PAIN MEDICINE | Facility: CLINIC | Age: 83
End: 2018-12-13

## 2018-12-13 NOTE — TELEPHONE ENCOUNTER
----- Message from Slime Webb sent at 12/12/2018 12:48 PM CST -----  Patient want to know if you receive a clearance from his doctor for him to be off his Eliquis prior to his procedure on 12-18-1. He request a called back to inform him if it is okay for him to be off for the time frame requested.

## 2018-12-13 NOTE — TELEPHONE ENCOUNTER
Contacted and left message on pt's voicemail informing him Dr. Ly has approved for him to hold coumadin for 3 days.

## 2018-12-14 NOTE — DISCHARGE INSTRUCTIONS
Home Care Instructions Pain Management:    1.  DIET:    You may resume your normal diet today.    2.  BATHING:    You may shower with luke warm water.    3.  DRESSING:    You may remove your bandage today.    4.  ACTIVITY LEVEL:      You may resume your normal activities 24 hours after your procedure.    5.  MEDICATIONS:    You may resume your normal medications today.    6.  SPECIAL INSTRUCTIONS:    No heat to the injection site for 24 hours including bath or shower, heating pad, moist heat or hot tubs.    Use an ice pack to the injection site for any pain or discomfort.  Apply ice packs for 20 minute intervals as needed.    If you have received any sedatives by mouth today, you can not drive for 12 hours.    If you have received sedation through an IV, you can not drive for 24 hours.    PLEASE CALL YOUR DOCTOR FOR THE FOLLOWIN.  Redness or swelling around the injection site.  2.  Fever of 101 degrees.  3.  Drainage (pus) from the injection site.  4.  For any continuous bleeding (some dried blood over the incision is normal.)    FOR EMERGENCIES:    If any unusual problems or difficulties occur during clinic hours, call [phone] or dial 101.    Follow up with with your physician in 2-3 weeks.

## 2018-12-18 ENCOUNTER — HOSPITAL ENCOUNTER (OUTPATIENT)
Facility: HOSPITAL | Age: 83
Discharge: HOME | End: 2018-12-18
Attending: ANESTHESIOLOGY | Admitting: ANESTHESIOLOGY
Payer: MEDICARE

## 2018-12-18 VITALS
TEMPERATURE: 98 F | SYSTOLIC BLOOD PRESSURE: 145 MMHG | HEART RATE: 50 BPM | WEIGHT: 190 LBS | DIASTOLIC BLOOD PRESSURE: 78 MMHG | OXYGEN SATURATION: 99 % | HEIGHT: 68 IN | RESPIRATION RATE: 16 BRPM | BODY MASS INDEX: 28.79 KG/M2

## 2018-12-18 DIAGNOSIS — G89.29 CHRONIC PAIN: ICD-10-CM

## 2018-12-18 DIAGNOSIS — M47.9 OSTEOARTHRITIS OF SPINE, UNSPECIFIED SPINAL OSTEOARTHRITIS COMPLICATION STATUS, UNSPECIFIED SPINAL REGION: Primary | ICD-10-CM

## 2018-12-18 PROCEDURE — 63600175 PHARM REV CODE 636 W HCPCS: Mod: HCNC | Performed by: ANESTHESIOLOGY

## 2018-12-18 PROCEDURE — 25000003 PHARM REV CODE 250: Mod: HCNC | Performed by: ANESTHESIOLOGY

## 2018-12-18 PROCEDURE — 64494 INJ PARAVERT F JNT L/S 2 LEV: CPT | Mod: 50,HCNC,, | Performed by: ANESTHESIOLOGY

## 2018-12-18 PROCEDURE — 64494 INJ PARAVERT F JNT L/S 2 LEV: CPT | Mod: 50,HCNC | Performed by: ANESTHESIOLOGY

## 2018-12-18 PROCEDURE — 64495 INJ PARAVERT F JNT L/S 3 LEV: CPT | Mod: 50,HCNC | Performed by: ANESTHESIOLOGY

## 2018-12-18 PROCEDURE — S0020 INJECTION, BUPIVICAINE HYDRO: HCPCS | Mod: HCNC | Performed by: ANESTHESIOLOGY

## 2018-12-18 PROCEDURE — 64493 INJ PARAVERT F JNT L/S 1 LEV: CPT | Mod: 50,HCNC,, | Performed by: ANESTHESIOLOGY

## 2018-12-18 PROCEDURE — 64493 INJ PARAVERT F JNT L/S 1 LEV: CPT | Mod: 50,HCNC | Performed by: ANESTHESIOLOGY

## 2018-12-18 PROCEDURE — 64495 INJ PARAVERT F JNT L/S 3 LEV: CPT | Mod: 50,HCNC,, | Performed by: ANESTHESIOLOGY

## 2018-12-18 RX ORDER — MIDAZOLAM HYDROCHLORIDE 1 MG/ML
INJECTION, SOLUTION INTRAMUSCULAR; INTRAVENOUS
Status: DISCONTINUED | OUTPATIENT
Start: 2018-12-18 | End: 2018-12-18 | Stop reason: HOSPADM

## 2018-12-18 RX ORDER — BUPIVACAINE HYDROCHLORIDE 5 MG/ML
INJECTION, SOLUTION EPIDURAL; INTRACAUDAL
Status: DISCONTINUED | OUTPATIENT
Start: 2018-12-18 | End: 2018-12-18 | Stop reason: HOSPADM

## 2018-12-18 RX ORDER — SODIUM CHLORIDE 9 MG/ML
500 INJECTION, SOLUTION INTRAVENOUS CONTINUOUS
Status: DISCONTINUED | OUTPATIENT
Start: 2018-12-18 | End: 2018-12-18 | Stop reason: HOSPADM

## 2018-12-18 RX ORDER — TRIAMCINOLONE ACETONIDE 40 MG/ML
INJECTION, SUSPENSION INTRA-ARTICULAR; INTRAMUSCULAR
Status: DISCONTINUED | OUTPATIENT
Start: 2018-12-18 | End: 2018-12-18 | Stop reason: HOSPADM

## 2018-12-18 NOTE — INTERVAL H&P NOTE
The patient has been examined and the H&P has been reviewed:    I concur with the findings and no changes have occurred since H&P was written.     Patient stopped his apixaban 5 days ago.     Anesthesia/Surgery risks, benefits and alternative options discussed and understood by patient/family.          Active Hospital Problems    Diagnosis  POA    Chronic pain [G89.29]  Yes      Resolved Hospital Problems   No resolved problems to display.

## 2018-12-18 NOTE — DISCHARGE SUMMARY
Discharge Note  Short Stay      SUMMARY     Admit Date: 12/18/2018    Attending Physician: James Hodges      Discharge Physician: James Hodges      Discharge Date: 12/18/2018 4:10 PM    Procedure(s) (LRB):  BLOCK, NERVE, FACET JOINT, LUMBAR, MEDIAL BRANCH (Bilateral)    Final Diagnosis: Lumbar spondylosis [M47.816]    Disposition: Home or self care    Patient Instructions:   Discharge Medication List as of 12/14/2018 10:41 AM      START taking these medications    Details   amiodarone (PACERONE) 200 MG Tab Take 1 tablet (200 mg total) by mouth once daily., Starting Tue 10/16/2018, Normal      amLODIPine (NORVASC) 5 MG tablet Take 1 tablet (5 mg total) by mouth once daily., Starting Tue 5/22/2018, Until Wed 5/22/2019, Normal      apixaban 2.5 mg Tab Take 1 tablet (2.5 mg total) by mouth 2 (two) times daily., Starting Thu 7/5/2018, Normal      ascorbic acid (VITAMIN C) 1000 MG tablet Take 1,000 mg by mouth every morning. , Until Discontinued, Historical Med      brimonidine 0.2% (ALPHAGAN) 0.2 % Drop Place 1 drop into the left eye 3 (three) times daily., Starting Mon 2/5/2018, Normal      brinzolamide (AZOPT) 1 % ophthalmic suspension Place 1 drop into both eyes 3 (three) times daily., Starting Fri 10/26/2018, Historical Med      buPROPion (WELLBUTRIN XL) 150 MG TB24 tablet TAKE ONE TABLET BY MOUTH ONCE DAILY, Normal      co-enzyme Q-10 30 mg capsule Take 30 mg by mouth once daily. , Until Discontinued, Historical Med      donepezil (ARICEPT) 5 MG tablet Take 1 tablet (5 mg total) by mouth every evening., Starting Fri 6/15/2018, Until Sat 6/15/2019, Normal      dorzolamide (TRUSOPT) 2 % ophthalmic solution Place 1 drop into both eyes 3 (three) times daily. Please fill today - this evening if possible - pt is leaving town early tomorrow, Starting Tue 7/17/2018, Normal      ipratropium (ATROVENT) 0.03 % nasal spray 1-2 sprays by Nasal route 2 (two) times daily., Starting Fri 7/13/2018, Normal      latanoprost  0.005 % ophthalmic solution Place 1 drop into the left eye every evening., Starting Mon 2/5/2018, Normal      losartan (COZAAR) 50 MG tablet Take 1 tablet (50 mg total) by mouth 2 (two) times daily., Starting Fri 1/5/2018, Normal      lutein 20 mg Cap Take 20 mg by mouth once daily. , Until Discontinued, Historical Med      MULTIVITAMINS,THER W-MINERALS (VITAMINS & MINERALS ORAL) Take 1 tablet by mouth every morning. , Until Discontinued, Historical Med      polycarbophil (FIBERCON) 625 mg tablet Take 625 mg by mouth 2 (two) times daily., Historical Med      rosuvastatin (CRESTOR) 20 MG tablet Take 1 tablet (20 mg total) by mouth every evening., Starting Thu 2/8/2018, Normal                 Discharge Diagnosis: Lumbar spondylosis [M47.816]  Condition on Discharge: Stable with no complications to procedure   Diet on Discharge: Same as before.  Activity: as per instruction sheet.  Discharge to: Home with a responsible adult.  Follow up: 2-4 weeks

## 2018-12-18 NOTE — PROGRESS NOTES
Discharge instructions reviewed with patient. Questions answered. Verbalized understanding, no further questions at this time. IV d/c'd with tip intact. Procedure site is DSI. VSS. No acute distress noted. Staff at bedside to help pt change. Wheeled to DC area by staff. Home with family in private vehicle.

## 2018-12-18 NOTE — PLAN OF CARE
Patient lying in bed with no complaints of pain or distress noted.  Monitors applied.  VSS.  Fall risk review with patient.  Procedure and recovery process explained to patient and all questions answered.  Patient verbalized understanding.  Will continue to monitor.

## 2018-12-18 NOTE — OP NOTE
"Patient Name: Javier Valles  MRN: [id number]    INFORMED CONSENT: The procedure, risks, benefits and options were discussed with patient. There are no contraindications to the procedure. The patient expressed understanding and agreed to proceed. The personnel performing the procedure was discussed. I verify that I personally obtained Javier's consent prior to the start of the procedure and the signed consent can be found on the patient's chart.    Procedure Date: 12/18/2018    Anesthesia: Topical    Pre Procedure diagnosis: Lumbar spondylosis [M47.816]  1. Osteoarthritis of spine, unspecified spinal osteoarthritis complication status, unspecified spinal region    2. Chronic pain      Post-Procedure diagnosis: SAME      Moderate Sedation: None    PROCEDURE: bilateral L2-3-4-5 LUMBAR FACET MEDIAL BRANCH NERVE BLOCK      DESCRIPTION OF PROCEDURE:The patient was brought to the procedure room. After performing time out. IV access was obtained prior to the procedure. The patient was positioned prone on the fluoroscopy table. Continuous hemodynamic monitoring was initiated including blood pressure, EKG, and pulse oximetry. The area of the lumbar spine was prepped chlorhexidine and draped into a sterile field. Fluoroscopy was used to identify the location of the bilateral side L2, L3, L4, and L5 medial branch nerves at the junctions of the superior articular process and the transverse processes of L3, L4, L5, and the sacral ala respectively. Skin anesthesia was achieved using 5 cc of Lidocaine 1% over the injection sites. A 22 gauge, 3 1/2" spinal needle was slowly inserted at each level using AP, lateral and oblique fluoroscopic imaging. Negative aspiration for blood or CSF was confirmed.8 ml bupivacaine 0.25%was injected at all sites. The needles were removed and bleeding was nil. A sterile dressing was applied. No specimens collected. Javier was taken back to the recovery room for further observation.     Blood Loss: " Nill  Specimen: None    James Hodges MD

## 2018-12-19 ENCOUNTER — TELEPHONE (OUTPATIENT)
Dept: PAIN MEDICINE | Facility: CLINIC | Age: 83
End: 2018-12-19

## 2018-12-19 NOTE — TELEPHONE ENCOUNTER
Marleni, this is Jason I've received your request I'm returning your call from the pain management clinic at [facility]. I just wanted to let you know that I'm working on getting an answer for you by the time you call back with more information.

## 2018-12-19 NOTE — TELEPHONE ENCOUNTER
----- Message from KEITH Mcgraw sent at 12/19/2018  3:47 PM CST -----  Contact: pt  Per Dr. Hodges, all of his patients need f/u after MBBs.  Please make him an appointment for f/u.  ----- Message -----  From: Rizwana Sage LPN  Sent: 12/19/2018   3:40 PM  To: KEITH Mcgraw    Spoke with patient, stated that he complete pain relief for a few hours after his block, wishes to proceed with RFA ASAP  ----- Message -----  From: Sigifredo Goetz  Sent: 12/19/2018   2:16 PM  To: [facility] [employer] Staff              Name of Who is Calling: pt      What is the request in detail: is returning a call. Pt states he is having phone trouble to please try calling the number listed below.       Can the clinic reply by [facility]: no      What Number to Call Back if not in [place] [facility]: wife cell [phone] or [phone]

## 2018-12-19 NOTE — TELEPHONE ENCOUNTER
----- Message from Savanah Caballero sent at 12/19/2018 10:40 AM CST -----  Contact: pt   Name of Who is Calling: MARGRET MARTIN [[id number]]    What is the request in detail: Patient Is requesting to speak with Dr. Hodges in regards to progress notes from procedure.....Please contact to further discuss and advise      Can the clinic reply by [facility]: No     What Number to Call Back if not in [facility]: [phone]

## 2018-12-20 ENCOUNTER — PATIENT MESSAGE (OUTPATIENT)
Dept: DERMATOLOGY | Facility: CLINIC | Age: 83
End: 2018-12-20

## 2018-12-20 ENCOUNTER — OFFICE VISIT (OUTPATIENT)
Dept: PAIN MEDICINE | Facility: CLINIC | Age: 83
End: 2018-12-20
Payer: MEDICARE

## 2018-12-20 ENCOUNTER — PATIENT MESSAGE (OUTPATIENT)
Dept: ELECTROPHYSIOLOGY | Facility: CLINIC | Age: 83
End: 2018-12-20

## 2018-12-20 ENCOUNTER — PATIENT OUTREACH (OUTPATIENT)
Dept: OTHER | Facility: OTHER | Age: 83
End: 2018-12-20

## 2018-12-20 VITALS
TEMPERATURE: 98 F | HEIGHT: 68 IN | HEART RATE: 49 BPM | DIASTOLIC BLOOD PRESSURE: 59 MMHG | WEIGHT: 198.19 LBS | BODY MASS INDEX: 30.04 KG/M2 | SYSTOLIC BLOOD PRESSURE: 109 MMHG

## 2018-12-20 DIAGNOSIS — M47.816 LUMBAR SPONDYLOSIS: Primary | ICD-10-CM

## 2018-12-20 DIAGNOSIS — M43.06 SPONDYLOLYSIS OF LUMBAR REGION: ICD-10-CM

## 2018-12-20 DIAGNOSIS — M54.17 LUMBOSACRAL RADICULOPATHY: ICD-10-CM

## 2018-12-20 DIAGNOSIS — M47.9 OSTEOARTHRITIS OF SPINE, UNSPECIFIED SPINAL OSTEOARTHRITIS COMPLICATION STATUS, UNSPECIFIED SPINAL REGION: ICD-10-CM

## 2018-12-20 DIAGNOSIS — G89.4 CHRONIC PAIN SYNDROME: ICD-10-CM

## 2018-12-20 PROCEDURE — 99213 OFFICE O/P EST LOW 20 MIN: CPT | Mod: HCNC,S$GLB,, | Performed by: NURSE PRACTITIONER

## 2018-12-20 PROCEDURE — 1101F PT FALLS ASSESS-DOCD LE1/YR: CPT | Mod: CPTII,HCNC,S$GLB, | Performed by: NURSE PRACTITIONER

## 2018-12-20 PROCEDURE — 99499 UNLISTED E&M SERVICE: CPT | Mod: S$GLB,,, | Performed by: NURSE PRACTITIONER

## 2018-12-20 PROCEDURE — 99999 PR PBB SHADOW E&M-EST. PATIENT-LVL III: CPT | Mod: PBBFAC,HCNC,, | Performed by: NURSE PRACTITIONER

## 2018-12-20 NOTE — H&P (VIEW-ONLY)
Chronic patient Established Note (Follow up visit)      SUBJECTIVE:    Javier Valles presents to the clinic for a follow-up appointment for lower back and bilateral leg pain.  He had SCS implant by Dr. Toribio on 11/9/17.  He is now s/p bilateral L2,3,4,5 MBB on 12/18/18 with 90% pain relief.  He would like to move forward with RFA for the left side.  He continues to take Eliquis.  Since the last visit, Javier Valles states the pain has been improving.  Current pain intensity is 4/10.    Pain Medications:  Robaxin 750 mg PRN muscle pain.    Tried in past: gabapentin 100 mg TID, Percocet (helpful), Norco    - Anti-Coagulants: Eliquis (pacemaker)    Opioid Contract: no     report:  Not applicable    Pain Procedures:  Multiple JOMAR's with Dr. Lopez 4 yrs ago  4/28/17 Right L4-5 TF JOMAR- 100% relief   5/29/17 Left L5-S1 TF JOMAR  7/21/17 Caudal JOMAR- significant benefit  9/22/17 Lumbar SCS trial- 90% relief  12/18/18 Bilateral L2,3,4,5 MBB- 90% relief    Spinal surgeries:  MIS laminectomy L3-4 and L4-5.     Physical Therapy/Home Exercise: no    Imaging:   3/24/16 Lumbar CT    Narrative   Comparison: 8/11/14    Technique: 2.5 mm axial images obtained of the lumbar spine without contrast with sagittal and coronal reformats.    Findings: There is no evidence of acute fracture or bone destruction.  There is intervertebral disc space narrowing at multiple levels which is most significant at L2-3 with prominent marginal osteophyte formation.  There is vacuum disc formation at L1-2 and L5-S1.  There is mild, 4 mm of anterolisthesis of L4 on L5 with partial uncovering of the disc.    T10-11: There is prominent facet arthropathy on the right which causes mass effect on the right posterior lateral aspect of the thecal sac.  This level is only partially imaged.    T11-12, T12-L1: There are degenerative Schmorl's nodes.  There is no significant central canal stenosis or neural foraminal narrowing.    L1-L2: There is a circumferential  disc bulge with ligamentum flavum thickening and facet arthropathy resulting in moderate central canal stenosis.  There is moderate bilateral neural foraminal narrowing left greater than right.    L2-3: There is a broad-based circumferential disc bulge with ligamentum flavum thickening and facet arthropathy which in combination results in moderate central canal stenosis.  There is severe bilateral neural foraminal narrowing.    L3-4: There is a diffuse disc bulge with facet arthropathy and ligamentum flavum thickening resulting in severe central canal stenosis and moderate bilateral neural foraminal narrowing.    L4-5: There is a circumferential posterior disc osteophyte complex with bilateral facet arthropathy and ligamentum flavum thickening resulting in moderate central canal stenosis.  There is mild anterolisthesis which is likely secondary to the prominent facet degenerative change.  The posterior disc osteophyte complex extends into the left neural foramen causing severe left neural foraminal narrowing.  There is moderate right neural foraminal narrowing.    L5-S1: There is a mild diffuse disc bulge with facet arthropathy resulting in no significant central canal stenosis.  There is a posterior disc osteophyte which causes severe right neural foraminal narrowing and moderate left neural foraminal narrowing.    Limited review of the retroperitoneal structures demonstrates advanced atherosclerosis of the aorta and branch vessels.   Impression       Advanced multilevel degenerative changes of the lumbar spine.  Specific details at each level are discussed above       CMP  Sodium   Date Value Ref Range Status   10/16/2018 142 136 - 145 mmol/L Final     Potassium   Date Value Ref Range Status   10/16/2018 4.1 3.5 - 5.1 mmol/L Final     Chloride   Date Value Ref Range Status   10/16/2018 111 (H) 95 - 110 mmol/L Final     CO2   Date Value Ref Range Status   10/16/2018 24 23 - 29 mmol/L Final     Glucose   Date Value  Ref Range Status   10/16/2018 86 70 - 110 mg/dL Final     BUN, Bld   Date Value Ref Range Status   10/16/2018 27 (H) 8 - 23 mg/dL Final     Creatinine   Date Value Ref Range Status   10/16/2018 1.7 (H) 0.5 - 1.4 mg/dL Final     Calcium   Date Value Ref Range Status   10/16/2018 10.1 8.7 - 10.5 mg/dL Final     Total Protein   Date Value Ref Range Status   10/16/2018 7.2 6.0 - 8.4 g/dL Final     Albumin   Date Value Ref Range Status   10/16/2018 4.1 3.5 - 5.2 g/dL Final     Total Bilirubin   Date Value Ref Range Status   10/16/2018 1.2 (H) 0.1 - 1.0 mg/dL Final     Comment:     For infants and newborns, interpretation of results should be based  on gestational age, weight and in agreement with clinical  observations.  Premature Infant recommended reference ranges:  Up to 24 hours.............<8.0 mg/dL  Up to 48 hours............<12.0 mg/dL  3-5 days..................<15.0 mg/dL  6-29 days.................<15.0 mg/dL       Alkaline Phosphatase   Date Value Ref Range Status   10/16/2018 71 55 - 135 U/L Final     AST   Date Value Ref Range Status   10/16/2018 33 10 - 40 U/L Final     ALT   Date Value Ref Range Status   10/16/2018 20 10 - 44 U/L Final     Anion Gap   Date Value Ref Range Status   10/16/2018 7 (L) 8 - 16 mmol/L Final     eGFR if    Date Value Ref Range Status   10/16/2018 40.7 (A) >60 mL/min/1.73 m^2 Final     eGFR if non    Date Value Ref Range Status   10/16/2018 35.2 (A) >60 mL/min/1.73 m^2 Final     Comment:     Calculation used to obtain the estimated glomerular filtration  rate (eGFR) is the CKD-EPI equation.            REVIEW OF SYSTEMS:    GENERAL:  No weight loss, malaise or fevers.  HEENT:  Negative for frequent or significant headaches.  NECK:  Negative for lumps, goiter, pain and significant neck swelling.  RESPIRATORY:  Negative for cough, wheezing or shortness of breath.  CARDIOVASCULAR:  Negative for chest pain, leg swelling or palpitations. Pacemaker  placement.  A-fib.  GI:  Negative for abdominal discomfort, blood in stools or black stools or change in bowel habits.  MUSCULOSKELETAL:  See HPI.  SKIN:  Negative for lesions, rash, and itching.  PSYCH:  Negative for sleep disturbance, mood disorder and recent psychosocial stressors.  HEMATOLOGY/LYMPHOLOGY:  Negative for prolonged bleeding, bruising easily or swollen nodes.  NEURO:   No history of headaches, syncope, paralysis, seizures or tremors.  NEPH: CKD.  All other reviewed and negative other than HPI.    Past Medical History:  Past Medical History:   Diagnosis Date    *Atrial flutter 10/3/13    Anticoagulant long-term use     Aspirin only at this time    Arthritis     Atrial fibrillation     Atrial flutter     Blood transfusion     ?    BPH (benign prostatic hypertrophy)     Carotid artery disease     2008: [place] There is 40 - 49% right Internal Carotid stenosis.  There is 20 - 39% left Internal Carotid stenosis.       Chronic kidney disease     Coronary artery disease     DDD (degenerative disc disease) 6/25/2015    Degenerative disc disease     Elevated PSA     Glaucoma     Hyperlipidemia     Hypertension     Lumbar stenosis     lumbar spinal stenosis    NSTEMI (non-ST elevated myocardial infarction) 11/3/2013    Pacemaker 11/2013    Peripheral neuropathy     - S/P right ILIAC stent 1993;      Retinal detachment     Squamous cell carcinoma     left leg    Syncope and collapse: orthostatic with hypotension 10/9/2015       Past Surgical History:  Past Surgical History:   Procedure Laterality Date    ABLATION N/A 11/1/2016    [facility] CATH LAB    ABLATION N/A 11/1/2013    Performed by [facility] at [facility] CATH LAB    BLOCK, NERVE, FACET JOINT, LUMBAR, MEDIAL BRANCH Bilateral 12/18/2018    [facility]    CARDIAC CATHETERIZATION      [facility]    CARDIAC ELECTROPHYSIOLOGY MAPPING AND ABLATION  11/2016     CARDIAC PACEMAKER PLACEMENT  11/2016    CARDIOVERSION N/A 11/18/2013    Per[facility] by [facility] at [facility] CATH LAB    CATARACT EXTRACTION W/  INTRAOCULAR LENS IMPLANT Left 1997        CATHETERIZATION, HEART, LEFT Left 11/1/2013    Performed by Jovi Guillermo [facility] at [facility] CATH LAB    CORONARY ARTERY BYPASS GRAFT  1991    ECHOCARDIOGRAM-TRANSESOPHAGEAL N/A 11/18/2013    Performed by Lola Surgeon at [facility] LOLA    ENUCLEATION Right 1949    JOMAR-TRANSFORAMINAL Left 8/7/2015    Performed by [facility] at [facility] PAIN MGT    INJECTION OF ANESTHETIC AGENT AROUND MEDIAL BRANCH NERVES INNERVATING LUMBAR FACET JOINT Bilateral 12/18/2018    Procedure: BLOCK, NERVE, FACET JOINT, LUMBAR, MEDIAL BRANCH;  Surgeon: James Hodges MD;  Location: [facility];  Service: Pain Management;  Laterality: Bilateral;    INJECTION-STEROID-EPIDURAL-CAUDAL N/A 7/21/2017    Performed by [facility] at [facility] PAIN MGT    INJECTION-STEROID-EPIDURAL-LUMBAR N/A 8/28/2015    Performed by [facility] at [facility] PAIN MGT    INJECTION-STEROID-EPIDURAL-TRANSFORAMINAL Left 5/29/2017    Performed by Albert Graff MD at [facility] PAIN MGT    INJECTION-STEROID-EPIDURAL-TRANSFORAMINAL Right 4/28/2017    Per[facility] by [facility] at [facility] PAIN MGT    INJECTION-STEROID-EPIDURAL-TRANSFORAMINAL Left 7/10/2015    Performed by [facility] at [facility] PAIN MGT    GPNGIDEPF-[id number] N/A 11/1/2016    Performed by [facility] at [facility] CATH LAB    LAMINECTOMY-LUMBAR N/A 4/25/2016    Performed by Florentin Stanford MD at [facility] OR 2ND FLR    LAMINOTOMY  11/9/2017    Performed by [facility]j [facility]io [facility] at [facility] OR    LUMBAR LAMINECTOMY  04/25/2016    PLACEMENT OF SPINAL CORD STIMULATOR T10-T11 laminotomyh for placement of spinal cord stimulator at T9-10, left below the belt line pulse generator N/A 11/9/2017    Performed by [facility]j [facility]io [facility] at [facility] OR    RELEASE-CARPAL TUNNEL  "Bilateral 3/29/2017    Performed by Nam [facility]ong Jr., [facility] at [facility] OR    RETINAL DETACHMENT SURGERY Left     Dr. Cosme    SKIN BIOPSY      SPINAL CORD STIMULATOR TRIAL W/ LAMINOTOMY  10/2017    TONSILLECTOMY      TRANSESOPHAGEAL ECHOCARDIOGRAM (MONICA) N/A 2013    [facility] CATH LAB    TRIAL-STIMULATOR-DORSAL COLUMN N/A 2017    Performed by [facility] at [facility] PAIN MGT       Family History:  Family History   Problem Relation Age of Onset    Stroke Mother 69    Clotting disorder Mother         per patient no clotting disorder    Hypertension Mother     Diverticulitis Son     Cancer Neg Hx     Diabetes Neg Hx     Heart disease Neg Hx     Glaucoma Neg Hx     Amblyopia Neg Hx     Blindness Neg Hx     Cataracts Neg Hx     Macular degeneration Neg Hx     Retinal detachment Neg Hx     Strabismus Neg Hx        Social History:  Social History     Socioeconomic History    Marital status:      Spouse name: Joanie    Number of children: None    Years of education: None    Highest education level: None   Social Needs    Financial resource strain: None    Food insecurity - worry: None    Food insecurity - inability: None    Transportation needs - medical: None    Transportation needs - non-medical: None   Occupational History    Occupation: retired   Tobacco Use    Smoking status: Former Smoker     Last attempt to quit: 1963     Years since quittin.3    Smokeless tobacco: Never Used   Substance and Sexual Activity    Alcohol use: Yes     Alcohol/week: 1.8 oz     Types: 1 Glasses of wine, 1 Cans of beer, 1 Shots of liquor per week     Comment: 2 a day    Drug use: No    Sexual activity: Yes     Partners: Female   Other Topics Concern    None   Social History Narrative    None       OBJECTIVE:    BP (!) 109/59   Pulse (!) 49   Temp 98.3 °F (36.8 °C)   Ht 5' 8" (1.727 m)   Wt 89.9 kg (198 lb 3.1 oz)   BMI 30.14 kg/m² "     PHYSICAL EXAMINATION:    General appearance: Well appearing, in no acute distress, alert and oriented x3.  Psych: Mood and affect appropriate.  Skin: Skin color, texture, turgor normal, no rashes or lesions, in both upper and lower body.  SCS sites well healed.  Head/face: Normocephalic, atraumatic. No palpable lymph nodes.  Cor: RRR  Pulm: CTA  Back: Straight leg raising in the supine position is negative to radicular pain bilaterally.  There is pain to palpation over the lumbar facet joints on the left.  Limited ROM with pain on extension.  Positive facet loading bilaterally, L>R.  Extremities: Peripheral joint ROM is full and pain free without obvious instability or laxity in all four extremities. No deformities, edema, or skin discoloration. Good capillary refill.  Musculoskeletal: There is pain with palpation to IPG.  5/5 strength in right ankle with plantar and dorsiflexion. 5/5 strength in left ankle with plantar and dorsiflexion. 5/5 strength with right knee flexion and 4/5 on extension. 5/5 strength with left knee flexion and extension. 5/5 strength in right EHL, 5/5 strength in left EHL.  Left hip flexion is 4/5, right is 5/5.  No atrophy or tone abnormalities are noted.  Neuro: Bilateral lower extremity coordination and muscle stretch reflexes are physiologic and symmetric. Plantar response are downgoing. No loss of sensation is noted.  Gait: Antalgic.      ASSESSMENT: 88 y.o. year old male with lower back and leg pain, consistent with lumbar radiculopathy.     1. Lumbar spondylosis     2. Chronic pain syndrome     3. Spondylolysis of lumbar region     4. Lumbosacral radiculopathy     5. Osteoarthritis of spine, unspecified spinal osteoarthritis complication status, unspecified spinal region           PLAN:     - I have stressed the importance of physical activity and a home exercise plan to help with pain and improve health.    - He is s/p bilateral L2,3,4,5 MBB with 90% relief.  Will schedule for  left L2,3,4,5 RFA.    - His SCS is providing complete relief of radicular symptoms.    - Continue PT at [facility].    - RTC 4 weeks after RFA.    - Counseled patient regarding the importance of activity modification, constant sleeping habits and physical therapy.        The above plan and management options were discussed at length with patient. Patient is in agreement with the above and verbalized understanding.    Jeniffer Mueller  12/20/2018

## 2018-12-20 NOTE — PROGRESS NOTES
Chronic patient Established Note (Follow up visit)      SUBJECTIVE:    Javier Valles presents to the clinic for a follow-up appointment for lower back and bilateral leg pain.  He had SCS implant by Dr. Toribio on 11/9/17.  He is now s/p bilateral L2,3,4,5 MBB on 12/18/18 with 90% pain relief.  He would like to move forward with RFA for the left side.  He continues to take Eliquis.  Since the last visit, Javier Valles states the pain has been improving.  Current pain intensity is 4/10.    Pain Medications:  Robaxin 750 mg PRN muscle pain.    Tried in past: gabapentin 100 mg TID, Percocet (helpful), Norco    - Anti-Coagulants: Eliquis (pacemaker)    Opioid Contract: no     report:  Not applicable    Pain Procedures:  Multiple JOMAR's with Dr. Lopez 4 yrs ago  4/28/17 Right L4-5 TF JOMAR- 100% relief   5/29/17 Left L5-S1 TF JOMAR  7/21/17 Caudal JOMAR- significant benefit  9/22/17 Lumbar SCS trial- 90% relief  12/18/18 Bilateral L2,3,4,5 MBB- 90% relief    Spinal surgeries:  MIS laminectomy L3-4 and L4-5.     Physical Therapy/Home Exercise: no    Imaging:   3/24/16 Lumbar CT    Narrative   Comparison: 8/11/14    Technique: 2.5 mm axial images obtained of the lumbar spine without contrast with sagittal and coronal reformats.    Findings: There is no evidence of acute fracture or bone destruction.  There is intervertebral disc space narrowing at multiple levels which is most significant at L2-3 with prominent marginal osteophyte formation.  There is vacuum disc formation at L1-2 and L5-S1.  There is mild, 4 mm of anterolisthesis of L4 on L5 with partial uncovering of the disc.    T10-11: There is prominent facet arthropathy on the right which causes mass effect on the right posterior lateral aspect of the thecal sac.  This level is only partially imaged.    T11-12, T12-L1: There are degenerative Schmorl's nodes.  There is no significant central canal stenosis or neural foraminal narrowing.    L1-L2: There is a circumferential  disc bulge with ligamentum flavum thickening and facet arthropathy resulting in moderate central canal stenosis.  There is moderate bilateral neural foraminal narrowing left greater than right.    L2-3: There is a broad-based circumferential disc bulge with ligamentum flavum thickening and facet arthropathy which in combination results in moderate central canal stenosis.  There is severe bilateral neural foraminal narrowing.    L3-4: There is a diffuse disc bulge with facet arthropathy and ligamentum flavum thickening resulting in severe central canal stenosis and moderate bilateral neural foraminal narrowing.    L4-5: There is a circumferential posterior disc osteophyte complex with bilateral facet arthropathy and ligamentum flavum thickening resulting in moderate central canal stenosis.  There is mild anterolisthesis which is likely secondary to the prominent facet degenerative change.  The posterior disc osteophyte complex extends into the left neural foramen causing severe left neural foraminal narrowing.  There is moderate right neural foraminal narrowing.    L5-S1: There is a mild diffuse disc bulge with facet arthropathy resulting in no significant central canal stenosis.  There is a posterior disc osteophyte which causes severe right neural foraminal narrowing and moderate left neural foraminal narrowing.    Limited review of the retroperitoneal structures demonstrates advanced atherosclerosis of the aorta and branch vessels.   Impression       Advanced multilevel degenerative changes of the lumbar spine.  Specific details at each level are discussed above       CMP  Sodium   Date Value Ref Range Status   10/16/2018 142 136 - 145 mmol/L Final     Potassium   Date Value Ref Range Status   10/16/2018 4.1 3.5 - 5.1 mmol/L Final     Chloride   Date Value Ref Range Status   10/16/2018 111 (H) 95 - 110 mmol/L Final     CO2   Date Value Ref Range Status   10/16/2018 24 23 - 29 mmol/L Final     Glucose   Date Value  Ref Range Status   10/16/2018 86 70 - 110 mg/dL Final     BUN, Bld   Date Value Ref Range Status   10/16/2018 27 (H) 8 - 23 mg/dL Final     Creatinine   Date Value Ref Range Status   10/16/2018 1.7 (H) 0.5 - 1.4 mg/dL Final     Calcium   Date Value Ref Range Status   10/16/2018 10.1 8.7 - 10.5 mg/dL Final     Total Protein   Date Value Ref Range Status   10/16/2018 7.2 6.0 - 8.4 g/dL Final     Albumin   Date Value Ref Range Status   10/16/2018 4.1 3.5 - 5.2 g/dL Final     Total Bilirubin   Date Value Ref Range Status   10/16/2018 1.2 (H) 0.1 - 1.0 mg/dL Final     Comment:     For infants and newborns, interpretation of results should be based  on gestational age, weight and in agreement with clinical  observations.  Premature Infant recommended reference ranges:  Up to 24 hours.............<8.0 mg/dL  Up to 48 hours............<12.0 mg/dL  3-5 days..................<15.0 mg/dL  6-29 days.................<15.0 mg/dL       Alkaline Phosphatase   Date Value Ref Range Status   10/16/2018 71 55 - 135 U/L Final     AST   Date Value Ref Range Status   10/16/2018 33 10 - 40 U/L Final     ALT   Date Value Ref Range Status   10/16/2018 20 10 - 44 U/L Final     Anion Gap   Date Value Ref Range Status   10/16/2018 7 (L) 8 - 16 mmol/L Final     eGFR if    Date Value Ref Range Status   10/16/2018 40.7 (A) >60 mL/min/1.73 m^2 Final     eGFR if non    Date Value Ref Range Status   10/16/2018 35.2 (A) >60 mL/min/1.73 m^2 Final     Comment:     Calculation used to obtain the estimated glomerular filtration  rate (eGFR) is the CKD-EPI equation.            REVIEW OF SYSTEMS:    GENERAL:  No weight loss, malaise or fevers.  HEENT:  Negative for frequent or significant headaches.  NECK:  Negative for lumps, goiter, pain and significant neck swelling.  RESPIRATORY:  Negative for cough, wheezing or shortness of breath.  CARDIOVASCULAR:  Negative for chest pain, leg swelling or palpitations. Pacemaker  placement.  A-fib.  GI:  Negative for abdominal discomfort, blood in stools or black stools or change in bowel habits.  MUSCULOSKELETAL:  See HPI.  SKIN:  Negative for lesions, rash, and itching.  PSYCH:  Negative for sleep disturbance, mood disorder and recent psychosocial stressors.  HEMATOLOGY/LYMPHOLOGY:  Negative for prolonged bleeding, bruising easily or swollen nodes.  NEURO:   No history of headaches, syncope, paralysis, seizures or tremors.  NEPH: CKD.  All other reviewed and negative other than HPI.    Past Medical History:  Past Medical History:   Diagnosis Date    *Atrial flutter 10/3/13    Anticoagulant long-term use     Aspirin only at this time    Arthritis     Atrial fibrillation     Atrial flutter     Blood transfusion     ?    BPH (benign prostatic hypertrophy)     Carotid artery disease     2008: [place] There is 40 - 49% right Internal Carotid stenosis.  There is 20 - 39% left Internal Carotid stenosis.       Chronic kidney disease     Coronary artery disease     DDD (degenerative disc disease) 6/25/2015    Degenerative disc disease     Elevated PSA     Glaucoma     Hyperlipidemia     Hypertension     Lumbar stenosis     lumbar spinal stenosis    NSTEMI (non-ST elevated myocardial infarction) 11/3/2013    Pacemaker 11/2013    Peripheral neuropathy     - S/P right ILIAC stent 1993;      Retinal detachment     Squamous cell carcinoma     left leg    Syncope and collapse: orthostatic with hypotension 10/9/2015       Past Surgical History:  Past Surgical History:   Procedure Laterality Date    ABLATION N/A 11/1/2016    [facility]L [facility] CATH LAB    ABLATION N/A 11/1/2013    Per[facility] by Alcon Ly [facility] at [facility] CATH LAB    BLOCK, NERVE, FACET JOINT, LUMBAR, MEDIAL BRANCH Bilateral 12/18/2018    [facility]    CARDIAC CATHETERIZATION      [facility]    CARDIAC ELECTROPHYSIOLOGY MAPPING AND ABLATION  11/2016     CARDIAC PACEMAKER PLACEMENT  11/2016    CARDIOVERSION N/A 11/18/2013    Performed by Jas Nathan [facility] at [facility] CATH LAB    CATARACT EXTRACTION W/  INTRAOCULAR LENS IMPLANT Left 1997        CATHETERIZATION, HEART, LEFT Left 11/1/2013    Performed by Jovi Guillermo MD at [facility] CATH LAB    CORONARY ARTERY BYPASS GRAFT  1991    ECHOCARDIOGRAM-TRANSESOPHAGEAL N/A 11/18/2013    Performed by Lola Surgeon at [facility] LOLA    ENUCLEATION Right 1949    JOMAR-TRANSFORAMINAL Left 8/7/2015    [facility] at [facility] PAIN MGT    INJECTION OF ANESTHETIC AGENT AROUND MEDIAL BRANCH NERVES INNERVATING LUMBAR FACET JOINT Bilateral 12/18/2018    Procedure: BLOCK, NERVE, FACET JOINT, LUMBAR, MEDIAL BRANCH;  Surgeon: James Hodges MD;  Location: [facility];  Service: Pain Management;  Laterality: Bilateral;    INJECTION-STEROID-EPIDURAL-CAUDAL N/A 7/21/2017    Performed by [facility] at [facility] PAIN MGT    INJECTION-STEROID-EPIDURAL-LUMBAR N/A 8/28/2015    Performed by [facility] at [facility] PAIN MGT    INJECTION-STEROID-EPIDURAL-TRANSFORAMINAL Left 5/29/2017    Performed by Albert Graff MD at [facility] PAIN MGT    INJECTION-STEROID-EPIDURAL-TRANSFORAMINAL Right 4/28/2017    Performed by [facility] at [facility] PAIN MGT    INJECTION-STEROID-EPIDURAL-TRANSFORAMINAL Left 7/10/2015    Performed by [facility] at [facility] PAIN MGT    [id number] N/A 11/1/2016    Performed by [facility]con Ly [facility] at [facility] CATH LAB    LAMINECTOMY-LUMBAR N/A 4/25/2016    Performed by Florentin Stanford MD at [facility] OR 2ND FLR    LAMINOTOMY  11/9/2017    Performed by [facility]j [facility] at [facility] OR    LUMBAR LAMINECTOMY  04/25/2016    PLACEMENT OF SPINAL CORD STIMULATOR T10-T11 laminotomyh for placement of spinal cord stimulator at T9-10, left below the belt line pulse generator N/A 11/9/2017    Performed by [facility]j [facility]io [facility] at [facility] OR    RELEASE-CARPAL TUNNEL  "Bilateral 3/29/2017    Per[facility] by Nam [facility]ong Jr., [facility] at [facility] OR    RETINAL DETACHMENT SURGERY Left     Dr. Cosme    SKIN BIOPSY      SPINAL CORD STIMULATOR TRIAL W/ LAMINOTOMY  10/2017    TONSILLECTOMY      TRANSESOPHAGEAL ECHOCARDIOGRAM (MONICA) N/A 2013    [facility] CATH LAB    TRIAL-STIMULATOR-DORSAL COLUMN N/A 2017    Per[facility] by [facility]ges [facility] at [facility] PAIN MGT       Family History:  Family History   Problem Relation Age of Onset    Stroke Mother 69    Clotting disorder Mother         per patient no clotting disorder    Hypertension Mother     Diverticulitis Son     Cancer Neg Hx     Diabetes Neg Hx     Heart disease Neg Hx     Glaucoma Neg Hx     Amblyopia Neg Hx     Blindness Neg Hx     Cataracts Neg Hx     Macular degeneration Neg Hx     Retinal detachment Neg Hx     Strabismus Neg Hx        Social History:  Social History     Socioeconomic History    Marital status:      Spouse name: Joanie    Number of children: None    Years of education: None    Highest education level: None   Social Needs    Financial resource strain: None    Food insecurity - worry: None    Food insecurity - inability: None    Transportation needs - medical: None    Transportation needs - non-medical: None   Occupational History    Occupation: retired   Tobacco Use    Smoking status: Former Smoker     Last attempt to quit: 1963     Years since quittin.3    Smokeless tobacco: Never Used   Substance and Sexual Activity    Alcohol use: Yes     Alcohol/week: 1.8 oz     Types: 1 Glasses of wine, 1 Cans of beer, 1 Shots of liquor per week     Comment: 2 a day    Drug use: No    Sexual activity: Yes     Partners: Female   Other Topics Concern    None   Social History Narrative    None       OBJECTIVE:    BP (!) 109/59   Pulse (!) 49   Temp 98.3 °F (36.8 °C)   Ht 5' 8" (1.727 m)   Wt 89.9 kg (198 lb 3.1 oz)   BMI 30.14 kg/m² "     PHYSICAL EXAMINATION:    General appearance: Well appearing, in no acute distress, alert and oriented x3.  Psych: Mood and affect appropriate.  Skin: Skin color, texture, turgor normal, no rashes or lesions, in both upper and lower body.  SCS sites well healed.  Head/face: Normocephalic, atraumatic. No palpable lymph nodes.  Cor: RRR  Pulm: CTA  Back: Straight leg raising in the supine position is negative to radicular pain bilaterally.  There is pain to palpation over the lumbar facet joints on the left.  Limited ROM with pain on extension.  Positive facet loading bilaterally, L>R.  Extremities: Peripheral joint ROM is full and pain free without obvious instability or laxity in all four extremities. No deformities, edema, or skin discoloration. Good capillary refill.  Musculoskeletal: There is pain with palpation to IPG.  5/5 strength in right ankle with plantar and dorsiflexion. 5/5 strength in left ankle with plantar and dorsiflexion. 5/5 strength with right knee flexion and 4/5 on extension. 5/5 strength with left knee flexion and extension. 5/5 strength in right EHL, 5/5 strength in left EHL.  Left hip flexion is 4/5, right is 5/5.  No atrophy or tone abnormalities are noted.  Neuro: Bilateral lower extremity coordination and muscle stretch reflexes are physiologic and symmetric. Plantar response are downgoing. No loss of sensation is noted.  Gait: Antalgic.      ASSESSMENT: 88 y.o. year old male with lower back and leg pain, consistent with lumbar radiculopathy.     1. Lumbar spondylosis     2. Chronic pain syndrome     3. Spondylolysis of lumbar region     4. Lumbosacral radiculopathy     5. Osteoarthritis of spine, unspecified spinal osteoarthritis complication status, unspecified spinal region           PLAN:     - I have stressed the importance of physical activity and a home exercise plan to help with pain and improve health.    - He is s/p bilateral L2,3,4,5 MBB with 90% relief.  Will schedule for  left L2,3,4,5 RFA.    - His SCS is providing complete relief of radicular symptoms.    - Continue PT at [facility].    - RTC 4 weeks after RFA.    - Counseled patient regarding the importance of activity modification, constant sleeping habits and physical therapy.        The above plan and management options were discussed at length with patient. Patient is in agreement with the above and verbalized understanding.    Jeniffer Mueller  12/20/2018

## 2018-12-20 NOTE — PROGRESS NOTES
HPI:  Called patient to follow up. Patient attributes back pain to elevated readings. Patient endorses adherence to medication regimen daily and denies missed doses. Patient denies hypotensive s/sx (lightheadedness, dizziness, nausea, fatigue); patient denies hypertensive s/sx (SOB, CP, severe headaches, changes in vision, dizziness, fatigue, confusion, anxiety, nosebleeds).     Last 5 Patient Entered Readings                                      Current 30 Day Average: 143/74     Recent Readings 12/17/2018 12/10/2018 12/8/2018 12/8/2018 11/30/2018    SBP (mmHg) 133 138 142 163 129    DBP (mmHg) 75 74 74 82 61    Pulse 56 53 52 50 62     Assessment:  Reviewed recent readings. Per 2017 ACC/ AHA HTN guidelines (goal of BP < 140/90), current 30-day average is slightly uncontrolled, remains stable.     Plan:  Continue current medication regimen. Patients health , Alexa Méndez, will be following up every 3-4 weeks. I will continue to monitor regularly and will follow-up in 6 months, sooner if blood pressure begins to trend upward or downward.     Current medication regimen:  Hypertension Medications             amLODIPine (NORVASC) 5 MG tablet Take 1 tablet (5 mg total) by mouth once daily.    losartan (COZAAR) 50 MG tablet Take 1 tablet (50 mg total) by mouth 2 (two) times daily.        Patient denies having questions or concerns. Patient has my contact information and knows to call with any concerns or clinical changes.

## 2018-12-21 ENCOUNTER — PATIENT MESSAGE (OUTPATIENT)
Dept: PAIN MEDICINE | Facility: CLINIC | Age: 83
End: 2018-12-21

## 2018-12-21 ENCOUNTER — TELEPHONE (OUTPATIENT)
Dept: PAIN MEDICINE | Facility: CLINIC | Age: 83
End: 2018-12-21

## 2018-12-21 NOTE — TELEPHONE ENCOUNTER
----- Message from Katty Victor sent at 12/21/2018  4:11 PM CST -----  Contact: Pt  Name of Who is Calling:MARGRET MARTIN [[id number]]    What is the request in detail: Patient would like a call back confirming procedure date 01/09/2019 for Radiofrequency Ablation Please contact to further discuss and advise    Can the clinic reply by [facility]: No    What Number to Call Back if not in [facility]: [phone]

## 2018-12-21 NOTE — TELEPHONE ENCOUNTER
Staff contacted the patient to confirm his 1/09/2019 Left Lumbar RFA with Dr. Tracie MD.    Patient wanted to inform staff that he is on Eliquis and believes that he need clearance prior to the procedure listed above.     Please review and advise.

## 2018-12-24 ENCOUNTER — PATIENT MESSAGE (OUTPATIENT)
Dept: INTERNAL MEDICINE | Facility: CLINIC | Age: 83
End: 2018-12-24

## 2018-12-24 ENCOUNTER — PATIENT MESSAGE (OUTPATIENT)
Dept: ADMINISTRATIVE | Facility: OTHER | Age: 83
End: 2018-12-24

## 2018-12-26 ENCOUNTER — PATIENT MESSAGE (OUTPATIENT)
Dept: NEUROSURGERY | Facility: CLINIC | Age: 83
End: 2018-12-26

## 2019-01-03 ENCOUNTER — TELEPHONE (OUTPATIENT)
Dept: PAIN MEDICINE | Facility: CLINIC | Age: 84
End: 2019-01-03

## 2019-01-03 NOTE — TELEPHONE ENCOUNTER
Marleni, this is Jason I've received your request I'm returning your call from the pain management clinic at [facility]. I just wanted to let you know that I'm working on getting an answer for you by the you contact our clinic with more information.

## 2019-01-03 NOTE — TELEPHONE ENCOUNTER
----- Message from Ada Ortega sent at 1/2/2019  5:22 PM CST -----  Contact: patient  Has questions regarding his procedure.     Would like a call back at [phone]    Thanks  KB

## 2019-01-04 ENCOUNTER — PATIENT MESSAGE (OUTPATIENT)
Dept: ADMINISTRATIVE | Facility: OTHER | Age: 84
End: 2019-01-04

## 2019-01-05 ENCOUNTER — PATIENT MESSAGE (OUTPATIENT)
Dept: CARDIOLOGY | Facility: CLINIC | Age: 84
End: 2019-01-05

## 2019-01-07 DIAGNOSIS — E78.5 HYPERLIPIDEMIA, UNSPECIFIED HYPERLIPIDEMIA TYPE: ICD-10-CM

## 2019-01-07 DIAGNOSIS — I25.10 CORONARY ARTERY DISEASE INVOLVING NATIVE CORONARY ARTERY WITHOUT ANGINA PECTORIS, UNSPECIFIED WHETHER NATIVE OR TRANSPLANTED HEART: ICD-10-CM

## 2019-01-07 RX ORDER — ROSUVASTATIN CALCIUM 20 MG/1
20 TABLET, COATED ORAL NIGHTLY
Qty: 90 TABLET | Refills: 3 | Status: SHIPPED | OUTPATIENT
Start: 2019-01-07 | End: 2021-12-16 | Stop reason: SDUPTHER

## 2019-01-07 RX ORDER — LOSARTAN POTASSIUM 50 MG/1
50 TABLET ORAL 2 TIMES DAILY
Qty: 180 TABLET | Refills: 3 | Status: SHIPPED | OUTPATIENT
Start: 2019-01-07 | End: 2019-03-25 | Stop reason: SDUPTHER

## 2019-01-07 NOTE — PROGRESS NOTES
Last 5 Patient Entered Readings                                      Current 30 Day Average: 137/77     Recent Readings 1/5/2019 1/4/2019 1/4/2019 12/26/2018 12/17/2018    SBP (mmHg) 110 161 166 138 133    DBP (mmHg) 82 76 81 69 75    Pulse 50 50 53 50 56          Digital Medicine: Health  Follow Up    Lifestyle Modifications:    1.Dietary Modifications (Sodium intake <2,000mg/day, food labels, dining out): Deferred    2.Physical Activity: Deferred    3.Medication Therapy: Patient has been compliant with the medication regimen. Patient is doing well on his current regimen. She denies symptoms/side effects.     4.Patient has the following medication side effects/concerns:   (Frequency/Alleviating factors/Precipitating factors, etc.)     Patient attributes higher readings on 1/4 from added stress when trying to change his cable and Internet service. He denies symptoms and his readings are now back down to normal range.     Follow up with Mr. Javier Valles completed. No further questions or concerns. Will continue to follow up to achieve health goals.

## 2019-01-07 NOTE — PROGRESS NOTES
Last 5 Patient Entered Readings                                      Current 30 Day Average: 137/77     Recent Readings 1/5/2019 1/4/2019 1/4/2019 12/26/2018 12/17/2018    SBP (mmHg) 110 161 166 138 133    DBP (mmHg) 82 76 81 69 75    Pulse 50 50 53 50 56            Digital Medicine: Health  Follow Up    Left voicemail to follow up with Mr. Javier Valles.  Current BP average 137/77 mmHg is at goal, <140/90.

## 2019-01-08 ENCOUNTER — TELEPHONE (OUTPATIENT)
Dept: PAIN MEDICINE | Facility: CLINIC | Age: 84
End: 2019-01-08

## 2019-01-08 NOTE — TELEPHONE ENCOUNTER
----- Message from KEITH Mcgraw sent at 1/8/2019  8:30 AM CST -----  Contact: Self  Left vm  ----- Message -----  From: Rizwana Sage LPN  Sent: 1/3/2019   3:31 PM  To: KEITH Mcgraw        ----- Message -----  From: Sakshi Cox  Sent: 1/3/2019  12:14 PM  To: [facility] Staff              Name of Who is Calling: MARGRET MARTIN [[id number]]      What is the request in detail: Pt states he is returning a call to the clinical staff regarding his procedure. Pt has some questions. Please contact to further discuss and advise.        Can the clinic reply by [facility]: N      What Number to [facility] Back [facility]: [phone] or [phone]

## 2019-01-09 ENCOUNTER — HOSPITAL ENCOUNTER (OUTPATIENT)
Facility: OTHER | Age: 84
Discharge: HOME | End: 2019-01-09
Attending: ANESTHESIOLOGY | Admitting: ANESTHESIOLOGY
Payer: MEDICARE

## 2019-01-09 VITALS
DIASTOLIC BLOOD PRESSURE: 64 MMHG | OXYGEN SATURATION: 98 % | SYSTOLIC BLOOD PRESSURE: 114 MMHG | WEIGHT: 193 LBS | BODY MASS INDEX: 29.25 KG/M2 | HEART RATE: 50 BPM | RESPIRATION RATE: 18 BRPM | HEIGHT: 68 IN | TEMPERATURE: 98 F

## 2019-01-09 DIAGNOSIS — G89.29 CHRONIC PAIN: ICD-10-CM

## 2019-01-09 DIAGNOSIS — M47.9 OSTEOARTHRITIS OF SPINE, UNSPECIFIED SPINAL OSTEOARTHRITIS COMPLICATION STATUS, UNSPECIFIED SPINAL REGION: Primary | ICD-10-CM

## 2019-01-09 PROCEDURE — 64635 DESTROY LUMB/SAC FACET JNT: CPT | Mod: HCNC | Performed by: ANESTHESIOLOGY

## 2019-01-09 PROCEDURE — 99152 MOD SED SAME PHYS/QHP 5/>YRS: CPT | Mod: HCNC,LT,, | Performed by: ANESTHESIOLOGY

## 2019-01-09 PROCEDURE — 64636 PR DESTROY L/S FACET JNT ADDL: ICD-10-PCS | Mod: HCNC,LT,, | Performed by: ANESTHESIOLOGY

## 2019-01-09 PROCEDURE — 25000003 PHARM REV CODE 250: Mod: HCNC | Performed by: ANESTHESIOLOGY

## 2019-01-09 PROCEDURE — 64636 DESTROY L/S FACET JNT ADDL: CPT | Mod: HCNC | Performed by: ANESTHESIOLOGY

## 2019-01-09 PROCEDURE — 63600175 PHARM REV CODE 636 W HCPCS: Mod: HCNC | Performed by: ANESTHESIOLOGY

## 2019-01-09 PROCEDURE — 64636 DESTROY L/S FACET JNT ADDL: CPT | Mod: HCNC,LT,, | Performed by: ANESTHESIOLOGY

## 2019-01-09 PROCEDURE — 99152 PR MOD CONSCIOUS SEDATION, SAME PHYS, 5+ YRS, FIRST 15 MIN: ICD-10-PCS | Mod: HCNC,LT,, | Performed by: ANESTHESIOLOGY

## 2019-01-09 PROCEDURE — 64635 DESTROY LUMB/SAC FACET JNT: CPT | Mod: HCNC,LT,, | Performed by: ANESTHESIOLOGY

## 2019-01-09 PROCEDURE — 64635 PR DESTROY LUMB/SAC FACET JNT: ICD-10-PCS | Mod: HCNC,LT,, | Performed by: ANESTHESIOLOGY

## 2019-01-09 RX ORDER — SODIUM CHLORIDE 9 MG/ML
500 INJECTION, SOLUTION INTRAVENOUS CONTINUOUS
Status: DISCONTINUED | OUTPATIENT
Start: 2019-01-09 | End: 2019-01-09 | Stop reason: HOSPADM

## 2019-01-09 RX ORDER — LIDOCAINE HYDROCHLORIDE 10 MG/ML
INJECTION INFILTRATION; PERINEURAL
Status: DISCONTINUED | OUTPATIENT
Start: 2019-01-09 | End: 2019-01-09 | Stop reason: HOSPADM

## 2019-01-09 RX ORDER — DEXAMETHASONE SODIUM PHOSPHATE 4 MG/ML
INJECTION, SOLUTION INTRA-ARTICULAR; INTRALESIONAL; INTRAMUSCULAR; INTRAVENOUS; SOFT TISSUE
Status: DISCONTINUED | OUTPATIENT
Start: 2019-01-09 | End: 2019-01-09 | Stop reason: HOSPADM

## 2019-01-09 RX ORDER — FENTANYL CITRATE 50 UG/ML
INJECTION, SOLUTION INTRAMUSCULAR; INTRAVENOUS
Status: DISCONTINUED | OUTPATIENT
Start: 2019-01-09 | End: 2019-01-09 | Stop reason: HOSPADM

## 2019-01-09 RX ORDER — MIDAZOLAM HYDROCHLORIDE 1 MG/ML
INJECTION INTRAMUSCULAR; INTRAVENOUS
Status: DISCONTINUED | OUTPATIENT
Start: 2019-01-09 | End: 2019-01-09 | Stop reason: HOSPADM

## 2019-01-09 RX ORDER — BUPIVACAINE HYDROCHLORIDE 2.5 MG/ML
INJECTION, SOLUTION EPIDURAL; INFILTRATION; INTRACAUDAL
Status: DISCONTINUED | OUTPATIENT
Start: 2019-01-09 | End: 2019-01-09 | Stop reason: HOSPADM

## 2019-01-09 NOTE — DISCHARGE INSTRUCTIONS

## 2019-01-09 NOTE — DISCHARGE SUMMARY
Discharge Note  Short Stay      SUMMARY     Admit Date: 1/9/2019    Attending Physician: James Hodges      Discharge Physician: James Hodges      Discharge Date: 1/9/2019 11:24 AM    Procedure(s) (LRB):  Radiofrequency Ablation LEFT LUMBAR L2,3,4,5 RFA (Left)    Final Diagnosis: Lumbar spondylosis [M47.816]    Disposition: Home or self care    Patient Instructions:   Current Discharge Medication List      CONTINUE these medications which have NOT CHANGED    Details   amiodarone (PACERONE) 200 MG Tab Take 1 tablet (200 mg total) by mouth once daily.  Qty: 30 tablet, Refills: 11    Associated Diagnoses: SSS (sick sinus syndrome); S/P CABG (coronary artery bypass graft); Ischemic cardiomyopathy; PVC (premature ventricular contraction)      amLODIPine (NORVASC) 5 MG tablet Take 1 tablet (5 mg total) by mouth once daily.  Qty: 90 tablet, Refills: 3    Comments: Please consider 90 day supplies to promote better adherence      apixaban 2.5 mg Tab Take 1 tablet (2.5 mg total) by mouth 2 (two) times daily.  Qty: 180 tablet, Refills: 3    Associated Diagnoses: Typical atrial flutter      ascorbic acid (VITAMIN C) 1000 MG tablet Take 1,000 mg by mouth every morning.       brimonidine 0.2% (ALPHAGAN) 0.2 % Drop Place 1 drop into the left eye 3 (three) times daily.  Qty: 10 mL, Refills: 12    Associated Diagnoses: Primary open-angle glaucoma, left eye, moderate stage      brinzolamide (AZOPT) 1 % ophthalmic suspension Place 1 drop into both eyes 3 (three) times daily.      buPROPion (WELLBUTRIN XL) 150 MG TB24 tablet TAKE ONE TABLET BY MOUTH ONCE DAILY  Qty: 30 tablet, Refills: 11    Comments: Please consider 90 day supplies to promote better adherence      co-enzyme Q-10 30 mg capsule Take 30 mg by mouth once daily.       donepezil (ARICEPT) 5 MG tablet Take 1 tablet (5 mg total) by mouth every evening.  Qty: 30 tablet, Refills: 11      dorzolamide (TRUSOPT) 2 % ophthalmic solution Place 1 drop into both eyes 3 (three)  times daily. Please fill today - this evening if possible - pt is leaving town early tomorrow  Qty: 30 mL, Refills: 3    Associated Diagnoses: Primary open-angle glaucoma, left eye, moderate stage      ipratropium (ATROVENT) 0.03 % nasal spray 1-2 sprays by Nasal route 2 (two) times daily.  Qty: 30 mL, Refills: 1    Associated Diagnoses: Rhinorrhea      latanoprost 0.005 % ophthalmic solution Place 1 drop into the left eye every evening.  Qty: 1 Bottle, Refills: 12    Associated Diagnoses: Primary open-angle glaucoma, left eye, moderate stage      losartan (COZAAR) 50 MG tablet Take 1 tablet (50 mg total) by mouth 2 (two) times daily.  Qty: 180 tablet, Refills: 3    Associated Diagnoses: Coronary artery disease involving native coronary artery without angina pectoris, unspecified whether native or transplanted heart      lutein 20 mg Cap Take 20 mg by mouth once daily.       MULTIVITAMINS,THER W-MINERALS (VITAMINS & MINERALS ORAL) Take 1 tablet by mouth every morning.       polycarbophil (FIBERCON) 625 mg tablet Take 625 mg by mouth 2 (two) times daily.      rosuvastatin (CRESTOR) 20 MG tablet Take 1 tablet (20 mg total) by mouth every evening.  Qty: 90 tablet, Refills: 3    Comments: Patient wants generic  Associated Diagnoses: Hyperlipidemia, unspecified hyperlipidemia type                 Discharge Diagnosis: Lumbar spondylosis [M47.816]  Condition on Discharge: Stable with no complications to procedure   Diet on Discharge: Same as before.  Activity: as per instruction sheet.  Discharge to: Home with a responsible adult.  Follow up: 2-4 weeks

## 2019-01-09 NOTE — OP NOTE
Patient Name: Javier Valles  MRN: [id number]    INFORMED CONSENT: The procedure, risks, benefits and options were discussed with patient. There are no contraindications to the procedure. The patient expressed understanding and agreed to proceed. The personnel performing the procedure was discussed. I verify that I personally obtained Javier's consent prior to the start of the procedure and the signed consent can be found on the patient's chart.    Procedure Date: 01/09/2019    Anesthesia: Topical    Pre Procedure diagnosis: Lumbar spondylosis [M47.816]  1. Osteoarthritis of spine, unspecified spinal osteoarthritis complication status, unspecified spinal region    2. Chronic pain      Post-Procedure diagnosis: SAME      Moderate Sedation: Yes - Fentanyl 100 mcg and Midazolam 2 mg    PROCEDURE: left L2-3-4-5 FACET MEDIAL BRANCH NERVE RADIOFREQUENCY NEUROTOMY (lumbar)          DESCRIPTION OF PROCEDURE: The patient was brought to the procedure room.  After performing time out IV access was obtained prior to the procedure. The patient was positioned prone on the fluoroscopy table. Continuous hemodynamic monitoring was initiated including blood pressure and pulse oximetry. IV sedation was administered incrementally to allow the patient to remain comfortable and conversant throughout the procedure. The area of the lumbar spine was prepped chlorhexidine three times and draped into a sterile field.  Fluoroscopy was used to identify the location of the LEFT side L2, L3, L4, and L5 medial branch nerves at the junctions of the superior articular process and the transverse processes of L3, L4, L5, and the sacral ala respectively.  Skin anesthesia was achieved using 3 cc of Lidocaine 1% over the injection sites. A 20 gauge, 100mm (10mm active tip) curved RF needle was slowly inserted at each level using AP, lateral and oblique fluoroscopic imaging. Negative aspiration for blood or CSF was confirmed.  Sensory stimulation at 50Hz  below 0.5V was achieved at every level. Motor stimulation at 2Hz up to 1.5V did not cause any radicular symptoms at any level. Each level was anesthetized with 1.5 cc of lidocaine 1%.  Radiofrequency lesioning was performed for 90 seconds at 80 degrees in two different positions at each level.  Total of 4 cc of bupivacaine 0.25% and 10 mg of Decadron was injected was injected at all levels.. The needles were removed and bleeding was nil.  A sterile dressing was applied. Javier was taken back to the recovery room for further observation.     Stimulation Results:  L2 = Sensory positive @ 0.4, Motor negative @ 1.5  L3 = Sensory positive @ 0.7, Motor negative @ 1.5  L4 = Sensory positive @ 0.6, Motor negative @ 1.5  L5 = Sensory positive @ 0.5, Motor negative @ 1.5    Blood Loss: Nill  Specimen: None    James Hodges MD

## 2019-01-14 NOTE — PROGRESS NOTES
Health Call Center    Phone Message    May a detailed message be left on voicemail: yes    Reason for Call: Other: Ayad returning Jennifer's call.  He was offered to reschedule his appointment for next week but declined and wanted to talk with Jennifer     Action Taken: Message routed to:  [facility] ([facility]  Neurology   Last 5 Patient Entered Readings                                      Current 30 Day Average: 130/72     Recent Readings 1/21/2018 1/17/2018 1/12/2018 1/10/2018 1/4/2018    SBP (mmHg) 130 142 147 117 110    DBP (mmHg) 73 71 76 80 59    Pulse 55 58 53 49 54        Hypertension Medications             amlodipine (NORVASC) 5 MG tablet Take 1 tablet (5 mg total) by mouth once daily.    losartan (COZAAR) 50 MG tablet Take 1 tablet (50 mg total) by mouth 2 (two) times daily.    nitroGLYCERIN (NITROSTAT) 0.4 MG SL tablet Place 1 tablet (0.4 mg total) under the tongue every 5 (five) minutes as needed for Chest pain.        Assessment/ Plan:   Called patient to follow up.   Per newly released 2017 ACC/ AHA HTN guidelines (goal of BP < 130/80), current 30-day average is controlled.    Patient denies having questions or concerns. Instructed patient to call if he has any questions or concerns, patient confirms understanding.   Will continue to monitor. WCB in 3 months, sooner if BP begins to trend up or down.

## 2019-01-15 ENCOUNTER — OFFICE VISIT (OUTPATIENT)
Dept: DERMATOLOGY | Facility: CLINIC | Age: 84
End: 2019-01-15
Payer: MEDICARE

## 2019-01-15 VITALS — WEIGHT: 193 LBS | BODY MASS INDEX: 29.35 KG/M2

## 2019-01-15 DIAGNOSIS — R20.9 DISTURBANCE OF SKIN SENSATION: ICD-10-CM

## 2019-01-15 DIAGNOSIS — D48.5 NEOPLASM OF UNCERTAIN BEHAVIOR OF SKIN: Primary | ICD-10-CM

## 2019-01-15 DIAGNOSIS — L82.1 SEBORRHEIC KERATOSES: ICD-10-CM

## 2019-01-15 DIAGNOSIS — L81.4 LENTIGINES: ICD-10-CM

## 2019-01-15 DIAGNOSIS — Z85.828 HISTORY OF SKIN CANCER: ICD-10-CM

## 2019-01-15 PROCEDURE — 88305 TISSUE SPECIMEN TO PATHOLOGY, DERMATOLOGY: ICD-10-PCS | Mod: 26,HCNC,, | Performed by: PATHOLOGY

## 2019-01-15 PROCEDURE — 1101F PT FALLS ASSESS-DOCD LE1/YR: CPT | Mod: CPTII,HCNC,S$GLB, | Performed by: DERMATOLOGY

## 2019-01-15 PROCEDURE — 11301 SHAVE SKIN LESION 0.6-1.0 CM: CPT | Mod: HCNC,S$GLB,, | Performed by: DERMATOLOGY

## 2019-01-15 PROCEDURE — 99213 PR OFFICE/OUTPT VISIT, EST, LEVL III, 20-29 MIN: ICD-10-PCS | Mod: 25,HCNC,S$GLB, | Performed by: DERMATOLOGY

## 2019-01-15 PROCEDURE — 99999 PR PBB SHADOW E&M-EST. PATIENT-LVL III: ICD-10-PCS | Mod: PBBFAC,HCNC,, | Performed by: DERMATOLOGY

## 2019-01-15 PROCEDURE — 99213 OFFICE O/P EST LOW 20 MIN: CPT | Mod: 25,HCNC,S$GLB, | Performed by: DERMATOLOGY

## 2019-01-15 PROCEDURE — 11301 PR SHAV SKIN LES 0.6-1.0 CM TRUNK,ARM,LEG: ICD-10-PCS | Mod: HCNC,S$GLB,, | Performed by: DERMATOLOGY

## 2019-01-15 PROCEDURE — 1101F PR PT FALLS ASSESS DOC 0-1 FALLS W/OUT INJ PAST YR: ICD-10-PCS | Mod: CPTII,HCNC,S$GLB, | Performed by: DERMATOLOGY

## 2019-01-15 PROCEDURE — 88305 TISSUE EXAM BY PATHOLOGIST: CPT | Mod: HCNC | Performed by: PATHOLOGY

## 2019-01-15 PROCEDURE — 99999 PR PBB SHADOW E&M-EST. PATIENT-LVL III: CPT | Mod: PBBFAC,HCNC,, | Performed by: DERMATOLOGY

## 2019-01-15 NOTE — PROGRESS NOTES
Subjective:       Patient ID:  Javier Valles is a 88 y.o. male who presents for   Chief Complaint   Patient presents with    Spot     chest    Skin Check     UBSE     History of Present Illness: The patient presents with chief complaint of spot.  Location: chest  Duration: months  Signs/Symptoms: growing    Prior treatments: cryo          Review of Systems   Constitutional: Negative for fever.   Skin: Negative for itching and rash.   Hematologic/Lymphatic: Does not bruise/bleed easily.        Objective:    Physical Exam   Constitutional: He appears well-developed and well-nourished. No distress.   Neurological: He is alert and oriented to person, place, and time. He is not disoriented.   Psychiatric: He has a normal mood and affect.   Skin:   Areas Examined (abnormalities noted in diagram):   Head / Face Inspection Performed  Neck Inspection Performed  Chest / Axilla Inspection Performed  Abdomen Inspection Performed  Back Inspection Performed  RUE Inspected  LUE Inspection Performed  LLE Inspection Performed              Diagram Legend     Erythematous scaling macule/papule c/w actinic keratosis       Vascular papule c/w angioma      Pigmented verrucoid papule/plaque c/w seborrheic keratosis      Yellow umbilicated papule c/w sebaceous hyperplasia      Irregularly shaped tan macule c/w lentigo     1-2 mm smooth white papules consistent with Milia      Movable subcutaneous cyst with punctum c/w epidermal inclusion cyst      Subcutaneous movable cyst c/w pilar cyst      Firm pink to brown papule c/w dermatofibroma      Pedunculated fleshy papule(s) c/w skin tag(s)      Evenly pigmented macule c/w junctional nevus     Mildly variegated pigmented, slightly irregular-bordered macule c/w mildly atypical nevus      Flesh colored to evenly pigmented papule c/w intradermal nevus       Pink pearly papule/plaque c/w basal cell carcinoma      Erythematous hyperkeratotic cursted plaque c/w SCC      Surgical scar with no  "sign of skin cancer recurrence      Open and closed comedones      Inflammatory papules and pustules      Verrucoid papule consistent consistent with wart     Erythematous eczematous patches and plaques     Dystrophic onycholytic nail with subungual debris c/w onychomycosis     Umbilicated papule    Erythematous-base heme-crusted tan verrucoid plaque consistent with inflamed seborrheic keratosis     Erythematous Silvery Scaling Plaque c/w Psoriasis     See annotation      Assessment / Plan:      Pathology Orders:     Normal Orders This Visit    Tissue Specimen To Pathology, Dermatology     Questions:    Directional Terms:  Other(comment)    Clinical information:  irritated sk    Specific Site:  left chest        Neoplasm of uncertain behavior of skin  -     Tissue Specimen To Pathology, Dermatology  Shave removal procedure note:    Shave removal performed after verbal consent including risk of infection, scar, recurrence, need for additional treatment of site. Area prepped with alcohol, anesthetized 1% lidocaine with epinephrine. Lesional tissue shaved. Lesion defect size 7mm No complications. Dressing applied. Wound care explained.        Seborrheic keratoses  reassurance      Lentigines  The "ABCD" rules to observe pigmented lesions were reviewed.      History of skin cancer  Comments:  scc left leg                 Follow-up in about 6 months (around 7/15/2019).  "

## 2019-01-23 ENCOUNTER — PATIENT OUTREACH (OUTPATIENT)
Dept: OTHER | Facility: OTHER | Age: 84
End: 2019-01-23

## 2019-01-23 ENCOUNTER — TELEPHONE (OUTPATIENT)
Dept: PAIN MEDICINE | Facility: CLINIC | Age: 84
End: 2019-01-23

## 2019-01-23 ENCOUNTER — PATIENT MESSAGE (OUTPATIENT)
Dept: OTHER | Facility: OTHER | Age: 84
End: 2019-01-23

## 2019-01-23 NOTE — PROGRESS NOTES
Last 5 Patient Entered Readings                                      Current 30 Day Average: 139/76     Recent Readings 1/23/2019 1/22/2019 1/15/2019 1/8/2019 1/8/2019    SBP (mmHg) 127 152 138 149 160    DBP (mmHg) 73 73 74 75 80    Pulse 67 57 66 51 51          Digital Medicine: Health  Follow Up    Lifestyle Modifications:    1.Dietary Modifications (Sodium intake <2,000mg/day, food labels, dining out): Deferred    2.Physical Activity: Deferred    3.Medication Therapy: Patient has been compliant with the medication regimen. Patient is doing well on his current regimen. He stated that he does not want to had any medication added to his regimen due to some of his higher readings, etc so I explained that medication adjustments are typically based on his overall BP average and not individual readings. Patient was pleased when he heard this.   Right now, the patient takes some of his readings before taking his BP medication. For example: the reading on 1/22 (152/73) and that was taken before taking his evening dose of BP medication. He denies symptoms and stated that he always feel great.   I requested that he start waiting at least one full hour or more, after taking his BP medication, before taking his BP reading and patient expressed understanding.     4.Patient has the following medication side effects/concerns:   (Frequency/Alleviating factors/Precipitating factors, etc.)     Follow up with Mr. Javier Valles completed. No further questions or concerns. Will continue to follow up to achieve health goals.

## 2019-01-23 NOTE — TELEPHONE ENCOUNTER
Contacted and spoke to patient regarding his request. After a discussion on rather or not the appointment is required. He agreed that we can hold off on cancelling the appointment as it is a couple of weeks out. He can always call and r/s if need be.

## 2019-01-23 NOTE — TELEPHONE ENCOUNTER
----- Message from Aysha Diaz sent at 1/23/2019  8:13 AM CST -----  Contact: MARGRET MARTIN [[id number]]  Name of Who is Calling: MARGRET MARTIN [[id number]]      What is the request in detail: Patient would like a call back regarding appt on 02/07 states he does not think appt is necessary. Please call to discuss        Can the clinic reply by [facility]: no      [facility] [phone]

## 2019-01-24 ENCOUNTER — TELEPHONE (OUTPATIENT)
Dept: NEUROSURGERY | Facility: CLINIC | Age: 84
End: 2019-01-24

## 2019-01-24 DIAGNOSIS — Z95.0 CARDIAC PACEMAKER IN SITU: Primary | ICD-10-CM

## 2019-01-24 DIAGNOSIS — Z96.89 S/P INSERTION OF SPINAL CORD STIMULATOR: Primary | ICD-10-CM

## 2019-01-28 ENCOUNTER — HOSPITAL ENCOUNTER (OUTPATIENT)
Dept: RADIOLOGY | Facility: HOSPITAL | Age: 84
Discharge: HOME | End: 2019-01-28
Attending: NEUROLOGICAL SURGERY
Payer: MEDICARE

## 2019-01-28 ENCOUNTER — OFFICE VISIT (OUTPATIENT)
Dept: NEUROSURGERY | Facility: CLINIC | Age: 84
End: 2019-01-28
Payer: MEDICARE

## 2019-01-28 VITALS — SYSTOLIC BLOOD PRESSURE: 94 MMHG | HEART RATE: 55 BPM | DIASTOLIC BLOOD PRESSURE: 62 MMHG

## 2019-01-28 DIAGNOSIS — Z96.89 S/P INSERTION OF SPINAL CORD STIMULATOR: ICD-10-CM

## 2019-01-28 DIAGNOSIS — G89.4 CHRONIC PAIN SYNDROME: Primary | ICD-10-CM

## 2019-01-28 PROCEDURE — 99999 PR PBB SHADOW E&M-EST. PATIENT-LVL III: CPT | Mod: PBBFAC,HCNC,, | Performed by: NEUROLOGICAL SURGERY

## 2019-01-28 PROCEDURE — 99999 PR PBB SHADOW E&M-EST. PATIENT-LVL III: ICD-10-PCS | Mod: PBBFAC,HCNC,, | Performed by: NEUROLOGICAL SURGERY

## 2019-01-28 PROCEDURE — 99212 OFFICE O/P EST SF 10 MIN: CPT | Mod: HCNC,S$GLB,, | Performed by: NEUROLOGICAL SURGERY

## 2019-01-28 PROCEDURE — 72080 XR THORACOLUMBAR SPINE AP LATERAL: ICD-10-PCS | Mod: 26,HCNC,, | Performed by: RADIOLOGY

## 2019-01-28 PROCEDURE — 99212 PR OFFICE/OUTPT VISIT, EST, LEVL II, 10-19 MIN: ICD-10-PCS | Mod: HCNC,S$GLB,, | Performed by: NEUROLOGICAL SURGERY

## 2019-01-28 PROCEDURE — 1101F PT FALLS ASSESS-DOCD LE1/YR: CPT | Mod: HCNC,CPTII,S$GLB, | Performed by: NEUROLOGICAL SURGERY

## 2019-01-28 PROCEDURE — 72080 X-RAY EXAM THORACOLMB 2/> VW: CPT | Mod: TC,HCNC,PN

## 2019-01-28 PROCEDURE — 72080 X-RAY EXAM THORACOLMB 2/> VW: CPT | Mod: 26,HCNC,, | Performed by: RADIOLOGY

## 2019-01-28 PROCEDURE — 1101F PR PT FALLS ASSESS DOC 0-1 FALLS W/OUT INJ PAST YR: ICD-10-PCS | Mod: HCNC,CPTII,S$GLB, | Performed by: NEUROLOGICAL SURGERY

## 2019-01-28 NOTE — PROGRESS NOTES
NEUROSURGICAL PROGRESS NOTE    DATE OF SERVICE:  01/28/2019    ATTENDING PHYSICIAN:  Andrzej Toribio MD    SUBJECTIVE:    INTERIM HISTORY:    This is a very pleasant 88 y.o. male, who is status more than one year T8 SCS using the [employer] system. He had two revision of programs. Complains of pain overlying the SCS on the left side. Pain is reproducible with palpation. Sometimes the pain irradiates at the LS junction bilaterally. He had recent lumbar RFA with significant pain relief. Still has more than 50% pain relief from SCS.    No new weakness or numbness.        Low Back Pain Scale  R Low Back-Pain Score: 5  R Low Back-Pain Intensity: I can tolerate the pain I have without having to use pain killers  R Low Back-Pain Score: I can look after myself normally without causing extra pain  Low Back-Lifting: I can lift heavy weights but it gives extra pain   Low Back-Walking: Pain prevents me walking more than .5 mile   Low Back-Sitting: I can sit in any chair as long as I like   Low Back-Standing: I cannot stand for longer than 1 hour without increasing pain   Low Back-Sleeping: I have no pain in bed   Low Back-Social Life: My social life is normal but it increases the degree of pain   Low Back-Traveling: I have some pain when traveling but april of my usual forms of travel make it any worse   Low Back-Changing Degree of Pain: (My pain is neither getting better nor worse)         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    Chronic kidney disease, stage 3 (moderate) 11/27/2012    Gout, unspecified 11/27/2012    Acquired cyst of kidney  11/27/2012    Idiopathic progressive polyneuropathy 12/17/2012    Atherosclerosis of aorta 10/09/2013    Epiretinal membrane, left eye 10/28/2013    Posterior vitreous detachment of left eye 10/28/2013    Pseudophakia of left eye 10/28/2013    Prosthetic eye globe 10/28/2013    Open-angle glaucoma 10/28/2013    Retinal tear 10/28/2013    Rhegmatogenous retinal detachment 10/28/2013    Atrial fibrillation 11/03/2013    History of Urinary retention 11/07/2013    Biventricular cardiac pacemaker in situ 12/12/2013    SSS (sick sinus syndrome): s/p PPM 12/12/2013    Macular edema, cystoid 06/11/2014    Lamellar macular hole 06/11/2014    Left lumbar radiculopathy 06/25/2015    Lumbar facet arthropathy 06/25/2015    Spondylosis without myelopathy 06/25/2015    Lumbar degenerative disc disease 06/25/2015    Gait apraxia 10/09/2015    Contusion of rib on left side 10/28/2015    Spinal stenosis, lumbar 04/13/2016    Obesity, Class I, BMI 30-34.9 04/21/2016    Coronary artery disease involving native coronary artery of native heart without angina pectoris 04/21/2016    S/P CABG (coronary artery bypass graft) 04/21/2016    Varicose veins of both lower extremities- Rt more than Left 04/21/2016    Edema 04/21/2016    Thrombocytopenia 04/21/2016    Total bilirubin, elevated 04/21/2016    Lumbar stenosis with neurogenic claudication 04/25/2016    S/P lumbar laminectomy     Encounter for postoperative wound check 05/24/2016    Peripheral venous insufficiency     Typical atrial flutter 09/21/2016    Current use of long term anticoagulation 09/21/2016    Status post catheter ablation of atrial flutter 12/14/2016    Bilateral carpal tunnel syndrome 01/24/2017    Chronic pain 04/28/2017    Lumbar spinal stenosis 05/02/2017    Acute lumbar radiculopathy 05/02/2017    Osteoarthritis of spine with radiculopathy, lumbar region 05/29/2017    Cardiomyopathy, ischemic: Prior MI, CABG, EF 40-45%  06/07/2017    Post laminectomy syndrome 06/15/2017    PVC (premature ventricular contraction) 08/29/2017    Chronic pain syndrome 11/09/2017     Resolved Ambulatory Problems     Diagnosis Date Noted    Coronary artery disease     Benign hypertensive renal disease without renal failure 11/27/2012    Atrial flutter 10/03/2013    Lamellar macular hole of left eye 10/28/2013    Lamellar macular hole 10/28/2013    Unstable angina 11/01/2013    NSTEMI (non-ST elevated myocardial infarction) 11/03/2013    Cardiogenic shock 11/03/2013    Ventricular fibrillation 11/03/2013    Acute systolic heart failure 11/03/2013    Syncope and collapse: orthostatic with hypotension 10/09/2015    Lumbar myelopathy 04/29/2016     Past Medical History:   Diagnosis Date    *Atrial flutter 10/3/13    Anticoagulant long-term use     Arthritis     Atrial fibrillation     Atrial flutter     Blood transfusion     BPH (benign prostatic hypertrophy)     Carotid artery disease     Chronic kidney disease     Coronary artery disease     DDD (degenerative disc disease) 6/25/2015    Degenerative disc disease     Elevated PSA     Glaucoma     Hyperlipidemia     Hypertension     Lumbar stenosis     NSTEMI (non-ST elevated myocardial infarction) 11/3/2013    Pacemaker 11/2013    Peripheral neuropathy     Retinal detachment     Squamous cell carcinoma     Syncope and collapse: orthostatic with hypotension 10/9/2015       PAST SURGICAL HISTORY:  Past Surgical History:   Procedure Laterality Date    ABLATION N/A 11/1/2016    [facility] C[facility]    ABLATION N/A 11/1/2013    [facility] at [facility] CATH LAB    BLOCK, NERVE, FACET JOINT, LUMBAR, MEDIAL BRANCH Bilateral 12/18/2018    [facility] PAIN MGT    CARDIAC CATHETERIZATION      [facility]    CARDIAC ELECTROPHYSIOLOGY MAPPING AND ABLATION  11/2016    CARDIAC PACEMAKER PLACEMENT  11/2016     CARDIOVERSION N/A 11/18/2013    Per[facility] by [facility] at [facility] CATH LAB    CATARACT EXTRACTION W/  INTRAOCULAR LENS IMPLANT Left 1997        CATHETERIZATION, HEART, LEFT Left 11/1/2013    Performed by Jovi Guillermo MD at [facility] CATH LAB    CORONARY ARTERY BYPASS GRAFT  1991    ECHOCARDIOGRAM-TRANSESOPHAGEAL N/A 11/18/2013    Performed by Lola Surgeon at [facility] LOLA    ENUCLEATION Right 1949    JOMAR-TRANSFORAMINAL Left 8/7/2015    [facility]formed [facility] at [facility] [place] PAIN MGT    INJECTION-STEROID-EPIDURAL-CAUDAL N/A 7/21/2017    Performed by James Hodges [facility] at [facility] [place] PAIN MGT    INJECTION-STEROID-EPIDURAL-LUMBAR N/A 8/28/2015    Performed by James Hodges [facility] at [facility] [place] PAIN MGT    INJECTION-STEROID-EPIDURAL-TRANSFORAMINAL Left 5/29/2017    Performed by Albert Graff MD at [facility] [place] PAIN MGT    INJECTION-STEROID-EPIDURAL-TRANSFORAMINAL Right 4/28/2017    Performed by James Hodges [facility] at [facility] [place] PAIN MGT    INJECTION-STEROID-EPIDURAL-TRANSFORAMINAL Left 7/10/2015    [facility] at [facility] MGT    [id number] N/A 11/1/2016    Performed by Alcon Ly MD at [facility] CATH LAB    LAMINECTOMY-LUMBAR N/A 4/25/2016    Performed by Florentin Stanford MD at [facility] OR 2ND FLR    LAMINOTOMY  11/9/2017    [facility] at [facility] OR    LUMBAR LAMINECTOMY  04/25/2016    PLACEMENT OF SPINAL CORD STIMULATOR T10-T11 laminotomyh for placement of spinal cord stimulator at T9-10, left below the belt line pulse generator N/A 11/9/2017    Performed by [facility] at [facility] OR    Radiofrequency Ablation LEFT LUMBAR L2,3,4,5 RFA Left 1/9/2019    Performed by James Hodges [facility] at [facility] [place] PAIN MGT    RELEASE-CARPAL TUNNEL Bilateral 3/29/2017    Performed by Nam Chong Jr., MD at [facility] [place] OR    RETINAL DETACHMENT SURGERY Left 1997    Dr. Cosme    SKIN BIOPSY      SPINAL CORD STIMULATOR TRIAL W/ LAMINOTOMY  10/2017     TONSILLECTOMY      TRANSESOPHAGEAL ECHOCARDIOGRAM (MONICA) N/A 2013    [facility] CATH LAB    TRIAL-STIMULATOR-DORSAL COLUMN N/A 2017    [facility]       SOCIAL HISTORY:   Social History     Socioeconomic History    Marital status:      Spouse name: Joanie    Number of children: Not on file    Years of education: Not on file    Highest education level: Not on file   Social Needs    Financial resource strain: Not on file    Food insecurity - worry: Not on file    Food insecurity - inability: Not on file    Transportation needs - medical: Not on file    Transportation needs - non-medical: Not on file   Occupational History    Occupation: retired   Tobacco Use    Smoking status: Former Smoker     Last attempt to quit: 1963     Years since quittin.4    Smokeless tobacco: Never Used   Substance and Sexual Activity    Alcohol use: Yes     Alcohol/week: 1.8 oz     Types: 1 Glasses of wine, 1 Cans of beer, 1 Shots of liquor per week     Comment: 2 a day    Drug use: No    Sexual activity: Yes     Partners: Female   Other Topics Concern    Not on file   Social History Narrative    Not on file       FAMILY HISTORY:  Family History   Problem Relation Age of Onset    Stroke Mother 69    Clotting disorder Mother         per patient no clotting disorder    Hypertension Mother     Diverticulitis Son     Cancer Neg Hx     Diabetes Neg Hx     Heart disease Neg Hx     Glaucoma Neg Hx     Amblyopia Neg Hx     Blindness Neg Hx     Cataracts Neg Hx     Macular degeneration Neg Hx     Retinal detachment Neg Hx     Strabismus Neg Hx        CURRENTS MEDICATIONS:  Current Outpatient Medications on File Prior to Visit   Medication Sig Dispense Refill    amiodarone (PACERONE) 200 MG Tab Take 1 tablet (200 mg total) by mouth once daily. 30 tablet 11    amLODIPine (NORVASC) 5 MG tablet Take 1 tablet (5 mg total) by  mouth once daily. 90 tablet 3    apixaban 2.5 mg Tab Take 1 tablet (2.5 mg total) by mouth 2 (two) times daily. 180 tablet 3    ascorbic acid (VITAMIN C) 1000 MG tablet Take 1,000 mg by mouth every morning.       brimonidine 0.2% (ALPHAGAN) 0.2 % Drop Place 1 drop into the left eye 3 (three) times daily. 10 mL 12    brinzolamide (AZOPT) 1 % ophthalmic suspension Place 1 drop into both eyes 3 (three) times daily.      buPROPion (WELLBUTRIN XL) 150 MG TB24 tablet TAKE ONE TABLET BY MOUTH ONCE DAILY 30 tablet 11    co-enzyme Q-10 30 mg capsule Take 30 mg by mouth once daily.       donepezil (ARICEPT) 5 MG tablet Take 1 tablet (5 mg total) by mouth every evening. 30 tablet 11    dorzolamide (TRUSOPT) 2 % ophthalmic solution Place 1 drop into both eyes 3 (three) times daily. Please fill today - this evening if possible - pt is leaving town early tomorrow 30 mL 3    ipratropium (ATROVENT) 0.03 % nasal spray 1-2 sprays by Nasal route 2 (two) times daily. 30 mL 1    latanoprost 0.005 % ophthalmic solution Place 1 drop into the left eye every evening. 1 Bottle 12    losartan (COZAAR) 50 MG tablet Take 1 tablet (50 mg total) by mouth 2 (two) times daily. 180 tablet 3    lutein 20 mg Cap Take 20 mg by mouth once daily.       MULTIVITAMINS,THER W-MINERALS (VITAMINS & MINERALS ORAL) Take 1 tablet by mouth every morning.       polycarbophil (FIBERCON) 625 mg tablet Take 625 mg by mouth 2 (two) times daily.      rosuvastatin (CRESTOR) 20 MG tablet Take 1 tablet (20 mg total) by mouth every evening. 90 tablet 3     No current facility-administered medications on file prior to visit.        ALLERGIES:  Review of patient's allergies indicates:   Allergen Reactions    Pravastatin      Severe myalgias/elevated CPK    Lipitor [atorvastatin]      Myositis/elevated CPK    Statins-hmg-coa reductase inhibitors      Muscle pain and loss of muscle    Ace inhibitors      Cough       REVIEW OF SYSTEMS:  Review of Systems    Constitutional: Negative for diaphoresis, fever and weight loss.   Respiratory: Negative for shortness of breath.    Cardiovascular: Negative for chest pain.   Gastrointestinal: Negative for blood in stool.   Genitourinary: Negative for hematuria.   Endo/Heme/Allergies: Does not bruise/bleed easily.   All other systems reviewed and are negative.        OBJECTIVE:    PHYSICAL EXAMINATION:   Vitals:    01/28/19 1104   BP: 94/62   Pulse: (!) 55       Physical Exam:  Vitals reviewed.    Constitutional: He appears well-developed and well-nourished.     Eyes: Pupils are equal, round, and reactive to light. Conjunctivae and EOM are normal.     Cardiovascular: Normal distal pulses and no edema.     Abdominal: Soft.     Skin: Skin displays no rash on trunk and no rash on extremities. Skin displays no lesions on trunk and no lesions on extremities.     Psych/Behavior: He is alert. He is oriented to person, place, and time. He has a normal mood and affect.     Musculoskeletal:        Neck: Range of motion is limited.     Neurological:        DTRs: Tricep reflexes are 2+ on the right side and 2+ on the left side. Bicep reflexes are 2+ on the right side and 2+ on the left side. Brachioradialis reflexes are 2+ on the right side and 2+ on the left side. Patellar reflexes are 2+ on the right side and 2+ on the left side. Achilles reflexes are 2+ on the right side and 2+ on the left side.       Back Exam     Tenderness   Back tenderness location: left IPG below the belt pain on palpation.    Range of Motion   Extension: normal   Flexion: normal   Lateral bend right: normal   Lateral bend left: normal   Rotation right: normal   Rotation left: normal     Muscle Strength   Right Quadriceps:  5/5   Left Quadriceps:  5/5   Right Hamstrings:  5/5   Left Hamstrings:  5/5     Tests   Straight leg raise right: negative  Straight leg raise left: negative    Other   Toe walk: normal  Heel walk: normal                Neurologic Exam      Mental Status   Oriented to person, place, and time.   Speech: speech is normal   Level of consciousness: alert    Cranial Nerves   Cranial nerves II through XII intact.     CN III, IV, VI   Pupils are equal, round, and reactive to light.  Extraocular motions are normal.     Motor Exam   Muscle bulk: normal  Overall muscle tone: normal    Strength   Right deltoid: 5/5  Left deltoid: 5/5  Right biceps: 5/5  Left biceps: 5/5  Right triceps: 5/5  Left triceps: 5/5  Right wrist flexion: 5/5  Left wrist flexion: 5/5  Right wrist extension: 5/5  Left wrist extension: 5/5  Right interossei: 5/5  Left interossei: 5/5  Right iliopsoas: 5/5  Left iliopsoas: 5/5  Right quadriceps: 5/5  Left quadriceps: 5/5  Right hamstrin/5  Left hamstrin/5  Right anterior tibial: 5/5  Left anterior tibial: 5/5  Right posterior tibial: 5/5  Left posterior tibial: 5/5  Right peroneal: 5/5  Left peroneal: 5/5  Right gastroc: 5/5  Left gastroc: 5/5    Sensory Exam   Light touch normal.   Pinprick normal.     Gait, Coordination, and Reflexes     Gait  Gait: normal    Coordination   Finger to nose coordination: normal  Tandem walking coordination: normal    Reflexes   Right brachioradialis: 2+  Left brachioradialis: 2+  Right biceps: 2+  Left biceps: 2+  Right triceps: 2+  Left triceps: 2+  Right patellar: 2+  Left patellar: 2+  Right achilles: 2+  Left achilles: 2+  Right plantar: normal  Left plantar: normal  Right Tucker: absent  Left Tucker: absent  Right ankle clonus: absent  Left ankle clonus: absent        DIAGNOSTIC DATA:  I personally interpreted the following imaging:   Today's TL spine XR shows correct positioning of the paddle lead and IPG    ASSESMENT:  This is a 88 y.o. male with     Problem List Items Addressed This Visit        Neuro    Chronic pain syndrome - Primary      Other Visit Diagnoses     S/P insertion of spinal cord stimulator            Pain at IPG implantation site, myofascial pain      PLAN:  Compound  cream  FU as needed  All questions answered        Andrzej Toribio MD  Cell:[phone]

## 2019-02-04 ENCOUNTER — PATIENT MESSAGE (OUTPATIENT)
Dept: PAIN MEDICINE | Facility: CLINIC | Age: 84
End: 2019-02-04

## 2019-02-07 ENCOUNTER — OFFICE VISIT (OUTPATIENT)
Dept: PAIN MEDICINE | Facility: CLINIC | Age: 84
End: 2019-02-07
Payer: MEDICARE

## 2019-02-07 ENCOUNTER — PATIENT MESSAGE (OUTPATIENT)
Dept: PAIN MEDICINE | Facility: CLINIC | Age: 84
End: 2019-02-07

## 2019-02-07 VITALS
SYSTOLIC BLOOD PRESSURE: 143 MMHG | HEIGHT: 68 IN | DIASTOLIC BLOOD PRESSURE: 79 MMHG | BODY MASS INDEX: 30.07 KG/M2 | TEMPERATURE: 98 F | HEART RATE: 43 BPM | WEIGHT: 198.44 LBS

## 2019-02-07 DIAGNOSIS — Z98.890 S/P LUMBAR LAMINECTOMY: ICD-10-CM

## 2019-02-07 DIAGNOSIS — M54.16 LUMBAR RADICULOPATHY: ICD-10-CM

## 2019-02-07 DIAGNOSIS — M96.1 FAILED BACK SURGICAL SYNDROME: ICD-10-CM

## 2019-02-07 DIAGNOSIS — M47.816 LUMBAR SPONDYLOSIS: ICD-10-CM

## 2019-02-07 DIAGNOSIS — G89.4 CHRONIC PAIN SYNDROME: Primary | ICD-10-CM

## 2019-02-07 PROCEDURE — 1101F PT FALLS ASSESS-DOCD LE1/YR: CPT | Mod: HCNC,CPTII,S$GLB, | Performed by: NURSE PRACTITIONER

## 2019-02-07 PROCEDURE — 99213 PR OFFICE/OUTPT VISIT, EST, LEVL III, 20-29 MIN: ICD-10-PCS | Mod: HCNC,S$GLB,, | Performed by: NURSE PRACTITIONER

## 2019-02-07 PROCEDURE — 99499 RISK ADDL DX/OHS AUDIT: ICD-10-PCS | Mod: HCNC,S$GLB,, | Performed by: NURSE PRACTITIONER

## 2019-02-07 PROCEDURE — 99999 PR PBB SHADOW E&M-EST. PATIENT-LVL III: ICD-10-PCS | Mod: PBBFAC,HCNC,, | Performed by: NURSE PRACTITIONER

## 2019-02-07 PROCEDURE — 99499 UNLISTED E&M SERVICE: CPT | Mod: HCNC,S$GLB,, | Performed by: NURSE PRACTITIONER

## 2019-02-07 PROCEDURE — 99999 PR PBB SHADOW E&M-EST. PATIENT-LVL III: CPT | Mod: PBBFAC,HCNC,, | Performed by: NURSE PRACTITIONER

## 2019-02-07 PROCEDURE — 99213 OFFICE O/P EST LOW 20 MIN: CPT | Mod: HCNC,S$GLB,, | Performed by: NURSE PRACTITIONER

## 2019-02-07 PROCEDURE — 1101F PR PT FALLS ASSESS DOC 0-1 FALLS W/OUT INJ PAST YR: ICD-10-PCS | Mod: HCNC,CPTII,S$GLB, | Performed by: NURSE PRACTITIONER

## 2019-02-07 RX ORDER — OXYCODONE AND ACETAMINOPHEN 5; 325 MG/1; MG/1
1 TABLET ORAL EVERY 6 HOURS PRN
Qty: 30 TABLET | Refills: 0 | Status: SHIPPED | OUTPATIENT
Start: 2019-02-07 | End: 2019-07-01 | Stop reason: SDUPTHER

## 2019-02-07 NOTE — PROGRESS NOTES
Chronic patient Established Note (Follow up visit)      SUBJECTIVE:    Javier Valles presents to the clinic for a follow-up appointment for lower back and bilateral leg pain.  He is s/p left L2,3,4,5 RFA on 1/9/19 with 70% relief.  He had a recent f/u with Dr. Toribio.  He had SCS implant by Dr. Toribio on 11/9/17.  He has been having some pain over the battery site.  Dr. Toribio prescribed him a compounding cream.  He continues to take Eliquis.  Since the last visit, Javier Valles states the pain has been improving.  Current pain intensity is 4/10.    Pain Medications:  Robaxin 750 mg PRN muscle pain  Compounding cream    Tried in past: gabapentin 100 mg TID, Percocet (helpful), Norco    - Anti-Coagulants: Eliquis (pacemaker)    Opioid Contract: no     report:  Not applicable    Pain Procedures:  Multiple JOMAR's with Dr. Lopez 4 yrs ago  4/28/17 Right L4-5 TF JOMAR- 100% relief   5/29/17 Left L5-S1 TF JOMAR  7/21/17 Caudal JOMAR- significant benefit  9/22/17 Lumbar SCS trial- 90% relief  12/18/18 Bilateral L2,3,4,5 MBB- 90% relief  1/9/19 Left L2,3,4,5 RFA- 70% relief    Spinal surgeries:  MIS laminectomy L3-4 and L4-5.     Physical Therapy/Home Exercise: no    Imaging:   3/24/16 Lumbar CT    Narrative   Comparison: 8/11/14    Technique: 2.5 mm axial images obtained of the lumbar spine without contrast with sagittal and coronal reformats.    Findings: There is no evidence of acute fracture or bone destruction.  There is intervertebral disc space narrowing at multiple levels which is most significant at L2-3 with prominent marginal osteophyte formation.  There is vacuum disc formation at L1-2 and L5-S1.  There is mild, 4 mm of anterolisthesis of L4 on L5 with partial uncovering of the disc.    T10-11: There is prominent facet arthropathy on the right which causes mass effect on the right posterior lateral aspect of the thecal sac.  This level is only partially imaged.    T11-12, T12-L1: There are degenerative Schmorl's  nodes.  There is no significant central canal stenosis or neural foraminal narrowing.    L1-L2: There is a circumferential disc bulge with ligamentum flavum thickening and facet arthropathy resulting in moderate central canal stenosis.  There is moderate bilateral neural foraminal narrowing left greater than right.    L2-3: There is a broad-based circumferential disc bulge with ligamentum flavum thickening and facet arthropathy which in combination results in moderate central canal stenosis.  There is severe bilateral neural foraminal narrowing.    L3-4: There is a diffuse disc bulge with facet arthropathy and ligamentum flavum thickening resulting in severe central canal stenosis and moderate bilateral neural foraminal narrowing.    L4-5: There is a circumferential posterior disc osteophyte complex with bilateral facet arthropathy and ligamentum flavum thickening resulting in moderate central canal stenosis.  There is mild anterolisthesis which is likely secondary to the prominent facet degenerative change.  The posterior disc osteophyte complex extends into the left neural foramen causing severe left neural foraminal narrowing.  There is moderate right neural foraminal narrowing.    L5-S1: There is a mild diffuse disc bulge with facet arthropathy resulting in no significant central canal stenosis.  There is a posterior disc osteophyte which causes severe right neural foraminal narrowing and moderate left neural foraminal narrowing.    Limited review of the retroperitoneal structures demonstrates advanced atherosclerosis of the aorta and branch vessels.   Impression       Advanced multilevel degenerative changes of the lumbar spine.  Specific details at each level are discussed above       CMP  Sodium   Date Value Ref Range Status   10/16/2018 142 136 - 145 mmol/L Final     Potassium   Date Value Ref Range Status   10/16/2018 4.1 3.5 - 5.1 mmol/L Final     Chloride   Date Value Ref Range Status   10/16/2018 111 (H)  95 - 110 mmol/L Final     CO2   Date Value Ref Range Status   10/16/2018 24 23 - 29 mmol/L Final     Glucose   Date Value Ref Range Status   10/16/2018 86 70 - 110 mg/dL Final     BUN, Bld   Date Value Ref Range Status   10/16/2018 27 (H) 8 - 23 mg/dL Final     Creatinine   Date Value Ref Range Status   10/16/2018 1.7 (H) 0.5 - 1.4 mg/dL Final     Calcium   Date Value Ref Range Status   10/16/2018 10.1 8.7 - 10.5 mg/dL Final     Total Protein   Date Value Ref Range Status   10/16/2018 7.2 6.0 - 8.4 g/dL Final     Albumin   Date Value Ref Range Status   10/16/2018 4.1 3.5 - 5.2 g/dL Final     Total Bilirubin   Date Value Ref Range Status   10/16/2018 1.2 (H) 0.1 - 1.0 mg/dL Final     Comment:     For infants and newborns, interpretation of results should be based  on gestational age, weight and in agreement with clinical  observations.  Premature Infant recommended reference ranges:  Up to 24 hours.............<8.0 mg/dL  Up to 48 hours............<12.0 mg/dL  3-5 days..................<15.0 mg/dL  6-29 days.................<15.0 mg/dL       Alkaline Phosphatase   Date Value Ref Range Status   10/16/2018 71 55 - 135 U/L Final     AST   Date Value Ref Range Status   10/16/2018 33 10 - 40 U/L Final     ALT   Date Value Ref Range Status   10/16/2018 20 10 - 44 U/L Final     Anion Gap   Date Value Ref Range Status   10/16/2018 7 (L) 8 - 16 mmol/L Final     eGFR if    Date Value Ref Range Status   10/16/2018 40.7 (A) >60 mL/min/1.73 m^2 Final     eGFR if non    Date Value Ref Range Status   10/16/2018 35.2 (A) >60 mL/min/1.73 m^2 Final     Comment:     Calculation used to obtain the estimated glomerular filtration  rate (eGFR) is the CKD-EPI equation.            REVIEW OF SYSTEMS:    GENERAL:  No weight loss, malaise or fevers.  HEENT:  Negative for frequent or significant headaches.  NECK:  Negative for lumps, goiter, pain and significant neck swelling.  RESPIRATORY:  Negative for cough,  wheezing or shortness of breath.  CARDIOVASCULAR:  Negative for chest pain, leg swelling or palpitations. Pacemaker placement.  A-fib.  GI:  Negative for abdominal discomfort, blood in stools or black stools or change in bowel habits.  MUSCULOSKELETAL:  See HPI.  SKIN:  Negative for lesions, rash, and itching.  PSYCH:  Negative for sleep disturbance, mood disorder and recent psychosocial stressors.  HEMATOLOGY/LYMPHOLOGY:  Negative for prolonged bleeding, bruising easily or swollen nodes.  NEURO:   No history of headaches, syncope, paralysis, seizures or tremors.  NEPH: CKD.  All other reviewed and negative other than HPI.    Past Medical History:  Past Medical History:   Diagnosis Date    *Atrial flutter 10/3/13    Anticoagulant long-term use     Aspirin only at this time    Arthritis     Atrial fibrillation     Atrial flutter     Blood transfusion     ?    BPH (benign prostatic hypertrophy)     Carotid artery disease     2008: [place] There is 40 - 49% right Internal Carotid stenosis.  There is 20 - 39% left Internal Carotid stenosis.       Chronic kidney disease     Coronary artery disease     DDD (degenerative disc disease) 6/25/2015    Degenerative disc disease     Elevated PSA     Glaucoma     Hyperlipidemia     Hypertension     Lumbar stenosis     lumbar spinal stenosis    NSTEMI (non-ST elevated myocardial infarction) 11/3/2013    Pacemaker 11/2013    Peripheral neuropathy     - S/P right ILIAC stent 1993;      Retinal detachment     Squamous cell carcinoma     left leg    Syncope and collapse: orthostatic with hypotension 10/9/2015       Past Surgical History:  Past Surgical History:   Procedure Laterality Date    ABLATION N/A 11/1/2016    [facility] CATH LAB    ABLATION N/A 11/1/2013    [facility] CATH LAB    BLOCK, NERVE, FACET JOINT, LUMBAR, MEDIAL BRANCH Bilateral 12/18/2018    Performed by [facility] at  [facility] PAIN MGT    CARDIAC CATHETERIZATION      [facility]    CARDIAC ELECTROPHYSIOLOGY MAPPING AND ABLATION  11/2016    CARDIAC PACEMAKER PLACEMENT  11/2016    CARDIOVERSION N/A 11/18/2013    [facility] C[facility]    CATARACT EXTRACTION W/  INTRAOCULAR LENS IMPLANT Left 1997        CATHETERIZATION, HEART, LEFT Left 11/1/2013    [facility] at [facility] CATH LAB    CORONARY ARTERY BYPASS GRAFT  1991    ECHOCARDIOGRAM-TRANSESOPHAGEAL N/A 11/18/2013    Performed by Lola Surgeon at [facility] LOLA    ENUCLEATION Right 1949    JOMAR-TRANSFORAMINAL Left 8/7/2015    [facility] MGT    INJECTION-STEROID-EPIDURAL-CAUDAL N/A 7/21/2017    [facility] PAIN MGT    INJECTION-STEROID-EPIDURAL-LUMBAR N/A 8/28/2015    [facility] PAIN MGT    INJECTION-STEROID-EPIDURAL-TRANSFORAMINAL Left 5/29/2017    Performed by Albert Graff [facility] at [facility] PAIN MGT    INJECTION-STEROID-EPIDURAL-TRANSFORAMINAL Right 4/28/2017    [facility] PAIN MGT    INJECTION-STEROID-EPIDURAL-TRANSFORAMINAL Left 7/10/2015    Per[facility] MGT    CMRINNPYO-JHOVLTGEG-KMPMEOFGOEHOI N/A 11/1/2016    [facility] CATH LAB    LAMINECTOMY-LUMBAR N/A 4/25/2016    Performed by Florentin Stanford MD at [facility] OR 2ND FLR    LAMINOTOMY  11/9/2017    Per[facility]j [facility]io [facility] at [facility] OR    LUMBAR LAMINECTOMY  04/25/2016    PLACEMENT OF SPINAL CORD STIMULATOR T10-T11 laminotomyh for placement of spinal cord stimulator at T9-10, left below the belt line pulse generator N/A 11/9/2017    Performed by [facility]j [facility]io [facility] at [facility] OR    Radiofrequency Ablation LEFT LUMBAR L2,3,4,5 RFA Left 1/9/2019    Per[facility] at [facility] PAIN MGT    RELEASE-CARPAL TUNNEL Bilateral 3/29/2017    Performed by Nam Chong  "[facility] OR    RETINAL DETACHMENT SURGERY Left     Dr. Cosme    SKIN BIOPSY      SPINAL CORD STIMULATOR TRIAL W/ LAMINOTOMY  10/2017    TONSILLECTOMY      TRANSESOPHAGEAL ECHOCARDIOGRAM (MONICA) N/A 2013    [facility] CATH LAB    TRIAL-STIMULATOR-DORSAL COLUMN N/A 2017    Performed by [facility] at [facility] PAIN MGT       Family History:  Family History   Problem Relation Age of Onset    Stroke Mother 69    Clotting disorder Mother         per patient no clotting disorder    Hypertension Mother     Diverticulitis Son     Cancer Neg Hx     Diabetes Neg Hx     Heart disease Neg Hx     Glaucoma Neg Hx     Amblyopia Neg Hx     Blindness Neg Hx     Cataracts Neg Hx     Macular degeneration Neg Hx     Retinal detachment Neg Hx     Strabismus Neg Hx        Social History:  Social History     Socioeconomic History    Marital status:      Spouse name: Joanie    Number of children: Not on file    Years of education: Not on file    Highest education level: Not on file   Social Needs    Financial resource strain: Not on file    Food insecurity - worry: Not on file    Food insecurity - inability: Not on file    Transportation needs - medical: Not on file    Transportation needs - non-medical: Not on file   Occupational History    Occupation: retired   Tobacco Use    Smoking status: Former Smoker     Last attempt to quit: 1963     Years since quittin.5    Smokeless tobacco: Never Used   Substance and Sexual Activity    Alcohol use: Yes     Alcohol/week: 1.8 oz     Types: 1 Glasses of wine, 1 Cans of beer, 1 Shots of liquor per week     Comment: 2 a day    Drug use: No    Sexual activity: Yes     Partners: Female   Other Topics Concern    Not on file   Social History Narrative    Not on file       OBJECTIVE:    Ht 5' 8" (1.727 m)   Wt 90 kg (198 lb 6.6 oz)   BMI 30.17 kg/m²     PHYSICAL EXAMINATION:    General " appearance: Well appearing, in no acute distress, alert and oriented x3.  Psych: Mood and affect appropriate.  Skin: Skin color, texture, turgor normal, no rashes or lesions, in both upper and lower body.  SCS sites well healed.  Head/face: Normocephalic, atraumatic. No palpable lymph nodes.  Cor: RRR  Pulm: CTA  Back: Straight leg raising in the supine position is negative to radicular pain bilaterally.  There is mild pain to palpation over the lumbar facet joints on the left.  Limited ROM with pain on extension.  Positive facet loading bilaterally.  Extremities: Peripheral joint ROM is full and pain free without obvious instability or laxity in all four extremities. No deformities, edema, or skin discoloration. Good capillary refill.  Musculoskeletal: There is pain with palpation to IPG.  5/5 strength in right ankle with plantar and dorsiflexion. 5/5 strength in left ankle with plantar and dorsiflexion. 5/5 strength with right knee flexion and 4/5 on extension. 5/5 strength with left knee flexion and extension. 5/5 strength in right EHL, 5/5 strength in left EHL.  Left hip flexion is 4/5, right is 5/5.  No atrophy or tone abnormalities are noted.  Neuro: Bilateral lower extremity coordination and muscle stretch reflexes are physiologic and symmetric. Plantar response are downgoing. No loss of sensation is noted.  Gait: Antalgic- ambulates without assistance.      ASSESSMENT: 88 y.o. year old male with lower back and leg pain, consistent with lumbar radiculopathy.     1. Chronic pain syndrome     2. Lumbar spondylosis     3. Failed back surgical syndrome     4. S/P lumbar laminectomy     5. Lumbar radiculopathy           PLAN:     - I have stressed the importance of physical activity and a home exercise plan to help with pain and improve health.    - He is s/p left L2,3,4,5 RFA with benefit.  Will continue to monitor progress.    - His SCS is providing relief of radicular symptoms.    - Refill Percocet 5/325 mg  PRN.  Last filled in 2017.    - The patient will continue a home exercise routine to help with pain and strengthening.      - RTC PRN.    - Counseled patient regarding the importance of activity modification, constant sleeping habits and physical therapy.        The above plan and management options were discussed at length with patient. Patient is in agreement with the above and verbalized understanding.    Jeniffer Mueller  02/07/2019

## 2019-02-12 ENCOUNTER — PATIENT MESSAGE (OUTPATIENT)
Dept: INTERNAL MEDICINE | Facility: CLINIC | Age: 84
End: 2019-02-12

## 2019-02-12 DIAGNOSIS — J34.89 RHINORRHEA: ICD-10-CM

## 2019-02-12 RX ORDER — IPRATROPIUM BROMIDE 21 UG/1
1-2 SPRAY, METERED NASAL 2 TIMES DAILY
Qty: 30 ML | Refills: 1 | Status: SHIPPED | OUTPATIENT
Start: 2019-02-12 | End: 2019-12-14 | Stop reason: SDUPTHER

## 2019-02-22 ENCOUNTER — PATIENT OUTREACH (OUTPATIENT)
Dept: OTHER | Facility: OTHER | Age: 84
End: 2019-02-22

## 2019-02-22 NOTE — PROGRESS NOTES
Last 5 Patient Entered Readings                                      Current 30 Day Average: 133/74     Recent Readings 2/18/2019 2/18/2019 2/11/2019 2/8/2019 2/7/2019    SBP (mmHg) 129 159 136 144 142    DBP (mmHg) 79 77 75 71 73    Pulse 59 63 66 56 50          Digital Medicine: Health  Follow Up    Lifestyle Modifications:    1.Dietary Modifications (Sodium intake <2,000mg/day, food labels, dining out): Patient continues monitoring his sodium intake. He denies any changes that would cause an increase in salt intake.    2.Physical Activity: Deferred    3.Medication Therapy: Patient has been compliant with the medication regimen. Patient is doing well on his current regimen. He denies symptoms/side effects.     4.Patient has the following medication side effects/concerns:   (Frequency/Alleviating factors/Precipitating factors, etc.)     Patient is going to [facility] to visit the [place] from March 1st - March 16th. I will place him on hiatus during that time.     Follow up with Mr. Javier Valles completed. No further questions or concerns. Will continue to follow up to achieve health goals.

## 2019-02-26 ENCOUNTER — PATIENT MESSAGE (OUTPATIENT)
Dept: OPHTHALMOLOGY | Facility: CLINIC | Age: 84
End: 2019-02-26

## 2019-02-26 DIAGNOSIS — H01.009 BLEPHARITIS, UNSPECIFIED LATERALITY, UNSPECIFIED TYPE: Primary | ICD-10-CM

## 2019-02-26 RX ORDER — ERYTHROMYCIN 5 MG/G
OINTMENT OPHTHALMIC
Qty: 3.5 G | Refills: 6 | Status: SHIPPED | OUTPATIENT
Start: 2019-02-26 | End: 2021-10-19

## 2019-02-26 RX ORDER — ERYTHROMYCIN 5 MG/G
OINTMENT OPHTHALMIC
COMMUNITY
End: 2019-02-26 | Stop reason: SDUPTHER

## 2019-03-14 ENCOUNTER — PATIENT MESSAGE (OUTPATIENT)
Dept: ADMINISTRATIVE | Facility: OTHER | Age: 84
End: 2019-03-14

## 2019-03-14 DIAGNOSIS — H40.1122 PRIMARY OPEN-ANGLE GLAUCOMA, LEFT EYE, MODERATE STAGE: ICD-10-CM

## 2019-03-14 RX ORDER — LATANOPROST 50 UG/ML
1 SOLUTION/ DROPS OPHTHALMIC NIGHTLY
Qty: 5 ML | Refills: 12 | Status: SHIPPED | OUTPATIENT
Start: 2019-03-14 | End: 2019-03-18 | Stop reason: SDUPTHER

## 2019-03-17 NOTE — PROGRESS NOTES
Assessment /Plan     For exam results, see Encounter Report.    Primary open-angle glaucoma, left eye, moderate stage    Posterior vitreous detachment of left eye    Epiretinal membrane, left eye    Rhegmatogenous retinal detachment of left eye    Pseudophakia of left eye    Prosthetic eye globe          Old pt of Dr. Goodrich - now sees Jaime     1. POAG   Followed at [facility] since 1997   followed by Sonia for 30 years  First HVF 2014  gtts started - [place] - 2012  First photos - ? 1997 - for RD os     Glaucoma meds -  latanoprost qhs os / brimonidine os   H/O adverse rxn to glaucoma drops none  LASERS - SLT os 5/25/2017 (270 degrees - too narrow inf.) (( good resp 16--> 11)   GLAUCOMA SURGERIES none  OTHER EYE SURGERIES - prosthesis od - (2/2 injury - 1940's) // Pnematic retinopexy OS 1997 - [place] // PC iol os - [place]  CDR - prosthesis / 0.75- 0.8  Tbase  - ?? On gtts when started here  Tmax  - ?? Off gtts   Ttarget  - ?? 13 or less if possible // had a disc heme with IOP 15-18   HVF 4  test 2014 to 2017 OS:  ? Paracentral defect with early SAD/IAD  Gonio  +3-4 S/T/N // very narrow inf os   CCT - xx/492  OCT 2 test 2014 to 2016- RNFL - OD:not done // OS:dec s/bord T  HRT 3 test 2015 - 2018 -  MR -  For OS dec G,TS, N, NS, NI, bord T //CDR// 0.78 os   Disc photos - mult old slides 1997 - 2006 // 2015, 2017 - w/ IT disc heme - OIS    Ttoday - prosthetic // 10   Test done today HVF os and DFE and photos     2.  disc heme os    See photos 4/17/2017 - occurred with IOP's of  13-18   Resolved by 10/2/107    3.  Monocular precautions  - prothetic od    4.  erm os // lamellar hole   - stable, follows with arend  -on nevanac q day os - feels it helps vision - ?     5.  Hx of rhegmatogenous rrd os  - flat, follows with arend    6.  Mac hole os  - monitor     7. PC IOL OS     8. - ? S/p yag cap - posterior capsule open     9. Vit floaters OS    Increase in floaters os - will refer back to retina -  jaime     10. 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 Target 14))   SLT os - 5/25/2017 - S/T/N (too narrow inf. ) - steroid taper (( good resp 16-->11)     - (?  BB candidate - now has a pacemaker, but has a H/O low heart rate / and/or arrythmia)     -continue EES ointment od - prosthesis - prn irritation     Sees [facility] -  glasses - has been restricted to daytime driving - due for 1 year follow up 3/2019     Keep regular F/U with Jaime - last seen 6/5/2018     Pt occasionally has to get steroid injections for back pain - so will need to monitor that - may be a cause of elevated IOP from time to time      Pt is having trouble with prosthetic eye - discharge and not fitting well. Will have him see Alberto to be sure tissues still healthy and  intact and if so he can be refered  to oculist for new prosthesis. He usually has to get a new one every 5 years and it has been 6 years since last done     F/U 4 months with  IOP check and gonio     I have seen and personally examined the patient.  I agree with the findings, assessment and plan of the resident and/or fellow.     Sena Schneider MD

## 2019-03-18 ENCOUNTER — OFFICE VISIT (OUTPATIENT)
Dept: INTERNAL MEDICINE | Facility: CLINIC | Age: 84
End: 2019-03-18
Payer: MEDICARE

## 2019-03-18 ENCOUNTER — TELEPHONE (OUTPATIENT)
Dept: OPHTHALMOLOGY | Facility: CLINIC | Age: 84
End: 2019-03-18

## 2019-03-18 ENCOUNTER — CLINICAL SUPPORT (OUTPATIENT)
Dept: OPHTHALMOLOGY | Facility: CLINIC | Age: 84
End: 2019-03-18
Payer: MEDICARE

## 2019-03-18 ENCOUNTER — PATIENT MESSAGE (OUTPATIENT)
Dept: INTERNAL MEDICINE | Facility: CLINIC | Age: 84
End: 2019-03-18

## 2019-03-18 ENCOUNTER — OFFICE VISIT (OUTPATIENT)
Dept: OPHTHALMOLOGY | Facility: CLINIC | Age: 84
End: 2019-03-18
Payer: MEDICARE

## 2019-03-18 VITALS
WEIGHT: 189 LBS | HEIGHT: 68 IN | HEART RATE: 60 BPM | OXYGEN SATURATION: 99 % | BODY MASS INDEX: 28.64 KG/M2 | SYSTOLIC BLOOD PRESSURE: 128 MMHG | DIASTOLIC BLOOD PRESSURE: 80 MMHG

## 2019-03-18 DIAGNOSIS — H35.372 EPIRETINAL MEMBRANE, LEFT EYE: ICD-10-CM

## 2019-03-18 DIAGNOSIS — R20.2 LEFT HAND PARESTHESIA: ICD-10-CM

## 2019-03-18 DIAGNOSIS — Z97.0 PROSTHETIC EYE GLOBE: ICD-10-CM

## 2019-03-18 DIAGNOSIS — Z96.1 PSEUDOPHAKIA OF LEFT EYE: ICD-10-CM

## 2019-03-18 DIAGNOSIS — R05.9 COUGH: ICD-10-CM

## 2019-03-18 DIAGNOSIS — H40.1122 PRIMARY OPEN-ANGLE GLAUCOMA, LEFT EYE, MODERATE STAGE: Primary | ICD-10-CM

## 2019-03-18 DIAGNOSIS — L98.9 SKIN SORE: Primary | ICD-10-CM

## 2019-03-18 DIAGNOSIS — H61.23 BILATERAL IMPACTED CERUMEN: ICD-10-CM

## 2019-03-18 DIAGNOSIS — H33.002 RHEGMATOGENOUS RETINAL DETACHMENT OF LEFT EYE: ICD-10-CM

## 2019-03-18 DIAGNOSIS — H43.812 POSTERIOR VITREOUS DETACHMENT OF LEFT EYE: ICD-10-CM

## 2019-03-18 PROCEDURE — 99999 PR PBB SHADOW E&M-EST. PATIENT-LVL II: CPT | Mod: PBBFAC,HCNC,, | Performed by: OPHTHALMOLOGY

## 2019-03-18 PROCEDURE — 99999 PR PBB SHADOW E&M-EST. PATIENT-LVL V: ICD-10-PCS | Mod: PBBFAC,HCNC,, | Performed by: INTERNAL MEDICINE

## 2019-03-18 PROCEDURE — 92014 COMPRE OPH EXAM EST PT 1/>: CPT | Mod: HCNC,S$GLB,, | Performed by: OPHTHALMOLOGY

## 2019-03-18 PROCEDURE — 92083 HUMPHREY VISUAL FIELD - OU - BOTH EYES: ICD-10-PCS | Mod: HCNC,S$GLB,, | Performed by: OPHTHALMOLOGY

## 2019-03-18 PROCEDURE — 99214 PR OFFICE/OUTPT VISIT, EST, LEVL IV, 30-39 MIN: ICD-10-PCS | Mod: HCNC,S$GLB,, | Performed by: INTERNAL MEDICINE

## 2019-03-18 PROCEDURE — 99999 PR PBB SHADOW E&M-EST. PATIENT-LVL I: CPT | Mod: PBBFAC,HCNC,,

## 2019-03-18 PROCEDURE — 92250 FUNDUS PHOTOGRAPHY W/I&R: CPT | Mod: HCNC,S$GLB,, | Performed by: OPHTHALMOLOGY

## 2019-03-18 PROCEDURE — 92083 EXTENDED VISUAL FIELD XM: CPT | Mod: HCNC,S$GLB,, | Performed by: OPHTHALMOLOGY

## 2019-03-18 PROCEDURE — 1101F PT FALLS ASSESS-DOCD LE1/YR: CPT | Mod: HCNC,CPTII,S$GLB, | Performed by: INTERNAL MEDICINE

## 2019-03-18 PROCEDURE — 92014 PR EYE EXAM, EST PATIENT,COMPREHESV: ICD-10-PCS | Mod: HCNC,S$GLB,, | Performed by: OPHTHALMOLOGY

## 2019-03-18 PROCEDURE — 99214 OFFICE O/P EST MOD 30 MIN: CPT | Mod: HCNC,S$GLB,, | Performed by: INTERNAL MEDICINE

## 2019-03-18 PROCEDURE — 99999 PR PBB SHADOW E&M-EST. PATIENT-LVL I: ICD-10-PCS | Mod: PBBFAC,HCNC,,

## 2019-03-18 PROCEDURE — 99999 PR PBB SHADOW E&M-EST. PATIENT-LVL II: ICD-10-PCS | Mod: PBBFAC,HCNC,, | Performed by: OPHTHALMOLOGY

## 2019-03-18 PROCEDURE — 99999 PR PBB SHADOW E&M-EST. PATIENT-LVL V: CPT | Mod: PBBFAC,HCNC,, | Performed by: INTERNAL MEDICINE

## 2019-03-18 PROCEDURE — 92250 COLOR FUNDUS PHOTOGRAPHY - OU - BOTH EYES: ICD-10-PCS | Mod: HCNC,S$GLB,, | Performed by: OPHTHALMOLOGY

## 2019-03-18 PROCEDURE — 1101F PR PT FALLS ASSESS DOC 0-1 FALLS W/OUT INJ PAST YR: ICD-10-PCS | Mod: HCNC,CPTII,S$GLB, | Performed by: INTERNAL MEDICINE

## 2019-03-18 RX ORDER — LATANOPROST 50 UG/ML
1 SOLUTION/ DROPS OPHTHALMIC NIGHTLY
Qty: 3 ML | Refills: 3 | Status: SHIPPED | OUTPATIENT
Start: 2019-03-18 | End: 2019-08-26 | Stop reason: SDUPTHER

## 2019-03-18 RX ORDER — BRIMONIDINE TARTRATE 2 MG/ML
1 SOLUTION/ DROPS OPHTHALMIC 3 TIMES DAILY
Qty: 30 ML | Refills: 3 | Status: SHIPPED | OUTPATIENT
Start: 2019-03-18 | End: 2019-08-01 | Stop reason: SDUPTHER

## 2019-03-18 NOTE — PROGRESS NOTES
PAST MEDICAL HISTORY:   Coronary artery disease with previous bypass surgery, stents in 2013, and then a  subsequent non-ST MI due to in-stent stenosis of obtuse marginal for which angioplasty was performed.  Hypertension.  Hyperlipidemia.  Peripheral vascular disease with stent of the right iliac.  Venous Reflux  Chronic kidney disease with secondary hyperparathyroid.  BPH with elevated PSA.  Carotid artery disease Osteoarthritis of the knees  Arrhythmia disorder for which he is status post pacemaker and defibrillator for sick sinus syndrome, status post radiofrequency ablation for atrial flutter, and on amiodarone for atrial flutter/fibrillation/PVCs.  Gout.  Bilateral inguinal hernia repair.  Left knee surgery.  Glaucoma.     SOCIAL HISTORY:  Tobacco use, none.  Alcohol use, a couple of drinks a day.     CURRENT MEDICATIONS:  Amiodarone 200 mg daily.  Amlodipine 5 mg daily.  Aspirin 81 mg daily.  Bupropion 150 mg daily.  Aricept 5 mg daily.  Atrovent nasal spray as needed.  Losartan 50 mg daily.  Crestor 20 mg daily.  Eliquis 2.5 mg twice a day          REASON FOR VISIT:  He is an 88-year-old male who is here to address a number of   issues.    The main one is that for a while he has just noticed what he feels to be a   growth in his buttocks, near his coccyx region.  It just feels like an   irritation.  He states that he tends to have a lot of drainage coming from his   rectum, although he knows when he has a bowel function and the stools tend to be   a little bit soft or loose.    He brings up the point that many months ago he fell down.  He used his left hand   to brace it, he jammed his left fourth finger.  He just constantly feels a   numbness involving the very tip, but now and then, he can feel what may be an   electric shock that goes up to his finger to the dorsal aspect of the hand.    However, he is not aware of any circumstances that will trigger this.  It does   not happen frequently.  It may last  for 20 seconds.    He wants to just have his ears checked.  He has hearing aids.  He has used   Q-tips to clean his ears.  He remembers one time jamming his right ear a lot   with a Q-tip.    Recently, he went on a prolonged trip out of the country where there was a lot   of bus ride and he said there were a lot of people that had colds.  He lately   has been having nasal and head congestion, but it is getting better, but at   night at times he will just have a dry, nonproductive cough.  He does not feel   short of breath.  He is aware of runny nose, postnasal drip.    MEDICAL HISTORY:  Outlined above.    PHYSICAL EXAMINATION:  VITAL SIGNS:  Per EPIC.  LUNGS:  Clear.  HEART:  Regular rate and rhythm.  HEENT:  Nasal mucosa is clear.  Oropharynx, no lesions.  There is cerumen   impaction involving the left ear.  I am not able to clean it out and it felt   very uncomfortable.  Involving his right ear, I can see the tympanic membrane is   normal, but there is an area where there appears to be dried old blood that I   tried to remove, but again it was not performed because of being uncomfortable.  EXTREMITIES:  He has negative Tinel sign involving the hands.  Hand  is   good.  There is no deformity involving the hands.  There is no nodularity   involving the dorsal aspect.  He is not tender over the PIP, MCP or metacarpal   bones.  Evaluation of his buttocks, there is an area of dry hard skin with one   being a scab on the left side.  RECTAL:  On digital rectal exam, stool is brown, but very loose, heme-positive.    IMPRESSION:  1. 1.  Buttock skin irritation.  2. 2.  Rectal drainage.  3. 3.  Paresthesia of the hand.  4. 4.  Cerumen impaction.  5. 5.  Cough.    PLAN:  Recommend taking Allegra or Zyrtec at night to see if this will help.  In   regard to the hand, we will just continue to observe.  It seems like it is   something that is not frequent and does not last long.  We will refer to ENT for   cerumen impaction  removal.  Recommend the use of some Medihoney to apply twice   a day and proper cleaning of his rectum was discussed.  Consider taking   Metamucil or FiberCon twice a day.      MARY/STANLEY  dd: 03/18/2019 15:10:52 (CDT)  td: 03/18/2019 22:37:55 (CDT)  Doc ID   #[id number]  Job ID #[id number]    CC:

## 2019-03-23 ENCOUNTER — PATIENT MESSAGE (OUTPATIENT)
Dept: CARDIOLOGY | Facility: CLINIC | Age: 84
End: 2019-03-23

## 2019-03-25 DIAGNOSIS — I25.10 CORONARY ARTERY DISEASE INVOLVING NATIVE CORONARY ARTERY WITHOUT ANGINA PECTORIS, UNSPECIFIED WHETHER NATIVE OR TRANSPLANTED HEART: ICD-10-CM

## 2019-03-25 RX ORDER — LOSARTAN POTASSIUM 50 MG/1
50 TABLET ORAL 2 TIMES DAILY
Qty: 180 TABLET | Refills: 3 | Status: SHIPPED | OUTPATIENT
Start: 2019-03-25 | End: 2019-03-28 | Stop reason: SDUPTHER

## 2019-03-27 ENCOUNTER — PATIENT MESSAGE (OUTPATIENT)
Dept: CARDIOLOGY | Facility: CLINIC | Age: 84
End: 2019-03-27

## 2019-03-28 DIAGNOSIS — I25.10 CORONARY ARTERY DISEASE INVOLVING NATIVE CORONARY ARTERY WITHOUT ANGINA PECTORIS, UNSPECIFIED WHETHER NATIVE OR TRANSPLANTED HEART: ICD-10-CM

## 2019-03-28 RX ORDER — LOSARTAN POTASSIUM 50 MG/1
50 TABLET ORAL 2 TIMES DAILY
Qty: 180 TABLET | Refills: 3 | Status: SHIPPED | OUTPATIENT
Start: 2019-03-28 | End: 2020-02-28 | Stop reason: SDUPTHER

## 2019-04-02 ENCOUNTER — OFFICE VISIT (OUTPATIENT)
Dept: OTOLARYNGOLOGY | Facility: CLINIC | Age: 84
End: 2019-04-02
Payer: MEDICARE

## 2019-04-02 VITALS — DIASTOLIC BLOOD PRESSURE: 74 MMHG | HEART RATE: 50 BPM | SYSTOLIC BLOOD PRESSURE: 130 MMHG

## 2019-04-02 DIAGNOSIS — S09.91XS TRAUMA OF EAR CANAL, SEQUELA: Primary | ICD-10-CM

## 2019-04-02 DIAGNOSIS — H61.22 IMPACTED CERUMEN, LEFT EAR: ICD-10-CM

## 2019-04-02 PROCEDURE — 69210 PR REMOVAL IMPACTED CERUMEN REQUIRING INSTRUMENTATION, UNILATERAL: ICD-10-PCS | Mod: HCNC,S$GLB,, | Performed by: OTOLARYNGOLOGY

## 2019-04-02 PROCEDURE — 99999 PR PBB SHADOW E&M-EST. PATIENT-LVL IV: CPT | Mod: PBBFAC,HCNC,, | Performed by: OTOLARYNGOLOGY

## 2019-04-02 PROCEDURE — 99499 NO LOS: ICD-10-PCS | Mod: HCNC,S$GLB,, | Performed by: OTOLARYNGOLOGY

## 2019-04-02 PROCEDURE — 99499 UNLISTED E&M SERVICE: CPT | Mod: HCNC,S$GLB,, | Performed by: OTOLARYNGOLOGY

## 2019-04-02 PROCEDURE — 99999 PR PBB SHADOW E&M-EST. PATIENT-LVL IV: ICD-10-PCS | Mod: PBBFAC,HCNC,, | Performed by: OTOLARYNGOLOGY

## 2019-04-02 PROCEDURE — 69210 REMOVE IMPACTED EAR WAX UNI: CPT | Mod: HCNC,S$GLB,, | Performed by: OTOLARYNGOLOGY

## 2019-04-02 NOTE — LETTER
April 3, 2019      Thiago Gibbs MD  [address]  [facility] [address]           [facility] - Otorhinolaryngology  [address]  [facility] [address]  Phone: [phone]  Fax: [phone]          Patient: Javier Valles   MR Number: [id number]   YOB: 1930   Date of Visit: 4/2/2019       Dear Dr. Thiago Gibbs:    Thank you for referring Javier Valles to me for evaluation. Attached you will find relevant portions of my assessment and plan of care.    If you have questions, please do not hesitate to call me. I look forward to following Javier Valles along with you.    Sincerely,    Nicholas Garcia III, MD    Enclosure  CC:  No Recipients    If you would like to receive this communication electronically, please contact [email or web address] or [phone] to request more information on [employer] Link access.    For providers and/or their staff who would like to refer a patient to [facility], please contact us through our one-stop-shop provider referral line, [facility] [place], at [phone].    If you feel you have received this communication in error or would no longer like to receive these types of communications, please e-mail [email or web address]

## 2019-04-02 NOTE — PROGRESS NOTES
CC:  Right ear discomfort  HPI: Mr. Valles is an 88-year-old  gentleman indicates a hard trip to the [facility], endured recently.  He indicates the auto-cleaning of his right ear with a cotton swab which was applied deeply to his ear canal causing pain and discomfort ( and some bleeding) which lasted about 10 days.   His ear feels slightly better now.  He indicates treatment of chronic postnasal drip symptoms with Atrovent 0.03% nasal spray.  He asks my opinion about this medication.    His medication list includes Aricept, Pacerone and Eliquis.    Past Medical History:   Diagnosis Date    *Atrial flutter 10/3/13    Anticoagulant long-term use     Aspirin only at this time    Arthritis     Atrial fibrillation     Atrial flutter     Blood transfusion     ?    BPH (benign prostatic hypertrophy)     Carotid artery disease     2008: [place] There is 40 - 49% right Internal Carotid stenosis.  There is 20 - 39% left Internal Carotid stenosis.       Chronic kidney disease     Coronary artery disease     DDD (degenerative disc disease) 6/25/2015    Degenerative disc disease     Elevated PSA     Glaucoma     Hyperlipidemia     Hypertension     Lumbar stenosis     lumbar spinal stenosis    NSTEMI (non-ST elevated myocardial infarction) 11/3/2013    Pacemaker 11/2013    Peripheral neuropathy     - S/P right ILIAC stent 1993;      Retinal detachment     Squamous cell carcinoma     left leg    Syncope and collapse: orthostatic with hypotension 10/9/2015     Current Outpatient Medications on File Prior to Visit   Medication Sig Dispense Refill    amiodarone (PACERONE) 200 MG Tab Take 1 tablet (200 mg total) by mouth once daily. 30 tablet 11    amLODIPine (NORVASC) 5 MG tablet Take 1 tablet (5 mg total) by mouth once daily. 90 tablet 3    apixaban 2.5 mg Tab Take 1 tablet (2.5 mg total) by mouth 2 (two) times daily. 180 tablet 3    ascorbic acid (VITAMIN C) 1000 MG tablet Take 1,000 mg by mouth every  morning.       brimonidine 0.2% (ALPHAGAN) 0.2 % Drop Place 1 drop into the left eye 3 (three) times daily. 30 mL 3    brinzolamide (AZOPT) 1 % ophthalmic suspension Place 1 drop into both eyes 3 (three) times daily.      buPROPion (WELLBUTRIN XL) 150 MG TB24 tablet TAKE ONE TABLET BY MOUTH ONCE DAILY 30 tablet 11    co-enzyme Q-10 30 mg capsule Take 30 mg by mouth once daily.       donepezil (ARICEPT) 5 MG tablet Take 1 tablet (5 mg total) by mouth every evening. 30 tablet 11    dorzolamide (TRUSOPT) 2 % ophthalmic solution Place 1 drop into both eyes 3 (three) times daily. Please fill today - this evening if possible - pt is leaving town early tomorrow 30 mL 3    erythromycin (ROMYCIN) ophthalmic ointment Apply to affected eye daily as needed 3.5 g 6    ipratropium (ATROVENT) 0.03 % nasal spray 1-2 sprays by Nasal route 2 (two) times daily. 30 mL 1    latanoprost 0.005 % ophthalmic solution Place 1 drop into the left eye every evening. 3 mL 3    losartan (COZAAR) 50 MG tablet Take 1 tablet (50 mg total) by mouth 2 (two) times daily. 180 tablet 3    lutein 20 mg Cap Take 20 mg by mouth once daily.       MULTIVITAMINS,THER W-MINERALS (VITAMINS & MINERALS ORAL) Take 1 tablet by mouth every morning.       oxyCODONE-acetaminophen (PERCOCET) 5-325 mg per tablet Take 1 tablet by mouth every 6 (six) hours as needed for Pain. 30 tablet 0    polycarbophil (FIBERCON) 625 mg tablet Take 625 mg by mouth 2 (two) times daily.      rosuvastatin (CRESTOR) 20 MG tablet Take 1 tablet (20 mg total) by mouth every evening. 90 tablet 3     No current facility-administered medications on file prior to visit.          PE:  Blood pressure 130/74 pulse 50 height 5 ft 8 in weight 189 lb  General:  Alert and oriented gentleman in no acute distress; he is articulate  Both ears were examined under the microscope in the micro procedure room  Procedure:  Half of a dried scab was removed from the floor of the right ear canal with  micro forceps.    Right eardrum is intact and clear as visualized in the right middle ear space is well aerated.  There is no evidence of otitis externa, cerumen impaction of the ear canal, otitis externa or myringitis.  A cerumen impaction is removed from left ear canal. Left eardrum is intact and clear as visualized directly.  Left middle ear space is aerated.    Some residual cerumen and keratin debris ( minor amount) is left on the canal floor.      DIAGNOSIS:     ICD-10-CM ICD-9-CM    1. Trauma of ear canal, sequela S09.91XS 908.9    2. Impacted cerumen, left ear H61.22 380.4      PLAN: 1/2 scab removed from AD eac; cerumen removed from AS eac  Pt. reassured  RTC prn

## 2019-04-04 ENCOUNTER — PATIENT MESSAGE (OUTPATIENT)
Dept: INTERNAL MEDICINE | Facility: CLINIC | Age: 84
End: 2019-04-04

## 2019-04-04 RX ORDER — AMLODIPINE BESYLATE 5 MG/1
5 TABLET ORAL DAILY
Qty: 90 TABLET | Refills: 3 | Status: SHIPPED | OUTPATIENT
Start: 2019-04-04 | End: 2020-04-05 | Stop reason: SDUPTHER

## 2019-04-05 ENCOUNTER — PATIENT MESSAGE (OUTPATIENT)
Dept: INTERNAL MEDICINE | Facility: CLINIC | Age: 84
End: 2019-04-05

## 2019-04-15 ENCOUNTER — PES CALL (OUTPATIENT)
Dept: ADMINISTRATIVE | Facility: CLINIC | Age: 84
End: 2019-04-15

## 2019-04-15 NOTE — PROGRESS NOTES
PAST MEDICAL HISTORY:   Coronary artery disease with previous bypass surgery, stents in 2013, and then a  subsequent non-ST MI due to in-stent stenosis of obtuse marginal for which angioplasty was performed.  Hypertension.  Hyperlipidemia.  Peripheral vascular disease with stent of the right iliac.  Venous Reflux  Chronic kidney disease with secondary hyperparathyroid.  BPH with elevated PSA.  Carotid artery disease   Osteoarthritis of the knees  Arrhythmia disorder for which he is status post pacemaker and defibrillator for sick sinus syndrome, status post radiofrequency ablation for atrial flutter, and on amiodarone for atrial flutter/fibrillation/PVCs.  Gout.  Bilateral inguinal hernia repair.  Left knee surgery.  Glaucoma.     SOCIAL HISTORY:  Tobacco use, none.  Alcohol use, a couple of drinks a day.     CURRENT MEDICATIONS:  Amiodarone 200 mg daily.  Amlodipine 5 mg daily.  Aspirin 81 mg daily.  Aricept 5 mg daily.  Atrovent nasal spray as needed.  Losartan 50 mg daily.  Crestor 20 mg daily.  Eliquis 2.5 mg twice a day          Addendum  He is known to have carotid artery disease and will arrange for repeat carotid Doppler however the past was worse on the right           REASON FOR VISIT:  This is an 88-year-old male to actually address a number of   issues.    The main reason is that he has ongoing pain involving the left side of his back   that extends to the right.  It is at the level where he has the spinal cord   generator.  He brings up the fact that in September and November 2017, he had a   spinal cord generator placed in for lumbar spinal stenosis.  It definitely   helped out pain that he was having involving his both legs in which he still   does not feel, but since having it placed he has been feeling a pain in that   area that is around the generator in the left lower back that extends to the   right.  He is known to have based on a CT scan in March 2016 significant and   moderate-to-severe spinal  stenosis and neural foraminal stenosis and facet   arthropathy that ranged from L2-L3 and L5-S1.  He feels that the pain is getting   worse.  In December 2018 and January 2019, he underwent facet joint injections   and a radiofrequency ablation involving the lumbar vertebrae, did not find it to   be all that helpful.  Only time he got real good relief is the other day taking   oxycodone that was given to him a few months ago when he was getting ready to   go on trip, but this is the first time he has taken it.    However, he just mentions that he is constantly feeling cold all the time   internally, has to have the temperature up in his house and his hands and feet   will feel cold.  He feels that the vision in his left eye is getting worse and   he does feel shortness of breath with any physical activity, although at times   his physical activity mainly seems more limited because of back pain.    His wife who is with him states that he is no longer taking Wellbutrin.  He   feels that he is depressed.  He has not been eating much and seems to be off   balance.  Although he does not feel pain in his legs, he does feel that his legs   are weak.    His medical history is outlined above as well as his medication list.    PHYSICAL EXAMINATION:  VITAL SIGNS:  Weight is 193 pounds, pulse 60, blood pressure 122/70.  LUNGS:  Clear.  HEART:  Regular rate and rhythm.  ABDOMEN:  Active bowel sounds, soft, and nontender.  NEUROLOGIC:  I am unable to elicit knee and ankle jerk reflex.  He has 2+ pedal   pulses.  He is not able to do a Romberg testing.  He can walk on his tippy toes   as well as his heels with me holding his hands, but it does appear to be   difficult, but there is no evidence of a drop foot.    IMPRESSION:  1. Lumbar spondylosis with spinal stenosis and chronic pain.  2. Cold feeling.  3. Dyspnea on exertion.  4. Arrhythmia with pacemaker placement and history of atrial   fibrillation/flutter.  5. Coronary artery  disease with chronic diastolic heart failure.    PLAN:  Today, we will repeat a chemistry, CBC, BMP and get a B12 again, although   this was done in October, arrange for a CT of the lumbar spine and will give   him a trial of mirtazapine to take at bedtime 15 mg.  In the past, he had been   placed on the medicines of Cymbalta and Zoloft.        JAM/HN  dd: 04/16/2019 12:06:53 (CDT)  td: 04/17/2019 08:17:59 (CDT)  Doc ID   #[id number]  Job ID #[id number]    CC:

## 2019-04-16 ENCOUNTER — OFFICE VISIT (OUTPATIENT)
Dept: INTERNAL MEDICINE | Facility: CLINIC | Age: 84
End: 2019-04-16
Payer: MEDICARE

## 2019-04-16 ENCOUNTER — CLINICAL SUPPORT (OUTPATIENT)
Dept: CARDIOLOGY | Facility: CLINIC | Age: 84
End: 2019-04-16
Attending: INTERNAL MEDICINE
Payer: MEDICARE

## 2019-04-16 ENCOUNTER — LAB VISIT (OUTPATIENT)
Dept: LAB | Facility: HOSPITAL | Age: 84
End: 2019-04-16
Attending: INTERNAL MEDICINE
Payer: MEDICARE

## 2019-04-16 VITALS
DIASTOLIC BLOOD PRESSURE: 74 MMHG | WEIGHT: 193 LBS | SYSTOLIC BLOOD PRESSURE: 128 MMHG | HEART RATE: 56 BPM | OXYGEN SATURATION: 98 % | BODY MASS INDEX: 29.25 KG/M2 | HEIGHT: 68 IN

## 2019-04-16 DIAGNOSIS — M48.061 SPINAL STENOSIS OF LUMBAR REGION WITHOUT NEUROGENIC CLAUDICATION: Primary | ICD-10-CM

## 2019-04-16 DIAGNOSIS — R06.09 DOE (DYSPNEA ON EXERTION): ICD-10-CM

## 2019-04-16 DIAGNOSIS — I25.810 CORONARY ARTERY DISEASE INVOLVING CORONARY BYPASS GRAFT OF NATIVE HEART WITHOUT ANGINA PECTORIS: ICD-10-CM

## 2019-04-16 DIAGNOSIS — I50.32 CHRONIC DIASTOLIC HEART FAILURE: ICD-10-CM

## 2019-04-16 DIAGNOSIS — I65.23 BILATERAL CAROTID ARTERY STENOSIS: ICD-10-CM

## 2019-04-16 DIAGNOSIS — Z95.0 BIVENTRICULAR CARDIAC PACEMAKER IN SITU: ICD-10-CM

## 2019-04-16 DIAGNOSIS — N18.30 CKD (CHRONIC KIDNEY DISEASE) STAGE 3, GFR 30-59 ML/MIN: ICD-10-CM

## 2019-04-16 DIAGNOSIS — R68.89 SENSATION OF FEELING COLD: ICD-10-CM

## 2019-04-16 DIAGNOSIS — M54.50 LOW BACK PAIN, NON-SPECIFIC: ICD-10-CM

## 2019-04-16 DIAGNOSIS — I48.19 PERSISTENT ATRIAL FIBRILLATION: ICD-10-CM

## 2019-04-16 DIAGNOSIS — M48.061 SPINAL STENOSIS OF LUMBAR REGION WITHOUT NEUROGENIC CLAUDICATION: ICD-10-CM

## 2019-04-16 LAB
ALBUMIN SERPL BCP-MCNC: 4.1 G/DL (ref 3.5–5.2)
ALP SERPL-CCNC: 66 U/L (ref 55–135)
ALT SERPL W/O P-5'-P-CCNC: 19 U/L (ref 10–44)
ANION GAP SERPL CALC-SCNC: 7 MMOL/L (ref 8–16)
AST SERPL-CCNC: 27 U/L (ref 10–40)
BASOPHILS # BLD AUTO: 0.07 K/UL (ref 0–0.2)
BASOPHILS NFR BLD: 0.9 % (ref 0–1.9)
BILIRUB SERPL-MCNC: 1.7 MG/DL (ref 0.1–1)
BNP SERPL-MCNC: 394 PG/ML (ref 0–99)
BUN SERPL-MCNC: 27 MG/DL (ref 8–23)
CALCIUM SERPL-MCNC: 10.1 MG/DL (ref 8.7–10.5)
CHLORIDE SERPL-SCNC: 112 MMOL/L (ref 95–110)
CO2 SERPL-SCNC: 22 MMOL/L (ref 23–29)
CREAT SERPL-MCNC: 2 MG/DL (ref 0.5–1.4)
DIFFERENTIAL METHOD: ABNORMAL
EOSINOPHIL # BLD AUTO: 0.5 K/UL (ref 0–0.5)
EOSINOPHIL NFR BLD: 6 % (ref 0–8)
ERYTHROCYTE [DISTWIDTH] IN BLOOD BY AUTOMATED COUNT: 14.3 % (ref 11.5–14.5)
EST. GFR  (AFRICAN AMERICAN): 33.5 ML/MIN/1.73 M^2
EST. GFR  (NON AFRICAN AMERICAN): 28.9 ML/MIN/1.73 M^2
FOLATE SERPL-MCNC: 15.2 NG/ML (ref 4–24)
GLUCOSE SERPL-MCNC: 83 MG/DL (ref 70–110)
HCT VFR BLD AUTO: 39.7 % (ref 40–54)
HGB BLD-MCNC: 12.5 G/DL (ref 14–18)
IMM GRANULOCYTES # BLD AUTO: 0.04 K/UL (ref 0–0.04)
IMM GRANULOCYTES NFR BLD AUTO: 0.5 % (ref 0–0.5)
LEFT ARM DIASTOLIC BLOOD PRESSURE: 74 MMHG
LEFT ARM SYSTOLIC BLOOD PRESSURE: 126 MMHG
LEFT CBA DIAS: 11 CM/S
LEFT CBA SYS: 73 CM/S
LEFT CCA DIST DIAS: 19 CM/S
LEFT CCA DIST SYS: 94 CM/S
LEFT CCA MID DIAS: 16 CM/S
LEFT CCA MID SYS: 100 CM/S
LEFT CCA PROX DIAS: 11 CM/S
LEFT CCA PROX SYS: 82 CM/S
LEFT ECA DIAS: 10 CM/S
LEFT ECA SYS: 263 CM/S
LEFT ICA DIST DIAS: 10 CM/S
LEFT ICA DIST SYS: 60 CM/S
LEFT ICA MID DIAS: 22 CM/S
LEFT ICA MID SYS: 80 CM/S
LEFT ICA PROX DIAS: 10 CM/S
LEFT ICA PROX SYS: 94 CM/S
LEFT VERTEBRAL DIAS: 19 CM/S
LEFT VERTEBRAL SYS: 66 CM/S
LYMPHOCYTES # BLD AUTO: 0.9 K/UL (ref 1–4.8)
LYMPHOCYTES NFR BLD: 11.1 % (ref 18–48)
MCH RBC QN AUTO: 33.8 PG (ref 27–31)
MCHC RBC AUTO-ENTMCNC: 31.5 G/DL (ref 32–36)
MCV RBC AUTO: 107 FL (ref 82–98)
MONOCYTES # BLD AUTO: 0.6 K/UL (ref 0.3–1)
MONOCYTES NFR BLD: 7.1 % (ref 4–15)
NEUTROPHILS # BLD AUTO: 5.8 K/UL (ref 1.8–7.7)
NEUTROPHILS NFR BLD: 74.4 % (ref 38–73)
NRBC BLD-RTO: 0 /100 WBC
OHS CV CAROTID RIGHT ICA EDV HIGHEST: 44
OHS CV CAROTID ULTRASOUND LEFT ICA/CCA RATIO: 0.94
OHS CV CAROTID ULTRASOUND RIGHT ICA/CCA RATIO: 2.8
OHS CV PV CAROTID LEFT HIGHEST CCA: 100
OHS CV PV CAROTID LEFT HIGHEST ICA: 94
OHS CV PV CAROTID RIGHT HIGHEST CCA: 101
OHS CV PV CAROTID RIGHT HIGHEST ICA: 283
OHS CV US CAROTID LEFT HIGHEST EDV: 22
PLATELET # BLD AUTO: 99 K/UL (ref 150–350)
PMV BLD AUTO: 12.3 FL (ref 9.2–12.9)
POTASSIUM SERPL-SCNC: 4.5 MMOL/L (ref 3.5–5.1)
PROT SERPL-MCNC: 7 G/DL (ref 6–8.4)
RBC # BLD AUTO: 3.7 M/UL (ref 4.6–6.2)
RIGHT ARM DIASTOLIC BLOOD PRESSURE: 74 MMHG
RIGHT ARM SYSTOLIC BLOOD PRESSURE: 128 MMHG
RIGHT CBA DIAS: 12 CM/S
RIGHT CBA SYS: 77 CM/S
RIGHT CCA DIST DIAS: 10 CM/S
RIGHT CCA DIST SYS: 101 CM/S
RIGHT CCA MID DIAS: 10 CM/S
RIGHT CCA MID SYS: 88 CM/S
RIGHT CCA PROX DIAS: 9 CM/S
RIGHT CCA PROX SYS: 85 CM/S
RIGHT ECA DIAS: 0 CM/S
RIGHT ECA SYS: 131 CM/S
RIGHT ICA DIST DIAS: 11 CM/S
RIGHT ICA DIST SYS: 108 CM/S
RIGHT ICA MID DIAS: 44 CM/S
RIGHT ICA MID SYS: 283 CM/S
RIGHT ICA PROX DIAS: 26 CM/S
RIGHT ICA PROX SYS: 113 CM/S
RIGHT VERTEBRAL DIAS: 8 CM/S
RIGHT VERTEBRAL SYS: 45 CM/S
SODIUM SERPL-SCNC: 141 MMOL/L (ref 136–145)
T4 FREE SERPL-MCNC: 1.07 NG/DL (ref 0.71–1.51)
TSH SERPL DL<=0.005 MIU/L-ACNC: 3.52 UIU/ML (ref 0.4–4)
VIT B12 SERPL-MCNC: 462 PG/ML (ref 210–950)
WBC # BLD AUTO: 7.78 K/UL (ref 3.9–12.7)

## 2019-04-16 PROCEDURE — 99999 PR PBB SHADOW E&M-EST. PATIENT-LVL IV: ICD-10-PCS | Mod: PBBFAC,HCNC,, | Performed by: INTERNAL MEDICINE

## 2019-04-16 PROCEDURE — 1101F PR PT FALLS ASSESS DOC 0-1 FALLS W/OUT INJ PAST YR: ICD-10-PCS | Mod: HCNC,CPTII,S$GLB, | Performed by: INTERNAL MEDICINE

## 2019-04-16 PROCEDURE — 84439 ASSAY OF FREE THYROXINE: CPT | Mod: HCNC

## 2019-04-16 PROCEDURE — 80053 COMPREHEN METABOLIC PANEL: CPT | Mod: HCNC

## 2019-04-16 PROCEDURE — 99214 OFFICE O/P EST MOD 30 MIN: CPT | Mod: HCNC,S$GLB,, | Performed by: INTERNAL MEDICINE

## 2019-04-16 PROCEDURE — 36415 COLL VENOUS BLD VENIPUNCTURE: CPT | Mod: HCNC,PO

## 2019-04-16 PROCEDURE — 99214 PR OFFICE/OUTPT VISIT, EST, LEVL IV, 30-39 MIN: ICD-10-PCS | Mod: HCNC,S$GLB,, | Performed by: INTERNAL MEDICINE

## 2019-04-16 PROCEDURE — 82746 ASSAY OF FOLIC ACID SERUM: CPT | Mod: HCNC

## 2019-04-16 PROCEDURE — 99499 RISK ADDL DX/OHS AUDIT: ICD-10-PCS | Mod: HCNC,S$GLB,, | Performed by: INTERNAL MEDICINE

## 2019-04-16 PROCEDURE — 99999 PR PBB SHADOW E&M-EST. PATIENT-LVL IV: CPT | Mod: PBBFAC,HCNC,, | Performed by: INTERNAL MEDICINE

## 2019-04-16 PROCEDURE — 1101F PT FALLS ASSESS-DOCD LE1/YR: CPT | Mod: HCNC,CPTII,S$GLB, | Performed by: INTERNAL MEDICINE

## 2019-04-16 PROCEDURE — 93880 CV US DOPPLER CAROTID (CUPID ONLY): ICD-10-PCS | Mod: HCNC,S$GLB,, | Performed by: INTERNAL MEDICINE

## 2019-04-16 PROCEDURE — 83880 ASSAY OF NATRIURETIC PEPTIDE: CPT | Mod: HCNC

## 2019-04-16 PROCEDURE — 82607 VITAMIN B-12: CPT | Mod: HCNC

## 2019-04-16 PROCEDURE — 99499 UNLISTED E&M SERVICE: CPT | Mod: HCNC,S$GLB,, | Performed by: INTERNAL MEDICINE

## 2019-04-16 PROCEDURE — 93880 EXTRACRANIAL BILAT STUDY: CPT | Mod: HCNC,S$GLB,, | Performed by: INTERNAL MEDICINE

## 2019-04-16 PROCEDURE — 84443 ASSAY THYROID STIM HORMONE: CPT | Mod: HCNC

## 2019-04-16 PROCEDURE — 85025 COMPLETE CBC W/AUTO DIFF WBC: CPT | Mod: HCNC

## 2019-04-16 RX ORDER — MIRTAZAPINE 15 MG/1
15 TABLET, FILM COATED ORAL NIGHTLY
Qty: 30 TABLET | Refills: 3 | Status: SHIPPED | OUTPATIENT
Start: 2019-04-16 | End: 2019-05-22

## 2019-04-17 ENCOUNTER — TELEPHONE (OUTPATIENT)
Dept: ELECTROPHYSIOLOGY | Facility: CLINIC | Age: 84
End: 2019-04-17

## 2019-04-17 NOTE — TELEPHONE ENCOUNTER
Spoke to patient. Patient reports that he has been feeling SOB for quite some time now. He denies being on any medication to remove extra fluid in the body. Patient reports that he reviewed his lab work in his [facility] today and was a bit alarmed with his BNP results.    Informed patient that BNP was actually ordered by . Patient reports that he has called 's office and is awaiting a return call. Patient is requesting for Results of BNP be sent to - keyur CARABALLO can review recent BNP results and call patient if any new treatment plan will need to be initiated.     EpIC message sent to .      Yoly VILLASENOR CCM

## 2019-04-17 NOTE — TELEPHONE ENCOUNTER
----- Message from Noemí Joseph sent at 4/17/2019  4:02 PM CDT -----  Contact: Self      ----- Message -----  From: Guillermina Allen  Sent: 4/17/2019  11:19 AM  To: Malachi Rondon Staff    .Test Results    Type of Test: BNP levels  Date of Test: 4/16  Communication Preference:  [phone]  Additional Information: Concern about the levels, states has had SOB.

## 2019-04-22 ENCOUNTER — HOSPITAL ENCOUNTER (OUTPATIENT)
Dept: RADIOLOGY | Facility: HOSPITAL | Age: 84
Discharge: HOME | End: 2019-04-22
Attending: INTERNAL MEDICINE
Payer: MEDICARE

## 2019-04-22 DIAGNOSIS — M54.50 LOW BACK PAIN, NON-SPECIFIC: ICD-10-CM

## 2019-04-22 PROCEDURE — 72131 CT LUMBAR SPINE W/O DYE: CPT | Mod: 26,HCNC,, | Performed by: RADIOLOGY

## 2019-04-22 PROCEDURE — 72131 CT LUMBAR SPINE W/O DYE: CPT | Mod: TC,HCNC

## 2019-04-22 PROCEDURE — 72131 CT LUMBAR SPINE WITHOUT CONTRAST: ICD-10-PCS | Mod: 26,HCNC,, | Performed by: RADIOLOGY

## 2019-04-24 ENCOUNTER — TELEPHONE (OUTPATIENT)
Dept: PAIN MEDICINE | Facility: CLINIC | Age: 84
End: 2019-04-24

## 2019-04-24 ENCOUNTER — PATIENT OUTREACH (OUTPATIENT)
Dept: OTHER | Facility: OTHER | Age: 84
End: 2019-04-24

## 2019-04-24 NOTE — TELEPHONE ENCOUNTER
----- Message from Bri Siegel sent at 4/24/2019 12:32 PM CDT -----  Contact: MARGRET MARTIN [[id number]]  Name of Who is Calling: MARGRET MARTIN [[id number]]    What is the request in detail: Patient is requesting that Dr. Gallagher call him prior to his visit or attend the visit. Please call to advise.       Can the clinic reply by [facility]: no      What Number to Call Back if not in [facility]: [phone]

## 2019-04-24 NOTE — TELEPHONE ENCOUNTER
Staff contacted and spoke with patient in regards to his message. Patient stated he was not calling to have provider Dr. Hodges to call him, pt wanted to know if JAMAL Jeniffer Mueller and Provider can get together and look over his CT Scan results and determine from there his plan of care.     Staff informed pt that they will make note of it     Pt verbalized understanding.

## 2019-04-24 NOTE — PROGRESS NOTES
Last 5 Patient Entered Readings                                      Current 30 Day Average: 118/64     Recent Readings 4/19/2019 4/11/2019 4/6/2019 4/3/2019 3/25/2019    SBP (mmHg) 119 130 124 103 116    DBP (mmHg) 66 72 70 65 45    Pulse 50 55 50 50 56          Digital Medicine: Health  Follow Up    Lifestyle Modifications:    1.Dietary Modifications (Sodium intake <2,000mg/day, food labels, dining out): Patient continues monitoring his sodium intake. He denies symptoms/side effects.     2.Physical Activity: Deferred    3.Medication Therapy: Patient has been compliant with the medication regimen. Patient is doing well on his current BP medication regimen. He denies symptoms/side effects.     4.Patient has the following medication side effects/concerns:   (Frequency/Alleviating factors/Precipitating factors, etc.)     Patient does not like receiving the BP reminder every 8 days. He stated that he takes his readings often with his home meter and he keeps a log for himself. I explained that the reason for the message is due to it being a requirement of the program for patients to submit at least 1 BP reading each week. I explained that I would be willing to work on this with him as long as he tries to submit a few readings each month. For now, he will just ignore the reminder message. He will let me know if he decides to drop from the program but for now, he would like to remain active.     Follow up with Mr. Javier OLMOS Hai completed. No further questions or concerns. Will continue to follow up to achieve health goals.

## 2019-04-25 ENCOUNTER — INITIAL CONSULT (OUTPATIENT)
Dept: OPHTHALMOLOGY | Facility: CLINIC | Age: 84
End: 2019-04-25
Payer: MEDICARE

## 2019-04-25 DIAGNOSIS — Z96.1 PSEUDOPHAKIA OF LEFT EYE: ICD-10-CM

## 2019-04-25 DIAGNOSIS — Z97.0 PROSTHETIC EYE GLOBE: ICD-10-CM

## 2019-04-25 DIAGNOSIS — H40.1122 PRIMARY OPEN-ANGLE GLAUCOMA, LEFT EYE, MODERATE STAGE: Primary | ICD-10-CM

## 2019-04-25 DIAGNOSIS — H43.812 POSTERIOR VITREOUS DETACHMENT OF LEFT EYE: ICD-10-CM

## 2019-04-25 DIAGNOSIS — H35.372 EPIRETINAL MEMBRANE, LEFT EYE: ICD-10-CM

## 2019-04-25 DIAGNOSIS — H33.002 RHEGMATOGENOUS RETINAL DETACHMENT OF LEFT EYE: ICD-10-CM

## 2019-04-25 PROCEDURE — 92012 PR EYE EXAM, EST PATIENT,INTERMED: ICD-10-PCS | Mod: HCNC,S$GLB,, | Performed by: OPHTHALMOLOGY

## 2019-04-25 PROCEDURE — 99999 PR PBB SHADOW E&M-EST. PATIENT-LVL II: ICD-10-PCS | Mod: PBBFAC,HCNC,, | Performed by: OPHTHALMOLOGY

## 2019-04-25 PROCEDURE — 99999 PR PBB SHADOW E&M-EST. PATIENT-LVL II: CPT | Mod: PBBFAC,HCNC,, | Performed by: OPHTHALMOLOGY

## 2019-04-25 PROCEDURE — 92012 INTRM OPH EXAM EST PATIENT: CPT | Mod: HCNC,S$GLB,, | Performed by: OPHTHALMOLOGY

## 2019-04-25 NOTE — LETTER
April 25, 2019      Sena Schneider MD  [address]  [facility] [address]           [facility] - Ophthalmology  [address]  [facility] [address]  Phone: [phone]  Fax: [phone]          Patient: Javier Valles   MR Number: [id number]   YOB: 1930   Date of Visit: 4/25/2019       Dear Dr. Sena Schneider:    Thank you for referring Javier Valles to me for evaluation. Attached you will find relevant portions of my assessment and plan of care.    If you have questions, please do not hesitate to call me. I look forward to following Javier Valles along with you.    Sincerely,    Zeina Dominguez MD    Enclosure  CC:  No Recipients    If you would like to receive this communication electronically, please contact [email or web address] or [phone] to request more information on [employer] Link access.    For providers and/or their staff who would like to refer a patient to [facility], please contact us through our one-stop-shop provider referral line, [facility], at [phone].    If you feel you have received this communication in error or would no longer like to receive these types of communications, please e-mail [email or web address]

## 2019-04-25 NOTE — PROGRESS NOTES
HPI     Ref. By Dr Schneider     Pt here for evaluation of right socket having a lot of mucous drainage.   Last prosthesis was made 5 years ago [employer]. Using erythromycin   ointment prn  No pain or irritation   H/o injury playing hand ball 1951        States eye drops burn when first put in       Meds:   Brimonidine TID OS   Latanoprost QHS OS   Dorzolamide TID OS     1) POAG   2) PCIOL OS   3) Prosthesis OD   4) ERM OS   5) Hx Rhegmatogenous RD OS   6) Psuedo Mac Hole OS       Last edited by Yudelka Campos on 4/25/2019  8:20 AM. (History)            Assessment /Plan     For exam results, see Encounter Report.    Primary open-angle glaucoma, left eye, moderate stage    Posterior vitreous detachment of left eye    Epiretinal membrane, left eye    Rhegmatogenous retinal detachment of left eye    Pseudophakia of left eye    Prosthetic eye globe       The patient is a pleasant 88 year old male with a history of a handball l injury many years prior.  He is here for evaluation of a new prosthesis of the right socket.    He has had prior prosthesis's made by [employer].     He is currently having irritation and mucous discharge of the right side.    Prescription given to the patient for new prosthesis of the right side.    Return as needed    POAG followed by Dr. Schneider

## 2019-05-01 ENCOUNTER — TELEPHONE (OUTPATIENT)
Dept: INTERNAL MEDICINE | Facility: CLINIC | Age: 84
End: 2019-05-01

## 2019-05-01 ENCOUNTER — DOCUMENTATION ONLY (OUTPATIENT)
Dept: INTERNAL MEDICINE | Facility: CLINIC | Age: 84
End: 2019-05-01

## 2019-05-01 DIAGNOSIS — R06.02 SOB (SHORTNESS OF BREATH): Primary | ICD-10-CM

## 2019-05-01 DIAGNOSIS — I51.9 LEFT VENTRICULAR DYSFUNCTION: ICD-10-CM

## 2019-05-01 NOTE — TELEPHONE ENCOUNTER
I talked to the patient    Set up 2D echo with Doppler    He has a pain clinic appointment on Friday

## 2019-05-01 NOTE — PROGRESS NOTES
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

## 2019-05-01 NOTE — TELEPHONE ENCOUNTER
----- Message from Hang Parmar sent at 5/1/2019  2:05 PM CDT -----  Contact: Patient [phone]  Patient is returning a phone call.  Who left a message for the patient: Dr. Gibbs  Does patient know what this is regarding:  He said it to give him some test results     Comments: He will be home until 6pm    Thank you

## 2019-05-03 ENCOUNTER — TELEPHONE (OUTPATIENT)
Dept: PAIN MEDICINE | Facility: CLINIC | Age: 84
End: 2019-05-03

## 2019-05-03 ENCOUNTER — HOSPITAL ENCOUNTER (OUTPATIENT)
Dept: CARDIOLOGY | Facility: CLINIC | Age: 84
Discharge: HOME | End: 2019-05-03
Attending: INTERNAL MEDICINE
Payer: MEDICARE

## 2019-05-03 VITALS
HEART RATE: 70 BPM | WEIGHT: 193 LBS | DIASTOLIC BLOOD PRESSURE: 70 MMHG | HEIGHT: 68 IN | BODY MASS INDEX: 29.25 KG/M2 | SYSTOLIC BLOOD PRESSURE: 130 MMHG

## 2019-05-03 DIAGNOSIS — R06.02 SOB (SHORTNESS OF BREATH): ICD-10-CM

## 2019-05-03 DIAGNOSIS — I51.9 LEFT VENTRICULAR DYSFUNCTION: ICD-10-CM

## 2019-05-03 LAB
ASCENDING AORTA: 3.65 CM
AV INDEX (PROSTH): 0.7
AV MEAN GRADIENT: 4.54 MMHG
AV PEAK GRADIENT: 9.99 MMHG
AV VALVE AREA: 2.65 CM2
AV VELOCITY RATIO: 0.7
BSA FOR ECHO PROCEDURE: 2.05 M2
CV ECHO LV RWT: 0.28 CM
DOP CALC AO PEAK VEL: 1.58 M/S
DOP CALC AO VTI: 31.26 CM
DOP CALC LVOT AREA: 3.8 CM2
DOP CALC LVOT DIAMETER: 2.2 CM
DOP CALC LVOT PEAK VEL: 1.1 M/S
DOP CALC LVOT STROKE VOLUME: 82.94 CM3
DOP CALCLVOT PEAK VEL VTI: 21.83 CM
E WAVE DECELERATION TIME: 249.88 MSEC
E/A RATIO: 1.22
ECHO LV POSTERIOR WALL: 0.79 CM (ref 0.6–1.1)
FRACTIONAL SHORTENING: 29 % (ref 28–44)
INTERVENTRICULAR SEPTUM: 0.97 CM (ref 0.6–1.1)
LA MAJOR: 6.33 CM
LA MINOR: 6.37 CM
LA WIDTH: 5.48 CM
LEFT ATRIUM SIZE: 5.5 CM
LEFT ATRIUM VOLUME INDEX: 80.8 ML/M2
LEFT ATRIUM VOLUME: 162.68 CM3
LEFT INTERNAL DIMENSION IN SYSTOLE: 4.03 CM (ref 2.1–4)
LEFT VENTRICLE DIASTOLIC VOLUME INDEX: 79.45 ML/M2
LEFT VENTRICLE DIASTOLIC VOLUME: 159.94 ML
LEFT VENTRICLE MASS INDEX: 95.3 G/M2
LEFT VENTRICLE SYSTOLIC VOLUME INDEX: 35.4 ML/M2
LEFT VENTRICLE SYSTOLIC VOLUME: 71.29 ML
LEFT VENTRICULAR INTERNAL DIMENSION IN DIASTOLE: 5.7 CM (ref 3.5–6)
LEFT VENTRICULAR MASS: 191.93 G
LV LATERAL E/E' RATIO: 5.09
MV PEAK A VEL: 0.46 M/S
MV PEAK E VEL: 0.56 M/S
PISA TR MAX VEL: 2.33 M/S
PULM VEIN S/D RATIO: 1.12
PV PEAK D VEL: 0.69 M/S
PV PEAK S VEL: 0.77 M/S
RA MAJOR: 5.51 CM
RA WIDTH: 5.86 CM
RIGHT VENTRICULAR END-DIASTOLIC DIMENSION: 4.47 CM
RV TISSUE DOPPLER FREE WALL SYSTOLIC VELOCITY 1 (APICAL 4 CHAMBER VIEW): 9.22 M/S
SINUS: 3.75 CM
STJ: 3.16 CM
TDI LATERAL: 0.11
TR MAX PG: 21.72 MMHG
TRICUSPID ANNULAR PLANE SYSTOLIC EXCURSION: 1.78 CM

## 2019-05-03 PROCEDURE — 93306 TRANSTHORACIC ECHO (TTE) COMPLETE (CUPID ONLY): ICD-10-PCS | Mod: HCNC,S$GLB,, | Performed by: INTERNAL MEDICINE

## 2019-05-03 PROCEDURE — 93306 TTE W/DOPPLER COMPLETE: CPT | Mod: HCNC,S$GLB,, | Performed by: INTERNAL MEDICINE

## 2019-05-04 ENCOUNTER — TELEPHONE (OUTPATIENT)
Dept: ENDOSCOPY | Facility: HOSPITAL | Age: 84
End: 2019-05-04

## 2019-05-04 NOTE — PROGRESS NOTES
Patient, Javier Valles (MRN #[id number]), presented with a recent Platelet count less than 150 K/uL consistent with the definition of thrombocytopenia (ICD10 - D69.6).    Platelets   Date Value Ref Range Status   04/16/2019 99 (L) 150 - 350 K/uL Final     The patient's thrombocytopenia was monitored, evaluated, addressed and/or treated. This addendum to the medical record is made on 05/04/2019.

## 2019-05-06 ENCOUNTER — OFFICE VISIT (OUTPATIENT)
Dept: PAIN MEDICINE | Facility: CLINIC | Age: 84
End: 2019-05-06
Payer: MEDICARE

## 2019-05-06 VITALS
WEIGHT: 188.69 LBS | HEART RATE: 52 BPM | HEIGHT: 68 IN | SYSTOLIC BLOOD PRESSURE: 95 MMHG | DIASTOLIC BLOOD PRESSURE: 65 MMHG | BODY MASS INDEX: 28.6 KG/M2 | TEMPERATURE: 97 F

## 2019-05-06 DIAGNOSIS — G89.4 CHRONIC PAIN SYNDROME: Primary | ICD-10-CM

## 2019-05-06 DIAGNOSIS — M47.816 LUMBAR SPONDYLOSIS: ICD-10-CM

## 2019-05-06 DIAGNOSIS — M96.1 FAILED BACK SURGICAL SYNDROME: ICD-10-CM

## 2019-05-06 PROCEDURE — 1101F PR PT FALLS ASSESS DOC 0-1 FALLS W/OUT INJ PAST YR: ICD-10-PCS | Mod: HCNC,CPTII,S$GLB, | Performed by: NURSE PRACTITIONER

## 2019-05-06 PROCEDURE — 99999 PR PBB SHADOW E&M-EST. PATIENT-LVL III: CPT | Mod: PBBFAC,HCNC,, | Performed by: NURSE PRACTITIONER

## 2019-05-06 PROCEDURE — 99999 PR PBB SHADOW E&M-EST. PATIENT-LVL III: ICD-10-PCS | Mod: PBBFAC,HCNC,, | Performed by: NURSE PRACTITIONER

## 2019-05-06 PROCEDURE — 99214 PR OFFICE/OUTPT VISIT, EST, LEVL IV, 30-39 MIN: ICD-10-PCS | Mod: HCNC,S$GLB,, | Performed by: NURSE PRACTITIONER

## 2019-05-06 PROCEDURE — 1101F PT FALLS ASSESS-DOCD LE1/YR: CPT | Mod: HCNC,CPTII,S$GLB, | Performed by: NURSE PRACTITIONER

## 2019-05-06 PROCEDURE — 99214 OFFICE O/P EST MOD 30 MIN: CPT | Mod: HCNC,S$GLB,, | Performed by: NURSE PRACTITIONER

## 2019-05-06 NOTE — PROGRESS NOTES
Chronic patient Established Note (Follow up visit)      SUBJECTIVE:    Javier Valles presents to the clinic for a follow-up appointment for lower back and bilateral leg pain.  I last saw him in February.  At that time, he had some benefit from left L2,3,4,5 RFA on 1/9/19.  He was having some pain surrounding IPG site.  Today, he says that his benefit from RFA did not last very long.  He is having pain to left buttocks.  He describes it as stabbing.  He did have an updated lumbar CT since last visit which shows left sided bulge at L5-S1 with NF narrowing.  He takes Percocet sparingly.  He is afraid to take more often due to opioid epidemic.  However, when he takes one pill, he has benefit for about 16 hours.  He continues to take Eliquis.  Since the last visit, Javier Valles states the pain has been worsening.  Current pain intensity is 7/10.    Pain Medications:  Robaxin 750 mg PRN muscle pain  Compounding cream  Percocet 5/325 mg PRN    Tried in past: gabapentin 100 mg TID, Percocet (helpful), Norco    - Anti-Coagulants: Eliquis (pacemaker)    Opioid Contract: no     report:  Not applicable    Pain Procedures:  Multiple JOMAR's with Dr. Lopez 4 yrs ago  4/28/17 Right L4-5 TF JOMAR- 100% relief   5/29/17 Left L5-S1 TF JOMAR  7/21/17 Caudal JOMAR- significant benefit  9/22/17 Lumbar SCS trial- 90% relief  12/18/18 Bilateral L2,3,4,5 MBB- 90% relief  1/9/19 Left L2,3,4,5 RFA- 70% relief    Spinal surgeries:  MIS laminectomy L3-4 and L4-5.     Physical Therapy/Home Exercise: no    Imaging:    Narrative     EXAMINATION:  CT LUMBAR SPINE WITHOUT CONTRAST    CLINICAL HISTORY:  Low back painLow back pain, prior surgery, new or progressive sx;    TECHNIQUE:  Contiguous axial images were obtained of the lumbar spine without intravenous contrast. Coronal and sagittal reformations were acquired.    COMPARISON:  Thoracolumbar spine radiograph 01/28/2019, CT lumbar spine 03/24/2016    FINDINGS:  Vertebral body heights are within  normal limits.  There is mild anterolisthesis of L4 on L5 similar to prior.  Postoperative changes of prior left-sided laminectomies are present at L3-4 and L4-5.  There is multilevel disc space height loss from T12-L1 through L5-S1, worst at L2-3 and L3-4.  No acute fractures.  There are degenerative Schmorl's nodes at the inferior endplate of T12, superior endplate of L1, inferior endplate of L3, and superior endplate of L4.    There is moderate calcific atherosclerosis of the aorta and iliac branch vessels.  There is infrarenal aortic ectasia just proximal to the bifurcation measuring up to 2.4 cm.  No aortic aneurysm.  There is ectasia of the right proximal common iliac artery measuring 2.1 cm.    Otherwise the imaged portions of the abdomen are unremarkable.    The paravertebral tissues are unremarkable.  Intervertebral disc demonstrate prominent vacuum disc formation at T12-L1, L1-L2, and L5-S1.  Degenerative changes are detailed as follows:    T12-L1 disc: There is bilateral facet arthrosis.  No significant spinal canal or neural foraminal stenosis.    L1-2 disc: Circumferential posterior disc bulge, bilateral facet arthrosis, and mild bilateral ligamentum flavum thickening results in mild central canal stenosis and mild bilateral neural foraminal narrowing.    L2-3 disc: Circumferential posterior disc bulge, prominent bilateral facet arthrosis, ligamentum flavum thickening result in moderate central canal stenosis and moderate left and mild right neural foraminal narrowing.    L3-4 disc: Postoperative changes left-sided laminectomy, bilateral facet arthrosis, circumferential posterior disc bulge with partially calcified central disc protrusion results in moderate central canal stenosis and mild bilateral neural foraminal narrowing.    L4-5 disc: Postoperative changes left-sided laminectomy, prominent bilateral facet arthrosis and circumferential posterior disc bulge resulting in moderate central canal  stenosis and severe left and moderate right neural foraminal narrowing.    L5-S1 disc: Asymmetric left posterior disc bulge and prominent bilateral facet arthrosis results in moderate central canal stenosis and severe bilateral neural foraminal narrowing noting the posterior disc bulge is in close approximation to the exiting left S1 nerve root.      Impression       Interval postoperative changes of left L3-4 and L4-5 laminectomies compared to CT lumbar spine 03/24/2016.    Multilevel degenerative changes of the lumbar spine as detailed above.    Aortoiliac atherosclerotic calcification with ectasia of the proximal right common iliac artery.           CMP  Sodium   Date Value Ref Range Status   04/16/2019 141 136 - 145 mmol/L Final     Potassium   Date Value Ref Range Status   04/16/2019 4.5 3.5 - 5.1 mmol/L Final     Chloride   Date Value Ref Range Status   04/16/2019 112 (H) 95 - 110 mmol/L Final     CO2   Date Value Ref Range Status   04/16/2019 22 (L) 23 - 29 mmol/L Final     Glucose   Date Value Ref Range Status   04/16/2019 83 70 - 110 mg/dL Final     BUN, Bld   Date Value Ref Range Status   04/16/2019 27 (H) 8 - 23 mg/dL Final     Creatinine   Date Value Ref Range Status   04/16/2019 2.0 (H) 0.5 - 1.4 mg/dL Final     Calcium   Date Value Ref Range Status   04/16/2019 10.1 8.7 - 10.5 mg/dL Final     Total Protein   Date Value Ref Range Status   04/16/2019 7.0 6.0 - 8.4 g/dL Final     Albumin   Date Value Ref Range Status   04/16/2019 4.1 3.5 - 5.2 g/dL Final     Total Bilirubin   Date Value Ref Range Status   04/16/2019 1.7 (H) 0.1 - 1.0 mg/dL Final     Comment:     For infants and newborns, interpretation of results should be based  on gestational age, weight and in agreement with clinical  observations.  Premature Infant recommended reference ranges:  Up to 24 hours.............<8.0 mg/dL  Up to 48 hours............<12.0 mg/dL  3-5 days..................<15.0 mg/dL  6-29 days.................<15.0 mg/dL        Alkaline Phosphatase   Date Value Ref Range Status   04/16/2019 66 55 - 135 U/L Final     AST   Date Value Ref Range Status   04/16/2019 27 10 - 40 U/L Final     ALT   Date Value Ref Range Status   04/16/2019 19 10 - 44 U/L Final     Anion Gap   Date Value Ref Range Status   04/16/2019 7 (L) 8 - 16 mmol/L Final     eGFR if    Date Value Ref Range Status   04/16/2019 33.5 (A) >60 mL/min/1.73 m^2 Final     eGFR if non    Date Value Ref Range Status   04/16/2019 28.9 (A) >60 mL/min/1.73 m^2 Final     Comment:     Calculation used to obtain the estimated glomerular filtration  rate (eGFR) is the CKD-EPI equation.            REVIEW OF SYSTEMS:    GENERAL:  No weight loss, malaise or fevers.  HEENT:  Negative for frequent or significant headaches.  NECK:  Negative for lumps, goiter, pain and significant neck swelling.  RESPIRATORY:  Negative for cough, wheezing or shortness of breath.  CARDIOVASCULAR:  Negative for chest pain, leg swelling or palpitations. Pacemaker placement.  A-fib.  GI:  Negative for abdominal discomfort, blood in stools or black stools or change in bowel habits.  MUSCULOSKELETAL:  See HPI.  SKIN:  Negative for lesions, rash, and itching.  PSYCH:  Negative for sleep disturbance, mood disorder and recent psychosocial stressors.  HEMATOLOGY/LYMPHOLOGY:  Negative for prolonged bleeding, bruising easily or swollen nodes.  NEURO:   No history of headaches, syncope, paralysis, seizures or tremors.  NEPH: CKD.  All other reviewed and negative other than HPI.    Past Medical History:  Past Medical History:   Diagnosis Date    *Atrial flutter 10/3/13    Anticoagulant long-term use     Aspirin only at this time    Arthritis     Atrial fibrillation     Atrial flutter     Blood transfusion     ?    BPH (benign prostatic hypertrophy)     Carotid artery disease     2008: [place] There is 40 - 49% right Internal Carotid stenosis.  There is 20 - 39% left Internal Carotid  stenosis.       Chronic kidney disease     Coronary artery disease     DDD (degenerative disc disease) 6/25/2015    Degenerative disc disease     Elevated PSA     Glaucoma     Hyperlipidemia     Hypertension     Lumbar stenosis     lumbar spinal stenosis    NSTEMI (non-ST elevated myocardial infarction) 11/3/2013    Pacemaker 11/2013    Peripheral neuropathy     - S/P right ILIAC stent 1993;      Retinal detachment     Squamous cell carcinoma     left leg    Syncope and collapse: orthostatic with hypotension 10/9/2015       Past Surgical History:  Past Surgical History:   Procedure Laterality Date    ABLATION N/A 11/1/2016    [facility]    ABLATION N/A 11/1/2013    [facility] at [facility]    BLOCK, NERVE, FACET JOINT, LUMBAR, MEDIAL BRANCH Bilateral 12/18/2018    [facility]    CARDIAC CATHETERIZATION      [facility]    CARDIAC ELECTROPHYSIOLOGY MAPPING AND ABLATION  11/2016    CARDIAC PACEMAKER PLACEMENT  11/2016    CARDIOVERSION N/A 11/18/2013    [facility] at [facility] C[facility]    CATARACT EXTRACTION W/  INTRAOCULAR LENS IMPLANT Left 1997        CATHETERIZATION, HEART, LEFT Left 11/1/2013    [facility] at [facility] CATH [facility]    CORONARY ARTERY BYPASS GRAFT  1991    ECHOCARDIOGRAM-TRANSESOPHAGEAL N/A 11/18/2013    Performed by Lola Surgeon at [facility] LOLA    ENUCLEATION Right 1949    JOMAR-TRANSFORAMINAL Left 8/7/2015    [facility] at [facility] PAIN MGT    INJECTION-STEROID-EPIDURAL-CAUDAL N/A 7/21/2017    [facility] PA[facility] MGT    INJECTION-STEROID-EPIDURAL-LUMBAR N/A 8/28/2015    [facility] at [facility] PA[facility] MGT    INJECTION-STEROID-EPIDURAL-TRANSFORAMINAL Left 5/29/2017    [facility] MGT    INJECTION-STEROID-EPIDURAL-TRANSFORAMINAL Right  4/28/2017    [facility]    INJECTION-STEROID-EPIDURAL-TRANSFORAMINAL Left 7/10/2015    [facility] by [facility] at [facility]    [id number]-PULYIOFDS-[id number] N/A 11/1/2016    [facility] at [facility] CATH LAB    LAMINECTOMY-LUMBAR N/A 4/25/2016    Performed by Florentin Stanford [facility] at [facility] OR 2ND FLR    LAMINOTOMY  11/9/2017    [facility] at [facility] OR    LUMBAR LAMINECTOMY  04/25/2016    PLACEMENT OF SPINAL CORD STIMULATOR T10-T11 laminotomyh for placement of spinal cord stimulator at T9-10, left below the belt line pulse generator N/A 11/9/2017    [facility] at [facility] OR    Radiofrequency Ablation LEFT LUMBAR L2,3,4,5 RFA Left 1/9/2019    [facility]    RELEASE-CARPAL TUNNEL Bilateral 3/29/2017    [facility]    RETINAL DETACHMENT SURGERY Left 1997    Dr. Cosme    SKIN BIOPSY      SPINAL CORD STIMULATOR TRIAL W/ LAMINOTOMY  10/2017    TONSILLECTOMY      TRANSESOPHAGEAL ECHOCARDIOGRAM (MONICA) N/A 11/1/2013    [facility] CATH LAB    TRIAL-STIMULATOR-DORSAL COLUMN N/A 9/22/2017    [facility] by [facility] at [facility]       Family History:  Family History   Problem Relation Age of Onset    Stroke Mother 69    Clotting disorder Mother         per patient no clotting disorder    Hypertension Mother     Diverticulitis Son     Cancer Neg Hx     Diabetes Neg Hx     Heart disease Neg Hx     Glaucoma Neg Hx     Amblyopia Neg Hx     Blindness Neg Hx     Cataracts Neg Hx     Macular degeneration Neg Hx     Retinal detachment Neg Hx     Strabismus Neg Hx        Social History:  Social History     Socioeconomic History    Marital status:      Spouse name: Grand Chain    Number of children: Not on file    Years of education: Not on file    Highest education  "level: Not on file   Occupational History    Occupation: retired   Social Needs    Financial resource strain: Not on file    Food insecurity:     Worry: Not on file     Inability: Not on file    Transportation needs:     Medical: Not on file     Non-medical: Not on file   Tobacco Use    Smoking status: Former Smoker     Last attempt to quit: 1963     Years since quittin.7    Smokeless tobacco: Never Used   Substance and Sexual Activity    Alcohol use: Yes     Alcohol/week: 1.8 oz     Types: 1 Glasses of wine, 1 Cans of beer, 1 Shots of liquor per week     Comment: 2 a day    Drug use: No    Sexual activity: Yes     Partners: Female   Lifestyle    Physical activity:     Days per week: Not on file     Minutes per session: Not on file    Stress: Not on file   Relationships    Social connections:     Talks on phone: Not on file     Gets together: Not on file     Attends [employer] service: Not on file     Active member of club or organization: Not on file     Attends meetings of clubs or organizations: Not on file     Relationship status: Not on file   Other Topics Concern    Not on file   Social History Narrative    Not on file       OBJECTIVE:    BP 95/65   Pulse (!) 52   Temp 97.4 °F (36.3 °C)   Ht 5' 8" (1.727 m)   Wt 85.6 kg (188 lb 11.4 oz)   BMI 28.69 kg/m²     PHYSICAL EXAMINATION:    General appearance: Well appearing, in no acute distress, alert and oriented x3.  Psych: Mood and affect appropriate.  Skin: Skin color, texture, turgor normal, no rashes or lesions, in both upper and lower body.  SCS sites well healed.  Head/face: Normocephalic, atraumatic. No palpable lymph nodes.  Cor: RRR  Pulm: CTA  Back: Straight leg raising in the supine position is negative to radicular pain bilaterally.  There is no pain with palpation to lumbar facet joints.  Limited ROM with pain on extension and flexion.  Positive facet loading bilaterally, L>R.  Extremities: Peripheral joint ROM is full and " pain free without obvious instability or laxity in all four extremities. No deformities, edema, or skin discoloration. Good capillary refill.  Musculoskeletal: There is mild pain with palpation to IPG.  5/5 strength in right ankle with plantar and dorsiflexion. 5/5 strength in left ankle with plantar and dorsiflexion. 5/5 strength with right knee flexion and 4/5 on extension. 5/5 strength with left knee flexion and extension. 5/5 strength in right EHL, 5/5 strength in left EHL.  Left hip flexion is 4/5, right is 5/5.  No atrophy or tone abnormalities are noted.  Neuro: Bilateral lower extremity coordination and muscle stretch reflexes are physiologic and symmetric. Plantar response are downgoing. No loss of sensation is noted.  Gait: Antalgic- ambulates without assistance.      ASSESSMENT: 88 y.o. year old male with lower back and leg pain, consistent with lumbar radiculopathy.     1. Chronic pain syndrome     2. Lumbar spondylosis     3. Failed back surgical syndrome           PLAN:     - I have stressed the importance of physical activity and a home exercise plan to help with pain and improve health.    - I will have Oren reach out to him for programming.  If limited benefit can consider left L5 and S1 TF JOMAR.    - His SCS is providing relief of leg pain.    - We discussed Percocet.  It helps but he has only taken 2 pills since prescription was given 3 months ago.  He will try 1/2 tab 1-2 times per day as needed.  If this is helpful we can continue this.  Would need to obtain pain contract and UDS.    - The patient will continue a home exercise routine to help with pain and strengthening.      - RTC PRN.  He would like to call for next appointment.    - Counseled patient regarding the importance of activity modification, constant sleeping habits and physical therapy.        The above plan and management options were discussed at length with patient. Patient is in agreement with the above and verbalized  understanding.    Jeniffer Mueller  05/06/2019

## 2019-05-07 ENCOUNTER — OFFICE VISIT (OUTPATIENT)
Dept: ELECTROPHYSIOLOGY | Facility: CLINIC | Age: 84
End: 2019-05-07
Attending: INTERNAL MEDICINE
Payer: MEDICARE

## 2019-05-07 ENCOUNTER — HOSPITAL ENCOUNTER (OUTPATIENT)
Dept: CARDIOLOGY | Facility: CLINIC | Age: 84
Discharge: HOME | End: 2019-05-07
Attending: INTERNAL MEDICINE
Payer: MEDICARE

## 2019-05-07 VITALS
WEIGHT: 192 LBS | HEART RATE: 49 BPM | SYSTOLIC BLOOD PRESSURE: 124 MMHG | BODY MASS INDEX: 29.1 KG/M2 | DIASTOLIC BLOOD PRESSURE: 70 MMHG | HEIGHT: 68 IN

## 2019-05-07 DIAGNOSIS — I49.5 SSS (SICK SINUS SYNDROME): ICD-10-CM

## 2019-05-07 DIAGNOSIS — I25.5 CARDIOMYOPATHY, ISCHEMIC: Chronic | ICD-10-CM

## 2019-05-07 DIAGNOSIS — I49.3 PVC (PREMATURE VENTRICULAR CONTRACTION): ICD-10-CM

## 2019-05-07 DIAGNOSIS — I10 ESSENTIAL HYPERTENSION: ICD-10-CM

## 2019-05-07 DIAGNOSIS — Z95.0 CARDIAC PACEMAKER IN SITU: ICD-10-CM

## 2019-05-07 DIAGNOSIS — I48.19 PERSISTENT ATRIAL FIBRILLATION: ICD-10-CM

## 2019-05-07 DIAGNOSIS — R06.09 DOE (DYSPNEA ON EXERTION): ICD-10-CM

## 2019-05-07 DIAGNOSIS — Z98.890 STATUS POST CATHETER ABLATION OF ATRIAL FLUTTER: ICD-10-CM

## 2019-05-07 DIAGNOSIS — Z95.0 BIVENTRICULAR CARDIAC PACEMAKER IN SITU: Primary | ICD-10-CM

## 2019-05-07 DIAGNOSIS — Z79.01 CURRENT USE OF LONG TERM ANTICOAGULATION: ICD-10-CM

## 2019-05-07 PROCEDURE — 93010 ELECTROCARDIOGRAM REPORT: CPT | Mod: HCNC,S$GLB,, | Performed by: INTERNAL MEDICINE

## 2019-05-07 PROCEDURE — 99214 PR OFFICE/OUTPT VISIT, EST, LEVL IV, 30-39 MIN: ICD-10-PCS | Mod: HCNC,S$GLB,, | Performed by: NURSE PRACTITIONER

## 2019-05-07 PROCEDURE — 99214 OFFICE O/P EST MOD 30 MIN: CPT | Mod: HCNC,S$GLB,, | Performed by: NURSE PRACTITIONER

## 2019-05-07 PROCEDURE — 1101F PT FALLS ASSESS-DOCD LE1/YR: CPT | Mod: HCNC,CPTII,S$GLB, | Performed by: NURSE PRACTITIONER

## 2019-05-07 PROCEDURE — 99999 PR PBB SHADOW E&M-EST. PATIENT-LVL III: CPT | Mod: PBBFAC,HCNC,, | Performed by: NURSE PRACTITIONER

## 2019-05-07 PROCEDURE — 99999 PR PBB SHADOW E&M-EST. PATIENT-LVL III: ICD-10-PCS | Mod: PBBFAC,HCNC,, | Performed by: NURSE PRACTITIONER

## 2019-05-07 PROCEDURE — 1101F PR PT FALLS ASSESS DOC 0-1 FALLS W/OUT INJ PAST YR: ICD-10-PCS | Mod: HCNC,CPTII,S$GLB, | Performed by: NURSE PRACTITIONER

## 2019-05-07 PROCEDURE — 93005 RHYTHM STRIP: ICD-10-PCS | Mod: HCNC,S$GLB,, | Performed by: INTERNAL MEDICINE

## 2019-05-07 PROCEDURE — 93005 ELECTROCARDIOGRAM TRACING: CPT | Mod: HCNC,S$GLB,, | Performed by: INTERNAL MEDICINE

## 2019-05-07 PROCEDURE — 93010 RHYTHM STRIP: ICD-10-PCS | Mod: HCNC,S$GLB,, | Performed by: INTERNAL MEDICINE

## 2019-05-07 NOTE — LETTER
May 7, 2019      Alcon Ly MD  [address]  [facility] [address]           [facility] Arrhythmia  [address]  [facility] [address]  Phone: [phone]  Fax: [phone]          Patient: Javier Valles   MR Number: [id number]   YOB: 1930   Date of Visit: 5/7/2019       Dear Dr. Alcon Ly:    Thank you for referring Javier Valles to me for evaluation. Attached you will find relevant portions of my assessment and plan of care.    If you have questions, please do not hesitate to call me. I look forward to following Javier Valles along with you.    Sincerely,    Jloene Painter, NP    Enclosure  CC:  No Recipients    If you would like to receive this communication electronically, please contact [email or web address] or [phone] to request more information on [employer] Link access.    For providers and/or their staff who would like to refer a patient to [facility], please contact us through our one-stop-shop provider referral line, [facility], at [phone].    If you feel you have received this communication in error or would no longer like to receive these types of communications, please e-mail [email or web address]

## 2019-05-07 NOTE — Clinical Note
He is reporting worsening ELIAS. If his PET is negative, would you consider reducing his amio? No AF on device.ErosxJ

## 2019-05-07 NOTE — PATIENT INSTRUCTIONS
Reduce salt as much as possible.  PET stress test.  Low-Salt Diet  This diet removes foods that are high in salt. It also limits the amount of salt you use when cooking. It is most often used for people with high blood pressure, edema (fluid retention), and kidney, liver, or heart disease.  Table salt contains the mineral sodium. Your body needs sodium to work normally. But too much sodium can make your health problems worse. Your healthcare provider is recommending a low-salt (also called low-sodium) diet for you. Your total daily allowance of salt is 1,500 to 2,300 milligrams (mg). It is less than 1 teaspoon of table salt. This means you can have only about 500 to 700 mg of sodium at each meal. People with certain health problems should limit salt intake to the lower end of the recommended range.    When you cook, dont add much salt. If you can cook without using salt, even better. Dont add salt to your food at the table.  When shopping, read food labels. Salt is often called sodium on the label. Choose foods that are salt-free, low salt, or very low salt. Note that foods with reduced salt may not lower your salt intake enough.    Beans, potatoes, and pasta  Ok: Dry beans, split peas, lentils, potatoes, rice, macaroni, pasta, spaghetti without added salt  Avoid: Potato chips, tortilla chips, and similar products  Breads and cereals  Ok: Low-sodium breads, rolls, cereals, and cakes; low-salt crackers, matzo crackers  Avoid: Salted crackers, pretzels, popcorn, [place] toast, pancakes, muffins  Dairy  Ok: Milk, chocolate milk, hot chocolate mix, low-salt cheeses, and yogurt  Avoid: Processed cheese and cheese spreads; Roquefort, Camembert, and cottage cheese; buttermilk, instant breakfast drink  Desserts  Ok: Ice cream, frozen yogurt, juice bars, gelatin, cookies and pies, sugar, honey, jelly, hard candy  Avoid: Most pies, cakes and cookies prepared or processed with salt; instant pudding  Drinks  Ok: Tea, coffee,  fizzy (carbonated) drinks, juices  Avoid: Flavored coffees, electrolyte replacement drinks, sports drinks  Meats  Ok: All fresh meat, fish, poultry, low-salt tuna, eggs, egg substitute  Avoid: Smoked, pickled, brine-cured, or salted meats and fish. This includes bermeo, chipped beef, corned beef, hot dogs, deli meats, ham, kosher meats, salt pork, sausage, canned tuna, salted codfish, smoked salmon, herring, sardines, or anchovies.  Seasonings and spices  Ok: Most seasonings are okay. Good substitutes for salt include: fresh herb blends, hot sauce, lemon, garlic, pacheco, vinegar, dry mustard, parsley, cilantro, horseradish, tomato paste, regular margarine, mayonnaise, unsalted butter, cream cheese, vegetable oil, cream, low-salt salad dressing and gravy.  Avoid: Regular ketchup, relishes, pickles, soy sauce, teriyaki sauce, Worcestershire sauce, BBQ sauce, tartar sauce, meat tenderizer, chili sauce, regular gravy, regular salad dressing, salted butter  Soups  Ok: Low-salt soups and broths made with allowed foods  Avoid: Bouillon cubes, soups with smoked or salted meats, regular soup and broth  Vegetables  Ok: Most vegetables are okay; also low-salt tomato and vegetable juices  Avoid: Sauerkraut and other brine-soaked vegetables; pickles and other pickled vegetables; tomato juice, olives  Date Last Reviewed: 8/1/2016  © 2660-2422 [employer]. [address] [place] [address]. All rights reserved. This information is not intended as a substitute for professional medical care. Always follow your healthcare professional's instructions.

## 2019-05-07 NOTE — PROGRESS NOTES
Mr. Valles is a patient of Dr. Ly and was last seen in clinic 12/5/2017.      Subjective:   Patient ID:  Javier Valles is a 88 y.o. male who presents for follow-up of Cardiomyopathy; Atrial Fibrillation; and Shortness of Breath  .     HPI:    Mr. Valles is a 88 y.o. 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      Background:    History: 11/2/13 he was in the EP procedure room for AFL ablation. Prior to the ablation, he experience angina with anterior ST changes. He was sent urgently for a [facility] where he was found to have experienced an in-stent thrombosis of an OM1 stent. He underwent DC PPM placement prior to discharge secondary to bradycardic events on low dose beta blocker. He underwent DCCV post implantation. He was discharged on amiodarone, metoprolol, aspirin plavix, warfarin, rosuvastatin. His EF was normal on pre-CV MONICA. He subsequently developed L eye bruising and superficial bleeding, and warfarin was stopped 02/2014. He was subsequently started on rivaroxaban 15 mg qd without adverse effects until 06/2014 when he experienced excessive bleeding leading to its discontinuation. He subsequently went on a 3-week tour of [place] without any limitation. In November of 2015 his PPM LRL was set to 60 bpm with noted improvement in his energy level. At that time, Mr. Valles was noted to have an increase in his AF burden, and was also found to have AFL.     Mr. Valles has historically had a long hx of lower back pain; he underwent a successful lumbar laminectomy (04/25/16), which was complicated by persistent AFL post-op. At the time, he remained on aspirin, but had been on apixaban prior to his in-stent thrombosis event in 2013. Apixaban 2.5 mg twice daily was re-started. At that time, Mr. Valles reported experiencxing dyspnea and fatigue, with a significant decrease in his energy level. He was subsequently switched to coumadin (for  the procedures), and underwent a successful combination of an AFL RFA and BiV PPM upgrade (11/01/16).     8/17: BiV pacing has reduced to 82% with increase in PVC burden to 14%. Cardiac symptoms have been stable.     12/2017: Amiodarone started with CRT paced burden 92%, PVCs ~4%. Symptoms are stable.     Update (05/07/2019):    Today he reports worsening ELIAS over the past year.      He is currently taking eliquis 2.5mg BID for stroke prophylaxis and denies significant bleeding episodes. He is currently being treated with amiodarone 200mg daily for rhythm control. Kidney function is decreased but stable, with a creatinine of 2 on 4/16/2019. LFTs are WNL 4/16/2019. TSH is WNL 4/16/2019. No PFTs on file.    Device Interrogation (5/7/2019) reveals an intrinsic SR with CHB with stable lead and device function. No arrhythmias or treated episodes were noted. He paces 77% in the RA and 98% in the BiV. Estimated battery longevity 3.6 years.     I have personally reviewed the patient's EKG today, which shows APVP at 49bpm. QT is 508.    Recent Cardiac Tests:    2D Echo (5/3/2019):  · Normal left ventricular systolic function. The estimated ejection fraction is 55%  · No wall motion abnormalities.  · Mild left ventricular enlargement.  · Normal LV diastolic function.  · The mitral valve shows bileaflet prolapse with mild central mitral regurgitation.  · Mild right ventricular enlargement with normal right ventricular systolic function.  · Mild tricuspid regurgitation.  · Biatrial enlargement.     Current Outpatient Medications   Medication Sig    amiodarone (PACERONE) 200 MG Tab Take 1 tablet (200 mg total) by mouth once daily.    amLODIPine (NORVASC) 5 MG tablet Take 1 tablet (5 mg total) by mouth once daily.    apixaban 2.5 mg Tab Take 1 tablet (2.5 mg total) by mouth 2 (two) times daily.    ascorbic acid (VITAMIN C) 1000 MG tablet Take 1,000 mg by mouth every morning.     brimonidine 0.2% (ALPHAGAN) 0.2 % Drop Place 1  drop into the left eye 3 (three) times daily.    brinzolamide (AZOPT) 1 % ophthalmic suspension Place 1 drop into both eyes 3 (three) times daily.    buPROPion (WELLBUTRIN XL) 150 MG TB24 tablet TAKE ONE TABLET BY MOUTH ONCE DAILY    co-enzyme Q-10 30 mg capsule Take 30 mg by mouth once daily.     donepezil (ARICEPT) 5 MG tablet Take 1 tablet (5 mg total) by mouth every evening.    dorzolamide (TRUSOPT) 2 % ophthalmic solution Place 1 drop into both eyes 3 (three) times daily. Please fill today - this evening if possible - pt is leaving town early tomorrow    erythromycin (ROMYCIN) ophthalmic ointment Apply to affected eye daily as needed    ipratropium (ATROVENT) 0.03 % nasal spray 1-2 sprays by Nasal route 2 (two) times daily.    latanoprost 0.005 % ophthalmic solution Place 1 drop into the left eye every evening.    losartan (COZAAR) 50 MG tablet Take 1 tablet (50 mg total) by mouth 2 (two) times daily.    lutein 20 mg Cap Take 20 mg by mouth once daily.     mirtazapine (REMERON) 15 MG tablet Take 1 tablet (15 mg total) by mouth every evening.    MULTIVITAMINS,THER W-MINERALS (VITAMINS & MINERALS ORAL) Take 1 tablet by mouth every morning.     oxyCODONE-acetaminophen (PERCOCET) 5-325 mg per tablet Take 1 tablet by mouth every 6 (six) hours as needed for Pain.    polycarbophil (FIBERCON) 625 mg tablet Take 625 mg by mouth 2 (two) times daily.    rosuvastatin (CRESTOR) 20 MG tablet Take 1 tablet (20 mg total) by mouth every evening.     No current facility-administered medications for this visit.      Review of Systems   Constitution: Negative for malaise/fatigue.   Cardiovascular: Positive for dyspnea on exertion. Negative for chest pain, irregular heartbeat, leg swelling and palpitations.   Respiratory: Negative for shortness of breath.    Hematologic/Lymphatic: Negative for bleeding problem.   Skin: Negative for rash.   Musculoskeletal: Negative for myalgias.   Gastrointestinal: Negative for  "hematemesis, hematochezia and nausea.   Genitourinary: Negative for hematuria.   Neurological: Negative for light-headedness.   Psychiatric/Behavioral: Negative for altered mental status.   Allergic/Immunologic: Negative for persistent infections.     Objective:        /70   Pulse (!) 49   Ht 5' 8" (1.727 m)   Wt 87.1 kg (192 lb 0.3 oz)   BMI 29.20 kg/m²     Physical Exam   Constitutional: He is oriented to person, place, and time. He appears well-developed and well-nourished.   HENT:   Head: Normocephalic.   Nose: Nose normal.   Eyes: Pupils are equal, round, and reactive to light.   Cardiovascular: Normal rate, regular rhythm, S1 normal and S2 normal.   No murmur heard.  Pulses:       Radial pulses are 2+ on the right side, and 2+ on the left side.   Pulmonary/Chest: Breath sounds normal. No respiratory distress.   Device to LUCW.   Abdominal: Normal appearance.   Musculoskeletal: Normal range of motion. He exhibits no edema.   Neurological: He is alert and oriented to person, place, and time.   Skin: Skin is warm and dry. No erythema.   Psychiatric: He has a normal mood and affect. His speech is normal and behavior is normal.   Nursing note and vitals reviewed.    Lab Results   Component Value Date     04/16/2019    K 4.5 04/16/2019    MG 2.2 04/08/2015    BUN 27 (H) 04/16/2019    CREATININE 2.0 (H) 04/16/2019    ALT 19 04/16/2019    AST 27 04/16/2019    HGB 12.5 (L) 04/16/2019    HCT 39.7 (L) 04/16/2019    HCT 38 11/01/2013    TSH 3.517 04/16/2019    LDLCALC 54.2 (L) 06/27/2018       Recent Labs   Lab 11/07/16  0938 11/15/16  0808 11/29/16  0955 11/01/17  1128   INR 3.4 3.0 H 3.0 H 1.0       Assessment:     1. Biventricular cardiac pacemaker in situ    2. Current use of long term anticoagulation    3. Status post catheter ablation of atrial flutter    4. Persistent atrial fibrillation    5. Cardiomyopathy, ischemic: Prior MI, CABG, EF 40-45%    6. Essential hypertension    7. PVC (premature " ventricular contraction)    8. SSS (sick sinus syndrome): s/p PPM    9. ELIAS (dyspnea on exertion)      Plan:     In summary, Mr. Valles is a 88 y.o. 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    Mr. Valles is doing well from a device perspective with stable lead and device function. 98% biventricular pacing. No arrhythmia noted. On amiodarone for rhythm management and eliquis for CVA prophylaxis. LFTs and TSH ok.  He is reporting worsening ELIAS over the past year. Recent echo shows normal EF. BNP showed some fluid overload. We discussed salt reduction as his kidney function limits diuretic use. He is a bit reluctant to do this but says he will try. Given his CAD history, will also obtain PET stress test to rule out new onset ischemia as a cause for his ELIAS. Will also discuss reducing amiodarone with Dr. Ly.     Schedule PET stress. Low salt diet.  Continue current medication regimen and device settings.   Follow up in device clinic as scheduled.   Follow up in EP clinic in 1 year, sooner as needed.     *A copy of this note has been sent to Dr. Ly*    Follow up in about 6 months (around 11/7/2019).    ------------------------------------------------------------------    JOSHUA Rapp, NP-C  Cardiac Electrophysiology

## 2019-05-19 ENCOUNTER — PATIENT MESSAGE (OUTPATIENT)
Dept: PAIN MEDICINE | Facility: CLINIC | Age: 84
End: 2019-05-19

## 2019-05-22 ENCOUNTER — PATIENT MESSAGE (OUTPATIENT)
Dept: INTERNAL MEDICINE | Facility: CLINIC | Age: 84
End: 2019-05-22

## 2019-05-22 ENCOUNTER — PATIENT OUTREACH (OUTPATIENT)
Dept: OTHER | Facility: OTHER | Age: 84
End: 2019-05-22

## 2019-05-22 RX ORDER — ESCITALOPRAM OXALATE 5 MG/1
5 TABLET ORAL DAILY
Qty: 30 TABLET | Refills: 5 | Status: SHIPPED | OUTPATIENT
Start: 2019-05-22 | End: 2019-10-15

## 2019-05-22 NOTE — PROGRESS NOTES
Last 5 Patient Entered Readings                                      Current 30 Day Average: 121/67     Recent Readings 5/19/2019 5/13/2019 5/5/2019 4/26/2019 4/24/2019    SBP (mmHg) 137 129 109 137 95    DBP (mmHg) 68 69 64 74 58    Pulse 49 53 49 68 49          Digital Medicine: Health  Follow Up    Lifestyle Modifications:    1.Dietary Modifications (Sodium intake <2,000mg/day, food labels, dining out): Patient continues monitoring his sodium intake. He denies any changes that would cause an increase in salt intake.     2.Physical Activity: Deferred    3.Medication Therapy: Patient has been compliant with the medication regimen. Patient is doing well on his current BP medication regimen. He denies symptoms/side effects.     4.Patient has the following medication side effects/concerns: None  (Frequency/Alleviating factors/Precipitating factors, etc.)     Follow up with Mr. Javier Valles completed. No further questions or concerns. Will continue to follow up to achieve health goals.

## 2019-05-27 ENCOUNTER — TELEPHONE (OUTPATIENT)
Dept: PAIN MEDICINE | Facility: CLINIC | Age: 84
End: 2019-05-27

## 2019-05-27 NOTE — TELEPHONE ENCOUNTER
Called to confirm appointment with Dr. Hodges on 5/30/19 @ 2:15pm in Pain management clinic.    Pt did not answer. Left pt detailed message to contact office @ [phone] to confirm appointment

## 2019-05-29 ENCOUNTER — CLINICAL SUPPORT (OUTPATIENT)
Dept: CARDIOLOGY | Facility: CLINIC | Age: 84
End: 2019-05-29
Attending: NURSE PRACTITIONER
Payer: MEDICARE

## 2019-05-29 ENCOUNTER — LAB VISIT (OUTPATIENT)
Dept: LAB | Facility: HOSPITAL | Age: 84
End: 2019-05-29
Attending: INTERNAL MEDICINE
Payer: MEDICARE

## 2019-05-29 ENCOUNTER — TELEPHONE (OUTPATIENT)
Dept: ELECTROPHYSIOLOGY | Facility: CLINIC | Age: 84
End: 2019-05-29

## 2019-05-29 VITALS — HEIGHT: 68 IN | WEIGHT: 192 LBS | BODY MASS INDEX: 29.1 KG/M2

## 2019-05-29 DIAGNOSIS — E78.5 HYPERLIPIDEMIA, UNSPECIFIED HYPERLIPIDEMIA TYPE: ICD-10-CM

## 2019-05-29 DIAGNOSIS — R06.09 DOE (DYSPNEA ON EXERTION): ICD-10-CM

## 2019-05-29 LAB
% MYOCARDIUM- DEFECT 1: 5 %
ANION GAP SERPL CALC-SCNC: 9 MMOL/L (ref 8–16)
BUN SERPL-MCNC: 25 MG/DL (ref 8–23)
CALCIUM SERPL-MCNC: 10.4 MG/DL (ref 8.7–10.5)
CFR FLOW - ANTERIOR: 1.77 CC/MIN/G
CFR FLOW - INFERIOR: 1.64 CC/MIN/G
CFR FLOW - MAX: 2.4 CC/MIN/G
CFR FLOW - MIN: 1.1 CC/MIN/G
CFR FLOW - SEPTAL: 1.69 CC/MIN/G
CFR FLOW- DEFECT 1: 1.5 CC/MIN/G
CHLORIDE SERPL-SCNC: 112 MMOL/L (ref 95–110)
CHOLEST SERPL-MCNC: 133 MG/DL (ref 120–199)
CHOLEST/HDLC SERPL: 2.5 {RATIO} (ref 2–5)
CO2 SERPL-SCNC: 23 MMOL/L (ref 23–29)
CREAT SERPL-MCNC: 2 MG/DL (ref 0.5–1.4)
CV PHARM DOSE: 48.9 MG
CV STRESS BASE HR: 57 BPM
DIASTOLIC BLOOD PRESSURE: 76 MMHG
END DIASTOLIC INDEX-HIGH: 170 ML/M2
END SYSTOLIC INDEX-HIGH: 70 ML/M2
EST. GFR  (AFRICAN AMERICAN): 33.2 ML/MIN/1.73 M^2
EST. GFR  (NON AFRICAN AMERICAN): 28.7 ML/MIN/1.73 M^2
GLUCOSE SERPL-MCNC: 100 MG/DL (ref 70–110)
HDLC SERPL-MCNC: 53 MG/DL (ref 40–75)
HDLC SERPL: 39.8 % (ref 20–50)
LDLC SERPL CALC-MCNC: 66.8 MG/DL (ref 63–159)
NONHDLC SERPL-MCNC: 80 MG/DL
NUC REST DIASTOLIC VOLUME INDEX: 127
NUC REST EJECTION FRACTION: 58
NUC REST SYSTOLIC VOLUME INDEX: 54
NUC STRESS DIASTOLIC VOLUME INDEX: 133
NUC STRESS EJECTION FRACTION: 60 %
NUC STRESS SYSTOLIC VOLUME INDEX: 54
OHS CV CPX 85 PERCENT MAX PREDICTED HEART RATE MALE: 111
OHS CV CPX MAX PREDICTED HEART RATE: 131
OHS CV CPX PATIENT IS FEMALE: 0
OHS CV CPX PATIENT IS MALE: 1
OHS CV CPX PEAK DIASTOLIC BLOOD PRESSURE: 60 MMHG
OHS CV CPX PEAK HEAR RATE: 50 BPM
OHS CV CPX PEAK RATE PRESSURE PRODUCT: 6450
OHS CV CPX PEAK SYSTOLIC BLOOD PRESSURE: 129 MMHG
OHS CV CPX PERCENT MAX PREDICTED HEART RATE ACHIEVED: 38
OHS CV CPX RATE PRESSURE PRODUCT PRESENTING: 8550
PERFUSION DEFECT 1 SIZE IN %: 5 %
PERFUSION DEFECT SIZE WORSENS % 1: 7 %
POTASSIUM SERPL-SCNC: 4.3 MMOL/L (ref 3.5–5.1)
REST FLOW - ANTERIOR: 0.72 CC/MIN/G
REST FLOW - INFERIOR: 0.77 CC/MIN/G
REST FLOW - LATERAL: 0.57 CC/MIN/G
REST FLOW - MAX: 1.1 CC/MIN/G
REST FLOW - MIN: 0.4 CC/MIN/G
REST FLOW - SEPTAL: 0.87 CC/MIN/G
REST FLOW - WHOLE HEART: 0.73
REST FLOW- DEFECT 1: 0.5 CC/MIN/G
RETIRED EF AND QEF - SEE NOTES: 51 %
SODIUM SERPL-SCNC: 144 MMOL/L (ref 136–145)
STRESS ECHO TARGET HR: 111.35 BPM
STRESS FLOW - ANTERIOR: 1.23 CC/MIN/G
STRESS FLOW - INFERIOR: 1.28 CC/MIN/G
STRESS FLOW - LATERAL: 0.93 CC/MIN/G
STRESS FLOW - MAX: 1.9 CC/MIN/G
STRESS FLOW - MIN: 0.6 CC/MIN/G
STRESS FLOW - SEPTAL: 1.54 CC/MIN/G
STRESS FLOW - WHOLE HEART: 1.25 CC/MIN/G
STRESS FLOW- DEFECT 1: 0.75 CC/MIN/G
SYSTOLIC BLOOD PRESSURE: 150 MMHG
TRIGL SERPL-MCNC: 66 MG/DL (ref 30–150)

## 2019-05-29 PROCEDURE — 80048 BASIC METABOLIC PNL TOTAL CA: CPT | Mod: HCNC

## 2019-05-29 PROCEDURE — A9555 RB82 RUBIDIUM: HCPCS | Mod: HCNC,S$GLB,, | Performed by: INTERNAL MEDICINE

## 2019-05-29 PROCEDURE — 78492 PET STRESS (CUPID ONLY): ICD-10-PCS | Mod: HCNC,S$GLB,, | Performed by: INTERNAL MEDICINE

## 2019-05-29 PROCEDURE — 36415 COLL VENOUS BLD VENIPUNCTURE: CPT | Mod: HCNC

## 2019-05-29 PROCEDURE — 80061 LIPID PANEL: CPT | Mod: HCNC

## 2019-05-29 PROCEDURE — 93015 CV STRESS TEST SUPVJ I&R: CPT | Mod: HCNC,S$GLB,, | Performed by: INTERNAL MEDICINE

## 2019-05-29 PROCEDURE — A9555 PET STRESS (CUPID ONLY): ICD-10-PCS | Mod: HCNC,S$GLB,, | Performed by: INTERNAL MEDICINE

## 2019-05-29 PROCEDURE — 78492 MYOCRD IMG PET MLT RST&STRS: CPT | Mod: HCNC,S$GLB,, | Performed by: INTERNAL MEDICINE

## 2019-05-29 PROCEDURE — 93015 PET STRESS (CUPID ONLY): ICD-10-PCS | Mod: HCNC,S$GLB,, | Performed by: INTERNAL MEDICINE

## 2019-05-29 PROCEDURE — 99999 PR PBB SHADOW E&M-EST. PATIENT-LVL II: ICD-10-PCS | Mod: PBBFAC,HCNC,,

## 2019-05-29 PROCEDURE — 99999 PR PBB SHADOW E&M-EST. PATIENT-LVL II: CPT | Mod: PBBFAC,HCNC,,

## 2019-05-29 RX ORDER — DIPYRIDAMOLE 5 MG/ML
48.87 INJECTION INTRAVENOUS
Status: COMPLETED | OUTPATIENT
Start: 2019-05-29 | End: 2019-05-29

## 2019-05-29 RX ADMIN — DIPYRIDAMOLE 48.85 MG: 5 INJECTION INTRAVENOUS at 08:05

## 2019-05-29 NOTE — TELEPHONE ENCOUNTER
Called and left message with patient to call back to discuss PET stress results.  Patient reporting worsening ELIAS. Perfusion abnormality on PET.   Plan was to refer to interventional but patient has annual clinic appt already established with Dr. Curtis 5/31/2019.  Forwarding PET result to Dr. Curtis.

## 2019-05-30 ENCOUNTER — TELEPHONE (OUTPATIENT)
Dept: ELECTROPHYSIOLOGY | Facility: CLINIC | Age: 84
End: 2019-05-30

## 2019-05-30 ENCOUNTER — OFFICE VISIT (OUTPATIENT)
Dept: PAIN MEDICINE | Facility: CLINIC | Age: 84
End: 2019-05-30
Attending: ANESTHESIOLOGY
Payer: MEDICARE

## 2019-05-30 VITALS
HEIGHT: 68 IN | BODY MASS INDEX: 28.64 KG/M2 | HEART RATE: 49 BPM | SYSTOLIC BLOOD PRESSURE: 85 MMHG | RESPIRATION RATE: 18 BRPM | WEIGHT: 189 LBS | TEMPERATURE: 98 F | DIASTOLIC BLOOD PRESSURE: 52 MMHG

## 2019-05-30 DIAGNOSIS — M79.2 NEURALGIA AND NEURITIS: ICD-10-CM

## 2019-05-30 DIAGNOSIS — G89.4 CHRONIC PAIN SYNDROME: Primary | ICD-10-CM

## 2019-05-30 DIAGNOSIS — M96.1 POST LAMINECTOMY SYNDROME: ICD-10-CM

## 2019-05-30 DIAGNOSIS — G89.4 CHRONIC PAIN SYNDROME: ICD-10-CM

## 2019-05-30 PROCEDURE — 1101F PR PT FALLS ASSESS DOC 0-1 FALLS W/OUT INJ PAST YR: ICD-10-PCS | Mod: HCNC,CPTII,S$GLB, | Performed by: ANESTHESIOLOGY

## 2019-05-30 PROCEDURE — 1101F PT FALLS ASSESS-DOCD LE1/YR: CPT | Mod: HCNC,CPTII,S$GLB, | Performed by: ANESTHESIOLOGY

## 2019-05-30 PROCEDURE — 99499 UNLISTED E&M SERVICE: CPT | Mod: HCNC,S$GLB,, | Performed by: ANESTHESIOLOGY

## 2019-05-30 PROCEDURE — 99999 PR PBB SHADOW E&M-EST. PATIENT-LVL III: ICD-10-PCS | Mod: PBBFAC,HCNC,, | Performed by: ANESTHESIOLOGY

## 2019-05-30 PROCEDURE — 99214 OFFICE O/P EST MOD 30 MIN: CPT | Mod: HCNC,GC,S$GLB, | Performed by: ANESTHESIOLOGY

## 2019-05-30 PROCEDURE — 99214 PR OFFICE/OUTPT VISIT, EST, LEVL IV, 30-39 MIN: ICD-10-PCS | Mod: HCNC,GC,S$GLB, | Performed by: ANESTHESIOLOGY

## 2019-05-30 PROCEDURE — 99999 PR PBB SHADOW E&M-EST. PATIENT-LVL III: CPT | Mod: PBBFAC,HCNC,, | Performed by: ANESTHESIOLOGY

## 2019-05-30 PROCEDURE — 99499 RISK ADDL DX/OHS AUDIT: ICD-10-PCS | Mod: HCNC,S$GLB,, | Performed by: ANESTHESIOLOGY

## 2019-05-30 RX ORDER — PREGABALIN 75 MG/1
75 CAPSULE ORAL 2 TIMES DAILY
Qty: 60 CAPSULE | Refills: 6 | Status: SHIPPED | OUTPATIENT
Start: 2019-05-30 | End: 2019-05-30 | Stop reason: SDUPTHER

## 2019-05-30 RX ORDER — LIDOCAINE 50 MG/G
1 PATCH TOPICAL DAILY
Qty: 15 PATCH | Refills: 0 | Status: SHIPPED | OUTPATIENT
Start: 2019-05-30 | End: 2019-05-30 | Stop reason: SDUPTHER

## 2019-05-30 NOTE — PROGRESS NOTES
Subjective:   Patient ID:  Javier Valles is a 89 y.o. male who presents for follow up of Coronary Artery Disease; Peripheral Arterial Disease; Hypertension; Dyslipidemia; and Carotid Artery Disease      Assessment:     1. Coronary artery disease: : Stent thrombosis of LCx, re-stented with ELLIOTT.  Patent LIMA and G-E grafts.  - S/P LAD PTCA 1982; stent, PTCA 01/91  - S/P CABG x1 1991;   - redo CABG x 2 05/07/2007  LIMA to LAD, GASTROE to PDA                          - S/P LCX stent, 11/91, 2/01, Brachy 5/01, ELLIOTT 8/04, 5/07, 11/13                     2. Cardiomyopathy, ischemic: Prior MI, CABG, EF 40-45% : EF now normal at 55%   3. Mixed hyperlipidemia : lipids at goal   4. Chronic kidney disease, stage 3 (moderate)    5. Essential hypertension    6. Peripheral vascular disease : s/p Iliac stent   7. Bilateral carotid artery stenosis: DAKOTAH 70-79% no change since 2015.        Plan:   1. Discussed results of PET with Dr. Vargas who feels this ischemic defect is in the region of his distal LCX/OM territory with a history of re-stenting to OM1 in 2013. With improvement in EF and no angina which is his usual cardiac symptom, I would defer angiography in view of his renal dysfunction.  If angina occurs with activity, would consider investigating restenosis of OM stents.   2. Encourage activity: pool exercise.  3. Cont to pursue relief with Dr. Hodges in pain management.  4. Cont meds. RTC 1 yr with lipids and carotid US.      HPI: Major problem for Mr. Valles is difficult to manage back pain which virtually immobilizes him and is really impairing his quality of life. He denies angina.  He does get SOB with climbing stairs, but is pretty inactive.   No orthopnea or PND.         5-30-19 PET Stress    There is a small sized (5%), mild to moderate intensity resting perfusion abnormality in the base to mid lateral wall. After pharmacologic stress, this defect is slightly more severe and larger involving 7.0 % of the LV  myocardium indicative of infarct with carmen-infarct ischemia in the usual distribution of the OM1.    Within the perfusion abnormality, absolute myocardial perfusion (cc/min/gm) averaged 0.50 cc/min/gat rest, 0.75 cc/min/g at stress and CFR was 1.50 cc/min/g, which equates to moderately to severely reduced coronary flow capacity in 7% of the myocardium (within the territory).    Whole heart absolute myocardial perfusion (cc/min/g) averaged 0.73  at rest (which is normal), 1.25 cc/min/g at stress (which is mildly reduced), and  (which is severely reduced).    Gated perfusion images showed an ejection fraction of 58.0 % at rest and 60 % during stress. Normal is greater than 51%.    Wall motion was normal at rest and during stress.    LV cavity size is normal at rest and stress.    The EKG portion of this study is uninterpretable.    Arrhythmias during stress: rare PVCs.    The patient reported no chest pain during the stress test.    When compared to the prior study on 10/1/2013, there is now a perfusion abnormality in the base to mid lateral wall, which slightly worsens with stress. Globally, resting flows have increased while stress flow remain relatively unchanged, resulting in a reduced CFR on the current study.     5-3-19 TTE  · Normal left ventricular systolic function. The estimated ejection fraction is 55%  · No wall motion abnormalities.  · Mild left ventricular enlargement.  · Normal LV diastolic function.  · The mitral valve shows bileaflet prolapse with mild central mitral regurgitation.  · Mild right ventricular enlargement with normal right ventricular systolic function.  · Mild tricuspid regurgitation.  · Biatrial enlargement.    4-16-19 Carotid US  · There is 70-79% right Internal Carotid Stenosis.  · There is 0-19% left Internal Carotid Stenosis. >50% L ECA stenosis.     LHC and PCI for ACS       Patient has a right dominant coronary artery.        - Left Main Coronary Artery:              The mid LM. There is ELEONORA 3 flow. Ectatic.       - Left Anterior Descending Artery:             The proximal LAD has chronic total occlusion. There is ELEONORA 0 flow. Occluded stent in proximal LAD.       - Left Circumflex Artery:             The LCX is diffusely diseased. There is ELEONORA 3 flow.       - Right Coronary Artery:             The mid RCA has chronic total occlusion. There is ELEONORA 0 flow.       - OM1:             The proximal OM1. There is ELEONORA 3 flow.               The mid OM1 is occluded. There is ELEONORA 0 flow.               The bifurcation of the OM1 has luminal irregularities. There is ELEONORA 3 flow. The remaining portion of the vessel has luminal irregularities.       - KELLEY To LAD:             The LIMA to LAD is patent. There is ELEONORA 3 flow.       - GASTROE To PDA:             The GASTROE to PDA is patent. There is ELEONORA 3 flow.    INTERVENTION:         Proximal OM1:              The intervention of the lesion was unsuccessful . The vessel is occluded, post-ELEONORA 0 flow and TMP grade 0.         Mid OM1:              The lesion was successfully intervened. Post-stenosis of 0% and post-ELEONORA 3 flow. The following items were used: 3.0X20 Laurens II Balloon, 2.0X15 Voyager Rx Balloon, 3.0MM X 12MM RX Xience Stent (ELLIOTT),   [id number] Sprinter Legend RX Balloon, Nc Sprinter Balloon 3.0 X 12, Cath Emerge Mr 8 X 3.00 and Balloon Trek Rx 3.0x20.         Bifurcation of the OM1:              The lesion was successfully intervened. Post-stenosis of 0%, post-ELEONORA 3 flow and TMP grade 3. The vessel was accessed natively.  The following items were used: Guidezilla Guide Extension Cath 6fr.         ROS    Past Medical History:   Diagnosis Date    *Atrial flutter 10/3/13    Anticoagulant long-term use     Aspirin only at this time    Arthritis     Atrial fibrillation     Atrial flutter     Blood transfusion     ?    BPH (benign prostatic hypertrophy)     Carotid artery disease     2008: US There is 40 - 49% right  Internal Carotid stenosis.  There is 20 - 39% left Internal Carotid stenosis.       Chronic kidney disease     Coronary artery disease     DDD (degenerative disc disease) 6/25/2015    Degenerative disc disease     Elevated PSA     Glaucoma     Hyperlipidemia     Hypertension     Lumbar stenosis     lumbar spinal stenosis    NSTEMI (non-ST elevated myocardial infarction) 11/3/2013    Pacemaker 11/2013    Peripheral neuropathy     - S/P right ILIAC stent 1993;      Retinal detachment     Squamous cell carcinoma     left leg    Syncope and collapse: orthostatic with hypotension 10/9/2015       Past Surgical History:   Procedure Laterality Date    ABLATION N/A 11/1/2016    [facility] CATH LAB    ABLATION N/A 11/1/2013    [facility] by [facility] at [facility] CATH LAB    BLOCK, NERVE, FACET JOINT, LUMBAR, MEDIAL BRANCH Bilateral 12/18/2018    [facility]    CARDIAC CATHETERIZATION      [facility]    CARDIAC ELECTROPHYSIOLOGY MAPPING AND ABLATION  11/2016    CARDIAC PACEMAKER PLACEMENT  11/2016    CARDIOVERSION N/A 11/18/2013    Performed by [facility] at [facility] CATH LAB    CATARACT EXTRACTION W/  INTRAOCULAR LENS IMPLANT Left 1997        CATHETERIZATION, HEART, LEFT Left 11/1/2013    Per[facility] by [facility]illermo [facility] at [facility] CATH LAB    CORONARY ARTERY BYPASS GRAFT  1991    ECHOCARDIOGRAM-TRANSESOPHAGEAL N/A 11/18/2013    Performed by Lola Surgeon at [facility] LOLA    ENUCLEATION Right 1949    JOMAR-TRANSFORAMINAL Left 8/7/2015    [facility]    INJECTION-STEROID-EPIDURAL-CAUDAL N/A 7/21/2017    [facility]    INJECTION-STEROID-EPIDURAL-LUMBAR N/A 8/28/2015    [facility]    INJECTION-STEROID-EPIDURAL-TRANSFORAMINAL Left 5/29/2017    [facility] by [facility] Graff [facility] at [facility]     INJECTION-STEROID-EPIDURAL-TRANSFORAMINAL Right 2017    [facility]    INJECTION-STEROID-EPIDURAL-TRANSFORAMINAL Left 7/10/2015    [facility] at [facility]    MTMEJKVIO-NZOJSXOFH-PWEAKHASVJLCL N/A 2016    [facility] at [facility] CATH LAB    LAMINECTOMY-LUMBAR N/A 2016    Performed by Florentin Stanford MD at [facility] OR 2ND FLR    LAMINOTOMY  2017    [facility]formed by [facility]j [facility] at [facility] OR    LUMBAR LAMINECTOMY  2016    PLACEMENT OF SPINAL CORD STIMULATOR T10-T11 laminotomyh for placement of spinal cord stimulator at T9-10, left below the belt line pulse generator N/A 2017    Performed [facility] at [facility] OR    Radiofrequency Ablation LEFT LUMBAR L2,3,4,5 RFA Left 2019    [facility] at [facility]    RELEASE-CARPAL TUNNEL Bilateral 3/29/2017    [facility] at [facility] OR    RETINAL DETACHMENT SURGERY Left     Dr. Cosme    SKIN BIOPSY      SPINAL CORD STIMULATOR TRIAL W/ LAMINOTOMY  10/2017    TONSILLECTOMY      TRANSESOPHAGEAL ECHOCARDIOGRAM (MONICA) N/A 2013    [facility] at [facility] CATH LAB    TRIAL-STIMULATOR-DORSAL COLUMN N/A 2017    [facility] at [facility]       Social History     Tobacco Use    Smoking status: Former Smoker     Last attempt to quit: 1963     Years since quittin.8    Smokeless tobacco: Never Used   Substance Use Topics    Alcohol use: Yes     Alcohol/week: 1.8 oz     Types: 1 Glasses of wine, 1 Cans of beer, 1 Shots of liquor per week     Comment: 2 a day    Drug use: No       Family History   Problem Relation Age of Onset    Stroke Mother 69    Clotting disorder Mother         per patient no clotting disorder    Hypertension Mother     Diverticulitis Son     Cancer Neg Hx     Diabetes Neg Hx     Heart disease Neg Hx      Glaucoma Neg Hx     Amblyopia Neg Hx     Blindness Neg Hx     Cataracts Neg Hx     Macular degeneration Neg Hx     Retinal detachment Neg Hx     Strabismus Neg Hx        Current Outpatient Medications   Medication Sig    amiodarone (PACERONE) 200 MG Tab Take 1 tablet (200 mg total) by mouth once daily.    amLODIPine (NORVASC) 5 MG tablet Take 1 tablet (5 mg total) by mouth once daily.    apixaban 2.5 mg Tab Take 1 tablet (2.5 mg total) by mouth 2 (two) times daily.    ascorbic acid (VITAMIN C) 1000 MG tablet Take 1,000 mg by mouth every morning.     brimonidine 0.2% (ALPHAGAN) 0.2 % Drop Place 1 drop into the left eye 3 (three) times daily.    brinzolamide (AZOPT) 1 % ophthalmic suspension Place 1 drop into both eyes 3 (three) times daily.    co-enzyme Q-10 30 mg capsule Take 30 mg by mouth once daily.     donepezil (ARICEPT) 5 MG tablet Take 1 tablet (5 mg total) by mouth every evening.    dorzolamide (TRUSOPT) 2 % ophthalmic solution Place 1 drop into both eyes 3 (three) times daily. Please fill today - this evening if possible - pt is leaving town early tomorrow    erythromycin (ROMYCIN) ophthalmic ointment Apply to affected eye daily as needed    escitalopram oxalate (LEXAPRO) 5 MG Tab Take 1 tablet (5 mg total) by mouth once daily.    ipratropium (ATROVENT) 0.03 % nasal spray 1-2 sprays by Nasal route 2 (two) times daily.    latanoprost 0.005 % ophthalmic solution Place 1 drop into the left eye every evening.    lidocaine (LIDODERM) 5 % Place 1 patch onto the skin once daily. Remove & Discard patch within 12 hours or as directed by MD    losartan (COZAAR) 50 MG tablet Take 1 tablet (50 mg total) by mouth 2 (two) times daily.    lutein 20 mg Cap Take 20 mg by mouth once daily.     MULTIVITAMINS,THER W-MINERALS (VITAMINS & MINERALS ORAL) Take 1 tablet by mouth every morning.     polycarbophil (FIBERCON) 625 mg tablet Take 625 mg by mouth 2 (two) times daily.    pregabalin (LYRICA) 75 MG  "capsule Take 1 capsule (75 mg total) by mouth 2 (two) times daily.    rosuvastatin (CRESTOR) 20 MG tablet Take 1 tablet (20 mg total) by mouth every evening.    oxyCODONE-acetaminophen (PERCOCET) 5-325 mg per tablet Take 1 tablet by mouth every 6 (six) hours as needed for Pain.     No current facility-administered medications for this visit.        Review of patient's allergies indicates:   Allergen Reactions    Pravastatin      Severe myalgias/elevated CPK    Atorvastatin Other (See Comments)     Myositis/elevated CPK    Statins-hmg-coa reductase inhibitors      Muscle pain and loss of muscle    Ace inhibitors      Cough       Objective:     /63 (BP Location: Left arm, Patient Position: Sitting, BP Method: Large (Automatic))   Pulse 61   Ht 5' 8" (1.727 m)   Wt 86.4 kg (190 lb 7.6 oz)   SpO2 97%   BMI 28.96 kg/m²     Physical Exam      Chemistry        Component Value Date/Time     05/29/2019 0847    K 4.3 05/29/2019 0847     (H) 05/29/2019 0847    CO2 23 05/29/2019 0847    BUN 25 (H) 05/29/2019 0847    CREATININE 2.0 (H) 05/29/2019 0847     05/29/2019 0847        Component Value Date/Time    CALCIUM 10.4 05/29/2019 0847    ALKPHOS 66 04/16/2019 1229    AST 27 04/16/2019 1229    ALT 19 04/16/2019 1229    BILITOT 1.7 (H) 04/16/2019 1229    ESTGFRAFRICA 33.2 (A) 05/29/2019 0847    EGFRNONAA 28.7 (A) 05/29/2019 0847            Lab Results   Component Value Date    CHOL 133 05/29/2019    CHOL 124 06/27/2018    CHOL 128 06/06/2017     Lab Results   Component Value Date    HDL 53 05/29/2019    HDL 61 06/27/2018    HDL 52 06/06/2017     Lab Results   Component Value Date    LDLCALC 66.8 05/29/2019    LDLCALC 54.2 (L) 06/27/2018    LDLCALC 66.0 06/06/2017     Lab Results   Component Value Date    TRIG 66 05/29/2019    TRIG 44 06/27/2018    TRIG 50 06/06/2017     Lab Results   Component Value Date    CHOLHDL 39.8 05/29/2019    CHOLHDL 49.2 06/27/2018    CHOLHDL 40.6 06/06/2017         Lab " Results   Component Value Date     05/29/2019    K 4.3 05/29/2019     (H) 05/29/2019    CO2 23 05/29/2019    BUN 25 (H) 05/29/2019    CREATININE 2.0 (H) 05/29/2019     05/29/2019    HGBA1C 6.2 03/06/2008    MG 2.2 04/08/2015    AST 27 04/16/2019    ALT 19 04/16/2019    ALBUMIN 4.1 04/16/2019    PROT 7.0 04/16/2019    BILITOT 1.7 (H) 04/16/2019    WBC 7.78 04/16/2019    HGB 12.5 (L) 04/16/2019    HCT 39.7 (L) 04/16/2019    HCT 38 11/01/2013     (H) 04/16/2019    PLT 99 (L) 04/16/2019    INR 1.0 11/01/2017    PSA 10 (H) 06/30/2010    TSH 3.517 04/16/2019    CHOL 133 05/29/2019    HDL 53 05/29/2019    LDLCALC 66.8 05/29/2019    TRIG 66 05/29/2019

## 2019-05-30 NOTE — PROGRESS NOTES
Chronic patient Established Note (Follow up visit)      SUBJECTIVE:    Javier Valles presents to the clinic for a follow-up appointment for lower back pain. Since the last visit, Javier Valles states the pain has been persistant. Current pain intensity is 7/10. Patient states the pain starts just below his stimulator battery and spreads across his lower back. He describes it as sharp and shooting and just under the skin. The pain is made worse by flexion and better by extension.       Pain Disability Index Review:  Last 3 PDI Scores 5/6/2019 2/7/2019 12/20/2018   Pain Disability Index (PDI) 27 0 0     Pain Medications:  - Opioids: Vicodin ( Hydrocodone/Acetaminophen)  - Adjuvant Medications: none  - Anti-Coagulants: none  - Others: see med list     Opioid Contract: no     report: Reviewed and consistent with medication use as prescribed.    Pain Procedures:   - Multiple JOMAR's with Dr. Lopez 4 yrs ago  - 04/28/17: Right L4-5 TF JOMAR- 100% relief   - 05/29/17: Left L5-S1 TF JOMAR  - 07/21/17: Caudal JOMAR- significant benefit  - 09/22/17: Lumbar SCS trial- 90% relief  - 12/18/18: Bilateral L2,3,4,5 MBB- 90% relief  - 01/09/19: Left L2,3,4,5 RFA- 70% relief    Physical Therapy/Home Exercise: yes    Imaging:     EXAMINATION:  CT LUMBAR SPINE WITHOUT CONTRAST    CLINICAL HISTORY:  Low back painLow back pain, prior surgery, new or progressive sx;    TECHNIQUE:  Contiguous axial images were obtained of the lumbar spine without intravenous contrast. Coronal and sagittal reformations were acquired.    COMPARISON:  Thoracolumbar spine radiograph 01/28/2019, CT lumbar spine 03/24/2016    FINDINGS:  Vertebral body heights are within normal limits.  There is mild anterolisthesis of L4 on L5 similar to prior.  Postoperative changes of prior left-sided laminectomies are present at L3-4 and L4-5.  There is multilevel disc space height loss from T12-L1 through L5-S1, worst at L2-3 and L3-4.  No acute fractures.  There are  degenerative Schmorl's nodes at the inferior endplate of T12, superior endplate of L1, inferior endplate of L3, and superior endplate of L4.    There is moderate calcific atherosclerosis of the aorta and iliac branch vessels.  There is infrarenal aortic ectasia just proximal to the bifurcation measuring up to 2.4 cm.  No aortic aneurysm.  There is ectasia of the right proximal common iliac artery measuring 2.1 cm.    Otherwise the imaged portions of the abdomen are unremarkable.    The paravertebral tissues are unremarkable.  Intervertebral disc demonstrate prominent vacuum disc formation at T12-L1, L1-L2, and L5-S1.  Degenerative changes are detailed as follows:    T12-L1 disc: There is bilateral facet arthrosis.  No significant spinal canal or neural foraminal stenosis.    L1-2 disc: Circumferential posterior disc bulge, bilateral facet arthrosis, and mild bilateral ligamentum flavum thickening results in mild central canal stenosis and mild bilateral neural foraminal narrowing.    L2-3 disc: Circumferential posterior disc bulge, prominent bilateral facet arthrosis, ligamentum flavum thickening result in moderate central canal stenosis and moderate left and mild right neural foraminal narrowing.    L3-4 disc: Postoperative changes left-sided laminectomy, bilateral facet arthrosis, circumferential posterior disc bulge with partially calcified central disc protrusion results in moderate central canal stenosis and mild bilateral neural foraminal narrowing.    L4-5 disc: Postoperative changes left-sided laminectomy, prominent bilateral facet arthrosis and circumferential posterior disc bulge resulting in moderate central canal stenosis and severe left and moderate right neural foraminal narrowing.    L5-S1 disc: Asymmetric left posterior disc bulge and prominent bilateral facet arthrosis results in moderate central canal stenosis and severe bilateral neural foraminal narrowing noting the posterior disc bulge is in  close approximation to the exiting left S1 nerve root.       Impression         Interval postoperative changes of left L3-4 and L4-5 laminectomies compared to CT lumbar spine 03/24/2016.    Multilevel degenerative changes of the lumbar spine as detailed above.    Aortoiliac atherosclerotic calcification with ectasia of the proximal right common iliac artery.       Allergies:   Review of patient's allergies indicates:   Allergen Reactions    Pravastatin      Severe myalgias/elevated CPK    Lipitor [atorvastatin]      Myositis/elevated CPK    Statins-hmg-coa reductase inhibitors      Muscle pain and loss of muscle    Ace inhibitors      Cough       Current Medications:   Current Outpatient Medications   Medication Sig Dispense Refill    amiodarone (PACERONE) 200 MG Tab Take 1 tablet (200 mg total) by mouth once daily. 30 tablet 11    amLODIPine (NORVASC) 5 MG tablet Take 1 tablet (5 mg total) by mouth once daily. 90 tablet 3    apixaban 2.5 mg Tab Take 1 tablet (2.5 mg total) by mouth 2 (two) times daily. 180 tablet 3    ascorbic acid (VITAMIN C) 1000 MG tablet Take 1,000 mg by mouth every morning.       brimonidine 0.2% (ALPHAGAN) 0.2 % Drop Place 1 drop into the left eye 3 (three) times daily. 30 mL 3    brinzolamide (AZOPT) 1 % ophthalmic suspension Place 1 drop into both eyes 3 (three) times daily.      co-enzyme Q-10 30 mg capsule Take 30 mg by mouth once daily.       donepezil (ARICEPT) 5 MG tablet Take 1 tablet (5 mg total) by mouth every evening. 30 tablet 11    dorzolamide (TRUSOPT) 2 % ophthalmic solution Place 1 drop into both eyes 3 (three) times daily. Please fill today - this evening if possible - pt is leaving town early tomorrow 30 mL 3    erythromycin (ROMYCIN) ophthalmic ointment Apply to affected eye daily as needed 3.5 g 6    escitalopram oxalate (LEXAPRO) 5 MG Tab Take 1 tablet (5 mg total) by mouth once daily. 30 tablet 5    ipratropium (ATROVENT) 0.03 % nasal spray 1-2 sprays by  Nasal route 2 (two) times daily. 30 mL 1    latanoprost 0.005 % ophthalmic solution Place 1 drop into the left eye every evening. 3 mL 3    losartan (COZAAR) 50 MG tablet Take 1 tablet (50 mg total) by mouth 2 (two) times daily. 180 tablet 3    lutein 20 mg Cap Take 20 mg by mouth once daily.       MULTIVITAMINS,THER W-MINERALS (VITAMINS & MINERALS ORAL) Take 1 tablet by mouth every morning.       oxyCODONE-acetaminophen (PERCOCET) 5-325 mg per tablet Take 1 tablet by mouth every 6 (six) hours as needed for Pain. 30 tablet 0    polycarbophil (FIBERCON) 625 mg tablet Take 625 mg by mouth 2 (two) times daily.      rosuvastatin (CRESTOR) 20 MG tablet Take 1 tablet (20 mg total) by mouth every evening. 90 tablet 3    lidocaine (LIDODERM) 5 % Place 1 patch onto the skin once daily. Remove & Discard patch within 12 hours or as directed by MD 15 patch 0    pregabalin (LYRICA) 75 MG capsule Take 1 capsule (75 mg total) by mouth 2 (two) times daily. 60 capsule 6     No current facility-administered medications for this visit.        REVIEW OF SYSTEMS:  GENERAL:  No weight loss, malaise or fevers.  HEENT:  Negative for frequent or significant headaches.  NECK:  Negative for lumps, goiter, pain and significant neck swelling.  RESPIRATORY:  Negative for cough, wheezing or shortness of breath.  CARDIOVASCULAR:  Negative for chest pain, leg swelling or palpitations.  GI:  Negative for abdominal discomfort, blood in stools or black stools or change in bowel habits.  MUSCULOSKELETAL:  See HPI.  SKIN:  Negative for lesions, rash, and itching.  PSYCH:  Negative for sleep disturbance, mood disorder and recent psychosocial stressors.  HEMATOLOGY/LYMPHOLOGY:  Negative for prolonged bleeding, bruising easily or swollen nodes.  NEURO:   No history of headaches, syncope, paralysis, seizures or tremors.  All other reviewed and negative other than HPI.    Past Medical History:  Past Medical History:   Diagnosis Date    *Atrial  flutter 10/3/13    Anticoagulant long-term use     Aspirin only at this time    Arthritis     Atrial fibrillation     Atrial flutter     Blood transfusion     ?    BPH (benign prostatic hypertrophy)     Carotid artery disease     2008: [place] There is 40 - 49% right Internal Carotid stenosis.  There is 20 - 39% left Internal Carotid stenosis.       Chronic kidney disease     Coronary artery disease     DDD (degenerative disc disease) 6/25/2015    Degenerative disc disease     Elevated PSA     Glaucoma     Hyperlipidemia     Hypertension     Lumbar stenosis     lumbar spinal stenosis    NSTEMI (non-ST elevated myocardial infarction) 11/3/2013    Pacemaker 11/2013    Peripheral neuropathy     - S/P right ILIAC stent 1993;      Retinal detachment     Squamous cell carcinoma     left leg    Syncope and collapse: orthostatic with hypotension 10/9/2015       Past Surgical History:  Past Surgical History:   Procedure Laterality Date    ABLATION N/A 11/1/2016    Performed [facility] at [facility] CATH LAB    ABLATION N/A 11/1/2013    Per[facility] at [facility] CATH LAB    BLOCK, NERVE, FACET JOINT, LUMBAR, MEDIAL BRANCH Bilateral 12/18/2018    [facility]    CARDIAC CATHETERIZATION      [facility]    CARDIAC ELECTROPHYSIOLOGY MAPPING AND ABLATION  11/2016    CARDIAC PACEMAKER PLACEMENT  11/2016    CARDIOVERSION N/A 11/18/2013    Performed by Jas Nathan [facility] at [facility] CATH LAB    CATARACT EXTRACTION W/  INTRAOCULAR LENS IMPLANT Left 1997        CATHETERIZATION, HEART, LEFT Left 11/1/2013    Performed by [facility]illermo [facility] at [facility] CATH LAB    CORONARY ARTERY BYPASS GRAFT  1991    ECHOCARDIOGRAM-TRANSESOPHAGEAL N/A 11/18/2013    Performed by Lola Surgeon at [facility] LOLA    ENUCLEATION Right 1949    JOMAR-TRANSFORAMINAL Left 8/7/2015    [facility] PAIN T    INJECTION-STEROID-EPIDURAL-CAUDAL N/A  7/21/2017    [facility]    INJECTION-STEROID-EPIDURAL-LUMBAR N/A 8/28/2015    [facility] at [facility] MGT    INJECTION-STEROID-EPIDURAL-TRANSFORAMINAL Left 5/29/2017    Performed by Albert Graff [facility] at [facility] MGT    INJECTION-STEROID-EPIDURAL-TRANSFORAMINAL Right 4/28/2017    [facility]    INJECTION-STEROID-EPIDURAL-TRANSFORAMINAL Left 7/10/2015    [facility]    UJEDWNLSD-CZMIUFIAO-DOXVXRFNVOTLE N/A 11/1/2016    [facility] at [facility] CATH LAB    LAMINECTOMY-LUMBAR N/A 4/25/2016    Performed by Florentin Stanford MD at [facility] OR 2ND FLR    LAMINOTOMY  11/9/2017    Per[facility] by [facility] at [facility] OR    LUMBAR LAMINECTOMY  04/25/2016    PLACEMENT OF SPINAL CORD STIMULATOR T10-T11 laminotomyh for placement of spinal cord stimulator at T9-10, left below the belt line pulse generator N/A 11/9/2017    Performed by [facility] at [facility] OR    Radiofrequency Ablation LEFT LUMBAR L2,3,4,5 RFA Left 1/9/2019    [facility] at [facility]    RELEASE-CARPAL TUNNEL Bilateral 3/29/2017    [facility] OR    RETINAL DETACHMENT SURGERY Left 1997    Dr. Cosme    SKIN BIOPSY      SPINAL CORD STIMULATOR TRIAL W/ LAMINOTOMY  10/2017    TONSILLECTOMY      TRANSESOPHAGEAL ECHOCARDIOGRAM (MONICA) N/A 11/1/2013    Performed [facility] at [facility] CATH LAB    TRIAL-STIMULATOR-DORSAL COLUMN N/A 9/22/2017    Per[facility] by [facility] at [facility]       Family History:  Family History   Problem Relation Age of Onset    Stroke Mother 69    Clotting disorder Mother         per patient no clotting disorder    Hypertension Mother     Diverticulitis Son     Cancer Neg Hx     Diabetes Neg Hx     Heart disease Neg Hx     Glaucoma Neg Hx     Amblyopia Neg Hx      "Blindness Neg Hx     Cataracts Neg Hx     Macular degeneration Neg Hx     Retinal detachment Neg Hx     Strabismus Neg Hx        Social History:  Social History     Socioeconomic History    Marital status:      Spouse name: Joanie    Number of children: Not on file    Years of education: Not on file    Highest education level: Not on file   Occupational History    Occupation: retired   Social Needs    Financial resource strain: Not on file    Food insecurity:     Worry: Not on file     Inability: Not on file    Transportation needs:     Medical: Not on file     Non-medical: Not on file   Tobacco Use    Smoking status: Former Smoker     Last attempt to quit: 1963     Years since quittin.8    Smokeless tobacco: Never Used   Substance and Sexual Activity    Alcohol use: Yes     Alcohol/week: 1.8 oz     Types: 1 Glasses of wine, 1 Cans of beer, 1 Shots of liquor per week     Comment: 2 a day    Drug use: No    Sexual activity: Yes     Partners: Female   Lifestyle    Physical activity:     Days per week: Not on file     Minutes per session: Not on file    Stress: Not on file   Relationships    Social connections:     Talks on phone: Not on file     Gets together: Not on file     Attends Evangelical service: Not on file     Active member of club or organization: Not on file     Attends meetings of clubs or organizations: Not on file     Relationship status: Not on file   Other Topics Concern    Not on file   Social History Narrative    Not on file       OBJECTIVE:    BP (!) 85/52   Pulse (!) 49   Temp 97.6 °F (36.4 °C)   Resp 18   Ht 5' 8" (1.727 m)   Wt 85.7 kg (189 lb)   BMI 28.74 kg/m²     PHYSICAL EXAMINATION:  General appearance: Well appearing, in no acute distress, alert and oriented x3.  Psych:  Mood and affect appropriate.  Skin: Skin color, texture, turgor normal, no rashes or lesions, in both upper and lower body.  Head/face:  Atraumatic, normocephalic. No palpable lymph " nodes  Neck: No pain to palpation over the cervical paraspinous muscles. Spurling Negative. No pain with neck flexion, extension, or lateral flexion. .  Cor: RRR  Pulm: CTA  GI: Abdomen soft and non-tender.  Back: Straight leg raising in the supine position is negative to radicular pain bilaterally.  There is no pain with palpation to lumbar facet joints.  Limited ROM with pain on extension and flexion.  Positive facet loading bilaterally, L>R.  Extremities: Peripheral joint ROM is full and pain free without obvious instability or laxity in all four extremities. No deformities, edema, or skin discoloration. Good capillary refill.  Musculoskeletal: There is mild pain with palpation to IPG.  5/5 strength in right ankle with plantar and dorsiflexion. 5/5 strength in left ankle with plantar and dorsiflexion. 5/5 strength with right knee flexion and 4/5 on extension. 5/5 strength with left knee flexion and extension. 5/5 strength in right EHL, 5/5 strength in left EHL.  Left hip flexion is 4/5, right is 5/5.  No atrophy or tone abnormalities are noted.  Neuro: Bilateral lower extremity coordination and muscle stretch reflexes are physiologic and symmetric. Plantar response are downgoing. No loss of sensation is noted.  Gait: Antalgic- ambulates without assistance.      ASSESSMENT: 89 y.o. year old male with lower back pain, consistent with .     1. Chronic pain syndrome  pregabalin (LYRICA) 75 MG capsule    lidocaine (LIDODERM) 5 %   2. Post laminectomy syndrome  pregabalin (LYRICA) 75 MG capsule    lidocaine (LIDODERM) 5 %   3. Neuralgia and neuritis  pregabalin (LYRICA) 75 MG capsule    lidocaine (LIDODERM) 5 %     PLAN:     - I have stressed the importance of physical activity and a home exercise plan to help with pain and improve health.  - Start Lyrica 75 mg and gradually increase to twice a day to help with the neuropathic pain.  - Lidocaine patch over affected lower back area once daily.  - RTC in three weeks for  follow up regarding medication changes.  - Counseled patient regarding the importance of activity modification, constant sleeping habits and physical therapy.      The above plan and management options were discussed at length with patient. Patient is in agreement with the above and verbalized understanding.    Oleksandr Siegel I have personally reviewed the history and exam of this patient and agree with the resident/fellow/NPs note as stated above.    James Hodges MD    05/30/2019

## 2019-05-30 NOTE — TELEPHONE ENCOUNTER
Called and left another voicemail message for patient to call back to discuss results of stress test. He can discuss them with Dr. Curtis in clinic tomorrow if he does not call back before then.

## 2019-05-30 NOTE — TELEPHONE ENCOUNTER
----- Message from Savanah Caballero sent at 5/30/2019  3:59 PM CDT -----  Contact: pt   Name of Who is Calling: MARGRET MARTIN [[id number]]    What is the request in detail: Patient is requesting a call back in regards to medications lidocaine (LIDODERM) 5 % and pregabalin (LYRICA) 75 MG capsule needing a PA.....Please contact to further discuss and advise      Can the clinic reply by [facility]:     What [facility]: [phone]

## 2019-05-31 ENCOUNTER — OFFICE VISIT (OUTPATIENT)
Dept: CARDIOLOGY | Facility: CLINIC | Age: 84
End: 2019-05-31
Payer: MEDICARE

## 2019-05-31 ENCOUNTER — TELEPHONE (OUTPATIENT)
Dept: PAIN MEDICINE | Facility: CLINIC | Age: 84
End: 2019-05-31

## 2019-05-31 VITALS
OXYGEN SATURATION: 97 % | SYSTOLIC BLOOD PRESSURE: 133 MMHG | WEIGHT: 190.5 LBS | DIASTOLIC BLOOD PRESSURE: 63 MMHG | HEART RATE: 61 BPM | BODY MASS INDEX: 28.87 KG/M2 | HEIGHT: 68 IN

## 2019-05-31 DIAGNOSIS — E78.2 MIXED HYPERLIPIDEMIA: Chronic | ICD-10-CM

## 2019-05-31 DIAGNOSIS — N18.30 CHRONIC KIDNEY DISEASE, STAGE 3 (MODERATE): ICD-10-CM

## 2019-05-31 DIAGNOSIS — I73.9 PERIPHERAL VASCULAR DISEASE: ICD-10-CM

## 2019-05-31 DIAGNOSIS — I25.10 CORONARY ARTERY DISEASE INVOLVING NATIVE CORONARY ARTERY OF NATIVE HEART WITHOUT ANGINA PECTORIS: Primary | ICD-10-CM

## 2019-05-31 DIAGNOSIS — I65.23 BILATERAL CAROTID ARTERY STENOSIS: ICD-10-CM

## 2019-05-31 DIAGNOSIS — I10 ESSENTIAL HYPERTENSION: ICD-10-CM

## 2019-05-31 DIAGNOSIS — I65.23 BILATERAL CAROTID ARTERY STENOSIS: Primary | ICD-10-CM

## 2019-05-31 PROCEDURE — 99214 OFFICE O/P EST MOD 30 MIN: CPT | Mod: HCNC,S$GLB,, | Performed by: INTERNAL MEDICINE

## 2019-05-31 PROCEDURE — 99999 PR PBB SHADOW E&M-EST. PATIENT-LVL IV: ICD-10-PCS | Mod: PBBFAC,HCNC,, | Performed by: INTERNAL MEDICINE

## 2019-05-31 PROCEDURE — 99499 UNLISTED E&M SERVICE: CPT | Mod: HCNC,S$GLB,, | Performed by: INTERNAL MEDICINE

## 2019-05-31 PROCEDURE — 99999 PR PBB SHADOW E&M-EST. PATIENT-LVL IV: CPT | Mod: PBBFAC,HCNC,, | Performed by: INTERNAL MEDICINE

## 2019-05-31 PROCEDURE — 1101F PT FALLS ASSESS-DOCD LE1/YR: CPT | Mod: HCNC,CPTII,S$GLB, | Performed by: INTERNAL MEDICINE

## 2019-05-31 PROCEDURE — 99214 PR OFFICE/OUTPT VISIT, EST, LEVL IV, 30-39 MIN: ICD-10-PCS | Mod: HCNC,S$GLB,, | Performed by: INTERNAL MEDICINE

## 2019-05-31 PROCEDURE — 99499 RISK ADDL DX/OHS AUDIT: ICD-10-PCS | Mod: HCNC,S$GLB,, | Performed by: INTERNAL MEDICINE

## 2019-05-31 PROCEDURE — 1101F PR PT FALLS ASSESS DOC 0-1 FALLS W/OUT INJ PAST YR: ICD-10-PCS | Mod: HCNC,CPTII,S$GLB, | Performed by: INTERNAL MEDICINE

## 2019-05-31 RX ORDER — LIDOCAINE 50 MG/G
1 PATCH TOPICAL DAILY
Qty: 15 PATCH | Refills: 0 | Status: SHIPPED | OUTPATIENT
Start: 2019-05-31 | End: 2019-06-30

## 2019-05-31 RX ORDER — PREGABALIN 75 MG/1
75 CAPSULE ORAL 2 TIMES DAILY
Qty: 60 CAPSULE | Refills: 6 | Status: SHIPPED | OUTPATIENT
Start: 2019-05-31 | End: 2019-08-12

## 2019-05-31 NOTE — TELEPHONE ENCOUNTER
Prior authorization for lidocaine patches done at this time, Lyrica prescription phoned in to [facility] also

## 2019-05-31 NOTE — TELEPHONE ENCOUNTER
----- Message from Susie Rene sent at 5/31/2019  3:53 PM CDT -----  Contact: pt  Name of Who is Calling: MARGRET MARTIN [[id number]]      What is the request in detail: pt returning call.. Please advise      Can the clinic reply by [facility]:no      What [facility]: [phone]

## 2019-05-31 NOTE — TELEPHONE ENCOUNTER
----- Message from Amina Rockwell sent at 5/31/2019 10:31 AM CDT -----  Contact: pt  Name of Who is Calling: Javier      What is the request in detail: Pt states he got additional info from [facility] and needs to speak to the nurse in reference to his medication. Please call and advise      Can the clinic reply by [facility]: no      What Number to Call Back if not in [facility]: #[phone]

## 2019-05-31 NOTE — TELEPHONE ENCOUNTER
Marleni, this is Jason I've received your request I'm returning your call from the pain management clinic at [facility]. I just wanted to let you know that I'm working on getting an answer for you by the time you call back with more information.

## 2019-05-31 NOTE — TELEPHONE ENCOUNTER
Patient contacted and spoke to patient regarding his message.     He reports that he spoke to his insurance company and they have denied the lidocaine patches. He has started the appeal process and he would like a note stating what is the medical need for the medication and his condition that he needs it for.     Attn:  Appeals department of [facility]  Fax number [phone]

## 2019-06-03 NOTE — TELEPHONE ENCOUNTER
KEITH Mcgraw  You 2 hours ago (8:29 AM)      Also, it is over the counter as 4% so I would just tell him to try that, they are very similar.    Routing comment       KEITH Mcgraw  You 2 hours ago (8:29 AM)      The prescription was started by Tracie so he would have to sign a letter.    Routing comment

## 2019-06-03 NOTE — TELEPHONE ENCOUNTER
Contacted patient and explained NP response.     Mr. Valles verbalized understanding, however he would prefer the letter be written so he can obtain what was prescribed.     He was informed that his request was presented to Dr. Hodges and is pending his response at this time.

## 2019-06-05 ENCOUNTER — PATIENT OUTREACH (OUTPATIENT)
Dept: OTHER | Facility: OTHER | Age: 84
End: 2019-06-05

## 2019-06-05 NOTE — PROGRESS NOTES
HPI:  Called patient to follow up. Patient reports adherence to medication regimen daily and denies missed doses. Patient denies hypotensive s/sx (lightheadedness, dizziness, nausea, fatigue); patient denies hypertensive s/sx (SOB, CP, severe headaches, changes in vision, dizziness, fatigue, confusion, anxiety, nosebleeds).     Last 5 Patient Entered Readings                                      Current 30 Day Average: 135/70     Recent Readings 5/28/2019 5/19/2019 5/13/2019 5/5/2019 4/26/2019    SBP (mmHg) 139 137 129 109 137    DBP (mmHg) 72 68 69 64 74    Pulse 49 49 53 49 68        Assessment:  Reviewed recent readings. Per 2017 ACC/ AHA HTN guidelines (goal of BP < 140/90), current 30-day average is well controlled. Patient was seen by Dr. Curtis on 5/31, BP was 133/63.    Plan:  Continue current medication regimen.   Encouraged patient to charge digital cuff at least monthly.  Patients health , Alexa Méndez, will be following up as scheduled.   I will continue to monitor regularly and will follow-up in 6 months, sooner if blood pressure begins to trend upward or downward.     Current medication regimen:  Hypertension Medications             amLODIPine (NORVASC) 5 MG tablet Take 1 tablet (5 mg total) by mouth once daily.    losartan (COZAAR) 50 MG tablet Take 1 tablet (50 mg total) by mouth 2 (two) times daily.         Patient denies having questions or concerns. Patient has my contact information and knows to call with any concerns or clinical changes.

## 2019-06-11 ENCOUNTER — OFFICE VISIT (OUTPATIENT)
Dept: OPHTHALMOLOGY | Facility: CLINIC | Age: 84
End: 2019-06-11
Payer: MEDICARE

## 2019-06-11 DIAGNOSIS — H35.372 EPIRETINAL MEMBRANE, LEFT EYE: Primary | ICD-10-CM

## 2019-06-11 DIAGNOSIS — H43.812 POSTERIOR VITREOUS DETACHMENT OF LEFT EYE: ICD-10-CM

## 2019-06-11 PROCEDURE — 99999 PR PBB SHADOW E&M-EST. PATIENT-LVL III: ICD-10-PCS | Mod: PBBFAC,HCNC,, | Performed by: OPHTHALMOLOGY

## 2019-06-11 PROCEDURE — 92134 POSTERIOR SEGMENT OCT RETINA (OCULAR COHERENCE TOMOGRAPHY)-BOTH EYES: ICD-10-PCS | Mod: HCNC,S$GLB,, | Performed by: OPHTHALMOLOGY

## 2019-06-11 PROCEDURE — 92226 PR SPECIAL EYE EXAM, SUBSEQUENT: ICD-10-PCS | Mod: HCNC,LT,S$GLB, | Performed by: OPHTHALMOLOGY

## 2019-06-11 PROCEDURE — 92134 CPTRZ OPH DX IMG PST SGM RTA: CPT | Mod: HCNC,S$GLB,, | Performed by: OPHTHALMOLOGY

## 2019-06-11 PROCEDURE — 92226 PR SPECIAL EYE EXAM, SUBSEQUENT: CPT | Mod: HCNC,LT,S$GLB, | Performed by: OPHTHALMOLOGY

## 2019-06-11 PROCEDURE — 99999 PR PBB SHADOW E&M-EST. PATIENT-LVL III: CPT | Mod: PBBFAC,HCNC,, | Performed by: OPHTHALMOLOGY

## 2019-06-11 PROCEDURE — 92014 PR EYE EXAM, EST PATIENT,COMPREHESV: ICD-10-PCS | Mod: HCNC,S$GLB,, | Performed by: OPHTHALMOLOGY

## 2019-06-11 PROCEDURE — 92014 COMPRE OPH EXAM EST PT 1/>: CPT | Mod: HCNC,S$GLB,, | Performed by: OPHTHALMOLOGY

## 2019-06-11 NOTE — PROGRESS NOTES
HPI     12 mo / OCT     Pt sts doing well no change noticed since last visit occasional pain with the amount of gtt use   H/o injury playing hand ball 1951       Meds:   Brimonidine TID OS   Latanoprost QHS OS   Dorzolimide TID  OS          A/P    1) ERM OS    2) PVD OS  Vitreous base dehisced ST - no new breaks or tears    3) PCIOL OS    4) Prosthetic eye OD    5) Retinal tear OS    6) Lamellar macular hole OS        RD precautions      12 months OCT

## 2019-06-18 ENCOUNTER — PATIENT MESSAGE (OUTPATIENT)
Dept: INTERNAL MEDICINE | Facility: CLINIC | Age: 84
End: 2019-06-18

## 2019-06-18 DIAGNOSIS — I25.5 ISCHEMIC CARDIOMYOPATHY: ICD-10-CM

## 2019-06-18 DIAGNOSIS — I49.5 SSS (SICK SINUS SYNDROME): ICD-10-CM

## 2019-06-18 DIAGNOSIS — I49.3 PVC (PREMATURE VENTRICULAR CONTRACTION): ICD-10-CM

## 2019-06-18 DIAGNOSIS — Z95.1 S/P CABG (CORONARY ARTERY BYPASS GRAFT): ICD-10-CM

## 2019-06-18 RX ORDER — DONEPEZIL HYDROCHLORIDE 5 MG/1
5 TABLET, FILM COATED ORAL NIGHTLY
Qty: 90 TABLET | Refills: 3 | Status: SHIPPED | OUTPATIENT
Start: 2019-06-18 | End: 2020-06-01 | Stop reason: SDUPTHER

## 2019-06-18 RX ORDER — AMIODARONE HYDROCHLORIDE 200 MG/1
200 TABLET ORAL DAILY
Qty: 90 TABLET | Refills: 1 | Status: SHIPPED | OUTPATIENT
Start: 2019-06-18 | End: 2020-01-02

## 2019-06-25 ENCOUNTER — PATIENT MESSAGE (OUTPATIENT)
Dept: CARDIOLOGY | Facility: CLINIC | Age: 84
End: 2019-06-25

## 2019-07-01 ENCOUNTER — OFFICE VISIT (OUTPATIENT)
Dept: PAIN MEDICINE | Facility: CLINIC | Age: 84
End: 2019-07-01
Payer: MEDICARE

## 2019-07-01 VITALS
WEIGHT: 193.81 LBS | HEIGHT: 68 IN | TEMPERATURE: 98 F | SYSTOLIC BLOOD PRESSURE: 95 MMHG | DIASTOLIC BLOOD PRESSURE: 60 MMHG | BODY MASS INDEX: 29.37 KG/M2 | HEART RATE: 50 BPM

## 2019-07-01 DIAGNOSIS — M54.16 LUMBAR RADICULOPATHY: ICD-10-CM

## 2019-07-01 DIAGNOSIS — M51.36 DDD (DEGENERATIVE DISC DISEASE), LUMBAR: ICD-10-CM

## 2019-07-01 DIAGNOSIS — M79.10 MYALGIA: ICD-10-CM

## 2019-07-01 DIAGNOSIS — M96.1 POSTLAMINECTOMY SYNDROME OF LUMBAR REGION: ICD-10-CM

## 2019-07-01 DIAGNOSIS — M47.816 LUMBAR SPONDYLOSIS: ICD-10-CM

## 2019-07-01 DIAGNOSIS — G89.4 CHRONIC PAIN SYNDROME: Primary | ICD-10-CM

## 2019-07-01 PROCEDURE — 99213 PR OFFICE/OUTPT VISIT, EST, LEVL III, 20-29 MIN: ICD-10-PCS | Mod: HCNC,S$GLB,, | Performed by: NURSE PRACTITIONER

## 2019-07-01 PROCEDURE — 99213 OFFICE O/P EST LOW 20 MIN: CPT | Mod: HCNC,S$GLB,, | Performed by: NURSE PRACTITIONER

## 2019-07-01 PROCEDURE — 99999 PR PBB SHADOW E&M-EST. PATIENT-LVL III: CPT | Mod: PBBFAC,HCNC,, | Performed by: NURSE PRACTITIONER

## 2019-07-01 PROCEDURE — 99999 PR PBB SHADOW E&M-EST. PATIENT-LVL III: ICD-10-PCS | Mod: PBBFAC,HCNC,, | Performed by: NURSE PRACTITIONER

## 2019-07-01 PROCEDURE — 1101F PR PT FALLS ASSESS DOC 0-1 FALLS W/OUT INJ PAST YR: ICD-10-PCS | Mod: HCNC,CPTII,S$GLB, | Performed by: NURSE PRACTITIONER

## 2019-07-01 PROCEDURE — 1101F PT FALLS ASSESS-DOCD LE1/YR: CPT | Mod: HCNC,CPTII,S$GLB, | Performed by: NURSE PRACTITIONER

## 2019-07-01 NOTE — PROGRESS NOTES
Chronic patient Established Note (Follow up visit)      SUBJECTIVE:    Javier Valles presents to the clinic for a follow-up appointment for lower back and bilateral leg pain. He continues to report low back pain that radiates down the back of both legs to his feet. His worst pain is below his knees to his feet. He describes this pain as tingling and burning in nature. His pain is worse with prolonged walking, standing, and activity. He is frustrated with his continued pain. He has had limited relief from previous RFA. He reports limited relief of his pain with his SCS. He has not had reprogramming in several months. He continues to take Percocet sparingly. He denies any other health changes. His pain today is 7/10.        Pain Medications:  Robaxin 750 mg PRN muscle pain  Compounding cream  Percocet 5/325 mg PRN    Tried in past: gabapentin 100 mg TID, Percocet (helpful), Norco    - Anti-Coagulants: Eliquis (pacemaker)    Opioid Contract: no     report:  Not applicable    Pain Procedures:  Multiple JOMAR's with Dr. Lopez 4 yrs ago  4/28/17 Right L4-5 TF JOMAR- 100% relief   5/29/17 Left L5-S1 TF JOMAR  7/21/17 Caudal JOMAR- significant benefit  9/22/17 Lumbar SCS trial- 90% relief  12/18/18 Bilateral L2,3,4,5 MBB- 90% relief  1/9/19 Left L2,3,4,5 RFA- 70% relief    Spinal surgeries:  MIS laminectomy L3-4 and L4-5.     Physical Therapy/Home Exercise: no    Imaging:    Narrative     EXAMINATION:  CT LUMBAR SPINE WITHOUT CONTRAST    CLINICAL HISTORY:  Low back painLow back pain, prior surgery, new or progressive sx;    TECHNIQUE:  Contiguous axial images were obtained of the lumbar spine without intravenous contrast. Coronal and sagittal reformations were acquired.    COMPARISON:  Thoracolumbar spine radiograph 01/28/2019, CT lumbar spine 03/24/2016    FINDINGS:  Vertebral body heights are within normal limits.  There is mild anterolisthesis of L4 on L5 similar to prior.  Postoperative changes of prior left-sided  laminectomies are present at L3-4 and L4-5.  There is multilevel disc space height loss from T12-L1 through L5-S1, worst at L2-3 and L3-4.  No acute fractures.  There are degenerative Schmorl's nodes at the inferior endplate of T12, superior endplate of L1, inferior endplate of L3, and superior endplate of L4.    There is moderate calcific atherosclerosis of the aorta and iliac branch vessels.  There is infrarenal aortic ectasia just proximal to the bifurcation measuring up to 2.4 cm.  No aortic aneurysm.  There is ectasia of the right proximal common iliac artery measuring 2.1 cm.    Otherwise the imaged portions of the abdomen are unremarkable.    The paravertebral tissues are unremarkable.  Intervertebral disc demonstrate prominent vacuum disc formation at T12-L1, L1-L2, and L5-S1.  Degenerative changes are detailed as follows:    T12-L1 disc: There is bilateral facet arthrosis.  No significant spinal canal or neural foraminal stenosis.    L1-2 disc: Circumferential posterior disc bulge, bilateral facet arthrosis, and mild bilateral ligamentum flavum thickening results in mild central canal stenosis and mild bilateral neural foraminal narrowing.    L2-3 disc: Circumferential posterior disc bulge, prominent bilateral facet arthrosis, ligamentum flavum thickening result in moderate central canal stenosis and moderate left and mild right neural foraminal narrowing.    L3-4 disc: Postoperative changes left-sided laminectomy, bilateral facet arthrosis, circumferential posterior disc bulge with partially calcified central disc protrusion results in moderate central canal stenosis and mild bilateral neural foraminal narrowing.    L4-5 disc: Postoperative changes left-sided laminectomy, prominent bilateral facet arthrosis and circumferential posterior disc bulge resulting in moderate central canal stenosis and severe left and moderate right neural foraminal narrowing.    L5-S1 disc: Asymmetric left posterior disc bulge  and prominent bilateral facet arthrosis results in moderate central canal stenosis and severe bilateral neural foraminal narrowing noting the posterior disc bulge is in close approximation to the exiting left S1 nerve root.      Impression       Interval postoperative changes of left L3-4 and L4-5 laminectomies compared to CT lumbar spine 03/24/2016.    Multilevel degenerative changes of the lumbar spine as detailed above.    Aortoiliac atherosclerotic calcification with ectasia of the proximal right common iliac artery.           CMP  Sodium   Date Value Ref Range Status   05/29/2019 144 136 - 145 mmol/L Final     Potassium   Date Value Ref Range Status   05/29/2019 4.3 3.5 - 5.1 mmol/L Final     Chloride   Date Value Ref Range Status   05/29/2019 112 (H) 95 - 110 mmol/L Final     CO2   Date Value Ref Range Status   05/29/2019 23 23 - 29 mmol/L Final     Glucose   Date Value Ref Range Status   05/29/2019 100 70 - 110 mg/dL Final     BUN, Bld   Date Value Ref Range Status   05/29/2019 25 (H) 8 - 23 mg/dL Final     Creatinine   Date Value Ref Range Status   05/29/2019 2.0 (H) 0.5 - 1.4 mg/dL Final     Calcium   Date Value Ref Range Status   05/29/2019 10.4 8.7 - 10.5 mg/dL Final     Total Protein   Date Value Ref Range Status   04/16/2019 7.0 6.0 - 8.4 g/dL Final     Albumin   Date Value Ref Range Status   04/16/2019 4.1 3.5 - 5.2 g/dL Final     Total Bilirubin   Date Value Ref Range Status   04/16/2019 1.7 (H) 0.1 - 1.0 mg/dL Final     Comment:     For infants and newborns, interpretation of results should be based  on gestational age, weight and in agreement with clinical  observations.  Premature Infant recommended reference ranges:  Up to 24 hours.............<8.0 mg/dL  Up to 48 hours............<12.0 mg/dL  3-5 days..................<15.0 mg/dL  6-29 days.................<15.0 mg/dL       Alkaline Phosphatase   Date Value Ref Range Status   04/16/2019 66 55 - 135 U/L Final     AST   Date Value Ref Range Status    04/16/2019 27 10 - 40 U/L Final     ALT   Date Value Ref Range Status   04/16/2019 19 10 - 44 U/L Final     Anion Gap   Date Value Ref Range Status   05/29/2019 9 8 - 16 mmol/L Final     eGFR if    Date Value Ref Range Status   05/29/2019 33.2 (A) >60 mL/min/1.73 m^2 Final     eGFR if non    Date Value Ref Range Status   05/29/2019 28.7 (A) >60 mL/min/1.73 m^2 Final     Comment:     Calculation used to obtain the estimated glomerular filtration  rate (eGFR) is the CKD-EPI equation.            REVIEW OF SYSTEMS:    GENERAL:  No weight loss, malaise or fevers.  HEENT:  Negative for frequent or significant headaches.  NECK:  Negative for lumps, goiter, pain and significant neck swelling.  RESPIRATORY:  Negative for cough, wheezing or shortness of breath.  CARDIOVASCULAR:  Negative for chest pain, leg swelling or palpitations. Pacemaker placement.  A-fib.  GI:  Negative for abdominal discomfort, blood in stools or black stools or change in bowel habits.  MUSCULOSKELETAL:  See HPI.  SKIN:  Negative for lesions, rash, and itching.  PSYCH:  Negative for sleep disturbance, mood disorder and recent psychosocial stressors.  HEMATOLOGY/LYMPHOLOGY:  Negative for prolonged bleeding, bruising easily or swollen nodes.  NEURO:   No history of headaches, syncope, paralysis, seizures or tremors.  NEPH: CKD.  All other reviewed and negative other than HPI.    Past Medical History:  Past Medical History:   Diagnosis Date    *Atrial flutter 10/3/13    Anticoagulant long-term use     Aspirin only at this time    Arthritis     Atrial fibrillation     Atrial flutter     Blood transfusion     ?    BPH (benign prostatic hypertrophy)     Carotid artery disease     2008: [place] There is 40 - 49% right Internal Carotid stenosis.  There is 20 - 39% left Internal Carotid stenosis.       Chronic kidney disease     Coronary artery disease     DDD (degenerative disc disease) 6/25/2015    Degenerative disc  disease     Elevated PSA     Glaucoma     Hyperlipidemia     Hypertension     Lumbar stenosis     lumbar spinal stenosis    NSTEMI (non-ST elevated myocardial infarction) 11/3/2013    Pacemaker 11/2013    Peripheral neuropathy     - S/P right ILIAC stent 1993;      Retinal detachment     Squamous cell carcinoma     left leg    Syncope and collapse: orthostatic with hypotension 10/9/2015       Past Surgical History:  Past Surgical History:   Procedure Laterality Date    ABLATION N/A 11/1/2016    [facility] CATH LAB    ABLATION N/A 11/1/2013    [facility] C[facility]    BLOCK, NERVE, FACET JOINT, LUMBAR, MEDIAL BRANCH Bilateral 12/18/2018    [facility]    CARDIAC CATHETERIZATION      [facility]    CARDIAC ELECTROPHYSIOLOGY MAPPING AND ABLATION  11/2016    CARDIAC PACEMAKER PLACEMENT  11/2016    CARDIOVERSION N/A 11/18/2013    [facility] by [facility] Nathan [facility] at [facility] CATH LAB    CATARACT EXTRACTION W/  INTRAOCULAR LENS IMPLANT Left 1997        CATHETERIZATION, HEART, LEFT Left 11/1/2013    [facility]ermo [facility] at [facility] CATH LAB    CORONARY ARTERY BYPASS GRAFT  1991    ECHOCARDIOGRAM-TRANSESOPHAGEAL N/A 11/18/2013    Performed by Lola Surgeon at [facility] LOLA    ENUCLEATION Right 1949    JOMAR-TRANSFORAMINAL Left 8/7/2015    [facility] PAIN MGT    INJECTION-STEROID-EPIDURAL-CAUDAL N/A 7/21/2017    [facility] PAIN MGT    INJECTION-STEROID-EPIDURAL-LUMBAR N/A 8/28/2015    [facility] PAIN MGT    INJECTION-STEROID-EPIDURAL-TRANSFORAMINAL Left 5/29/2017    Performed by Albert Graff [facility] at [facility] PAIN MGT    INJECTION-STEROID-EPIDURAL-TRANSFORAMINAL Right 4/28/2017    [facility] PAIN MGT    INJECTION-STEROID-EPIDURAL-TRANSFORAMINAL Left 7/10/2015    [facility]    IKUIBAVYA-EIYOEPMKR-SVUJLJWLTEDZY N/A 11/1/2016    [facility] CATH LAB    LAMINECTOMY-LUMBAR N/A 4/25/2016    Performed by Florentin Stanford MD at [facility] OR 2ND FLR    LAMINOTOMY  11/9/2017    Performed by [facility]j [facility] at [facility] OR    LUMBAR LAMINECTOMY  04/25/2016    PLACEMENT OF SPINAL CORD STIMULATOR T10-T11 laminotomyh for placement of spinal cord stimulator at T9-10, left below the belt line pulse generator N/A 11/9/2017    Performed by [facility] at [facility] OR    Radiofrequency Ablation LEFT LUMBAR L2,3,4,5 RFA Left 1/9/2019    [facility]    RELEASE-CARPAL TUNNEL Bilateral 3/29/2017    [facility] OR    RETINAL DETACHMENT SURGERY Left 1997    Dr. Cosme    SKIN BIOPSY      SPINAL CORD STIMULATOR TRIAL W/ LAMINOTOMY  10/2017    TONSILLECTOMY      TRANSESOPHAGEAL ECHOCARDIOGRAM (MONICA) N/A 11/1/2013    [facility] CATH LAB    TRIAL-STIMULATOR-DORSAL COLUMN N/A 9/22/2017    [facility] at [facility]       Family History:  Family History   Problem Relation Age of Onset    Stroke Mother 69    Clotting disorder Mother         per patient no clotting disorder    Hypertension Mother     Diverticulitis Son     Cancer Neg Hx     Diabetes Neg Hx     Heart disease Neg Hx     Glaucoma Neg Hx     Amblyopia Neg Hx     Blindness Neg Hx     Cataracts Neg Hx     Macular degeneration Neg Hx     Retinal detachment Neg Hx     Strabismus Neg Hx        Social History:  Social History     Socioeconomic History    Marital status:      Spouse name: Joanie    Number of children: Not on file    Years of education: Not on file    Highest education level: Not on file   Occupational History    Occupation: retired   Social Needs    Financial resource strain: Not on file    Food insecurity:  "    Worry: Not on file     Inability: Not on file    Transportation needs:     Medical: Not on file     Non-medical: Not on file   Tobacco Use    Smoking status: Former Smoker     Last attempt to quit: 1963     Years since quittin.9    Smokeless tobacco: Never Used   Substance and Sexual Activity    Alcohol use: Yes     Alcohol/week: 1.8 oz     Types: 1 Glasses of wine, 1 Cans of beer, 1 Shots of liquor per week     Comment: 2 a day    Drug use: No    Sexual activity: Yes     Partners: Female   Lifestyle    Physical activity:     Days per week: Not on file     Minutes per session: Not on file    Stress: Not on file   Relationships    Social connections:     Talks on phone: Not on file     Gets together: Not on file     Attends Jainism service: Not on file     Active member of club or organization: Not on file     Attends meetings of clubs or organizations: Not on file     Relationship status: Not on file   Other Topics Concern    Not on file   Social History Narrative    Not on file       OBJECTIVE:    BP 95/60   Pulse (!) 50   Temp 98.1 °F (36.7 °C)   Ht 5' 8" (1.727 m)   Wt 87.9 kg (193 lb 12.6 oz)   BMI 29.46 kg/m²     PHYSICAL EXAMINATION:    General appearance: Well appearing, in no acute distress, alert and oriented x3.  Psych: Mood and affect appropriate.  Skin: Skin color, texture, turgor normal, no rashes or lesions, in both upper and lower body.  SCS sites well healed.  Head/face: Normocephalic, atraumatic. No palpable lymph nodes.  Cor: RRR  Pulm: CTA  Back: Straight leg raising in the supine position is negative to radicular pain bilaterally.  There is no pain with palpation to lumbar facet joints.  Limited ROM with pain on extension greater than flexion.  Positive facet loading bilaterally, L>R.  Extremities: Peripheral joint ROM is full and pain free without obvious instability or laxity in all four extremities. No deformities, edema, or skin discoloration. Good capillary " refill.  Musculoskeletal: There is mild pain with palpation to IPG site.  5/5 strength in right ankle with plantar and dorsiflexion. 5/5 strength in left ankle with plantar and dorsiflexion. 5/5 strength with right knee flexion and 4/5 on extension. 5/5 strength with left knee flexion and extension. 5/5 strength in right EHL, 5/5 strength in left EHL.  Left hip flexion is 4/5, right is 5/5.  No atrophy or tone abnormalities are noted.  Neuro: Bilateral lower extremity coordination and muscle stretch reflexes are physiologic and symmetric. Plantar response are downgoing. No loss of sensation is noted.  Gait: Antalgic- ambulates without assistance.      ASSESSMENT: 89 y.o. year old male with lower back and leg pain, consistent with lumbar radiculopathy.     1. Chronic pain syndrome     2. Postlaminectomy syndrome of lumbar region     3. Lumbar radiculopathy     4. Lumbar spondylosis     5. DDD (degenerative disc disease), lumbar     6. Myalgia           PLAN:     - Previous imaging was reviewed and discussed with the patient today.    - The patient met with Katarina from [employer] today for reprogramming.     - Schedule for caudal JOMAR. He may cancel if reprogramming is successful.   The procedure, risks, benefits and options were discussed with patient. There are no contraindications to the procedure. The patient expressed understanding and agreed to proceed.  Consent obtained today.    - I have stressed the importance of physical activity and a home exercise plan to help with pain and improve health.    - Continue Percocet 5/325 every 6 hours PRN, #30. Refill provided today. Medication management provided by Dr. Tang in absence of Dr. Hodges.     - At next visit, will obtain pain contract and UDS.       - RTC 2 weeks after above procedure.     - Counseled patient regarding the importance of activity modification, constant sleeping habits and physical therapy.        The above plan and management options were  discussed at length with patient. Patient is in agreement with the above and verbalized understanding.    Trista Nunez  07/01/2019

## 2019-07-02 ENCOUNTER — TELEPHONE (OUTPATIENT)
Dept: ELECTROPHYSIOLOGY | Facility: CLINIC | Age: 84
End: 2019-07-02

## 2019-07-02 RX ORDER — OXYCODONE AND ACETAMINOPHEN 5; 325 MG/1; MG/1
1 TABLET ORAL EVERY 6 HOURS PRN
Qty: 30 TABLET | Refills: 0 | Status: SHIPPED | OUTPATIENT
Start: 2019-07-02 | End: 2019-08-22 | Stop reason: SDUPTHER

## 2019-07-02 NOTE — TELEPHONE ENCOUNTER
Message sent to Rosie Jackman as follows:  Patient is >80 years old and has creatinine = 2.0. Per protocol, he should actually hold the Eliquis for 4 days prior to neuraxial procedure (per Dr Ruiz).

## 2019-07-02 NOTE — TELEPHONE ENCOUNTER
----- Message from Kenna Khalil MA sent at 7/2/2019 12:39 PM CDT -----      ----- Message -----  From: Rosie Jackman  Sent: 7/2/2019  11:06 AM  To: [facility] Staff    Requesting clearance of Eliquis 3 days to schedule Caudal Mary with Dr. Hodges , please advise.    Subjective:     Chief Complaint   Patient presents with   • Medication Refill     Gonzáles Pierce Mendez is a 68 y.o. male here today to follow up on:    Insomnia  Chronic issue generally controlled with trazodone.  He occasionally wakes up in the middle night feeling anxious as discussed below.    Obstructive sleep apnea of adult  Patient reports consistent use of CPAP. Waking up feeling rested.  No shortness of breath, recent illness    Acquired hypothyroidism  Continues doing well on levothyroxine 75 mcg daily, his labs have been monitored by his provider in [place] where he spends half of his time.  He reports good TSH and T4 ranges.  Energy is stable.  No constipation, diarrhea, heat or cold intolerance    SONYA (generalized anxiety disorder)  Continues doing well on venlafaxine and rare use of diazepam.  In need of refill for diazepam today, estimates that he is using this may be twice a week sometimes less.  Typically takes it in the middle the night if he wakes up feeling anxious and cannot go back to sleep.   report reviewed.  Denies any difficulty waking after having taken medication, he does not mix with alcohol.       Current medicines (including changes today)  Current Outpatient Prescriptions   Medication Sig Dispense Refill   • diazePAM (VALIUM) 5 MG Tab Take 1 Tab by mouth 1 time daily as needed for Anxiety (no more than 3 days weekly) for up to 90 days. 36 Tab 1   • traZODone (DESYREL) 100 MG Tab Take 1 Tab by mouth at bedtime as needed for Sleep. 90 Tab 3   • zolpidem (AMBIEN) 10 MG Tab Take 1 Tab by mouth at bedtime as needed for Sleep. 30 Tab 0   • levothyroxine (SYNTHROID) 75 MCG Tab Take 1 Tab by mouth every day. 90 Tab 4   • venlafaxine XR (EFFEXOR XR) 37.5 MG CAPSULE SR 24 HR Take 1 Cap by mouth every day. 90 Cap 3   • FLUZONE HIGH-DOSE 0.5 ML Suspension Prefilled Syringe injection   0     No current facility-administered medications for this visit.      He  has a past medical history of  "Hypothyroidism; Sleep apnea in adult; and Thrombocytopenia (HCC).    ROS included above     Objective:     /60 (BP Location: Left arm, Patient Position: Sitting, BP Cuff Size: Adult)   Pulse 63   Temp 36.8 °C (98.2 °F) (Temporal)   Resp 16   Ht 1.702 m (5' 7\")   Wt 73.5 kg (162 lb)   SpO2 94%  Body mass index is 25.37 kg/m².     Physical Exam:  General: Alert, oriented in no acute distress.  Eye contact is good, speech is normal, affect calm  Lungs: clear to auscultation bilaterally, normal effort, no wheeze/ rhonchi/ rales.  CV: regular rate and rhythm, S1, S2, no murmur  Ext: no edema, color normal, vascularity normal, temperature normal    Assessment and Plan:   The following treatment plan was discussed  1. SONYA (generalized anxiety disorder)   generally doing well.  Continues with occasional use of diazepam.  I have reviewed the  report, I determine this medication to be medically necessary.  Risks benefits and side effects reviewed.  diazePAM (VALIUM) 5 MG Tab        2. Primary insomnia   stable  traZODone (DESYREL) 100 MG Tab   3. Acquired hypothyroidism   euthyroid, monitored by his provider in [place]   4. Obstructive sleep apnea of adult   consistent with CPAP use       Followup: 6 months, sooner as needed         Please note that this dictation was created using voice recognition software. I have worked with consultants from the vendor as well as technical experts from [facility] to optimize the interface. I have made every reasonable attempt to correct obvious errors, but I expect that there are errors of grammar and possibly content that I did not discover before finalizing the note.       "

## 2019-07-09 RX ORDER — SODIUM CHLORIDE 9 MG/ML
500 INJECTION, SOLUTION INTRAVENOUS CONTINUOUS
Status: CANCELLED | OUTPATIENT
Start: 2019-07-09

## 2019-07-10 ENCOUNTER — TELEPHONE (OUTPATIENT)
Dept: PAIN MEDICINE | Facility: CLINIC | Age: 84
End: 2019-07-10

## 2019-07-10 NOTE — TELEPHONE ENCOUNTER
----- Message from Katty Victor sent at 7/10/2019  8:40 AM CDT -----  Contact: Pt    Name of Who is Calling:MARGRET MARTIN [[id number]]    What is the request in detail: Patient would like a call back regarding rescheduling appointment Please contact to further discuss and advise    Can the clinic reply by [facility]: No    [facility]: [phone]

## 2019-07-11 ENCOUNTER — PATIENT MESSAGE (OUTPATIENT)
Dept: CARDIOLOGY | Facility: CLINIC | Age: 84
End: 2019-07-11

## 2019-07-12 NOTE — PROGRESS NOTES
HPI     DLS: 3/18/19    Pt here for HRT OS:    Meds:   Brimonidine TID OS   Latanoprost QHS OS   Dorzolimide TID  OS     1) POAG   2) PCIOL OS   3) Prosthesis OD   4) ERM OS   5) Hx Rhegmatogenous RD OS   6) Psuedo Mac Hole OS     Last edited by Mariya Dixon on 7/15/2019  8:11 AM. (History)            Assessment /Plan     For exam results, see Encounter Report.    Primary open-angle glaucoma, left eye, moderate stage    Epiretinal membrane, left eye    Posterior vitreous detachment of left eye    Pseudophakia of left eye    Prosthetic eye globe          Old pt of Dr. Goodrich - now seedennis Sandoval     1. POAG   Followed at [facility] since 1997   followed by Sonia for 30 years  First HVF 2014  gtts started - [place] - 2012  First photos - ? 1997 - for RD os     Glaucoma meds -  latanoprost qhs os / brimonidine os   H/O adverse rxn to glaucoma drops none  LASERS - SLT os 5/25/2017 (270 degrees - too narrow inf.) (( good resp 16--> 11)   GLAUCOMA SURGERIES none  OTHER EYE SURGERIES - prosthesis od - (2/2 injury - 1940's) // Pnematic retinopexy OS 1997 - [place] // PC iol os - [place]  CDR - prosthesis / 0.75- 0.8  Tbase  - ?? On gtts when started here  Tmax  - ?? Off gtts   Ttarget  - ?? 13 or less if possible // had a disc heme with IOP 15-18   HVF 4  test 2014 to 2017 OS:  ? Paracentral defect with early SAD/IAD  Gonio  +3-4 S/T/N // very narrow inf os   CCT - xx/492  OCT 2 test 2014 to 2016- RNFL - OD:not done // OS:dec s/bord T  HRT 4 test 2015 -  2019 -  MR - prosthesis od //   OS dec G,TS, N, NS, NI, //CDR// 0.771 os (high std dev os)   Disc photos - mult old slides 1997 - 2006 // 2015, 2017 - w/ IT disc heme - OIS    Ttoday - prosthetic // 10  Test done today HRT and IOP check      2.  disc heme os    See photos 4/17/2017 - occurred with IOP's of  13-18   Resolved by 10/2/107    3.  Monocular precautions  - prothetic od    4.  erm os // lamellar hole   - stable, follows with arend  -on nevanac q day os - feels  it helps vision - ?     5.  Hx of rhegmatogenous rrd os  - flat, follows with [facility]    6.  Mac hole os  - monitor     7. PC IOL OS     8. - ? S/p yag cap - posterior capsule open     9. Vit floaters OS    Increase in floaters os - will refer back to retina - jaime     10. 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 Target 14))   SLT os - 5/25/2017 - S/T/N (too narrow inf. ) - steroid taper (( good resp 16-->11)     - (?  BB candidate - now has a pacemaker, but has a H/O low heart rate / and/or arrythmia)   - ( pt accidentally washed and dried his brimonidine bottle - can not refill it yet - one sample of combigan given to use until he can refill it - has a pacemaker so low heart rate should not be a problem)     -continue EES ointment od - prosthesis - prn irritation     Sees [facility] -  glasses - has been restricted to daytime driving - due for 1 year follow up 3/2019     Keep regular F/U with Jaime - last seen 6/11/2019    Pt occasionally has to get steroid injections for back pain - so will need to monitor that - may be a cause of elevated IOP from time to time      F/U 4 months IOP and Gonio check     I have seen and personally examined the patient.  I agree with the findings, assessment and plan of the resident and/or fellow.     Sena Schneider MD

## 2019-07-15 ENCOUNTER — OFFICE VISIT (OUTPATIENT)
Dept: OPHTHALMOLOGY | Facility: CLINIC | Age: 84
End: 2019-07-15
Payer: MEDICARE

## 2019-07-15 DIAGNOSIS — H40.1122 PRIMARY OPEN-ANGLE GLAUCOMA, LEFT EYE, MODERATE STAGE: Primary | ICD-10-CM

## 2019-07-15 DIAGNOSIS — H43.812 POSTERIOR VITREOUS DETACHMENT OF LEFT EYE: ICD-10-CM

## 2019-07-15 DIAGNOSIS — H35.372 EPIRETINAL MEMBRANE, LEFT EYE: ICD-10-CM

## 2019-07-15 DIAGNOSIS — Z97.0 PROSTHETIC EYE GLOBE: ICD-10-CM

## 2019-07-15 DIAGNOSIS — Z96.1 PSEUDOPHAKIA OF LEFT EYE: ICD-10-CM

## 2019-07-15 PROCEDURE — 99999 PR PBB SHADOW E&M-EST. PATIENT-LVL II: CPT | Mod: PBBFAC,HCNC,, | Performed by: OPHTHALMOLOGY

## 2019-07-15 PROCEDURE — 92012 INTRM OPH EXAM EST PATIENT: CPT | Mod: HCNC,S$GLB,, | Performed by: OPHTHALMOLOGY

## 2019-07-15 PROCEDURE — 99999 PR PBB SHADOW E&M-EST. PATIENT-LVL II: ICD-10-PCS | Mod: PBBFAC,HCNC,, | Performed by: OPHTHALMOLOGY

## 2019-07-15 PROCEDURE — 92012 PR EYE EXAM, EST PATIENT,INTERMED: ICD-10-PCS | Mod: HCNC,S$GLB,, | Performed by: OPHTHALMOLOGY

## 2019-07-15 PROCEDURE — 92133 HEIDELBERG RETINA TOMOGRAPHY (HRT) - OU - BOTH EYES: ICD-10-PCS | Mod: HCNC,S$GLB,, | Performed by: OPHTHALMOLOGY

## 2019-07-15 PROCEDURE — 92133 CPTRZD OPH DX IMG PST SGM ON: CPT | Mod: HCNC,S$GLB,, | Performed by: OPHTHALMOLOGY

## 2019-07-17 ENCOUNTER — HOSPITAL ENCOUNTER (OUTPATIENT)
Facility: OTHER | Age: 84
Discharge: HOME | End: 2019-07-17
Attending: ANESTHESIOLOGY | Admitting: ANESTHESIOLOGY
Payer: MEDICARE

## 2019-07-17 VITALS
HEIGHT: 67 IN | OXYGEN SATURATION: 97 % | BODY MASS INDEX: 28.72 KG/M2 | DIASTOLIC BLOOD PRESSURE: 71 MMHG | HEART RATE: 52 BPM | SYSTOLIC BLOOD PRESSURE: 158 MMHG | WEIGHT: 183 LBS | RESPIRATION RATE: 18 BRPM | TEMPERATURE: 98 F

## 2019-07-17 DIAGNOSIS — M51.36 LUMBAR DEGENERATIVE DISC DISEASE: ICD-10-CM

## 2019-07-17 DIAGNOSIS — M48.061 SPINAL STENOSIS OF LUMBAR REGION, UNSPECIFIED WHETHER NEUROGENIC CLAUDICATION PRESENT: Primary | ICD-10-CM

## 2019-07-17 DIAGNOSIS — G89.29 CHRONIC PAIN: ICD-10-CM

## 2019-07-17 DIAGNOSIS — M47.819 SPONDYLOSIS WITHOUT MYELOPATHY: ICD-10-CM

## 2019-07-17 DIAGNOSIS — G89.4 CHRONIC PAIN SYNDROME: ICD-10-CM

## 2019-07-17 PROCEDURE — 62323 NJX INTERLAMINAR LMBR/SAC: CPT | Mod: HCNC,,, | Performed by: ANESTHESIOLOGY

## 2019-07-17 PROCEDURE — 63600175 PHARM REV CODE 636 W HCPCS: Mod: HCNC | Performed by: ANESTHESIOLOGY

## 2019-07-17 PROCEDURE — 25500020 PHARM REV CODE 255: Mod: HCNC | Performed by: ANESTHESIOLOGY

## 2019-07-17 PROCEDURE — 62323 NJX INTERLAMINAR LMBR/SAC: CPT | Mod: HCNC | Performed by: ANESTHESIOLOGY

## 2019-07-17 PROCEDURE — 62323 PR INJ LUMBAR/SACRAL, W/IMAGING GUIDANCE: ICD-10-PCS | Mod: HCNC,,, | Performed by: ANESTHESIOLOGY

## 2019-07-17 PROCEDURE — 25000003 PHARM REV CODE 250: Mod: HCNC | Performed by: ANESTHESIOLOGY

## 2019-07-17 RX ORDER — LIDOCAINE HYDROCHLORIDE 5 MG/ML
INJECTION, SOLUTION INFILTRATION; INTRAVENOUS
Status: DISCONTINUED | OUTPATIENT
Start: 2019-07-17 | End: 2019-07-17 | Stop reason: HOSPADM

## 2019-07-17 RX ORDER — DEXAMETHASONE SODIUM PHOSPHATE 4 MG/ML
INJECTION, SOLUTION INTRA-ARTICULAR; INTRALESIONAL; INTRAMUSCULAR; INTRAVENOUS; SOFT TISSUE
Status: DISCONTINUED | OUTPATIENT
Start: 2019-07-17 | End: 2019-07-17 | Stop reason: HOSPADM

## 2019-07-17 RX ORDER — SODIUM CHLORIDE 9 MG/ML
500 INJECTION, SOLUTION INTRAVENOUS CONTINUOUS
Status: DISCONTINUED | OUTPATIENT
Start: 2019-07-17 | End: 2019-07-17 | Stop reason: HOSPADM

## 2019-07-17 RX ORDER — LIDOCAINE HYDROCHLORIDE 10 MG/ML
INJECTION INFILTRATION; PERINEURAL
Status: DISCONTINUED | OUTPATIENT
Start: 2019-07-17 | End: 2019-07-17 | Stop reason: HOSPADM

## 2019-07-17 RX ORDER — MIDAZOLAM HYDROCHLORIDE 1 MG/ML
INJECTION INTRAMUSCULAR; INTRAVENOUS
Status: DISCONTINUED | OUTPATIENT
Start: 2019-07-17 | End: 2019-07-17 | Stop reason: HOSPADM

## 2019-07-17 RX ADMIN — SODIUM CHLORIDE 500 ML: 0.9 INJECTION, SOLUTION INTRAVENOUS at 02:07

## 2019-07-17 NOTE — DISCHARGE SUMMARY
Discharge Note  Short Stay      SUMMARY     Admit Date: 7/17/2019    Attending Physician: James Hodges      Discharge Physician: James Hodges      Discharge Date: 7/17/2019 3:16 PM    Procedure(s) (LRB):  INJECTION, STEROID, EPIDURAL, CAUDAL  cleared to hold eliquis 4 days per Dr. Ruiz (N/A)    Final Diagnosis: Lumbar radiculopathy [M54.16]    Disposition: Home or self care    Patient Instructions:   Discharge Medication List as of 7/17/2019  2:37 PM      CONTINUE these medications which have NOT CHANGED    Details   amiodarone (PACERONE) 200 MG Tab Take 1 tablet (200 mg total) by mouth once daily., Starting Tue 6/18/2019, Normal      amLODIPine (NORVASC) 5 MG tablet Take 1 tablet (5 mg total) by mouth once daily., Starting Thu 4/4/2019, Until Fri 4/3/2020, Normal      apixaban 2.5 mg Tab Take 1 tablet (2.5 mg total) by mouth 2 (two) times daily., Starting Thu 7/5/2018, Normal      ascorbic acid (VITAMIN C) 1000 MG tablet Take 1,000 mg by mouth every morning. , Until Discontinued, Historical Med      brimonidine 0.2% (ALPHAGAN) 0.2 % Drop Place 1 drop into the left eye 3 (three) times daily., Starting Mon 3/18/2019, Normal      brinzolamide (AZOPT) 1 % ophthalmic suspension Place 1 drop into both eyes 3 (three) times daily., Starting Fri 10/26/2018, Historical Med      co-enzyme Q-10 30 mg capsule Take 30 mg by mouth once daily. , Until Discontinued, Historical Med      donepezil (ARICEPT) 5 MG tablet Take 1 tablet (5 mg total) by mouth every evening., Starting Tue 6/18/2019, Until Wed 6/17/2020, Normal      dorzolamide (TRUSOPT) 2 % ophthalmic solution Place 1 drop into both eyes 3 (three) times daily. Please fill today - this evening if possible - pt is leaving town early tomorrow, Starting Tue 7/17/2018, Normal      erythromycin (ROMYCIN) ophthalmic ointment Apply to affected eye daily as needed, Normal      escitalopram oxalate (LEXAPRO) 5 MG Tab Take 1 tablet (5 mg total) by mouth once daily.,  Starting Wed 5/22/2019, Normal      ipratropium (ATROVENT) 0.03 % nasal spray 1-2 sprays by Nasal route 2 (two) times daily., Starting Tue 2/12/2019, Normal      latanoprost 0.005 % ophthalmic solution Place 1 drop into the left eye every evening., Starting Mon 3/18/2019, Normal      losartan (COZAAR) 50 MG tablet Take 1 tablet (50 mg total) by mouth 2 (two) times daily., Starting Thu 3/28/2019, Normal      lutein 20 mg Cap Take 20 mg by mouth once daily. , Until Discontinued, Historical Med      MULTIVITAMINS,THER W-MINERALS (VITAMINS & MINERALS ORAL) Take 1 tablet by mouth every morning. , Until Discontinued, Historical Med      oxyCODONE-acetaminophen (PERCOCET) 5-325 mg per tablet Take 1 tablet by mouth every 6 (six) hours as needed for Pain., Starting Tue 7/2/2019, Normal      polycarbophil (FIBERCON) 625 mg tablet Take 625 mg by mouth 2 (two) times daily., Historical Med      pregabalin (LYRICA) 75 MG capsule Take 1 capsule (75 mg total) by mouth 2 (two) times daily., Starting Fri 5/31/2019, Until Fri 11/29/2019, Normal      rosuvastatin (CRESTOR) 20 MG tablet Take 1 tablet (20 mg total) by mouth every evening., Starting Mon 1/7/2019, Normal                 Discharge Diagnosis: Lumbar radiculopathy [M54.16]  Condition on Discharge: Stable with no complications to procedure   Diet on Discharge: Same as before.  Activity: as per instruction sheet.  Discharge to: Home with a responsible adult.  Follow up: 2-4 weeks    Please call my office or pager at [phone] if experienced any weakness or loss of sensation, fever > 101.5, pain uncontrolled with oral medications, persistent nausea/vomiting/or diarrhea, redness or drainage from the incisions, or any other worrisome concerns. If physician on call was not reached or could not communicate with our office for any reason please go to the nearest emergency department

## 2019-07-17 NOTE — OP NOTE
Patient Name: Javier Valles  MRN: [id number]    INFORMED CONSENT: The procedure, risks, benefits and options were discussed with patient. There are no contraindications to the procedure. The patient expressed understanding and agreed to proceed. The personnel performing the procedure was discussed. I verify that I personally obtained Javier's consent prior to the start of the procedure and the signed consent can be found on the patient's chart.    Procedure Date: 07/17/2019    Anesthesia: Topical    Pre Procedure diagnosis: Lumbar radiculopathy [M54.16]  1. Spinal stenosis of lumbar region, unspecified whether neurogenic claudication present    2. Chronic pain syndrome    3. Chronic pain    4. Spondylosis without myelopathy    5. Lumbar degenerative disc disease      Post-Procedure diagnosis: SAME        Moderate Sedation: None    PROCEDURE: CAUDAL JOMAR with Racz Catheter      DESCRIPTION OF PROCEDURE: After fully informed written consent was obtained, the patient was brought to the procedure room and placed in the prone position. Monitoring of pulse oximetry, heart rate, and blood pressure was done pre-procedure, during the procedure, and post-procedure. The skin was prepped with chlorhexidine and draped in a sterile fashion.  After performing time out. With a 25-gauge 1.5  inch needle, 4 cc of lidocaine 1% was injected subcutaneously over the entry site. The sacrum and sacral cornua were visualized in an AP view.  An 16 gauge 31/2 inch Tuohy needle was inserted and advanced into the sacral hiatus under fluoroscopic guidance. After the needle passed through the sacrococcygeal ligament, the needle angle was lowered and the needle was advanced . The needle position was confirmed using AP and lateral fluoroscopic imaging. There was no evidence of paresthesias throughout needle placement. A radiopaque 20 G Flextip catheter  was introduced through the needle and directed to the L5 level, under fluoroscopic guidance. The  stylette was removed and aspiration was negative for blood or CSF. 3 ml of Omnipaque 300 contrast agent was slowly injected. Confirmation of spread of contrast agent within the epidural space was made with fluoroscopic imaging in the AP and lateral views. Subsequently, 10 mL of lidocaine 0.5% and 10 mg decadron was slowly administered without resistance. There was no pain on injection. Contrast spread was noted from S2 to L5. The needle was removed and bleeding was nil . The patient tolerated the procedure well and there was no evidence of procedural complications. A sterile dressing was applied. No  specimens collected. Javier was taken back to the recovery room for further observation.     Blood Loss: Nill  Specimen: None    James Hodges MD

## 2019-07-17 NOTE — DISCHARGE INSTRUCTIONS
Thank you for allowing us to care for you today. You may receive a survey about the care we provided. Your feedback is valuable and helps us provide excellent care throughout the community.     Home Care Instructions for Pain Management:    1. DIET:   You may resume your normal diet today.   2. BATHING:   You may shower with luke warm water. No tub baths or anything that will soak injection sites under water for the next 24 hours.  3. DRESSING:   You may remove your bandage today.   4. ACTIVITY LEVEL:   You may resume your normal activities 24 hrs after your procedure. Nothing strenuous today.  5. MEDICATIONS:   You may resume your normal medications today. To restart blood thinners, ask your doctor.  6. DRIVING    If you have received any sedatives by mouth today, you may not drive for 12 hours.    If you have received any sedation through your IV, you may not drive for 24 hrs.   7. SPECIAL INSTRUCTIONS:   No heat to the injection site for 24 hrs including, hot bath or shower, heating pad, moist heat, or hot tubs.    Use ice pack to injection site for any pain or discomfort.  Apply ice packs for 20 minute intervals as needed.    IF you have diabetes, be sure to monitor your blood sugar more closely. IF your injection contained steroids your blood sugar levels may become higher than normal.    If you are still having pain upon discharge:  Your pain may improve over the next 48 hours. The anesthetic (numbing medication) works immediately to 48 hours. IF your injection contained a steroid (anti-inflammatory medication), it takes approximately 3 days to start feeling relief and 7-10 days to see your greatest results from the medication. It is possible you may need subsequent injections. This would be discussed at your follow up appointment with pain management or your referring doctor.      PLEASE CALL YOUR DOCTOR IF:  1. Redness or swelling around the injection site.  2. Fever of 101 degrees or more  3. Drainage  (pus) from the injection site.  4. For any continuous bleeding (some dried blood over the incision is normal.)    FOR EMERGENCIES:   If any unusual problems or difficulties occur during clinic hours, call [phone] or [phone].

## 2019-07-17 NOTE — H&P
HPI  Patient presenting for Procedure(s) (LRB):  INJECTION, STEROID, EPIDURAL, CAUDAL  cleared to hold eliquis 4 days per Dr. Ruiz (N/A)     Patient on Anti-coagulation Yes, On eliquis    Today patient denies any fevers, chills, nausea, vomiting, changes in health, procedures such as colonoscopy or dental procedure in the last 2 weeks, or infections.      No health changes since previous encounter    Past Medical History:   Diagnosis Date    *Atrial flutter 10/3/13    Anticoagulant long-term use     Aspirin only at this time    Arthritis     Atrial fibrillation     Atrial flutter     Blood transfusion     ?    BPH (benign prostatic hypertrophy)     Carotid artery disease     2008: [place] There is 40 - 49% right Internal Carotid stenosis.  There is 20 - 39% left Internal Carotid stenosis.       Chronic kidney disease     Coronary artery disease     DDD (degenerative disc disease) 6/25/2015    Degenerative disc disease     Elevated PSA     Glaucoma     Hyperlipidemia     Hypertension     Lumbar stenosis     lumbar spinal stenosis    NSTEMI (non-ST elevated myocardial infarction) 11/3/2013    Pacemaker 11/2013    Peripheral neuropathy     - S/P right ILIAC stent 1993;      Retinal detachment     Squamous cell carcinoma     left leg    Syncope and collapse: orthostatic with hypotension 10/9/2015     Past Surgical History:   Procedure Laterality Date    ABLATION N/A 11/1/2016    [facility] CATH LAB    ABLATION N/A 11/1/2013    [facility] by [facility]con [facility] at [facility] CATH LAB    BLOCK, NERVE, FACET JOINT, LUMBAR, MEDIAL BRANCH Bilateral 12/18/2018    [facility] PAIN T    CARDIAC CATHETERIZATION      [facility]    CARDIAC ELECTROPHYSIOLOGY MAPPING AND ABLATION  11/2016    CARDIAC PACEMAKER PLACEMENT  11/2016    CARDIOVERSION N/A 11/18/2013    Performed by Jas Nathan [facility] at [facility] CATH LAB    CATARACT EXTRACTION W/   INTRAOCULAR LENS IMPLANT Left 1997        CATHETERIZATION, HEART, LEFT Left 11/1/2013    Performed by [facility]illermo [facility] at [facility] CATH LAB    CORONARY ARTERY BYPASS GRAFT  1991    ECHOCARDIOGRAM-TRANSESOPHAGEAL N/A 11/18/2013    Performed by Lola Surgeon at [facility] LOLA    ENUCLEATION Right 1949    JOMAR-TRANSFORAMINAL Left 8/7/2015    [facility]formed by [facility] at [facility] PAIN MGT    INJECTION-STEROID-EPIDURAL-CAUDAL N/A 7/21/2017    Performed by [facility] at [facility] PAIN MGT    INJECTION-STEROID-EPIDURAL-LUMBAR N/A 8/28/2015    Performed by [facility] at [facility] PAIN MGT    INJECTION-STEROID-EPIDURAL-TRANSFORAMINAL Left 5/29/2017    Performed by Albert Graff [facility] at [facility] MGT    INJECTION-STEROID-EPIDURAL-TRANSFORAMINAL Right 4/28/2017    Performed by [facility] at [facility] PAIN MGT    INJECTION-STEROID-EPIDURAL-TRANSFORAMINAL Left 7/10/2015    [facility]formed by [facility] at [facility] MGT    KLXOOLDXR-QXUAQYBWF-NRICSUAEKLNEI N/A 11/1/2016    Performed by Alcon Ly [facility] at [facility] CATH LAB    LAMINECTOMY-LUMBAR N/A 4/25/2016    Performed by Florentin Stanford MD at [facility] OR 2ND FLR    LAMINOTOMY  11/9/2017    Performed by [facility]j [facility] at [facility] OR    LUMBAR LAMINECTOMY  04/25/2016    PLACEMENT OF SPINAL CORD STIMULATOR T10-T11 laminotomyh for placement of spinal cord stimulator at T9-10, left below the belt line pulse generator N/A 11/9/2017    Performed by [facility]j [facility]io [facility] at [facility] OR    Radiofrequency Ablation LEFT LUMBAR L2,3,4,5 RFA Left 1/9/2019    Performed by [facility] at [facility] PAIN MGT    RELEASE-CARPAL TUNNEL Bilateral 3/29/2017    Performed by Nam Chong Jr., MD at [facility] OR    RETINAL DETACHMENT SURGERY Left 1997    Dr. Cosme    SKIN BIOPSY      SPINAL CORD STIMULATOR TRIAL W/ LAMINOTOMY  10/2017    TONSILLECTOMY      TRANSESOPHAGEAL ECHOCARDIOGRAM (MONICA) N/A 11/1/2013    Performed by Alcon [facility] at [facility]  CATH LAB    TRIAL-STIMULATOR-DORSAL COLUMN N/A 9/22/2017    Performed by [facility] at [facility] PAIN MGT     Review of patient's allergies indicates:   Allergen Reactions    Pravastatin      Severe myalgias/elevated CPK    Atorvastatin Other (See Comments)     Myositis/elevated CPK    Statins-hmg-coa reductase inhibitors      Muscle pain and loss of muscle    Ace inhibitors      Cough      No current facility-administered medications for this encounter.        PMHx, PSHx, Allergies, Medications reviewed in epic    ROS negative except pain complaints in HPI    OBJECTIVE:    There were no vitals taken for this visit.    PHYSICAL EXAMINATION:    GENERAL: Well appearing, in no acute distress, alert and oriented x3.  PSYCH:  Mood and affect appropriate.  SKIN: Skin color, texture, turgor normal, no rashes or lesions which will impact the procedure.  CV: RRR with palpation of the radial artery.  PULM: No evidence of respiratory difficulty, symmetric chest rise. Clear to auscultation.  NEURO: Cranial nerves grossly intact.    Plan:    Proceed with procedure as planned Procedure(s) (LRB):  INJECTION, STEROID, EPIDURAL, CAUDAL  cleared to hold eliquis 4 days per Dr. Ruiz (N/A)    Gilbert Chun  07/17/2019

## 2019-07-18 ENCOUNTER — TELEPHONE (OUTPATIENT)
Dept: ADMINISTRATIVE | Facility: OTHER | Age: 84
End: 2019-07-18

## 2019-07-19 DIAGNOSIS — I48.3 TYPICAL ATRIAL FLUTTER: ICD-10-CM

## 2019-07-21 ENCOUNTER — PATIENT MESSAGE (OUTPATIENT)
Dept: ELECTROPHYSIOLOGY | Facility: CLINIC | Age: 84
End: 2019-07-21

## 2019-07-23 ENCOUNTER — HOSPITAL ENCOUNTER (OUTPATIENT)
Dept: RADIOLOGY | Facility: HOSPITAL | Age: 84
Discharge: HOME | End: 2019-07-23
Attending: PHYSICIAN ASSISTANT
Payer: MEDICARE

## 2019-07-23 DIAGNOSIS — M54.5 LOW BACK PAIN, UNSPECIFIED BACK PAIN LATERALITY, UNSPECIFIED CHRONICITY, WITH SCIATICA PRESENCE UNSPECIFIED: ICD-10-CM

## 2019-07-23 PROCEDURE — 72120 XR LUMBAR SPINE AP AND LAT WITH FLEX/EXT: ICD-10-PCS | Mod: 26,HCNC,, | Performed by: RADIOLOGY

## 2019-07-23 PROCEDURE — 72120 X-RAY BEND ONLY L-S SPINE: CPT | Mod: TC,HCNC

## 2019-07-23 PROCEDURE — 72120 X-RAY BEND ONLY L-S SPINE: CPT | Mod: 26,HCNC,, | Performed by: RADIOLOGY

## 2019-07-23 PROCEDURE — 72100 XR LUMBAR SPINE AP AND LAT WITH FLEX/EXT: ICD-10-PCS | Mod: 26,HCNC,, | Performed by: RADIOLOGY

## 2019-07-23 PROCEDURE — 72100 X-RAY EXAM L-S SPINE 2/3 VWS: CPT | Mod: 26,HCNC,, | Performed by: RADIOLOGY

## 2019-07-24 ENCOUNTER — TELEPHONE (OUTPATIENT)
Dept: SPINE | Facility: CLINIC | Age: 84
End: 2019-07-24

## 2019-07-24 DIAGNOSIS — I48.3 TYPICAL ATRIAL FLUTTER: ICD-10-CM

## 2019-07-24 NOTE — TELEPHONE ENCOUNTER
----- Message from Jeanine Cano LPN sent at 7/24/2019  9:25 AM CDT -----  Contact: Pt. [phone]      ----- Message -----  From: Yuli Soria  Sent: 7/24/2019   9:19 AM  To: Abdoulaye Lerma Staff    Needs Advice    Reason for call: The patient would like to speak to someone regarding the appointment mix up from yesterday. Please contact the patient to discuss further.          Communication Preference: PHONE     Additional Information:

## 2019-07-25 ENCOUNTER — TELEPHONE (OUTPATIENT)
Dept: SPINE | Facility: CLINIC | Age: 84
End: 2019-07-25

## 2019-07-25 NOTE — TELEPHONE ENCOUNTER
----- Message from Uriel Ramos sent at 7/25/2019  8:03 AM CDT -----  Contact: MARGRET MARTIN [[id number]]  To: Abdoulaye Lerma Staff     Needs Advice     Reason for call: The patient would like to speak to someone regarding the appointment mix up from yesterday. Please contact the patient to discuss further.          Communication Preference: PHONE      Additional Information:

## 2019-07-25 NOTE — TELEPHONE ENCOUNTER
Called and spoke with patient.  Scheduled appointment with Mary Stanford consult.  Unable to do MRI.  CT and X-ray in chart.

## 2019-07-26 ENCOUNTER — DOCUMENTATION ONLY (OUTPATIENT)
Dept: CARDIAC CATH/INVASIVE PROCEDURES | Facility: HOSPITAL | Age: 84
End: 2019-07-26

## 2019-07-26 NOTE — PROGRESS NOTES
Mr. Valles is being scheduled for lumbar myelogram CT SPECT for evaluation of his severely lifestyle limiting back pain that has been refractory to treatment.  He has active CAD with 5% reversible ischemia on PET.  In balancing the risks vs. Benefits for this test, there are no absolute contraindications to the test being performed.

## 2019-07-31 ENCOUNTER — PATIENT OUTREACH (OUTPATIENT)
Dept: OTHER | Facility: OTHER | Age: 84
End: 2019-07-31

## 2019-07-31 NOTE — PROGRESS NOTES
Last 5 Patient Entered Readings                                      Current 30 Day Average: 129/72     Recent Readings 7/25/2019 7/20/2019 7/12/2019 7/7/2019 6/24/2019    SBP (mmHg) 122 130 130 133 130    DBP (mmHg) 74 72 68 74 91    Pulse 49 49 59 49 48          Digital Medicine: Health  Follow Up    Lifestyle Modifications:    1.Dietary Modifications (Sodium intake <2,000mg/day, food labels, dining out): Deferred    2.Physical Activity: Deferred     3.Medication Therapy: Patient has been compliant with the medication regimen. Patient is doing well on his current BP medication regimen. He denies symptoms/side effects.     4.Patient has the following medication side effects/concerns: None  (Frequency/Alleviating factors/Precipitating factors, etc.)     Patient and I's conversation mostly focused on the issues he is having with his back/spine. The stimulator is doing its job the best it can but the issues with his back continue to arise. He will be meeting with his physician soon to talk about the options which may lead to back surgery.  Other then this, he stated that he is doing well and is feeling great in regards to his BP, etc.     Follow up with Mr. Javier OLMOS Hai completed. No further questions or concerns. Will continue to follow up to achieve health goals.

## 2019-08-01 ENCOUNTER — PATIENT MESSAGE (OUTPATIENT)
Dept: INTERNAL MEDICINE | Facility: CLINIC | Age: 84
End: 2019-08-01

## 2019-08-01 ENCOUNTER — PATIENT MESSAGE (OUTPATIENT)
Dept: OPHTHALMOLOGY | Facility: CLINIC | Age: 84
End: 2019-08-01

## 2019-08-01 DIAGNOSIS — H40.1122 PRIMARY OPEN-ANGLE GLAUCOMA, LEFT EYE, MODERATE STAGE: ICD-10-CM

## 2019-08-01 DIAGNOSIS — M19.049 HAND ARTHRITIS: ICD-10-CM

## 2019-08-01 RX ORDER — DICLOFENAC SODIUM 10 MG/G
2 GEL TOPICAL 2 TIMES DAILY
Qty: 1 TUBE | Refills: 3 | Status: SHIPPED | OUTPATIENT
Start: 2019-08-01 | End: 2020-06-10

## 2019-08-01 RX ORDER — BRIMONIDINE TARTRATE 2 MG/ML
1 SOLUTION/ DROPS OPHTHALMIC 3 TIMES DAILY
Qty: 10 ML | Refills: 10 | Status: SHIPPED | OUTPATIENT
Start: 2019-08-01 | End: 2019-08-26 | Stop reason: SDUPTHER

## 2019-08-02 RX ORDER — DORZOLAMIDE HCL 20 MG/ML
SOLUTION/ DROPS OPHTHALMIC
Qty: 30 ML | Refills: 3 | Status: SHIPPED | OUTPATIENT
Start: 2019-08-02 | End: 2019-09-20 | Stop reason: SDUPTHER

## 2019-08-05 ENCOUNTER — TELEPHONE (OUTPATIENT)
Dept: CARDIOLOGY | Facility: HOSPITAL | Age: 84
End: 2019-08-05

## 2019-08-05 NOTE — TELEPHONE ENCOUNTER
----- Message from Guillermina Allen sent at 8/5/2019 11:09 AM CDT -----  Contact: Self  .Needs Advice    Reason for call: Pt wants to verified if monitor will go through for 8/7, doesn't know that appt was canceled on 8/1.  Please call Pt .Thanks        Communication Preference: [phone]    Additional Information:

## 2019-08-08 ENCOUNTER — OFFICE VISIT (OUTPATIENT)
Dept: SPINE | Facility: CLINIC | Age: 84
End: 2019-08-08
Attending: NEUROLOGICAL SURGERY
Payer: MEDICARE

## 2019-08-08 VITALS — SYSTOLIC BLOOD PRESSURE: 97 MMHG | DIASTOLIC BLOOD PRESSURE: 58 MMHG | HEART RATE: 50 BPM

## 2019-08-08 DIAGNOSIS — G89.29 CHRONIC MIDLINE LOW BACK PAIN WITHOUT SCIATICA: Primary | ICD-10-CM

## 2019-08-08 DIAGNOSIS — M54.50 CHRONIC MIDLINE LOW BACK PAIN WITHOUT SCIATICA: Primary | ICD-10-CM

## 2019-08-08 PROCEDURE — 1100F PR PT FALLS ASSESS DOC 2+ FALLS/FALL W/INJURY/YR: ICD-10-PCS | Mod: HCNC,CPTII,S$GLB, | Performed by: NEUROLOGICAL SURGERY

## 2019-08-08 PROCEDURE — 99999 PR PBB SHADOW E&M-EST. PATIENT-LVL II: ICD-10-PCS | Mod: PBBFAC,HCNC,, | Performed by: NEUROLOGICAL SURGERY

## 2019-08-08 PROCEDURE — 3288F FALL RISK ASSESSMENT DOCD: CPT | Mod: HCNC,CPTII,S$GLB, | Performed by: NEUROLOGICAL SURGERY

## 2019-08-08 PROCEDURE — 99214 PR OFFICE/OUTPT VISIT, EST, LEVL IV, 30-39 MIN: ICD-10-PCS | Mod: HCNC,S$GLB,, | Performed by: NEUROLOGICAL SURGERY

## 2019-08-08 PROCEDURE — 99999 PR PBB SHADOW E&M-EST. PATIENT-LVL II: CPT | Mod: PBBFAC,HCNC,, | Performed by: NEUROLOGICAL SURGERY

## 2019-08-08 PROCEDURE — 3288F PR FALLS RISK ASSESSMENT DOCUMENTED: ICD-10-PCS | Mod: HCNC,CPTII,S$GLB, | Performed by: NEUROLOGICAL SURGERY

## 2019-08-08 PROCEDURE — 99214 OFFICE O/P EST MOD 30 MIN: CPT | Mod: HCNC,S$GLB,, | Performed by: NEUROLOGICAL SURGERY

## 2019-08-08 PROCEDURE — 1100F PTFALLS ASSESS-DOCD GE2>/YR: CPT | Mod: HCNC,CPTII,S$GLB, | Performed by: NEUROLOGICAL SURGERY

## 2019-08-08 NOTE — PROGRESS NOTES
CHIEF COMPLAINT:  Pain across the whole lower back and in the buttocks    HPI:  Javier Valles is a 89 y.o. male who presents for neurosurgical evaluation. He is having pain across the whole lower back and in the buttocks started 2 years ago.  The pain came on suddenly, and it has been constant ever since.  The patient describes the pain as aching, rated as a 8 on a pain scale of 1-10. He denies there is weakness in the right leg, left leg, right arm and left arm. The pain gets better with lying down and sitting, and the pain is worse with lifiting and walking for prolonged periods. He tried narcotics, epidural steroid injections and SCS which helps with leg pain. Patient denies accidents or trauma, denies bowel or bladder symptoms, and denies saddle anesthesia.        (Not in a hospital admission)    Review of patient's allergies indicates:   Allergen Reactions    Pravastatin      Severe myalgias/elevated CPK    Atorvastatin Other (See Comments)     Myositis/elevated CPK    Statins-hmg-coa reductase inhibitors      Muscle pain and loss of muscle    Ace inhibitors      Cough       Past Medical History:   Diagnosis Date    *Atrial flutter 10/3/13    Anticoagulant long-term use     Aspirin only at this time    Arthritis     Atrial fibrillation     Atrial flutter     Blood transfusion     ?    BPH (benign prostatic hypertrophy)     Carotid artery disease     2008: [place] There is 40 - 49% right Internal Carotid stenosis.  There is 20 - 39% left Internal Carotid stenosis.       Chronic kidney disease     Coronary artery disease     DDD (degenerative disc disease) 6/25/2015    Degenerative disc disease     Elevated PSA     Glaucoma     Hyperlipidemia     Hypertension     Lumbar stenosis     lumbar spinal stenosis    NSTEMI (non-ST elevated myocardial infarction) 11/3/2013    Pacemaker 11/2013    Peripheral neuropathy     - S/P right ILIAC stent 1993;      Retinal detachment     Squamous cell  carcinoma     left leg    Syncope and collapse: orthostatic with hypotension 10/9/2015     Past Surgical History:   Procedure Laterality Date    ABLATION N/A 11/1/2016    [facility] CATH LAB    ABLATION N/A 11/1/2013    [facility] CATH LAB    BLOCK, NERVE, FACET JOINT, LUMBAR, MEDIAL BRANCH Bilateral 12/18/2018    [facility] PAIN MGT    CARDIAC CATHETERIZATION      [facility]    CARDIAC ELECTROPHYSIOLOGY MAPPING AND ABLATION  11/2016    CARDIAC PACEMAKER PLACEMENT  11/2016    CARDIOVERSION N/A 11/18/2013    [facility] by Jas Nathan [facility] at [facility] CATH LAB    CATARACT EXTRACTION W/  INTRAOCULAR LENS IMPLANT Left 1997        CATHETERIZATION, HEART, LEFT Left 11/1/2013    [facility]illermo [facility] CATH LAB    CORONARY ARTERY BYPASS GRAFT  1991    ECHOCARDIOGRAM-TRANSESOPHAGEAL N/A 11/18/2013    Performed by Lola Surgeon at [facility] LOLA    ENUCLEATION Right 1949    JOMAR-TRANSFORAMINAL Left 8/7/2015    [facility] PAIN MGT    INJECTION, STEROID, EPIDURAL, CAUDAL  cleared to hold eliquis 4 days per Dr. Ruiz N/A 7/17/2019    Per[facility] by [facility] PAIN MGT    INJECTION-STEROID-EPIDURAL-CAUDAL N/A 7/21/2017    [facility] by [facility] PAIN MGT    INJECTION-STEROID-EPIDURAL-LUMBAR N/A 8/28/2015    Per[facility] by [facility] PAIN MGT    INJECTION-STEROID-EPIDURAL-TRANSFORAMINAL Left 5/29/2017    Performed by Albert Graff MD at [facility] PAIN MGT    INJECTION-STEROID-EPIDURAL-TRANSFORAMINAL Right 4/28/2017    Per[facility] by [facility] PAIN MGT    INJECTION-STEROID-EPIDURAL-TRANSFORAMINAL Left 7/10/2015    [facility] MGT    XNSMYHRXW-SJPHHXKCG-YMIWCIGHKOBRU N/A 11/1/2016    [facility] CATH LAB    LAMINECTOMY-LUMBAR N/A  2016    [facility]in [facility] at [facility] OR 2ND FLR    LAMINOTOMY  2017    [facility] at [facility] OR    LUMBAR LAMINECTOMY  2016    PLACEMENT OF SPINAL CORD STIMULATOR T10-T11 laminotomyh for placement of spinal cord stimulator at T9-10, left below the belt line pulse generator N/A 2017    Per[facility] at [facility] OR    Radiofrequency Ablation LEFT LUMBAR L2,3,4,5 RFA Left 2019    [facility] at [facility] PAIN MGT    RELEASE-CARPAL TUNNEL Bilateral 3/29/2017    Performed by Nam Chong Jr., [facility] at [facility] OR    RETINAL DETACHMENT SURGERY Left     Dr. Cosme    SKIN BIOPSY      SPINAL CORD STIMULATOR TRIAL W/ LAMINOTOMY  10/2017    TONSILLECTOMY      TRANSESOPHAGEAL ECHOCARDIOGRAM (MONICA) N/A 2013    [facility] CATH LAB    TRIAL-STIMULATOR-DORSAL COLUMN N/A 2017    Performed by [facility] at [facility] PAIN MGT     Family History   Problem Relation Age of Onset    Stroke Mother 69    Clotting disorder Mother         per patient no clotting disorder    Hypertension Mother     Diverticulitis Son     Cancer Neg Hx     Diabetes Neg Hx     Heart disease Neg Hx     Glaucoma Neg Hx     Amblyopia Neg Hx     Blindness Neg Hx     Cataracts Neg Hx     Macular degeneration Neg Hx     Retinal detachment Neg Hx     Strabismus Neg Hx      Social History     Tobacco Use    Smoking status: Former Smoker     Last attempt to quit: 1963     Years since quittin.0    Smokeless tobacco: Never Used   Substance Use Topics    Alcohol use: Yes     Alcohol/week: 1.8 oz     Types: 1 Glasses of wine, 1 Cans of beer, 1 Shots of liquor per week     Comment: 2 a day    Drug use: No        Review of Systems:  Review of Systems    OBJECTIVE:     Vital Signs (Most Recent)       Physical Exam:  Physical Exam:  Nursing note and vitals reviewed.    Constitutional: He appears  well-developed and well-nourished.     Eyes: Pupils are equal, round, and reactive to light. Conjunctivae and EOM are normal.     Cardiovascular: Normal rate.     Abdominal: Soft. Bowel sounds are normal.     Skin: Skin displays no rash on trunk and no rash on extremities. Skin displays no lesions on trunk and no lesions on extremities.     Psych/Behavior: He is alert. He is oriented to person, place, and time. He has a normal mood and affect.     Musculoskeletal: Gait is abnormal.        Neck: Range of motion is full. Muscle strength is 5/5. Tone is normal.        Back: Range of motion is limited. Muscle strength is 5/5. Tone is normal.        Right Upper Extremities: Range of motion is full. Muscle strength is 5/5. Tone is normal.        Left Upper Extremities: Range of motion is full. Muscle strength is 5/5. Tone is normal.       Right Lower Extremities: Range of motion is full. Muscle strength is 5/5. Tone is normal.        Left Lower Extremities: Range of motion is full. Muscle strength is 5/5. Tone is normal.     Neurological:        DTRs: DTRs are DTRS NORMAL AND SYMMETRICnormal and symmetric.        Cranial nerves: Cranial nerve(s) III, IV, V, VI, VII, IX, X, XI and XII are intact. Cranial nerve(s) II and VIII are not intact. CN II deficit is reduced vision. Cranial nerve VIII deficit is reduced hearing.       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     Plan- not a surgical candidate given age and extent of spine disease. Continue with pain management.

## 2019-08-12 ENCOUNTER — HOSPITAL ENCOUNTER (OUTPATIENT)
Dept: RADIOLOGY | Facility: HOSPITAL | Age: 84
Discharge: HOME | End: 2019-08-12
Attending: INTERNAL MEDICINE
Payer: MEDICARE

## 2019-08-12 ENCOUNTER — OFFICE VISIT (OUTPATIENT)
Dept: INTERNAL MEDICINE | Facility: CLINIC | Age: 84
End: 2019-08-12
Payer: MEDICARE

## 2019-08-12 ENCOUNTER — PATIENT MESSAGE (OUTPATIENT)
Dept: INTERNAL MEDICINE | Facility: CLINIC | Age: 84
End: 2019-08-12

## 2019-08-12 VITALS
DIASTOLIC BLOOD PRESSURE: 82 MMHG | HEART RATE: 80 BPM | WEIGHT: 194 LBS | BODY MASS INDEX: 30.45 KG/M2 | SYSTOLIC BLOOD PRESSURE: 130 MMHG | HEIGHT: 67 IN | OXYGEN SATURATION: 98 %

## 2019-08-12 DIAGNOSIS — S16.1XXA STRAIN OF NECK MUSCLE, INITIAL ENCOUNTER: ICD-10-CM

## 2019-08-12 DIAGNOSIS — M54.12 CERVICAL RADICULOPATHY: ICD-10-CM

## 2019-08-12 DIAGNOSIS — S16.1XXA STRAIN OF NECK MUSCLE, INITIAL ENCOUNTER: Primary | ICD-10-CM

## 2019-08-12 DIAGNOSIS — M47.816 LUMBAR SPONDYLOSIS: ICD-10-CM

## 2019-08-12 PROCEDURE — 72040 X-RAY EXAM NECK SPINE 2-3 VW: CPT | Mod: 26,HCNC,, | Performed by: RADIOLOGY

## 2019-08-12 PROCEDURE — 72040 X-RAY EXAM NECK SPINE 2-3 VW: CPT | Mod: TC,HCNC,FY,PO

## 2019-08-12 PROCEDURE — 96372 PR INJECTION,THERAP/PROPH/DIAG2ST, IM OR SUBCUT: ICD-10-PCS | Mod: HCNC,S$GLB,, | Performed by: INTERNAL MEDICINE

## 2019-08-12 PROCEDURE — 99999 PR PBB SHADOW E&M-EST. PATIENT-LVL III: CPT | Mod: PBBFAC,HCNC,, | Performed by: INTERNAL MEDICINE

## 2019-08-12 PROCEDURE — 99214 PR OFFICE/OUTPT VISIT, EST, LEVL IV, 30-39 MIN: ICD-10-PCS | Mod: 25,HCNC,S$GLB, | Performed by: INTERNAL MEDICINE

## 2019-08-12 PROCEDURE — 1101F PR PT FALLS ASSESS DOC 0-1 FALLS W/OUT INJ PAST YR: ICD-10-PCS | Mod: HCNC,CPTII,S$GLB, | Performed by: INTERNAL MEDICINE

## 2019-08-12 PROCEDURE — 99999 PR PBB SHADOW E&M-EST. PATIENT-LVL III: ICD-10-PCS | Mod: PBBFAC,HCNC,, | Performed by: INTERNAL MEDICINE

## 2019-08-12 PROCEDURE — 1101F PT FALLS ASSESS-DOCD LE1/YR: CPT | Mod: HCNC,CPTII,S$GLB, | Performed by: INTERNAL MEDICINE

## 2019-08-12 PROCEDURE — 99214 OFFICE O/P EST MOD 30 MIN: CPT | Mod: 25,HCNC,S$GLB, | Performed by: INTERNAL MEDICINE

## 2019-08-12 PROCEDURE — 72040 XR CERVICAL SPINE AP LATERAL: ICD-10-PCS | Mod: 26,HCNC,, | Performed by: RADIOLOGY

## 2019-08-12 PROCEDURE — 96372 THER/PROPH/DIAG INJ SC/IM: CPT | Mod: HCNC,S$GLB,, | Performed by: INTERNAL MEDICINE

## 2019-08-12 RX ORDER — METHOCARBAMOL 500 MG/1
500 TABLET, FILM COATED ORAL 3 TIMES DAILY PRN
Qty: 15 TABLET | Refills: 0 | Status: SHIPPED | OUTPATIENT
Start: 2019-08-12 | End: 2019-08-17

## 2019-08-12 RX ORDER — TRIAMCINOLONE ACETONIDE 40 MG/ML
80 INJECTION, SUSPENSION INTRA-ARTICULAR; INTRAMUSCULAR ONCE
Status: COMPLETED | OUTPATIENT
Start: 2019-08-12 | End: 2019-08-12

## 2019-08-12 RX ADMIN — TRIAMCINOLONE ACETONIDE 80 MG: 40 INJECTION, SUSPENSION INTRA-ARTICULAR; INTRAMUSCULAR at 11:08

## 2019-08-12 NOTE — PROGRESS NOTES
The cervical spine x-ray does reveal significant DJD or degenerative joint disease     this would go along with having the clinical diagnosis of cervical radiculopathy or pinched nerve     continue with the use of the muscle relaxant that was prescribed but if the pain still persists particularly with the right upper extremity, can consider course of the steroid prednisone over 8-9 day period

## 2019-08-12 NOTE — PROGRESS NOTES
PAST MEDICAL HISTORY:   Coronary artery disease with previous bypass surgery, stents in 2013, and then a  subsequent non-ST MI due to in-stent stenosis of obtuse marginal for which angioplasty was performed.  Hypertension.  Hyperlipidemia.  Peripheral vascular disease with stent of the right iliac.  Venous Reflux  Chronic kidney disease with secondary hyperparathyroid.  BPH with elevated PSA.  Carotid artery disease   Osteoarthritis of the knees  Arrhythmia disorder for which he is status post pacemaker and defibrillator for sick sinus syndrome, status post radiofrequency ablation for atrial flutter, and on amiodarone for atrial flutter/fibrillation/PVCs.  Gout.  Bilateral inguinal hernia repair.  Left knee surgery.  Glaucoma.     SOCIAL HISTORY:  Tobacco use, none.  Alcohol use, a couple of drinks a day.     CURRENT MEDICATIONS:  Amiodarone 200 mg daily.  Amlodipine 5 mg daily.  Aspirin 81 mg daily.  Aricept 5 mg daily.  Atrovent nasal spray as needed.  Losartan 50 mg daily.  Crestor 20 mg daily.  Eliquis 2.5 mg twice a day  lexapro 5mg           REASON FOR VISIT:  This is an 89-year-old male.  Starting Friday morning,   08/09/2019, he has been experiencing pain on the lateral aspect of his right   neck and along with this, now he can feel pain involving his right arm.  It is   hard for him to turn his neck to the right.  There is no tingling or numbness.    He is not having a sore throat, chest pain, or shortness of breath.  He does not   recall any incident or activity to cause this.    The main limiting situation that he has is lumbar spondylosis for which he is   being followed by [facility] at [facility] and actually he is seeing   also an another doctor at [facility] for a second opinion for which he will have a   myelogram later this week.    MEDICAL HISTORY AND MEDICATIONS:  Outlined above.    PHYSICAL EXAMINATION:  VITAL SIGNS:  Weight is 194 pounds, pulse 80, and blood pressure 98/62 by me.  LUNGS:   Clear.  HEART:  Regular rate and rhythm.  MUSCULOSKELETAL:  On the left arm, he has 2+ biceps, triceps reflexes.  On the   right, it is 1+.  He has no pain when I abduct or rotate the right arm at the   shoulder joint.  He is definitely tender on the sternocleidomastoid muscle.    Nontender over the posterior neck.    IMPRESSION:  1. Cervical strain.  2. Cervical radiculopathy.    PLAN:  Today Kenalog 80 mg IM and then a course of methocarbamol 500 mg three   times a day for five days, cervical spine film and warm and ice pack over the   area.    ADDENDUM:  There is no rash.        JAM/HN  dd: 08/12/2019 11:31:53 (CDT)  td: 08/13/2019 01:20:37 (CDT)  Doc ID   #[id number]  Job ID #[id number]    CC:

## 2019-08-13 ENCOUNTER — TELEPHONE (OUTPATIENT)
Dept: INTERNAL MEDICINE | Facility: CLINIC | Age: 84
End: 2019-08-13

## 2019-08-13 RX ORDER — PREDNISONE 20 MG/1
TABLET ORAL
Qty: 24 TABLET | Refills: 0 | Status: SHIPPED | OUTPATIENT
Start: 2019-08-13 | End: 2019-11-11 | Stop reason: ALTCHOICE

## 2019-08-13 NOTE — TELEPHONE ENCOUNTER
----- Message from Melida Mckeon sent at 8/13/2019  8:11 AM CDT -----  Contact: Patient [phone]  Pt states that he is calling to speak with . He states that effects of yesterday's steroid shot and prescription as requested that he is notifying you that he needs to move on to get something stronger. Please advise.      Thanks

## 2019-08-13 NOTE — TELEPHONE ENCOUNTER
Pt states the medication and shot was little or no help he want to know if you can send the steroids that was discussed at the visit

## 2019-08-20 ENCOUNTER — PATIENT MESSAGE (OUTPATIENT)
Dept: ADMINISTRATIVE | Facility: OTHER | Age: 84
End: 2019-08-20

## 2019-08-21 ENCOUNTER — PATIENT MESSAGE (OUTPATIENT)
Dept: INTERNAL MEDICINE | Facility: CLINIC | Age: 84
End: 2019-08-21

## 2019-08-21 DIAGNOSIS — M47.22 CERVICAL SPONDYLOSIS WITH RADICULOPATHY: Primary | ICD-10-CM

## 2019-08-22 ENCOUNTER — PATIENT MESSAGE (OUTPATIENT)
Dept: PAIN MEDICINE | Facility: CLINIC | Age: 84
End: 2019-08-22

## 2019-08-22 DIAGNOSIS — M51.36 DDD (DEGENERATIVE DISC DISEASE), LUMBAR: ICD-10-CM

## 2019-08-22 DIAGNOSIS — M96.1 POSTLAMINECTOMY SYNDROME OF LUMBAR REGION: ICD-10-CM

## 2019-08-22 DIAGNOSIS — M47.816 LUMBAR SPONDYLOSIS: ICD-10-CM

## 2019-08-22 DIAGNOSIS — G89.4 CHRONIC PAIN SYNDROME: ICD-10-CM

## 2019-08-22 DIAGNOSIS — M54.16 LUMBAR RADICULOPATHY: ICD-10-CM

## 2019-08-22 RX ORDER — OXYCODONE AND ACETAMINOPHEN 5; 325 MG/1; MG/1
1 TABLET ORAL EVERY 6 HOURS PRN
Qty: 30 TABLET | Refills: 0 | Status: SHIPPED | OUTPATIENT
Start: 2019-08-22 | End: 2020-06-10

## 2019-08-22 NOTE — TELEPHONE ENCOUNTER
Do you wish to continue this patient of medication? Would you like him to come in and establish a contract?

## 2019-08-23 ENCOUNTER — PATIENT MESSAGE (OUTPATIENT)
Dept: PAIN MEDICINE | Facility: CLINIC | Age: 84
End: 2019-08-23

## 2019-08-23 ENCOUNTER — TELEPHONE (OUTPATIENT)
Dept: PAIN MEDICINE | Facility: CLINIC | Age: 84
End: 2019-08-23

## 2019-08-23 NOTE — TELEPHONE ENCOUNTER
Staff contacted pt to schedule a follow up appointment per Trista    Pt scheduled for 8/28/19 at 9:40 am john Nunez

## 2019-08-26 DIAGNOSIS — H40.1122 PRIMARY OPEN-ANGLE GLAUCOMA, LEFT EYE, MODERATE STAGE: ICD-10-CM

## 2019-08-26 RX ORDER — LATANOPROST 50 UG/ML
1 SOLUTION/ DROPS OPHTHALMIC NIGHTLY
Qty: 3 ML | Refills: 3 | Status: SHIPPED | OUTPATIENT
Start: 2019-08-26 | End: 2019-11-08 | Stop reason: SDUPTHER

## 2019-08-26 RX ORDER — BRIMONIDINE TARTRATE 2 MG/ML
1 SOLUTION/ DROPS OPHTHALMIC 3 TIMES DAILY
Qty: 30 ML | Refills: 3 | Status: SHIPPED | OUTPATIENT
Start: 2019-08-26 | End: 2020-12-09

## 2019-08-27 ENCOUNTER — TELEPHONE (OUTPATIENT)
Dept: PAIN MEDICINE | Facility: CLINIC | Age: 84
End: 2019-08-27

## 2019-08-27 NOTE — TELEPHONE ENCOUNTER
Attempted to contact patient to inform to please check-in on the 8th floor, [address] for tomorrow's appointment with Trista Nunez NP, no answer, left detailed voice message.

## 2019-08-28 ENCOUNTER — OFFICE VISIT (OUTPATIENT)
Dept: PAIN MEDICINE | Facility: CLINIC | Age: 84
End: 2019-08-28
Payer: MEDICARE

## 2019-08-28 ENCOUNTER — HOSPITAL ENCOUNTER (OUTPATIENT)
Dept: RADIOLOGY | Facility: OTHER | Age: 84
Discharge: HOME | End: 2019-08-28
Attending: NURSE PRACTITIONER
Payer: MEDICARE

## 2019-08-28 VITALS
DIASTOLIC BLOOD PRESSURE: 64 MMHG | BODY MASS INDEX: 28.82 KG/M2 | HEART RATE: 54 BPM | TEMPERATURE: 98 F | HEIGHT: 67 IN | WEIGHT: 183.63 LBS | SYSTOLIC BLOOD PRESSURE: 111 MMHG

## 2019-08-28 DIAGNOSIS — M79.18 MYOFASCIAL PAIN: ICD-10-CM

## 2019-08-28 DIAGNOSIS — M50.30 DDD (DEGENERATIVE DISC DISEASE), CERVICAL: ICD-10-CM

## 2019-08-28 DIAGNOSIS — M54.12 CERVICAL RADICULOPATHY: ICD-10-CM

## 2019-08-28 DIAGNOSIS — M54.16 LUMBAR RADICULOPATHY: ICD-10-CM

## 2019-08-28 DIAGNOSIS — M96.1 POSTLAMINECTOMY SYNDROME OF LUMBAR REGION: ICD-10-CM

## 2019-08-28 DIAGNOSIS — M47.22 OSTEOARTHRITIS OF SPINE WITH RADICULOPATHY, CERVICAL REGION: ICD-10-CM

## 2019-08-28 DIAGNOSIS — G89.4 CHRONIC PAIN SYNDROME: ICD-10-CM

## 2019-08-28 DIAGNOSIS — G89.4 CHRONIC PAIN SYNDROME: Primary | ICD-10-CM

## 2019-08-28 DIAGNOSIS — M47.816 LUMBAR SPONDYLOSIS: ICD-10-CM

## 2019-08-28 DIAGNOSIS — M51.36 DDD (DEGENERATIVE DISC DISEASE), LUMBAR: ICD-10-CM

## 2019-08-28 PROCEDURE — 99213 PR OFFICE/OUTPT VISIT, EST, LEVL III, 20-29 MIN: ICD-10-PCS | Mod: 25,HCNC,S$GLB, | Performed by: NURSE PRACTITIONER

## 2019-08-28 PROCEDURE — 99499 RISK ADDL DX/OHS AUDIT: ICD-10-PCS | Mod: HCNC,S$GLB,, | Performed by: NURSE PRACTITIONER

## 2019-08-28 PROCEDURE — 1101F PT FALLS ASSESS-DOCD LE1/YR: CPT | Mod: HCNC,CPTII,S$GLB, | Performed by: NURSE PRACTITIONER

## 2019-08-28 PROCEDURE — 99499 UNLISTED E&M SERVICE: CPT | Mod: HCNC,S$GLB,, | Performed by: NURSE PRACTITIONER

## 2019-08-28 PROCEDURE — 20553 PR INJECT TRIGGER POINTS, > 3: ICD-10-PCS | Mod: HCNC,S$GLB,, | Performed by: NURSE PRACTITIONER

## 2019-08-28 PROCEDURE — 72125 CT NECK SPINE W/O DYE: CPT | Mod: 26,HCNC,, | Performed by: INTERNAL MEDICINE

## 2019-08-28 PROCEDURE — 20553 NJX 1/MLT TRIGGER POINTS 3/>: CPT | Mod: HCNC,S$GLB,, | Performed by: NURSE PRACTITIONER

## 2019-08-28 PROCEDURE — 99999 PR PBB SHADOW E&M-EST. PATIENT-LVL III: ICD-10-PCS | Mod: PBBFAC,HCNC,, | Performed by: NURSE PRACTITIONER

## 2019-08-28 PROCEDURE — 72125 CT CERVICAL SPINE WITHOUT CONTRAST: ICD-10-PCS | Mod: 26,HCNC,, | Performed by: INTERNAL MEDICINE

## 2019-08-28 PROCEDURE — 99999 PR PBB SHADOW E&M-EST. PATIENT-LVL III: CPT | Mod: PBBFAC,HCNC,, | Performed by: NURSE PRACTITIONER

## 2019-08-28 PROCEDURE — 99213 OFFICE O/P EST LOW 20 MIN: CPT | Mod: 25,HCNC,S$GLB, | Performed by: NURSE PRACTITIONER

## 2019-08-28 PROCEDURE — 72125 CT NECK SPINE W/O DYE: CPT | Mod: TC,HCNC

## 2019-08-28 PROCEDURE — 1101F PR PT FALLS ASSESS DOC 0-1 FALLS W/OUT INJ PAST YR: ICD-10-PCS | Mod: HCNC,CPTII,S$GLB, | Performed by: NURSE PRACTITIONER

## 2019-08-28 RX ORDER — METHYLPREDNISOLONE ACETATE 40 MG/ML
40 INJECTION, SUSPENSION INTRA-ARTICULAR; INTRALESIONAL; INTRAMUSCULAR; SOFT TISSUE ONCE
Status: COMPLETED | OUTPATIENT
Start: 2019-08-28 | End: 2019-08-28

## 2019-08-28 RX ORDER — BUPIVACAINE HYDROCHLORIDE 2.5 MG/ML
9 INJECTION, SOLUTION EPIDURAL; INFILTRATION; INTRACAUDAL
Status: COMPLETED | OUTPATIENT
Start: 2019-08-28 | End: 2019-08-28

## 2019-08-28 RX ADMIN — BUPIVACAINE HYDROCHLORIDE 22.5 MG: 2.5 INJECTION, SOLUTION EPIDURAL; INFILTRATION; INTRACAUDAL at 10:08

## 2019-08-28 RX ADMIN — METHYLPREDNISOLONE ACETATE 40 MG: 40 INJECTION, SUSPENSION INTRA-ARTICULAR; INTRALESIONAL; INTRAMUSCULAR; SOFT TISSUE at 10:08

## 2019-08-28 NOTE — PROGRESS NOTES
Chronic patient Established Note (Follow up visit)      SUBJECTIVE:    Javier Valles presents to the clinic for a follow-up appointment for lower back and bilateral leg pain. He reports increased neck pain that began on Thursday after no inciting event. He woke up with this pain. He reports neck pain that radiates into both shoulders and into his right arm. He describes this pain as burning. This pain is worse with turning his head side to side. He also reports increased pain with extension. He tried ice and heat with limited relief. He continues to report low back pain that radiates down the back of both legs to his feet. He reports limited relief with SCS. He is frustrated with his continued pain. He reports a recent CT myelogram ordered by an outside neurosurgeon. He is awaiting the results. He continues to take Percocet sparingly with benefit. He denies any other health changes.        Pain Medications:  Robaxin 750 mg PRN muscle pain  Compounding cream  Percocet 5/325 mg PRN    Tried in past: gabapentin 100 mg TID, Percocet (helpful), Norco    - Anti-Coagulants: Eliquis (pacemaker)    Opioid Contract: no     report:  Not applicable    Pain Procedures:  Multiple JOMAR's with Dr. Lopez 4 yrs ago  4/28/17 Right L4-5 TF JOMAR- 100% relief   5/29/17 Left L5-S1 TF JOMAR  7/21/17 Caudal JOMAR- significant benefit  9/22/17 Lumbar SCS trial- 90% relief  12/18/18 Bilateral L2,3,4,5 MBB- 90% relief  1/9/19 Left L2,3,4,5 RFA- 70% relief    Spinal surgeries:  MIS laminectomy L3-4 and L4-5.     Physical Therapy/Home Exercise: no    Imaging:    Narrative     EXAMINATION:  CT LUMBAR SPINE WITHOUT CONTRAST    CLINICAL HISTORY:  Low back painLow back pain, prior surgery, new or progressive sx;    TECHNIQUE:  Contiguous axial images were obtained of the lumbar spine without intravenous contrast. Coronal and sagittal reformations were acquired.    COMPARISON:  Thoracolumbar spine radiograph 01/28/2019, CT lumbar spine  03/24/2016    FINDINGS:  Vertebral body heights are within normal limits.  There is mild anterolisthesis of L4 on L5 similar to prior.  Postoperative changes of prior left-sided laminectomies are present at L3-4 and L4-5.  There is multilevel disc space height loss from T12-L1 through L5-S1, worst at L2-3 and L3-4.  No acute fractures.  There are degenerative Schmorl's nodes at the inferior endplate of T12, superior endplate of L1, inferior endplate of L3, and superior endplate of L4.    There is moderate calcific atherosclerosis of the aorta and iliac branch vessels.  There is infrarenal aortic ectasia just proximal to the bifurcation measuring up to 2.4 cm.  No aortic aneurysm.  There is ectasia of the right proximal common iliac artery measuring 2.1 cm.    Otherwise the imaged portions of the abdomen are unremarkable.    The paravertebral tissues are unremarkable.  Intervertebral disc demonstrate prominent vacuum disc formation at T12-L1, L1-L2, and L5-S1.  Degenerative changes are detailed as follows:    T12-L1 disc: There is bilateral facet arthrosis.  No significant spinal canal or neural foraminal stenosis.    L1-2 disc: Circumferential posterior disc bulge, bilateral facet arthrosis, and mild bilateral ligamentum flavum thickening results in mild central canal stenosis and mild bilateral neural foraminal narrowing.    L2-3 disc: Circumferential posterior disc bulge, prominent bilateral facet arthrosis, ligamentum flavum thickening result in moderate central canal stenosis and moderate left and mild right neural foraminal narrowing.    L3-4 disc: Postoperative changes left-sided laminectomy, bilateral facet arthrosis, circumferential posterior disc bulge with partially calcified central disc protrusion results in moderate central canal stenosis and mild bilateral neural foraminal narrowing.    L4-5 disc: Postoperative changes left-sided laminectomy, prominent bilateral facet arthrosis and circumferential  posterior disc bulge resulting in moderate central canal stenosis and severe left and moderate right neural foraminal narrowing.    L5-S1 disc: Asymmetric left posterior disc bulge and prominent bilateral facet arthrosis results in moderate central canal stenosis and severe bilateral neural foraminal narrowing noting the posterior disc bulge is in close approximation to the exiting left S1 nerve root.      Impression       Interval postoperative changes of left L3-4 and L4-5 laminectomies compared to CT lumbar spine 03/24/2016.    Multilevel degenerative changes of the lumbar spine as detailed above.    Aortoiliac atherosclerotic calcification with ectasia of the proximal right common iliac artery.           CMP  Sodium   Date Value Ref Range Status   05/29/2019 144 136 - 145 mmol/L Final     Potassium   Date Value Ref Range Status   05/29/2019 4.3 3.5 - 5.1 mmol/L Final     Chloride   Date Value Ref Range Status   05/29/2019 112 (H) 95 - 110 mmol/L Final     CO2   Date Value Ref Range Status   05/29/2019 23 23 - 29 mmol/L Final     Glucose   Date Value Ref Range Status   05/29/2019 100 70 - 110 mg/dL Final     BUN, Bld   Date Value Ref Range Status   05/29/2019 25 (H) 8 - 23 mg/dL Final     Creatinine   Date Value Ref Range Status   05/29/2019 2.0 (H) 0.5 - 1.4 mg/dL Final     Calcium   Date Value Ref Range Status   05/29/2019 10.4 8.7 - 10.5 mg/dL Final     Total Protein   Date Value Ref Range Status   04/16/2019 7.0 6.0 - 8.4 g/dL Final     Albumin   Date Value Ref Range Status   04/16/2019 4.1 3.5 - 5.2 g/dL Final     Total Bilirubin   Date Value Ref Range Status   04/16/2019 1.7 (H) 0.1 - 1.0 mg/dL Final     Comment:     For infants and newborns, interpretation of results should be based  on gestational age, weight and in agreement with clinical  observations.  Premature Infant recommended reference ranges:  Up to 24 hours.............<8.0 mg/dL  Up to 48 hours............<12.0 mg/dL  3-5  days..................<15.0 mg/dL  6-29 days.................<15.0 mg/dL       Alkaline Phosphatase   Date Value Ref Range Status   04/16/2019 66 55 - 135 U/L Final     AST   Date Value Ref Range Status   04/16/2019 27 10 - 40 U/L Final     ALT   Date Value Ref Range Status   04/16/2019 19 10 - 44 U/L Final     Anion Gap   Date Value Ref Range Status   05/29/2019 9 8 - 16 mmol/L Final     eGFR if    Date Value Ref Range Status   05/29/2019 33.2 (A) >60 mL/min/1.73 m^2 Final     eGFR if non    Date Value Ref Range Status   05/29/2019 28.7 (A) >60 mL/min/1.73 m^2 Final     Comment:     Calculation used to obtain the estimated glomerular filtration  rate (eGFR) is the CKD-EPI equation.            REVIEW OF SYSTEMS:    GENERAL:  No weight loss, malaise or fevers.  HEENT:  Negative for frequent or significant headaches.  NECK:  Negative for lumps, goiter, pain and significant neck swelling.  RESPIRATORY:  Negative for cough, wheezing or shortness of breath.  CARDIOVASCULAR:  Negative for chest pain, leg swelling or palpitations. Pacemaker placement.  A-fib.  GI:  Negative for abdominal discomfort, blood in stools or black stools or change in bowel habits.  MUSCULOSKELETAL:  See HPI.  SKIN:  Negative for lesions, rash, and itching.  PSYCH:  Negative for sleep disturbance, mood disorder and recent psychosocial stressors.  HEMATOLOGY/LYMPHOLOGY:  Negative for prolonged bleeding, bruising easily or swollen nodes.  NEURO:   No history of headaches, syncope, paralysis, seizures or tremors.  NEPH: CKD.  All other reviewed and negative other than HPI.    Past Medical History:  Past Medical History:   Diagnosis Date    *Atrial flutter 10/3/13    Anticoagulant long-term use     Aspirin only at this time    Arthritis     Atrial fibrillation     Atrial flutter     Blood transfusion     ?    BPH (benign prostatic hypertrophy)     Carotid artery disease     2008: [place] There is 40 - 49% right  Internal Carotid stenosis.  There is 20 - 39% left Internal Carotid stenosis.       Chronic kidney disease     Coronary artery disease     DDD (degenerative disc disease) 6/25/2015    Degenerative disc disease     Elevated PSA     Glaucoma     Hyperlipidemia     Hypertension     Lumbar stenosis     lumbar spinal stenosis    NSTEMI (non-ST elevated myocardial infarction) 11/3/2013    Pacemaker 11/2013    Peripheral neuropathy     - S/P right ILIAC stent 1993;      Retinal detachment     Squamous cell carcinoma     left leg    Syncope and collapse: orthostatic with hypotension 10/9/2015       Past Surgical History:  Past Surgical History:   Procedure Laterality Date    ABLATION N/A 11/1/2016    [facility] CATH LAB    ABLATION N/A 11/1/2013    [facility] CATH LAB    BLOCK, NERVE, FACET JOINT, LUMBAR, MEDIAL BRANCH Bilateral 12/18/2018    [facility] PAIN T    CARDIAC CATHETERIZATION      [facility]    CARDIAC ELECTROPHYSIOLOGY MAPPING AND ABLATION  11/2016    CARDIAC PACEMAKER PLACEMENT  11/2016    CARDIOVERSION N/A 11/18/2013    [facility] Nathan [facility] at [facility] CATH LAB    CATARACT EXTRACTION W/  INTRAOCULAR LENS IMPLANT Left 1997        CATHETERIZATION, HEART, LEFT Left 11/1/2013    [facility]ermo [facility] at [facility] CATH LAB    CORONARY ARTERY BYPASS GRAFT  1991    ECHOCARDIOGRAM-TRANSESOPHAGEAL N/A 11/18/2013    Performed by Lola Surgeon at [facility] LOLA    ENUCLEATION Right 1949    JOMAR-TRANSFORAMINAL Left 8/7/2015    [facility] PAIN MGT    INJECTION, STEROID, EPIDURAL, CAUDAL  cleared to hold eliquis 4 days per Dr. Ruiz N/A 7/17/2019    [facility] PAIN MGT    INJECTION-STEROID-EPIDURAL-CAUDAL N/A 7/21/2017    [facility] PAIN MGT    INJECTION-STEROID-EPIDURAL-LUMBAR N/A  8/28/2015    [facility]    INJECTION-STEROID-EPIDURAL-TRANSFORAMINAL Left 5/29/2017    Performed by Albert Graff [facility] at [facility]    INJECTION-STEROID-EPIDURAL-TRANSFORAMINAL Right 4/28/2017    [facility]    INJECTION-STEROID-EPIDURAL-TRANSFORAMINAL Left 7/10/2015    [facility]    [id number] N/A 11/1/2016    [facility] at [facility] CATH LAB    LAMINECTOMY-LUMBAR N/A 4/25/2016    Performed by Florentin Stanford MD at [facility] OR 2ND FLR    LAMINOTOMY  11/9/2017    [facility] at [facility] OR    LUMBAR LAMINECTOMY  04/25/2016    PLACEMENT OF SPINAL CORD STIMULATOR T10-T11 laminotomyh for placement of spinal cord stimulator at T9-10, left below the belt line pulse generator N/A 11/9/2017    [facility] at [facility] OR    Radiofrequency Ablation LEFT LUMBAR L2,3,4,5 RFA Left 1/9/2019    [facility]formed by [facility] at [facility]    RELEASE-CARPAL TUNNEL Bilateral 3/29/2017    [facility] OR    RETINAL DETACHMENT SURGERY Left 1997    Dr. Cosme    SKIN BIOPSY      SPINAL CORD STIMULATOR TRIAL W/ LAMINOTOMY  10/2017    TONSILLECTOMY      TRANSESOPHAGEAL ECHOCARDIOGRAM (MONICA) N/A 11/1/2013    [facility] at [facility] CATH LAB    TRIAL-STIMULATOR-DORSAL COLUMN N/A 9/22/2017    [facility] by [facility] at [facility]       Family History:  Family History   Problem Relation Age of Onset    Stroke Mother 69    Clotting disorder Mother         per patient no clotting disorder    Hypertension Mother     Diverticulitis Son     Cancer Neg Hx     Diabetes Neg Hx     Heart disease Neg Hx     Glaucoma Neg Hx     Amblyopia Neg Hx     Blindness Neg Hx     Cataracts Neg Hx     Macular degeneration Neg Hx     Retinal detachment Neg Hx      "Strabismus Neg Hx        Social History:  Social History     Socioeconomic History    Marital status:      Spouse name: Joanie    Number of children: Not on file    Years of education: Not on file    Highest education level: Not on file   Occupational History    Occupation: retired   Social Needs    Financial resource strain: Not on file    Food insecurity:     Worry: Not on file     Inability: Not on file    Transportation needs:     Medical: Not on file     Non-medical: Not on file   Tobacco Use    Smoking status: Former Smoker     Last attempt to quit: 1963     Years since quittin.0    Smokeless tobacco: Never Used   Substance and Sexual Activity    Alcohol use: Yes     Alcohol/week: 1.8 oz     Types: 1 Glasses of wine, 1 Cans of beer, 1 Shots of liquor per week     Comment: 2 a day    Drug use: No    Sexual activity: Yes     Partners: Female   Lifestyle    Physical activity:     Days per week: Not on file     Minutes per session: Not on file    Stress: Not on file   Relationships    Social connections:     Talks on phone: Not on file     Gets together: Not on file     Attends Denominational service: Not on file     Active member of club or organization: Not on file     Attends meetings of clubs or organizations: Not on file     Relationship status: Not on file   Other Topics Concern    Not on file   Social History Narrative    Not on file       OBJECTIVE:    /64   Pulse (!) 54   Temp 98 °F (36.7 °C)   Ht 5' 7" (1.702 m)   Wt 83.3 kg (183 lb 10.3 oz)   BMI 28.76 kg/m²     PHYSICAL EXAMINATION:    General appearance: Well appearing, in no acute distress, alert and oriented x3.  Psych: Mood and affect appropriate.  Skin: Skin color, texture, turgor normal, no rashes or lesions, in both upper and lower body.  SCS sites well healed.  Head/face: Normocephalic, atraumatic. No palpable lymph nodes.  Neck: There is pain with palpation over cervical paraspinals and trapezius muscles " bilaterally. There is pain with palpation over cervical spine. Spurling Negative bilaterally. Limited ROM with pain on flexion, extension, and lateral rotation.   Cor: RRR  Pulm: CTA  Back: Straight leg raising in the supine position is negative to radicular pain bilaterally.  There is no pain with palpation to lumbar facet joints.  Limited ROM with pain on extension greater than flexion.  Positive facet loading bilaterally, L>R.  Extremities: Peripheral joint ROM is full and pain free without obvious instability or laxity in all four extremities. No deformities, edema, or skin discoloration. Good capillary refill.  Musculoskeletal: There is mild pain with palpation to IPG site.  5/5 strength in right ankle with plantar and dorsiflexion. 5/5 strength in left ankle with plantar and dorsiflexion. 5/5 strength with right knee flexion and 4/5 on extension. 5/5 strength with left knee flexion and extension. 5/5 strength in right EHL, 5/5 strength in left EHL.  Left hip flexion is 4/5, right is 5/5.  No atrophy or tone abnormalities are noted.  Neuro: Bilateral lower extremity coordination and muscle stretch reflexes are physiologic and symmetric. Plantar response are downgoing. No loss of sensation is noted.  Gait: Antalgic- ambulates with cane.      ASSESSMENT: 89 y.o. year old male with neck and low back pain, consistent with the followin. Chronic pain syndrome  X-Ray Cervical Spine 5 View W Flex Extxt    CT Cervical Spine Without Contrast   2. Myofascial pain  X-Ray Cervical Spine 5 View W Flex Extxt    CT Cervical Spine Without Contrast   3. Cervical radiculopathy  X-Ray Cervical Spine 5 View W Flex Extxt    CT Cervical Spine Without Contrast   4. Osteoarthritis of spine with radiculopathy, cervical region  X-Ray Cervical Spine 5 View W Flex Extxt    CT Cervical Spine Without Contrast   5. DDD (degenerative disc disease), cervical  X-Ray Cervical Spine 5 View W Flex Extxt    CT Cervical Spine Without Contrast    6. Postlaminectomy syndrome of lumbar region     7. Lumbar radiculopathy     8. Lumbar spondylosis     9. DDD (degenerative disc disease), lumbar           PLAN:     - Previous imaging was reviewed and discussed with the patient today.    - Trigger point injections as per below.     - Obtain cervical xray and CT scan.     - Consider cervical JOMAR in the future.     - He will continue to follow up with Neurosurgery.     - I have stressed the importance of physical activity and a home exercise plan to help with pain and improve health.    - Continue Percocet 5/325 every 6 hours PRN, #30. He takes this sparingly. He does not need a refill today. We discussed pain contract today. He would like to think about this. We discussed that in order to continue receiving this medication, we will need to establish this contract. He verbalized understanding.      - RTC in 1 month.     - Counseled patient regarding the importance of activity modification, constant sleeping habits and physical therapy.    - Dr. Hodges was consulted on the patient and agrees with this plan.    The above plan and management options were discussed at length with patient. Patient is in agreement with the above and verbalized understanding.    Trista Nunez, ZEUS  08/28/2019     Trigger Point Injection:   The procedure was discussed with the patient including complications of nerve damage,  bleeding, infection, and failure of pain relief.   Trigger points were identified by palpation and marked. Chlorhexidine prep of sites done. A mixture of 9mL 0.25% bupivacaine +40mg Depo-Medrol was prepared (10 mL total).   A 27-gauge needle was advanced to the point of maximal tenderness, and  1 mL  was injected after negative aspiration. All sites done in the same manner. Patient tolerated the procedure well and without complications. Sites injected included: paracervical x1 on the left and x1 on the right, trapezius x1 on the left and x1 on the right, rhomboid x1  on the left and x1 on the right. (6 total).

## 2019-08-29 ENCOUNTER — PATIENT MESSAGE (OUTPATIENT)
Dept: PAIN MEDICINE | Facility: CLINIC | Age: 84
End: 2019-08-29

## 2019-08-29 ENCOUNTER — TELEPHONE (OUTPATIENT)
Dept: PAIN MEDICINE | Facility: CLINIC | Age: 84
End: 2019-08-29

## 2019-08-29 NOTE — TELEPHONE ENCOUNTER
Spoke with patient and discussed CT results. We discussed a potential cervical JOMAR in the future. He would like to wait at this time. He will contact us if he wishes to move forward with that.

## 2019-09-03 ENCOUNTER — PATIENT OUTREACH (OUTPATIENT)
Dept: OTHER | Facility: OTHER | Age: 84
End: 2019-09-03

## 2019-09-03 NOTE — PROGRESS NOTES
Last 5 Patient Entered Readings                                      Current 30 Day Average: 116/67     Recent Readings 9/2/2019 8/23/2019 8/17/2019 8/9/2019 8/1/2019    SBP (mmHg) 99 116 139 109 108    DBP (mmHg) 70 74 64 59 61    Pulse 51 53 49 48 53            Digital Medicine: Health  Follow Up    Returned patients call to discuss the text message alert that he gets to remind him to take his readings. The patient takes his readings about every 10 days so I explained that he will receive that message once he goes 8 or more days without taking a reading. The patient will likely not take his readings more then once every 10 days so I advised him to ignore the message for now. Patient expressed understanding and he will reach out if he feels like he is receiving the message for lack of readings in error.

## 2019-09-03 NOTE — PROGRESS NOTES
Last 5 Patient Entered Readings                                      Current 30 Day Average: 116/67     Recent Readings 9/2/2019 8/23/2019 8/17/2019 8/9/2019 8/1/2019    SBP (mmHg) 99 116 139 109 108    DBP (mmHg) 70 74 64 59 61    Pulse 51 53 49 48 53            Digital Medicine: Health  Follow Up    Left voicemail to follow up with Mr. Javier Valles.  Current BP average 116/67 mmHg is at goal, <130/80.

## 2019-09-12 ENCOUNTER — PATIENT MESSAGE (OUTPATIENT)
Dept: INTERNAL MEDICINE | Facility: CLINIC | Age: 84
End: 2019-09-12

## 2019-09-13 RX ORDER — FUROSEMIDE 20 MG/1
20 TABLET ORAL DAILY
Qty: 7 TABLET | Refills: 0 | Status: SHIPPED | OUTPATIENT
Start: 2019-09-13 | End: 2020-06-10

## 2019-09-13 NOTE — TELEPHONE ENCOUNTER
Pt refuse appt he want to know if you can send him in a diuretic to pharmacy. For fluid in his legs

## 2019-09-18 ENCOUNTER — TELEPHONE (OUTPATIENT)
Dept: ELECTROPHYSIOLOGY | Facility: CLINIC | Age: 84
End: 2019-09-18

## 2019-09-18 NOTE — TELEPHONE ENCOUNTER
----- Message from Karon Dean sent at 9/18/2019  3:56 PM CDT -----  Contact: Patient  The Pt is calling to schedule his recall appointment and wants something on a Monday or Friday. Please malini him back @ [phone]. Thanks, Karon

## 2019-09-20 ENCOUNTER — PATIENT MESSAGE (OUTPATIENT)
Dept: OPHTHALMOLOGY | Facility: CLINIC | Age: 84
End: 2019-09-20

## 2019-09-20 DIAGNOSIS — H40.1122 PRIMARY OPEN-ANGLE GLAUCOMA, LEFT EYE, MODERATE STAGE: ICD-10-CM

## 2019-09-20 RX ORDER — DORZOLAMIDE HCL 20 MG/ML
1 SOLUTION/ DROPS OPHTHALMIC 3 TIMES DAILY
Qty: 30 ML | Refills: 3 | Status: SHIPPED | OUTPATIENT
Start: 2019-09-20 | End: 2020-12-11 | Stop reason: SDUPTHER

## 2019-09-23 ENCOUNTER — PATIENT MESSAGE (OUTPATIENT)
Dept: INTERNAL MEDICINE | Facility: CLINIC | Age: 84
End: 2019-09-23

## 2019-09-24 ENCOUNTER — OFFICE VISIT (OUTPATIENT)
Dept: INTERNAL MEDICINE | Facility: CLINIC | Age: 84
End: 2019-09-24
Payer: MEDICARE

## 2019-09-24 VITALS
OXYGEN SATURATION: 98 % | BODY MASS INDEX: 27.94 KG/M2 | WEIGHT: 178 LBS | DIASTOLIC BLOOD PRESSURE: 78 MMHG | HEART RATE: 78 BPM | HEIGHT: 67 IN | SYSTOLIC BLOOD PRESSURE: 132 MMHG

## 2019-09-24 DIAGNOSIS — I25.5 ISCHEMIC CARDIOMYOPATHY: ICD-10-CM

## 2019-09-24 DIAGNOSIS — R53.83 FATIGUE, UNSPECIFIED TYPE: ICD-10-CM

## 2019-09-24 DIAGNOSIS — R63.4 WEIGHT LOSS: Primary | ICD-10-CM

## 2019-09-24 DIAGNOSIS — R60.0 BILATERAL LEG EDEMA: ICD-10-CM

## 2019-09-24 DIAGNOSIS — I49.3 PVC (PREMATURE VENTRICULAR CONTRACTION): ICD-10-CM

## 2019-09-24 PROCEDURE — 1101F PR PT FALLS ASSESS DOC 0-1 FALLS W/OUT INJ PAST YR: ICD-10-PCS | Mod: HCNC,CPTII,S$GLB, | Performed by: INTERNAL MEDICINE

## 2019-09-24 PROCEDURE — 1101F PT FALLS ASSESS-DOCD LE1/YR: CPT | Mod: HCNC,CPTII,S$GLB, | Performed by: INTERNAL MEDICINE

## 2019-09-24 PROCEDURE — 99214 OFFICE O/P EST MOD 30 MIN: CPT | Mod: HCNC,S$GLB,, | Performed by: INTERNAL MEDICINE

## 2019-09-24 PROCEDURE — 99999 PR PBB SHADOW E&M-EST. PATIENT-LVL V: CPT | Mod: PBBFAC,HCNC,, | Performed by: INTERNAL MEDICINE

## 2019-09-24 PROCEDURE — 99999 PR PBB SHADOW E&M-EST. PATIENT-LVL V: ICD-10-PCS | Mod: PBBFAC,HCNC,, | Performed by: INTERNAL MEDICINE

## 2019-09-24 PROCEDURE — 99214 PR OFFICE/OUTPT VISIT, EST, LEVL IV, 30-39 MIN: ICD-10-PCS | Mod: HCNC,S$GLB,, | Performed by: INTERNAL MEDICINE

## 2019-09-24 NOTE — PROGRESS NOTES
PAST MEDICAL HISTORY:   Coronary artery disease with previous bypass surgery, stents in 2013, and then a  subsequent non-ST MI due to in-stent stenosis of obtuse marginal for which angioplasty was performed.  Hypertension.  Hyperlipidemia.  Peripheral vascular disease with stent of the right iliac.  Venous Reflux  Chronic kidney disease with secondary hyperparathyroid.  BPH with elevated PSA.  Carotid artery disease   Osteoarthritis of the knees  Arrhythmia disorder for which he is status post pacemaker and defibrillator for sick sinus syndrome, status post radiofrequency ablation for atrial flutter, and on amiodarone for atrial flutter/fibrillation/PVCs.  Gout.  Bilateral inguinal hernia repair.  Left knee surgery.  Glaucoma.     SOCIAL HISTORY:  Tobacco use, none.  Alcohol use, a couple of drinks a day.     CURRENT MEDICATIONS:  Amiodarone 200 mg daily.  Amlodipine 5 mg daily.  Aspirin 81 mg daily.  Atrovent nasal spray as needed.  Losartan 50 mg daily.  Crestor 20 mg daily.  Eliquis 2.5 mg twice a day        REASON FOR VISIT:  This is an 89-year-old male.  He has been noticing edema   involving his legs, but with this is that his appetite has been poor.  He has   lost weight.  He feels weak easily doing any activity.  He does not really feel   shortness of breath other than just being weak and his legs feel weak.  He was   given a prescription for Lasix on 09/13/2019 for 7 days, did not feel that it   made any improvement and he did not experience any type of significant weight   loss during this time.  It seems like his main limitation is degenerative   changes involving the cervical vertebrae where he has chronic neck pain and is   seeing [facility], which has been helpful as well as chronic lumbar pain due to   lumbar degenerative disk disease for which he has had multiple injections.    What right now helps the most is not doing any sustained activity for a length   of time.  In regard to his medications  above listed have been Aricept and   Lexapro, which according to his wife, he has not taken.    PHYSICAL EXAMINATION:  VITAL SIGNS:  He has a weight of 178 pounds; where back in August, April and   March of this past year, he was at 193 to 194.  LUNGS:  Clear.  HEART:  Regular rate and rhythm.  ABDOMEN:  Active bowel sounds, soft and nontender.  EXTREMITIES:  He has at present no edema involving his right leg, 1+ in the   left.    IMPRESSION:  1. Weight loss.  2. Weakness.  3. Lower extremity edema.    PLAN:  Today, we will have him go get a chemistry, CBC and repeat a TSH.  They   inquired about a B12 shot, but he had a normal B12 back in April 2019.    DISPOSITION:  Phone review to follow afterwards,  any further testing is   needed.              MARY/IN  dd: 09/24/2019 16:58:27 (CDT)  td: 09/25/2019 12:29:52 (CDT)  Doc ID   #[id number]  Job ID #[id number]    CC:

## 2019-09-25 ENCOUNTER — LAB VISIT (OUTPATIENT)
Dept: LAB | Facility: HOSPITAL | Age: 84
End: 2019-09-25
Attending: INTERNAL MEDICINE
Payer: MEDICARE

## 2019-09-25 DIAGNOSIS — R63.4 WEIGHT LOSS: ICD-10-CM

## 2019-09-25 DIAGNOSIS — R53.83 FATIGUE, UNSPECIFIED TYPE: ICD-10-CM

## 2019-09-25 DIAGNOSIS — I49.3 PVC (PREMATURE VENTRICULAR CONTRACTION): ICD-10-CM

## 2019-09-25 DIAGNOSIS — R60.0 BILATERAL LEG EDEMA: ICD-10-CM

## 2019-09-25 DIAGNOSIS — I25.5 ISCHEMIC CARDIOMYOPATHY: ICD-10-CM

## 2019-09-25 LAB
ALBUMIN SERPL BCP-MCNC: 3.4 G/DL (ref 3.5–5.2)
ALP SERPL-CCNC: 79 U/L (ref 55–135)
ALT SERPL W/O P-5'-P-CCNC: 19 U/L (ref 10–44)
ANION GAP SERPL CALC-SCNC: 9 MMOL/L (ref 8–16)
AST SERPL-CCNC: 24 U/L (ref 10–40)
BASOPHILS # BLD AUTO: 0.06 K/UL (ref 0–0.2)
BASOPHILS NFR BLD: 0.7 % (ref 0–1.9)
BILIRUB SERPL-MCNC: 0.7 MG/DL (ref 0.1–1)
BNP SERPL-MCNC: 152 PG/ML (ref 0–99)
BUN SERPL-MCNC: 42 MG/DL (ref 8–23)
CALCIUM SERPL-MCNC: 10 MG/DL (ref 8.7–10.5)
CHLORIDE SERPL-SCNC: 112 MMOL/L (ref 95–110)
CO2 SERPL-SCNC: 22 MMOL/L (ref 23–29)
CREAT SERPL-MCNC: 2.2 MG/DL (ref 0.5–1.4)
DIFFERENTIAL METHOD: ABNORMAL
EOSINOPHIL # BLD AUTO: 0.3 K/UL (ref 0–0.5)
EOSINOPHIL NFR BLD: 4.2 % (ref 0–8)
ERYTHROCYTE [DISTWIDTH] IN BLOOD BY AUTOMATED COUNT: 15 % (ref 11.5–14.5)
EST. GFR  (AFRICAN AMERICAN): 29.6 ML/MIN/1.73 M^2
EST. GFR  (NON AFRICAN AMERICAN): 25.6 ML/MIN/1.73 M^2
GLUCOSE SERPL-MCNC: 126 MG/DL (ref 70–110)
HCT VFR BLD AUTO: 35.6 % (ref 40–54)
HGB BLD-MCNC: 11.2 G/DL (ref 14–18)
IMM GRANULOCYTES # BLD AUTO: 0.31 K/UL (ref 0–0.04)
IMM GRANULOCYTES NFR BLD AUTO: 3.8 % (ref 0–0.5)
LYMPHOCYTES # BLD AUTO: 0.9 K/UL (ref 1–4.8)
LYMPHOCYTES NFR BLD: 11.4 % (ref 18–48)
MCH RBC QN AUTO: 33.3 PG (ref 27–31)
MCHC RBC AUTO-ENTMCNC: 31.5 G/DL (ref 32–36)
MCV RBC AUTO: 106 FL (ref 82–98)
MONOCYTES # BLD AUTO: 0.8 K/UL (ref 0.3–1)
MONOCYTES NFR BLD: 9.3 % (ref 4–15)
NEUTROPHILS # BLD AUTO: 5.7 K/UL (ref 1.8–7.7)
NEUTROPHILS NFR BLD: 70.6 % (ref 38–73)
NRBC BLD-RTO: 0 /100 WBC
PLATELET # BLD AUTO: 122 K/UL (ref 150–350)
PMV BLD AUTO: 10.9 FL (ref 9.2–12.9)
POTASSIUM SERPL-SCNC: 4.5 MMOL/L (ref 3.5–5.1)
PROT SERPL-MCNC: 6.7 G/DL (ref 6–8.4)
RBC # BLD AUTO: 3.36 M/UL (ref 4.6–6.2)
SODIUM SERPL-SCNC: 143 MMOL/L (ref 136–145)
T4 FREE SERPL-MCNC: 1.12 NG/DL (ref 0.71–1.51)
TSH SERPL DL<=0.005 MIU/L-ACNC: 2.45 UIU/ML (ref 0.4–4)
WBC # BLD AUTO: 8.1 K/UL (ref 3.9–12.7)

## 2019-09-25 PROCEDURE — 83880 ASSAY OF NATRIURETIC PEPTIDE: CPT | Mod: HCNC

## 2019-09-25 PROCEDURE — 36415 COLL VENOUS BLD VENIPUNCTURE: CPT | Mod: HCNC,PO

## 2019-09-25 PROCEDURE — 84439 ASSAY OF FREE THYROXINE: CPT | Mod: HCNC

## 2019-09-25 PROCEDURE — 84443 ASSAY THYROID STIM HORMONE: CPT | Mod: HCNC

## 2019-09-25 PROCEDURE — 85025 COMPLETE CBC W/AUTO DIFF WBC: CPT | Mod: HCNC

## 2019-09-25 PROCEDURE — 80053 COMPREHEN METABOLIC PANEL: CPT | Mod: HCNC

## 2019-10-02 ENCOUNTER — CLINICAL SUPPORT (OUTPATIENT)
Dept: CARDIOLOGY | Facility: HOSPITAL | Age: 84
End: 2019-10-02
Payer: MEDICARE

## 2019-10-02 DIAGNOSIS — Z95.0 PRESENCE OF CARDIAC PACEMAKER: ICD-10-CM

## 2019-10-02 DIAGNOSIS — I25.5 ISCHEMIC CARDIOMYOPATHY: ICD-10-CM

## 2019-10-02 PROCEDURE — 93296 REM INTERROG EVL PM/IDS: CPT | Performed by: INTERNAL MEDICINE

## 2019-10-02 PROCEDURE — 93294 REM INTERROG EVL PM/LDLS PM: CPT | Mod: ,,, | Performed by: INTERNAL MEDICINE

## 2019-10-02 PROCEDURE — 93294 CARDIAC DEVICE CHECK - REMOTE: ICD-10-PCS | Mod: ,,, | Performed by: INTERNAL MEDICINE

## 2019-10-14 ENCOUNTER — PATIENT MESSAGE (OUTPATIENT)
Dept: INTERNAL MEDICINE | Facility: CLINIC | Age: 84
End: 2019-10-14

## 2019-10-15 RX ORDER — SERTRALINE HYDROCHLORIDE 50 MG/1
50 TABLET, FILM COATED ORAL DAILY
Qty: 30 TABLET | Refills: 11 | Status: SHIPPED | OUTPATIENT
Start: 2019-10-15 | End: 2020-06-01 | Stop reason: SDUPTHER

## 2019-10-22 ENCOUNTER — TELEPHONE (OUTPATIENT)
Dept: NEUROSURGERY | Facility: CLINIC | Age: 84
End: 2019-10-22

## 2019-10-22 NOTE — TELEPHONE ENCOUNTER
----- Message from Karoline Arzate sent at 10/22/2019 11:29 AM CDT -----  Contact: [phone]/self  Patient states he is returning your call. Please advise.

## 2019-10-22 NOTE — TELEPHONE ENCOUNTER
----- Message from Anh Mccarty sent at 10/22/2019 11:04 AM CDT -----  Contact: [phone]/self  Patient requesting to speak with you (personal) patient did not care to elaborate  Please call back to assist at [phone]

## 2019-10-22 NOTE — TELEPHONE ENCOUNTER
Mr. Valles is requesting a sooner appt with MD re: back pain - SCS placed 11/09/2017.     He states he would prefer to be seen by MD. Next appt isn't until 01/06/20- please advise     Thanks,   Lauren

## 2019-10-25 ENCOUNTER — TELEPHONE (OUTPATIENT)
Dept: NEUROSURGERY | Facility: CLINIC | Age: 84
End: 2019-10-25

## 2019-10-25 DIAGNOSIS — Z96.89 S/P INSERTION OF SPINAL CORD STIMULATOR: Primary | ICD-10-CM

## 2019-10-25 NOTE — TELEPHONE ENCOUNTER
Spoke with Mr. Valles- appt scheduled with MD. Patient was informed Mr. Tijerina will be reaching out to him re: reprogramming his SCS- pt v/u.    Spoke with Breezy Preciado- states he will contact pt now.

## 2019-11-08 ENCOUNTER — PATIENT MESSAGE (OUTPATIENT)
Dept: OPHTHALMOLOGY | Facility: CLINIC | Age: 84
End: 2019-11-08

## 2019-11-08 ENCOUNTER — TELEPHONE (OUTPATIENT)
Dept: INTERNAL MEDICINE | Facility: CLINIC | Age: 84
End: 2019-11-08

## 2019-11-08 DIAGNOSIS — H40.1122 PRIMARY OPEN-ANGLE GLAUCOMA, LEFT EYE, MODERATE STAGE: ICD-10-CM

## 2019-11-08 RX ORDER — LATANOPROST 50 UG/ML
1 SOLUTION/ DROPS OPHTHALMIC NIGHTLY
Qty: 15 ML | Refills: 3 | Status: SHIPPED | OUTPATIENT
Start: 2019-11-08 | End: 2020-12-11 | Stop reason: SDUPTHER

## 2019-11-08 NOTE — TELEPHONE ENCOUNTER
----- Message from Rosaura Neil sent at 11/8/2019  9:09 AM CST -----  Contact: self    Patient called in regards to a report of some medical concerns he received  patient would like to speak with Dr Gibbs to discuss please advise.

## 2019-11-09 NOTE — PROGRESS NOTES
PAST MEDICAL HISTORY:   Coronary artery disease with previous bypass surgery, stents in 2013, and then a  subsequent non-ST MI due to in-stent stenosis of obtuse marginal for which angioplasty was performed.  Hypertension.  Hyperlipidemia.  Peripheral vascular disease with stent of the right iliac.  Venous Reflux  Chronic kidney disease with secondary hyperparathyroid.  BPH with elevated PSA.  Carotid artery disease   Osteoarthritis of the knees  Arrhythmia disorder for which he is status post pacemaker and defibrillator for sick sinus syndrome, status post radiofrequency ablation for atrial flutter, and on amiodarone for atrial flutter/fibrillation/PVCs.  Gout.  Bilateral inguinal hernia repair.  Left knee surgery.  Glaucoma.     SOCIAL HISTORY:  Tobacco use, none.  Alcohol use, a couple of drinks a day.     CURRENT MEDICATIONS:  Amiodarone 200 mg daily.  Amlodipine 5 mg daily.  Aspirin 81 mg daily.  Atrovent nasal spray as needed.  Losartan 50 mg daily.  Crestor 20 mg daily.  Eliquis 2.5 mg twice a day      REASON FOR VISIT:  This is an 89-year-old male who comes in to talk about his   situation with his legs and recent diagnostic findings.    The patient went to meet with Neurosurgery at outside of [facility] for evaluation   of low back pain and leg pain and weakness.  He had a CT myelogram, looks like   that it may have been done in August 2019.  Results showing increased uptake in   the facet joints bilaterally.  It appears at some point afterwards he developed   bilateral drop foot and went to go see an outside neurologist where an EMG   revealed axonal sensory motor neuropathy.  He did labs and was told that he   needed to see hematologist based on the results that was done and revealed that   there was protein in the urine and an CHOLO showed excess kappa chains.  When I   reviewed the chart here at [facility], he was seen by Nephrology last year for   which that has been noted.    The main thing with his symptoms  is chronic low back pain, which at times is   relieved with the use of a spinal cord stimulator.  He has problems lifting up   both feet, particularly the right foot compared to the left.  He actually does   not complain of any type of paresthesias involving the extremities.  When we   were seeing him back in September, a TSH, free T4 was normal.  Chemistry was   normal except glucose 126 and creatinine 2.2.  BNP was 152, which is improved.    CBC revealed hemoglobin and hematocrit 11.2 and 35.6,  and platelet count   122,000, which has been noted before.    PHYSICAL EXAMINATION:  VITAL SIGNS:  Weight is 180 pounds, pulse 60, blood pressure 132/70.  LUNGS:  Clear.  HEART:  Regular rate and rhythm.  ABDOMEN:  Active bowel sounds, soft, nontender.  PULSES:  There are 2+ pedal pulses.  EXTREMITIES:  Varicose veins.  Actually 1+ pedal edema.  He is unable to   dorsiflex both feet at the ankle joint, worse on the right.    IMPRESSION:  1. Peripheral neuropathy with bilateral peroneal neuropathy.  2. Lumbar spondylosis.  3. Chronic kidney disease with proteinuria.  4. Lower extremity edema with known history of chronic venous reflux.  5. Anemia with macrocytosis and thrombocytopenia.    PLAN:  Today, we will repeat a CBC, renal function panel, serum protein   electrophoresis, urine protein-creatinine ratio and urine free chains.  We will   also need to do extensive review of the labs that he has and a phone review to   follow up.        BRIGHT  dd: 11/11/2019 11:04:32 (CST)  td: 11/12/2019 01:25:07 (CST)  Doc ID   #[id number]  Job ID #[id number]    CC:

## 2019-11-11 ENCOUNTER — LAB VISIT (OUTPATIENT)
Dept: LAB | Facility: HOSPITAL | Age: 84
End: 2019-11-11
Attending: INTERNAL MEDICINE
Payer: MEDICARE

## 2019-11-11 ENCOUNTER — OFFICE VISIT (OUTPATIENT)
Dept: INTERNAL MEDICINE | Facility: CLINIC | Age: 84
End: 2019-11-11
Payer: MEDICARE

## 2019-11-11 ENCOUNTER — OFFICE VISIT (OUTPATIENT)
Dept: OPHTHALMOLOGY | Facility: CLINIC | Age: 84
End: 2019-11-11
Payer: MEDICARE

## 2019-11-11 VITALS
HEART RATE: 60 BPM | BODY MASS INDEX: 28.25 KG/M2 | SYSTOLIC BLOOD PRESSURE: 130 MMHG | HEIGHT: 67 IN | DIASTOLIC BLOOD PRESSURE: 80 MMHG | OXYGEN SATURATION: 98 % | WEIGHT: 180 LBS

## 2019-11-11 DIAGNOSIS — G57.30 PERONEAL NEUROPATHY, UNSPECIFIED LATERALITY: ICD-10-CM

## 2019-11-11 DIAGNOSIS — R80.9 PROTEINURIA, UNSPECIFIED TYPE: ICD-10-CM

## 2019-11-11 DIAGNOSIS — H40.89 OTHER GLAUCOMA OF LEFT EYE: ICD-10-CM

## 2019-11-11 DIAGNOSIS — R60.0 BILATERAL LEG EDEMA: ICD-10-CM

## 2019-11-11 DIAGNOSIS — H40.1132 PRIMARY OPEN ANGLE GLAUCOMA OF BOTH EYES, MODERATE STAGE: Primary | ICD-10-CM

## 2019-11-11 DIAGNOSIS — N18.30 CKD (CHRONIC KIDNEY DISEASE) STAGE 3, GFR 30-59 ML/MIN: ICD-10-CM

## 2019-11-11 DIAGNOSIS — G62.9 PERIPHERAL POLYNEUROPATHY: Primary | ICD-10-CM

## 2019-11-11 DIAGNOSIS — G62.9 PERIPHERAL POLYNEUROPATHY: ICD-10-CM

## 2019-11-11 DIAGNOSIS — H35.342 LAMELLAR MACULAR HOLE OF LEFT EYE: ICD-10-CM

## 2019-11-11 DIAGNOSIS — Z97.0 PROSTHETIC EYE GLOBE: ICD-10-CM

## 2019-11-11 DIAGNOSIS — Z96.1 PSEUDOPHAKIA OF LEFT EYE: ICD-10-CM

## 2019-11-11 DIAGNOSIS — H47.392 OPTIC DISC HEMORRHAGE, LEFT: ICD-10-CM

## 2019-11-11 DIAGNOSIS — H43.812 POSTERIOR VITREOUS DETACHMENT OF LEFT EYE: ICD-10-CM

## 2019-11-11 DIAGNOSIS — H33.002 RHEGMATOGENOUS RETINAL DETACHMENT OF LEFT EYE: ICD-10-CM

## 2019-11-11 DIAGNOSIS — H35.372 EPIRETINAL MEMBRANE, LEFT EYE: ICD-10-CM

## 2019-11-11 LAB
ALBUMIN SERPL BCP-MCNC: 4.1 G/DL (ref 3.5–5.2)
ANION GAP SERPL CALC-SCNC: 8 MMOL/L (ref 8–16)
BASOPHILS # BLD AUTO: 0.07 K/UL (ref 0–0.2)
BASOPHILS NFR BLD: 1 % (ref 0–1.9)
BUN SERPL-MCNC: 27 MG/DL (ref 8–23)
CALCIUM SERPL-MCNC: 10 MG/DL (ref 8.7–10.5)
CHLORIDE SERPL-SCNC: 112 MMOL/L (ref 95–110)
CK SERPL-CCNC: 129 U/L (ref 20–200)
CO2 SERPL-SCNC: 23 MMOL/L (ref 23–29)
CREAT SERPL-MCNC: 1.7 MG/DL (ref 0.5–1.4)
CREAT UR-MCNC: 372 MG/DL (ref 23–375)
CRP SERPL-MCNC: 1.2 MG/L (ref 0–8.2)
DIFFERENTIAL METHOD: ABNORMAL
EOSINOPHIL # BLD AUTO: 0.2 K/UL (ref 0–0.5)
EOSINOPHIL NFR BLD: 2.3 % (ref 0–8)
ERYTHROCYTE [DISTWIDTH] IN BLOOD BY AUTOMATED COUNT: 17.8 % (ref 11.5–14.5)
ERYTHROCYTE [SEDIMENTATION RATE] IN BLOOD BY WESTERGREN METHOD: 7 MM/HR (ref 0–23)
EST. GFR  (AFRICAN AMERICAN): 40.4 ML/MIN/1.73 M^2
EST. GFR  (NON AFRICAN AMERICAN): 35 ML/MIN/1.73 M^2
GLUCOSE SERPL-MCNC: 82 MG/DL (ref 70–110)
HCT VFR BLD AUTO: 37 % (ref 40–54)
HGB BLD-MCNC: 11.3 G/DL (ref 14–18)
IMM GRANULOCYTES # BLD AUTO: 0.05 K/UL (ref 0–0.04)
IMM GRANULOCYTES NFR BLD AUTO: 0.7 % (ref 0–0.5)
LYMPHOCYTES # BLD AUTO: 0.9 K/UL (ref 1–4.8)
LYMPHOCYTES NFR BLD: 12.3 % (ref 18–48)
MCH RBC QN AUTO: 34 PG (ref 27–31)
MCHC RBC AUTO-ENTMCNC: 30.5 G/DL (ref 32–36)
MCV RBC AUTO: 111 FL (ref 82–98)
MONOCYTES # BLD AUTO: 0.7 K/UL (ref 0.3–1)
MONOCYTES NFR BLD: 9.5 % (ref 4–15)
NEUTROPHILS # BLD AUTO: 5.2 K/UL (ref 1.8–7.7)
NEUTROPHILS NFR BLD: 74.2 % (ref 38–73)
NRBC BLD-RTO: 0 /100 WBC
PHOSPHATE SERPL-MCNC: 2.9 MG/DL (ref 2.7–4.5)
PLATELET # BLD AUTO: 124 K/UL (ref 150–350)
PMV BLD AUTO: 11.9 FL (ref 9.2–12.9)
POTASSIUM SERPL-SCNC: 3.9 MMOL/L (ref 3.5–5.1)
PROT UR-MCNC: 134 MG/DL (ref 0–15)
PROT/CREAT UR: 0.36 MG/G{CREAT} (ref 0–0.2)
PTH-INTACT SERPL-MCNC: 73 PG/ML (ref 9–77)
RBC # BLD AUTO: 3.32 M/UL (ref 4.6–6.2)
SODIUM SERPL-SCNC: 143 MMOL/L (ref 136–145)
WBC # BLD AUTO: 7.06 K/UL (ref 3.9–12.7)

## 2019-11-11 PROCEDURE — 86140 C-REACTIVE PROTEIN: CPT | Mod: HCNC

## 2019-11-11 PROCEDURE — 84165 PROTEIN E-PHORESIS SERUM: CPT | Mod: 26,HCNC,, | Performed by: PATHOLOGY

## 2019-11-11 PROCEDURE — 99999 PR PBB SHADOW E&M-EST. PATIENT-LVL II: CPT | Mod: PBBFAC,HCNC,, | Performed by: OPHTHALMOLOGY

## 2019-11-11 PROCEDURE — 85652 RBC SED RATE AUTOMATED: CPT | Mod: HCNC

## 2019-11-11 PROCEDURE — 86335 IMMUNFIX E-PHORSIS/URINE/CSF: CPT | Mod: 26,HCNC,, | Performed by: PATHOLOGY

## 2019-11-11 PROCEDURE — 36415 COLL VENOUS BLD VENIPUNCTURE: CPT | Mod: HCNC

## 2019-11-11 PROCEDURE — 85025 COMPLETE CBC W/AUTO DIFF WBC: CPT | Mod: HCNC

## 2019-11-11 PROCEDURE — 99214 PR OFFICE/OUTPT VISIT, EST, LEVL IV, 30-39 MIN: ICD-10-PCS | Mod: HCNC,S$GLB,, | Performed by: INTERNAL MEDICINE

## 2019-11-11 PROCEDURE — 84156 ASSAY OF PROTEIN URINE: CPT | Mod: HCNC

## 2019-11-11 PROCEDURE — 99999 PR PBB SHADOW E&M-EST. PATIENT-LVL II: ICD-10-PCS | Mod: PBBFAC,HCNC,, | Performed by: OPHTHALMOLOGY

## 2019-11-11 PROCEDURE — 82550 ASSAY OF CK (CPK): CPT | Mod: HCNC

## 2019-11-11 PROCEDURE — 1101F PT FALLS ASSESS-DOCD LE1/YR: CPT | Mod: HCNC,CPTII,S$GLB, | Performed by: INTERNAL MEDICINE

## 2019-11-11 PROCEDURE — 86334 IMMUNOFIX E-PHORESIS SERUM: CPT | Mod: 26,HCNC,, | Performed by: PATHOLOGY

## 2019-11-11 PROCEDURE — 84165 PROTEIN E-PHORESIS SERUM: CPT | Mod: HCNC

## 2019-11-11 PROCEDURE — 99999 PR PBB SHADOW E&M-EST. PATIENT-LVL IV: CPT | Mod: PBBFAC,HCNC,, | Performed by: INTERNAL MEDICINE

## 2019-11-11 PROCEDURE — 84181 WESTERN BLOT TEST: CPT | Mod: HCNC

## 2019-11-11 PROCEDURE — 99999 PR PBB SHADOW E&M-EST. PATIENT-LVL IV: ICD-10-PCS | Mod: PBBFAC,HCNC,, | Performed by: INTERNAL MEDICINE

## 2019-11-11 PROCEDURE — 86335 IMMUNFIX E-PHORSIS/URINE/CSF: CPT | Mod: HCNC

## 2019-11-11 PROCEDURE — 92012 INTRM OPH EXAM EST PATIENT: CPT | Mod: HCNC,S$GLB,, | Performed by: OPHTHALMOLOGY

## 2019-11-11 PROCEDURE — 86334 PATHOLOGIST INTERPRETATION IFE: ICD-10-PCS | Mod: 26,HCNC,, | Performed by: PATHOLOGY

## 2019-11-11 PROCEDURE — 86038 ANTINUCLEAR ANTIBODIES: CPT | Mod: HCNC

## 2019-11-11 PROCEDURE — 92012 PR EYE EXAM, EST PATIENT,INTERMED: ICD-10-PCS | Mod: HCNC,S$GLB,, | Performed by: OPHTHALMOLOGY

## 2019-11-11 PROCEDURE — 99214 OFFICE O/P EST MOD 30 MIN: CPT | Mod: HCNC,S$GLB,, | Performed by: INTERNAL MEDICINE

## 2019-11-11 PROCEDURE — 1101F PR PT FALLS ASSESS DOC 0-1 FALLS W/OUT INJ PAST YR: ICD-10-PCS | Mod: HCNC,CPTII,S$GLB, | Performed by: INTERNAL MEDICINE

## 2019-11-11 PROCEDURE — 84165 PATHOLOGIST INTERPRETATION SPE: ICD-10-PCS | Mod: 26,HCNC,, | Performed by: PATHOLOGY

## 2019-11-11 PROCEDURE — 83970 ASSAY OF PARATHORMONE: CPT | Mod: HCNC

## 2019-11-11 PROCEDURE — 86335 PATHOLOGIST INTERPRETATION UIFE: ICD-10-PCS | Mod: 26,HCNC,, | Performed by: PATHOLOGY

## 2019-11-11 PROCEDURE — 86334 IMMUNOFIX E-PHORESIS SERUM: CPT | Mod: HCNC

## 2019-11-11 PROCEDURE — 80069 RENAL FUNCTION PANEL: CPT | Mod: HCNC

## 2019-11-11 NOTE — PROGRESS NOTES
HPI     Glaucoma      Additional comments: 4 month ck today              Eye Problem      Additional comments: Pt c/o frequent burning sensation with all his   drops              Comments     DLS: 7/15/19    1) POAG  2) PCIOL OS  3) Prosthesis OD  4) ERM OS  5) Hx Rhegmatogenous RD OS  6) Psuedo Mac Hole OS    MEDS:  Dorzolamide TID OS  Brimonidine TID OS   Latanoprost QHS OS            Last edited by Teresa Gonzalez MA on 11/11/2019  8:13 AM. (History)              Assessment /Plan     For exam results, see Encounter Report.    Primary open angle glaucoma of both eyes, moderate stage    Epiretinal membrane, left eye    Posterior vitreous detachment of left eye    Pseudophakia of left eye    Prosthetic eye globe    Rhegmatogenous retinal detachment of left eye    Other glaucoma of left eye     Optic disc hemorrhage, left    Lamellar macular hole of left eye          Old pt of Dr. Goodrich - now seedennis Sandoval     1. POAG   Followed at [facility] since 1997   followed by Sonia for 30 years  First HVF 2014  gtts started - [facility] - 2012  First photos - ? 1997 - for RD os     Glaucoma meds -  latanoprost qhs os / brimonidine os   H/O adverse rxn to glaucoma drops none  LASERS - SLT os 5/25/2017 (270 degrees - too narrow inf.) (( good resp 16--> 11)   GLAUCOMA SURGERIES none  OTHER EYE SURGERIES - prosthesis od - (2/2 injury - 1940's) // Pnematic retinopexy OS 1997 - [place] // PC iol os - [place]  CDR - prosthesis / 0.75- 0.8  Tbase  - ?? On gtts when started here  Tmax  - ?? Off gtts   Ttarget  - ?? 13 or less if possible // had a disc heme with IOP 15-18   HVF 4  test 2014 to 2017 OS:  ? Paracentral defect with early SAD/IAD  Gonio  +3-4 S/T/N // very narrow inf os   CCT - xx/492  OCT 2 test 2014 to 2016- RNFL - OD:not done // OS:dec s/bord T  HRT 4 test 2015 -  2019 -  MR - prosthesis od //   OS dec G,TS, N, NS, NI, //CDR// 0.771 os (high std dev os)   Disc photos - mult old slides 1997 - 2006 // 2015, 2017 - w/ IT  disc heme - OIS    Ttoday - prosthetic // 9  Test done today HRT and IOP check      2.  disc heme os    See photos 4/17/2017 - occurred with IOP's of  13-18   Resolved by 10/2/107    3.  Monocular precautions  - prothetic od    4.  erm os // lamellar hole   - stable, follows with philippe  -on nevanac q day os - feels it helps vision - ?     5.  Hx of rhegmatogenous rrd os  - flat, follows with philippe    6.  Mac hole os  - monitor     7. PC IOL OS     8. - ? S/p yag cap - posterior capsule open     9. Vit floaters OS    Increase in floaters os - will refer back to retina - jaime     10. 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 Target 14))   SLT os - 5/25/2017 - S/T/N (too narrow inf. ) - steroid taper (( good resp 16-->11)     - (?  BB candidate - now has a pacemaker, but has a H/O low heart rate / and/or arrythmia)     -continue EES ointment od - prosthesis - prn irritation     Sees [facility] -  glasses - has been restricted to daytime driving - due for 1 year follow up 3/2019   11/11/2019 - pt is no longer driving     Keep regular yearly  F/U with Jaime -  6/ 2020     Pt occasionally has to get steroid injections for back pain - so will need to monitor that - may be a cause of elevated IOP from time to time      F/U 4 months IOP and HVF     I have seen and personally examined the patient.  I agree with the findings, assessment and plan of the resident and/or fellow.     Sena Schneider MD

## 2019-11-12 ENCOUNTER — PATIENT MESSAGE (OUTPATIENT)
Dept: INTERNAL MEDICINE | Facility: CLINIC | Age: 84
End: 2019-11-12

## 2019-11-12 DIAGNOSIS — G57.30 PERONEAL NEUROPATHY, UNSPECIFIED LATERALITY: Primary | ICD-10-CM

## 2019-11-12 LAB
ALBUMIN SERPL ELPH-MCNC: 4.09 G/DL (ref 3.35–5.55)
ALPHA1 GLOB SERPL ELPH-MCNC: 0.3 G/DL (ref 0.17–0.41)
ALPHA2 GLOB SERPL ELPH-MCNC: 0.75 G/DL (ref 0.43–0.99)
ANA SER QL IF: NORMAL
B-GLOBULIN SERPL ELPH-MCNC: 0.75 G/DL (ref 0.5–1.1)
GAMMA GLOB SERPL ELPH-MCNC: 0.72 G/DL (ref 0.67–1.58)
INTERPRETATION SERPL IFE-IMP: NORMAL
PATHOLOGIST INTERPRETATION IFE: NORMAL
PATHOLOGIST INTERPRETATION SPE: NORMAL
PROT SERPL-MCNC: 6.6 G/DL (ref 6–8.4)

## 2019-11-12 NOTE — PROGRESS NOTES
Subjective:   Patient ID:  Javier Valles is a 89 y.o. male who presents for follow up of Peripheral Neuropathy; Coronary Artery Disease; Peripheral Arterial Disease; and Chronic Kidney Disease      Assessment:     1. Cardiomyopathy, ischemic: Prior MI, CABG, EF 40-45%    2. CAD: : Stent thrombosis of LCx, re-stented with ELLIOTT.  Patent LIMA and G-E grafts.    3. Mixed hyperlipidemia : LDL at goal   4. Chronic kidney disease, stage 3 (moderate)    6. Essential hypertension    7. Peripheral vascular disease    8. Statin-induced myositis    9. Bilateral carotid artery stenosis        Plan:     1. Cont meds.  2. RTC as needed.        HPI:  Comes in today to discuss his concerns about his polyneuropathy and foot drop.  He has re-established care with [facility] and Dr. Gibbs is guiding the work up of his polyneuropathy.  We discussed several of his recent lab results that do not indicate evidence of an autoimmune disease or an inflammatory disorder.  He denies angina or symptoms of heart failure. He is ambulating with a cane due to foot drop.    5-2019 PET Stress    There is a small sized (5%), mild to moderate intensity resting perfusion abnormality in the base to mid lateral wall. After pharmacologic stress, this defect is slightly more severe and larger involving 7.0 % of the LV myocardium indicative of infarct with carmen-infarct ischemia in the usual distribution of the OM1.    Within the perfusion abnormality, absolute myocardial perfusion (cc/min/gm) averaged 0.50 cc/min/gat rest, 0.75 cc/min/g at stress and CFR was 1.50 cc/min/g, which equates to moderately to severely reduced coronary flow capacity in 7% of the myocardium (within the territory).    Whole heart absolute myocardial perfusion (cc/min/g) averaged 0.73  at rest (which is normal), 1.25 cc/min/g at stress (which is mildly reduced), and  (which is severely reduced).    Gated perfusion images showed an ejection fraction of 58.0 % at rest and 60  % during stress. Normal is greater than 51%.    Wall motion was normal at rest and during stress.    LV cavity size is normal at rest and stress.    The EKG portion of this study is uninterpretable.    Arrhythmias during stress: rare PVCs.    The patient reported no chest pain during the stress test.    When compared to the prior study on 10/1/2013, there is now a perfusion abnormality in the base to mid lateral wall, which slightly worsens with stress. Globally, resting flows have increased while stress flow remain relatively unchanged, resulting in a reduced CFR on the current study.    Review of Systems   Musculoskeletal: Positive for arthritis and back pain.   Neurological: Positive for focal weakness.       Past Medical History:   Diagnosis Date    *Atrial flutter 10/3/13    Anticoagulant long-term use     Aspirin only at this time    Arthritis     Atrial fibrillation     Atrial flutter     Blood transfusion     ?    BPH (benign prostatic hypertrophy)     Carotid artery disease     2008: [place] There is 40 - 49% right Internal Carotid stenosis.  There is 20 - 39% left Internal Carotid stenosis.       Chronic kidney disease     Coronary artery disease     DDD (degenerative disc disease) 6/25/2015    Degenerative disc disease     Elevated PSA     Glaucoma     Hyperlipidemia     Hypertension     Lumbar stenosis     lumbar spinal stenosis    NSTEMI (non-ST elevated myocardial infarction) 11/3/2013    Pacemaker 11/2013    Peripheral neuropathy     - S/P right ILIAC stent 1993;      Retinal detachment     Squamous cell carcinoma     left leg    Syncope and collapse: orthostatic with hypotension 10/9/2015       Past Surgical History:   Procedure Laterality Date    [facility]      [facility]    CARDIAC ELECTROPHYSIOLOGY MAPPING AND ABLATION  11/2016    CARDIAC PACEMAKER PLACEMENT  11/2016    CATARACT EXTRACTION W/  INTRAOCULAR LENS IMPLANT Left 1997        CORONARY ARTERY  BYPASS GRAFT  1991    ENUCLEATION Right     EPIDURAL STEROID INJECTION N/A 2019    Procedure: INJECTION, STEROID, EPIDURAL, CAUDAL  cleared to hold eliquis 4 days per Dr. Ruiz;  Surgeon: James Hodges MD;  Location: [facility], [place], operated by [facility];  Service: Pain Management;  Laterality: N/A;    INJECTION OF ANESTHETIC AGENT AROUND MEDIAL BRANCH NERVES INNERVATING LUMBAR FACET JOINT Bilateral 2018    Procedure: BLOCK, NERVE, FACET JOINT, LUMBAR, MEDIAL BRANCH;  Surgeon: James Hodges MD;  Location: [facility];  Service: Pain Management;  Laterality: Bilateral;    LUMBAR LAMINECTOMY  2016    RETINAL DETACHMENT SURGERY Left     Dr. Cosme    SKIN BIOPSY      SPINAL CORD STIMULATOR TRIAL W/ LAMINOTOMY  10/2017    TONSILLECTOMY         Social History     Tobacco Use    Smoking status: Former Smoker     Last attempt to quit: 1963     Years since quittin.2    Smokeless tobacco: Never Used   Substance Use Topics    Alcohol use: Yes     Alcohol/week: 3.0 standard drinks     Types: 1 Glasses of wine, 1 Cans of beer, 1 Shots of liquor per week     Comment: 2 a day    Drug use: No       Family History   Problem Relation Age of Onset    Stroke Mother 69    Clotting disorder Mother         per patient no clotting disorder    Hypertension Mother     Diverticulitis Son     Cancer Neg Hx     Diabetes Neg Hx     Heart disease Neg Hx     Glaucoma Neg Hx     Amblyopia Neg Hx     Blindness Neg Hx     Cataracts Neg Hx     Macular degeneration Neg Hx     Retinal detachment Neg Hx     Strabismus Neg Hx        Current Outpatient Medications   Medication Sig    amiodarone (PACERONE) 200 MG Tab Take 1 tablet (200 mg total) by mouth once daily.    amLODIPine (NORVASC) 5 MG tablet Take 1 tablet (5 mg total) by mouth once daily.    apixaban (ELIQUIS) 2.5 mg Tab Take 1 tablet (2.5 mg total) by mouth 2 (two) times daily.    ascorbic acid (VITAMIN C) 1000 MG tablet Take 1,000 mg by  mouth every morning.     brimonidine 0.2% (ALPHAGAN) 0.2 % Drop Place 1 drop into the left eye 3 (three) times daily.    brinzolamide (AZOPT) 1 % ophthalmic suspension Place 1 drop into both eyes 3 (three) times daily.    co-enzyme Q-10 30 mg capsule Take 30 mg by mouth once daily.     diclofenac sodium (VOLTAREN) 1 % Gel Apply 2 g topically 2 (two) times daily. for 10 days    donepezil (ARICEPT) 5 MG tablet Take 1 tablet (5 mg total) by mouth every evening.    dorzolamide (TRUSOPT) 2 % ophthalmic solution Place 1 drop into both eyes 3 (three) times daily.    erythromycin (ROMYCIN) ophthalmic ointment Apply to affected eye daily as needed    ipratropium (ATROVENT) 0.03 % nasal spray 1-2 sprays by Nasal route 2 (two) times daily.    latanoprost 0.005 % ophthalmic solution Place 1 drop into the left eye every evening.    losartan (COZAAR) 50 MG tablet Take 1 tablet (50 mg total) by mouth 2 (two) times daily.    lutein 20 mg Cap Take 20 mg by mouth once daily.     MULTIVITAMINS,THER W-MINERALS (VITAMINS & MINERALS ORAL) Take 1 tablet by mouth every morning.     polycarbophil (FIBERCON) 625 mg tablet Take 625 mg by mouth 2 (two) times daily.    rosuvastatin (CRESTOR) 20 MG tablet Take 1 tablet (20 mg total) by mouth every evening.    sertraline (ZOLOFT) 50 MG tablet Take 1 tablet (50 mg total) by mouth once daily.    furosemide (LASIX) 20 MG tablet Take 1 tablet (20 mg total) by mouth once daily. for 7 days (Patient not taking: Reported on 11/11/2019)    oxyCODONE-acetaminophen (PERCOCET) 5-325 mg per tablet Take 1 tablet by mouth every 6 (six) hours as needed for Pain. Greater than 7 day supply, medically necessary (Patient not taking: Reported on 11/11/2019)     No current facility-administered medications for this visit.        Review of patient's allergies indicates:   Allergen Reactions    Atorvastatin      Myositis/elevated CPK    Pravastatin      Severe myalgias/elevated CPK    Statins-hmg-coa  "reductase inhibitors      Muscle pain and loss of muscle    Ace inhibitors      Cough       Objective:     /74 (BP Location: Right arm, Patient Position: Sitting, BP Method: Large (Automatic))   Pulse (!) 49   Ht 5' 7" (1.702 m)   Wt 82.8 kg (182 lb 8.7 oz)   SpO2 100%   BMI 28.59 kg/m²     Physical Exam   Constitutional: He is oriented to person, place, and time. He appears well-developed and well-nourished.   HENT:   Head: Normocephalic and atraumatic.   Eyes: Pupils are equal, round, and reactive to light. Conjunctivae and EOM are normal.   Neck: Normal range of motion. Neck supple. No thyromegaly present.   Cardiovascular: Normal rate, regular rhythm and normal heart sounds. Exam reveals no gallop and no friction rub.   No murmur heard.  Pulmonary/Chest: Effort normal and breath sounds normal. No respiratory distress. He has no wheezes. He has no rales. He exhibits no tenderness.   Abdominal: Soft. Bowel sounds are normal. He exhibits no distension. There is no tenderness.   Musculoskeletal: Normal range of motion.   Neurological: He is alert and oriented to person, place, and time. He has normal reflexes. No cranial nerve deficit. Coordination normal.   Skin: Skin is warm and dry.   Psychiatric: He has a normal mood and affect. His behavior is normal. Judgment and thought content normal.   Nursing note and vitals reviewed.        Chemistry        Component Value Date/Time     11/11/2019 1139    K 3.9 11/11/2019 1139     (H) 11/11/2019 1139    CO2 23 11/11/2019 1139    BUN 27 (H) 11/11/2019 1139    CREATININE 1.7 (H) 11/11/2019 1139    GLU 82 11/11/2019 1139        Component Value Date/Time    CALCIUM 10.0 11/11/2019 1139    ALKPHOS 79 09/25/2019 1320    AST 24 09/25/2019 1320    ALT 19 09/25/2019 1320    BILITOT 0.7 09/25/2019 1320    ESTGFRAFRICA 40.4 (A) 11/11/2019 1139    EGFRNONAA 35.0 (A) 11/11/2019 1139            Lab Results   Component Value Date    CHOL 133 05/29/2019    CHOL " 124 06/27/2018    CHOL 128 06/06/2017     Lab Results   Component Value Date    HDL 53 05/29/2019    HDL 61 06/27/2018    HDL 52 06/06/2017     Lab Results   Component Value Date    LDLCALC 66.8 05/29/2019    LDLCALC 54.2 (L) 06/27/2018    LDLCALC 66.0 06/06/2017     Lab Results   Component Value Date    TRIG 66 05/29/2019    TRIG 44 06/27/2018    TRIG 50 06/06/2017     Lab Results   Component Value Date    CHOLHDL 39.8 05/29/2019    CHOLHDL 49.2 06/27/2018    CHOLHDL 40.6 06/06/2017         Lab Results   Component Value Date     11/11/2019    K 3.9 11/11/2019     (H) 11/11/2019    CO2 23 11/11/2019    BUN 27 (H) 11/11/2019    CREATININE 1.7 (H) 11/11/2019    GLU 82 11/11/2019    HGBA1C 6.2 03/06/2008    MG 2.2 04/08/2015    AST 24 09/25/2019    ALT 19 09/25/2019    ALBUMIN 4.1 11/11/2019    PROT 6.7 09/25/2019    BILITOT 0.7 09/25/2019    WBC 7.06 11/11/2019    HGB 11.3 (L) 11/11/2019    HCT 37.0 (L) 11/11/2019    HCT 38 11/01/2013     (H) 11/11/2019     (L) 11/11/2019    INR 1.0 11/01/2017    PSA 10 (H) 06/30/2010    TSH 2.447 09/25/2019    CHOL 133 05/29/2019    HDL 53 05/29/2019    LDLCALC 66.8 05/29/2019    TRIG 66 05/29/2019

## 2019-11-13 LAB
INTERPRETATION UR IFE-IMP: NORMAL
PATHOLOGIST INTERPRETATION UIFE: NORMAL

## 2019-11-15 ENCOUNTER — PATIENT MESSAGE (OUTPATIENT)
Dept: INTERNAL MEDICINE | Facility: CLINIC | Age: 84
End: 2019-11-15

## 2019-11-15 ENCOUNTER — OFFICE VISIT (OUTPATIENT)
Dept: CARDIOLOGY | Facility: CLINIC | Age: 84
End: 2019-11-15
Payer: MEDICARE

## 2019-11-15 VITALS
WEIGHT: 182.56 LBS | SYSTOLIC BLOOD PRESSURE: 132 MMHG | BODY MASS INDEX: 28.65 KG/M2 | HEART RATE: 49 BPM | HEIGHT: 67 IN | OXYGEN SATURATION: 100 % | DIASTOLIC BLOOD PRESSURE: 74 MMHG

## 2019-11-15 DIAGNOSIS — T46.6X5A STATIN-INDUCED MYOSITIS: ICD-10-CM

## 2019-11-15 DIAGNOSIS — I10 ESSENTIAL HYPERTENSION: ICD-10-CM

## 2019-11-15 DIAGNOSIS — M60.9 STATIN-INDUCED MYOSITIS: ICD-10-CM

## 2019-11-15 DIAGNOSIS — I65.23 BILATERAL CAROTID ARTERY STENOSIS: ICD-10-CM

## 2019-11-15 DIAGNOSIS — I25.5 CARDIOMYOPATHY, ISCHEMIC: Chronic | ICD-10-CM

## 2019-11-15 DIAGNOSIS — G62.9 PERIPHERAL POLYNEUROPATHY: Primary | ICD-10-CM

## 2019-11-15 DIAGNOSIS — I73.9 PERIPHERAL VASCULAR DISEASE: ICD-10-CM

## 2019-11-15 DIAGNOSIS — E78.2 MIXED HYPERLIPIDEMIA: Chronic | ICD-10-CM

## 2019-11-15 DIAGNOSIS — I25.10 CORONARY ARTERY DISEASE INVOLVING NATIVE CORONARY ARTERY OF NATIVE HEART WITHOUT ANGINA PECTORIS: ICD-10-CM

## 2019-11-15 DIAGNOSIS — N18.30 CHRONIC KIDNEY DISEASE, STAGE 3 (MODERATE): ICD-10-CM

## 2019-11-15 PROCEDURE — 1101F PT FALLS ASSESS-DOCD LE1/YR: CPT | Mod: HCNC,CPTII,S$GLB, | Performed by: INTERNAL MEDICINE

## 2019-11-15 PROCEDURE — 99999 PR PBB SHADOW E&M-EST. PATIENT-LVL V: ICD-10-PCS | Mod: PBBFAC,HCNC,, | Performed by: INTERNAL MEDICINE

## 2019-11-15 PROCEDURE — 99213 OFFICE O/P EST LOW 20 MIN: CPT | Mod: HCNC,S$GLB,, | Performed by: INTERNAL MEDICINE

## 2019-11-15 PROCEDURE — 1101F PR PT FALLS ASSESS DOC 0-1 FALLS W/OUT INJ PAST YR: ICD-10-PCS | Mod: HCNC,CPTII,S$GLB, | Performed by: INTERNAL MEDICINE

## 2019-11-15 PROCEDURE — 99213 PR OFFICE/OUTPT VISIT, EST, LEVL III, 20-29 MIN: ICD-10-PCS | Mod: HCNC,S$GLB,, | Performed by: INTERNAL MEDICINE

## 2019-11-15 PROCEDURE — 99999 PR PBB SHADOW E&M-EST. PATIENT-LVL V: CPT | Mod: PBBFAC,HCNC,, | Performed by: INTERNAL MEDICINE

## 2019-11-16 LAB — MAG IGM SER QL IB: NEGATIVE

## 2019-11-18 ENCOUNTER — PATIENT OUTREACH (OUTPATIENT)
Dept: OTHER | Facility: OTHER | Age: 84
End: 2019-11-18

## 2019-11-18 NOTE — PROGRESS NOTES
Digital Medicine: Health  Follow-Up    The history is provided by the patient.     Follow Up  Follow-up reason(s): reading review      Readings are trending up Patient reports that he is doing well. His pressure has fluctuated a little this month but he stated that he is still dealing with back pain that he does not think he will fully recover from that.     He is pleased with his pressure overall and he will reach out with any specific questions or concerns.       Intervention/Plan    There are no preventive care reminders to display for this patient.    Last 5 Patient Entered Readings                                      Current 30 Day Average: 130/75     Recent Readings 11/12/2019 11/4/2019 11/4/2019 10/24/2019 10/16/2019    SBP (mmHg) 138 138 147 115 117    DBP (mmHg) 76 75 86 61 83    Pulse 50 54 51 52 48                      Diet Screening   No change to diet.      Physical Activity Screening   When asked if exercising, patient responded: unable  Patient has the following chronic pain: back pain    He identified the following barriers to physical activity: pain/injury/recent surgery    Medication Adherence Screening   He misses doses: never      Patient identified the following reasons for non-compliance: none      SDOH

## 2019-11-19 ENCOUNTER — HOSPITAL ENCOUNTER (OUTPATIENT)
Dept: CARDIOLOGY | Facility: CLINIC | Age: 84
Discharge: HOME | End: 2019-11-19
Payer: MEDICARE

## 2019-11-19 ENCOUNTER — OFFICE VISIT (OUTPATIENT)
Dept: ELECTROPHYSIOLOGY | Facility: CLINIC | Age: 84
End: 2019-11-19
Payer: MEDICARE

## 2019-11-19 ENCOUNTER — PATIENT MESSAGE (OUTPATIENT)
Dept: INTERNAL MEDICINE | Facility: CLINIC | Age: 84
End: 2019-11-19

## 2019-11-19 VITALS
HEART RATE: 53 BPM | SYSTOLIC BLOOD PRESSURE: 136 MMHG | HEIGHT: 67 IN | DIASTOLIC BLOOD PRESSURE: 80 MMHG | WEIGHT: 182.56 LBS | BODY MASS INDEX: 28.65 KG/M2

## 2019-11-19 DIAGNOSIS — Z95.0 BIVENTRICULAR CARDIAC PACEMAKER IN SITU: Primary | ICD-10-CM

## 2019-11-19 DIAGNOSIS — I49.5 SSS (SICK SINUS SYNDROME): ICD-10-CM

## 2019-11-19 DIAGNOSIS — Z95.1 S/P CABG (CORONARY ARTERY BYPASS GRAFT): ICD-10-CM

## 2019-11-19 DIAGNOSIS — Z95.0 CARDIAC PACEMAKER IN SITU: ICD-10-CM

## 2019-11-19 DIAGNOSIS — I48.11 LONGSTANDING PERSISTENT ATRIAL FIBRILLATION: ICD-10-CM

## 2019-11-19 PROCEDURE — 93010 RHYTHM STRIP: ICD-10-PCS | Mod: HCNC,S$GLB,, | Performed by: INTERNAL MEDICINE

## 2019-11-19 PROCEDURE — 93010 ELECTROCARDIOGRAM REPORT: CPT | Mod: HCNC,S$GLB,, | Performed by: INTERNAL MEDICINE

## 2019-11-19 PROCEDURE — 99999 PR PBB SHADOW E&M-EST. PATIENT-LVL IV: CPT | Mod: PBBFAC,HCNC,, | Performed by: INTERNAL MEDICINE

## 2019-11-19 PROCEDURE — 99499 UNLISTED E&M SERVICE: CPT | Mod: HCNC,S$GLB,, | Performed by: INTERNAL MEDICINE

## 2019-11-19 PROCEDURE — 1125F PR PAIN SEVERITY QUANTIFIED, PAIN PRESENT: ICD-10-PCS | Mod: HCNC,S$GLB,, | Performed by: INTERNAL MEDICINE

## 2019-11-19 PROCEDURE — 99499 RISK ADDL DX/OHS AUDIT: ICD-10-PCS | Mod: HCNC,S$GLB,, | Performed by: INTERNAL MEDICINE

## 2019-11-19 PROCEDURE — 93005 ELECTROCARDIOGRAM TRACING: CPT | Mod: HCNC,S$GLB,, | Performed by: INTERNAL MEDICINE

## 2019-11-19 PROCEDURE — 93005 RHYTHM STRIP: ICD-10-PCS | Mod: HCNC,S$GLB,, | Performed by: INTERNAL MEDICINE

## 2019-11-19 PROCEDURE — 1125F AMNT PAIN NOTED PAIN PRSNT: CPT | Mod: HCNC,S$GLB,, | Performed by: INTERNAL MEDICINE

## 2019-11-19 PROCEDURE — 1159F MED LIST DOCD IN RCRD: CPT | Mod: HCNC,S$GLB,, | Performed by: INTERNAL MEDICINE

## 2019-11-19 PROCEDURE — 99999 PR PBB SHADOW E&M-EST. PATIENT-LVL IV: ICD-10-PCS | Mod: PBBFAC,HCNC,, | Performed by: INTERNAL MEDICINE

## 2019-11-19 PROCEDURE — 99214 OFFICE O/P EST MOD 30 MIN: CPT | Mod: HCNC,S$GLB,, | Performed by: INTERNAL MEDICINE

## 2019-11-19 PROCEDURE — 1159F PR MEDICATION LIST DOCUMENTED IN MEDICAL RECORD: ICD-10-PCS | Mod: HCNC,S$GLB,, | Performed by: INTERNAL MEDICINE

## 2019-11-19 PROCEDURE — 99214 PR OFFICE/OUTPT VISIT, EST, LEVL IV, 30-39 MIN: ICD-10-PCS | Mod: HCNC,S$GLB,, | Performed by: INTERNAL MEDICINE

## 2019-11-19 PROCEDURE — 1101F PT FALLS ASSESS-DOCD LE1/YR: CPT | Mod: HCNC,CPTII,S$GLB, | Performed by: INTERNAL MEDICINE

## 2019-11-19 PROCEDURE — 1101F PR PT FALLS ASSESS DOC 0-1 FALLS W/OUT INJ PAST YR: ICD-10-PCS | Mod: HCNC,CPTII,S$GLB, | Performed by: INTERNAL MEDICINE

## 2019-11-19 NOTE — PROGRESS NOTES
Subjective:    Patient ID:  Javier Valles is a 89 y.o. male who presents for follow-up of CRT-P      89 year old M AFL, SSS s/p DC PM, here for follow up. 11/2/13 he was in the EP procedure room for AFL ablation. Prior to the ablation, he experience angina with anterior ST changes. He was sent urgently for a [facility] where he was found to have experienced an in-stent thrombosis of an OM1 stent. He underwent DC PPM placement prior to discharge secondary to bradycardic events on low dose beta blocker. He underwent DCCV post implantation. He was discharged on amiodarone, metoprolol, aspirin plavix, warfarin, rosuvastatin. His EF was normal on pre-CV MONICA. He subsequently developed L eye bruising and superficial bleeding, and warfarin was stopped 02/2014. He was subsequently started on rivaroxaban 15 mg qd without adverse effects until 06/2014 when he experienced excessive bleeding leading to its discontinuation. He subsequently went on a 3-week tour of [place] without any limitation. In November of 2015 his PPM LRL was set to 60 bpm with noted improvement in his energy level. At that time, Mr. Valles was noted to have an increase in his AF burden, and was also found to have AFL.     Mr. Valles has historically had a long hx of lower back pain; he underwent a successful lumbar laminectomy (04/25/16), which was complicated by persistent AFL post-op. At the time, he remained on aspirin, but had been on apixaban prior to his in-stent thrombosis event in 2013. Apixaban 2.5 mg twice daily was re-started. At that time, Mr. Valles reported experiencxing dyspnea and fatigue, with a significant decrease in his energy level. He was subsequently switched to coumadin (for the procedures), and underwent a successful combination of an AFL RFA and BiV PPM upgrade (11/01/16).     8/17: BiV pacing has reduced to 82% with increase in PVC burden to 14%. Cardiac symptoms have been stable.     12/17: Amiodarone started with CRT paced burden 92%, PVCs  ~4%. Symptoms are stable.     5/19: Device Interrogation (5/7/2019) reveals an intrinsic SR with CHB with stable lead and device function. No arrhythmias or treated episodes were noted. He paces 77% in the RA and 98% in the BiV. Estimated battery longevity 3.6 years.     Interval history: PET stress with perfusion abnormality, no LHC/intervention performed. He remains on apixaban 2.5 mg bid as well as amiodarone 200 mg qd. He has developed bilateral foot drop and is under investigation by Neurology. 96% CRT pacing, stable lead parameters.No arrhythmias.     PET stress 5/19:    There is a small sized (5%), mild to moderate intensity resting perfusion abnormality in the base to mid lateral wall. After pharmacologic stress, this defect is slightly more severe and larger involving 7.0 % of the LV myocardium indicative of infarct with carmen-infarct ischemia in the usual distribution of the OM1.    Within the perfusion abnormality, absolute myocardial perfusion (cc/min/gm) averaged 0.50 cc/min/gat rest, 0.75 cc/min/g at stress and CFR was 1.50 cc/min/g, which equates to moderately to severely reduced coronary flow capacity in 7% of the myocardium (within the territory).    Whole heart absolute myocardial perfusion (cc/min/g) averaged 0.73  at rest (which is normal), 1.25 cc/min/g at stress (which is mildly reduced), and  (which is severely reduced).    Gated perfusion images showed an ejection fraction of 58.0 % at rest and 60 % during stress. Normal is greater than 51%.    Wall motion was normal at rest and during stress.    LV cavity size is normal at rest and stress.    The EKG portion of this study is uninterpretable.    Arrhythmias during stress: rare PVCs.    The patient reported no chest pain during the stress test.    When compared to the prior study on 10/1/2013, there is now a perfusion abnormality in the base to mid lateral wall, which slightly worsens with stress. Globally, resting flows have increased  while stress flow remain relatively unchanged, resulting in a reduced CFR on the current study.    Past Medical History:  10/3/13: *Atrial flutter  No date: Anticoagulant long-term use      Comment:  Aspirin only at this time  No date: Arthritis  No date: Atrial fibrillation  No date: Atrial flutter  No date: Blood transfusion      Comment:  ?  No date: BPH (benign prostatic hypertrophy)  No date: Carotid artery disease      Comment:  2008: US There is 40 - 49% right Internal Carotid                stenosis.  There is 20 - 39% left Internal Carotid                stenosis.     No date: Chronic kidney disease  No date: Coronary artery disease  6/25/2015: DDD (degenerative disc disease)  No date: Degenerative disc disease  No date: Elevated PSA  No date: Glaucoma  No date: Hyperlipidemia  No date: Hypertension  No date: Lumbar stenosis      Comment:  lumbar spinal stenosis  11/3/2013: NSTEMI (non-ST elevated myocardial infarction)  11/2013: Pacemaker  No date: Peripheral neuropathy      Comment:  - S/P right ILIAC stent 1993;    No date: Retinal detachment  No date: Squamous cell carcinoma      Comment:  left leg  10/9/2015: Syncope and collapse: orthostatic with hypotension    Past Surgical History:  No date: CARDIAC CATHETERIZATION      Comment:  [facility]  11/2016: CARDIAC ELECTROPHYSIOLOGY MAPPING AND ABLATION  11/2016: CARDIAC PACEMAKER PLACEMENT  1997: CATARACT EXTRACTION W/  INTRAOCULAR LENS IMPLANT; Left      Comment:    1991: CORONARY ARTERY BYPASS GRAFT  1949: ENUCLEATION; Right  7/17/2019: EPIDURAL STEROID INJECTION; N/A      Comment:  Procedure: INJECTION, STEROID, EPIDURAL, CAUDAL  cleared               to hold eliquis 4 days per Dr. Ruiz;  Surgeon: James Hodges MD;  Location: [facility];  Service: Pain                Management;  Laterality: N/A;  12/18/2018: INJECTION OF ANESTHETIC AGENT AROUND MEDIAL BRANCH NERVES   INNERVATING LUMBAR FACET JOINT; Bilateral      Comment:   Procedure: BLOCK, NERVE, FACET JOINT, LUMBAR, MEDIAL                BRANCH;  Surgeon: James Hodges MD;  Location: [facility];  Service: Pain Management;  Laterality:                Bilateral;  2016: LUMBAR LAMINECTOMY  1997: RETINAL DETACHMENT SURGERY; Left      Comment:  Dr. Cosme  No date: SKIN BIOPSY  10/2017: SPINAL CORD STIMULATOR TRIAL W/ LAMINOTOMY  No date: TONSILLECTOMY    Social History    Socioeconomic History      Marital status:       Spouse name: Joanie      Number of children: Not on file      Years of education: Not on file      Highest education level: Not on file    Occupational History      Occupation: retired    Social Needs      Financial resource strain: Not on file      Food insecurity:        Worry: Not on file        Inability: Not on file      Transportation needs:        Medical: Not on file        Non-medical: Not on file    Tobacco Use      Smoking status: Former Smoker        Quit date: 1963        Years since quittin.2      Smokeless tobacco: Never Used    Substance and Sexual Activity      Alcohol use: Yes        Alcohol/week: 3.0 standard drinks        Types: 1 Glasses of wine, 1 Cans of beer, 1 Shots of liquor per week        Comment: 2 a day      Drug use: No      Sexual activity: Yes        Partners: Female    Lifestyle      Physical activity:        Days per week: Not on file        Minutes per session: Not on file      Stress: Not on file    Relationships      Social connections:        Talks on phone: Not on file        Gets together: Not on file        Attends Yazdanism service: Not on file        Active member of club or organization: Not on file        Attends meetings of clubs or organizations: Not on file        Relationship status: Not on file    Other Topics      Concerns:        Not on file    Social History Narrative      Not on file      Review of patient's family history indicates:  Problem: Stroke      Relation:  Mother          Age of Onset: 69  Problem: Clotting disorder      Relation: Mother          Age of Onset: (Not Specified)          Comment: per patient no clotting disorder  Problem: Hypertension      Relation: Mother          Age of Onset: (Not Specified)  Problem: Diverticulitis      Relation: Son          Age of Onset: (Not Specified)  Problem: Cancer      Relation: Neg Hx          Age of Onset: (Not Specified)  Problem: Diabetes      Relation: Neg Hx          Age of Onset: (Not Specified)  Problem: Heart disease      Relation: Neg Hx          Age of Onset: (Not Specified)  Problem: Glaucoma      Relation: Neg Hx          Age of Onset: (Not Specified)  Problem: Amblyopia      Relation: Neg Hx          Age of Onset: (Not Specified)  Problem: Blindness      Relation: Neg Hx          Age of Onset: (Not Specified)  Problem: Cataracts      Relation: Neg Hx          Age of Onset: (Not Specified)  Problem: Macular degeneration      Relation: Neg Hx          Age of Onset: (Not Specified)  Problem: Retinal detachment      Relation: Neg Hx          Age of Onset: (Not Specified)  Problem: Strabismus      Relation: Neg Hx          Age of Onset: (Not Specified)            Review of Systems   Constitution: Negative for malaise/fatigue.   Cardiovascular: Positive for dyspnea on exertion. Negative for chest pain, irregular heartbeat, leg swelling and palpitations.   Respiratory: Negative for shortness of breath.    Hematologic/Lymphatic: Negative for bleeding problem.   Skin: Negative for rash.   Musculoskeletal: Negative for myalgias.   Gastrointestinal: Negative for hematemesis, hematochezia and nausea.   Genitourinary: Negative for hematuria.   Neurological: Positive for focal weakness. Negative for light-headedness.   Psychiatric/Behavioral: Negative for altered mental status.   Allergic/Immunologic: Negative for persistent infections.        Objective:    Physical Exam   Constitutional: He is oriented to person, place, and  time. He appears well-developed and well-nourished.   HENT:   Head: Normocephalic.   Nose: Nose normal.   Eyes: Pupils are equal, round, and reactive to light.   Cardiovascular: Normal rate, regular rhythm, S1 normal and S2 normal.   No murmur heard.  Pulses:       Radial pulses are 2+ on the right side, and 2+ on the left side.   Pulmonary/Chest: Breath sounds normal. No respiratory distress.   Device to LUCW.   Abdominal: Normal appearance.   Musculoskeletal: Normal range of motion. He exhibits no edema.   Neurological: He is alert and oriented to person, place, and time.   Skin: Skin is warm and dry. No erythema.   Psychiatric: He has a normal mood and affect. His speech is normal and behavior is normal.   Nursing note and vitals reviewed.    ECG: AP, CRT paced QRS        Assessment:       1. Biventricular cardiac pacemaker in situ    2. Longstanding persistent atrial fibrillation    3. S/P CABG (coronary artery bypass graft)    4. SSS (sick sinus syndrome): s/p PPM         Plan:       89 yoM CRT-P, CAD/CABG, SSS, AFL here for routine follow up. Normal CRT-P function with no sustained arrhythmias. He is a candidate for Watchman but prefers to be on eliqus. I discussed routine device follow up including quarterly to bi-annual device checks for device function as well as yearly follow up in the EP clinic. The patient  was advised to call with any concerns regarding their device. Device clinic follow up as scheduled. RTC 1y

## 2019-11-20 ENCOUNTER — PATIENT OUTREACH (OUTPATIENT)
Dept: OTHER | Facility: OTHER | Age: 84
End: 2019-11-20

## 2019-11-20 NOTE — PROGRESS NOTES
Last 5 Patient Entered Readings                                      Current 30 Day Average: 130/75     Recent Readings 11/12/2019 11/4/2019 11/4/2019 10/24/2019 10/16/2019    SBP (mmHg) 138 138 147 115 117    DBP (mmHg) 76 75 86 61 83    Pulse 50 54 51 52 48        Hypertension Medications             amLODIPine (NORVASC) 5 MG tablet Take 1 tablet (5 mg total) by mouth once daily.    furosemide (LASIX) 20 MG tablet Take 1 tablet (20 mg total) by mouth once daily. for 7 days    losartan (COZAAR) 50 MG tablet Take 1 tablet (50 mg total) by mouth 2 (two) times daily.        Called patient to follow up, reviewed BP readings. Per 2017 ACC/ AHA HTN guidelines  (goal of BP < 130/80), current 30-day average is well controlled.  Patient was seen by Cardiology on 11/19 and 11/19, BPs were 132/74 and 136/80, respectively.  LVM, requested patient call back at his convenience if he has any questions or concerns.  Will continue to monitor. WCB in 6 months, sooner if BP begins to trend up or down.

## 2019-11-21 ENCOUNTER — PATIENT MESSAGE (OUTPATIENT)
Dept: INTERNAL MEDICINE | Facility: CLINIC | Age: 84
End: 2019-11-21

## 2019-11-21 ENCOUNTER — TELEPHONE (OUTPATIENT)
Dept: NEUROLOGY | Facility: CLINIC | Age: 84
End: 2019-11-21

## 2019-11-21 NOTE — TELEPHONE ENCOUNTER
----- Message from Jesus Camarena sent at 11/21/2019  2:33 PM CST -----  Contact: pt @ [phone]  Pt is calling to schedule an appt for foot drop. Pt last saw the doctor on 08/08/18.

## 2019-11-23 ENCOUNTER — TELEPHONE (OUTPATIENT)
Dept: INTERNAL MEDICINE | Facility: CLINIC | Age: 84
End: 2019-11-23

## 2019-11-23 RX ORDER — GABAPENTIN 300 MG/1
300 CAPSULE ORAL 2 TIMES DAILY
Qty: 60 CAPSULE | Refills: 5 | Status: SHIPPED | OUTPATIENT
Start: 2019-11-23 | End: 2020-10-13

## 2019-11-25 ENCOUNTER — PATIENT MESSAGE (OUTPATIENT)
Dept: INTERNAL MEDICINE | Facility: CLINIC | Age: 84
End: 2019-11-25

## 2019-11-25 DIAGNOSIS — R80.9 PROTEINURIA, UNSPECIFIED TYPE: Primary | ICD-10-CM

## 2019-11-26 ENCOUNTER — LAB VISIT (OUTPATIENT)
Dept: LAB | Facility: HOSPITAL | Age: 84
End: 2019-11-26
Attending: INTERNAL MEDICINE
Payer: MEDICARE

## 2019-11-26 DIAGNOSIS — R80.9 PROTEINURIA, UNSPECIFIED TYPE: ICD-10-CM

## 2019-11-26 PROCEDURE — 83520 IMMUNOASSAY QUANT NOS NONAB: CPT | Mod: 59,HCNC

## 2019-11-26 PROCEDURE — 36415 COLL VENOUS BLD VENIPUNCTURE: CPT | Mod: HCNC

## 2019-11-27 LAB
KAPPA LC SER QL IA: 2.65 MG/DL (ref 0.33–1.94)
KAPPA LC/LAMBDA SER IA: 1.2 (ref 0.26–1.65)
LAMBDA LC SER QL IA: 2.21 MG/DL (ref 0.57–2.63)

## 2019-12-14 DIAGNOSIS — J34.89 RHINORRHEA: ICD-10-CM

## 2019-12-16 ENCOUNTER — OFFICE VISIT (OUTPATIENT)
Dept: NEUROLOGY | Facility: CLINIC | Age: 84
End: 2019-12-16
Payer: MEDICARE

## 2019-12-16 VITALS
BODY MASS INDEX: 27.94 KG/M2 | HEIGHT: 67 IN | HEART RATE: 51 BPM | WEIGHT: 178 LBS | SYSTOLIC BLOOD PRESSURE: 109 MMHG | DIASTOLIC BLOOD PRESSURE: 60 MMHG

## 2019-12-16 DIAGNOSIS — M48.062 SPINAL STENOSIS OF LUMBAR REGION WITH NEUROGENIC CLAUDICATION: ICD-10-CM

## 2019-12-16 DIAGNOSIS — M21.371 BILATERAL FOOT-DROP: Primary | ICD-10-CM

## 2019-12-16 DIAGNOSIS — M21.372 BILATERAL FOOT-DROP: Primary | ICD-10-CM

## 2019-12-16 DIAGNOSIS — G60.3 IDIOPATHIC PROGRESSIVE POLYNEUROPATHY: ICD-10-CM

## 2019-12-16 PROCEDURE — 1159F MED LIST DOCD IN RCRD: CPT | Mod: HCNC,GC,S$GLB, | Performed by: STUDENT IN AN ORGANIZED HEALTH CARE EDUCATION/TRAINING PROGRAM

## 2019-12-16 PROCEDURE — 99214 PR OFFICE/OUTPT VISIT, EST, LEVL IV, 30-39 MIN: ICD-10-PCS | Mod: HCNC,GC,S$GLB, | Performed by: STUDENT IN AN ORGANIZED HEALTH CARE EDUCATION/TRAINING PROGRAM

## 2019-12-16 PROCEDURE — 1101F PR PT FALLS ASSESS DOC 0-1 FALLS W/OUT INJ PAST YR: ICD-10-PCS | Mod: HCNC,CPTII,GC,S$GLB | Performed by: STUDENT IN AN ORGANIZED HEALTH CARE EDUCATION/TRAINING PROGRAM

## 2019-12-16 PROCEDURE — 1101F PT FALLS ASSESS-DOCD LE1/YR: CPT | Mod: HCNC,CPTII,GC,S$GLB | Performed by: STUDENT IN AN ORGANIZED HEALTH CARE EDUCATION/TRAINING PROGRAM

## 2019-12-16 PROCEDURE — 99999 PR PBB SHADOW E&M-EST. PATIENT-LVL V: ICD-10-PCS | Mod: PBBFAC,HCNC,GC, | Performed by: STUDENT IN AN ORGANIZED HEALTH CARE EDUCATION/TRAINING PROGRAM

## 2019-12-16 PROCEDURE — 1125F PR PAIN SEVERITY QUANTIFIED, PAIN PRESENT: ICD-10-PCS | Mod: HCNC,GC,S$GLB, | Performed by: STUDENT IN AN ORGANIZED HEALTH CARE EDUCATION/TRAINING PROGRAM

## 2019-12-16 PROCEDURE — 99214 OFFICE O/P EST MOD 30 MIN: CPT | Mod: HCNC,GC,S$GLB, | Performed by: STUDENT IN AN ORGANIZED HEALTH CARE EDUCATION/TRAINING PROGRAM

## 2019-12-16 PROCEDURE — 1125F AMNT PAIN NOTED PAIN PRSNT: CPT | Mod: HCNC,GC,S$GLB, | Performed by: STUDENT IN AN ORGANIZED HEALTH CARE EDUCATION/TRAINING PROGRAM

## 2019-12-16 PROCEDURE — 1159F PR MEDICATION LIST DOCUMENTED IN MEDICAL RECORD: ICD-10-PCS | Mod: HCNC,GC,S$GLB, | Performed by: STUDENT IN AN ORGANIZED HEALTH CARE EDUCATION/TRAINING PROGRAM

## 2019-12-16 PROCEDURE — 99999 PR PBB SHADOW E&M-EST. PATIENT-LVL V: CPT | Mod: PBBFAC,HCNC,GC, | Performed by: STUDENT IN AN ORGANIZED HEALTH CARE EDUCATION/TRAINING PROGRAM

## 2019-12-16 RX ORDER — IPRATROPIUM BROMIDE 21 UG/1
SPRAY, METERED NASAL
Qty: 30 ML | Refills: 0 | Status: SHIPPED | OUTPATIENT
Start: 2019-12-16 | End: 2020-03-16 | Stop reason: SDUPTHER

## 2019-12-16 NOTE — PATIENT INSTRUCTIONS
-Try taking vitamin B12 supplement, okay to use over the counter  -Will put in order for ankle-foot orthotics to see if the insurance will cover braces to use during the day--the company should call you and mail you the ankle-foot orthotics  -Would keep neurosurgery appointment with Dr. 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  If neurosurgery does not feel that the low back is the cause, we can discuss lumbar puncture with further lab tests because CIDP though unlikely would be something we can try to treat.  -Follow up call after Neurosurgery appointment; have follow up appt available within 3 months

## 2019-12-18 PROBLEM — M21.372 BILATERAL FOOT-DROP: Status: ACTIVE | Noted: 2019-12-18

## 2019-12-18 PROBLEM — M21.371 BILATERAL FOOT-DROP: Status: ACTIVE | Noted: 2019-12-18

## 2019-12-18 NOTE — ASSESSMENT & PLAN NOTE
-Patient to continue working with PT on gait  -Ordered ankle-foot orthotics for safety with ambulation  -Reviewed lab work from [facility] (Milana)--adding vitamin 12 oral supplement  -Pt to see NSGY 1/8/20--would like their opinion on lumbar spine and new symptoms  -May consider LP to eval for CIDP if symptoms progress and lumbar spine issues not explaining symptoms adequately

## 2019-12-18 NOTE — ASSESSMENT & PLAN NOTE
-Reviewed thorough lab work up at [facility], low-normal vitamin B12 otherwise normal results  -Recommend starting po vitamin B12 supplementation

## 2019-12-18 NOTE — PROGRESS NOTES
NEUROLOGY OUTPATIENT CLINIC NOTE    Patient Name:  Javier Valles  Patient MRN:  [id number]    Interval History:  Patient had seen me previously for his wife's concern of memory loss though patient was functioning very well and did well on the MoCA screening test.  He is here today with his wife, who assists with history, to discuss recent gait changes and bilateral foot drop.  They have a lot of records from recent work up including an EMG and labs for peripheral neuropathy.  Patient notes that over the past several months (he thinks since ~ October though wife states several months more) he has had progressive difficulty with gait.  He feels that he is more hunched over for comfort than he had been for the lumbar spinal stenosis previously.  He has weakness specifically of the dorsiflexors of the foot and has started to walk with a cane for support when he goes out.  He has known lumbar spinal stenosis with EMG/NCV consistent with this, but some concern for whether there is another component such as CIDP due to timeline of worsening over past several months.  He does have an appt with NSGY (Dr. Toribio, to discuss the spinal cord stimulator he has in the back) and asked that they discuss his lumbar spine imaging done recently to see if Dr. Toribio feels that this presentation is all related to the lumbar spinal pathology and compression of bilateral L5 nerve roots. On review of labs, extensive work up was done to evaluate for peripheral neuropathy.  Patient has cited balance issues recently.  Studies were thorough and relatively unremarkable apart from low-normal B12 of 400.  Discussed supplementation of this with continuing PT, will try to obtain AFOs for safety with ambulation.    HPI:  Patient is a 89 y.o. male with PMHx of CAD s/p CABG 1991, HTN, HLD, CKD III, A fib on apixaban, and lumbar spinal stenosis s/p spinal stimulator placement who presents to [facility] 08/09/18 for memory loss evaluation.  Patient  states that he is here because his wife made him come in.  She became more concerned when he was talking to someone about the treatment of his previous (2nd) wife's leukemia and left out something that she thought was a very important part of her treatment.  She thought this was out of character for him as he takes a lot of pride in having cared for his late wife.  Patient otherwise does cite trouble with recall occasionally and states that he sometimes loses his train of thought.  He denies issues with ADLs, still capable of grooming and dressing himself.  Drives himself without issues, does not get lost.  Restricts himself to night-driving due to some vision loss in his L eye (lost R eye as a young man in a game of handball at hospitals).  Manages some of the finances but states that his wife primarily manages their bills.  She also sets up his medication for him in a pill box.  He notes some recent depression related to health issues but states that he expects to get back to golfing soon as his spinal stimulator has taken away ~ 95% of his pain and helped him to feel better overall.  He and his wife enjoy seeing many friends, but they had to move to [facility] to live in a one-level house since his wife had a bad fall ~ 3 years ago.  Mr. Valles feels that this has made them more isolated but notes that they still get together with friends fairly often and he still sees his golf buddies for a meal once a month even though he has not been able to play for the last two years.  He denies SI, SIB, VH, AH.  Sleeps well.  Stays fairly active.  Does not fell like he lacks energy or has trouble concentrating.  Patient was previously on sertraline and did not want to take it anymore 2/2 feeling like he takes too much medication anyway.  Was recently prescribed some sertraline again, but feels that he may not need it and side effects may do more harm than good.  Agreed and asked that he discuss this with his PCP who knows him better.   Feels that he is doing well overall for his age.  Denies appetite change, cravings, gait disturbance, tremors.      ROS:  General:  No fever, no chills, no fatigue, no change in weight  HEENT:  No headache, +worsening vision--follows w/ Ophthalmology  Respiratory:  No cough, no SOB  Cardiovascular:  No chest pain, no palpitations  GI:  No abdominal pain, no n/v/c/d  Skin:  No rashes, no pruritus, no wounds  Musculoskeletal:  No myalgias, no arthralgias  Hematologic:  + easy bruising or bleeding--over extensors with pt on apixaban  Neuro:  No tremors, + focal weakness, no paresthesias  Psych:  No anxiety, +mild depression    PMHx:  Patient Active Problem List   Diagnosis    Hereditary and idiopathic peripheral neuropathy    Peripheral vascular disease    Hypertension    Hyperlipidemia    Peripheral neuropathy    Bilateral carotid artery disease: Asx, non-obstructive 20%-30%    Degenerative disc disease    Elevated PSA    Myositis    Statin-induced myositis    Chronic kidney disease, stage 3 (moderate)    Gout, unspecified    Acquired cyst of kidney    Idiopathic progressive polyneuropathy    Atherosclerosis of aorta    Epiretinal membrane, left eye    Posterior vitreous detachment of left eye    Pseudophakia of left eye    Prosthetic eye globe    Open-angle glaucoma    Retinal tear    Rhegmatogenous retinal detachment    History of Urinary retention    Biventricular cardiac pacemaker in situ    SSS (sick sinus syndrome): s/p PPM    Macular edema, cystoid    Lamellar macular hole    Spondylosis without myelopathy    Lumbar degenerative disc disease    Gait apraxia    Contusion of rib on left side    Obesity, Class I, BMI 30-34.9    Coronary artery disease involving native coronary artery of native heart without angina pectoris    S/P CABG (coronary artery bypass graft)    Varicose veins of both lower extremities- Rt more than Left    Edema    Thrombocytopenia    Total bilirubin,  elevated    Encounter for postoperative wound check    Peripheral venous insufficiency    Typical atrial flutter    Current use of long term anticoagulation    Status post catheter ablation of atrial flutter    Bilateral carpal tunnel syndrome    Chronic pain    Lumbar spinal stenosis    Acute lumbar radiculopathy    Osteoarthritis of spine with radiculopathy, lumbar region    Cardiomyopathy, ischemic: Prior MI, CABG, EF 40-45%    Post laminectomy syndrome    PVC (premature ventricular contraction)    Chronic pain syndrome    Chronic midline low back pain without sciatica     PSHx:  Past Surgical History:   Procedure Laterality Date    [facility]AC [facility]TERIZATION      [facility]    CARDIAC ELECTROPHYSIOLOGY MAPPING AND ABLATION  11/2016    CARDIAC PACEMAKER PLACEMENT  11/2016    CATARACT EXTRACTION W/  INTRAOCULAR LENS IMPLANT Left 1997        CORONARY ARTERY BYPASS GRAFT  1991    ENUCLEATION Right 1949    EPIDURAL STEROID INJECTION N/A 7/17/2019    Procedure: INJECTION, STEROID, EPIDURAL, CAUDAL  cleared to hold eliquis 4 days per Dr. Ruiz;  Surgeon: James Hodges MD;  Location: [facility];  Service: Pain Management;  Laterality: N/A;    INJECTION OF ANESTHETIC AGENT AROUND MEDIAL BRANCH NERVES INNERVATING LUMBAR FACET JOINT Bilateral 12/18/2018    Procedure: BLOCK, NERVE, FACET JOINT, LUMBAR, MEDIAL BRANCH;  Surgeon: James Hodges MD;  Location: [facility]IN [facility];  Service: Pain Management;  Laterality: Bilateral;    LUMBAR LAMINECTOMY  04/25/2016    RETINAL DETACHMENT SURGERY Left 1997    Dr. Cosme    SKIN BIOPSY      SPINAL CORD STIMULATOR TRIAL W/ LAMINOTOMY  10/2017    TONSILLECTOMY       Medications:  Current Outpatient Medications on File Prior to Visit   Medication Sig Dispense Refill    amiodarone (PACERONE) 200 MG Tab Take 1 tablet (200 mg total) by mouth once daily. 90 tablet 1    amLODIPine (NORVASC) 5 MG tablet Take 1 tablet (5 mg total) by mouth once daily. 90  tablet 3    apixaban (ELIQUIS) 2.5 mg Tab Take 1 tablet (2.5 mg total) by mouth 2 (two) times daily. 180 tablet 3    ascorbic acid (VITAMIN C) 1000 MG tablet Take 1,000 mg by mouth every morning.       brimonidine 0.2% (ALPHAGAN) 0.2 % Drop Place 1 drop into the left eye 3 (three) times daily. 30 mL 3    brinzolamide (AZOPT) 1 % ophthalmic suspension Place 1 drop into both eyes 3 (three) times daily.      co-enzyme Q-10 30 mg capsule Take 30 mg by mouth once daily.       donepezil (ARICEPT) 5 MG tablet Take 1 tablet (5 mg total) by mouth every evening. 90 tablet 3    dorzolamide (TRUSOPT) 2 % ophthalmic solution Place 1 drop into both eyes 3 (three) times daily. 30 mL 3    erythromycin (ROMYCIN) ophthalmic ointment Apply to affected eye daily as needed 3.5 g 6    FLUCELVAX QUAD 9801-1601, PF, 60 mcg (15 mcg x 4)/0.5 mL Syrg       gabapentin (NEURONTIN) 300 MG capsule Take 1 capsule (300 mg total) by mouth 2 (two) times daily. 60 capsule 5    latanoprost 0.005 % ophthalmic solution Place 1 drop into the left eye every evening. 15 mL 3    losartan (COZAAR) 50 MG tablet Take 1 tablet (50 mg total) by mouth 2 (two) times daily. 180 tablet 3    lutein 20 mg Cap Take 20 mg by mouth once daily.       MULTIVITAMINS,THER W-MINERALS (VITAMINS & MINERALS ORAL) Take 1 tablet by mouth every morning.       oxyCODONE-acetaminophen (PERCOCET) 5-325 mg per tablet Take 1 tablet by mouth every 6 (six) hours as needed for Pain. Greater than 7 day supply, medically necessary 30 tablet 0    polycarbophil (FIBERCON) 625 mg tablet Take 625 mg by mouth 2 (two) times daily.      rosuvastatin (CRESTOR) 20 MG tablet Take 1 tablet (20 mg total) by mouth every evening. 90 tablet 3    sertraline (ZOLOFT) 50 MG tablet Take 1 tablet (50 mg total) by mouth once daily. 30 tablet 11    diclofenac sodium (VOLTAREN) 1 % Gel Apply 2 g topically 2 (two) times daily. for 10 days 1 Tube 3    furosemide (LASIX) 20 MG tablet Take 1 tablet  "(20 mg total) by mouth once daily. for 7 days 7 tablet 0    ipratropium (ATROVENT) 0.03 % nasal spray USE 1 TO 2 SPRAY(S) IN EACH NOSTRIL TWICE DAILY 30 mL 0     No current facility-administered medications on file prior to visit.      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  He is  to his third wife.  Has many daughters and two sons from his first two marriages as well as grandchildren and great-grandchildren.  His family is spread out but he gets to see the grandchildren and great-grandchildren on a semi-annual basis.  He and his wife remain very active socially.  They both drive and he is hoping to get back to playing golf with his regular group since having the spinal stimulator placed.  He drinks 1-2 drinks if he and his wife are out.  Never smoker.  Denies illicits.    Physical Exam:  /60   Pulse (!) 51   Ht 5' 7" (1.702 m)   Wt 80.7 kg (178 lb)   BMI 27.88 kg/m²   General:  Well-developed, well-nourished, nad  HEENT:  NCAT. Missing R eye, L pupil reactive to light w/ EOMI on L. Oropharygneal membranes non-erythematous/without exudate  Neck:  Supple, normal ROM w/o nuchal rigidity  Respiratory:  Symmetric expansion, no increased wob  CVS:  No LE edema.  Extremities warm, well-perfused  GI:  Abd soft, non-distended, non-tender to palpation  Skin:  No visible rashes or wounds  Psych:  Pleasant, cooperative with exam.  Speech and thought content appropriate.  Neurologic Exam:  Mental Status:  AAOx3.  Converses easily. Able to spell 'world' forward and backward without error.  Cranial Nerves:  Missing R eye (glass eye), L pupil reactive to light w/ EOMI on " L. Facial movement intact, symmetric.  Tongue protrudes midline, palate raises symmetrically.  Trapezius 5/5 bilaterally.  Motor:  Normal muscle bulk and tone.  Strength 5/5 throughout BUE.  BLE hip flexion 4+/5, knee flexion/extension 4+/5, dorsiflexion L 2/5, dorsiflexion R 3/5, eversion 4/5 bilaterally, inversion 4-/5, plantarflexion 4+/5.  Sensory:  Sensation intact to light touch at all extremities.  Vibratory sensation intact and symmetric at BUE/BLE digits.  Reflexes:  Reflexes 2+--biceps, brachioradialis, patellar.  No ankle clonus.  Coordination:  No resting tremor or myoclonus.  FTN, HTS, JUMANA wnl--no ataxia, dysmetria, or dysdiadochokinesia.  Gait:  Walking more hunched over with cane (did not have this on previous appt).  Has bilateral foot drop with L worse than R.  Slow gait, not shuffling, but does have some circumduction of L > R leg to avoid dragging foot.    Labs:  Results: CBC:   Lab Results   Component Value Date/Time    WBC 7.06 2019 11:39 AM    RBC 3.32 (L) 2019 11:39 AM    HGB 11.3 (L) 2019 11:39 AM    HCT 37.0 (L) 2019 11:39 AM    HCT 38 2013 09:22 AM     (L) 2019 11:39 AM     (H) 2019 11:39 AM    MCH 34.0 (H) 2019 11:39 AM    MCHC 30.5 (L) 2019 11:39 AM     CMP:   Lab Results   Component Value Date/Time    GLU 82 2019 11:39 AM    CALCIUM 10.0 2019 11:39 AM    ALBUMIN 4.1 2019 11:39 AM    PROT 6.7 2019 01:20 PM     2019 11:39 AM    K 3.9 2019 11:39 AM    CO2 23 2019 11:39 AM     (H) 2019 11:39 AM    BUN 27 (H) 2019 11:39 AM    CREATININE 1.7 (H) 2019 11:39 AM    ALKPHOS 79 2019 01:20 PM    ALT 19 2019 01:20 PM    AST 24 2019 01:20 PM    BILITOT 0.7 2019 01:20 PM     Imagin19 CT C spine w/o contrast:  In this patient with multi level degenerative changes and foraminal narrowing, there is moderate spinal canal stenosis at  multiple levels.  Note that canal detail is somewhat degraded particularly more inferiorly within the spine.  In this patient who has had a recent myelogram, that will likely provide additional detail.    07/23/19 XR Lumbar Spine:  DJD with significant bridging osteophytosis.  Mild scoliosis.  There is a grade 1 L4/L5 anterolisthesis noted.  The disc spaces are narrowed.  No acute fracture or dislocation.  No bone destruction identified.  Intraspinal electrode identified.    04/22/19 CT Lumbar Spine w/o contrast:  Interval postoperative changes of left L3-4 and L4-5 laminectomies compared to CT lumbar spine 03/24/2016.  Multilevel degenerative changes of the lumbar spine as detailed above.  Aortoiliac atherosclerotic calcification with ectasia of the proximal right common iliac artery.    Additional Diagnotic Testing:  N/a    ASSESSMENT/PLAN:  Patient is a 89 y.o. male with a PMHx of  CAD s/p CABG 1991, HTN, HLD, CKD III, and lumbar spinal stenosis s/p spinal stimulator placement who presents to [facility] 12/17/19 due to concern for worsening gait and bilateral foot drop.    Problem List Items Addressed This Visit        1 - High    Lumbar spinal stenosis    Current Assessment & Plan     -Keep NSGY appt 1/8/20, continue PT         Bilateral foot-drop - Primary    Overview     Onset mid-2019  Concern for impingement at spinal level  May consider LP for eval of CIDP syndrome if NSGY not convinced that lumbar spine is causing foot drop on their review of imaging         Current Assessment & Plan     -Patient to continue working with PT on gait  -Ordered ankle-foot orthotics for safety with ambulation  -Reviewed lab work from [facility] ([facility])--adding vitamin 12 oral supplement  -Pt to see NSGY 1/8/20--would like their opinion on lumbar spine and new symptoms  -May consider LP to eval for CIDP if symptoms progress and lumbar spine issues not explaining symptoms adequately         Relevant Orders    ANKLE FOOT  ORTHOSIS FOR HOME USE       2     Idiopathic progressive polyneuropathy    Current Assessment & Plan     -Reviewed thorough lab work up at [facility], low-normal vitamin B12 otherwise normal results  -Recommend starting po vitamin B12 supplementation             Sigrid Vinson MD  Pager:  [phone] [place], [place] [phone]

## 2019-12-19 ENCOUNTER — PATIENT MESSAGE (OUTPATIENT)
Dept: INTERNAL MEDICINE | Facility: CLINIC | Age: 84
End: 2019-12-19

## 2019-12-19 DIAGNOSIS — R76.8 ELEVATED SERUM IMMUNOGLOBULIN FREE LIGHT CHAINS: Primary | ICD-10-CM

## 2019-12-20 ENCOUNTER — DOCUMENTATION ONLY (OUTPATIENT)
Dept: INTERNAL MEDICINE | Facility: CLINIC | Age: 84
End: 2019-12-20

## 2019-12-20 NOTE — PROGRESS NOTES
Patient was recommended to be evaluated by Hematology due to elevated Bence-Mckeon protein    he was evaluated by outside neurologist for neuropathy, associated with lower extremity paresthesias and weakness      in August 2018 and November 2019, urine free light chains, kappa, was elevated      the initial testing was part of being evaluated by Nephrology for chronic kidney disease stage 3/4 and the 2nd reading was a follow-up    The seeing him protein electrophoresis and immunofixation did not reveal any monoclonal gammopathy

## 2019-12-23 ENCOUNTER — TELEPHONE (OUTPATIENT)
Dept: HEMATOLOGY/ONCOLOGY | Facility: CLINIC | Age: 84
End: 2019-12-23

## 2019-12-23 ENCOUNTER — PATIENT MESSAGE (OUTPATIENT)
Dept: INTERNAL MEDICINE | Facility: CLINIC | Age: 84
End: 2019-12-23

## 2019-12-24 ENCOUNTER — INITIAL CONSULT (OUTPATIENT)
Dept: HEMATOLOGY/ONCOLOGY | Facility: CLINIC | Age: 84
End: 2019-12-24
Payer: MEDICARE

## 2019-12-24 VITALS
RESPIRATION RATE: 16 BRPM | SYSTOLIC BLOOD PRESSURE: 146 MMHG | DIASTOLIC BLOOD PRESSURE: 65 MMHG | WEIGHT: 189.63 LBS | HEIGHT: 67 IN | TEMPERATURE: 98 F | HEART RATE: 84 BPM | BODY MASS INDEX: 29.76 KG/M2 | OXYGEN SATURATION: 99 %

## 2019-12-24 DIAGNOSIS — R48.2 GAIT APRAXIA: Primary | ICD-10-CM

## 2019-12-24 DIAGNOSIS — E78.2 MIXED HYPERLIPIDEMIA: Chronic | ICD-10-CM

## 2019-12-24 DIAGNOSIS — D53.9 MACROCYTIC ANEMIA: ICD-10-CM

## 2019-12-24 DIAGNOSIS — R76.8 ELEVATED SERUM IMMUNOGLOBULIN FREE LIGHT CHAIN LEVEL: ICD-10-CM

## 2019-12-24 DIAGNOSIS — I10 ESSENTIAL HYPERTENSION: ICD-10-CM

## 2019-12-24 DIAGNOSIS — G62.9 PERIPHERAL POLYNEUROPATHY: ICD-10-CM

## 2019-12-24 DIAGNOSIS — D69.6 THROMBOCYTOPENIA: ICD-10-CM

## 2019-12-24 PROCEDURE — 1101F PR PT FALLS ASSESS DOC 0-1 FALLS W/OUT INJ PAST YR: ICD-10-PCS | Mod: HCNC,CPTII,S$GLB, | Performed by: INTERNAL MEDICINE

## 2019-12-24 PROCEDURE — 99204 OFFICE O/P NEW MOD 45 MIN: CPT | Mod: HCNC,S$GLB,, | Performed by: INTERNAL MEDICINE

## 2019-12-24 PROCEDURE — 1125F AMNT PAIN NOTED PAIN PRSNT: CPT | Mod: HCNC,S$GLB,, | Performed by: INTERNAL MEDICINE

## 2019-12-24 PROCEDURE — 99999 PR PBB SHADOW E&M-EST. PATIENT-LVL V: ICD-10-PCS | Mod: PBBFAC,HCNC,, | Performed by: INTERNAL MEDICINE

## 2019-12-24 PROCEDURE — 1159F MED LIST DOCD IN RCRD: CPT | Mod: HCNC,S$GLB,, | Performed by: INTERNAL MEDICINE

## 2019-12-24 PROCEDURE — 99499 RISK ADDL DX/OHS AUDIT: ICD-10-PCS | Mod: HCNC,S$GLB,, | Performed by: INTERNAL MEDICINE

## 2019-12-24 PROCEDURE — 1125F PR PAIN SEVERITY QUANTIFIED, PAIN PRESENT: ICD-10-PCS | Mod: HCNC,S$GLB,, | Performed by: INTERNAL MEDICINE

## 2019-12-24 PROCEDURE — 1101F PT FALLS ASSESS-DOCD LE1/YR: CPT | Mod: HCNC,CPTII,S$GLB, | Performed by: INTERNAL MEDICINE

## 2019-12-24 PROCEDURE — 99204 PR OFFICE/OUTPT VISIT, NEW, LEVL IV, 45-59 MIN: ICD-10-PCS | Mod: HCNC,S$GLB,, | Performed by: INTERNAL MEDICINE

## 2019-12-24 PROCEDURE — 1159F PR MEDICATION LIST DOCUMENTED IN MEDICAL RECORD: ICD-10-PCS | Mod: HCNC,S$GLB,, | Performed by: INTERNAL MEDICINE

## 2019-12-24 PROCEDURE — 99499 UNLISTED E&M SERVICE: CPT | Mod: HCNC,S$GLB,, | Performed by: INTERNAL MEDICINE

## 2019-12-24 PROCEDURE — 99999 PR PBB SHADOW E&M-EST. PATIENT-LVL V: CPT | Mod: PBBFAC,HCNC,, | Performed by: INTERNAL MEDICINE

## 2019-12-24 NOTE — PROGRESS NOTES
CC: Anemia, low platelets, elevated serum free kappa light chain, hematology consultation      HPI: , 89, is here for hematology consultation for anemia, low platelets and abnormal serum free light chains. He has atrial fibrillation, has pacemaker, CKD, DJD, h/o NSTEMI, HTn, DLD.  He I snot a very good historian. He believes he has 'excess protein' in his u urine. He denies tingling or numbess, but has been told he has a left dropped foot.He has been diagnosed with his condition in the past 3 months. he has been evaluated by neurology/neuropsurgery. He has chronic back pain and DJD.     No change in weight/appetite in the past 6 months. Denies fevers, night sweats.       Review of Systems   Constitutional: Positive for malaise/fatigue. Negative for chills, diaphoresis, fever and weight loss.   HENT: Negative for ear discharge, ear pain, nosebleeds and tinnitus.    Eyes: Negative for blurred vision, photophobia and pain.   Respiratory: Negative for cough, hemoptysis, sputum production and shortness of breath.    Cardiovascular: Negative for chest pain, palpitations, orthopnea and claudication.   Gastrointestinal: Negative for abdominal pain, blood in stool, diarrhea, heartburn, melena and nausea.   Genitourinary: Negative for dysuria, frequency, hematuria and urgency.   Musculoskeletal: Positive for back pain and neck pain. Negative for myalgias.   Skin: Negative for rash.   Neurological: Positive for focal weakness. Negative for dizziness, tingling, tremors and speech change.        He has weakness in his left LE   Endo/Heme/Allergies: Does not bruise/bleed easily.   Psychiatric/Behavioral: Negative for depression, hallucinations, substance abuse and suicidal ideas. The patient is nervous/anxious.                Past Medical History:   Diagnosis Date    *Atrial flutter 10/3/13    Anticoagulant long-term use     Aspirin only at this time    Arthritis     Atrial fibrillation     Atrial flutter     BPH  (benign prostatic hypertrophy)     Carotid artery disease     2008: [place] There is 40 - 49% right Internal Carotid stenosis.  There is 20 - 39% left Internal Carotid stenosis.       Chronic kidney disease     Coronary artery disease     DDD (degenerative disc disease) 6/25/2015    Elevated PSA     Glaucoma     Hyperlipidemia     Hypertension     Lumbar stenosis     lumbar spinal stenosis    NSTEMI (non-ST elevated myocardial infarction) 11/3/2013    Peripheral neuropathy     - S/P right ILIAC stent 1993;      Retinal detachment     Squamous cell carcinoma     left leg           Past Surgical History:   Procedure Laterality Date    [facility]IZATION      [facility]    CARDIAC ELECTROPHYSIOLOGY MAPPING AND ABLATION  11/2016    CARDIAC PACEMAKER PLACEMENT  11/2016    CATARACT EXTRACTION W/  INTRAOCULAR LENS IMPLANT Left 1997        CORONARY ARTERY BYPASS GRAFT  1991    ENUCLEATION Right 1949    EPIDURAL STEROID INJECTION N/A 7/17/2019    Procedure: INJECTION, STEROID, EPIDURAL, CAUDAL  cleared to hold eliquis 4 days per Dr. Ruiz;  Surgeon: James Hodges MD;  Location: [facility];  Service: Pain Management;  Laterality: N/A;    INJECTION OF ANESTHETIC AGENT AROUND MEDIAL BRANCH NERVES INNERVATING LUMBAR FACET JOINT Bilateral 12/18/2018    Procedure: BLOCK, NERVE, FACET JOINT, LUMBAR, MEDIAL BRANCH;  Surgeon: James Hodges MD;  Location: [facility];  Service: Pain Management;  Laterality: Bilateral;    LUMBAR LAMINECTOMY  04/25/2016    RETINAL DETACHMENT SURGERY Left 1997    Dr. Cosme    SKIN BIOPSY      SPINAL CORD STIMULATOR TRIAL W/ LAMINOTOMY  10/2017    TONSILLECTOMY           Social History     Occupational History    Occupation: retired   Social Needs    Financial resource strain: Not on file    Food insecurity:     Worry: Not on file     Inability: Not on file    Transportation needs:     Medical: Not on file     Non-medical: Not on file   Tobacco Use     Smoking status: Former Smoker     Last attempt to quit: 1963     Years since quittin.3    Smokeless tobacco: Never Used   Substance and Sexual Activity    Alcohol use: Yes     Alcohol/week: 3.0 standard drinks     Types: 1 Glasses of wine, 1 Cans of beer, 1 Shots of liquor per week     Comment: 2 a day    Drug use: No    Sexual activity: Yes     Partners: Female     Family History   Problem Relation Age of Onset    Stroke Mother 69    Clotting disorder Mother         per patient no clotting disorder    Hypertension Mother     Diverticulitis Son     Cancer Neg Hx            Review of patient's allergies indicates:   Allergen Reactions    Atorvastatin      Myositis/elevated CPK    Pravastatin      Severe myalgias/elevated CPK    Statins-hmg-coa reductase inhibitors      Muscle pain and loss of muscle    Ace inhibitors      Cough         Current Outpatient Medications   Medication Sig    amiodarone (PACERONE) 200 MG Tab Take 1 tablet (200 mg total) by mouth once daily.    amLODIPine (NORVASC) 5 MG tablet Take 1 tablet (5 mg total) by mouth once daily.    apixaban (ELIQUIS) 2.5 mg Tab Take 1 tablet (2.5 mg total) by mouth 2 (two) times daily.    ascorbic acid (VITAMIN C) 1000 MG tablet Take 1,000 mg by mouth every morning.     brimonidine 0.2% (ALPHAGAN) 0.2 % Drop Place 1 drop into the left eye 3 (three) times daily.    brinzolamide (AZOPT) 1 % ophthalmic suspension Place 1 drop into both eyes 3 (three) times daily.    co-enzyme Q-10 30 mg capsule Take 30 mg by mouth once daily.     donepezil (ARICEPT) 5 MG tablet Take 1 tablet (5 mg total) by mouth every evening.    dorzolamide (TRUSOPT) 2 % ophthalmic solution Place 1 drop into both eyes 3 (three) times daily.    erythromycin (ROMYCIN) ophthalmic ointment Apply to affected eye daily as needed    FLUCELVAX QUAD 1734-8187, PF, 60 mcg (15 mcg x 4)/0.5 mL Syrg     gabapentin (NEURONTIN) 300 MG capsule Take 1 capsule (300 mg total) by  mouth 2 (two) times daily.    ipratropium (ATROVENT) 0.03 % nasal spray USE 1 TO 2 SPRAY(S) IN EACH NOSTRIL TWICE DAILY    latanoprost 0.005 % ophthalmic solution Place 1 drop into the left eye every evening.    losartan (COZAAR) 50 MG tablet Take 1 tablet (50 mg total) by mouth 2 (two) times daily.    lutein 20 mg Cap Take 20 mg by mouth once daily.     MULTIVITAMINS,THER W-MINERALS (VITAMINS & MINERALS ORAL) Take 1 tablet by mouth every morning.     oxyCODONE-acetaminophen (PERCOCET) 5-325 mg per tablet Take 1 tablet by mouth every 6 (six) hours as needed for Pain. Greater than 7 day supply, medically necessary    polycarbophil (FIBERCON) 625 mg tablet Take 625 mg by mouth 2 (two) times daily.    rosuvastatin (CRESTOR) 20 MG tablet Take 1 tablet (20 mg total) by mouth every evening.    sertraline (ZOLOFT) 50 MG tablet Take 1 tablet (50 mg total) by mouth once daily.    diclofenac sodium (VOLTAREN) 1 % Gel Apply 2 g topically 2 (two) times daily. for 10 days    furosemide (LASIX) 20 MG tablet Take 1 tablet (20 mg total) by mouth once daily. for 7 days     No current facility-administered medications for this visit.         Physical Exam   Constitutional: He is oriented to person, place, and time. He appears well-developed. No distress.   HENT:   Head: Normocephalic.   Mouth/Throat: No oropharyngeal exudate.   Eyes: No scleral icterus.   Cardiovascular: Normal rate and regular rhythm.   No murmur heard.  Pulmonary/Chest: Effort normal. No respiratory distress. He has no rales.   Abdominal: Soft. He exhibits no distension. There is no tenderness. There is no rebound.   Musculoskeletal: He exhibits no edema.   He has unsteady gait, uses cane to ambulate   Lymphadenopathy:     He has no cervical adenopathy.   Neurological: He is alert and oriented to person, place, and time.   Skin: Skin is warm.   Psychiatric: He has a normal mood and affect.         Component      Latest Ref Rng & Units 11/26/2019  11/11/2019 9/25/2019   WBC      3.90 - 12.70 K/uL  7.06    RBC      4.60 - 6.20 M/uL  3.32 (L)    Hemoglobin      14.0 - 18.0 g/dL  11.3 (L)    Hematocrit      40.0 - 54.0 %  37.0 (L)    MCV      82 - 98 fL  111 (H)    MCH      27.0 - 31.0 pg  34.0 (H)    MCHC      32.0 - 36.0 g/dL  30.5 (L)    RDW      11.5 - 14.5 %  17.8 (H)    Platelets      150 - 350 K/uL  124 (L)    MPV      9.2 - 12.9 fL  11.9    Immature Granulocytes      0.0 - 0.5 %  0.7 (H)    Gran # (ANC)      1.8 - 7.7 K/uL  5.2    Immature Grans (Abs)      0.00 - 0.04 K/uL  0.05 (H)    Lymph #      1.0 - 4.8 K/uL  0.9 (L)    Mono #      0.3 - 1.0 K/uL  0.7    Eos #      0.0 - 0.5 K/uL  0.2    Baso #      0.00 - 0.20 K/uL  0.07    nRBC      0 /100 WBC  0    Gran%      38.0 - 73.0 %  74.2 (H)    Lymph%      18.0 - 48.0 %  12.3 (L)    Mono%      4.0 - 15.0 %  9.5    Eosinophil%      0.0 - 8.0 %  2.3    Basophil%      0.0 - 1.9 %  1.0    Differential Method        Automated    Glucose      70 - 110 mg/dL  82    Sodium      136 - 145 mmol/L  143    Potassium      3.5 - 5.1 mmol/L  3.9    Chloride      95 - 110 mmol/L  112 (H)    CO2      23 - 29 mmol/L  23    BUN, Bld      8 - 23 mg/dL  27 (H)    Calcium      8.7 - 10.5 mg/dL  10.0    Creatinine      0.5 - 1.4 mg/dL  1.7 (H)    Albumin      3.5 - 5.2 g/dL  4.1    Phosphorus      2.7 - 4.5 mg/dL  2.9    eGFR if African American      >60 mL/min/1.73 m:2  40.4 (A)    eGFR if non African American      >60 mL/min/1.73 m:2  35.0 (A)    Anion Gap      8 - 16 mmol/L  8    [place] Free Light Chains      0.33 - 1.94 mg/dL 2.65 (H)     Lambda Free Light Chains      0.57 - 2.63 mg/dL 2.21     Kappa/Lambda FLC Ratio      0.26 - 1.65 1.20     Free T4      0.71 - 1.51 ng/dL   1.12   Sed Rate      0 - 23 mm/Hr  7    MADDI Screen      Negative <1:160  Negative <1:160      11/11/19 sIFE: No monoclonal peaks identified.  11/11/19 SPEP: Normal total protein, normal pattern.   11/11/19 urine CHOLO, random sample :No monoclonal peaks  identified.     Assessment:    1. Elevated serum free kappa light chain  2. Chronic macrocytic anemia  3. Atrial fibrillation, chronic  4. Essential HTn  5. Dyslipidemia  6. Chronic thrombocytopenia      Plan:  1. Serum FLC ratio is normal. No monoclonal protein on serum immunofixation or on immunofixation of random urine sample. While 24hr UPEP with immunofixation is useful and is more definitive in ruling out Bence Mckeon proteinuria, it is unlikely to be of use in this case, considering his age, co-morbidities, and given the fact that his sFLC ratio is normal, with kappa light chains being only minimally elevated.    AL amyloidosis affecting the nerves and causing his foot drop is still a possibility, but would require nerve biopsy to confirm.   I discussed bone marrow biopsy, but recommended against it considering his age and co-morbidities. Paraneoplastic syndrome in the setting of underlying malignancy is also a possibility, but outcome is dismal given his age and co-morbidities, if it is the cause. MAG Ab negative.      2,56: mild, asymptomatic.  B12, folate normal in April 2019. TSH normal in Sept 2019.     4,5: follows with his primary care physician.

## 2019-12-31 ENCOUNTER — CLINICAL SUPPORT (OUTPATIENT)
Dept: CARDIOLOGY | Facility: HOSPITAL | Age: 84
End: 2019-12-31
Attending: INTERNAL MEDICINE
Payer: MEDICARE

## 2019-12-31 DIAGNOSIS — I48.0 PAROXYSMAL ATRIAL FIBRILLATION: ICD-10-CM

## 2019-12-31 DIAGNOSIS — Z95.0 CARDIAC PACEMAKER IN SITU: ICD-10-CM

## 2019-12-31 DIAGNOSIS — I49.5 SSS (SICK SINUS SYNDROME): ICD-10-CM

## 2019-12-31 PROCEDURE — 93294 REM INTERROG EVL PM/LDLS PM: CPT | Mod: ,,, | Performed by: INTERNAL MEDICINE

## 2019-12-31 PROCEDURE — 93294 CARDIAC DEVICE CHECK - REMOTE: ICD-10-PCS | Mod: ,,, | Performed by: INTERNAL MEDICINE

## 2019-12-31 PROCEDURE — 93296 REM INTERROG EVL PM/IDS: CPT | Mod: HCNC | Performed by: INTERNAL MEDICINE

## 2020-01-02 DIAGNOSIS — Z95.1 S/P CABG (CORONARY ARTERY BYPASS GRAFT): ICD-10-CM

## 2020-01-02 DIAGNOSIS — I25.5 ISCHEMIC CARDIOMYOPATHY: ICD-10-CM

## 2020-01-02 DIAGNOSIS — I49.3 PVC (PREMATURE VENTRICULAR CONTRACTION): ICD-10-CM

## 2020-01-02 DIAGNOSIS — I49.5 SSS (SICK SINUS SYNDROME): ICD-10-CM

## 2020-01-02 RX ORDER — AMIODARONE HYDROCHLORIDE 200 MG/1
TABLET ORAL
Qty: 90 TABLET | Refills: 1 | Status: SHIPPED | OUTPATIENT
Start: 2020-01-02 | End: 2020-01-03 | Stop reason: SDUPTHER

## 2020-01-03 ENCOUNTER — PATIENT MESSAGE (OUTPATIENT)
Dept: INTERNAL MEDICINE | Facility: CLINIC | Age: 85
End: 2020-01-03

## 2020-01-03 ENCOUNTER — PATIENT MESSAGE (OUTPATIENT)
Dept: ADMINISTRATIVE | Facility: OTHER | Age: 85
End: 2020-01-03

## 2020-01-03 DIAGNOSIS — I49.3 PVC (PREMATURE VENTRICULAR CONTRACTION): ICD-10-CM

## 2020-01-03 DIAGNOSIS — Z95.1 S/P CABG (CORONARY ARTERY BYPASS GRAFT): ICD-10-CM

## 2020-01-03 DIAGNOSIS — I25.5 ISCHEMIC CARDIOMYOPATHY: ICD-10-CM

## 2020-01-03 DIAGNOSIS — I49.5 SSS (SICK SINUS SYNDROME): ICD-10-CM

## 2020-01-03 RX ORDER — AMIODARONE HYDROCHLORIDE 200 MG/1
200 TABLET ORAL DAILY
Qty: 90 TABLET | Refills: 1 | Status: SHIPPED | OUTPATIENT
Start: 2020-01-03 | End: 2020-06-01 | Stop reason: SDUPTHER

## 2020-01-08 ENCOUNTER — PATIENT OUTREACH (OUTPATIENT)
Dept: OTHER | Facility: OTHER | Age: 85
End: 2020-01-08

## 2020-01-08 ENCOUNTER — OFFICE VISIT (OUTPATIENT)
Dept: NEUROSURGERY | Facility: CLINIC | Age: 85
End: 2020-01-08
Payer: MEDICARE

## 2020-01-08 VITALS
DIASTOLIC BLOOD PRESSURE: 68 MMHG | BODY MASS INDEX: 29.66 KG/M2 | WEIGHT: 189 LBS | HEART RATE: 63 BPM | HEIGHT: 67 IN | SYSTOLIC BLOOD PRESSURE: 147 MMHG

## 2020-01-08 DIAGNOSIS — M47.22 CERVICAL SPONDYLOSIS WITH RADICULOPATHY: ICD-10-CM

## 2020-01-08 DIAGNOSIS — M48.07 FORAMINAL STENOSIS OF LUMBOSACRAL REGION: ICD-10-CM

## 2020-01-08 DIAGNOSIS — M21.372 BILATERAL FOOT-DROP: Primary | ICD-10-CM

## 2020-01-08 DIAGNOSIS — M54.16 LUMBAR RADICULOPATHY: ICD-10-CM

## 2020-01-08 DIAGNOSIS — Z96.89 STATUS POST INSERTION OF SPINAL CORD STIMULATOR: ICD-10-CM

## 2020-01-08 DIAGNOSIS — M21.371 BILATERAL FOOT-DROP: Primary | ICD-10-CM

## 2020-01-08 PROCEDURE — 1159F MED LIST DOCD IN RCRD: CPT | Mod: HCNC,S$GLB,, | Performed by: NEUROLOGICAL SURGERY

## 2020-01-08 PROCEDURE — 99999 PR PBB SHADOW E&M-EST. PATIENT-LVL V: ICD-10-PCS | Mod: PBBFAC,HCNC,, | Performed by: NEUROLOGICAL SURGERY

## 2020-01-08 PROCEDURE — 1159F PR MEDICATION LIST DOCUMENTED IN MEDICAL RECORD: ICD-10-PCS | Mod: HCNC,S$GLB,, | Performed by: NEUROLOGICAL SURGERY

## 2020-01-08 PROCEDURE — 1125F PR PAIN SEVERITY QUANTIFIED, PAIN PRESENT: ICD-10-PCS | Mod: HCNC,S$GLB,, | Performed by: NEUROLOGICAL SURGERY

## 2020-01-08 PROCEDURE — 99999 PR PBB SHADOW E&M-EST. PATIENT-LVL V: CPT | Mod: PBBFAC,HCNC,, | Performed by: NEUROLOGICAL SURGERY

## 2020-01-08 PROCEDURE — 99213 OFFICE O/P EST LOW 20 MIN: CPT | Mod: HCNC,S$GLB,, | Performed by: NEUROLOGICAL SURGERY

## 2020-01-08 PROCEDURE — 1101F PR PT FALLS ASSESS DOC 0-1 FALLS W/OUT INJ PAST YR: ICD-10-PCS | Mod: HCNC,CPTII,S$GLB, | Performed by: NEUROLOGICAL SURGERY

## 2020-01-08 PROCEDURE — 1125F AMNT PAIN NOTED PAIN PRSNT: CPT | Mod: HCNC,S$GLB,, | Performed by: NEUROLOGICAL SURGERY

## 2020-01-08 PROCEDURE — 1101F PT FALLS ASSESS-DOCD LE1/YR: CPT | Mod: HCNC,CPTII,S$GLB, | Performed by: NEUROLOGICAL SURGERY

## 2020-01-08 PROCEDURE — 99213 PR OFFICE/OUTPT VISIT, EST, LEVL III, 20-29 MIN: ICD-10-PCS | Mod: HCNC,S$GLB,, | Performed by: NEUROLOGICAL SURGERY

## 2020-01-08 NOTE — PROGRESS NOTES
Digital Medicine: Health  Follow-Up    The history is provided by the patient.     Follow Up  Follow-up reason(s): reading review and routine education      Readings are trending up Patient stated that he cannot pinpoint a specific cause as to why his BP readings have been reading higher. He is still dealing with a lot of back pain (will be following up with the doctor who did his surgery today) and he also had a few close friends pass within the last few weeks.  He denies any symptoms related to his BP readings.    He has had his BP cuff since 07/2016 so I spoke about the likelihood of him needing a new cuff soon since the lifespan of a BP cuff is typically 2-3 years. Patient stated that he would like to watch it for a few more months before making the decision to purchase a new cuff. I will continue to follow up with him about this.       Intervention/Plan    There are no preventive care reminders to display for this patient.    Last 5 Patient Entered Readings                                      Current 30 Day Average: 150/74     Recent Readings 1/4/2020 12/26/2019 12/23/2019 12/21/2019 12/13/2019    SBP (mmHg) 143 160 150 156 140    DBP (mmHg) 75 80 70 71 78    Pulse 64 51 60 54 54                      Diet Screening       He did mention that his diet was off during the holidays but he is getting back on track now.     Medication Adherence Screening   He misses doses: never      Patient identified the following reasons for non-compliance: none      SDOH

## 2020-01-08 NOTE — PROGRESS NOTES
NEUROSURGICAL PROGRESS NOTE    DATE OF SERVICE:  01/08/2020    ATTENDING PHYSICIAN:  Andrzej Toribio MD    SUBJECTIVE:    INTERIM HISTORY:    This is a very pleasant 89 y.o. male, who is status post lumbar laminectomy with Dr. Chow in 2015.  Placement of spinal cord stimulator using the [employer] system with me in November 2017.  Report worsening bilateral leg pain and bilateral foot drop for about 6 months.  His gait has deteriorated.  He has 2 programs on his spinal cord stimulator but he is only using the program 2.  He has not contacted his spinal cord stimulator rep in the last year.  Spinal cord stimulator still functioning.  Also complains of left arm numbness in the C7 distribution.  No sphincter dysfunction.              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    Chronic kidney disease, stage 3 (moderate) 11/27/2012    Gout, unspecified 11/27/2012    Acquired cyst of kidney 11/27/2012    Idiopathic progressive polyneuropathy 12/17/2012    Atherosclerosis of aorta 10/09/2013    Epiretinal membrane, left eye 10/28/2013    Posterior vitreous detachment of left eye 10/28/2013    Pseudophakia of left eye 10/28/2013    Prosthetic eye globe 10/28/2013    Open-angle glaucoma 10/28/2013    Retinal tear 10/28/2013    Rhegmatogenous retinal detachment 10/28/2013    History of Urinary retention 11/07/2013    Biventricular cardiac pacemaker in situ 12/12/2013    SSS (sick sinus syndrome): s/p PPM 12/12/2013    Macular edema, cystoid 06/11/2014    Lamellar macular hole 06/11/2014    Spondylosis without myelopathy 06/25/2015     Lumbar degenerative disc disease 06/25/2015    Gait apraxia 10/09/2015    Contusion of rib on left side 10/28/2015    Obesity, Class I, BMI 30-34.9 04/21/2016    Coronary artery disease involving native coronary artery of native heart without angina pectoris 04/21/2016    S/P CABG (coronary artery bypass graft) 04/21/2016    Varicose veins of both lower extremities- Rt more than Left 04/21/2016    Edema 04/21/2016    Thrombocytopenia 04/21/2016    Total bilirubin, elevated 04/21/2016    Encounter for postoperative wound check 05/24/2016    Peripheral venous insufficiency     Typical atrial flutter 09/21/2016    Current use of long term anticoagulation 09/21/2016    Status post catheter ablation of atrial flutter 12/14/2016    Bilateral carpal tunnel syndrome 01/24/2017    Chronic pain 04/28/2017    Lumbar spinal stenosis 05/02/2017    Acute lumbar radiculopathy 05/02/2017    Osteoarthritis of spine with radiculopathy, lumbar region 05/29/2017    Cardiomyopathy, ischemic: Prior MI, CABG, EF 40-45% 06/07/2017    Post laminectomy syndrome 06/15/2017    PVC (premature ventricular contraction) 08/29/2017    Chronic pain syndrome 11/09/2017    Chronic midline low back pain without sciatica 08/08/2019    Bilateral foot-drop 12/18/2019    Macrocytic anemia 12/24/2019    Elevated serum immunoglobulin free light chain level 12/24/2019     Resolved Ambulatory Problems     Diagnosis Date Noted    Coronary artery disease     Benign hypertensive renal disease without renal failure 11/27/2012    Atrial flutter 10/03/2013    Lamellar macular hole of left eye 10/28/2013    Lamellar macular hole 10/28/2013    Unstable angina 11/01/2013    NSTEMI (non-ST elevated myocardial infarction) 11/03/2013    Atrial fibrillation 11/03/2013    Cardiogenic shock 11/03/2013    Ventricular fibrillation 11/03/2013    Acute systolic heart failure 11/03/2013    Left lumbar radiculopathy 06/25/2015    Lumbar  facet arthropathy 06/25/2015    Syncope and collapse: orthostatic with hypotension 10/09/2015    Spinal stenosis, lumbar 04/13/2016    Lumbar stenosis with neurogenic claudication 04/25/2016    S/P lumbar laminectomy     Lumbar myelopathy 04/29/2016     Past Medical History:   Diagnosis Date    *Atrial flutter 10/3/13    Anticoagulant long-term use     Arthritis     Blood transfusion     BPH (benign prostatic hypertrophy)     Carotid artery disease     Chronic kidney disease     DDD (degenerative disc disease) 6/25/2015    Glaucoma     Lumbar stenosis     Pacemaker 11/2013    Retinal detachment     Squamous cell carcinoma        PAST SURGICAL HISTORY:  Past Surgical History:   Procedure Laterality Date    CARDIAC CATHETERIZATION      [facility]    CARDIAC ELECTROPHYSIOLOGY MAPPING AND ABLATION  11/2016    CARDIAC PACEMAKER PLACEMENT  11/2016    CATARACT EXTRACTION W/  INTRAOCULAR LENS IMPLANT Left 1997        CORONARY ARTERY BYPASS GRAFT  1991    ENUCLEATION Right 1949    EPIDURAL STEROID INJECTION N/A 7/17/2019    Procedure: INJECTION, STEROID, EPIDURAL, CAUDAL  cleared to hold eliquis 4 days per Dr. Ruiz;  Surgeon: James Hodges MD;  Location: [facility]IN [facility];  Service: Pain Management;  Laterality: N/A;    INJECTION OF ANESTHETIC AGENT AROUND MEDIAL BRANCH NERVES INNERVATING LUMBAR FACET JOINT Bilateral 12/18/2018    Procedure: BLOCK, NERVE, FACET JOINT, LUMBAR, MEDIAL BRANCH;  Surgeon: James Hodges MD;  Location: [facility]IN [facility];  Service: Pain Management;  Laterality: Bilateral;    LUMBAR LAMINECTOMY  04/25/2016    RETINAL DETACHMENT SURGERY Left 1997    Dr. Cosme    SKIN BIOPSY      SPINAL CORD STIMULATOR TRIAL W/ LAMINOTOMY  10/2017    TONSILLECTOMY         SOCIAL HISTORY:   Social History     Socioeconomic History    Marital status:      Spouse name: Graham    Number of children: Not on file    Years of education: Not on file    Highest education level:  Not on file   Occupational History    Occupation: retired   Social Needs    Financial resource strain: Not on file    Food insecurity:     Worry: Not on file     Inability: Not on file    Transportation needs:     Medical: Not on file     Non-medical: Not on file   Tobacco Use    Smoking status: Former Smoker     Last attempt to quit: 1963     Years since quittin.4    Smokeless tobacco: Never Used   Substance and Sexual Activity    Alcohol use: Yes     Alcohol/week: 3.0 standard drinks     Types: 1 Glasses of wine, 1 Cans of beer, 1 Shots of liquor per week     Comment: 2 a day    Drug use: No    Sexual activity: Yes     Partners: Female   Lifestyle    Physical activity:     Days per week: Not on file     Minutes per session: Not on file    Stress: Not on file   Relationships    Social connections:     Talks on phone: Not on file     Gets together: Not on file     Attends Caoda[employer] service: Not on file     Active member of club or organization: Not on file     Attends meetings of clubs or organizations: Not on file     Relationship status: Not on file   Other Topics Concern    Not on file   Social History Narrative    Not on file       FAMILY HISTORY:  Family History   Problem Relation Age of Onset    Stroke Mother 69    Clotting disorder Mother         per patient no clotting disorder    Hypertension Mother     Diverticulitis Son     Cancer Neg Hx     Diabetes Neg Hx     Heart disease Neg Hx     Glaucoma Neg Hx     Amblyopia Neg Hx     Blindness Neg Hx     Cataracts Neg Hx     Macular degeneration Neg Hx     Retinal detachment Neg Hx     Strabismus Neg Hx        CURRENTS MEDICATIONS:  Current Outpatient Medications on File Prior to Visit   Medication Sig Dispense Refill    amiodarone (PACERONE) 200 MG Tab Take 1 tablet (200 mg total) by mouth once daily. 90 tablet 1    amLODIPine (NORVASC) 5 MG tablet Take 1 tablet (5 mg total) by mouth once daily. 90 tablet 3    apixaban  (ELIQUIS) 2.5 mg Tab Take 1 tablet (2.5 mg total) by mouth 2 (two) times daily. 180 tablet 3    ascorbic acid (VITAMIN C) 1000 MG tablet Take 1,000 mg by mouth every morning.       brimonidine 0.2% (ALPHAGAN) 0.2 % Drop Place 1 drop into the left eye 3 (three) times daily. 30 mL 3    brinzolamide (AZOPT) 1 % ophthalmic suspension Place 1 drop into both eyes 3 (three) times daily.      co-enzyme Q-10 30 mg capsule Take 30 mg by mouth once daily.       donepezil (ARICEPT) 5 MG tablet Take 1 tablet (5 mg total) by mouth every evening. 90 tablet 3    dorzolamide (TRUSOPT) 2 % ophthalmic solution Place 1 drop into both eyes 3 (three) times daily. 30 mL 3    erythromycin (ROMYCIN) ophthalmic ointment Apply to affected eye daily as needed 3.5 g 6    gabapentin (NEURONTIN) 300 MG capsule Take 1 capsule (300 mg total) by mouth 2 (two) times daily. 60 capsule 5    ipratropium (ATROVENT) 0.03 % nasal spray USE 1 TO 2 SPRAY(S) IN EACH NOSTRIL TWICE DAILY 30 mL 0    latanoprost 0.005 % ophthalmic solution Place 1 drop into the left eye every evening. 15 mL 3    losartan (COZAAR) 50 MG tablet Take 1 tablet (50 mg total) by mouth 2 (two) times daily. 180 tablet 3    lutein 20 mg Cap Take 20 mg by mouth once daily.       MULTIVITAMINS,THER W-MINERALS (VITAMINS & MINERALS ORAL) Take 1 tablet by mouth every morning.       polycarbophil (FIBERCON) 625 mg tablet Take 625 mg by mouth 2 (two) times daily.      rosuvastatin (CRESTOR) 20 MG tablet Take 1 tablet (20 mg total) by mouth every evening. 90 tablet 3    sertraline (ZOLOFT) 50 MG tablet Take 1 tablet (50 mg total) by mouth once daily. 30 tablet 11    diclofenac sodium (VOLTAREN) 1 % Gel Apply 2 g topically 2 (two) times daily. for 10 days 1 Tube 3    FLUCELVAX QUAD 0581-9137, PF, 60 mcg (15 mcg x 4)/0.5 mL Syrg       furosemide (LASIX) 20 MG tablet Take 1 tablet (20 mg total) by mouth once daily. for 7 days 7 tablet 0    oxyCODONE-acetaminophen (PERCOCET) 5-325  mg per tablet Take 1 tablet by mouth every 6 (six) hours as needed for Pain. Greater than 7 day supply, medically necessary (Patient not taking: Reported on 1/8/2020) 30 tablet 0     No current facility-administered medications on file prior to visit.        ALLERGIES:  Review of patient's allergies indicates:   Allergen Reactions    Atorvastatin      Myositis/elevated CPK    Pravastatin      Severe myalgias/elevated CPK    Statins-hmg-coa reductase inhibitors      Muscle pain and loss of muscle    Ace inhibitors      Cough       REVIEW OF SYSTEMS:  Review of Systems   Constitutional: Negative for diaphoresis, fever and weight loss.   Respiratory: Negative for shortness of breath.    Cardiovascular: Negative for chest pain.   Gastrointestinal: Negative for blood in stool.   Genitourinary: Negative for hematuria.   Endo/Heme/Allergies: Does not bruise/bleed easily.   All other systems reviewed and are negative.        OBJECTIVE:    PHYSICAL EXAMINATION:   Vitals:    01/08/20 1409   BP: (!) 147/68   Pulse: 63       Physical Exam:  Vitals reviewed.    Constitutional: He appears well-developed and well-nourished.     Eyes: Pupils are equal, round, and reactive to light. Conjunctivae and EOM are normal.     Cardiovascular: Normal distal pulses and no edema.     Abdominal: Soft.     Skin: Skin displays no rash on trunk and no rash on extremities. Skin displays no lesions on trunk and no lesions on extremities.     Psych/Behavior: He is alert. He is oriented to person, place, and time. He has a normal mood and affect.     Musculoskeletal:        Neck: Range of motion is full.     Neurological:        DTRs: Tricep reflexes are 2+ on the right side and 2+ on the left side. Bicep reflexes are 2+ on the right side and 2+ on the left side. Brachioradialis reflexes are 2+ on the right side and 2+ on the left side. Patellar reflexes are 2+ on the right side and 2+ on the left side. Achilles reflexes are 0 on the right side  and 0 on the left side.       Back Exam     Tenderness   The patient is experiencing no tenderness.     Range of Motion   Extension: normal   Flexion: normal   Lateral bend right: normal   Lateral bend left: normal   Rotation right: normal   Rotation left: normal     Muscle Strength   Right Quadriceps:  5/5   Left Quadriceps:  5/5   Right Hamstrings:  5/5   Left Hamstrings:  5/5     Tests   Straight leg raise right: negative  Straight leg raise left: negative    Other   Toe walk: normal  Heel walk: normal              Neurologic Exam     Mental Status   Oriented to person, place, and time.   Speech: speech is normal   Level of consciousness: alert    Cranial Nerves   Cranial nerves II through XII intact.     CN III, IV, VI   Pupils are equal, round, and reactive to light.  Extraocular motions are normal.     Motor Exam   Muscle bulk: normal  Overall muscle tone: normal    Strength   Right deltoid: 5/5  Left deltoid: 5/5  Right biceps: 5/5  Left biceps: 5/5  Right triceps: 5/5  Left triceps: 5/5  Right wrist flexion: 5/5  Left wrist flexion: 5/5  Right wrist extension: 5/5  Left wrist extension: 5/5  Right interossei: 5/5  Left interossei: 5/5  Right iliopsoas: 5/5  Left iliopsoas: 5/5  Right quadriceps: 5/5  Left quadriceps: 5/5  Right hamstrin/5  Left hamstrin/5  Right anterior tibial: 1/5  Left anterior tibial: 4/5  Right posterior tibial: 5/5  Left posterior tibial: 5/5  Right peroneal: 2/5  Left peroneal: 4/5  Right gastroc: 5/5  Left gastroc: 5/5    Sensory Exam   Light touch normal.   Pinprick normal.     Gait, Coordination, and Reflexes     Coordination   Finger to nose coordination: normal  Tandem walking coordination: normal    Reflexes   Right brachioradialis: 2+  Left brachioradialis: 2+  Right biceps: 2+  Left biceps: 2+  Right triceps: 2+  Left triceps: 2+  Right patellar: 2+  Left patellar: 2+  Right achilles: 0  Left achilles: 0  Right plantar: normal  Left plantar: normal  Right Tucker:  absent  Left Tucker: absent  Right ankle clonus: absent  Left ankle clonus: absent        DIAGNOSTIC DATA:  I personally interpreted the following imaging:   Cervical spine MRI August 2018 shows diffuse spondylosis with severe bilateral foraminal stenosis at C3-4 down to C6-7  CT myelogram of the lumbar spine August 2019 shows right more than left severe foraminal stenosis at L5-S1, left more than right severe stenosis foraminal stenosis at L4-5    ASSESMENT:  This is a 89 y.o. male with     Problem List Items Addressed This Visit        Neuro    Bilateral foot-drop - Primary    Overview     Onset mid-2019  Concern for impingement at spinal level  May consider LP for eval of CIDP syndrome if NSGY not convinced that lumbar spine is causing foot drop on their review of imaging         Relevant Orders    Ambulatory consult to Physical Therapy    Ambulatory consult to Occupational Therapy    ANKLE FOOT ORTHOSIS FOR HOME USE      Other Visit Diagnoses     Lumbar radiculopathy        Relevant Orders    Ambulatory consult to Physical Therapy    Ambulatory consult to Occupational Therapy    ANKLE FOOT ORTHOSIS FOR HOME USE    Status post insertion of spinal cord stimulator        Cervical spondylosis with radiculopathy        Foraminal stenosis of lumbosacral region                PLAN:  PT and patient will therapy 3 times a week for 6 weeks  AFO brace ordered bilaterally  Will contact his spinal cord stimulator rep for adjustment of spinal cord stimulator  All questions answered          Andrzej Toribio MD  Cell:[phone]

## 2020-01-17 ENCOUNTER — PATIENT MESSAGE (OUTPATIENT)
Dept: OPTOMETRY | Facility: CLINIC | Age: 85
End: 2020-01-17

## 2020-01-20 ENCOUNTER — TELEPHONE (OUTPATIENT)
Dept: NEUROSURGERY | Facility: CLINIC | Age: 85
End: 2020-01-20

## 2020-01-20 ENCOUNTER — PATIENT MESSAGE (OUTPATIENT)
Dept: CARDIOLOGY | Facility: CLINIC | Age: 85
End: 2020-01-20

## 2020-01-20 DIAGNOSIS — M21.379 FOOT-DROP, UNSPECIFIED LATERALITY: Primary | ICD-10-CM

## 2020-01-20 NOTE — TELEPHONE ENCOUNTER
----- Message from Andrzej Toribio MD sent at 1/20/2020  6:19 AM CST -----  Contact: pt @[phone]  We were supposed to order an AFO brace for this patient. What is the status on this?    Thanks    DD  ----- Message -----  From: Raysa John  Sent: 1/17/2020  11:28 AM CST  To: Andrzej Toribio MD    Caller would like for Dr. Toribio to send a [place] [address]  [address] [place] [place] [phone]. Also, pt left a disk with your office upon last arrival few days ago in which info was to be xfered into records and returned to pt. Pt has heard nothing and would like a follow up also the original copy of the disk.

## 2020-01-20 NOTE — TELEPHONE ENCOUNTER
I gave the information to the patient that Dr Ohara sends his patients to for AFO braces. Now he is asking us to fax an order to [facility]. Can you put in an order for the AFO Brace so I can fax to [facility].

## 2020-01-23 ENCOUNTER — TELEPHONE (OUTPATIENT)
Dept: NEUROLOGY | Facility: CLINIC | Age: 85
End: 2020-01-23

## 2020-02-10 ENCOUNTER — OFFICE VISIT (OUTPATIENT)
Dept: INTERNAL MEDICINE | Facility: CLINIC | Age: 85
End: 2020-02-10
Payer: MEDICARE

## 2020-02-10 ENCOUNTER — LAB VISIT (OUTPATIENT)
Dept: LAB | Facility: HOSPITAL | Age: 85
End: 2020-02-10
Attending: INTERNAL MEDICINE
Payer: MEDICARE

## 2020-02-10 VITALS
HEIGHT: 67 IN | BODY MASS INDEX: 29.35 KG/M2 | OXYGEN SATURATION: 98 % | SYSTOLIC BLOOD PRESSURE: 128 MMHG | WEIGHT: 187 LBS | DIASTOLIC BLOOD PRESSURE: 78 MMHG | HEART RATE: 80 BPM

## 2020-02-10 DIAGNOSIS — I95.9 HYPOTENSION, UNSPECIFIED HYPOTENSION TYPE: Primary | ICD-10-CM

## 2020-02-10 DIAGNOSIS — I95.9 HYPOTENSION, UNSPECIFIED HYPOTENSION TYPE: ICD-10-CM

## 2020-02-10 DIAGNOSIS — R68.89 SENSATION OF FEELING COLD: ICD-10-CM

## 2020-02-10 LAB
ANION GAP SERPL CALC-SCNC: 6 MMOL/L (ref 8–16)
BASOPHILS # BLD AUTO: 0.05 K/UL (ref 0–0.2)
BASOPHILS NFR BLD: 0.9 % (ref 0–1.9)
BUN SERPL-MCNC: 30 MG/DL (ref 8–23)
CALCIUM SERPL-MCNC: 9.4 MG/DL (ref 8.7–10.5)
CHLORIDE SERPL-SCNC: 114 MMOL/L (ref 95–110)
CO2 SERPL-SCNC: 22 MMOL/L (ref 23–29)
CREAT SERPL-MCNC: 1.9 MG/DL (ref 0.5–1.4)
DIFFERENTIAL METHOD: ABNORMAL
EOSINOPHIL # BLD AUTO: 0.2 K/UL (ref 0–0.5)
EOSINOPHIL NFR BLD: 4.4 % (ref 0–8)
ERYTHROCYTE [DISTWIDTH] IN BLOOD BY AUTOMATED COUNT: 13.8 % (ref 11.5–14.5)
EST. GFR  (AFRICAN AMERICAN): 35.3 ML/MIN/1.73 M^2
EST. GFR  (NON AFRICAN AMERICAN): 30.6 ML/MIN/1.73 M^2
GLUCOSE SERPL-MCNC: 94 MG/DL (ref 70–110)
HCT VFR BLD AUTO: 34.4 % (ref 40–54)
HGB BLD-MCNC: 10.7 G/DL (ref 14–18)
IMM GRANULOCYTES # BLD AUTO: 0.02 K/UL (ref 0–0.04)
IMM GRANULOCYTES NFR BLD AUTO: 0.4 % (ref 0–0.5)
LYMPHOCYTES # BLD AUTO: 0.8 K/UL (ref 1–4.8)
LYMPHOCYTES NFR BLD: 13.9 % (ref 18–48)
MCH RBC QN AUTO: 35.4 PG (ref 27–31)
MCHC RBC AUTO-ENTMCNC: 31.1 G/DL (ref 32–36)
MCV RBC AUTO: 114 FL (ref 82–98)
MONOCYTES # BLD AUTO: 0.7 K/UL (ref 0.3–1)
MONOCYTES NFR BLD: 12.1 % (ref 4–15)
NEUTROPHILS # BLD AUTO: 3.7 K/UL (ref 1.8–7.7)
NEUTROPHILS NFR BLD: 68.3 % (ref 38–73)
NRBC BLD-RTO: 0 /100 WBC
PLATELET # BLD AUTO: 84 K/UL (ref 150–350)
PMV BLD AUTO: 12.9 FL (ref 9.2–12.9)
POTASSIUM SERPL-SCNC: 4.5 MMOL/L (ref 3.5–5.1)
RBC # BLD AUTO: 3.02 M/UL (ref 4.6–6.2)
SODIUM SERPL-SCNC: 142 MMOL/L (ref 136–145)
T4 FREE SERPL-MCNC: 1.01 NG/DL (ref 0.71–1.51)
TSH SERPL DL<=0.005 MIU/L-ACNC: 3.61 UIU/ML (ref 0.4–4)
WBC # BLD AUTO: 5.46 K/UL (ref 3.9–12.7)

## 2020-02-10 PROCEDURE — 99214 PR OFFICE/OUTPT VISIT, EST, LEVL IV, 30-39 MIN: ICD-10-PCS | Mod: HCNC,S$GLB,, | Performed by: INTERNAL MEDICINE

## 2020-02-10 PROCEDURE — 99214 OFFICE O/P EST MOD 30 MIN: CPT | Mod: HCNC,S$GLB,, | Performed by: INTERNAL MEDICINE

## 2020-02-10 PROCEDURE — 84439 ASSAY OF FREE THYROXINE: CPT | Mod: HCNC

## 2020-02-10 PROCEDURE — 1125F AMNT PAIN NOTED PAIN PRSNT: CPT | Mod: HCNC,S$GLB,, | Performed by: INTERNAL MEDICINE

## 2020-02-10 PROCEDURE — 1101F PR PT FALLS ASSESS DOC 0-1 FALLS W/OUT INJ PAST YR: ICD-10-PCS | Mod: HCNC,CPTII,S$GLB, | Performed by: INTERNAL MEDICINE

## 2020-02-10 PROCEDURE — 1159F MED LIST DOCD IN RCRD: CPT | Mod: HCNC,S$GLB,, | Performed by: INTERNAL MEDICINE

## 2020-02-10 PROCEDURE — 1101F PT FALLS ASSESS-DOCD LE1/YR: CPT | Mod: HCNC,CPTII,S$GLB, | Performed by: INTERNAL MEDICINE

## 2020-02-10 PROCEDURE — 85025 COMPLETE CBC W/AUTO DIFF WBC: CPT | Mod: HCNC

## 2020-02-10 PROCEDURE — 99999 PR PBB SHADOW E&M-EST. PATIENT-LVL IV: CPT | Mod: PBBFAC,HCNC,, | Performed by: INTERNAL MEDICINE

## 2020-02-10 PROCEDURE — 36415 COLL VENOUS BLD VENIPUNCTURE: CPT | Mod: HCNC

## 2020-02-10 PROCEDURE — 1159F PR MEDICATION LIST DOCUMENTED IN MEDICAL RECORD: ICD-10-PCS | Mod: HCNC,S$GLB,, | Performed by: INTERNAL MEDICINE

## 2020-02-10 PROCEDURE — 80048 BASIC METABOLIC PNL TOTAL CA: CPT | Mod: HCNC

## 2020-02-10 PROCEDURE — 99999 PR PBB SHADOW E&M-EST. PATIENT-LVL IV: ICD-10-PCS | Mod: PBBFAC,HCNC,, | Performed by: INTERNAL MEDICINE

## 2020-02-10 PROCEDURE — 1125F PR PAIN SEVERITY QUANTIFIED, PAIN PRESENT: ICD-10-PCS | Mod: HCNC,S$GLB,, | Performed by: INTERNAL MEDICINE

## 2020-02-10 PROCEDURE — 84443 ASSAY THYROID STIM HORMONE: CPT | Mod: HCNC

## 2020-02-10 NOTE — PROGRESS NOTES
PAST MEDICAL HISTORY:   Coronary artery disease with previous bypass surgery, stents in 2013, and then a  subsequent non-ST MI due to in-stent stenosis of obtuse marginal for which angioplasty was performed.  Hypertension.  Hyperlipidemia.  Peripheral vascular disease with stent of the right iliac.  Venous Reflux  Chronic kidney disease with secondary hyperparathyroid.  BPH with elevated PSA.  Carotid artery disease   Osteoarthritis of the knees  Arrhythmia disorder for which he is status post pacemaker and defibrillator for sick sinus syndrome, status post radiofrequency ablation for atrial flutter, and on amiodarone for atrial flutter/fibrillation/PVCs.  Gout.  Bilateral inguinal hernia repair.  Left knee surgery.  Glaucoma.     SOCIAL HISTORY:  Tobacco use, none.  Alcohol use, a couple of drinks a day.     CURRENT MEDICATIONS:  Amiodarone 200 mg daily.  Amlodipine 5 mg daily.    Atrovent nasal spray as needed.  Losartan 50 mg daily.  Crestor 20 mg daily.  Eliquis 2.5 mg twice a day  Done episode 5 mg  Sertraline 50 mg    Transcribed with M*Modal, there will be grammatical errors.        89-year-old male  Reason for the visit he states that for quite a while his body feels cold  He still has chronic numbness involving the hands and feet as well as chronic bilateral foot drop, secondary to combination of neuropathy and cervical and lumbar spondylosis    No chest pain or shortness of breath or abdominal pain and bowel function is regular      Examination  Weight is 187 lb  Pulse 68  Blood pressure 80/62  Lungs clear  Heart regular rate and rhythm  Charly examined bowel sounds soft nontender  2+ radial and pedal pulses    Impression  Hypotension  Cold intolerance    Recommendations  Holding amlodipine   CBC chemistry thyroid panel

## 2020-02-13 ENCOUNTER — PATIENT MESSAGE (OUTPATIENT)
Dept: ADMINISTRATIVE | Facility: OTHER | Age: 85
End: 2020-02-13

## 2020-02-20 ENCOUNTER — TELEPHONE (OUTPATIENT)
Dept: NEUROSURGERY | Facility: CLINIC | Age: 85
End: 2020-02-20

## 2020-02-28 ENCOUNTER — OFFICE VISIT (OUTPATIENT)
Dept: OPTOMETRY | Facility: CLINIC | Age: 85
End: 2020-02-28
Payer: MEDICARE

## 2020-02-28 DIAGNOSIS — H40.1132 PRIMARY OPEN ANGLE GLAUCOMA (POAG) OF BOTH EYES, MODERATE STAGE: Primary | ICD-10-CM

## 2020-02-28 DIAGNOSIS — I25.10 CORONARY ARTERY DISEASE INVOLVING NATIVE CORONARY ARTERY WITHOUT ANGINA PECTORIS, UNSPECIFIED WHETHER NATIVE OR TRANSPLANTED HEART: ICD-10-CM

## 2020-02-28 DIAGNOSIS — H52.7 REFRACTIVE ERROR: ICD-10-CM

## 2020-02-28 PROCEDURE — 92012 INTRM OPH EXAM EST PATIENT: CPT | Mod: HCNC,S$GLB,, | Performed by: OPTOMETRIST

## 2020-02-28 PROCEDURE — 99499 NO LOS: ICD-10-PCS | Mod: HCNC,S$GLB,, | Performed by: OPTOMETRIST

## 2020-02-28 PROCEDURE — 99999 PR PBB SHADOW E&M-EST. PATIENT-LVL II: ICD-10-PCS | Mod: PBBFAC,HCNC,, | Performed by: OPTOMETRIST

## 2020-02-28 PROCEDURE — 99999 PR PBB SHADOW E&M-EST. PATIENT-LVL II: CPT | Mod: PBBFAC,HCNC,, | Performed by: OPTOMETRIST

## 2020-02-28 PROCEDURE — 99499 UNLISTED E&M SERVICE: CPT | Mod: HCNC,S$GLB,, | Performed by: OPTOMETRIST

## 2020-02-28 PROCEDURE — 92015 DETERMINE REFRACTIVE STATE: CPT | Mod: HCNC,S$GLB,, | Performed by: OPTOMETRIST

## 2020-02-28 PROCEDURE — 92015 PR REFRACTION: ICD-10-PCS | Mod: HCNC,S$GLB,, | Performed by: OPTOMETRIST

## 2020-02-28 PROCEDURE — 92012 PR EYE EXAM, EST PATIENT,INTERMED: ICD-10-PCS | Mod: HCNC,S$GLB,, | Performed by: OPTOMETRIST

## 2020-02-28 RX ORDER — LOSARTAN POTASSIUM 50 MG/1
50 TABLET ORAL 2 TIMES DAILY
Qty: 180 TABLET | Refills: 3 | Status: SHIPPED | OUTPATIENT
Start: 2020-02-28

## 2020-02-28 NOTE — PROGRESS NOTES
CRISTIANO RICHTER 11/2019 with Dr. Schneider.  Here for low vision exam today.  Using   Latanoprost OS Q HS, Trusopt TID OS and Alphagan TID OS.  Patient thinks   vision has gradually deteriorated and is concerned he will lose his   vision.  Patient has a lot of difficulty reading books or magazines and   states he given up on reading.  Patient has a lighted hand held magnifier    That he got from the [employer] but he loses he place in reading and ends   up giving up.  Patient uses the computer a lot and has changed the font   and contrast but states it hasn't helped much and he needs to see his   stock portfolio.  Patient has also been to the [employer] but didn't find   anything useful.  Patient states he is still driving with daytime   restriction and distance vision is  Good but near has decreased.  Patient   doesn't have any trouble seeing his phone.  Glasses are 5 yrs. Old.    Patient has a new prosthesis that is about 1 month old and is having some   trouble with drainage and movement and is using  ECN liudmila.  Blur ou at dist, x mos, no assoc pain or red, no relief over time,   constant    Last edited by Reji Hampton, OD on 2/28/2020  3:34 PM. (History)            Assessment /Plan     For exam results, see Encounter Report.    Primary open angle glaucoma (POAG) of both eyes, moderate stage    Refractive error      1. IOP low and stable, keep appt with Jennie.  2. New Spec Rx given. Different lens options discussed with patient. RTC 1 year refraction.

## 2020-02-28 NOTE — PROGRESS NOTES
Assessment /Plan     For exam results, see Encounter Report.    Primary open angle glaucoma (POAG) of both eyes, moderate stage      1. See other exam same date low vision.

## 2020-03-06 ENCOUNTER — PATIENT OUTREACH (OUTPATIENT)
Dept: OTHER | Facility: OTHER | Age: 85
End: 2020-03-06

## 2020-03-06 ENCOUNTER — TELEPHONE (OUTPATIENT)
Dept: CARDIOLOGY | Facility: HOSPITAL | Age: 85
End: 2020-03-06

## 2020-03-06 NOTE — TELEPHONE ENCOUNTER
Returned the pt's call on this am.  Pt stated he rec'd a bill in December for a remote pacemaker check.  Pt then stated since that time he has rec'd a letter from [facility] stating the charge has been waived.  Informed the pt that is correct the charge was to be waived.  Also informed the pt his remote monitor was not communicating with his pacemaker.  Assisted the pt in sending a manual transmission.  Informed the pt the transmission was received and all is WNL.  Understanding was verbalized.

## 2020-03-06 NOTE — PROGRESS NOTES
Last 5 Patient Entered Readings                                      Current 30 Day Average: 129/69     Recent Readings 2/28/2020 2/21/2020 2/19/2020 2/10/2020 2/5/2020    SBP (mmHg) 132 137 141 116 119    DBP (mmHg) 63 73 73 67 69    Pulse 49 49 49 50 53        Hypertension Medications             amLODIPine (NORVASC) 5 MG tablet Take 1 tablet (5 mg total) by mouth once daily.    furosemide (LASIX) 20 MG tablet Take 1 tablet (20 mg total) by mouth once daily. for 7 days    losartan (COZAAR) 50 MG tablet Take 1 tablet (50 mg total) by mouth 2 (two) times daily.        Patient called, confused and upset regarding Epic charge for digital services. Explained, to my knowledge, the only [facility] cuff. Patient states he would like to be dis-enrolled from the program, states he can monitor his BP on his own. Patient will be removed from the HDMP.

## 2020-03-06 NOTE — TELEPHONE ENCOUNTER
----- Message from Surekha Tipton sent at 3/6/2020  9:52 AM CST -----  Contact: pt  Pt would like a call in ref to the company who monitors his device. He thinks it's a company called [employer].    Thanks

## 2020-03-08 ENCOUNTER — PATIENT MESSAGE (OUTPATIENT)
Dept: OTOLARYNGOLOGY | Facility: CLINIC | Age: 85
End: 2020-03-08

## 2020-03-09 ENCOUNTER — PATIENT MESSAGE (OUTPATIENT)
Dept: OTOLARYNGOLOGY | Facility: CLINIC | Age: 85
End: 2020-03-09

## 2020-03-09 ENCOUNTER — OFFICE VISIT (OUTPATIENT)
Dept: URGENT CARE | Facility: CLINIC | Age: 85
End: 2020-03-09
Payer: MEDICARE

## 2020-03-09 VITALS
BODY MASS INDEX: 28.88 KG/M2 | TEMPERATURE: 97 F | DIASTOLIC BLOOD PRESSURE: 76 MMHG | HEART RATE: 52 BPM | RESPIRATION RATE: 18 BRPM | WEIGHT: 184 LBS | SYSTOLIC BLOOD PRESSURE: 148 MMHG | OXYGEN SATURATION: 98 % | HEIGHT: 67 IN

## 2020-03-09 DIAGNOSIS — H61.21 IMPACTED CERUMEN OF RIGHT EAR: Primary | ICD-10-CM

## 2020-03-09 DIAGNOSIS — H92.01 OTALGIA, RIGHT: ICD-10-CM

## 2020-03-09 PROCEDURE — 99214 OFFICE O/P EST MOD 30 MIN: CPT | Mod: S$GLB,,, | Performed by: NURSE PRACTITIONER

## 2020-03-09 PROCEDURE — 99214 PR OFFICE/OUTPT VISIT, EST, LEVL IV, 30-39 MIN: ICD-10-PCS | Mod: S$GLB,,, | Performed by: NURSE PRACTITIONER

## 2020-03-09 NOTE — PATIENT INSTRUCTIONS
Earwax (Treated)    Everyone produces earwax from the lining of the ear canal. It lubricates and protects the ear. The wax that forms in the canal slowly moves toward the outside of the ear and falls out. Sometimes wax can build up in the ear canal. This can cause a blockage and loss of hearing. A buildup of earwax was removed from your ear today.  Home care  If you have a tendency to build up wax in the ear canal, you should clear the wax at home regularly, before it causes discomfort. This should be about once every six months.  · Unless a medicine was prescribed, you may use an over-the-counter product made for clearing earwax. These contain carbamide peroxide and are available over-the-counter in a kit with a small bulb syringe.  · Lie down with the blocked ear facing upward. Apply one dropper full of medicine and wait a few minutes. Grasp the outer ear and wiggle it to help the solution enter the canal.  · Lean over a sink or basin with the blocked ear turned downward. Use a rubber bulb syringe filled with warm (not hot or cold) water to rinse the ear several times. Use gentle pressure only. You may need to repeat the irrigation several times before the wax flows out.  · If you are having trouble draining all the water out of your ear canal, put a few drops of rubbing alcohol into the ear canal. This will help remove the remaining water.  Don'ts  · Dont use cold water to rinse the ear. This will make you dizzy.  · Dont do this procedure if you have an ear infection. Symptoms include ear pain, fever, or fluid draining from the ear.  · Dont do this procedure if you have a punctured eardrum.  · Dont use cotton swabs, matches, hairpins, keys, or other objects to clean the ear canal. This can cause infection of the ear canal or rupture of the eardrum. Because of their size and shape, cotton swabs can push the earwax deeper into the ear canal instead of removing it.  Follow-up care  Follow up with your  healthcare provider, or as advised.  When to seek medical advice  Call your healthcare provider right away if any of these occur:  · Worsening ear pain  · Fever of 100.4°F (38°C) or higher, or as directed by your healthcare provider  · Hearing does not return to normal after three days of treatment  · Fluid drainage or bleeding from the ear canal  · Swelling, redness, or tenderness of the outer ear  · Headache, neck pain, or stiff neck  Date Last Reviewed: 3/22/2015  © 3893-4747 [employer]. [address] [place] [address]. All rights reserved. This information is not intended as a substitute for professional medical care. Always follow your healthcare professional's instructions.

## 2020-03-09 NOTE — PROGRESS NOTES
"Subjective:       Patient ID: Javier Valles is a 89 y.o. male.    Vitals:  height is 5' 7" (1.702 m) and weight is 83.5 kg (184 lb). His temperature is 97 °F (36.1 °C). His blood pressure is 148/76 (abnormal) and his pulse is 52 (abnormal). His respiration is 18 and oxygen saturation is 98%.     Chief Complaint: Otalgia    Patient presents with right ear pain that started Friday and gradually gotten worse. Patient also has post nasal drip.   Provider note begins below  Pt wears hearing aids.  Denies fevers. Reports pain had been intermittent.     Otalgia    There is pain in the right ear. This is a new problem. The current episode started in the past 7 days. The problem occurs constantly. The problem has been gradually worsening. There has been no fever. The patient is experiencing no pain. Pertinent negatives include no coughing, rash, sore throat or vomiting. He has tried nothing for the symptoms. The treatment provided no relief.       Constitution: Negative for chills, sweating, fatigue and fever.   HENT: Positive for ear pain, congestion and sinus pain. Negative for sinus pressure, sore throat and voice change.    Neck: Negative for painful lymph nodes.   Eyes: Negative for eye redness.   Respiratory: Negative for chest tightness, cough, sputum production, bloody sputum, COPD, shortness of breath, stridor, wheezing and asthma.    Gastrointestinal: Negative for nausea and vomiting.   Musculoskeletal: Negative for muscle ache.   Skin: Negative for rash.   Allergic/Immunologic: Negative for seasonal allergies and asthma.   Hematologic/Lymphatic: Negative for swollen lymph nodes.       Objective:      Physical Exam   Constitutional: He is oriented to person, place, and time. He appears well-developed and well-nourished. He is cooperative.  Non-toxic appearance. He does not have a sickly appearance. He does not appear ill. No distress.   HENT:   Head: Normocephalic and atraumatic.   Right Ear: Hearing, tympanic " membrane, external ear and ear canal normal. There is cerumen present. Tympanic membrane is not erythematous.   Left Ear: Hearing, tympanic membrane, external ear and ear canal normal.   Nose: Nose normal. No mucosal edema, rhinorrhea or nasal deformity. No epistaxis. Right sinus exhibits no maxillary sinus tenderness and no frontal sinus tenderness. Left sinus exhibits no maxillary sinus tenderness and no frontal sinus tenderness.   Mouth/Throat: Uvula is midline, oropharynx is clear and moist and mucous membranes are normal. No trismus in the jaw. Normal dentition. No uvula swelling. No oropharyngeal exudate, posterior oropharyngeal edema or posterior oropharyngeal erythema.   Eyes: Conjunctivae and lids are normal. No scleral icterus.   Neck: Trachea normal, full passive range of motion without pain and phonation normal. Neck supple. No neck rigidity. No edema and no erythema present.   Cardiovascular: Normal rate, regular rhythm, intact distal pulses and normal pulses.   Murmur heard.   Systolic murmur is present with a grade of 2/6.  Pulmonary/Chest: Effort normal and breath sounds normal. No respiratory distress. He has no decreased breath sounds. He has no rhonchi.   Abdominal: Normal appearance.   Musculoskeletal: He exhibits no edema or deformity.   Walks with cane   Neurological: He is alert and oriented to person, place, and time. He exhibits normal muscle tone. Coordination normal.   Skin: Skin is warm, dry, intact, not diaphoretic and not pale.   Psychiatric: He has a normal mood and affect. His speech is normal and behavior is normal. Judgment and thought content normal. Cognition and memory are normal.   Nursing note and vitals reviewed.        Assessment:       1. Impacted cerumen of right ear    2. Otalgia, right        Plan:       Pt feels ok after the irrigation. No pain at this time.    Impacted cerumen of right ear  -     Ear wax removal    Otalgia, right  -     Ear wax removal          Patient  Instructions     Earwax (Treated)    Everyone produces earwax from the lining of the ear canal. It lubricates and protects the ear. The wax that forms in the canal slowly moves toward the outside of the ear and falls out. Sometimes wax can build up in the ear canal. This can cause a blockage and loss of hearing. A buildup of earwax was removed from your ear today.  Home care  If you have a tendency to build up wax in the ear canal, you should clear the wax at home regularly, before it causes discomfort. This should be about once every six months.  · Unless a medicine was prescribed, you may use an over-the-counter product made for clearing earwax. These contain carbamide peroxide and are available over-the-counter in a kit with a small bulb syringe.  · Lie down with the blocked ear facing upward. Apply one dropper full of medicine and wait a few minutes. Grasp the outer ear and wiggle it to help the solution enter the canal.  · Lean over a sink or basin with the blocked ear turned downward. Use a rubber bulb syringe filled with warm (not hot or cold) water to rinse the ear several times. Use gentle pressure only. You may need to repeat the irrigation several times before the wax flows out.  · If you are having trouble draining all the water out of your ear canal, put a few drops of rubbing alcohol into the ear canal. This will help remove the remaining water.  Don'ts  · Dont use cold water to rinse the ear. This will make you dizzy.  · Dont do this procedure if you have an ear infection. Symptoms include ear pain, fever, or fluid draining from the ear.  · Dont do this procedure if you have a punctured eardrum.  · Dont use cotton swabs, matches, hairpins, keys, or other objects to clean the ear canal. This can cause infection of the ear canal or rupture of the eardrum. Because of their size and shape, cotton swabs can push the earwax deeper into the ear canal instead of removing it.  Follow-up care  Follow up  with your healthcare provider, or as advised.  When to seek medical advice  Call your healthcare provider right away if any of these occur:  · Worsening ear pain  · Fever of 100.4°F (38°C) or higher, or as directed by your healthcare provider  · Hearing does not return to normal after three days of treatment  · Fluid drainage or bleeding from the ear canal  · Swelling, redness, or tenderness of the outer ear  · Headache, neck pain, or stiff neck  Date Last Reviewed: 3/22/2015  © 2235-6126 [employer]. [address] [place] [address], [place] [address]. All rights reserved. This information is not intended as a substitute for professional medical care. Always follow your healthcare professional's instructions.

## 2020-03-10 NOTE — PROGRESS NOTES
HPI     DLS: 11/11/19    Pt here for HVF review;    Meds;   Dorzolamide TID OS   Brimonidine TID OS   Latanoprost QHS OS     1) POAG   2) PCIOL OS   3) Prosthesis OD   4) ERM OS   5) Hx Rhegmatogenous RD OS   6) Psuedo Mac Hole OS     Last edited by Mariya Dixon on 3/16/2020  8:28 AM. (History)            Assessment /Plan     For exam results, see Encounter Report.    Primary open-angle glaucoma, left eye, moderate stage    Epiretinal membrane, left eye    Posterior vitreous detachment of left eye    Pseudophakia of left eye    Lamellar macular hole of left eye    Prosthetic eye globe          Old pt of Dr. Goodrich - now joanie Sandoval     1. POAG   Followed at [facility] since 1997   followed by Sonia for 30 years  First HVF 2014  gtts started - [facility] - 2012  First photos - ? 1997 - for RD os     Glaucoma meds -  latanoprost qhs os / brimonidine os   H/O adverse rxn to glaucoma drops none  LASERS - SLT os 5/25/2017 (270 degrees - too narrow inf.) (( good resp 16--> 11)   GLAUCOMA SURGERIES none  OTHER EYE SURGERIES - prosthesis od - (2/2 injury - 1940's) // Pnematic retinopexy OS 1997 - [place] // PC iol os - [place]  CDR - prosthesis / 0.75- 0.8  Tbase  - ?? On gtts when started here  Tmax  - ?? Off gtts   Ttarget  - ?? 13 or less if possible // had a disc heme with IOP 15-18   HVF 6  test 2014 to 2020 OS:  ? Paracentral defect with early SAD/IAD  Gonio  +3-4 S/T/N // very narrow inf os   CCT - xx/492  OCT 2 test 2014 to 2016- RNFL - OD:not done // OS:dec s/bord T  HRT 4 test 2015 -  2019 -  MR - prosthesis od //   OS dec G,TS, N, NS, NI, //CDR// 0.771 os (high std dev os)   Disc photos - mult old slides 1997 - 2006 // 2015, 2017 - w/ IT disc heme - OIS    Ttoday - prosthetic // 11  Test done today HRT and IOP check / HVF / DFE / (unable to get OCT - small pupil)     2.  disc heme os    See photos 4/17/2017 - occurred with IOP's of  13-18   Resolved by 10/2/107    3.  Monocular precautions  - prothetic  od    4.  erm os // lamellar hole   - stable, follows with philippe  -on nevanac q day os - feels it helps vision - ?     5.  Hx of rhegmatogenous rrd os  - flat, follows with philippe    6.  Mac hole os  - monitor     7. PC IOL OS     8. - ? S/p yag cap - posterior capsule open     9. Vit floaters OS    Increase in floaters os - will refer back to retina - jaime     10. In past Dr Goodrich has filled out form allowing him to get a drivers license - may need again soon     11. 3/16/2020 - Prosthesis OD    Done many decades ago    C/O some irritation - feels like sand, and some discharge   Prosthesis removed and socket examined by the oculoplastic staff   Socket ok, conj in tact // no breakdown / mild discharge   Refer back to ocularists to see if prosthesis can be smoothed / polished or re-fit      Pt on metoprolol xl      Glaucoma os - monocular   Cont  xalatan  Cont brimonidine  Cont dorz tid    IOP is  at goal// he had disc heme - rec lower IOP - consider slt os (( ?? Target 14))   SLT os - 5/25/2017 - S/T/N (too narrow inf. ) - steroid taper (( good resp 16-->11)     - (?  BB candidate - now has a pacemaker, but has a H/O low heart rate / and/or arrythmia)     -continue EES ointment od - prosthesis - prn irritation     Sees [facility] -  glasses - has been restricted to daytime driving - due for 1 year follow up 3/2019   11/11/2019 - pt is no longer driving     Keep regular yearly  F/U with Jaime -  6/ 2020     Pt occasionally has to get steroid injections for back pain - so will need to monitor that - may be a cause of elevated IOP from time to time      F/U 4 months IOP // gonio     I have seen and personally examined the patient.  I agree with the findings, assessment and plan of the resident and/or fellow.     Sena Schneider MD

## 2020-03-15 ENCOUNTER — PATIENT MESSAGE (OUTPATIENT)
Dept: NEUROLOGY | Facility: CLINIC | Age: 85
End: 2020-03-15

## 2020-03-16 ENCOUNTER — OFFICE VISIT (OUTPATIENT)
Dept: OTOLARYNGOLOGY | Facility: CLINIC | Age: 85
End: 2020-03-16
Payer: MEDICARE

## 2020-03-16 ENCOUNTER — CLINICAL SUPPORT (OUTPATIENT)
Dept: OPHTHALMOLOGY | Facility: CLINIC | Age: 85
End: 2020-03-16
Payer: MEDICARE

## 2020-03-16 ENCOUNTER — OFFICE VISIT (OUTPATIENT)
Dept: OPHTHALMOLOGY | Facility: CLINIC | Age: 85
End: 2020-03-16
Payer: MEDICARE

## 2020-03-16 DIAGNOSIS — R05.9 COUGH: ICD-10-CM

## 2020-03-16 DIAGNOSIS — H40.1122 PRIMARY OPEN-ANGLE GLAUCOMA, LEFT EYE, MODERATE STAGE: Primary | ICD-10-CM

## 2020-03-16 DIAGNOSIS — H35.342 LAMELLAR MACULAR HOLE OF LEFT EYE: ICD-10-CM

## 2020-03-16 DIAGNOSIS — Z97.4 WEARS HEARING AID IN BOTH EARS: ICD-10-CM

## 2020-03-16 DIAGNOSIS — H35.372 EPIRETINAL MEMBRANE, LEFT EYE: ICD-10-CM

## 2020-03-16 DIAGNOSIS — H43.812 POSTERIOR VITREOUS DETACHMENT OF LEFT EYE: ICD-10-CM

## 2020-03-16 DIAGNOSIS — R09.82 POST-NASAL DRIP: Primary | ICD-10-CM

## 2020-03-16 DIAGNOSIS — H40.1132 PRIMARY OPEN ANGLE GLAUCOMA OF BOTH EYES, MODERATE STAGE: ICD-10-CM

## 2020-03-16 DIAGNOSIS — H61.23 BILATERAL IMPACTED CERUMEN: ICD-10-CM

## 2020-03-16 DIAGNOSIS — Z96.1 PSEUDOPHAKIA OF LEFT EYE: ICD-10-CM

## 2020-03-16 DIAGNOSIS — J34.89 RHINORRHEA: ICD-10-CM

## 2020-03-16 DIAGNOSIS — Z97.0 PROSTHETIC EYE GLOBE: ICD-10-CM

## 2020-03-16 PROCEDURE — 99999 PR PBB SHADOW E&M-EST. PATIENT-LVL IV: CPT | Mod: PBBFAC,HCNC,, | Performed by: OTOLARYNGOLOGY

## 2020-03-16 PROCEDURE — 99213 OFFICE O/P EST LOW 20 MIN: CPT | Mod: HCNC,S$GLB,, | Performed by: OTOLARYNGOLOGY

## 2020-03-16 PROCEDURE — 1101F PT FALLS ASSESS-DOCD LE1/YR: CPT | Mod: HCNC,CPTII,S$GLB, | Performed by: OTOLARYNGOLOGY

## 2020-03-16 PROCEDURE — 1126F PR PAIN SEVERITY QUANTIFIED, NO PAIN PRESENT: ICD-10-PCS | Mod: HCNC,S$GLB,, | Performed by: OTOLARYNGOLOGY

## 2020-03-16 PROCEDURE — 99999 PR PBB SHADOW E&M-EST. PATIENT-LVL IV: ICD-10-PCS | Mod: PBBFAC,HCNC,, | Performed by: OTOLARYNGOLOGY

## 2020-03-16 PROCEDURE — 92083 EXTENDED VISUAL FIELD XM: CPT | Mod: HCNC,S$GLB,, | Performed by: OPHTHALMOLOGY

## 2020-03-16 PROCEDURE — 92014 PR EYE EXAM, EST PATIENT,COMPREHESV: ICD-10-PCS | Mod: HCNC,S$GLB,, | Performed by: OPHTHALMOLOGY

## 2020-03-16 PROCEDURE — 99999 PR PBB SHADOW E&M-EST. PATIENT-LVL II: ICD-10-PCS | Mod: PBBFAC,HCNC,, | Performed by: OPHTHALMOLOGY

## 2020-03-16 PROCEDURE — 1101F PR PT FALLS ASSESS DOC 0-1 FALLS W/OUT INJ PAST YR: ICD-10-PCS | Mod: HCNC,CPTII,S$GLB, | Performed by: OTOLARYNGOLOGY

## 2020-03-16 PROCEDURE — 99213 PR OFFICE/OUTPT VISIT, EST, LEVL III, 20-29 MIN: ICD-10-PCS | Mod: HCNC,S$GLB,, | Performed by: OTOLARYNGOLOGY

## 2020-03-16 PROCEDURE — 99999 PR PBB SHADOW E&M-EST. PATIENT-LVL II: CPT | Mod: PBBFAC,HCNC,, | Performed by: OPHTHALMOLOGY

## 2020-03-16 PROCEDURE — 1159F MED LIST DOCD IN RCRD: CPT | Mod: HCNC,S$GLB,, | Performed by: OTOLARYNGOLOGY

## 2020-03-16 PROCEDURE — 1126F AMNT PAIN NOTED NONE PRSNT: CPT | Mod: HCNC,S$GLB,, | Performed by: OTOLARYNGOLOGY

## 2020-03-16 PROCEDURE — 92083 HUMPHREY VISUAL FIELD - OU - BOTH EYES: ICD-10-PCS | Mod: HCNC,S$GLB,, | Performed by: OPHTHALMOLOGY

## 2020-03-16 PROCEDURE — 92014 COMPRE OPH EXAM EST PT 1/>: CPT | Mod: HCNC,S$GLB,, | Performed by: OPHTHALMOLOGY

## 2020-03-16 PROCEDURE — 1159F PR MEDICATION LIST DOCUMENTED IN MEDICAL RECORD: ICD-10-PCS | Mod: HCNC,S$GLB,, | Performed by: OTOLARYNGOLOGY

## 2020-03-16 RX ORDER — IPRATROPIUM BROMIDE 21 UG/1
SPRAY, METERED NASAL
Qty: 30 ML | Refills: 1 | Status: SHIPPED | OUTPATIENT
Start: 2020-03-16 | End: 2021-01-26

## 2020-03-16 NOTE — PATIENT INSTRUCTIONS
Ears cleaned  Atrovent 0.03% nasal spray re-ordered; 1 ( or 2) spray(s) @ lateral nostril BID may help;   discontinue for any nasal bleeding   Call for any significant change in status

## 2020-03-16 NOTE — PROGRESS NOTES
CC:Sinus drip, cough  HPI:Mr. Valles is an 89-year-old  gentleman who has a new ( 2 1/2 month old)  right prosthetic eye ( and socket)  checked earlier this morning.    He gained an appointment with me this morning (although his appointment was in fact scheduled for this afternoon). The clinic is in the midst of the corona virus pandemic at this point.  His chief complaint is a postnasal drip (from right to left) which causes a sensation behind his eye which exacerbates cough.  The drip sensation also forces nose blowing.   His symptoms last about 10 min and then resolve. He does not feel ill and is not take cough suppressant medication.  He also indicates clear nasal discharge while eating, often.  These symptoms began about 2 months ago.  He has never experienced this symptom before.  He does not indicate sneezing or allergy symptoms specifically.    The postnasal drip/mucous runs into his throat causing cough, he believes.  He uses cough drops occasionally.  He denies GERD symptoms.  He does not have a right eye socket infection (as of examination this morning).    He denies any fever recently.    He wears hearing aids, bilaterally.  His medication list includes Aricept, Pacerone and Eliquis.      Past Medical History:   Diagnosis Date    *Atrial flutter 10/3/13    Anticoagulant long-term use     Aspirin only at this time    Arthritis     Atrial fibrillation     Atrial flutter     Blood transfusion     ?    BPH (benign prostatic hypertrophy)     Carotid artery disease     2008: [place] There is 40 - 49% right Internal Carotid stenosis.  There is 20 - 39% left Internal Carotid stenosis.       Chronic kidney disease     Coronary artery disease     DDD (degenerative disc disease) 6/25/2015    Degenerative disc disease     Elevated PSA     Glaucoma     Hyperlipidemia     Hypertension     Lumbar stenosis     lumbar spinal stenosis    NSTEMI (non-ST elevated myocardial infarction) 11/3/2013     Pacemaker 11/2013    Peripheral neuropathy     - S/P right ILIAC stent 1993;      Retinal detachment     Squamous cell carcinoma     left leg    Syncope and collapse: orthostatic with hypotension 10/9/2015    Allergies:  Atorvastatin, provastatin, statins, Ace inhibitors  Past Surgical History:   Procedure Laterality Date    CARDIAC CATHETERIZATION      [facility]    CARDIAC ELECTROPHYSIOLOGY MAPPING AND ABLATION  11/2016    CARDIAC PACEMAKER PLACEMENT  11/2016    CATARACT EXTRACTION W/  INTRAOCULAR LENS IMPLANT Left 1997        CORONARY ARTERY BYPASS GRAFT  1991    ENUCLEATION Right 1949    EPIDURAL STEROID INJECTION N/A 7/17/2019    Procedure: INJECTION, STEROID, EPIDURAL, CAUDAL  cleared to hold eliquis 4 days per Dr. Ruiz;  Surgeon: James Hodges MD;  Location: [facility];  Service: Pain Management;  Laterality: N/A;    INJECTION OF ANESTHETIC AGENT AROUND MEDIAL BRANCH NERVES INNERVATING LUMBAR FACET JOINT Bilateral 12/18/2018    Procedure: BLOCK, NERVE, FACET JOINT, LUMBAR, MEDIAL BRANCH;  Surgeon: James Hodges MD;  Location: [facility];  Service: Pain Management;  Laterality: Bilateral;    LUMBAR LAMINECTOMY  04/25/2016    RETINAL DETACHMENT SURGERY Left 1997    Dr. Cosme    SKIN BIOPSY      SPINAL CORD STIMULATOR TRIAL W/ LAMINOTOMY  10/2017    TONSILLECTOMY       Current Outpatient Medications on File Prior to Visit   Medication Sig Dispense Refill    amiodarone (PACERONE) 200 MG Tab Take 1 tablet (200 mg total) by mouth once daily. 90 tablet 1    amLODIPine (NORVASC) 5 MG tablet Take 1 tablet (5 mg total) by mouth once daily. 90 tablet 3    apixaban (ELIQUIS) 2.5 mg Tab Take 1 tablet (2.5 mg total) by mouth 2 (two) times daily. 180 tablet 3    ascorbic acid (VITAMIN C) 1000 MG tablet Take 1,000 mg by mouth every morning.       brimonidine 0.2% (ALPHAGAN) 0.2 % Drop Place 1 drop into the left eye 3 (three) times daily. 30 mL 3    brinzolamide (AZOPT) 1 %  ophthalmic suspension Place 1 drop into both eyes 3 (three) times daily.      co-enzyme Q-10 30 mg capsule Take 30 mg by mouth once daily.       donepezil (ARICEPT) 5 MG tablet Take 1 tablet (5 mg total) by mouth every evening. 90 tablet 3    dorzolamide (TRUSOPT) 2 % ophthalmic solution Place 1 drop into both eyes 3 (three) times daily. 30 mL 3    erythromycin (ROMYCIN) ophthalmic ointment Apply to affected eye daily as needed 3.5 g 6    FLUCELVAX QUAD 7091-4175, PF, 60 mcg (15 mcg x 4)/0.5 mL Syrg       gabapentin (NEURONTIN) 300 MG capsule Take 1 capsule (300 mg total) by mouth 2 (two) times daily. 60 capsule 5    latanoprost 0.005 % ophthalmic solution Place 1 drop into the left eye every evening. 15 mL 3    losartan (COZAAR) 50 MG tablet Take 1 tablet (50 mg total) by mouth 2 (two) times daily. 180 tablet 3    lutein 20 mg Cap Take 20 mg by mouth once daily.       MULTIVITAMINS,THER W-MINERALS (VITAMINS & MINERALS ORAL) Take 1 tablet by mouth every morning.       oxyCODONE-acetaminophen (PERCOCET) 5-325 mg per tablet Take 1 tablet by mouth every 6 (six) hours as needed for Pain. Greater than 7 day supply, medically necessary 30 tablet 0    polycarbophil (FIBERCON) 625 mg tablet Take 625 mg by mouth 2 (two) times daily.      rosuvastatin (CRESTOR) 20 MG tablet Take 1 tablet (20 mg total) by mouth every evening. 90 tablet 3    sertraline (ZOLOFT) 50 MG tablet Take 1 tablet (50 mg total) by mouth once daily. 30 tablet 11    [DISCONTINUED] ipratropium (ATROVENT) 0.03 % nasal spray USE 1 TO 2 SPRAY(S) IN EACH NOSTRIL TWICE DAILY 30 mL 0    diclofenac sodium (VOLTAREN) 1 % Gel Apply 2 g topically 2 (two) times daily. for 10 days 1 Tube 3    furosemide (LASIX) 20 MG tablet Take 1 tablet (20 mg total) by mouth once daily. for 7 days 7 tablet 0     No current facility-administered medications on file prior to visit.        PE:  Height 5 ft 7 in weight 184 lb  General:  Alert and oriented gentleman  wearing hearing aids in no acute distress  Both ears were examined under the microscope in the micro procedure room  Procedure:  A layer of dry desquamated skin is removed from the semi occluded right ear canal with an angled pick.  A small amount of cerumen is removed from left ear canal with a blunt curette.  Both TMs are clear and intact.  Nasal exam per anterior rhinoscopy reveals no specific evidence of purulent nasal discharge or polypoid disease in either nasal passage after Ever-Synephrine decongestion of both.   Both are quite patent today (even prior to decongestion).  Oropharyngeal exam is unremarkable for inflammation, infection ulceration or thrush.  The uvula is small and not swollen.  Posterior pharynx is not inflamed.  Neck examination is within normal limits.    DIAGNOSIS:     ICD-10-CM ICD-9-CM    1. Post-nasal drip R09.82 784.91 ipratropium (ATROVENT) 0.03 % nasal spray   2. Cough R05 786.2 ipratropium (ATROVENT) 0.03 % nasal spray   3. Rhinorrhea J34.89 478.19 ipratropium (ATROVENT) 0.03 % nasal spray    while eating   4. Rhinorrhea J34.89 478.19 ipratropium (ATROVENT) 0.03 % nasal spray   5. Wears hearing aid in both ears Z97.4 [id number]    6. Bilateral impacted cerumen H61.23 380.4      PLAN: Ears cleaned  Atrovent 0.03% nasal spray re-ordered; 1 ( or 2) spray(s) @ lateral nostril BID may help;  discontinue for any nasal bleeding   Call for any significant change in status

## 2020-03-23 ENCOUNTER — PATIENT OUTREACH (OUTPATIENT)
Dept: OTHER | Facility: OTHER | Age: 85
End: 2020-03-23

## 2020-03-23 NOTE — PROGRESS NOTES
Digital Medicine: Health  Follow-Up    The history is provided by the patient.     Follow Up  Follow-up reason(s): routine education    Patient stated that he has a slight cold but other then that, he is doing well.     His BP reading was back to normal range this morning which he is pleased with.       Intervention/Plan    There are no preventive care reminders to display for this patient.    Last 5 Patient Entered Readings                                      Current 30 Day Average: 135/71     Recent Readings 3/23/2020 3/19/2020 2/28/2020 2/21/2020 2/19/2020    SBP (mmHg) 117 156 132 137 141    DBP (mmHg) 75 74 63 73 73    Pulse 62 59 49 49 49                      Diet Screening   No change to diet.      Medication Adherence Screening   He did not miss a dose this month.    Patient identified the following reasons for non-compliance: None      SDOH

## 2020-03-28 NOTE — LETTER
December 24, 2019      Thiago Gibbs MD  [address]  [facility] [address]           [facility]  [address]  [facility] [address]  Phone: [phone]          Patient: Javier Valles   MR Number: [id number]   YOB: 1930   Date of Visit: 12/24/2019       Dear Dr. Thiago Gibbs:    Thank you for referring Javier Valles to me for evaluation. Attached you will find relevant portions of my assessment and plan of care.    If you have questions, please do not hesitate to call me. I look forward to following Javier Valles along with you.    Sincerely,    Hermilo Wyatt MD    Enclosure  CC:  No Recipients    If you would like to receive this communication electronically, please contact [email or web address] or [phone] to request more information on [employer] access.    For providers and/or their staff who would like to refer a patient to [facility], please contact us through our one-stop-shop provider referral line, [facility], at [phone].    If you feel you have received this communication in error or would no longer like to receive these types of communications, please e-mail [email or web address]         
oxygen/see ambulance record

## 2020-03-30 ENCOUNTER — CLINICAL SUPPORT (OUTPATIENT)
Dept: CARDIOLOGY | Facility: HOSPITAL | Age: 85
End: 2020-03-30
Payer: MEDICARE

## 2020-03-30 DIAGNOSIS — Z95.0 PRESENCE OF CARDIAC PACEMAKER: ICD-10-CM

## 2020-03-30 PROCEDURE — 93296 REM INTERROG EVL PM/IDS: CPT | Performed by: INTERNAL MEDICINE

## 2020-03-30 PROCEDURE — 93294 REM INTERROG EVL PM/LDLS PM: CPT | Mod: ,,, | Performed by: INTERNAL MEDICINE

## 2020-03-30 PROCEDURE — 93294 CARDIAC DEVICE CHECK - REMOTE: ICD-10-PCS | Mod: ,,, | Performed by: INTERNAL MEDICINE

## 2020-04-01 ENCOUNTER — PATIENT MESSAGE (OUTPATIENT)
Dept: OPHTHALMOLOGY | Facility: CLINIC | Age: 85
End: 2020-04-01

## 2020-04-05 ENCOUNTER — PATIENT MESSAGE (OUTPATIENT)
Dept: INTERNAL MEDICINE | Facility: CLINIC | Age: 85
End: 2020-04-05

## 2020-04-06 RX ORDER — AMLODIPINE BESYLATE 5 MG/1
TABLET ORAL
Qty: 90 TABLET | Refills: 3 | Status: SHIPPED | OUTPATIENT
Start: 2020-04-06 | End: 2020-10-01 | Stop reason: SDUPTHER

## 2020-04-06 RX ORDER — AMLODIPINE BESYLATE 5 MG/1
5 TABLET ORAL DAILY
Qty: 90 TABLET | Refills: 3 | Status: SHIPPED | OUTPATIENT
Start: 2020-04-06 | End: 2020-06-10

## 2020-04-22 ENCOUNTER — TELEPHONE (OUTPATIENT)
Dept: CARDIOLOGY | Facility: CLINIC | Age: 85
End: 2020-04-22

## 2020-04-22 ENCOUNTER — PATIENT MESSAGE (OUTPATIENT)
Dept: CARDIOLOGY | Facility: CLINIC | Age: 85
End: 2020-04-22

## 2020-04-28 ENCOUNTER — TELEPHONE (OUTPATIENT)
Dept: CARDIOLOGY | Facility: CLINIC | Age: 85
End: 2020-04-28

## 2020-04-28 NOTE — TELEPHONE ENCOUNTER
Pt was scheduled on 5/8, called back to reschedule sometime in late June, informed him I will call in May to reschedule.

## 2020-05-03 ENCOUNTER — PATIENT MESSAGE (OUTPATIENT)
Dept: PAIN MEDICINE | Facility: CLINIC | Age: 85
End: 2020-05-03

## 2020-05-06 ENCOUNTER — PATIENT MESSAGE (OUTPATIENT)
Dept: PAIN MEDICINE | Facility: CLINIC | Age: 85
End: 2020-05-06

## 2020-05-06 ENCOUNTER — PATIENT MESSAGE (OUTPATIENT)
Dept: CARDIOLOGY | Facility: CLINIC | Age: 85
End: 2020-05-06

## 2020-05-07 ENCOUNTER — TELEPHONE (OUTPATIENT)
Dept: PAIN MEDICINE | Facility: CLINIC | Age: 85
End: 2020-05-07

## 2020-05-07 NOTE — TELEPHONE ENCOUNTER
----- Message from Paulette Merlos sent at 5/7/2020 11:26 AM CDT -----  Contact: Joanie (spouse)  Name of Who is Calling: Joanie (spouse)    What is the request in detail: Would like to speak with staff to schedule virtual visit to discuss injection for back pain. Please contact to further discuss and advise      Can the clinic reply by [facility]: no    What [facility] to [facility] Back [facility]: [phone]

## 2020-05-07 NOTE — TELEPHONE ENCOUNTER
Kelly,  Please schedule him for telemed visit so that we can discuss what specific issues he is having. From there we can determine what procedure would be most appropriate. Thank you.    JR

## 2020-05-08 ENCOUNTER — TELEPHONE (OUTPATIENT)
Dept: PAIN MEDICINE | Facility: CLINIC | Age: 85
End: 2020-05-08

## 2020-05-11 ENCOUNTER — OFFICE VISIT (OUTPATIENT)
Dept: PAIN MEDICINE | Facility: CLINIC | Age: 85
End: 2020-05-11
Attending: ANESTHESIOLOGY
Payer: MEDICARE

## 2020-05-11 DIAGNOSIS — M96.1 POST LAMINECTOMY SYNDROME: Primary | ICD-10-CM

## 2020-05-11 DIAGNOSIS — M48.062 SPINAL STENOSIS OF LUMBAR REGION WITH NEUROGENIC CLAUDICATION: ICD-10-CM

## 2020-05-11 DIAGNOSIS — M47.26 OSTEOARTHRITIS OF SPINE WITH RADICULOPATHY, LUMBAR REGION: ICD-10-CM

## 2020-05-11 DIAGNOSIS — D69.6 THROMBOCYTOPENIA: ICD-10-CM

## 2020-05-11 DIAGNOSIS — Z96.89 STATUS POST INSERTION OF SPINAL CORD STIMULATOR: ICD-10-CM

## 2020-05-11 DIAGNOSIS — M47.819 SPONDYLOSIS WITHOUT MYELOPATHY: ICD-10-CM

## 2020-05-11 DIAGNOSIS — M51.36 LUMBAR DEGENERATIVE DISC DISEASE: ICD-10-CM

## 2020-05-11 DIAGNOSIS — Z79.01 CURRENT USE OF LONG TERM ANTICOAGULATION: ICD-10-CM

## 2020-05-11 DIAGNOSIS — G89.4 CHRONIC PAIN SYNDROME: ICD-10-CM

## 2020-05-11 PROBLEM — M54.16 ACUTE LUMBAR RADICULOPATHY: Status: RESOLVED | Noted: 2017-05-02 | Resolved: 2020-05-11

## 2020-05-11 PROBLEM — M54.50 CHRONIC MIDLINE LOW BACK PAIN WITHOUT SCIATICA: Status: RESOLVED | Noted: 2019-08-08 | Resolved: 2020-05-11

## 2020-05-11 PROBLEM — G89.29 CHRONIC PAIN: Status: RESOLVED | Noted: 2017-04-28 | Resolved: 2020-05-11

## 2020-05-11 PROBLEM — G89.29 CHRONIC MIDLINE LOW BACK PAIN WITHOUT SCIATICA: Status: RESOLVED | Noted: 2019-08-08 | Resolved: 2020-05-11

## 2020-05-11 PROCEDURE — 99499 RISK ADDL DX/OHS AUDIT: ICD-10-PCS | Mod: HCNC,95,, | Performed by: ANESTHESIOLOGY

## 2020-05-11 PROCEDURE — 1159F MED LIST DOCD IN RCRD: CPT | Mod: HCNC,95,, | Performed by: ANESTHESIOLOGY

## 2020-05-11 PROCEDURE — 1101F PR PT FALLS ASSESS DOC 0-1 FALLS W/OUT INJ PAST YR: ICD-10-PCS | Mod: HCNC,CPTII,95, | Performed by: ANESTHESIOLOGY

## 2020-05-11 PROCEDURE — 99499 UNLISTED E&M SERVICE: CPT | Mod: HCNC,95,, | Performed by: ANESTHESIOLOGY

## 2020-05-11 PROCEDURE — 99214 PR OFFICE/OUTPT VISIT, EST, LEVL IV, 30-39 MIN: ICD-10-PCS | Mod: HCNC,95,GC, | Performed by: ANESTHESIOLOGY

## 2020-05-11 PROCEDURE — 99214 OFFICE O/P EST MOD 30 MIN: CPT | Mod: HCNC,95,GC, | Performed by: ANESTHESIOLOGY

## 2020-05-11 PROCEDURE — 1159F PR MEDICATION LIST DOCUMENTED IN MEDICAL RECORD: ICD-10-PCS | Mod: HCNC,95,, | Performed by: ANESTHESIOLOGY

## 2020-05-11 PROCEDURE — 1101F PT FALLS ASSESS-DOCD LE1/YR: CPT | Mod: HCNC,CPTII,95, | Performed by: ANESTHESIOLOGY

## 2020-05-11 NOTE — PROGRESS NOTES
Chronic Pain-Tele-Medicine-Established Note (Follow up visit)      The patient location is: home  The chief complaint leading to consultation is: low back pain  Visit type: Virtual visit with synchronous audio and video  Total time spent with patient: 20 min  Each patient to whom he or she provides medical services by telemedicine is:  (1) informed of the relationship between the physician and patient and the respective role of any other health care provider with respect to management of the patient; and (2) notified that he or she may decline to receive medical services by telemedicine and may withdraw from such care at any time.          SUBJECTIVE:  Interval History 05/11/2020:  Today he reports that his low back pain has increased over the last several months. He describes it as a dull/achy, sometimes throbbing, low back pain. He has been taking tylenol as needed with no relief. He reports that his back pain has interfered with his daily activities. He reports he has to lay or sit to get relief, he cannot ambulate or walk; he reports he has to stay hunched over 50 degrees to get relief while walking and by doing this, it makes him unstable. He reports the pain is isolated to the lower back, does not radiate down the legs.  He denies any new b/b incontinence, any significant subjective LE weakness, and no SA.     Patient noted to staff that he was experiencing pain at the battery site for his SCS.    Interval history 08/28/2019  Javier Valles presents to the clinic for a follow-up appointment for lower back and bilateral leg pain. He reports increased neck pain that began on Thursday after no inciting event. He woke up with this pain. He reports neck pain that radiates into both shoulders and into his right arm. He describes this pain as burning. This pain is worse with turning his head side to side. He also reports increased pain with extension. He tried ice and heat with limited relief. He continues to  report low back pain that radiates down the back of both legs to his feet. He reports limited relief with SCS. He is frustrated with his continued pain. He reports a recent CT myelogram ordered by an outside neurosurgeon. He is awaiting the results. He continues to take Percocet sparingly with benefit. He denies any other health changes.          Pain Medications:  Robaxin 750 mg PRN muscle pain  Compounding cream  Percocet 5/325 mg PRN     Tried in past: gabapentin 100 mg TID, Percocet (helpful), Norco     - Anti-Coagulants: Eliquis (pacemaker)     Opioid Contract: no      report:  Not applicable     Pain Procedures:  Multiple JOMAR's with Dr. Lopez 4 yrs ago  4/28/17 Right L4-5 TF JOMAR- 100% relief   5/29/17 Left L5-S1 TF JOMAR  7/21/17 Caudal JOMAR- significant benefit  9/22/17 Lumbar SCS trial- 90% relief  12/18/18 Bilateral L2,3,4,5 MBB- 90% relief  1/9/19 Left L2,3,4,5 RFA- 70% relief     Spinal surgeries:  MIS laminectomy L3-4 and L4-5.     Imaging: no new imaging    Past Medical History:   Diagnosis Date    *Atrial flutter 10/3/13    Anticoagulant long-term use     Aspirin only at this time    Arthritis     Atrial fibrillation     Atrial flutter     Blood transfusion     ?    BPH (benign prostatic hypertrophy)     Carotid artery disease     2008: [place] There is 40 - 49% right Internal Carotid stenosis.  There is 20 - 39% left Internal Carotid stenosis.       Chronic kidney disease     Coronary artery disease     DDD (degenerative disc disease) 6/25/2015    Degenerative disc disease     Elevated PSA     Glaucoma     Hyperlipidemia     Hypertension     Lumbar stenosis     lumbar spinal stenosis    NSTEMI (non-ST elevated myocardial infarction) 11/3/2013    Pacemaker 11/2013    Peripheral neuropathy     - S/P right ILIAC stent 1993;      Retinal detachment     Squamous cell carcinoma     left leg    Syncope and collapse: orthostatic with hypotension 10/9/2015     Past Surgical History:    Procedure Laterality Date    [facility]TERIZATION      [facility]    CARDIAC ELECTROPHYSIOLOGY MAPPING AND ABLATION  2016    CARDIAC PACEMAKER PLACEMENT  2016    CATARACT EXTRACTION W/  INTRAOCULAR LENS IMPLANT Left         CORONARY ARTERY BYPASS GRAFT  1991    ENUCLEATION Right     EPIDURAL STEROID INJECTION N/A 2019    Procedure: INJECTION, STEROID, EPIDURAL, CAUDAL  cleared to hold eliquis 4 days per Dr. Ruiz;  Surgeon: James Hodges MD;  Location: [facility];  Service: Pain Management;  Laterality: N/A;    INJECTION OF ANESTHETIC AGENT AROUND MEDIAL BRANCH NERVES INNERVATING LUMBAR FACET JOINT Bilateral 2018    Procedure: BLOCK, NERVE, FACET JOINT, LUMBAR, MEDIAL BRANCH;  Surgeon: James Hodges MD;  Location: [facility];  Service: Pain Management;  Laterality: Bilateral;    LUMBAR LAMINECTOMY  2016    RETINAL DETACHMENT SURGERY Left     Dr. Cosme    SKIN BIOPSY      SPINAL CORD STIMULATOR TRIAL W/ LAMINOTOMY  10/2017    TONSILLECTOMY       Social History     Socioeconomic History    Marital status:      Spouse name: Joanie    Number of children: Not on file    Years of education: Not on file    Highest education level: Not on file   Occupational History    Occupation: retired   Social Needs    Financial resource strain: Not hard at all    Food insecurity:     Worry: Never true     Inability: Never true    Transportation needs:     Medical: No     Non-medical: No   Tobacco Use    Smoking status: Former Smoker     Last attempt to quit: 1963     Years since quittin.7    Smokeless tobacco: Never Used   Substance and Sexual Activity    Alcohol use: Yes     Alcohol/week: 3.0 standard drinks     Types: 1 Glasses of wine, 1 Cans of beer, 1 Shots of liquor per week     Frequency: 2-3 times a week     Drinks per session: 1 or 2     Binge frequency: Never     Comment: 2 a day    Drug use: No    Sexual activity: Yes      Partners: Female   Lifestyle    Physical activity:     Days per week: 2 days     Minutes per session: 10 min    Stress: Not at all   Relationships    Social connections:     Talks on phone: More than three times a week     Gets together: Three times a week     Attends Confucianist service: Not on file     Active member of club or organization: Not on file     Attends meetings of clubs or organizations: More than 4 times per year     Relationship status:    Other Topics Concern    Not on file   Social History Narrative    Not on file     Family History   Problem Relation Age of Onset    Stroke Mother 69    Clotting disorder Mother         per patient no clotting disorder    Hypertension Mother     Diverticulitis Son     Cancer Neg Hx     Diabetes Neg Hx     Heart disease Neg Hx     Glaucoma Neg Hx     Amblyopia Neg Hx     Blindness Neg Hx     Cataracts Neg Hx     Macular degeneration Neg Hx     Retinal detachment Neg Hx     Strabismus Neg Hx        Review of patient's allergies indicates:   Allergen Reactions    Atorvastatin      Myositis/elevated CPK    Pravastatin      Severe myalgias/elevated CPK    Statins-hmg-coa reductase inhibitors      Muscle pain and loss of muscle    Ace inhibitors      Cough       Current Outpatient Medications   Medication Sig    amiodarone (PACERONE) 200 MG Tab Take 1 tablet (200 mg total) by mouth once daily.    amLODIPine (NORVASC) 5 MG tablet Take 1 tablet by mouth once daily    amLODIPine (NORVASC) 5 MG tablet Take 1 tablet (5 mg total) by mouth once daily.    apixaban (ELIQUIS) 2.5 mg Tab Take 1 tablet (2.5 mg total) by mouth 2 (two) times daily.    ascorbic acid (VITAMIN C) 1000 MG tablet Take 1,000 mg by mouth every morning.     brimonidine 0.2% (ALPHAGAN) 0.2 % Drop Place 1 drop into the left eye 3 (three) times daily.    brinzolamide (AZOPT) 1 % ophthalmic suspension Place 1 drop into both eyes 3 (three) times daily.    co-enzyme Q-10 30 mg  capsule Take 30 mg by mouth once daily.     diclofenac sodium (VOLTAREN) 1 % Gel Apply 2 g topically 2 (two) times daily. for 10 days    donepezil (ARICEPT) 5 MG tablet Take 1 tablet (5 mg total) by mouth every evening.    dorzolamide (TRUSOPT) 2 % ophthalmic solution Place 1 drop into both eyes 3 (three) times daily.    erythromycin (ROMYCIN) ophthalmic ointment Apply to affected eye daily as needed    FLUCELVAX QUAD 3336-8240, PF, 60 mcg (15 mcg x 4)/0.5 mL Syrg     furosemide (LASIX) 20 MG tablet Take 1 tablet (20 mg total) by mouth once daily. for 7 days    gabapentin (NEURONTIN) 300 MG capsule Take 1 capsule (300 mg total) by mouth 2 (two) times daily.    ipratropium (ATROVENT) 0.03 % nasal spray USE 1 TO 2 SPRAY(S) IN EACH NOSTRIL TWICE DAILY for rhinorrhea/nasal drip    latanoprost 0.005 % ophthalmic solution Place 1 drop into the left eye every evening.    losartan (COZAAR) 50 MG tablet Take 1 tablet (50 mg total) by mouth 2 (two) times daily.    lutein 20 mg Cap Take 20 mg by mouth once daily.     MULTIVITAMINS,THER W-MINERALS (VITAMINS & MINERALS ORAL) Take 1 tablet by mouth every morning.     oxyCODONE-acetaminophen (PERCOCET) 5-325 mg per tablet Take 1 tablet by mouth every 6 (six) hours as needed for Pain. Greater than 7 day supply, medically necessary    polycarbophil (FIBERCON) 625 mg tablet Take 625 mg by mouth 2 (two) times daily.    rosuvastatin (CRESTOR) 20 MG tablet Take 1 tablet (20 mg total) by mouth every evening.    sertraline (ZOLOFT) 50 MG tablet Take 1 tablet (50 mg total) by mouth once daily.     No current facility-administered medications for this visit.        REVIEW OF SYSTEMS:    GENERAL:  No weight loss, malaise or fevers.  HEENT:   +visual impairment  NECK:  Negative for lumps, no difficulty with swallowing.  RESPIRATORY:  Negative for cough, wheezing or shortness of breath, patient denies any recent URI.  CARDIOVASCULAR:  Negative for chest pain, leg swelling or  palpitations.  GI:  Negative for abdominal discomfort.  MUSCULOSKELETAL:  See HPI.  SKIN:  Negative for lesions, rash, and itching.  PSYCH:  No mood disorder or recent psychosocial stressors.  Patients sleep is disturbed secondary to pain.  HEMATOLOGY/LYMPHOLOGY:  Negative for prolonged bleeding, bruising easily or swollen nodes.  Patient is not currently taking any anti-coagulants  NEURO:   No history of headaches, syncope, paralysis, seizures or tremors.  All other reviewed and negative other than HPI.    OBJECTIVE:    General appearance: Well appearing, in no acute distress, alert and oriented x3.  Psych:  Mood and affect appropriate.      ASSESSMENT: 89 y.o. year old male with chronic low back pain, consistent with     1. Post laminectomy syndrome  CT Lumbar Spine Without Contrast    X-Ray Thoracolumbar Spine AP Lateral    X-Ray Hips Bilateral 2 View Incl AP Pelvis    CBC W/ AUTO DIFFERENTIAL   2. Chronic pain syndrome  CT Lumbar Spine Without Contrast    X-Ray Thoracolumbar Spine AP Lateral    X-Ray Hips Bilateral 2 View Incl AP Pelvis    CBC W/ AUTO DIFFERENTIAL   3. Spinal stenosis of lumbar region with neurogenic claudication  CT Lumbar Spine Without Contrast    X-Ray Thoracolumbar Spine AP Lateral    X-Ray Hips Bilateral 2 View Incl AP Pelvis    CBC W/ AUTO DIFFERENTIAL   4. Spondylosis without myelopathy  CT Lumbar Spine Without Contrast    X-Ray Thoracolumbar Spine AP Lateral    X-Ray Hips Bilateral 2 View Incl AP Pelvis    CBC W/ AUTO DIFFERENTIAL   5. Lumbar degenerative disc disease  CT Lumbar Spine Without Contrast    X-Ray Thoracolumbar Spine AP Lateral    X-Ray Hips Bilateral 2 View Incl AP Pelvis    CBC W/ AUTO DIFFERENTIAL   6. Osteoarthritis of spine with radiculopathy, lumbar region  CT Lumbar Spine Without Contrast    X-Ray Thoracolumbar Spine AP Lateral    X-Ray Hips Bilateral 2 View Incl AP Pelvis    CBC W/ AUTO DIFFERENTIAL   7. Current use of long term anticoagulation  CT Lumbar Spine  Without Contrast    X-Ray Thoracolumbar Spine AP Lateral    X-Ray Hips Bilateral 2 View Incl AP Pelvis    CBC W/ AUTO DIFFERENTIAL   8. Thrombocytopenia  CT Lumbar Spine Without Contrast    X-Ray Thoracolumbar Spine AP Lateral    X-Ray Hips Bilateral 2 View Incl AP Pelvis    CBC W/ AUTO DIFFERENTIAL   9. Status post insertion of spinal cord stimulator  CT Lumbar Spine Without Contrast    X-Ray Thoracolumbar Spine AP Lateral    X-Ray Hips Bilateral 2 View Incl AP Pelvis    CBC W/ AUTO DIFFERENTIAL         PLAN:   - I have stressed the importance of physical activity and a home exercise plan to help with pain and improve health.  - Patient can continue with medications for now since they are providing benefits, using them appropriately, and without side effects.  -Will schedule patient for Caudal JOMAR with catheter to address his pain complaints  - Patient found to be thrombocytopenic, platelets 84 on 02/10/20. Will obtain CBC prior to lumbar JOMAR  -Will order CT lumbar spine and XR of the lumbar spine.  -Will need clearance to stop anticoagulation prior to scheduled procedure.  - Encouraged patient to continue following up with Neurology as they mentioned they may consider LP to assess for CIDP  - RTC for Caudal JOMAR with catheter  - Counseled patient regarding the importance of activity modification, alcohol cessation and constant sleeping habits.    The above plan and management options were discussed at length with patient. Patient is in agreement with the above and verbalized understanding. It will be communicated with the referring physician via electronic record, fax, or mail.    Gilbert Chun, DO [employer] [facility] PGY2  I have personally reviewed the history and personally discussed the plan with patient through video visit and agree with the resident/fellow/NPs note as stated above.    James Hodges MD      05/11/2020

## 2020-05-11 NOTE — H&P (VIEW-ONLY)
Chronic Pain-Tele-Medicine-Established Note (Follow up visit)      The patient location is: home  The chief complaint leading to consultation is: low back pain  Visit type: Virtual visit with synchronous audio and video  Total time spent with patient: 20 min  Each patient to whom he or she provides medical services by telemedicine is:  (1) informed of the relationship between the physician and patient and the respective role of any other health care provider with respect to management of the patient; and (2) notified that he or she may decline to receive medical services by telemedicine and may withdraw from such care at any time.          SUBJECTIVE:  Interval History 05/11/2020:  Today he reports that his low back pain has increased over the last several months. He describes it as a dull/achy, sometimes throbbing, low back pain. He has been taking tylenol as needed with no relief. He reports that his back pain has interfered with his daily activities. He reports he has to lay or sit to get relief, he cannot ambulate or walk; he reports he has to stay hunched over 50 degrees to get relief while walking and by doing this, it makes him unstable. He reports the pain is isolated to the lower back, does not radiate down the legs.  He denies any new b/b incontinence, any significant subjective LE weakness, and no SA.     Patient noted to staff that he was experiencing pain at the battery site for his SCS.    Interval history 08/28/2019  Javier Valles presents to the clinic for a follow-up appointment for lower back and bilateral leg pain. He reports increased neck pain that began on Thursday after no inciting event. He woke up with this pain. He reports neck pain that radiates into both shoulders and into his right arm. He describes this pain as burning. This pain is worse with turning his head side to side. He also reports increased pain with extension. He tried ice and heat with limited relief. He continues to  report low back pain that radiates down the back of both legs to his feet. He reports limited relief with SCS. He is frustrated with his continued pain. He reports a recent CT myelogram ordered by an outside neurosurgeon. He is awaiting the results. He continues to take Percocet sparingly with benefit. He denies any other health changes.          Pain Medications:  Robaxin 750 mg PRN muscle pain  Compounding cream  Percocet 5/325 mg PRN     Tried in past: gabapentin 100 mg TID, Percocet (helpful), Norco     - Anti-Coagulants: Eliquis (pacemaker)     Opioid Contract: no      report:  Not applicable     Pain Procedures:  Multiple JOMAR's with Dr. Lopez 4 yrs ago  4/28/17 Right L4-5 TF JOMAR- 100% relief   5/29/17 Left L5-S1 TF JOMAR  7/21/17 Caudal JOMAR- significant benefit  9/22/17 Lumbar SCS trial- 90% relief  12/18/18 Bilateral L2,3,4,5 MBB- 90% relief  1/9/19 Left L2,3,4,5 RFA- 70% relief     Spinal surgeries:  MIS laminectomy L3-4 and L4-5.     Imaging: no new imaging    Past Medical History:   Diagnosis Date    *Atrial flutter 10/3/13    Anticoagulant long-term use     Aspirin only at this time    Arthritis     Atrial fibrillation     Atrial flutter     Blood transfusion     ?    BPH (benign prostatic hypertrophy)     Carotid artery disease     2008: [place] There is 40 - 49% right Internal Carotid stenosis.  There is 20 - 39% left Internal Carotid stenosis.       Chronic kidney disease     Coronary artery disease     DDD (degenerative disc disease) 6/25/2015    Degenerative disc disease     Elevated PSA     Glaucoma     Hyperlipidemia     Hypertension     Lumbar stenosis     lumbar spinal stenosis    NSTEMI (non-ST elevated myocardial infarction) 11/3/2013    Pacemaker 11/2013    Peripheral neuropathy     - S/P right ILIAC stent 1993;      Retinal detachment     Squamous cell carcinoma     left leg    Syncope and collapse: orthostatic with hypotension 10/9/2015     Past Surgical History:    Procedure Laterality Date    [facility]TERIZATION      [facility]    CARDIAC ELECTROPHYSIOLOGY MAPPING AND ABLATION  2016    CARDIAC PACEMAKER PLACEMENT  2016    CATARACT EXTRACTION W/  INTRAOCULAR LENS IMPLANT Left         CORONARY ARTERY BYPASS GRAFT  1991    ENUCLEATION Right     EPIDURAL STEROID INJECTION N/A 2019    Procedure: INJECTION, STEROID, EPIDURAL, CAUDAL  cleared to hold eliquis 4 days per Dr. Ruiz;  Surgeon: James Hodges MD;  Location: [facility]IN [facility];  Service: Pain Management;  Laterality: N/A;    INJECTION OF ANESTHETIC AGENT AROUND MEDIAL BRANCH NERVES INNERVATING LUMBAR FACET JOINT Bilateral 2018    Procedure: BLOCK, NERVE, FACET JOINT, LUMBAR, MEDIAL BRANCH;  Surgeon: James Hodges MD;  Location: [facility];  Service: Pain Management;  Laterality: Bilateral;    LUMBAR LAMINECTOMY  2016    RETINAL DETACHMENT SURGERY Left     Dr. Cosme    SKIN BIOPSY      SPINAL CORD STIMULATOR TRIAL W/ LAMINOTOMY  10/2017    TONSILLECTOMY       Social History     Socioeconomic History    Marital status:      Spouse name: Joanie    Number of children: Not on file    Years of education: Not on file    Highest education level: Not on file   Occupational History    Occupation: retired   Social Needs    Financial resource strain: Not hard at all    Food insecurity:     Worry: Never true     Inability: Never true    Transportation needs:     Medical: No     Non-medical: No   Tobacco Use    Smoking status: Former Smoker     Last attempt to quit: 1963     Years since quittin.7    Smokeless tobacco: Never Used   Substance and Sexual Activity    Alcohol use: Yes     Alcohol/week: 3.0 standard drinks     Types: 1 Glasses of wine, 1 Cans of beer, 1 Shots of liquor per week     Frequency: 2-3 times a week     Drinks per session: 1 or 2     Binge frequency: Never     Comment: 2 a day    Drug use: No    Sexual activity: Yes      Partners: Female   Lifestyle    Physical activity:     Days per week: 2 days     Minutes per session: 10 min    Stress: Not at all   Relationships    Social connections:     Talks on phone: More than three times a week     Gets together: Three times a week     Attends Mandaeism service: Not on file     Active member of club or organization: Not on file     Attends meetings of clubs or organizations: More than 4 times per year     Relationship status:    Other Topics Concern    Not on file   Social History Narrative    Not on file     Family History   Problem Relation Age of Onset    Stroke Mother 69    Clotting disorder Mother         per patient no clotting disorder    Hypertension Mother     Diverticulitis Son     Cancer Neg Hx     Diabetes Neg Hx     Heart disease Neg Hx     Glaucoma Neg Hx     Amblyopia Neg Hx     Blindness Neg Hx     Cataracts Neg Hx     Macular degeneration Neg Hx     Retinal detachment Neg Hx     Strabismus Neg Hx        Review of patient's allergies indicates:   Allergen Reactions    Atorvastatin      Myositis/elevated CPK    Pravastatin      Severe myalgias/elevated CPK    Statins-hmg-coa reductase inhibitors      Muscle pain and loss of muscle    Ace inhibitors      Cough       Current Outpatient Medications   Medication Sig    amiodarone (PACERONE) 200 MG Tab Take 1 tablet (200 mg total) by mouth once daily.    amLODIPine (NORVASC) 5 MG tablet Take 1 tablet by mouth once daily    amLODIPine (NORVASC) 5 MG tablet Take 1 tablet (5 mg total) by mouth once daily.    apixaban (ELIQUIS) 2.5 mg Tab Take 1 tablet (2.5 mg total) by mouth 2 (two) times daily.    ascorbic acid (VITAMIN C) 1000 MG tablet Take 1,000 mg by mouth every morning.     brimonidine 0.2% (ALPHAGAN) 0.2 % Drop Place 1 drop into the left eye 3 (three) times daily.    brinzolamide (AZOPT) 1 % ophthalmic suspension Place 1 drop into both eyes 3 (three) times daily.    co-enzyme Q-10 30 mg  capsule Take 30 mg by mouth once daily.     diclofenac sodium (VOLTAREN) 1 % Gel Apply 2 g topically 2 (two) times daily. for 10 days    donepezil (ARICEPT) 5 MG tablet Take 1 tablet (5 mg total) by mouth every evening.    dorzolamide (TRUSOPT) 2 % ophthalmic solution Place 1 drop into both eyes 3 (three) times daily.    erythromycin (ROMYCIN) ophthalmic ointment Apply to affected eye daily as needed    FLUCELVAX QUAD 6375-2989, PF, 60 mcg (15 mcg x 4)/0.5 mL Syrg     furosemide (LASIX) 20 MG tablet Take 1 tablet (20 mg total) by mouth once daily. for 7 days    gabapentin (NEURONTIN) 300 MG capsule Take 1 capsule (300 mg total) by mouth 2 (two) times daily.    ipratropium (ATROVENT) 0.03 % nasal spray USE 1 TO 2 SPRAY(S) IN EACH NOSTRIL TWICE DAILY for rhinorrhea/nasal drip    latanoprost 0.005 % ophthalmic solution Place 1 drop into the left eye every evening.    losartan (COZAAR) 50 MG tablet Take 1 tablet (50 mg total) by mouth 2 (two) times daily.    lutein 20 mg Cap Take 20 mg by mouth once daily.     MULTIVITAMINS,THER W-MINERALS (VITAMINS & MINERALS ORAL) Take 1 tablet by mouth every morning.     oxyCODONE-acetaminophen (PERCOCET) 5-325 mg per tablet Take 1 tablet by mouth every 6 (six) hours as needed for Pain. Greater than 7 day supply, medically necessary    polycarbophil (FIBERCON) 625 mg tablet Take 625 mg by mouth 2 (two) times daily.    rosuvastatin (CRESTOR) 20 MG tablet Take 1 tablet (20 mg total) by mouth every evening.    sertraline (ZOLOFT) 50 MG tablet Take 1 tablet (50 mg total) by mouth once daily.     No current facility-administered medications for this visit.        REVIEW OF SYSTEMS:    GENERAL:  No weight loss, malaise or fevers.  HEENT:   +visual impairment  NECK:  Negative for lumps, no difficulty with swallowing.  RESPIRATORY:  Negative for cough, wheezing or shortness of breath, patient denies any recent URI.  CARDIOVASCULAR:  Negative for chest pain, leg swelling or  palpitations.  GI:  Negative for abdominal discomfort.  MUSCULOSKELETAL:  See HPI.  SKIN:  Negative for lesions, rash, and itching.  PSYCH:  No mood disorder or recent psychosocial stressors.  Patients sleep is disturbed secondary to pain.  HEMATOLOGY/LYMPHOLOGY:  Negative for prolonged bleeding, bruising easily or swollen nodes.  Patient is not currently taking any anti-coagulants  NEURO:   No history of headaches, syncope, paralysis, seizures or tremors.  All other reviewed and negative other than HPI.    OBJECTIVE:    General appearance: Well appearing, in no acute distress, alert and oriented x3.  Psych:  Mood and affect appropriate.      ASSESSMENT: 89 y.o. year old male with chronic low back pain, consistent with     1. Post laminectomy syndrome  CT Lumbar Spine Without Contrast    X-Ray Thoracolumbar Spine AP Lateral    X-Ray Hips Bilateral 2 View Incl AP Pelvis    CBC W/ AUTO DIFFERENTIAL   2. Chronic pain syndrome  CT Lumbar Spine Without Contrast    X-Ray Thoracolumbar Spine AP Lateral    X-Ray Hips Bilateral 2 View Incl AP Pelvis    CBC W/ AUTO DIFFERENTIAL   3. Spinal stenosis of lumbar region with neurogenic claudication  CT Lumbar Spine Without Contrast    X-Ray Thoracolumbar Spine AP Lateral    X-Ray Hips Bilateral 2 View Incl AP Pelvis    CBC W/ AUTO DIFFERENTIAL   4. Spondylosis without myelopathy  CT Lumbar Spine Without Contrast    X-Ray Thoracolumbar Spine AP Lateral    X-Ray Hips Bilateral 2 View Incl AP Pelvis    CBC W/ AUTO DIFFERENTIAL   5. Lumbar degenerative disc disease  CT Lumbar Spine Without Contrast    X-Ray Thoracolumbar Spine AP Lateral    X-Ray Hips Bilateral 2 View Incl AP Pelvis    CBC W/ AUTO DIFFERENTIAL   6. Osteoarthritis of spine with radiculopathy, lumbar region  CT Lumbar Spine Without Contrast    X-Ray Thoracolumbar Spine AP Lateral    X-Ray Hips Bilateral 2 View Incl AP Pelvis    CBC W/ AUTO DIFFERENTIAL   7. Current use of long term anticoagulation  CT Lumbar Spine  Without Contrast    X-Ray Thoracolumbar Spine AP Lateral    X-Ray Hips Bilateral 2 View Incl AP Pelvis    CBC W/ AUTO DIFFERENTIAL   8. Thrombocytopenia  CT Lumbar Spine Without Contrast    X-Ray Thoracolumbar Spine AP Lateral    X-Ray Hips Bilateral 2 View Incl AP Pelvis    CBC W/ AUTO DIFFERENTIAL   9. Status post insertion of spinal cord stimulator  CT Lumbar Spine Without Contrast    X-Ray Thoracolumbar Spine AP Lateral    X-Ray Hips Bilateral 2 View Incl AP Pelvis    CBC W/ AUTO DIFFERENTIAL         PLAN:   - I have stressed the importance of physical activity and a home exercise plan to help with pain and improve health.  - Patient can continue with medications for now since they are providing benefits, using them appropriately, and without side effects.  -Will schedule patient for Caudal JOMAR with catheter to address his pain complaints  - Patient found to be thrombocytopenic, platelets 84 on 02/10/20. Will obtain CBC prior to lumbar JOMAR  -Will order CT lumbar spine and XR of the lumbar spine.  -Will need clearance to stop anticoagulation prior to scheduled procedure.  - Encouraged patient to continue following up with Neurology as they mentioned they may consider LP to assess for CIDP  - RTC for Caudal JOMAR with catheter  - Counseled patient regarding the importance of activity modification, alcohol cessation and constant sleeping habits.    The above plan and management options were discussed at length with patient. Patient is in agreement with the above and verbalized understanding. It will be communicated with the referring physician via electronic record, fax, or mail.    Gilbert Chun, DO [employer] [facility] PGY2  I have personally reviewed the history and personally discussed the plan with patient through video visit and agree with the resident/fellow/NPs note as stated above.    James Hodges MD      05/11/2020

## 2020-05-12 ENCOUNTER — HOSPITAL ENCOUNTER (OUTPATIENT)
Dept: RADIOLOGY | Facility: HOSPITAL | Age: 85
Discharge: HOME | End: 2020-05-12
Attending: ANESTHESIOLOGY
Payer: MEDICARE

## 2020-05-12 DIAGNOSIS — Z96.89 STATUS POST INSERTION OF SPINAL CORD STIMULATOR: ICD-10-CM

## 2020-05-12 DIAGNOSIS — D69.6 THROMBOCYTOPENIA: ICD-10-CM

## 2020-05-12 DIAGNOSIS — M96.1 POST LAMINECTOMY SYNDROME: ICD-10-CM

## 2020-05-12 DIAGNOSIS — Z79.01 CURRENT USE OF LONG TERM ANTICOAGULATION: ICD-10-CM

## 2020-05-12 DIAGNOSIS — M48.062 SPINAL STENOSIS OF LUMBAR REGION WITH NEUROGENIC CLAUDICATION: ICD-10-CM

## 2020-05-12 DIAGNOSIS — M47.26 OSTEOARTHRITIS OF SPINE WITH RADICULOPATHY, LUMBAR REGION: ICD-10-CM

## 2020-05-12 DIAGNOSIS — M47.819 SPONDYLOSIS WITHOUT MYELOPATHY: ICD-10-CM

## 2020-05-12 DIAGNOSIS — M51.36 LUMBAR DEGENERATIVE DISC DISEASE: ICD-10-CM

## 2020-05-12 DIAGNOSIS — G89.4 CHRONIC PAIN SYNDROME: ICD-10-CM

## 2020-05-12 PROCEDURE — 73521 X-RAY EXAM HIPS BI 2 VIEWS: CPT | Mod: 26,HCNC,, | Performed by: RADIOLOGY

## 2020-05-12 PROCEDURE — 72080 X-RAY EXAM THORACOLMB 2/> VW: CPT | Mod: TC,HCNC

## 2020-05-12 PROCEDURE — 72080 X-RAY EXAM THORACOLMB 2/> VW: CPT | Mod: 26,HCNC,, | Performed by: RADIOLOGY

## 2020-05-12 PROCEDURE — 73521 X-RAY EXAM HIPS BI 2 VIEWS: CPT | Mod: TC,HCNC

## 2020-05-12 PROCEDURE — 72080 XR THORACOLUMBAR SPINE AP LATERAL: ICD-10-PCS | Mod: 26,HCNC,, | Performed by: RADIOLOGY

## 2020-05-12 PROCEDURE — 73521 XR HIPS BILATERAL 2 VIEW INCL AP PELVIS: ICD-10-PCS | Mod: 26,HCNC,, | Performed by: RADIOLOGY

## 2020-05-14 ENCOUNTER — PATIENT MESSAGE (OUTPATIENT)
Dept: CARDIOLOGY | Facility: CLINIC | Age: 85
End: 2020-05-14

## 2020-05-18 ENCOUNTER — TELEPHONE (OUTPATIENT)
Dept: ELECTROPHYSIOLOGY | Facility: CLINIC | Age: 85
End: 2020-05-18

## 2020-05-18 ENCOUNTER — HOSPITAL ENCOUNTER (OUTPATIENT)
Dept: RADIOLOGY | Facility: HOSPITAL | Age: 85
Discharge: HOME | End: 2020-05-18
Attending: ANESTHESIOLOGY
Payer: MEDICARE

## 2020-05-18 DIAGNOSIS — M96.1 POST LAMINECTOMY SYNDROME: ICD-10-CM

## 2020-05-18 DIAGNOSIS — Z96.89 STATUS POST INSERTION OF SPINAL CORD STIMULATOR: ICD-10-CM

## 2020-05-18 DIAGNOSIS — Z01.812 PRE-PROCEDURE LAB EXAM: ICD-10-CM

## 2020-05-18 DIAGNOSIS — M47.819 SPONDYLOSIS WITHOUT MYELOPATHY: ICD-10-CM

## 2020-05-18 DIAGNOSIS — M47.26 OSTEOARTHRITIS OF SPINE WITH RADICULOPATHY, LUMBAR REGION: ICD-10-CM

## 2020-05-18 DIAGNOSIS — G89.4 CHRONIC PAIN SYNDROME: Primary | ICD-10-CM

## 2020-05-18 DIAGNOSIS — M51.36 LUMBAR DEGENERATIVE DISC DISEASE: ICD-10-CM

## 2020-05-18 DIAGNOSIS — Z79.01 CURRENT USE OF LONG TERM ANTICOAGULATION: ICD-10-CM

## 2020-05-18 DIAGNOSIS — D69.6 THROMBOCYTOPENIA: ICD-10-CM

## 2020-05-18 DIAGNOSIS — M48.062 SPINAL STENOSIS OF LUMBAR REGION WITH NEUROGENIC CLAUDICATION: ICD-10-CM

## 2020-05-18 DIAGNOSIS — G89.4 CHRONIC PAIN SYNDROME: ICD-10-CM

## 2020-05-18 PROCEDURE — 72131 CT LUMBAR SPINE WITHOUT CONTRAST: ICD-10-PCS | Mod: 26,,, | Performed by: RADIOLOGY

## 2020-05-18 PROCEDURE — 72131 CT LUMBAR SPINE W/O DYE: CPT | Mod: TC

## 2020-05-18 PROCEDURE — 72131 CT LUMBAR SPINE W/O DYE: CPT | Mod: 26,,, | Performed by: RADIOLOGY

## 2020-05-18 NOTE — DISCHARGE INSTRUCTIONS

## 2020-05-18 NOTE — TELEPHONE ENCOUNTER
Rosie,     Mr. Valles is cleared to hold Eliquis for 48H prior to his procedure and to resume per the surgeon's discretion.     JACKLYN Bassett

## 2020-05-18 NOTE — TELEPHONE ENCOUNTER
----- Message from Kenna Khalil MA sent at 5/14/2020  9:42 AM CDT -----      ----- Message -----  From: Rosie Jackman  Sent: 5/14/2020   8:49 AM CDT  To: [facility] Rondon Staff    Requesting clearance of Eliquis 3 days to schedule Caudal Mary with Cath with Dr. Hodges, please advise.

## 2020-05-25 ENCOUNTER — LAB VISIT (OUTPATIENT)
Dept: INTERNAL MEDICINE | Facility: CLINIC | Age: 85
End: 2020-05-25
Payer: MEDICARE

## 2020-05-25 DIAGNOSIS — G89.4 CHRONIC PAIN SYNDROME: ICD-10-CM

## 2020-05-25 DIAGNOSIS — Z01.812 PRE-PROCEDURE LAB EXAM: ICD-10-CM

## 2020-05-25 LAB — SARS-COV-2 RNA RESP QL NAA+PROBE: NOT DETECTED

## 2020-05-25 PROCEDURE — U0003 INFECTIOUS AGENT DETECTION BY NUCLEIC ACID (DNA OR RNA); SEVERE ACUTE RESPIRATORY SYNDROME CORONAVIRUS 2 (SARS-COV-2) (CORONAVIRUS DISEASE [COVID-19]), AMPLIFIED PROBE TECHNIQUE, MAKING USE OF HIGH THROUGHPUT TECHNOLOGIES AS DESCRIBED BY CMS-2020-01-R: HCPCS | Mod: HCNC

## 2020-05-27 ENCOUNTER — HOSPITAL ENCOUNTER (OUTPATIENT)
Facility: OTHER | Age: 85
Discharge: HOME | End: 2020-05-27
Attending: ANESTHESIOLOGY | Admitting: ANESTHESIOLOGY
Payer: MEDICARE

## 2020-05-27 VITALS
SYSTOLIC BLOOD PRESSURE: 128 MMHG | DIASTOLIC BLOOD PRESSURE: 59 MMHG | HEIGHT: 67 IN | RESPIRATION RATE: 14 BRPM | OXYGEN SATURATION: 100 % | BODY MASS INDEX: 27.47 KG/M2 | TEMPERATURE: 98 F | WEIGHT: 175 LBS | HEART RATE: 50 BPM

## 2020-05-27 DIAGNOSIS — M47.26 OSTEOARTHRITIS OF SPINE WITH RADICULOPATHY, LUMBAR REGION: ICD-10-CM

## 2020-05-27 DIAGNOSIS — G89.29 CHRONIC PAIN: ICD-10-CM

## 2020-05-27 DIAGNOSIS — G89.4 CHRONIC PAIN SYNDROME: ICD-10-CM

## 2020-05-27 DIAGNOSIS — M48.062 SPINAL STENOSIS OF LUMBAR REGION WITH NEUROGENIC CLAUDICATION: Primary | ICD-10-CM

## 2020-05-27 PROCEDURE — 25500020 PHARM REV CODE 255: Mod: HCNC | Performed by: ANESTHESIOLOGY

## 2020-05-27 PROCEDURE — 62323 NJX INTERLAMINAR LMBR/SAC: CPT

## 2020-05-27 PROCEDURE — 63600175 PHARM REV CODE 636 W HCPCS: Mod: HCNC | Performed by: ANESTHESIOLOGY

## 2020-05-27 PROCEDURE — 25000003 PHARM REV CODE 250: Mod: HCNC | Performed by: PHYSICAL MEDICINE & REHABILITATION

## 2020-05-27 PROCEDURE — 62323 NJX INTERLAMINAR LMBR/SAC: CPT | Mod: HCNC,,, | Performed by: ANESTHESIOLOGY

## 2020-05-27 PROCEDURE — 25000003 PHARM REV CODE 250: Mod: HCNC | Performed by: ANESTHESIOLOGY

## 2020-05-27 PROCEDURE — 62323 PR INJ LUMBAR/SACRAL, W/IMAGING GUIDANCE: ICD-10-PCS | Mod: HCNC,,, | Performed by: ANESTHESIOLOGY

## 2020-05-27 RX ORDER — LIDOCAINE HYDROCHLORIDE 10 MG/ML
INJECTION INFILTRATION; PERINEURAL
Status: DISCONTINUED | OUTPATIENT
Start: 2020-05-27 | End: 2020-05-27 | Stop reason: HOSPADM

## 2020-05-27 RX ORDER — LIDOCAINE HYDROCHLORIDE 5 MG/ML
INJECTION, SOLUTION INFILTRATION; INTRAVENOUS
Status: DISCONTINUED | OUTPATIENT
Start: 2020-05-27 | End: 2020-05-27 | Stop reason: HOSPADM

## 2020-05-27 RX ORDER — SODIUM CHLORIDE 9 MG/ML
500 INJECTION, SOLUTION INTRAVENOUS CONTINUOUS
Status: DISCONTINUED | OUTPATIENT
Start: 2020-05-27 | End: 2020-05-27 | Stop reason: HOSPADM

## 2020-05-27 RX ORDER — MIDAZOLAM HYDROCHLORIDE 1 MG/ML
INJECTION INTRAMUSCULAR; INTRAVENOUS
Status: DISCONTINUED | OUTPATIENT
Start: 2020-05-27 | End: 2020-05-27 | Stop reason: HOSPADM

## 2020-05-27 RX ADMIN — SODIUM CHLORIDE 500 ML: 0.9 INJECTION, SOLUTION INTRAVENOUS at 11:05

## 2020-05-27 NOTE — OP NOTE
Patient Name: Javier Valles  MRN: [id number]    INFORMED CONSENT: The procedure, risks, benefits and options were discussed with patient. There are no contraindications to the procedure. The patient expressed understanding and agreed to proceed. The personnel performing the procedure was discussed. I verify that I personally obtained Javier's consent prior to the start of the procedure and the signed consent can be found on the patient's chart.    Procedure Date: 05/27/2020    Anesthesia: Topical    Pre Procedure diagnosis: Lumbar radiculopathy [M54.16]  1. Spinal stenosis of lumbar region with neurogenic claudication    2. Osteoarthritis of spine with radiculopathy, lumbar region    3. Chronic pain syndrome    4. Chronic pain    5.       Lumbar post laminectomy syndrome  6.       Lumbar degenerative disc disease     Post-Procedure diagnosis: SAME        Moderate Sedation: none    PROCEDURE: CAUDAL JOMAR with Racz Catheter      DESCRIPTION OF PROCEDURE: After fully informed written consent was obtained, the patient was brought to the procedure room and placed in the prone position. Monitoring of pulse oximetry, heart rate, and blood pressure was done pre-procedure, during the procedure, and post-procedure. The skin was prepped with chlorhexidine and draped in a sterile fashion.  After performing time out. With a 25-gauge 1.5  inch needle, 4 cc of lidocaine 1% was injected subcutaneously over the entry site. The sacrum and sacral cornua were visualized in an AP view.  An 16 gauge 31/2 inch Tuohy needle was inserted and advanced into the sacral hiatus under fluoroscopic guidance. After the needle passed through the sacrococcygeal ligament, the needle angle was lowered and the needle was advanced . The needle position was confirmed using AP and lateral fluoroscopic imaging. There was no evidence of paresthesias throughout needle placement. A radiopaque 20 G Flextip catheter  was introduced through the needle and directed  to the L5 level, under fluoroscopic guidance. The stylette was removed and aspiration was negative for blood or CSF. 3 ml of Omnipaque 300 contrast agent was slowly injected. Confirmation of spread of contrast agent within the epidural space was made with fluoroscopic imaging in the AP and lateral views. Subsequently, 10 mL of lidocaine 0.5% and 10 mg decadron was slowly administered without resistance. There was no pain on injection. Contrast spread was noted from S2 to L5. The needle was removed and bleeding was nil . The patient tolerated the procedure well and there was no evidence of procedural complications. A sterile dressing was applied. No  specimens collected. Javier was taken back to the recovery room for further observation.     Blood Loss: Nill  Specimen: None    Elijah Forman MD

## 2020-05-27 NOTE — DISCHARGE SUMMARY
Discharge Note  Short Stay      SUMMARY     Admit Date: 5/27/2020    Attending Physician: Elijah Forman      Discharge Physician: Elijah Forman      Discharge Date: 5/27/2020 11:45 AM    Procedure(s) (LRB):  INJECTION, STEROID, EPIDURAL, CAUDALW/CATH need consent , clear to hold Eliquis 48 hrs per ordering  (N/A)    Final Diagnosis: Lumbar radiculopathy [M54.16]    Disposition: Home or self care    Patient Instructions:   Current Discharge Medication List      CONTINUE these medications which have NOT CHANGED    Details   amiodarone (PACERONE) 200 MG Tab Take 1 tablet (200 mg total) by mouth once daily.  Qty: 90 tablet, Refills: 1    Associated Diagnoses: SSS (sick sinus syndrome); S/P CABG (coronary artery bypass graft); Ischemic cardiomyopathy; PVC (premature ventricular contraction)      !! amLODIPine (NORVASC) 5 MG tablet Take 1 tablet by mouth once daily  Qty: 90 tablet, Refills: 3      !! amLODIPine (NORVASC) 5 MG tablet Take 1 tablet (5 mg total) by mouth once daily.  Qty: 90 tablet, Refills: 3    Comments: Please consider 90 day supplies to promote better adherence      apixaban (ELIQUIS) 2.5 mg Tab Take 1 tablet (2.5 mg total) by mouth 2 (two) times daily.  Qty: 180 tablet, Refills: 3    Associated Diagnoses: Typical atrial flutter      ascorbic acid (VITAMIN C) 1000 MG tablet Take 1,000 mg by mouth every morning.       brimonidine 0.2% (ALPHAGAN) 0.2 % Drop Place 1 drop into the left eye 3 (three) times daily.  Qty: 30 mL, Refills: 3    Associated Diagnoses: Primary open-angle glaucoma, left eye, moderate stage      brinzolamide (AZOPT) 1 % ophthalmic suspension Place 1 drop into both eyes 3 (three) times daily.      co-enzyme Q-10 30 mg capsule Take 30 mg by mouth once daily.       diclofenac sodium (VOLTAREN) 1 % Gel Apply 2 g topically 2 (two) times daily. for 10 days  Qty: 1 Tube, Refills: 3    Associated Diagnoses: Hand arthritis      donepezil (ARICEPT) 5 MG tablet Take 1 tablet (5 mg total) by mouth  every evening.  Qty: 90 tablet, Refills: 3      dorzolamide (TRUSOPT) 2 % ophthalmic solution Place 1 drop into both eyes 3 (three) times daily.  Qty: 30 mL, Refills: 3    Associated Diagnoses: Primary open-angle glaucoma, left eye, moderate stage      erythromycin (ROMYCIN) ophthalmic ointment Apply to affected eye daily as needed  Qty: 3.5 g, Refills: 6    Associated Diagnoses: Blepharitis, unspecified laterality, unspecified type      FLUCELVAX QUAD 9080-2225, PF, 60 mcg (15 mcg x 4)/0.5 mL Syrg       furosemide (LASIX) 20 MG tablet Take 1 tablet (20 mg total) by mouth once daily. for 7 days  Qty: 7 tablet, Refills: 0      gabapentin (NEURONTIN) 300 MG capsule Take 1 capsule (300 mg total) by mouth 2 (two) times daily.  Qty: 60 capsule, Refills: 5      ipratropium (ATROVENT) 0.03 % nasal spray USE 1 TO 2 SPRAY(S) IN EACH NOSTRIL TWICE DAILY for rhinorrhea/nasal drip  Qty: 30 mL, Refills: 1    Associated Diagnoses: Post-nasal drip; Cough; Rhinorrhea      latanoprost 0.005 % ophthalmic solution Place 1 drop into the left eye every evening.  Qty: 15 mL, Refills: 3    Comments: Please consider 90 day supplies to promote better adherence  Associated Diagnoses: Primary open-angle glaucoma, left eye, moderate stage      losartan (COZAAR) 50 MG tablet Take 1 tablet (50 mg total) by mouth 2 (two) times daily.  Qty: 180 tablet, Refills: 3    Associated Diagnoses: Coronary artery disease involving native coronary artery without angina pectoris, unspecified whether native or transplanted heart      lutein 20 mg Cap Take 20 mg by mouth once daily.       MULTIVITAMINS,THER W-MINERALS (VITAMINS & MINERALS ORAL) Take 1 tablet by mouth every morning.       oxyCODONE-acetaminophen (PERCOCET) 5-325 mg per tablet Take 1 tablet by mouth every 6 (six) hours as needed for Pain. Greater than 7 day supply, medically necessary  Qty: 30 tablet, Refills: 0    Associated Diagnoses: Chronic pain syndrome; Postlaminectomy syndrome of lumbar  region; Lumbar radiculopathy; Lumbar spondylosis; DDD (degenerative disc disease), lumbar      polycarbophil (FIBERCON) 625 mg tablet Take 625 mg by mouth 2 (two) times daily.      rosuvastatin (CRESTOR) 20 MG tablet Take 1 tablet (20 mg total) by mouth every evening.  Qty: 90 tablet, Refills: 3    Comments: Patient wants generic  Associated Diagnoses: Hyperlipidemia, unspecified hyperlipidemia type      sertraline (ZOLOFT) 50 MG tablet Take 1 tablet (50 mg total) by mouth once daily.  Qty: 30 tablet, Refills: 11       !! - Potential duplicate medications found. Please discuss with provider.              Discharge Diagnosis: Lumbar radiculopathy [M54.16]  Condition on Discharge: Stable with no complications to procedure   Diet on Discharge: Same as before.  Activity: as per instruction sheet.  Discharge to: Home with a responsible adult.  Follow up: 2-4 weeks       Please call my office or pager at [phone] if experienced any weakness or loss of sensation, fever > 101.5, pain uncontrolled with oral medications, persistent nausea/vomiting/or diarrhea, redness or drainage from the incisions, or any other worrisome concerns. If physician on call was not reached or could not communicate with our office for any reason please go to the nearest emergency department

## 2020-05-27 NOTE — INTERVAL H&P NOTE
The patient has been examined and the H&P ha s been reviewed:    I concur with the findings and no changes have occurred since H&P was written.    Anesthesia/Surgery risks, benefits and alternative options discussed and understood by patient/family.          Active Hospital Problems    Diagnosis  POA    Chronic pain [G89.29]  Yes      Resolved Hospital Problems   No resolved problems to display.

## 2020-05-27 NOTE — DISCHARGE INSTRUCTIONS
Thank you for allowing us to care for you today. You may receive a survey about the care we provided. Your feedback is valuable and helps us provide excellent care throughout the community.     Home Care Instructions for Pain Management:    1. DIET:   You may resume your normal diet today.   2. BATHING:   You may shower with luke warm water. No tub baths or anything that will soak injection sites under water for the next 24 hours.  3. DRESSING:   You may remove your bandage today.   4. ACTIVITY LEVEL:   You may resume your normal activities 24 hrs after your procedure. Nothing strenuous today.  5. MEDICATIONS:   You may resume your normal medications today. To restart blood thinners, ask your doctor.  6. DRIVING    If you have received any sedatives by mouth today, you may not drive for 12 hours.    If you have received any sedation through your IV, you may not drive for 24 hrs.   7. SPECIAL INSTRUCTIONS:   No heat to the injection site for 24 hrs including, hot bath or shower, heating pad, moist heat, or hot tubs.    Use ice pack to injection site for any pain or discomfort.  Apply ice packs for 20 minute intervals as needed.    IF you have diabetes, be sure to monitor your blood sugar more closely. IF your injection contained steroids your blood sugar levels may become higher than normal.    If you are still having pain upon discharge:  Your pain may improve over the next 48 hours. The anesthetic (numbing medication) works immediately to 48 hours. IF your injection contained a steroid (anti-inflammatory medication), it takes approximately 3 days to start feeling relief and 7-10 days to see your greatest results from the medication. It is possible you may need subsequent injections. This would be discussed at your follow up appointment with pain management or your referring doctor.    Please call the PAIN MANAGEMENT office at [phone] or ON CALL pager at [phone] if you experienced any:   -Weakness or  loss of sensation  -Fever > 101.5  -Pain uncontrolled with oral medications   -Persistent nausea, vomiting, or diarrhea  -Redness or drainage from the injection sites, or any other worrisome concerns.   If physician on call was not reached or could not communicate with our office for any reason please go to the nearest emergency department.  Adult Procedural Sedation Instructions    Recovery After Procedural Sedation (Adult)  You have been given medicine by vein to make you sleep during your surgery. This may have included both a pain medicine and sleeping medicine. Most of the effects have worn off. But you may still have some drowsiness for the next 6 to 8 hours.  Home care  Follow these guidelines when you get home:  · For the next 8 hours, you should be watched by a responsible adult. This person should make sure your condition is not getting worse.  · Don't drink any alcohol for the next 24 hours.  · Don't drive, operate dangerous machinery, or make important business or personal decisions during the next 24 hours.  Note: Your healthcare provider may tell you not to take any medicine by mouth for pain or sleep in the next 4 hours. These medicines may react with the medicines you were given in the hospital. This could cause a much stronger response than usual.  Follow-up care  Follow up with your healthcare provider if you are not alert and back to your usual level of activity within 12 hours.  When to seek medical advice  Call your healthcare provider right away if any of these occur:  · Drowsiness gets worse  · Weakness or dizziness gets worse  · Repeated vomiting  · You can't be awakened   Date Last Reviewed: 10/18/2016  © 6360-5437 [employer]. [address] [place] [address], [place] [address]. All rights reserved. This information is not intended as a substitute for professional medical care. Always follow your healthcare professional's instructions.

## 2020-05-28 ENCOUNTER — TELEPHONE (OUTPATIENT)
Dept: AUDIOLOGY | Facility: CLINIC | Age: 85
End: 2020-05-28

## 2020-05-28 DIAGNOSIS — H90.3 SENSORY HEARING LOSS, BILATERAL: Primary | ICD-10-CM

## 2020-06-09 ENCOUNTER — TELEPHONE (OUTPATIENT)
Dept: PAIN MEDICINE | Facility: CLINIC | Age: 85
End: 2020-06-09

## 2020-06-09 ENCOUNTER — CLINICAL SUPPORT (OUTPATIENT)
Dept: AUDIOLOGY | Facility: CLINIC | Age: 85
End: 2020-06-09
Payer: MEDICARE

## 2020-06-09 DIAGNOSIS — H90.3 SENSORY HEARING LOSS, BILATERAL: ICD-10-CM

## 2020-06-09 DIAGNOSIS — H90.3 SENSORY HEARING LOSS, BILATERAL: Primary | ICD-10-CM

## 2020-06-09 PROCEDURE — 92557 COMPREHENSIVE HEARING TEST: CPT | Mod: HCNC,S$GLB,, | Performed by: AUDIOLOGIST

## 2020-06-09 PROCEDURE — 92567 TYMPANOMETRY: CPT | Mod: HCNC,S$GLB,, | Performed by: AUDIOLOGIST

## 2020-06-09 PROCEDURE — 99999 PR PBB SHADOW E&M-EST. PATIENT-LVL II: ICD-10-PCS | Mod: PBBFAC,HCNC,, | Performed by: AUDIOLOGIST

## 2020-06-09 PROCEDURE — 99999 PR PBB SHADOW E&M-EST. PATIENT-LVL II: CPT | Mod: PBBFAC,HCNC,, | Performed by: AUDIOLOGIST

## 2020-06-09 PROCEDURE — 92557 PR COMPREHENSIVE HEARING TEST: ICD-10-PCS | Mod: HCNC,S$GLB,, | Performed by: AUDIOLOGIST

## 2020-06-09 PROCEDURE — 92567 PR TYMPA2METRY: ICD-10-PCS | Mod: HCNC,S$GLB,, | Performed by: AUDIOLOGIST

## 2020-06-09 NOTE — PROGRESS NOTES
Mr. Valles was seen for a hearing aid consultation. Recommended updated binaural amplification or a replacement right hearing aid. Discussed pros and cons of various styles and technology levels. Patient was most interested in economy iPhone compatible technology. We discussed fully rechargeable devices. He currently has Siemens devices which can use interchangeable batteries. We discussed warranties on various hearing aids and the use of the [facility] for regular clean and checks due to Mr. Valles's difficulty changing filters. Mr. Valles would like to call his insurance before proceeding. Patient acknowledged understanding of the 30 day trial period, $250 restocking fee from the time of order, and the fact that we do not file insurance on behalf of the patient. Patient was advised to call or email with any questions.

## 2020-06-09 NOTE — PROGRESS NOTES
Mr. Valles was seen in the clinic today for a hearing evaluation. He reported that he recently lost his right hearing aid. He reported that it is no longer under warranty.      Audiological testing revealed a mild rising to normal sloping to severe sensorineural hearing loss in the right ear and a normal sloping to severe sensorineural hearing loss in the left ear. A speech reception threshold was obtained at 40 dBHL, bilaterally. Speech discrimination was 76%, bilaterally.    Tympanometry revealed Type A tympanograms, bilaterally.    Recommendations:  1. Otologic evaluation  2. Annual hearing evaluation  3. Noise protection  4. Hearing aid consultation to discuss replacement RHA/updated binaural amplification/continue use of left Siemens hearing aid

## 2020-06-09 NOTE — TELEPHONE ENCOUNTER
Staff left detailed voice mail in regards to appointment scheduled 6/10/20 at 12:20pm as my chart virtual video visit staff left information on the e pre check process to login 15 minutes before visit and also tech support number.

## 2020-06-10 ENCOUNTER — OFFICE VISIT (OUTPATIENT)
Dept: PAIN MEDICINE | Facility: CLINIC | Age: 85
End: 2020-06-10
Payer: MEDICARE

## 2020-06-10 DIAGNOSIS — M54.16 LUMBAR RADICULOPATHY: ICD-10-CM

## 2020-06-10 DIAGNOSIS — M47.816 LUMBAR SPONDYLOSIS: ICD-10-CM

## 2020-06-10 DIAGNOSIS — M47.22 OSTEOARTHRITIS OF SPINE WITH RADICULOPATHY, CERVICAL REGION: ICD-10-CM

## 2020-06-10 DIAGNOSIS — M48.061 SPINAL STENOSIS OF LUMBAR REGION, UNSPECIFIED WHETHER NEUROGENIC CLAUDICATION PRESENT: ICD-10-CM

## 2020-06-10 DIAGNOSIS — G89.4 CHRONIC PAIN SYNDROME: Primary | ICD-10-CM

## 2020-06-10 DIAGNOSIS — M96.1 POST LAMINECTOMY SYNDROME: ICD-10-CM

## 2020-06-10 PROCEDURE — 1101F PR PT FALLS ASSESS DOC 0-1 FALLS W/OUT INJ PAST YR: ICD-10-PCS | Mod: HCNC,CPTII,95, | Performed by: NURSE PRACTITIONER

## 2020-06-10 PROCEDURE — 1101F PT FALLS ASSESS-DOCD LE1/YR: CPT | Mod: HCNC,CPTII,95, | Performed by: NURSE PRACTITIONER

## 2020-06-10 PROCEDURE — 99213 OFFICE O/P EST LOW 20 MIN: CPT | Mod: HCNC,95,, | Performed by: NURSE PRACTITIONER

## 2020-06-10 PROCEDURE — 1159F MED LIST DOCD IN RCRD: CPT | Mod: HCNC,95,, | Performed by: NURSE PRACTITIONER

## 2020-06-10 PROCEDURE — 99213 PR OFFICE/OUTPT VISIT, EST, LEVL III, 20-29 MIN: ICD-10-PCS | Mod: HCNC,95,, | Performed by: NURSE PRACTITIONER

## 2020-06-10 PROCEDURE — 1159F PR MEDICATION LIST DOCUMENTED IN MEDICAL RECORD: ICD-10-PCS | Mod: HCNC,95,, | Performed by: NURSE PRACTITIONER

## 2020-06-10 NOTE — PROGRESS NOTES
Chronic Pain-Tele-Medicine-Established Note (Follow up visit)      The patient location is: home  The chief complaint leading to consultation is: low back pain  Visit type: Virtual visit with synchronous audio and video  Total time spent with patient: 15 min  Each patient to whom he or she provides medical services by telemedicine is:  (1) informed of the relationship between the physician and patient and the respective role of any other health care provider with respect to management of the patient; and (2) notified that he or she may decline to receive medical services by telemedicine and may withdraw from such care at any time      SUBJECTIVE:    Interval History 06/10/2020  Pt presents for virtual visit for complaint of lower back pain. The patient is s/p Caudal JOMAR with minimal relief. He states continued lower back pain, + a.m. Stiffness and leg weakness with only limited amount of standing/walking, forward flexion alleviates pain. He denies any new neurological changes, denies any new areas of pain. Rates pain 8/10. He is taking Neurontin 300mg BID for pain.     Interval History 05/11/2020:  Today he reports that his low back pain has increased over the last several months. He describes it as a dull/achy, sometimes throbbing, low back pain. He has been taking tylenol as needed with no relief. He reports that his back pain has interfered with his daily activities. He reports he has to lay or sit to get relief, he cannot ambulate or walk; he reports he has to stay hunched over 50 degrees to get relief while walking and by doing this, it makes him unstable. He reports the pain is isolated to the lower back, does not radiate down the legs.  He denies any new b/b incontinence, any significant subjective LE weakness, and no SA.     Patient noted to staff that he was experiencing pain at the battery site for his SCS.    Interval history 08/28/2019  Javier Valles presents to the clinic for a follow-up appointment  for lower back and bilateral leg pain. He reports increased neck pain that began on Thursday after no inciting event. He woke up with this pain. He reports neck pain that radiates into both shoulders and into his right arm. He describes this pain as burning. This pain is worse with turning his head side to side. He also reports increased pain with extension. He tried ice and heat with limited relief. He continues to report low back pain that radiates down the back of both legs to his feet. He reports limited relief with SCS. He is frustrated with his continued pain. He reports a recent CT myelogram ordered by an outside neurosurgeon. He is awaiting the results. He continues to take Percocet sparingly with benefit. He denies any other health changes.           Pain Medications:  Robaxin 750 mg PRN muscle pain  Compounding cream  Percocet 5/325 mg PRN     Tried in past: gabapentin 100 mg TID, Percocet (helpful), Norco     - Anti-Coagulants: Eliquis (pacemaker)     Opioid Contract: no      report:  Not applicable     Pain Procedures:  Multiple JOMAR's with Dr. Lopez 4 yrs ago  4/28/17 Right L4-5 TF JOMAR- 100% relief   5/29/17 Left L5-S1 TF JOMAR  7/21/17 Caudal JOMAR- significant benefit  9/22/17 Lumbar SCS trial- 90% relief  12/18/18 Bilateral L2,3,4,5 MBB- 90% relief  1/9/19 Left L2,3,4,5 RFA- 70% relief  5/27/2020 Caudal JOMAR with no significant relief     Spinal surgeries:  MIS laminectomy L3-4 and L4-5.     Imaging: no new imaging    Past Medical History:   Diagnosis Date    *Atrial flutter 10/3/13    Anticoagulant long-term use     Aspirin only at this time    Arthritis     Atrial fibrillation     Atrial flutter     Blood transfusion     ?    BPH (benign prostatic hypertrophy)     Carotid artery disease     2008: [place] There is 40 - 49% right Internal Carotid stenosis.  There is 20 - 39% left Internal Carotid stenosis.       Chronic kidney disease     Coronary artery disease     DDD (degenerative disc disease)  6/25/2015    Degenerative disc disease     Elevated PSA     Glaucoma     Hyperlipidemia     Hypertension     Lumbar stenosis     lumbar spinal stenosis    NSTEMI (non-ST elevated myocardial infarction) 11/3/2013    Pacemaker 11/2013    Peripheral neuropathy     - S/P right ILIAC stent 1993;      Retinal detachment     Squamous cell carcinoma     left leg    Syncope and collapse: orthostatic with hypotension 10/9/2015     Past Surgical History:   Procedure Laterality Date    [facility]    CARDIAC ELECTROPHYSIOLOGY MAPPING AND ABLATION  11/2016    CARDIAC PACEMAKER PLACEMENT  11/2016    CATARACT EXTRACTION W/  INTRAOCULAR LENS IMPLANT Left 1997        CORONARY ARTERY BYPASS GRAFT  1991    ENUCLEATION Right 1949    EPIDURAL STEROID INJECTION N/A 7/17/2019    Procedure: INJECTION, STEROID, EPIDURAL, CAUDAL  cleared to hold eliquis 4 days per Dr. Ruiz;  Surgeon: James Hodges MD;  Location: [facility] PAIN MGT;  Service: Pain Management;  Laterality: N/A;    EPIDURAL STEROID INJECTION N/A 5/27/2020    Procedure: INJECTION, STEROID, EPIDURAL, CAUDALW/CATH need consent , clear to hold Eliquis 48 hrs per ordering ;  Surgeon: James Hodges MD;  Location: [facility] PAIN MGT;  Service: Pain Management;  Laterality: N/A;    INJECTION OF ANESTHETIC AGENT AROUND MEDIAL BRANCH NERVES INNERVATING LUMBAR FACET JOINT Bilateral 12/18/2018    Procedure: BLOCK, NERVE, FACET JOINT, LUMBAR, MEDIAL BRANCH;  Surgeon: James Hodges MD;  Location[facility];  Service: Pain Management;  Laterality: Bilateral;    LUMBAR LAMINECTOMY  04/25/2016    RETINAL DETACHMENT SURGERY Left 1997    Dr. Cosme    SKIN BIOPSY      SPINAL CORD STIMULATOR TRIAL W/ LAMINOTOMY  10/2017    TONSILLECTOMY       Social History     Socioeconomic History    Marital status:      Spouse name: [place]    Number of children: Not on file    Years of education: Not on file    Highest education  level: Not on file   Occupational History    Occupation: retired   Social Needs    Financial resource strain: Not hard at all    Food insecurity:     Worry: Never true     Inability: Never true    Transportation needs:     Medical: No     Non-medical: No   Tobacco Use    Smoking status: Former Smoker     Last attempt to quit: 1963     Years since quittin.8    Smokeless tobacco: Never Used   Substance and Sexual Activity    Alcohol use: Yes     Alcohol/week: 3.0 standard drinks     Types: 1 Glasses of wine, 1 Cans of beer, 1 Shots of liquor per week     Frequency: 2-3 times a week     Drinks per session: 1 or 2     Binge frequency: Never     Comment: 2 a day    Drug use: No    Sexual activity: Yes     Partners: Female   Lifestyle    Physical activity:     Days per week: 2 days     Minutes per session: 10 min    Stress: Not at all   Relationships    Social connections:     Talks on phone: More than three times a week     Gets together: Three times a week     Attends [employer] service: Not on file     Active member of club or organization: Not on file     Attends meetings of clubs or organizations: More than 4 times per year     Relationship status:    Other Topics Concern    Not on file   Social History Narrative    Not on file     Family History   Problem Relation Age of Onset    Stroke Mother 69    Clotting disorder Mother         per patient no clotting disorder    Hypertension Mother     Diverticulitis Son     Cancer Neg Hx     Diabetes Neg Hx     Heart disease Neg Hx     Glaucoma Neg Hx     Amblyopia Neg Hx     Blindness Neg Hx     Cataracts Neg Hx     Macular degeneration Neg Hx     Retinal detachment Neg Hx     Strabismus Neg Hx        Review of patient's allergies indicates:   Allergen Reactions    Atorvastatin      Myositis/elevated CPK    Pravastatin      Severe myalgias/elevated CPK    Statins-hmg-coa reductase inhibitors      Muscle pain and loss of muscle     Ace inhibitors      Cough       Current Outpatient Medications   Medication Sig    amiodarone (PACERONE) 200 MG Tab Take 1 tablet (200 mg total) by mouth once daily.    amLODIPine (NORVASC) 5 MG tablet Take 1 tablet by mouth once daily    amLODIPine (NORVASC) 5 MG tablet Take 1 tablet (5 mg total) by mouth once daily.    apixaban (ELIQUIS) 2.5 mg Tab Take 1 tablet (2.5 mg total) by mouth 2 (two) times daily.    ascorbic acid (VITAMIN C) 1000 MG tablet Take 1,000 mg by mouth every morning.     brimonidine 0.2% (ALPHAGAN) 0.2 % Drop Place 1 drop into the left eye 3 (three) times daily.    brinzolamide (AZOPT) 1 % ophthalmic suspension Place 1 drop into both eyes 3 (three) times daily.    co-enzyme Q-10 30 mg capsule Take 30 mg by mouth once daily.     diclofenac sodium (VOLTAREN) 1 % Gel Apply 2 g topically 2 (two) times daily. for 10 days    donepeziL (ARICEPT) 5 MG tablet Take 1 tablet (5 mg total) by mouth every evening.    dorzolamide (TRUSOPT) 2 % ophthalmic solution Place 1 drop into both eyes 3 (three) times daily.    erythromycin (ROMYCIN) ophthalmic ointment Apply to affected eye daily as needed    FLUCELVAX QUAD 0357-1239, PF, 60 mcg (15 mcg x 4)/0.5 mL Syrg     furosemide (LASIX) 20 MG tablet Take 1 tablet (20 mg total) by mouth once daily. for 7 days    gabapentin (NEURONTIN) 300 MG capsule Take 1 capsule (300 mg total) by mouth 2 (two) times daily.    ipratropium (ATROVENT) 0.03 % nasal spray USE 1 TO 2 SPRAY(S) IN EACH NOSTRIL TWICE DAILY for rhinorrhea/nasal drip    latanoprost 0.005 % ophthalmic solution Place 1 drop into the left eye every evening.    losartan (COZAAR) 50 MG tablet Take 1 tablet (50 mg total) by mouth 2 (two) times daily.    lutein 20 mg Cap Take 20 mg by mouth once daily.     MULTIVITAMINS,THER W-MINERALS (VITAMINS & MINERALS ORAL) Take 1 tablet by mouth every morning.     oxyCODONE-acetaminophen (PERCOCET) 5-325 mg per tablet Take 1 tablet by mouth every 6  (six) hours as needed for Pain. Greater than 7 day supply, medically necessary    polycarbophil (FIBERCON) 625 mg tablet Take 625 mg by mouth 2 (two) times daily.    rosuvastatin (CRESTOR) 20 MG tablet Take 1 tablet (20 mg total) by mouth every evening.    sertraline (ZOLOFT) 50 MG tablet Take 1 tablet (50 mg total) by mouth once daily.     No current facility-administered medications for this visit.        REVIEW OF SYSTEMS:    GENERAL:  No weight loss, malaise or fevers.  HEENT:   +visual impairment  NECK:  Negative for lumps, no difficulty with swallowing.  RESPIRATORY:  Negative for cough, wheezing or shortness of breath, patient denies any recent URI.  CARDIOVASCULAR:  Negative for chest pain, leg swelling or palpitations.  GI:  Negative for abdominal discomfort.  MUSCULOSKELETAL:  See HPI.  SKIN:  Negative for lesions, rash, and itching.  PSYCH:  No mood disorder or recent psychosocial stressors.  Patients sleep is disturbed secondary to pain.  HEMATOLOGY/LYMPHOLOGY:  Negative for prolonged bleeding, bruising easily or swollen nodes.  Patient is not currently taking any anti-coagulants  NEURO:   No history of headaches, syncope, paralysis, seizures or tremors.  All other reviewed and negative other than HPI.    OBJECTIVE:    General appearance: Well appearing, in no acute distress, alert and oriented x3.  Psych:  Mood and affect appropriate.      ASSESSMENT: 89 y.o. year old male with chronic low back pain, consistent with     1. Chronic pain syndrome     2. Post laminectomy syndrome     3. Lumbar radiculopathy     4. Lumbar spondylosis     5. Osteoarthritis of spine with radiculopathy, cervical region     6. Spinal stenosis of lumbar region, unspecified whether neurogenic claudication present           PLAN:   -Prior records reviewed and updated Lumbar CT discussed with Pt  - He is s/p Caudal JOMAR with minimal relief.  - Pt complaints consistent with imaging showing multilevel lumbar stenosis.   - He is not  interested in any surgical option at this point in his life,   - He has had some benefit from prior RFA to Left L2,3,4,5 and (+) diagnostic block bilaterally.  - Will schedule for Left L2,3,4,5 RFA then Consider Right L2,3,4,5 RFA  - Will need clearance to stop anticoagulation prior to scheduled procedure.  - Again Encouraged patient to continue following up with Neurology as they mentioned they may consider LP to assess for CIDP  - Discussed need for HEP or Physical Therapy.  - Counseled patient regarding the importance of activity modification, alcohol cessation and constant sleeping habits.    The above plan and management options were discussed at length with patient. Patient is in agreement with the above and verbalized understanding. It will be communicated with the referring physician via electronic record, fax, or mail.    Cheikh Mansfield NP    06/10/2020

## 2020-06-12 ENCOUNTER — TELEPHONE (OUTPATIENT)
Dept: AUDIOLOGY | Facility: CLINIC | Age: 85
End: 2020-06-12

## 2020-06-12 NOTE — TELEPHONE ENCOUNTER
Hearing Aid Concerns:    I called Mr. Valles to answer any questions he had regarding purchasing hearing aids and filing with insurance.  I explained to him that we do not file with the insurance companies.  I then explained that he would have to pay up front for the purchase of the hearing aids and he could then file a claim form on his own in attempt to get reimbursed.  He has purchases hearing aids elsewhere in the past but would love to be able to continue to get the good service he receives here at [facility] and to also be able to utilize his benefit that was explained to him by [employer].    I did let him know that I would do some research and get back with him.  He acknowledged understanding of our conversation and was appreciative of the phone call.

## 2020-06-12 NOTE — TELEPHONE ENCOUNTER
----- Message from CINTHYA Rahman sent at 6/11/2020  2:52 PM CDT -----  Contact: Javier Valles was in for a HA this week. We discussed that we do not file insurance on behalf of the patient. His spoke with his insurance company and they advised him to call and speak with a supervisor. I told him that I would pass along the message to you and you would get back to him as soon as possible.     Thanks so much,     Drake

## 2020-06-16 ENCOUNTER — OFFICE VISIT (OUTPATIENT)
Dept: OPHTHALMOLOGY | Facility: CLINIC | Age: 85
End: 2020-06-16
Payer: MEDICARE

## 2020-06-16 DIAGNOSIS — H35.343 LAMELLAR MACULAR HOLE OF BOTH EYES: ICD-10-CM

## 2020-06-16 DIAGNOSIS — H35.372 EPIRETINAL MEMBRANE, LEFT EYE: Primary | ICD-10-CM

## 2020-06-16 DIAGNOSIS — H43.812 POSTERIOR VITREOUS DETACHMENT OF LEFT EYE: ICD-10-CM

## 2020-06-16 PROCEDURE — 92201 OPSCPY EXTND RTA DRAW UNI/BI: CPT | Mod: HCNC,S$GLB,, | Performed by: OPHTHALMOLOGY

## 2020-06-16 PROCEDURE — 92134 POSTERIOR SEGMENT OCT RETINA (OCULAR COHERENCE TOMOGRAPHY)-BOTH EYES: ICD-10-PCS | Mod: HCNC,S$GLB,, | Performed by: OPHTHALMOLOGY

## 2020-06-16 PROCEDURE — 92201 PR OPHTHALMOSCOPY, EXT, W/RET DRAW/SCLERAL DEPR, I&R, UNI/BI: ICD-10-PCS | Mod: HCNC,S$GLB,, | Performed by: OPHTHALMOLOGY

## 2020-06-16 PROCEDURE — 92014 PR EYE EXAM, EST PATIENT,COMPREHESV: ICD-10-PCS | Mod: HCNC,S$GLB,, | Performed by: OPHTHALMOLOGY

## 2020-06-16 PROCEDURE — 92134 CPTRZ OPH DX IMG PST SGM RTA: CPT | Mod: HCNC,S$GLB,, | Performed by: OPHTHALMOLOGY

## 2020-06-16 PROCEDURE — 99999 PR PBB SHADOW E&M-EST. PATIENT-LVL IV: ICD-10-PCS | Mod: PBBFAC,HCNC,, | Performed by: OPHTHALMOLOGY

## 2020-06-16 PROCEDURE — 99999 PR PBB SHADOW E&M-EST. PATIENT-LVL IV: CPT | Mod: PBBFAC,HCNC,, | Performed by: OPHTHALMOLOGY

## 2020-06-16 PROCEDURE — 92014 COMPRE OPH EXAM EST PT 1/>: CPT | Mod: HCNC,S$GLB,, | Performed by: OPHTHALMOLOGY

## 2020-06-16 NOTE — PROGRESS NOTES
HPI     12 mo / OCT     Pt sts since lens was put in has not found great improvement vision seems   about the same as last year. Denies pain.  (-)Flashes (+)Floaters occasionally   (-)Photophobia  (needs a lot of light to read)  (-)Glare     H/o injury playing hand ball 1951     Meds:   Brimonidine TID OS   Latanoprost QHS OS   Dorzolimide TID  OS      A/P    1) ERM OS    2) PVD OS  Vitreous base dehisced ST - no new breaks or tears    3) PCIOL OS    4) Prosthetic eye OD    5) Retinal tear OS    6) Lamellar macular hole OS        RD precautions      12 months OCT

## 2020-06-19 ENCOUNTER — TELEPHONE (OUTPATIENT)
Dept: PAIN MEDICINE | Facility: CLINIC | Age: 85
End: 2020-06-19

## 2020-06-19 NOTE — TELEPHONE ENCOUNTER
----- Message from Beatriz Thomas MA sent at 6/18/2020  3:48 PM CDT -----  Please contact and schedule the following:      PLAN:   -Prior records reviewed and updated Lumbar CT discussed with Pt  - He is s/p Caudal JOMAR with minimal relief.  - Pt complaints consistent with imaging showing multilevel lumbar stenosis.   - He is not interested in any surgical option at this point in his life,   - He has had some benefit from prior RFA to Left L2,3,4,5 and (+) diagnostic block bilaterally.  - Will schedule for Left L2,3,4,5 RFA then Consider Right L2,3,4,5 RFA  - Will need clearance to stop anticoagulation prior to scheduled procedure.  - Again Encouraged patient to continue following up with Neurology as they mentioned they may consider LP to assess for CIDP  - Discussed need for HEP or Physical Therapy.  - Counseled patient regarding the importance of activity modification, alcohol cessation and constant sleeping habits.     The above plan and management options were discussed at length with patient. Patient is in agreement with the above and verbalized understanding. It will be communicated with the referring physician via electronic record, fax, or mail.     Cheikh Mansfield NP     06/10/2020                 Beatriz

## 2020-06-22 DIAGNOSIS — Z01.812 PRE-PROCEDURE LAB EXAM: Primary | ICD-10-CM

## 2020-06-24 ENCOUNTER — CLINICAL SUPPORT (OUTPATIENT)
Dept: CARDIOLOGY | Facility: CLINIC | Age: 85
End: 2020-06-24
Attending: STUDENT IN AN ORGANIZED HEALTH CARE EDUCATION/TRAINING PROGRAM
Payer: MEDICARE

## 2020-06-24 ENCOUNTER — PATIENT OUTREACH (OUTPATIENT)
Dept: OTHER | Facility: OTHER | Age: 85
End: 2020-06-24

## 2020-06-24 ENCOUNTER — LAB VISIT (OUTPATIENT)
Dept: LAB | Facility: HOSPITAL | Age: 85
End: 2020-06-24
Attending: STUDENT IN AN ORGANIZED HEALTH CARE EDUCATION/TRAINING PROGRAM
Payer: MEDICARE

## 2020-06-24 DIAGNOSIS — I65.23 BILATERAL CAROTID ARTERY STENOSIS: ICD-10-CM

## 2020-06-24 LAB
ANION GAP SERPL CALC-SCNC: 7 MMOL/L (ref 8–16)
BUN SERPL-MCNC: 36 MG/DL (ref 8–23)
CALCIUM SERPL-MCNC: 9.7 MG/DL (ref 8.7–10.5)
CHLORIDE SERPL-SCNC: 115 MMOL/L (ref 95–110)
CHOLEST SERPL-MCNC: 107 MG/DL (ref 120–199)
CHOLEST/HDLC SERPL: 2.1 {RATIO} (ref 2–5)
CO2 SERPL-SCNC: 22 MMOL/L (ref 23–29)
CREAT SERPL-MCNC: 2.1 MG/DL (ref 0.5–1.4)
EST. GFR  (AFRICAN AMERICAN): 31.1 ML/MIN/1.73 M^2
EST. GFR  (NON AFRICAN AMERICAN): 26.9 ML/MIN/1.73 M^2
GLUCOSE SERPL-MCNC: 92 MG/DL (ref 70–110)
HDLC SERPL-MCNC: 50 MG/DL (ref 40–75)
HDLC SERPL: 46.7 % (ref 20–50)
LDLC SERPL CALC-MCNC: 50.8 MG/DL (ref 63–159)
LEFT ARM DIASTOLIC BLOOD PRESSURE: 59 MMHG
LEFT ARM SYSTOLIC BLOOD PRESSURE: 104 MMHG
LEFT CBA DIAS: 17 CM/S
LEFT CBA SYS: 77 CM/S
LEFT CCA DIST DIAS: 17 CM/S
LEFT CCA DIST SYS: 82 CM/S
LEFT CCA MID DIAS: 20 CM/S
LEFT CCA MID SYS: 80 CM/S
LEFT CCA PROX DIAS: 14 CM/S
LEFT CCA PROX SYS: 66 CM/S
LEFT ECA DIAS: 28 CM/S
LEFT ECA SYS: 226 CM/S
LEFT ICA DIST DIAS: 24 CM/S
LEFT ICA DIST SYS: 79 CM/S
LEFT ICA MID DIAS: 21 CM/S
LEFT ICA MID SYS: 77 CM/S
LEFT ICA PROX DIAS: 20 CM/S
LEFT ICA PROX SYS: 88 CM/S
LEFT VERTEBRAL DIAS: 14 CM/S
LEFT VERTEBRAL SYS: 50 CM/S
NONHDLC SERPL-MCNC: 57 MG/DL
OHS CV CAROTID RIGHT ICA EDV HIGHEST: 81
OHS CV CAROTID ULTRASOUND LEFT ICA/CCA RATIO: 1.07
OHS CV CAROTID ULTRASOUND RIGHT ICA/CCA RATIO: 2.89
OHS CV PV CAROTID LEFT HIGHEST CCA: 82
OHS CV PV CAROTID LEFT HIGHEST ICA: 88
OHS CV PV CAROTID RIGHT HIGHEST CCA: 95
OHS CV PV CAROTID RIGHT HIGHEST ICA: 275
OHS CV US CAROTID LEFT HIGHEST EDV: 24
POTASSIUM SERPL-SCNC: 4.8 MMOL/L (ref 3.5–5.1)
RIGHT ARM DIASTOLIC BLOOD PRESSURE: 55 MMHG
RIGHT ARM SYSTOLIC BLOOD PRESSURE: 104 MMHG
RIGHT CBA DIAS: 9 CM/S
RIGHT CBA SYS: 59 CM/S
RIGHT CCA DIST DIAS: 14 CM/S
RIGHT CCA DIST SYS: 95 CM/S
RIGHT CCA MID DIAS: 11 CM/S
RIGHT CCA MID SYS: 87 CM/S
RIGHT CCA PROX DIAS: 14 CM/S
RIGHT CCA PROX SYS: 85 CM/S
RIGHT ECA DIAS: 15 CM/S
RIGHT ECA SYS: 126 CM/S
RIGHT ICA DIST DIAS: 13 CM/S
RIGHT ICA DIST SYS: 83 CM/S
RIGHT ICA MID DIAS: 81 CM/S
RIGHT ICA MID SYS: 275 CM/S
RIGHT ICA PROX DIAS: 21 CM/S
RIGHT ICA PROX SYS: 93 CM/S
RIGHT VERTEBRAL DIAS: 12 CM/S
RIGHT VERTEBRAL SYS: 43 CM/S
SODIUM SERPL-SCNC: 144 MMOL/L (ref 136–145)
TRIGL SERPL-MCNC: 31 MG/DL (ref 30–150)

## 2020-06-24 PROCEDURE — 93880 EXTRACRANIAL BILAT STUDY: CPT | Mod: HCNC,S$GLB,, | Performed by: INTERNAL MEDICINE

## 2020-06-24 PROCEDURE — 80061 LIPID PANEL: CPT | Mod: HCNC

## 2020-06-24 PROCEDURE — 80048 BASIC METABOLIC PNL TOTAL CA: CPT | Mod: HCNC

## 2020-06-24 PROCEDURE — 93880 CV US DOPPLER CAROTID (CUPID ONLY): ICD-10-PCS | Mod: HCNC,S$GLB,, | Performed by: INTERNAL MEDICINE

## 2020-06-24 PROCEDURE — 36415 COLL VENOUS BLD VENIPUNCTURE: CPT | Mod: HCNC

## 2020-06-24 NOTE — PROGRESS NOTES
"Digital Medicine: Health  Follow-Up    The history is provided by the patient.   Follow Up  Follow-up reason(s): routine education    Patient stated that he is doing well and feeling good besides the back pain he continues to suffer from. He will be having a nerve block next month to see if this will help.     Patient is pleased with his readings overall and he stated that the fluctuations are "just me" and that its likely from stress and pain. He denies any major symptoms or side effects.     He is going to see his cardiologist at the end of the week.       Intervention/Plan        Topic    Lipid (Cholesterol) Test        Last 5 Patient Entered Readings                                      Current 30 Day Average: 138/69     Recent Readings 6/24/2020 6/16/2020 6/13/2020 6/7/2020 5/31/2020    SBP (mmHg) 133 146 140 147 124    DBP (mmHg) 66 73 72 68 64    Pulse 54 54 51 52 66                      Medication Adherence Screening   He did not miss a dose this month.    Patient identified the following reasons for non-compliance: None      SDOH  "

## 2020-06-25 NOTE — PROGRESS NOTES
Subjective:   Patient ID:  Javier Valles is a 89 y.o. male who presents for follow up of Coronary Artery Disease, Dyslipidemia, Hypertension, Congestive Heart Failure, and Peripheral Arterial Disease    The patient location is: home          Face to Face time with patient:32 min  .  Each patient to whom he or she provides medical services by telemedicine is:  (1) informed of the relationship between the physician and patient and the respective role of any other health care provider with respect to management of the patient; and (2) notified that he or she may decline to receive medical services by telemedicine and may withdraw from such care at any time.    Notes:     Assessment:     1. Cardiomyopathy, ischemic: Prior MI, CABG, EF 40-45%    2. CAD: : Stent thrombosis of LCx, restented with ELLIOTT. Patent LIMA and G-E grafts.    3. Mixed hyperlipidemia : LDL at goal   4. Chronic kidney disease, stage 3 (moderate) : Cr 2.1 GFR 26   5. Essential hypertension    6. Peripheral vascular disease    7. Bilateral carotid artery stenosis: Asx, progression DAKOTAH >80%    8. S/P CABG (coronary artery bypass graft)        Plan:     1. Will repeat carotid US in 3-4 wks to confirm severity. Discussed medical therapy vs revascularization options.   2. Cont meds.    HPI: No angina or chf symptoms, but continues to have limited mobility due to back pain.  No stroke or tia symptoms.  BP well controlled at home.     6-2020 Carotid US  · There is 80-99% right Internal Carotid Stenosis.  · There is 0-19% left Internal Carotid Stenosis.    5-2016 Carotid US  · There is 20 - 39% right Internal Carotid stenosis.   There is 20 - 39% left Internal Carotid stenosis.     5-2019 PET Stress    There is a small sized (5%), mild to moderate intensity resting perfusion abnormality in the base to mid lateral wall. After pharmacologic stress, this defect is slightly more severe and larger involving 7.0 % of the LV myocardium indicative of infarct  with carmen-infarct ischemia in the usual distribution of the OM1.    Within the perfusion abnormality, absolute myocardial perfusion (cc/min/gm) averaged 0.50 cc/min/gat rest, 0.75 cc/min/g at stress and CFR was 1.50 cc/min/g, which equates to moderately to severely reduced coronary flow capacity in 7% of the myocardium (within the territory).    Whole heart absolute myocardial perfusion (cc/min/g) averaged 0.73  at rest (which is normal), 1.25 cc/min/g at stress (which is mildly reduced), and  (which is severely reduced).    Gated perfusion images showed an ejection fraction of 58.0 % at rest and 60 % during stress. Normal is greater than 51%.    Wall motion was normal at rest and during stress.    LV cavity size is normal at rest and stress.    The EKG portion of this study is uninterpretable.    Arrhythmias during stress: rare PVCs.    The patient reported no chest pain during the stress test.    When compared to the prior study on 10/1/2013, there is now a perfusion abnormality in the base to mid lateral wall, which slightly worsens with stress. Globally, resting flows have increased while stress flow remain relatively unchanged, resulting in a reduced CFR on the current study.    Coronary artery disease:   - : Stent thrombosis of LCx, re-stented with ELLIOTT.  Patent LIMA and G-E grafts.  - S/P LAD PTCA 1982; stent, PTCA 01/91  - S/P CABG x1 1991;   - redo CABG x 2 05/07/2007  LIMA to LAD, GASTROE to PDA                          - S/P LCX stent, 11/91, 2/01, Brachy 5/01, ELLIOTT 8/04, 5/07, 11/13      Review of Systems   Cardiovascular: Negative for chest pain, claudication, cyanosis, dyspnea on exertion, irregular heartbeat, leg swelling, near-syncope, orthopnea, palpitations, paroxysmal nocturnal dyspnea and syncope.   Respiratory: Negative for shortness of breath.    Musculoskeletal: Positive for back pain.   Neurological: Negative for brief paralysis, dizziness and focal weakness.       Past Medical  History:   Diagnosis Date    *Atrial flutter 10/3/13    Anticoagulant long-term use     Aspirin only at this time    Arthritis     Atrial fibrillation     Atrial flutter     Blood transfusion     ?    BPH (benign prostatic hypertrophy)     Carotid artery disease     2008: [place] There is 40 - 49% right Internal Carotid stenosis.  There is 20 - 39% left Internal Carotid stenosis.       Chronic kidney disease     Coronary artery disease     DDD (degenerative disc disease) 6/25/2015    Degenerative disc disease     Elevated PSA     Glaucoma     Hyperlipidemia     Hypertension     Lumbar stenosis     lumbar spinal stenosis    NSTEMI (non-ST elevated myocardial infarction) 11/3/2013    Pacemaker 11/2013    Peripheral neuropathy     - S/P right ILIAC stent 1993;      Retinal detachment     Squamous cell carcinoma     left leg    Syncope and collapse: orthostatic with hypotension 10/9/2015       Past Surgical History:   Procedure Laterality Date    [facility]ATION      [facility]    CARDIAC ELECTROPHYSIOLOGY MAPPING AND ABLATION  11/2016    CARDIAC PACEMAKER PLACEMENT  11/2016    CATARACT EXTRACTION W/  INTRAOCULAR LENS IMPLANT Left 1997        CORONARY ARTERY BYPASS GRAFT  1991    ENUCLEATION Right 1949    EPIDURAL STEROID INJECTION N/A 7/17/2019    Procedure: INJECTION, STEROID, EPIDURAL, CAUDAL  cleared to hold eliquis 4 days per Dr. Ruiz;  Surgeon: James Hodges MD;  Location: [facility] PAIN MGT;  Service: Pain Management;  Laterality: N/A;    EPIDURAL STEROID INJECTION N/A 5/27/2020    Procedure: INJECTION, STEROID, EPIDURAL, CAUDALW/CATH need consent , clear to hold Eliquis 48 hrs per ordering ;  Surgeon: James Hodges MD;  Location: [facility] PAIN MGT;  Service: Pain Management;  Laterality: N/A;    INJECTION OF ANESTHETIC AGENT AROUND MEDIAL BRANCH NERVES INNERVATING LUMBAR FACET JOINT Bilateral 12/18/2018    Procedure: BLOCK, NERVE, FACET JOINT, LUMBAR, MEDIAL BRANCH;   Surgeon: James Hodges MD;  Location: [facility];  Service: Pain Management;  Laterality: Bilateral;    LUMBAR LAMINECTOMY  2016    RETINAL DETACHMENT SURGERY Left     Dr. Cosme    SKIN BIOPSY      SPINAL CORD STIMULATOR TRIAL W/ LAMINOTOMY  10/2017    TONSILLECTOMY         Social History     Tobacco Use    Smoking status: Former Smoker     Quit date: 1963     Years since quittin.8    Smokeless tobacco: Never Used   Substance Use Topics    Alcohol use: Yes     Alcohol/week: 3.0 standard drinks     Types: 1 Glasses of wine, 1 Cans of beer, 1 Shots of liquor per week     Frequency: 2-3 times a week     Drinks per session: 1 or 2     Binge frequency: Never     Comment: 2 a day    Drug use: No       Family History   Problem Relation Age of Onset    Stroke Mother 69    Clotting disorder Mother         per patient no clotting disorder    Hypertension Mother     Diverticulitis Son     Cancer Neg Hx     Diabetes Neg Hx     Heart disease Neg Hx     Glaucoma Neg Hx     Amblyopia Neg Hx     Blindness Neg Hx     Cataracts Neg Hx     Macular degeneration Neg Hx     Retinal detachment Neg Hx     Strabismus Neg Hx        Current Outpatient Medications   Medication Sig    amiodarone (PACERONE) 200 MG Tab Take 1 tablet (200 mg total) by mouth once daily.    amLODIPine (NORVASC) 5 MG tablet Take 1 tablet by mouth once daily    apixaban (ELIQUIS) 2.5 mg Tab Take 1 tablet (2.5 mg total) by mouth 2 (two) times daily.    ascorbic acid (VITAMIN C) 1000 MG tablet Take 1,000 mg by mouth every morning.     brimonidine 0.2% (ALPHAGAN) 0.2 % Drop Place 1 drop into the left eye 3 (three) times daily.    brinzolamide (AZOPT) 1 % ophthalmic suspension Place 1 drop into both eyes 3 (three) times daily.    co-enzyme Q-10 30 mg capsule Take 30 mg by mouth once daily.     donepeziL (ARICEPT) 5 MG tablet Take 1 tablet (5 mg total) by mouth every evening.    dorzolamide (TRUSOPT) 2 %  ophthalmic solution Place 1 drop into both eyes 3 (three) times daily.    erythromycin (ROMYCIN) ophthalmic ointment Apply to affected eye daily as needed    FLUCELVAX QUAD 8249-0555, PF, 60 mcg (15 mcg x 4)/0.5 mL Syrg     gabapentin (NEURONTIN) 300 MG capsule Take 1 capsule (300 mg total) by mouth 2 (two) times daily.    ipratropium (ATROVENT) 0.03 % nasal spray USE 1 TO 2 SPRAY(S) IN EACH NOSTRIL TWICE DAILY for rhinorrhea/nasal drip    latanoprost 0.005 % ophthalmic solution Place 1 drop into the left eye every evening.    losartan (COZAAR) 50 MG tablet Take 1 tablet (50 mg total) by mouth 2 (two) times daily.    lutein 20 mg Cap Take 20 mg by mouth once daily.     MULTIVITAMINS,THER W-MINERALS (VITAMINS & MINERALS ORAL) Take 1 tablet by mouth every morning.     polycarbophil (FIBERCON) 625 mg tablet Take 625 mg by mouth 2 (two) times daily.    rosuvastatin (CRESTOR) 20 MG tablet Take 1 tablet (20 mg total) by mouth every evening.    sertraline (ZOLOFT) 50 MG tablet Take 1 tablet (50 mg total) by mouth once daily.     No current facility-administered medications for this visit.        Review of patient's allergies indicates:   Allergen Reactions    Atorvastatin      Myositis/elevated CPK    Pravastatin      Severe myalgias/elevated CPK    Statins-hmg-coa reductase inhibitors      Muscle pain and loss of muscle    Ace inhibitors      Cough       Objective:     There were no vitals taken for this visit.    Physical Exam      Chemistry        Component Value Date/Time     06/24/2020 0838    K 4.8 06/24/2020 0838     (H) 06/24/2020 0838    CO2 22 (L) 06/24/2020 0838    BUN 36 (H) 06/24/2020 0838    CREATININE 2.1 (H) 06/24/2020 0838    GLU 92 06/24/2020 0838        Component Value Date/Time    CALCIUM 9.7 06/24/2020 0838    ALKPHOS 79 09/25/2019 1320    AST 24 09/25/2019 1320    ALT 19 09/25/2019 1320    BILITOT 0.7 09/25/2019 1320    ESTGFRAFRICA 31.1 (A) 06/24/2020 0838    EGFRNONAA 26.9  (A) 06/24/2020 0838            Lab Results   Component Value Date    CHOL 107 (L) 06/24/2020    CHOL 133 05/29/2019    CHOL 124 06/27/2018     Lab Results   Component Value Date    HDL 50 06/24/2020    HDL 53 05/29/2019    HDL 61 06/27/2018     Lab Results   Component Value Date    LDLCALC 50.8 (L) 06/24/2020    LDLCALC 66.8 05/29/2019    LDLCALC 54.2 (L) 06/27/2018     Lab Results   Component Value Date    TRIG 31 06/24/2020    TRIG 66 05/29/2019    TRIG 44 06/27/2018     Lab Results   Component Value Date    CHOLHDL 46.7 06/24/2020    CHOLHDL 39.8 05/29/2019    CHOLHDL 49.2 06/27/2018         Lab Results   Component Value Date     06/24/2020    K 4.8 06/24/2020     (H) 06/24/2020    CO2 22 (L) 06/24/2020    BUN 36 (H) 06/24/2020    CREATININE 2.1 (H) 06/24/2020    GLU 92 06/24/2020    HGBA1C 6.2 03/06/2008    MG 2.2 04/08/2015    AST 24 09/25/2019    ALT 19 09/25/2019    ALBUMIN 4.1 11/11/2019    PROT 6.7 09/25/2019    BILITOT 0.7 09/25/2019    WBC 5.53 05/12/2020    HGB 11.7 (L) 05/12/2020    HCT 38.1 (L) 05/12/2020    HCT 38 11/01/2013     (H) 05/12/2020    PLT 89 (L) 05/12/2020    INR 1.0 11/01/2017    PSA 10 (H) 06/30/2010    TSH 3.607 02/10/2020    CHOL 107 (L) 06/24/2020    HDL 50 06/24/2020    LDLCALC 50.8 (L) 06/24/2020    TRIG 31 06/24/2020

## 2020-06-26 ENCOUNTER — OFFICE VISIT (OUTPATIENT)
Dept: CARDIOLOGY | Facility: CLINIC | Age: 85
End: 2020-06-26
Payer: MEDICARE

## 2020-06-26 DIAGNOSIS — I10 ESSENTIAL HYPERTENSION: ICD-10-CM

## 2020-06-26 DIAGNOSIS — Z95.1 S/P CABG (CORONARY ARTERY BYPASS GRAFT): ICD-10-CM

## 2020-06-26 DIAGNOSIS — E78.2 MIXED HYPERLIPIDEMIA: Chronic | ICD-10-CM

## 2020-06-26 DIAGNOSIS — I73.9 PERIPHERAL VASCULAR DISEASE: ICD-10-CM

## 2020-06-26 DIAGNOSIS — I65.23 BILATERAL CAROTID ARTERY OCCLUSION: Primary | ICD-10-CM

## 2020-06-26 DIAGNOSIS — N18.30 CHRONIC KIDNEY DISEASE, STAGE 3 (MODERATE): ICD-10-CM

## 2020-06-26 DIAGNOSIS — I25.10 CORONARY ARTERY DISEASE INVOLVING NATIVE CORONARY ARTERY OF NATIVE HEART WITHOUT ANGINA PECTORIS: ICD-10-CM

## 2020-06-26 DIAGNOSIS — I25.5 CARDIOMYOPATHY, ISCHEMIC: Primary | Chronic | ICD-10-CM

## 2020-06-26 DIAGNOSIS — I65.23 BILATERAL CAROTID ARTERY STENOSIS: ICD-10-CM

## 2020-06-26 PROCEDURE — 1101F PT FALLS ASSESS-DOCD LE1/YR: CPT | Mod: CPTII,95,, | Performed by: INTERNAL MEDICINE

## 2020-06-26 PROCEDURE — 99213 PR OFFICE/OUTPT VISIT, EST, LEVL III, 20-29 MIN: ICD-10-PCS | Mod: 95,,, | Performed by: INTERNAL MEDICINE

## 2020-06-26 PROCEDURE — 1159F PR MEDICATION LIST DOCUMENTED IN MEDICAL RECORD: ICD-10-PCS | Mod: 95,,, | Performed by: INTERNAL MEDICINE

## 2020-06-26 PROCEDURE — 1101F PR PT FALLS ASSESS DOC 0-1 FALLS W/OUT INJ PAST YR: ICD-10-PCS | Mod: CPTII,95,, | Performed by: INTERNAL MEDICINE

## 2020-06-26 PROCEDURE — 99213 OFFICE O/P EST LOW 20 MIN: CPT | Mod: 95,,, | Performed by: INTERNAL MEDICINE

## 2020-06-26 PROCEDURE — 1159F MED LIST DOCD IN RCRD: CPT | Mod: 95,,, | Performed by: INTERNAL MEDICINE

## 2020-06-28 ENCOUNTER — CLINICAL SUPPORT (OUTPATIENT)
Dept: CARDIOLOGY | Facility: HOSPITAL | Age: 85
End: 2020-06-28
Attending: INTERNAL MEDICINE
Payer: MEDICARE

## 2020-06-28 DIAGNOSIS — I49.5 SICK SINUS SYNDROME: ICD-10-CM

## 2020-06-28 DIAGNOSIS — I50.9 HEART FAILURE, UNSPECIFIED: ICD-10-CM

## 2020-06-28 DIAGNOSIS — I48.91 UNSPECIFIED ATRIAL FIBRILLATION: ICD-10-CM

## 2020-06-28 DIAGNOSIS — Z95.0 CARDIAC PACEMAKER IN SITU: ICD-10-CM

## 2020-06-28 DIAGNOSIS — Z95.810 PRESENCE OF AUTOMATIC (IMPLANTABLE) CARDIAC DEFIBRILLATOR: ICD-10-CM

## 2020-06-28 PROCEDURE — 93296 REM INTERROG EVL PM/IDS: CPT | Mod: HCNC | Performed by: INTERNAL MEDICINE

## 2020-06-28 PROCEDURE — 93294 REM INTERROG EVL PM/LDLS PM: CPT | Mod: HCNC,,, | Performed by: INTERNAL MEDICINE

## 2020-06-28 PROCEDURE — 93294 CARDIAC DEVICE CHECK - REMOTE: ICD-10-PCS | Mod: HCNC,,, | Performed by: INTERNAL MEDICINE

## 2020-07-10 ENCOUNTER — CLINICAL SUPPORT (OUTPATIENT)
Dept: CARDIOLOGY | Facility: CLINIC | Age: 85
End: 2020-07-10
Attending: INTERNAL MEDICINE
Payer: MEDICARE

## 2020-07-10 DIAGNOSIS — I65.23 BILATERAL CAROTID ARTERY OCCLUSION: ICD-10-CM

## 2020-07-10 LAB
LEFT ARM DIASTOLIC BLOOD PRESSURE: 64 MMHG
LEFT ARM SYSTOLIC BLOOD PRESSURE: 110 MMHG
LEFT CBA DIAS: 17 CM/S
LEFT CBA SYS: 77 CM/S
LEFT CCA DIST DIAS: 16 CM/S
LEFT CCA DIST SYS: 80 CM/S
LEFT CCA MID DIAS: 16 CM/S
LEFT CCA MID SYS: 85 CM/S
LEFT CCA PROX DIAS: 15 CM/S
LEFT CCA PROX SYS: 71 CM/S
LEFT ECA DIAS: 15 CM/S
LEFT ECA SYS: 197 CM/S
LEFT ICA DIST DIAS: 27 CM/S
LEFT ICA DIST SYS: 97 CM/S
LEFT ICA MID DIAS: 29 CM/S
LEFT ICA MID SYS: 99 CM/S
LEFT ICA PROX DIAS: 26 CM/S
LEFT ICA PROX SYS: 97 CM/S
LEFT VERTEBRAL DIAS: 16 CM/S
LEFT VERTEBRAL SYS: 45 CM/S
OHS CV CAROTID RIGHT ICA EDV HIGHEST: 60
OHS CV CAROTID ULTRASOUND LEFT ICA/CCA RATIO: 1.16
OHS CV CAROTID ULTRASOUND RIGHT ICA/CCA RATIO: 3.4
OHS CV PV CAROTID LEFT HIGHEST CCA: 85
OHS CV PV CAROTID LEFT HIGHEST ICA: 99
OHS CV PV CAROTID RIGHT HIGHEST CCA: 81
OHS CV PV CAROTID RIGHT HIGHEST ICA: 275
OHS CV US CAROTID LEFT HIGHEST EDV: 29
RIGHT ARM DIASTOLIC BLOOD PRESSURE: 53 MMHG
RIGHT ARM SYSTOLIC BLOOD PRESSURE: 112 MMHG
RIGHT CBA DIAS: 12 CM/S
RIGHT CBA SYS: 55 CM/S
RIGHT CCA DIST DIAS: 14 CM/S
RIGHT CCA DIST SYS: 75 CM/S
RIGHT CCA MID DIAS: 14 CM/S
RIGHT CCA MID SYS: 81 CM/S
RIGHT CCA PROX DIAS: 12 CM/S
RIGHT CCA PROX SYS: 76 CM/S
RIGHT ECA DIAS: 19 CM/S
RIGHT ECA SYS: 130 CM/S
RIGHT ICA DIST DIAS: 18 CM/S
RIGHT ICA DIST SYS: 99 CM/S
RIGHT ICA MID DIAS: 60 CM/S
RIGHT ICA MID SYS: 275 CM/S
RIGHT ICA PROX DIAS: 22 CM/S
RIGHT ICA PROX SYS: 88 CM/S
RIGHT VERTEBRAL DIAS: 12 CM/S
RIGHT VERTEBRAL SYS: 56 CM/S

## 2020-07-10 PROCEDURE — 93880 EXTRACRANIAL BILAT STUDY: CPT | Mod: HCNC,S$GLB,, | Performed by: INTERNAL MEDICINE

## 2020-07-10 PROCEDURE — 93880 CV US DOPPLER CAROTID (CUPID ONLY): ICD-10-PCS | Mod: HCNC,S$GLB,, | Performed by: INTERNAL MEDICINE

## 2020-07-13 ENCOUNTER — DOCUMENTATION ONLY (OUTPATIENT)
Dept: CARDIAC CATH/INVASIVE PROCEDURES | Facility: HOSPITAL | Age: 85
End: 2020-07-13

## 2020-07-13 NOTE — PROGRESS NOTES
Repeat carotid ultrasound shows mild to moderate <70% progression of DAKOTAH stenosis.  · There is 60-69% right Internal Carotid Stenosis.  · There is 20-39% left Internal Carotid Stenosis.  · >50% L ECA stenosis.

## 2020-07-20 ENCOUNTER — TELEPHONE (OUTPATIENT)
Dept: PAIN MEDICINE | Facility: CLINIC | Age: 85
End: 2020-07-20

## 2020-07-20 NOTE — TELEPHONE ENCOUNTER
"----- Message from Tracie Uriarte sent at 7/20/2020  8:21 AM CDT -----  Regarding: Pre-Procedure Lab Exam  Contact: Patient  Pt has an active request in Epic entered by this provider for Pre-procedure lab exam. [facility] confirms the pt's procedure will be on 7/22/2020. Pt was scheduled for Pre-Procedure (COVID) testing for 7/20/2020 at the [place] location; his appointment was cancelled due to "Other -Policy changed. Patients no longer need COVID pre-procedure test," (as per Epic). Pt stated he was not notified that the appointment had been cancelled and stated if so, it would be a problem. Requesting a call back from this provider to the pt; to discuss further details as to why the COVID test was cancelled and to confirm that the test is no longer a requirement prior to his procedure. Please contact pt at [phone].    "

## 2020-07-20 NOTE — TELEPHONE ENCOUNTER
lvm informing pt the COVID test was canceled because it is no longer rqeuired for the type of procedure he is having

## 2020-07-22 ENCOUNTER — HOSPITAL ENCOUNTER (OUTPATIENT)
Facility: OTHER | Age: 85
Discharge: HOME | End: 2020-07-22
Attending: ANESTHESIOLOGY | Admitting: ANESTHESIOLOGY
Payer: MEDICARE

## 2020-07-22 VITALS
TEMPERATURE: 98 F | HEIGHT: 67 IN | HEART RATE: 50 BPM | DIASTOLIC BLOOD PRESSURE: 64 MMHG | RESPIRATION RATE: 16 BRPM | SYSTOLIC BLOOD PRESSURE: 146 MMHG | BODY MASS INDEX: 27 KG/M2 | OXYGEN SATURATION: 97 % | WEIGHT: 172 LBS

## 2020-07-22 DIAGNOSIS — M47.26 OSTEOARTHRITIS OF SPINE WITH RADICULOPATHY, LUMBAR REGION: Primary | ICD-10-CM

## 2020-07-22 DIAGNOSIS — G89.29 CHRONIC PAIN: ICD-10-CM

## 2020-07-22 PROCEDURE — 25000003 PHARM REV CODE 250: Mod: HCNC | Performed by: ANESTHESIOLOGY

## 2020-07-22 PROCEDURE — 63600175 PHARM REV CODE 636 W HCPCS: Mod: HCNC | Performed by: ANESTHESIOLOGY

## 2020-07-22 PROCEDURE — 64635 DESTROY LUMB/SAC FACET JNT: CPT | Mod: HCNC,LT,, | Performed by: ANESTHESIOLOGY

## 2020-07-22 PROCEDURE — 64635 DESTROY LUMB/SAC FACET JNT: CPT | Mod: HCNC,LT | Performed by: ANESTHESIOLOGY

## 2020-07-22 PROCEDURE — 64635 PR DESTROY LUMB/SAC FACET JNT: ICD-10-PCS | Mod: HCNC,LT,, | Performed by: ANESTHESIOLOGY

## 2020-07-22 PROCEDURE — 64636 PR DESTROY L/S FACET JNT ADDL: ICD-10-PCS | Mod: HCNC,LT,, | Performed by: ANESTHESIOLOGY

## 2020-07-22 PROCEDURE — 64636 DESTROY L/S FACET JNT ADDL: CPT | Mod: HCNC,LT,, | Performed by: ANESTHESIOLOGY

## 2020-07-22 PROCEDURE — 64636 DESTROY L/S FACET JNT ADDL: CPT | Mod: HCNC,LT | Performed by: ANESTHESIOLOGY

## 2020-07-22 RX ORDER — BUPIVACAINE HYDROCHLORIDE 2.5 MG/ML
INJECTION, SOLUTION EPIDURAL; INFILTRATION; INTRACAUDAL
Status: DISCONTINUED | OUTPATIENT
Start: 2020-07-22 | End: 2020-07-22 | Stop reason: HOSPADM

## 2020-07-22 RX ORDER — DEXAMETHASONE SODIUM PHOSPHATE 4 MG/ML
INJECTION, SOLUTION INTRA-ARTICULAR; INTRALESIONAL; INTRAMUSCULAR; INTRAVENOUS; SOFT TISSUE
Status: DISCONTINUED | OUTPATIENT
Start: 2020-07-22 | End: 2020-07-22 | Stop reason: HOSPADM

## 2020-07-22 RX ORDER — FENTANYL CITRATE 50 UG/ML
INJECTION, SOLUTION INTRAMUSCULAR; INTRAVENOUS
Status: DISCONTINUED | OUTPATIENT
Start: 2020-07-22 | End: 2020-07-22 | Stop reason: HOSPADM

## 2020-07-22 RX ORDER — LIDOCAINE HYDROCHLORIDE 10 MG/ML
INJECTION INFILTRATION; PERINEURAL
Status: DISCONTINUED | OUTPATIENT
Start: 2020-07-22 | End: 2020-07-22 | Stop reason: HOSPADM

## 2020-07-22 RX ORDER — MIDAZOLAM HYDROCHLORIDE 1 MG/ML
INJECTION INTRAMUSCULAR; INTRAVENOUS
Status: DISCONTINUED | OUTPATIENT
Start: 2020-07-22 | End: 2020-07-22 | Stop reason: HOSPADM

## 2020-07-22 RX ORDER — SODIUM CHLORIDE 9 MG/ML
500 INJECTION, SOLUTION INTRAVENOUS CONTINUOUS
Status: DISCONTINUED | OUTPATIENT
Start: 2020-07-22 | End: 2020-07-22 | Stop reason: HOSPADM

## 2020-07-22 RX ADMIN — SODIUM CHLORIDE 500 ML: 0.9 INJECTION, SOLUTION INTRAVENOUS at 09:07

## 2020-07-22 NOTE — OP NOTE
Patient Name: Javier Valles  MRN: [id number]    INFORMED CONSENT: The procedure, risks, benefits and options were discussed with patient. There are no contraindications to the procedure. The patient expressed understanding and agreed to proceed. The personnel performing the procedure was discussed. I verify that I personally obtained Javier's consent prior to the start of the procedure and the signed consent can be found on the patient's chart.    Procedure Date: 07/22/2020    Anesthesia: Topical    Pre Procedure diagnosis: Lumbar spondylosis [M47.816]  1. Osteoarthritis of spine with radiculopathy, lumbar region    2. Chronic pain      Post-Procedure diagnosis: SAME      Moderate Sedation: Yes - Fentanyl 100 mcg and Midazolam 2 mg    PROCEDURE: left L2,3,4,5 FACET MEDIAL BRANCH NERVE RADIOFREQUENCY NEUROTOMY (lumbar)          DESCRIPTION OF PROCEDURE: The patient was brought to the procedure room.  After performing time out IV access was obtained prior to the procedure. The patient was positioned prone on the fluoroscopy table. Continuous hemodynamic monitoring was initiated including blood pressure and pulse oximetry. IV sedation was administered incrementally to allow the patient to remain comfortable and conversant throughout the procedure. The area of the lumbar spine was prepped chlorhexidine three times and draped into a sterile field.  Fluoroscopy was used to identify the location of the left side L2, L3, L4, and L5 medial branch nerves at the junctions of the superior articular process and the transverse processes of L3, L4, L5, and the sacral ala respectively.  Skin anesthesia was achieved using 3 cc of Lidocaine 1% over the injection sites. A 20 gauge, 100mm (10mm active tip) curved RF needle was slowly inserted at each level using AP, lateral and oblique fluoroscopic imaging. Negative aspiration for blood or CSF was confirmed.  Sensory stimulation at 50Hz below 0.5V was achieved at every level. Motor  stimulation at 2Hz up to 1.5V did not cause any radicular symptoms at any level. Each level was anesthetized with 1.5 cc of lidocaine 1%.  Radiofrequency lesioning was performed for 90 seconds at 80 degrees in two different positions at each level.  Total of 4 cc of bupivacaine 0.25% and 10 mg of Decadron was injected was injected at all levels.. The needles were removed and bleeding was nil.  A sterile dressing was applied. Javier was taken back to the recovery room for further observation.     Stimulation Results:  L2 = Sensory positive @ 0.2, Motor negative @ 1.5  L3 = Sensory positive @ 0.4, Motor negative @ 1.5  L4 = Sensory positive @ 0.5, Motor negative @ 1.5  L5 = Sensory positive @ 0.8, Motor negative @ 1.5    Blood Loss: Nill  Specimen: None    James Hodges MD

## 2020-07-22 NOTE — DISCHARGE INSTRUCTIONS

## 2020-07-22 NOTE — DISCHARGE SUMMARY
Discharge Note  Short Stay      SUMMARY     Admit Date: 7/22/2020    Attending Physician: Jay Conn      Discharge Physician: Jay Conn      Discharge Date: 7/22/2020 10:27 AM    Procedure(s) (LRB):  RADIOFREQUENCY ABLATION LEFT L2,3,4,5 consent needed (Left)    Final Diagnosis: Lumbar spondylosis [M47.816]    Disposition: Home or self care    Patient Instructions:   Current Discharge Medication List      CONTINUE these medications which have NOT CHANGED    Details   amiodarone (PACERONE) 200 MG Tab Take 1 tablet (200 mg total) by mouth once daily.  Qty: 90 tablet, Refills: 1    Associated Diagnoses: SSS (sick sinus syndrome); S/P CABG (coronary artery bypass graft); Ischemic cardiomyopathy; PVC (premature ventricular contraction)      amLODIPine (NORVASC) 5 MG tablet Take 1 tablet by mouth once daily  Qty: 90 tablet, Refills: 3      apixaban (ELIQUIS) 2.5 mg Tab Take 1 tablet (2.5 mg total) by mouth 2 (two) times daily.  Qty: 180 tablet, Refills: 3    Associated Diagnoses: Typical atrial flutter      ascorbic acid (VITAMIN C) 1000 MG tablet Take 1,000 mg by mouth every morning.       brimonidine 0.2% (ALPHAGAN) 0.2 % Drop Place 1 drop into the left eye 3 (three) times daily.  Qty: 30 mL, Refills: 3    Associated Diagnoses: Primary open-angle glaucoma, left eye, moderate stage      brinzolamide (AZOPT) 1 % ophthalmic suspension Place 1 drop into both eyes 3 (three) times daily.      co-enzyme Q-10 30 mg capsule Take 30 mg by mouth once daily.       donepeziL (ARICEPT) 5 MG tablet Take 1 tablet (5 mg total) by mouth every evening.  Qty: 90 tablet, Refills: 1      dorzolamide (TRUSOPT) 2 % ophthalmic solution Place 1 drop into both eyes 3 (three) times daily.  Qty: 30 mL, Refills: 3    Associated Diagnoses: Primary open-angle glaucoma, left eye, moderate stage      erythromycin (ROMYCIN) ophthalmic ointment Apply to affected eye daily as needed  Qty: 3.5 g, Refills: 6    Associated Diagnoses:  Blepharitis, unspecified laterality, unspecified type      FLUCELVAX QUAD 2164-0984, PF, 60 mcg (15 mcg x 4)/0.5 mL Syrg       gabapentin (NEURONTIN) 300 MG capsule Take 1 capsule (300 mg total) by mouth 2 (two) times daily.  Qty: 60 capsule, Refills: 5      ipratropium (ATROVENT) 0.03 % nasal spray USE 1 TO 2 SPRAY(S) IN EACH NOSTRIL TWICE DAILY for rhinorrhea/nasal drip  Qty: 30 mL, Refills: 1    Associated Diagnoses: Post-nasal drip; Cough; Rhinorrhea      latanoprost 0.005 % ophthalmic solution Place 1 drop into the left eye every evening.  Qty: 15 mL, Refills: 3    Comments: Please consider 90 day supplies to promote better adherence  Associated Diagnoses: Primary open-angle glaucoma, left eye, moderate stage      losartan (COZAAR) 50 MG tablet Take 1 tablet (50 mg total) by mouth 2 (two) times daily.  Qty: 180 tablet, Refills: 3    Associated Diagnoses: Coronary artery disease involving native coronary artery without angina pectoris, unspecified whether native or transplanted heart      lutein 20 mg Cap Take 20 mg by mouth once daily.       MULTIVITAMINS,THER W-MINERALS (VITAMINS & MINERALS ORAL) Take 1 tablet by mouth every morning.       polycarbophil (FIBERCON) 625 mg tablet Take 625 mg by mouth 2 (two) times daily.      rosuvastatin (CRESTOR) 20 MG tablet Take 1 tablet (20 mg total) by mouth every evening.  Qty: 90 tablet, Refills: 3    Comments: Patient wants generic  Associated Diagnoses: Hyperlipidemia, unspecified hyperlipidemia type      sertraline (ZOLOFT) 50 MG tablet Take 1 tablet (50 mg total) by mouth once daily.  Qty: 90 tablet, Refills: 1                 Discharge Diagnosis: Lumbar spondylosis [M47.816]  Condition on Discharge: Stable with no complications to procedure   Diet on Discharge: Same as before.  Activity: as per instruction sheet.  Discharge to: Home with a responsible adult.  Follow up: 2-4 weeks

## 2020-07-22 NOTE — H&P
HPI  Patient presenting for Procedure(s) (LRB):  RADIOFREQUENCY ABLATION LEFT L2,3,4,5 consent needed (Left)     Patient on Anti-coagulation Yes 2.5mg Eliquis but patient reports last dose was Monday 7/20/20    No health changes since previous encounter    Past Medical History:   Diagnosis Date    *Atrial flutter 10/3/13    Anticoagulant long-term use     Aspirin only at this time    Arthritis     Atrial fibrillation     Atrial flutter     Blood transfusion     ?    BPH (benign prostatic hypertrophy)     Carotid artery disease     2008: [place] There is 40 - 49% right Internal Carotid stenosis.  There is 20 - 39% left Internal Carotid stenosis.       Chronic kidney disease     Coronary artery disease     DDD (degenerative disc disease) 6/25/2015    Degenerative disc disease     Elevated PSA     Glaucoma     Hyperlipidemia     Hypertension     Lumbar stenosis     lumbar spinal stenosis    NSTEMI (non-ST elevated myocardial infarction) 11/3/2013    Pacemaker 11/2013    Peripheral neuropathy     - S/P right ILIAC stent 1993;      Retinal detachment     Squamous cell carcinoma     left leg    Syncope and collapse: orthostatic with hypotension 10/9/2015     Past Surgical History:   Procedure Laterality Date    [facility]    CARDIAC ELECTROPHYSIOLOGY MAPPING AND ABLATION  11/2016    CARDIAC PACEMAKER PLACEMENT  11/2016    CATARACT EXTRACTION W/  INTRAOCULAR LENS IMPLANT Left 1997        CORONARY ARTERY BYPASS GRAFT  1991    ENUCLEATION Right 1949    EPIDURAL STEROID INJECTION N/A 7/17/2019    Procedure: INJECTION, STEROID, EPIDURAL, CAUDAL  cleared to hold eliquis 4 days per Dr. Ruiz;  Surgeon: James Hodges MD;  Location: [facility] PAIN [facility];  Service: Pain Management;  Laterality: N/A;    EPIDURAL STEROID INJECTION N/A 5/27/2020    Procedure: INJECTION, STEROID, EPIDURAL, CAUDALW/CATH need consent , clear to hold Eliquis 48 hrs per ordering ;  Surgeon: James  NANI Hodges MD;  Location: [facility];  Service: Pain Management;  Laterality: N/A;    INJECTION OF ANESTHETIC AGENT AROUND MEDIAL BRANCH NERVES INNERVATING LUMBAR FACET JOINT Bilateral 12/18/2018    Procedure: BLOCK, NERVE, FACET JOINT, LUMBAR, MEDIAL BRANCH;  Surgeon: James Hodges MD;  Location: [facility] PAIN MGT;  Service: Pain Management;  Laterality: Bilateral;    LUMBAR LAMINECTOMY  04/25/2016    RETINAL DETACHMENT SURGERY Left 1997    Dr. Cosme    SKIN BIOPSY      SPINAL CORD STIMULATOR TRIAL W/ LAMINOTOMY  10/2017    TONSILLECTOMY       Review of patient's allergies indicates:   Allergen Reactions    Atorvastatin      Myositis/elevated CPK    Pravastatin      Severe myalgias/elevated CPK    Statins-hmg-coa reductase inhibitors      Muscle pain and loss of muscle    Ace inhibitors      Cough      No current facility-administered medications for this encounter.        PMHx, PSHx, Allergies, Medications reviewed in epic    ROS negative except pain complaints in HPI    OBJECTIVE:    There were no vitals taken for this visit.    PHYSICAL EXAMINATION:    GENERAL: Well appearing, in no acute distress, alert and oriented x3.  PSYCH:  Mood and affect appropriate.  SKIN: Skin color, texture, turgor normal, no rashes or lesions which will impact the procedure.  CV: RRR with palpation of the radial artery.  PULM: No evidence of respiratory difficulty, symmetric chest rise. Clear to auscultation.  NEURO: Cranial nerves grossly intact.    Plan:    Proceed with procedure as planned Procedure(s) (LRB):  RADIOFREQUENCY ABLATION LEFT L2,3,4,5 consent needed (Left)    John Finnegan  07/22/2020

## 2020-07-22 NOTE — DISCHARGE SUMMARY
Discharge Note  Short Stay      SUMMARY     Admit Date: 7/22/2020    Attending Physician: James Hodges      Discharge Physician: James Hodges      Discharge Date: 7/22/2020 12:47 PM    Procedure(s) (LRB):  RADIOFREQUENCY ABLATION LEFT L2,3,4,5 consent needed (Left)    Final Diagnosis: Lumbar spondylosis [M47.816]    Disposition: Home or self care    Patient Instructions:   Discharge Medication List as of 7/22/2020 10:44 AM      CONTINUE these medications which have NOT CHANGED    Details   amiodarone (PACERONE) 200 MG Tab Take 1 tablet (200 mg total) by mouth once daily., Starting Mon 6/1/2020, Normal      amLODIPine (NORVASC) 5 MG tablet Take 1 tablet by mouth once daily, Normal      apixaban (ELIQUIS) 2.5 mg Tab Take 1 tablet (2.5 mg total) by mouth 2 (two) times daily., Starting Thu 7/25/2019, Normal      ascorbic acid (VITAMIN C) 1000 MG tablet Take 1,000 mg by mouth every morning. , Until Discontinued, Historical Med      brimonidine 0.2% (ALPHAGAN) 0.2 % Drop Place 1 drop into the left eye 3 (three) times daily., Starting Mon 8/26/2019, Normal      brinzolamide (AZOPT) 1 % ophthalmic suspension Place 1 drop into both eyes 3 (three) times daily., Starting Fri 10/26/2018, Historical Med      co-enzyme Q-10 30 mg capsule Take 30 mg by mouth once daily. , Until Discontinued, Historical Med      donepeziL (ARICEPT) 5 MG tablet Take 1 tablet (5 mg total) by mouth every evening., Starting Mon 6/1/2020, Until Tue 6/1/2021, Normal      dorzolamide (TRUSOPT) 2 % ophthalmic solution Place 1 drop into both eyes 3 (three) times daily., Starting Fri 9/20/2019, Normal      erythromycin (ROMYCIN) ophthalmic ointment Apply to affected eye daily as needed, Normal      FLUCELVAX QUAD 1161-8549, PF, 60 mcg (15 mcg x 4)/0.5 mL Syrg Starting Wed 10/9/2019, Historical Med      gabapentin (NEURONTIN) 300 MG capsule Take 1 capsule (300 mg total) by mouth 2 (two) times daily., Starting Sat 11/23/2019, Normal      ipratropium  (ATROVENT) 0.03 % nasal spray USE 1 TO 2 SPRAY(S) IN EACH NOSTRIL TWICE DAILY for rhinorrhea/nasal drip, Normal      latanoprost 0.005 % ophthalmic solution Place 1 drop into the left eye every evening., Starting Fri 11/8/2019, Normal      losartan (COZAAR) 50 MG tablet Take 1 tablet (50 mg total) by mouth 2 (two) times daily., Starting Fri 2/28/2020, Normal      lutein 20 mg Cap Take 20 mg by mouth once daily. , Until Discontinued, Historical Med      MULTIVITAMINS,THER W-MINERALS (VITAMINS & MINERALS ORAL) Take 1 tablet by mouth every morning. , Until Discontinued, Historical Med      polycarbophil (FIBERCON) 625 mg tablet Take 625 mg by mouth 2 (two) times daily., Historical Med      rosuvastatin (CRESTOR) 20 MG tablet Take 1 tablet (20 mg total) by mouth every evening., Starting Mon 1/7/2019, Normal      sertraline (ZOLOFT) 50 MG tablet Take 1 tablet (50 mg total) by mouth once daily., Starting Mon 6/1/2020, Normal                 Discharge Diagnosis: Lumbar spondylosis [M47.816]  Condition on Discharge: Stable with no complications to procedure   Diet on Discharge: Same as before.  Activity: as per instruction sheet.  Discharge to: Home with a responsible adult.  Follow up: 2-4 weeks       Please call my office or pager at [phone] if experienced any weakness or loss of sensation, fever > 101.5, pain uncontrolled with oral medications, persistent nausea/vomiting/or diarrhea, redness or drainage from the incisions, or any other worrisome concerns. If physician on call was not reached or could not communicate with our office for any reason please go to the nearest emergency department

## 2020-07-29 ENCOUNTER — TELEPHONE (OUTPATIENT)
Dept: INTERNAL MEDICINE | Facility: CLINIC | Age: 85
End: 2020-07-29

## 2020-07-29 DIAGNOSIS — M47.816 LUMBAR SPONDYLOSIS: Primary | ICD-10-CM

## 2020-07-29 NOTE — TELEPHONE ENCOUNTER
----- Message from Rina Rosario sent at 7/29/2020  9:49 AM CDT -----  Contact: Self   Pt is requesting referral for PT to [facility]. Please advise

## 2020-08-11 ENCOUNTER — PATIENT OUTREACH (OUTPATIENT)
Dept: OTHER | Facility: OTHER | Age: 85
End: 2020-08-11

## 2020-08-11 NOTE — PROGRESS NOTES
Digital Medicine: Health  Follow-Up    The history is provided by the patient.             Reason for review: Blood pressure at goal      Additional Follow-up details: Patient stated that he is doing well and feeling good. He is pleased with his overall BP control at this time. He denies any major issues at this time and he denies symptoms when his pressure fluctuates. He will reach out if any concerns arise.    Right now, he and his wife are focusing on staying safe and healthy because of Covid-19.         Diet-no change to diet    No change to diet.        Physical Activity-Not assessed    Medication Adherence-Medication adherence was assessed.      Substance, Sleep, Stress-Not assessed      Continue current diet/physical activity routine.       Addressed any questions or concerns and patient has my contact information if needed prior to next outreach.   Explained the importance of self-monitoring and medication adherence. Encouraged the patient to communicate with their health  for lifestyle modifications to help improve or maintain a healthy lifestyle.            There are no preventive care reminders to display for this patient.    Last 5 Patient Entered Readings                                      Current 30 Day Average: 120/74     Recent Readings 8/6/2020 7/31/2020 7/25/2020 7/25/2020 7/17/2020    SBP (mmHg) 143 110 110 112 115    DBP (mmHg) 89 65 61 61 77    Pulse 56 56 50 57 50

## 2020-08-19 ENCOUNTER — PATIENT OUTREACH (OUTPATIENT)
Dept: OTHER | Facility: OTHER | Age: 85
End: 2020-08-19

## 2020-08-19 NOTE — PROGRESS NOTES
Digital Medicine: Clinician Follow-Up      Follow-up reason(s): routine follow up.     Hypertension    Patient readings are stable   Patient is not experiencing signs/symptoms of hypotension.  Patient is not experiencing signs/symptoms of hypertension.      Additional Follow-up details: Patient attributes family-related stress to elevated readings.      Last 5 Patient Entered Readings                                      Current 30 Day Average: 128/68     Recent Readings 8/15/2020 8/12/2020 8/12/2020 8/6/2020 7/31/2020    SBP (mmHg) 130 148 141 143 110    DBP (mmHg) 66 73 64 89 65    Pulse 50 55 56 56 56            Depression Screening  Did not address depression screening.    Sleep Apnea Screening    Did not address sleep apnea screening.     Medication Affordability Screening  Did not address medication affordability screening.     Medication Adherence-Medication adherence was asssessed.  Patient continue taking medication as prescribed.          ASSESSMENT(S)  Patients BP average is 128/68 mmHg, which is at goal. Patient's BP goal is less than or equal to 140/90 per 2017 ACC/AHA Hypertension Guidelines.      Hypertension Plan  Continue current therapy.  Instructed patient to call if BP remains > 140/90 or if he begins to experience s/s of hypotension.          Addressed any questions or concerns and patient has my contact information if needed prior to next outreach. Patient verbalizes understanding.          Hypertension Medications             amLODIPine (NORVASC) 5 MG tablet Take 1 tablet by mouth once daily    losartan (COZAAR) 50 MG tablet Take 1 tablet (50 mg total) by mouth 2 (two) times daily.

## 2020-08-27 NOTE — PROGRESS NOTES
HPI     DLS: 3/16/20    Pt here for 4 month check;  Pt states he's been having problems with his prosthesis OD and working   with the gentlemen who made his prosthesis. Pt states vision is blurry   with OS.    Meds:   Brimonidine TID OS   Dorzolimide TID  OS   Latanoprost QHS OS     1) POAG   2) PCIOL OS   3) Prosthesis OD   4) ERM OS   5) Hx Rhegmatogenous RD OS   6) Psuedo Mac Hole OS     Last edited by Sena Schneider MD on 8/28/2020  8:26 AM. (History)              Assessment /Plan     For exam results, see Encounter Report.    Primary open-angle glaucoma, left eye, moderate stage    Epiretinal membrane, left eye    Lamellar macular hole of left eye    Posterior vitreous detachment of left eye    Pseudophakia of left eye    Prosthetic eye globe    Persistent miosis          Old pt of Dr. Goodrich - now sees Jaiem     1. POAG   Followed at [facility] since 1997   followed by Sonia for 30 years  First HVF 2014  gtts started - [place] - 2012  First photos - ? 1997 - for RD os     Glaucoma meds -  latanoprost qhs os / brimonidine os tid / dorzolamide os tid   H/O adverse rxn to glaucoma drops - H/O bradycardia - but now has a pace maker - ? Use of BB   LASERS - SLT os 5/25/2017 (270 degrees - too narrow inf.) (( good resp 16--> 11)   GLAUCOMA SURGERIES none  OTHER EYE SURGERIES - prosthesis od - (2/2 injury - 1940's) // Pnematic retinopexy OS 1997 - [place] // PC iol os - [place]  CDR - prosthesis / 0.75- 0.8  Tbase  - ?? On gtts when started here  Tmax  - ?? Off gtts   Ttarget  - ?? 13 or less if possible // had a disc heme with IOP 15-18   HVF 6  test 2014 to 2020 OS:  ? Paracentral defect with early SAD/IAD  Gonio  +3-4 S/T/N // very narrow inf os   CCT - xx/492  OCT 2 test 2014 to 2016- RNFL - OD:not done // OS:dec s/bord T  HRT 4 test 2015 -  2019 -  MR - prosthesis od //   OS dec G,TS, N, NS, NI, //CDR// 0.771 os (high std dev os)   Disc photos - mult old slides 1997 - 2006 // 2015, 2017 - w/ IT disc  heme - OIS    Ttoday - prosthetic // 12  Test done today gonio / IOP    2.  disc heme os    See photos 4/17/2017 - occurred with IOP's of  13-18   Resolved by 10/2/107    3.  Monocular precautions  - prothetic od    4.  erm os // lamellar hole   - stable, follows with philippe  -on nevanac q day os - feels it helps vision - ?     5.  Hx of rhegmatogenous rrd os  - flat, follows with philippe    6.  Mac hole os - lamellar   - monitor     7. PC IOL OS     8. - ? S/p yag cap - posterior capsule open     9. Vit floaters OS    Increase in floaters os - will refer back to retina - jaime     10. Small pupil os     11. In past Dr Goodrich has filled out form allowing him to get a drivers license - may need again soon     12. 3/16/2020 - Prosthesis OD    Done many decades ago    C/O some irritation - feels like sand, and some discharge   Prosthesis removed and socket examined by the oculoplastic staff   Socket ok, conj in tact // no breakdown / mild discharge   Refer back to ocularists to see if prosthesis can be smoothed / polished or re-fit      Pt on metoprolol xl    PLAN   Glaucoma os - monocular   Cont  xalatan  Cont brimonidine  Cont dorz tid    IOP is  at goal// he had disc heme - rec lower IOP - consider slt os (( ?? Target 14))   SLT os - 5/25/2017 - S/T/N (too narrow inf. ) - steroid taper (( good resp 16-->11)     - (?  BB candidate - now has a pacemaker, but has a H/O low heart rate / and/or arrythmia)     -continue EES ointment od - prosthesis - prn irritation     saw [facility] - 2/28/2020 for new glasses -BCVA os is 20/80 to 20/100 os    11/11/2019 - pt is no longer driving       Pt occasionally has to get steroid injections for back pain - so will need to monitor that - may be a cause of elevated IOP from time to time      F/U 4 months IOP // HRT os - WILL NEED TO DILATE FIRST - SMALL PUPIL   Reviewed Jaime notes -   SEEN 6/16/2020 - stable - is to F/U 1 year     I have seen and personally examined the patient.  I  agree with the findings, assessment and plan of the resident and/or fellow.     Sena Schneider MD

## 2020-08-28 ENCOUNTER — OFFICE VISIT (OUTPATIENT)
Dept: OPHTHALMOLOGY | Facility: CLINIC | Age: 85
End: 2020-08-28
Payer: MEDICARE

## 2020-08-28 DIAGNOSIS — H57.03 PERSISTENT MIOSIS: ICD-10-CM

## 2020-08-28 DIAGNOSIS — H35.342 LAMELLAR MACULAR HOLE OF LEFT EYE: ICD-10-CM

## 2020-08-28 DIAGNOSIS — Z96.1 PSEUDOPHAKIA OF LEFT EYE: ICD-10-CM

## 2020-08-28 DIAGNOSIS — H40.1122 PRIMARY OPEN-ANGLE GLAUCOMA, LEFT EYE, MODERATE STAGE: Primary | ICD-10-CM

## 2020-08-28 DIAGNOSIS — H43.812 POSTERIOR VITREOUS DETACHMENT OF LEFT EYE: ICD-10-CM

## 2020-08-28 DIAGNOSIS — Z97.0 PROSTHETIC EYE GLOBE: ICD-10-CM

## 2020-08-28 DIAGNOSIS — H35.372 EPIRETINAL MEMBRANE, LEFT EYE: ICD-10-CM

## 2020-08-28 PROCEDURE — 92012 PR EYE EXAM, EST PATIENT,INTERMED: ICD-10-PCS | Mod: HCNC,S$GLB,, | Performed by: OPHTHALMOLOGY

## 2020-08-28 PROCEDURE — 92020 GONIOSCOPY: CPT | Mod: HCNC,S$GLB,, | Performed by: OPHTHALMOLOGY

## 2020-08-28 PROCEDURE — 92012 INTRM OPH EXAM EST PATIENT: CPT | Mod: HCNC,S$GLB,, | Performed by: OPHTHALMOLOGY

## 2020-08-28 PROCEDURE — 99999 PR PBB SHADOW E&M-EST. PATIENT-LVL III: ICD-10-PCS | Mod: PBBFAC,HCNC,, | Performed by: OPHTHALMOLOGY

## 2020-08-28 PROCEDURE — 99999 PR PBB SHADOW E&M-EST. PATIENT-LVL III: CPT | Mod: PBBFAC,HCNC,, | Performed by: OPHTHALMOLOGY

## 2020-08-28 PROCEDURE — 92020 PR SPECIAL EYE EVAL,GONIOSCOPY: ICD-10-PCS | Mod: HCNC,S$GLB,, | Performed by: OPHTHALMOLOGY

## 2020-09-16 ENCOUNTER — TELEPHONE (OUTPATIENT)
Dept: PAIN MEDICINE | Facility: CLINIC | Age: 85
End: 2020-09-16

## 2020-09-16 ENCOUNTER — PATIENT OUTREACH (OUTPATIENT)
Dept: ADMINISTRATIVE | Facility: OTHER | Age: 85
End: 2020-09-16

## 2020-09-16 NOTE — PROGRESS NOTES
Health Maintenance Due   Topic Date Due    Influenza Vaccine (1) 08/01/2020     Updates were requested from care everywhere.  Chart was reviewed for overdue Proactive [facility] Encounters ([facility]EL[facility]) topics (CRS, Breast Cancer Screening, Eye exam)  Health Maintenance has been updated.  LIN[place] immunization registry triggered.  Immunizations were reconciled.

## 2020-09-17 ENCOUNTER — OFFICE VISIT (OUTPATIENT)
Dept: PAIN MEDICINE | Facility: CLINIC | Age: 85
End: 2020-09-17
Attending: ANESTHESIOLOGY
Payer: MEDICARE

## 2020-09-17 VITALS
DIASTOLIC BLOOD PRESSURE: 51 MMHG | RESPIRATION RATE: 18 BRPM | SYSTOLIC BLOOD PRESSURE: 89 MMHG | TEMPERATURE: 98 F | BODY MASS INDEX: 27.34 KG/M2 | HEART RATE: 54 BPM | HEIGHT: 67 IN | WEIGHT: 174.19 LBS

## 2020-09-17 DIAGNOSIS — M96.1 POSTLAMINECTOMY SYNDROME OF LUMBAR REGION: ICD-10-CM

## 2020-09-17 DIAGNOSIS — M47.816 LUMBAR SPONDYLOSIS: Primary | ICD-10-CM

## 2020-09-17 DIAGNOSIS — G89.4 CHRONIC PAIN SYNDROME: ICD-10-CM

## 2020-09-17 DIAGNOSIS — M47.819 SPONDYLOSIS WITHOUT MYELOPATHY: ICD-10-CM

## 2020-09-17 DIAGNOSIS — M51.36 DDD (DEGENERATIVE DISC DISEASE), LUMBAR: ICD-10-CM

## 2020-09-17 PROCEDURE — 99499 UNLISTED E&M SERVICE: CPT | Mod: S$GLB,,, | Performed by: ANESTHESIOLOGY

## 2020-09-17 PROCEDURE — 99999 PR PBB SHADOW E&M-EST. PATIENT-LVL IV: ICD-10-PCS | Mod: PBBFAC,HCNC,, | Performed by: ANESTHESIOLOGY

## 2020-09-17 PROCEDURE — 99999 PR PBB SHADOW E&M-EST. PATIENT-LVL IV: CPT | Mod: PBBFAC,HCNC,, | Performed by: ANESTHESIOLOGY

## 2020-09-17 PROCEDURE — 1125F AMNT PAIN NOTED PAIN PRSNT: CPT | Mod: HCNC,S$GLB,, | Performed by: ANESTHESIOLOGY

## 2020-09-17 PROCEDURE — 1159F MED LIST DOCD IN RCRD: CPT | Mod: HCNC,S$GLB,, | Performed by: ANESTHESIOLOGY

## 2020-09-17 PROCEDURE — 99214 PR OFFICE/OUTPT VISIT, EST, LEVL IV, 30-39 MIN: ICD-10-PCS | Mod: HCNC,GC,S$GLB, | Performed by: ANESTHESIOLOGY

## 2020-09-17 PROCEDURE — 1101F PT FALLS ASSESS-DOCD LE1/YR: CPT | Mod: HCNC,CPTII,S$GLB, | Performed by: ANESTHESIOLOGY

## 2020-09-17 PROCEDURE — 1101F PR PT FALLS ASSESS DOC 0-1 FALLS W/OUT INJ PAST YR: ICD-10-PCS | Mod: HCNC,CPTII,S$GLB, | Performed by: ANESTHESIOLOGY

## 2020-09-17 PROCEDURE — 1159F PR MEDICATION LIST DOCUMENTED IN MEDICAL RECORD: ICD-10-PCS | Mod: HCNC,S$GLB,, | Performed by: ANESTHESIOLOGY

## 2020-09-17 PROCEDURE — 99499 RISK ADDL DX/OHS AUDIT: ICD-10-PCS | Mod: S$GLB,,, | Performed by: ANESTHESIOLOGY

## 2020-09-17 PROCEDURE — 99214 OFFICE O/P EST MOD 30 MIN: CPT | Mod: HCNC,GC,S$GLB, | Performed by: ANESTHESIOLOGY

## 2020-09-17 PROCEDURE — 1125F PR PAIN SEVERITY QUANTIFIED, PAIN PRESENT: ICD-10-PCS | Mod: HCNC,S$GLB,, | Performed by: ANESTHESIOLOGY

## 2020-09-17 NOTE — PROGRESS NOTES
Chronic patient Established Note (Follow up visit)      SUBJECTIVE:    Interval History 09/17/2020:  Javier Valles presents to the clinic for a follow-up appointment for back pain. Since the last visit, Javier Valles states the pain has been worsening. Current pain intensity is 7/10.  Pt reports that his back pain has not improved since his left L2, 3, 4, 5 RFA in July 2020.  He states that his SCS is covering his leg pain, but his back pain is excruciating.    Interval History 06/10/2020  Pt presents for virtual visit for complaint of lower back pain. The patient is s/p Caudal JOMAR with minimal relief. He states continued lower back pain, + a.m. Stiffness and leg weakness with only limited amount of standing/walking, forward flexion alleviates pain. He denies any new neurological changes, denies any new areas of pain. Rates pain 8/10. He is taking Neurontin 300mg BID for pain.      Interval History 05/11/2020:  Today he reports that his low back pain has increased over the last several months. He describes it as a dull/achy, sometimes throbbing, low back pain. He has been taking tylenol as needed with no relief. He reports that his back pain has interfered with his daily activities. He reports he has to lay or sit to get relief, he cannot ambulate or walk; he reports he has to stay hunched over 50 degrees to get relief while walking and by doing this, it makes him unstable. He reports the pain is isolated to the lower back, does not radiate down the legs.  He denies any new b/b incontinence, any significant subjective LE weakness, and no SA.      Patient noted to staff that he was experiencing pain at the battery site for his SCS.     Interval history 08/28/2019  Javier Valles presents to the clinic for a follow-up appointment for lower back and bilateral leg pain. He reports increased neck pain that began on Thursday after no inciting event. He woke up with this pain. He reports neck pain that radiates into both  shoulders and into his right arm. He describes this pain as burning. This pain is worse with turning his head side to side. He also reports increased pain with extension. He tried ice and heat with limited relief. He continues to report low back pain that radiates down the back of both legs to his feet. He reports limited relief with SCS. He is frustrated with his continued pain. He reports a recent CT myelogram ordered by an outside neurosurgeon. He is awaiting the results. He continues to take Percocet sparingly with benefit. He denies any other health changes.           Pain Medications:  Robaxin 750 mg PRN muscle pain  Compounding cream  Percocet 5/325 mg PRN     Tried in past: gabapentin 100 mg TID, Percocet (helpful), Norco     - Anti-Coagulants: Eliquis (pacemaker)     Opioid Contract: no      report:  Not applicable     Pain Procedures:  Multiple JOMAR's with Dr. Lopez 4 yrs ago  4/28/17 Right L4-5 TF JOMAR- 100% relief   5/29/17 Left L5-S1 TF JOMAR  7/21/17 Caudal JOMAR- significant benefit  9/22/17 Lumbar SCS trial- 90% relief  12/18/18 Bilateral L2,3,4,5 MBB- 90% relief  1/9/19 Left L2,3,4,5 RFA- 70% relief  5/27/2020 Caudal JOMAR with no significant relief  07/2020 Left L2, 3, 4, 5 RFA - 0% relief     Spinal surgeries:  MIS laminectomy L3-4 and L4-5.      Imaging: no new imaging   Allergies:   Review of patient's allergies indicates:   Allergen Reactions    Atorvastatin      Myositis/elevated CPK    Pravastatin      Severe myalgias/elevated CPK    Statins-hmg-coa reductase inhibitors      Muscle pain and loss of muscle    Ace inhibitors      Cough       Current Medications:   Current Outpatient Medications   Medication Sig Dispense Refill    amiodarone (PACERONE) 200 MG Tab Take 1 tablet (200 mg total) by mouth once daily. 90 tablet 1    amLODIPine (NORVASC) 5 MG tablet Take 1 tablet by mouth once daily 90 tablet 3    apixaban (ELIQUIS) 2.5 mg Tab Take 1 tablet (2.5 mg total) by mouth 2 (two) times daily.  180 tablet 3    ascorbic acid (VITAMIN C) 1000 MG tablet Take 1,000 mg by mouth every morning.       brimonidine 0.2% (ALPHAGAN) 0.2 % Drop Place 1 drop into the left eye 3 (three) times daily. 30 mL 3    brinzolamide (AZOPT) 1 % ophthalmic suspension Place 1 drop into both eyes 3 (three) times daily.      co-enzyme Q-10 30 mg capsule Take 30 mg by mouth once daily.       donepeziL (ARICEPT) 5 MG tablet Take 1 tablet (5 mg total) by mouth every evening. 90 tablet 1    dorzolamide (TRUSOPT) 2 % ophthalmic solution Place 1 drop into both eyes 3 (three) times daily. 30 mL 3    erythromycin (ROMYCIN) ophthalmic ointment Apply to affected eye daily as needed 3.5 g 6    FLUCELVAX QUAD [id number], PF, 60 mcg (15 mcg x 4)/0.5 mL Syrg       gabapentin (NEURONTIN) 300 MG capsule Take 1 capsule (300 mg total) by mouth 2 (two) times daily. 60 capsule 5    ipratropium (ATROVENT) 0.03 % nasal spray USE 1 TO 2 SPRAY(S) IN EACH NOSTRIL TWICE DAILY for rhinorrhea/nasal drip 30 mL 1    latanoprost 0.005 % ophthalmic solution Place 1 drop into the left eye every evening. 15 mL 3    losartan (COZAAR) 50 MG tablet Take 1 tablet (50 mg total) by mouth 2 (two) times daily. 180 tablet 3    lutein 20 mg Cap Take 20 mg by mouth once daily.       MULTIVITAMINS,THER W-MINERALS (VITAMINS & MINERALS ORAL) Take 1 tablet by mouth every morning.       polycarbophil (FIBERCON) 625 mg tablet Take 625 mg by mouth 2 (two) times daily.      rosuvastatin (CRESTOR) 20 MG tablet Take 1 tablet (20 mg total) by mouth every evening. 90 tablet 3    sertraline (ZOLOFT) 50 MG tablet Take 1 tablet (50 mg total) by mouth once daily. 90 tablet 1     No current facility-administered medications for this visit.        REVIEW OF SYSTEMS:    GENERAL:  No weight loss, malaise or fevers.  HEENT:  Negative for frequent or significant headaches.  NECK:  Negative for lumps, goiter, pain and significant neck swelling.  RESPIRATORY:  Negative for cough,  wheezing or shortness of breath.  CARDIOVASCULAR:  Negative for chest pain, leg swelling or palpitations. HTN. Afib/Aflutter. HLD. Pacemaker.  GI:  Negative for abdominal discomfort, blood in stools or black stools or change in bowel habits.  MUSCULOSKELETAL:  See HPI.  SKIN:  Negative for lesions, rash, and itching.  PSYCH:  Negative for sleep disturbance, mood disorder and recent psychosocial stressors.  HEMATOLOGY/LYMPHOLOGY:  On eliquis.  NEURO:   No history of headaches, syncope, paralysis, seizures or tremors.  All other reviewed and negative other than HPI.    Past Medical History:  Past Medical History:   Diagnosis Date    *Atrial flutter 10/3/13    Anticoagulant long-term use     Aspirin only at this time    Arthritis     Atrial fibrillation     Atrial flutter     Blood transfusion     ?    BPH (benign prostatic hypertrophy)     Carotid artery disease     2008: [place] There is 40 - 49% right Internal Carotid stenosis.  There is 20 - 39% left Internal Carotid stenosis.       Chronic kidney disease     Coronary artery disease     DDD (degenerative disc disease) 6/25/2015    Degenerative disc disease     Elevated PSA     Glaucoma     Hyperlipidemia     Hypertension     Lumbar stenosis     lumbar spinal stenosis    NSTEMI (non-ST elevated myocardial infarction) 11/3/2013    Pacemaker 11/2013    Peripheral neuropathy     - S/P right ILIAC stent 1993;      Retinal detachment     Squamous cell carcinoma     left leg    Syncope and collapse: orthostatic with hypotension 10/9/2015       Past Surgical History:  Past Surgical History:   Procedure Laterality Date    C[facility]TERIZATION      [facility]    CARDIAC ELECTROPHYSIOLOGY MAPPING AND ABLATION  11/2016    CARDIAC PACEMAKER PLACEMENT  11/2016    CATARACT EXTRACTION W/  INTRAOCULAR LENS IMPLANT Left 1997        CORONARY ARTERY BYPASS GRAFT  1991    ENUCLEATION Right 1949    EPIDURAL STEROID INJECTION N/A 7/17/2019    Procedure:  INJECTION, STEROID, EPIDURAL, CAUDAL  cleared to hold eliquis 4 days per Dr. Ruiz;  Surgeon: James Hodges MD;  Location: [facility];  Service: Pain Management;  Laterality: N/A;    EPIDURAL STEROID INJECTION N/A 5/27/2020    Procedure: INJECTION, STEROID, EPIDURAL, CAUDALW/CATH need consent , clear to hold Eliquis 48 hrs per ordering ;  Surgeon: James Hodges MD;  Location: [facility];  Service: Pain Management;  Laterality: N/A;    INJECTION OF ANESTHETIC AGENT AROUND MEDIAL BRANCH NERVES INNERVATING LUMBAR FACET JOINT Bilateral 12/18/2018    Procedure: BLOCK, NERVE, FACET JOINT, LUMBAR, MEDIAL BRANCH;  Surgeon: James Hodges MD;  Location: [facility];  Service: Pain Management;  Laterality: Bilateral;    LUMBAR LAMINECTOMY  04/25/2016    RADIOFREQUENCY ABLATION Left 7/22/2020    Procedure: RADIOFREQUENCY ABLATION LEFT L2,3,4,5 consent needed;  Surgeon: James Hodges MD;  Location: [facility]IN [facility];  Service: Pain Management;  Laterality: Left;  LEFT RFA L2,3,4,5  consent needed    RETINAL DETACHMENT SURGERY Left 1997    Dr. Cosme    SKIN BIOPSY      SPINAL CORD STIMULATOR TRIAL W/ LAMINOTOMY  10/2017    TONSILLECTOMY         Family History:  Family History   Problem Relation Age of Onset    Stroke Mother 69    Clotting disorder Mother         per patient no clotting disorder    Hypertension Mother     Diverticulitis Son     Cancer Neg Hx     Diabetes Neg Hx     Heart disease Neg Hx     Glaucoma Neg Hx     Amblyopia Neg Hx     Blindness Neg Hx     Cataracts Neg Hx     Macular degeneration Neg Hx     Retinal detachment Neg Hx     Strabismus Neg Hx        Social History:  Social History     Socioeconomic History    Marital status:      Spouse name: Joanie    Number of children: Not on file    Years of education: Not on file    Highest education level: Not on file   Occupational History    Occupation: retired   Social Needs    Financial resource  "strain: Not hard at all    Food insecurity     Worry: Never true     Inability: Never true    Transportation needs     Medical: No     Non-medical: No   Tobacco Use    Smoking status: Former Smoker     Quit date: 1963     Years since quittin.1    Smokeless tobacco: Never Used   Substance and Sexual Activity    Alcohol use: Yes     Alcohol/week: 3.0 standard drinks     Types: 1 Glasses of wine, 1 Cans of beer, 1 Shots of liquor per week     Frequency: 2-3 times a week     Drinks per session: 1 or 2     Binge frequency: Never     Comment: 2 a day    Drug use: No    Sexual activity: Yes     Partners: Female   Lifestyle    Physical activity     Days per week: 2 days     Minutes per session: 10 min    Stress: Not at all   Relationships    Social connections     Talks on phone: More than three times a week     Gets together: Three times a week     Attends [employer] service: Not on file     Active member of club or organization: Not on file     Attends meetings of clubs or organizations: More than 4 times per year     Relationship status:    Other Topics Concern    Not on file   Social History Narrative    Not on file       OBJECTIVE:    BP (!) 89/51   Pulse (!) 54   Temp 98.3 °F (36.8 °C) (Oral)   Resp 18   Ht 5' 7" (1.702 m)   Wt 79 kg (174 lb 2.6 oz)   BMI 27.28 kg/m²     PHYSICAL EXAMINATION:    General appearance: Well appearing, in no acute distress, alert and oriented x3.  Psych:  Mood and affect appropriate.  Skin: Skin color, texture, turgor normal, no rashes or lesions, in both upper and lower body.  Head/face:  Atraumatic, normocephalic. No palpable lymph nodes  Neck: No pain to palpation over the cervical paraspinous muscles. Spurling Negative. No pain with neck flexion, extension, or lateral flexion. .  Cor: RRR  Pulm: CTA  GI: Abdomen soft and non-tender.  Back: Straight leg raising in the sitting and supine positions is negative to radicular pain. Positive facet loading " bilaterally.  Extremities: Peripheral joint ROM is full and pain free without obvious instability or laxity in both lower extremities. No deformities, edema, or skin discoloration. Good capillary refill.  Musculoskeletal: Hip, sacroiliac and knee provocative maneuvers are negative. Bilateral  lower extremity strength is normal and symmetric.  No atrophy or tone abnormalities are noted.  Neuro: Bilateral lower extremity coordination and muscle stretch reflexes are physiologic and symmetric.  Plantar response are downgoing. No loss of sensation is noted.  Gait: antalgic  ASSESSMENT: 89 y.o. year old male with lumbar back pain, consistent with:     Encounter Diagnoses   Name Primary?    Lumbar spondylosis Yes    Chronic pain syndrome     Postlaminectomy syndrome of lumbar region     DDD (degenerative disc disease), lumbar     Spondylosis without myelopathy      PLAN:     - I have stressed the importance of physical activity and a home exercise plan to help with pain and improve health.  - Patient can continue with medications for now since they are providing benefits, using them appropriately, and without side effects.  - patient has a meeting with Dr Lemon and is planning to discuss surgery vs upgrade of SCS   - RTC prn  - Counseled patient regarding the importance of activity modification and physical therapy.    The above plan and management options were discussed at length with patient. Patient is in agreement with the above and verbalized understanding.    Mellisa Espana, DO   I have personally reviewed the history and exam of this patient and agree with the resident/fellow/NPs note as stated above.    James Hodges MD    09/17/2020

## 2020-09-21 ENCOUNTER — TELEPHONE (OUTPATIENT)
Dept: INTERNAL MEDICINE | Facility: CLINIC | Age: 85
End: 2020-09-21

## 2020-09-21 NOTE — TELEPHONE ENCOUNTER
----- Message from Venus Mansfield sent at 9/21/2020  9:31 AM CDT -----  Contact: Vannessa with [employer] -[phone]  Would like to receive medical advice.    Would they like a call back or a response via [facility]:  Call back     Additional information:  Calling in regards to a plan of care form that was sent over for the pt. The office states they need it signed and faxed back to [phone].

## 2020-10-01 ENCOUNTER — PATIENT MESSAGE (OUTPATIENT)
Dept: CARDIOLOGY | Facility: CLINIC | Age: 85
End: 2020-10-01

## 2020-10-01 ENCOUNTER — PATIENT MESSAGE (OUTPATIENT)
Dept: INTERNAL MEDICINE | Facility: CLINIC | Age: 85
End: 2020-10-01

## 2020-10-01 DIAGNOSIS — I48.3 TYPICAL ATRIAL FLUTTER: ICD-10-CM

## 2020-10-01 RX ORDER — DONEPEZIL HYDROCHLORIDE 5 MG/1
5 TABLET, FILM COATED ORAL NIGHTLY
Qty: 90 TABLET | Refills: 1 | Status: SHIPPED | OUTPATIENT
Start: 2020-10-01 | End: 2021-04-09 | Stop reason: SDUPTHER

## 2020-10-01 RX ORDER — AMLODIPINE BESYLATE 5 MG/1
5 TABLET ORAL DAILY
Qty: 90 TABLET | Refills: 1 | Status: SHIPPED | OUTPATIENT
Start: 2020-10-01 | End: 2021-11-30

## 2020-10-04 ENCOUNTER — PATIENT MESSAGE (OUTPATIENT)
Dept: RESEARCH | Facility: OTHER | Age: 85
End: 2020-10-04

## 2020-10-07 ENCOUNTER — PATIENT MESSAGE (OUTPATIENT)
Dept: ADMINISTRATIVE | Facility: OTHER | Age: 85
End: 2020-10-07

## 2020-10-11 ENCOUNTER — PATIENT MESSAGE (OUTPATIENT)
Dept: INTERNAL MEDICINE | Facility: CLINIC | Age: 85
End: 2020-10-11

## 2020-10-12 RX ORDER — INFLUENZA A VIRUS A/MICHIGAN/45/2015 X-275 (H1N1) ANTIGEN (FORMALDEHYDE INACTIVATED), INFLUENZA A VIRUS A/SINGAPORE/INFIMH-16-0019/2016 IVR-186 (H3N2) ANTIGEN (FORMALDEHYDE INACTIVATED), INFLUENZA B VIRUS B/PHUKET/3073/2013 ANTIGEN (FORMALDEHYDE INACTIVATED), AND INFLUENZA B VIRUS B/MARYLAND/15/2016 BX-69A ANTIGEN (FORMALDEHYDE INACTIVATED) 60; 60; 60; 60 UG/.7ML; UG/.7ML; UG/.7ML; UG/.7ML
INJECTION, SUSPENSION INTRAMUSCULAR
COMMUNITY
Start: 2020-09-21 | End: 2021-09-13 | Stop reason: ALTCHOICE

## 2020-10-12 NOTE — PROGRESS NOTES
MEDICAL HISTORY:   Coronary artery disease with previous bypass surgery, stents in 2013, and then a  subsequent non-ST MI due to in-stent stenosis of obtuse marginal for which angioplasty was performed.  Hypertension.  Hyperlipidemia.  Peripheral vascular disease with stent of the right iliac.  Venous Reflux  Chronic kidney disease with secondary hyperparathyroid.  BPH with elevated PSA.  Carotid artery disease   Osteoarthritis of the knees  Arrhythmia disorder for which he is status post pacemaker and defibrillator for sick sinus syndrome, status post radiofrequency ablation for atrial flutter, and on amiodarone for atrial flutter/fibrillation/PVCs.  Gout.  Bilateral inguinal hernia repair.  Left knee surgery.  Glaucoma.     SOCIAL HISTORY:  Tobacco use, none.  Alcohol use, a couple of drinks a day.     CURRENT MEDICATIONS:  Amiodarone 200 mg daily.  Amlodipine 5 mg daily.   Atrovent nasal spray as needed.  Losartan 50 mg daily.  Crestor 20 mg daily.  Eliquis 2.5 mg twice a day  Aricept 5 mg  Sertraline 50 mg          89-year-old male    Four weeks been having left leg pain particularly over the lateral aspect from the upper thigh to the mid thigh  He actually feels it more when resting and sometimes walking on it may make it feel better  He started noticing this after have a fall last week in which he tripped over a tree branch in landed on his left side    He has a history of lumbar spondylosis with previous lumbar laminectomy, multiple epidural steroid injections in radiofrequency ablation and spinal cord stimulator  Prior to the fall his main symptom has been chronic ongoing low back pain and a sense of weakness involving both legs more so on the left but there was no leg pain  Since the fall the left leg pain is new and has no worsening of the back pain  He actually has appointment with his neurosurgeon tomorrow discussed the issue of his spinal cord stimulator      Examination  Weight 176  Pulse 60  Blood  pressure 110/72  Absent knee and ankle jerk reflex  Negative straight leg raise on the right in with straight leg raise on the left ears low back pain motor strength of the leg of a with extension at the knee, flexion at the hip, dorsi and plantar flexion is 4 5/5  He is tender over the lateral aspect of the mid femur to the greater trochanter    Impression  Muscular leg pain with probable leg strain and trochanteric bursitis  Possible acute lumbar radiculopathy      Plan  Medrol Dosepak i an methocarbamol 500 mg twice a day for the next week  Femur x-ray since he is directly tender over the femur  Cold pack over the greater trochanter cold pack over the greater trochanter

## 2020-10-13 ENCOUNTER — HOSPITAL ENCOUNTER (OUTPATIENT)
Dept: RADIOLOGY | Facility: HOSPITAL | Age: 85
Discharge: HOME | End: 2020-10-13
Attending: INTERNAL MEDICINE
Payer: MEDICARE

## 2020-10-13 ENCOUNTER — OFFICE VISIT (OUTPATIENT)
Dept: INTERNAL MEDICINE | Facility: CLINIC | Age: 85
End: 2020-10-13
Payer: MEDICARE

## 2020-10-13 VITALS
OXYGEN SATURATION: 97 % | SYSTOLIC BLOOD PRESSURE: 124 MMHG | DIASTOLIC BLOOD PRESSURE: 70 MMHG | BODY MASS INDEX: 27.62 KG/M2 | WEIGHT: 176 LBS | HEIGHT: 67 IN | HEART RATE: 58 BPM

## 2020-10-13 DIAGNOSIS — M70.62 TROCHANTERIC BURSITIS OF LEFT HIP: ICD-10-CM

## 2020-10-13 DIAGNOSIS — M79.652 MUSCULOSKELETAL PAIN OF LEFT THIGH: ICD-10-CM

## 2020-10-13 DIAGNOSIS — M54.16 LUMBAR RADICULOPATHY: ICD-10-CM

## 2020-10-13 DIAGNOSIS — M79.652 MUSCULOSKELETAL PAIN OF LEFT THIGH: Primary | ICD-10-CM

## 2020-10-13 PROCEDURE — 1159F MED LIST DOCD IN RCRD: CPT | Mod: HCNC,S$GLB,, | Performed by: INTERNAL MEDICINE

## 2020-10-13 PROCEDURE — 73552 X-RAY EXAM OF FEMUR 2/>: CPT | Mod: 26,HCNC,LT, | Performed by: RADIOLOGY

## 2020-10-13 PROCEDURE — 1101F PT FALLS ASSESS-DOCD LE1/YR: CPT | Mod: HCNC,CPTII,S$GLB, | Performed by: INTERNAL MEDICINE

## 2020-10-13 PROCEDURE — 1125F AMNT PAIN NOTED PAIN PRSNT: CPT | Mod: HCNC,S$GLB,, | Performed by: INTERNAL MEDICINE

## 2020-10-13 PROCEDURE — 73552 XR FEMUR 2 VIEW LEFT: ICD-10-PCS | Mod: 26,HCNC,LT, | Performed by: RADIOLOGY

## 2020-10-13 PROCEDURE — 1125F PR PAIN SEVERITY QUANTIFIED, PAIN PRESENT: ICD-10-PCS | Mod: HCNC,S$GLB,, | Performed by: INTERNAL MEDICINE

## 2020-10-13 PROCEDURE — 73552 X-RAY EXAM OF FEMUR 2/>: CPT | Mod: TC,HCNC,LT

## 2020-10-13 PROCEDURE — 1159F PR MEDICATION LIST DOCUMENTED IN MEDICAL RECORD: ICD-10-PCS | Mod: HCNC,S$GLB,, | Performed by: INTERNAL MEDICINE

## 2020-10-13 PROCEDURE — 1101F PR PT FALLS ASSESS DOC 0-1 FALLS W/OUT INJ PAST YR: ICD-10-PCS | Mod: HCNC,CPTII,S$GLB, | Performed by: INTERNAL MEDICINE

## 2020-10-13 PROCEDURE — 99214 OFFICE O/P EST MOD 30 MIN: CPT | Mod: HCNC,S$GLB,, | Performed by: INTERNAL MEDICINE

## 2020-10-13 PROCEDURE — 99999 PR PBB SHADOW E&M-EST. PATIENT-LVL III: ICD-10-PCS | Mod: PBBFAC,HCNC,, | Performed by: INTERNAL MEDICINE

## 2020-10-13 PROCEDURE — 99999 PR PBB SHADOW E&M-EST. PATIENT-LVL III: CPT | Mod: PBBFAC,HCNC,, | Performed by: INTERNAL MEDICINE

## 2020-10-13 PROCEDURE — 99214 PR OFFICE/OUTPT VISIT, EST, LEVL IV, 30-39 MIN: ICD-10-PCS | Mod: HCNC,S$GLB,, | Performed by: INTERNAL MEDICINE

## 2020-10-13 RX ORDER — METHYLPREDNISOLONE 4 MG/1
TABLET ORAL
Qty: 1 PACKAGE | Refills: 0 | Status: SHIPPED | OUTPATIENT
Start: 2020-10-13 | End: 2021-03-10

## 2020-10-13 RX ORDER — METHOCARBAMOL 500 MG/1
500 TABLET, FILM COATED ORAL 2 TIMES DAILY
Qty: 14 TABLET | Refills: 0 | Status: SHIPPED | OUTPATIENT
Start: 2020-10-13 | End: 2020-11-08

## 2020-10-14 ENCOUNTER — OFFICE VISIT (OUTPATIENT)
Dept: NEUROSURGERY | Facility: CLINIC | Age: 85
End: 2020-10-14
Payer: MEDICARE

## 2020-10-14 VITALS — SYSTOLIC BLOOD PRESSURE: 113 MMHG | HEART RATE: 59 BPM | DIASTOLIC BLOOD PRESSURE: 52 MMHG

## 2020-10-14 DIAGNOSIS — M48.062 SPINAL STENOSIS OF LUMBAR REGION WITH NEUROGENIC CLAUDICATION: ICD-10-CM

## 2020-10-14 DIAGNOSIS — Z98.890 STATUS POST LUMBAR LAMINECTOMY: ICD-10-CM

## 2020-10-14 DIAGNOSIS — M48.061 SPINAL STENOSIS OF LUMBAR REGION AT MULTIPLE LEVELS: Primary | ICD-10-CM

## 2020-10-14 DIAGNOSIS — Z96.89 S/P INSERTION OF SPINAL CORD STIMULATOR: ICD-10-CM

## 2020-10-14 DIAGNOSIS — G89.4 CHRONIC PAIN SYNDROME: ICD-10-CM

## 2020-10-14 PROCEDURE — 1159F PR MEDICATION LIST DOCUMENTED IN MEDICAL RECORD: ICD-10-PCS | Mod: HCNC,S$GLB,, | Performed by: NEUROLOGICAL SURGERY

## 2020-10-14 PROCEDURE — 1159F MED LIST DOCD IN RCRD: CPT | Mod: HCNC,S$GLB,, | Performed by: NEUROLOGICAL SURGERY

## 2020-10-14 PROCEDURE — 99999 PR PBB SHADOW E&M-EST. PATIENT-LVL IV: ICD-10-PCS | Mod: PBBFAC,HCNC,, | Performed by: NEUROLOGICAL SURGERY

## 2020-10-14 PROCEDURE — 1101F PT FALLS ASSESS-DOCD LE1/YR: CPT | Mod: HCNC,CPTII,S$GLB, | Performed by: NEUROLOGICAL SURGERY

## 2020-10-14 PROCEDURE — 1101F PR PT FALLS ASSESS DOC 0-1 FALLS W/OUT INJ PAST YR: ICD-10-PCS | Mod: HCNC,CPTII,S$GLB, | Performed by: NEUROLOGICAL SURGERY

## 2020-10-14 PROCEDURE — 1125F PR PAIN SEVERITY QUANTIFIED, PAIN PRESENT: ICD-10-PCS | Mod: HCNC,S$GLB,, | Performed by: NEUROLOGICAL SURGERY

## 2020-10-14 PROCEDURE — 99999 PR PBB SHADOW E&M-EST. PATIENT-LVL IV: CPT | Mod: PBBFAC,HCNC,, | Performed by: NEUROLOGICAL SURGERY

## 2020-10-14 PROCEDURE — 1125F AMNT PAIN NOTED PAIN PRSNT: CPT | Mod: HCNC,S$GLB,, | Performed by: NEUROLOGICAL SURGERY

## 2020-10-14 PROCEDURE — 99214 PR OFFICE/OUTPT VISIT, EST, LEVL IV, 30-39 MIN: ICD-10-PCS | Mod: HCNC,S$GLB,, | Performed by: NEUROLOGICAL SURGERY

## 2020-10-14 PROCEDURE — 99214 OFFICE O/P EST MOD 30 MIN: CPT | Mod: HCNC,S$GLB,, | Performed by: NEUROLOGICAL SURGERY

## 2020-10-14 NOTE — PROGRESS NOTES
NEUROSURGICAL PROGRESS NOTE    DATE OF SERVICE:  10/14/2020    ATTENDING PHYSICIAN:  Andrzej Toribio MD    SUBJECTIVE:    INTERIM HISTORY:    This is a very pleasant 89 y.o. male, who is status post L3-4 and L4-5 laminectomy with Dr. Stanford.  Status post spinal cord stimulator placement for chronic pain syndrome and post laminectomy syndrome using the Abbott system in November 2017.  Patient had initially excellent pain relief.  Over the last 2 years he has been complaining of worsening lumbosacral back pain radiating down the posterior leg anterior thigh.  He has been suffering when he stands up and walks.  He has a tendency to lean forward the more he walks.  He has no pain when he sits.  The back and leg pain when he walks is interfering with his quality of life and functional status.  His symptoms are progressively becoming worse despite home physical therapy exercises.  He was treated recently with lumbar medial branch block and radiofrequency ablation without significant pain relief.  If he turns of his stimulator his pain is worse.  He returned on his stimulator the pain improves.  However his pain is on average 6 to 7/10.  The pain is deep, agonizing.  When he stands up straight he feels more leg pain.  Has contacted his spinal cord stimulator representative and tried different program without major improvement in baseline pain status.              PAST MEDICAL HISTORY:  Active Ambulatory Problems     Diagnosis Date Noted    Hereditary and idiopathic peripheral neuropathy 10/23/2012    Peripheral vascular disease 10/23/2012    Hypertension     Hyperlipidemia     Peripheral neuropathy     Carotid artery stenosis·There is 60-69% Asymptomatic right Internal Carotid Stenosis.    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    Chronic kidney disease, stage 3 (moderate) 11/27/2012    Gout, unspecified 11/27/2012    Acquired cyst of kidney  11/27/2012    Idiopathic progressive polyneuropathy 12/17/2012    Atherosclerosis of aorta 10/09/2013    Epiretinal membrane, left eye 10/28/2013    Posterior vitreous detachment of left eye 10/28/2013    Pseudophakia of left eye 10/28/2013    Prosthetic eye globe 10/28/2013    Open-angle glaucoma 10/28/2013    Retinal tear 10/28/2013    Rhegmatogenous retinal detachment 10/28/2013    History of Urinary retention 11/07/2013    Biventricular cardiac pacemaker in situ 12/12/2013    SSS (sick sinus syndrome): s/p PPM 12/12/2013    Macular edema, cystoid 06/11/2014    Lamellar macular hole 06/11/2014    Spondylosis without myelopathy 06/25/2015    Lumbar degenerative disc disease 06/25/2015    Gait apraxia 10/09/2015    Contusion of rib on left side 10/28/2015    Obesity, Class I, BMI 30-34.9 04/21/2016    Coronary artery disease involving native coronary artery of native heart without angina pectoris 04/21/2016    S/P CABG (coronary artery bypass graft) 04/21/2016    Varicose veins of both lower extremities- Rt more than Left 04/21/2016    Edema 04/21/2016    Thrombocytopenia 04/21/2016    Total bilirubin, elevated 04/21/2016    Encounter for postoperative wound check 05/24/2016    Peripheral venous insufficiency     Typical atrial flutter 09/21/2016    Current use of long term anticoagulation 09/21/2016    Status post catheter ablation of atrial flutter 12/14/2016    Bilateral carpal tunnel syndrome 01/24/2017    Lumbar spinal stenosis 05/02/2017    Osteoarthritis of spine with radiculopathy, lumbar region 05/29/2017    Cardiomyopathy, ischemic: Prior MI, CABG, EF 40-45% 06/07/2017    Post laminectomy syndrome 06/15/2017    PVC (premature ventricular contraction) 08/29/2017    Chronic pain syndrome 11/09/2017    Bilateral foot-drop 12/18/2019    Macrocytic anemia 12/24/2019    Elevated serum immunoglobulin free light chain level 12/24/2019    Status post insertion of spinal  cord stimulator 05/11/2020    Chronic pain 05/27/2020     Resolved Ambulatory Problems     Diagnosis Date Noted    Coronary artery disease     Benign hypertensive renal disease without renal failure 11/27/2012    Atrial flutter 10/03/2013    Lamellar macular hole of left eye 10/28/2013    Lamellar macular hole 10/28/2013    Unstable angina 11/01/2013    NSTEMI (non-ST elevated myocardial infarction) 11/03/2013    Atrial fibrillation 11/03/2013    Cardiogenic shock 11/03/2013    Ventricular fibrillation 11/03/2013    Acute systolic heart failure 11/03/2013    Left lumbar radiculopathy 06/25/2015    Lumbar facet arthropathy 06/25/2015    Syncope and collapse: orthostatic with hypotension 10/09/2015    Spinal stenosis, lumbar 04/13/2016    Lumbar stenosis with neurogenic claudication 04/25/2016    S/P lumbar laminectomy     Lumbar myelopathy 04/29/2016    Chronic pain 04/28/2017    Acute lumbar radiculopathy 05/02/2017    Chronic midline low back pain without sciatica 08/08/2019     Past Medical History:   Diagnosis Date    *Atrial flutter 10/3/13    Anticoagulant long-term use     Arthritis     Blood transfusion     BPH (benign prostatic hypertrophy)     Carotid artery disease     Chronic kidney disease     DDD (degenerative disc disease) 6/25/2015    Glaucoma     Lumbar stenosis     Pacemaker 11/2013    Retinal detachment     Squamous cell carcinoma        PAST SURGICAL HISTORY:  Past Surgical History:   Procedure Laterality Date    [facility]HETERIZATION      [facility]    CARDIAC ELECTROPHYSIOLOGY MAPPING AND ABLATION  11/2016    CARDIAC PACEMAKER PLACEMENT  11/2016    CATARACT EXTRACTION W/  INTRAOCULAR LENS IMPLANT Left 1997        CORONARY ARTERY BYPASS GRAFT  1991    ENUCLEATION Right 1949    EPIDURAL STEROID INJECTION N/A 7/17/2019    Procedure: INJECTION, STEROID, EPIDURAL, CAUDAL  cleared to hold eliquis 4 days per Dr. Ruiz;  Surgeon: James Hodges MD;   Location: [facility]IN [facility]GT;  Service: Pain Management;  Laterality: N/A;    EPIDURAL STEROID INJECTION N/A 2020    Procedure: INJECTION, STEROID, EPIDURAL, CAUDALW/CATH need consent , clear to hold Eliquis 48 hrs per ordering ;  Surgeon: James Hodges MD;  Location: [facility];  Service: Pain Management;  Laterality: N/A;    INJECTION OF ANESTHETIC AGENT AROUND MEDIAL BRANCH NERVES INNERVATING LUMBAR FACET JOINT Bilateral 2018    Procedure: BLOCK, NERVE, FACET JOINT, LUMBAR, MEDIAL BRANCH;  Surgeon: James Hodges MD;  Location: [facility] PAIN [facility]GT;  Service: Pain Management;  Laterality: Bilateral;    LUMBAR LAMINECTOMY  2016    RADIOFREQUENCY ABLATION Left 2020    Procedure: RADIOFREQUENCY ABLATION LEFT L2,3,4,5 consent needed;  Surgeon: James Hodges MD;  Location: [facility]IN [facility];  Service: Pain Management;  Laterality: Left;  LEFT RFA L2,3,4,5  consent needed    RETINAL DETACHMENT SURGERY Left     Dr. Cosme    SKIN BIOPSY      SPINAL CORD STIMULATOR TRIAL W/ LAMINOTOMY  10/2017    TONSILLECTOMY         SOCIAL HISTORY:   Social History     Socioeconomic History    Marital status:      Spouse name: Joanie    Number of children: Not on file    Years of education: Not on file    Highest education level: Not on file   Occupational History    Occupation: retired   Social Needs    Financial resource strain: Not hard at all    Food insecurity     Worry: Never true     Inability: Never true    Transportation needs     Medical: No     Non-medical: No   Tobacco Use    Smoking status: Former Smoker     Quit date: 1963     Years since quittin.1    Smokeless tobacco: Never Used   Substance and Sexual Activity    Alcohol use: Yes     Alcohol/week: 3.0 standard drinks     Types: 1 Glasses of wine, 1 Cans of beer, 1 Shots of liquor per week     Frequency: 2-3 times a week     Drinks per session: 1 or 2     Binge frequency: Never     Comment: 2 a day    Drug  use: No    Sexual activity: Yes     Partners: Female   Lifestyle    Physical activity     Days per week: 2 days     Minutes per session: 10 min    Stress: Not at all   Relationships    Social connections     Talks on phone: More than three times a week     Gets together: Three times a week     Attends Faith service: Not on file     Active member of club or organization: Not on file     Attends meetings of clubs or organizations: More than 4 times per year     Relationship status:    Other Topics Concern    Not on file   Social History Narrative    Not on file       FAMILY HISTORY:  Family History   Problem Relation Age of Onset    Stroke Mother 69    Clotting disorder Mother         per patient no clotting disorder    Hypertension Mother     Diverticulitis Son     Cancer Neg Hx     Diabetes Neg Hx     Heart disease Neg Hx     Glaucoma Neg Hx     Amblyopia Neg Hx     Blindness Neg Hx     Cataracts Neg Hx     Macular degeneration Neg Hx     Retinal detachment Neg Hx     Strabismus Neg Hx        CURRENTS MEDICATIONS:  Current Outpatient Medications on File Prior to Visit   Medication Sig Dispense Refill    amiodarone (PACERONE) 200 MG Tab Take 1 tablet (200 mg total) by mouth once daily. 90 tablet 1    amLODIPine (NORVASC) 5 MG tablet Take 1 tablet (5 mg total) by mouth once daily. 90 tablet 1    apixaban (ELIQUIS) 2.5 mg Tab Take 1 tablet (2.5 mg total) by mouth 2 (two) times daily. 180 tablet 3    ascorbic acid (VITAMIN C) 1000 MG tablet Take 1,000 mg by mouth every morning.       brimonidine 0.2% (ALPHAGAN) 0.2 % Drop Place 1 drop into the left eye 3 (three) times daily. 30 mL 3    brinzolamide (AZOPT) 1 % ophthalmic suspension Place 1 drop into both eyes 3 (three) times daily.      co-enzyme Q-10 30 mg capsule Take 30 mg by mouth once daily.       donepeziL (ARICEPT) 5 MG tablet Take 1 tablet (5 mg total) by mouth every evening. 90 tablet 1    dorzolamide (TRUSOPT) 2 %  ophthalmic solution Place 1 drop into both eyes 3 (three) times daily. 30 mL 3    erythromycin (ROMYCIN) ophthalmic ointment Apply to affected eye daily as needed 3.5 g 6    FLUCELVAX QUAD 0021-3521, PF, 60 mcg (15 mcg x 4)/0.5 mL Syrg       FLUZONE HIGHDOSE QUAD 20-21  mcg/0.7 mL Syrg       ipratropium (ATROVENT) 0.03 % nasal spray USE 1 TO 2 SPRAY(S) IN EACH NOSTRIL TWICE DAILY for rhinorrhea/nasal drip 30 mL 1    latanoprost 0.005 % ophthalmic solution Place 1 drop into the left eye every evening. 15 mL 3    losartan (COZAAR) 50 MG tablet Take 1 tablet (50 mg total) by mouth 2 (two) times daily. 180 tablet 3    lutein 20 mg Cap Take 20 mg by mouth once daily.       methocarbamoL (ROBAXIN) 500 MG Tab Take 1 tablet (500 mg total) by mouth 2 (two) times a day. for 7 days 14 tablet 0    methylPREDNISolone (MEDROL, BARON,) 4 mg tablet use as directed 1 Package 0    MULTIVITAMINS,THER W-MINERALS (VITAMINS & MINERALS ORAL) Take 1 tablet by mouth every morning.       polycarbophil (FIBERCON) 625 mg tablet Take 625 mg by mouth 2 (two) times daily.      rosuvastatin (CRESTOR) 20 MG tablet Take 1 tablet (20 mg total) by mouth every evening. 90 tablet 3    sertraline (ZOLOFT) 50 MG tablet Take 1 tablet (50 mg total) by mouth once daily. 90 tablet 1     No current facility-administered medications on file prior to visit.        ALLERGIES:  Review of patient's allergies indicates:   Allergen Reactions    Atorvastatin      Myositis/elevated CPK    Pravastatin      Severe myalgias/elevated CPK    Statins-hmg-coa reductase inhibitors      Muscle pain and loss of muscle    Ace inhibitors      Cough       REVIEW OF SYSTEMS:  Review of Systems   Constitutional: Negative for diaphoresis, fever and weight loss.   Respiratory: Negative for shortness of breath.    Cardiovascular: Negative for chest pain.   Gastrointestinal: Negative for blood in stool.   Genitourinary: Negative for hematuria.   Endo/Heme/Allergies:  Does not bruise/bleed easily.   All other systems reviewed and are negative.        OBJECTIVE:    PHYSICAL EXAMINATION:   Vitals:    10/14/20 1300   BP: (!) 113/52   Pulse: (!) 59       Physical Exam:  Vitals reviewed.    Constitutional: He appears well-developed and well-nourished.     Eyes: Pupils are equal, round, and reactive to light. Conjunctivae and EOM are normal.     Cardiovascular: Normal distal pulses and no edema.     Abdominal: Soft.     Skin: Skin displays no rash on trunk and no rash on extremities. Skin displays no lesions on trunk and no lesions on extremities.     Psych/Behavior: He is alert. He is oriented to person, place, and time. He has a normal mood and affect.     Musculoskeletal:        Neck: Range of motion is full.     Neurological:        DTRs: Tricep reflexes are 2+ on the right side and 2+ on the left side. Bicep reflexes are 2+ on the right side and 2+ on the left side. Brachioradialis reflexes are 2+ on the right side and 2+ on the left side. Patellar reflexes are 2+ on the right side and 2+ on the left side. Achilles reflexes are 2+ on the right side and 2+ on the left side.       Back Exam     Tenderness   The patient is experiencing no tenderness.     Range of Motion   Extension: abnormal   Flexion: normal   Lateral bend right: normal   Lateral bend left: normal   Rotation right: normal   Rotation left: normal     Muscle Strength   Right Quadriceps:  5/5   Left Quadriceps:  5/5   Right Hamstrings:  5/5   Left Hamstrings:  5/5     Tests   Straight leg raise right: negative  Straight leg raise left: negative    Other   Toe walk: normal  Heel walk: normal            SI joint:   Palpation at the right and left SI joints not painful  ABEBA test is negative bilaterally  Gaenslen test is negative bilaterally  Thigh thrust test is negative bilaterally    Neurologic Exam     Mental Status   Oriented to person, place, and time.   Speech: speech is normal   Level of consciousness:  alert    Cranial Nerves   Cranial nerves II through XII intact.     CN III, IV, VI   Pupils are equal, round, and reactive to light.  Extraocular motions are normal.     Motor Exam   Muscle bulk: normal  Overall muscle tone: normal    Strength   Right deltoid: 5/5  Left deltoid: 5/5  Right biceps: 5/5  Left biceps: 5/5  Right triceps: 5/5  Left triceps: 5/5  Right wrist flexion: 5/5  Left wrist flexion: 5/5  Right wrist extension: 5/5  Left wrist extension: 5/5  Right interossei: 5/5  Left interossei: 5/5  Right iliopsoas: 5/5  Left iliopsoas: 5/5  Right quadriceps: 5/5  Left quadriceps: 5/5  Right hamstrin/5  Left hamstrin/5  Right anterior tibial: 3/5  Left anterior tibial: 3/5  Right posterior tibial: 5/5  Left posterior tibial: 5/5  Right peroneal: 3/5  Left peroneal: 3/5  Right gastroc: 5/5  Left gastroc: 5/5    Sensory Exam   Light touch normal.   Pinprick normal.     Gait, Coordination, and Reflexes     Gait  Gait: normal    Coordination   Finger to nose coordination: normal  Tandem walking coordination: normal    Reflexes   Right brachioradialis: 2+  Left brachioradialis: 2+  Right biceps: 2+  Left biceps: 2+  Right triceps: 2+  Left triceps: 2+  Right patellar: 2+  Left patellar: 2+  Right achilles: 2+  Left achilles: 2+  Right plantar: normal  Left plantar: normal  Right Tucker: absent  Left Tucker: absent  Right ankle clonus: absent  Left ankle clonus: absent        DIAGNOSTIC DATA:  I personally interpreted the following imaging:   CT lumbar spine May 2020 shows moderate to severe stenosis at L1-2 and L2-3, status post L3-4 and L4-5 laminectomy, severe bilateral L5-S1 foraminal stenosis    ASSESMENT:  This is a 89 y.o. male with     Problem List Items Addressed This Visit        Neuro    Lumbar spinal stenosis    Relevant Orders    FL Myelogram Lumbar with CT to Follow    Chronic pain syndrome      Other Visit Diagnoses     Spinal stenosis of lumbar region at multiple levels    -  Primary     Status post lumbar laminectomy        S/P insertion of spinal cord stimulator                PLAN:  Patient will be a candidate for minimally invasive lumbar laminectomy if found to have severe spinal stenosis on CT myelogram  CT myelogram ordered, will call back after completed  All questions answered  Will keep using his spinal cord stimulator at this time          Andrzej Toribio MD  Cell:[phone]

## 2020-10-21 ENCOUNTER — TELEPHONE (OUTPATIENT)
Dept: RESEARCH | Facility: HOSPITAL | Age: 85
End: 2020-10-21

## 2020-10-21 NOTE — TELEPHONE ENCOUNTER
Javier Valles was contacted in regard to a research survey questionnaire (IRB #[id number]) by myself. This is the 1st contact attempt in regard to this study. Prior to administration of the survey, the following consent elements were discussed and understood by the patient:    ? Purpose of the study   ? Study and questionnaire design  ? Confidentiality and HIPAA Authorization for Release of Medical Records  ? Risks, Benefits, Compensations, and Costs  ? Participation in the survey is voluntary and patient may withdraw at anytime  ? Contact information for questions regarding your rights    Patient verbalizes understanding of the above: Yes  Contact information for CRC and PI given to patient: Yes  Patient was able to adequately summarize the study and seems to have a generalizable understanding of the study: Yes  Survey questionnaire completed: Yes

## 2020-11-03 ENCOUNTER — TELEPHONE (OUTPATIENT)
Dept: NEUROSURGERY | Facility: CLINIC | Age: 85
End: 2020-11-03

## 2020-11-03 ENCOUNTER — PATIENT MESSAGE (OUTPATIENT)
Dept: DERMATOLOGY | Facility: CLINIC | Age: 85
End: 2020-11-03

## 2020-11-03 ENCOUNTER — PATIENT MESSAGE (OUTPATIENT)
Dept: ELECTROPHYSIOLOGY | Facility: CLINIC | Age: 85
End: 2020-11-03

## 2020-11-03 ENCOUNTER — PATIENT MESSAGE (OUTPATIENT)
Dept: INTERNAL MEDICINE | Facility: CLINIC | Age: 85
End: 2020-11-03

## 2020-11-03 DIAGNOSIS — G89.4 CHRONIC PAIN SYNDROME: ICD-10-CM

## 2020-11-03 DIAGNOSIS — Z98.890 STATUS POST MYELOGRAM: Primary | ICD-10-CM

## 2020-11-03 DIAGNOSIS — R29.818 NEUROGENIC CLAUDICATION: ICD-10-CM

## 2020-11-03 DIAGNOSIS — M48.061 SPINAL STENOSIS, LUMBAR REGION, WITHOUT NEUROGENIC CLAUDICATION: Primary | ICD-10-CM

## 2020-11-03 NOTE — TELEPHONE ENCOUNTER
Spoke to the patient instructed on myelogram date and time. Instructed to check with prescribing physician to see if it is ok to stop zoloft, aricept, and eliquis 48 hour prior to procedure.

## 2020-11-04 ENCOUNTER — PATIENT MESSAGE (OUTPATIENT)
Dept: NEUROSURGERY | Facility: CLINIC | Age: 85
End: 2020-11-04

## 2020-11-04 ENCOUNTER — TELEPHONE (OUTPATIENT)
Dept: DERMATOLOGY | Facility: CLINIC | Age: 85
End: 2020-11-04

## 2020-11-04 DIAGNOSIS — L57.0 MULTIPLE ACTINIC KERATOSES: Primary | ICD-10-CM

## 2020-11-04 NOTE — TELEPHONE ENCOUNTER
I called the pt and setup PDT appt per DR. Barlow.    ----- Message from Kaley Barlow MD sent at 11/4/2020  8:37 AM CST -----  Regarding: pdt  Needs PDT scalp thank you

## 2020-11-05 ENCOUNTER — CLINICAL SUPPORT (OUTPATIENT)
Dept: DERMATOLOGY | Facility: CLINIC | Age: 85
End: 2020-11-05
Payer: MEDICARE

## 2020-11-05 DIAGNOSIS — L57.0 MULTIPLE ACTINIC KERATOSES: ICD-10-CM

## 2020-11-05 PROCEDURE — 96567 PR EXT PHOTODYNAMIC THERAPY: ICD-10-PCS | Mod: HCNC,S$GLB,, | Performed by: DERMATOLOGY

## 2020-11-05 PROCEDURE — 99499 UNLISTED E&M SERVICE: CPT | Mod: HCNC,S$GLB,, | Performed by: DERMATOLOGY

## 2020-11-05 PROCEDURE — 99499 NO LOS: ICD-10-PCS | Mod: HCNC,S$GLB,, | Performed by: DERMATOLOGY

## 2020-11-05 PROCEDURE — 96567 PDT DSTR PRMLG LES SKN: CPT | Mod: HCNC,S$GLB,, | Performed by: DERMATOLOGY

## 2020-11-05 PROCEDURE — 99999 PR PBB SHADOW E&M-EST. PATIENT-LVL III: CPT | Mod: PBBFAC,HCNC,,

## 2020-11-05 PROCEDURE — 99999 PR PBB SHADOW E&M-EST. PATIENT-LVL III: ICD-10-PCS | Mod: PBBFAC,HCNC,,

## 2020-11-05 NOTE — PROGRESS NOTES
Photodynamic Therapy Note.    PDT ordered per Dr. Barlow    Patient here today for treatment of actinic keratoses using photodynamic therapy.  Risks including but not limited to burning, stinging, redness, swelling, crusting or blistering of the skin of the area treated were discussed with patient.  Patient elects to proceed with photodynamic therapy.    Treatment area:  Scalp  Treatment area cleaned with rubbing alcohol, 1 Levulan Kerastick (NDC: 76320-898-36) applied evenly to entire surface and allowed to absorb for 90 minutes.  Patient then placed under Pawel-U light for 16 minutes 40 seconds.    Patient tolerated treatment well with only mild but tolerable symptoms of discomfort.  Area washed gently with mild soap and water; Zinc oxide sunscreen applied.  Patient advised to avoid any significant light exposure (sun and artificial) for next 48 hours.    RTC:  In 1 month or sooner if any problems arise.

## 2020-11-09 ENCOUNTER — LAB VISIT (OUTPATIENT)
Dept: LAB | Facility: HOSPITAL | Age: 85
End: 2020-11-09
Attending: NEUROLOGICAL SURGERY
Payer: MEDICARE

## 2020-11-09 DIAGNOSIS — Z98.890 STATUS POST MYELOGRAM: ICD-10-CM

## 2020-11-09 LAB
CREAT SERPL-MCNC: 1.8 MG/DL (ref 0.5–1.4)
EST. GFR  (AFRICAN AMERICAN): 37.5 ML/MIN/1.73 M^2
EST. GFR  (NON AFRICAN AMERICAN): 32.4 ML/MIN/1.73 M^2

## 2020-11-09 PROCEDURE — 36415 COLL VENOUS BLD VENIPUNCTURE: CPT | Mod: HCNC

## 2020-11-09 PROCEDURE — 82565 ASSAY OF CREATININE: CPT | Mod: HCNC

## 2020-11-10 ENCOUNTER — HOSPITAL ENCOUNTER (OUTPATIENT)
Facility: HOSPITAL | Age: 85
Discharge: HOME | End: 2020-11-10
Attending: NEUROLOGICAL SURGERY | Admitting: NEUROLOGICAL SURGERY
Payer: MEDICARE

## 2020-11-10 ENCOUNTER — HOSPITAL ENCOUNTER (OUTPATIENT)
Dept: RADIOLOGY | Facility: HOSPITAL | Age: 85
Discharge: HOME | End: 2020-11-10
Attending: NEUROLOGICAL SURGERY
Payer: MEDICARE

## 2020-11-10 ENCOUNTER — HOSPITAL ENCOUNTER (OUTPATIENT)
Dept: RADIOLOGY | Facility: HOSPITAL | Age: 85
Discharge: HOME | End: 2020-11-10
Attending: NEUROLOGICAL SURGERY | Admitting: NEUROLOGICAL SURGERY
Payer: MEDICARE

## 2020-11-10 VITALS
RESPIRATION RATE: 17 BRPM | WEIGHT: 169 LBS | BODY MASS INDEX: 25.61 KG/M2 | HEART RATE: 51 BPM | TEMPERATURE: 98 F | DIASTOLIC BLOOD PRESSURE: 70 MMHG | SYSTOLIC BLOOD PRESSURE: 163 MMHG | HEIGHT: 68 IN | OXYGEN SATURATION: 99 %

## 2020-11-10 DIAGNOSIS — M48.062 SPINAL STENOSIS OF LUMBAR REGION WITH NEUROGENIC CLAUDICATION: ICD-10-CM

## 2020-11-10 DIAGNOSIS — Z98.890 STATUS POST MYELOGRAM: ICD-10-CM

## 2020-11-10 DIAGNOSIS — M48.00 SPINAL STENOSIS: ICD-10-CM

## 2020-11-10 PROCEDURE — 25500020 PHARM REV CODE 255: Mod: HCNC | Performed by: NEUROLOGICAL SURGERY

## 2020-11-10 PROCEDURE — 25000003 PHARM REV CODE 250: Mod: HCNC | Performed by: NEUROLOGICAL SURGERY

## 2020-11-10 PROCEDURE — 62304 MYELOGRAPHY LUMBAR INJECTION: CPT | Mod: HCNC,,, | Performed by: RADIOLOGY

## 2020-11-10 PROCEDURE — 72132 CT LUMBAR SPINE W/DYE: CPT | Mod: 26,HCNC,, | Performed by: RADIOLOGY

## 2020-11-10 PROCEDURE — 72132 CT LUMBAR SPINE WITH CONTRAST: ICD-10-PCS | Mod: 26,HCNC,, | Performed by: RADIOLOGY

## 2020-11-10 PROCEDURE — 62304 FL MYELOGRAM LUMBAR WITH CT TO FOLLOW: ICD-10-PCS | Mod: HCNC,,, | Performed by: RADIOLOGY

## 2020-11-10 PROCEDURE — 72132 CT LUMBAR SPINE W/DYE: CPT | Mod: TC,HCNC

## 2020-11-10 PROCEDURE — 62304 MYELOGRAPHY LUMBAR INJECTION: CPT | Mod: HCNC

## 2020-11-10 RX ORDER — LIDOCAINE HYDROCHLORIDE 10 MG/ML
4 INJECTION INFILTRATION; PERINEURAL ONCE
Status: COMPLETED | OUTPATIENT
Start: 2020-11-10 | End: 2020-11-10

## 2020-11-10 RX ADMIN — LIDOCAINE HYDROCHLORIDE 4 ML: 10 INJECTION, SOLUTION INFILTRATION; PERINEURAL at 11:11

## 2020-11-10 RX ADMIN — IOHEXOL 10 ML: 180 INJECTION INTRAVENOUS at 11:11

## 2020-11-10 NOTE — PLAN OF CARE
Patient tolerated procedure/anesthesia well, vss, no complications or concerns. Patient denies pain, patient denies nausea, and tolerates PO intake. Consents with chart. RN reviewed discharge instructions with patient and spouse at bedside,verbalized understanding. Patient left via wheelchair. Patient to follow-up with MD that ordered myelogram for results.

## 2020-11-10 NOTE — PROCEDURES
Radiology Post-Procedure Note    Pre Op Diagnosis: spinal stenosis    Post Op Diagnosis: Same    Procedure: lumbar puncture with myelography    Procedure performed by: Alfredito Delgado MD    Supervising staff: Jericho Mendenhall MD    Written Informed Consent Obtained: Yes    Specimen Removed: none    Estimated Blood Loss: Minimal    Findings: Following written informed consent and sterile prep and drape, a 22 gauge spinal needle was inserted at L4 - L5 intralaminar space under fluoroscopic surveillance. Approximately 10cc of Omnipaque 180 was then injected intrathecally under intermittent fluoroscopy. There were no complications.    Patient tolerated procedure well and was transported to CT department.      Alfredito Delgado MD PGY-III  Interventional Radiology  [facility]

## 2020-11-10 NOTE — DISCHARGE INSTRUCTIONS
Myelogram  A myelogram is a test to check problems with your spinal canal, including the spinal cord, nerve roots, and spinal lining. The canal is a tunnel-like structure in your spine that holds your spinal cord. A myelogram uses a dye injected into the spinal canal with the guidance of imaging, usually by fluoroscopy (real time X-ray), X-ray, or computed tomography (CT). Pictures are then taken of your spinal canal.  How do I get ready for a myelogram?  · Dont eat the morning of the test. But you can drink water or other clear fluids.  · If told to, stop taking medicines before the test.  · Arrange for someone to drive you home.  Tell the healthcare provider  Tell the healthcare provider if you:  · Are pregnant or think you may be  · Have any bleeding problems  · Take blood thinners (anticoagulants) or other medicines. These include aspirin, certain antipsychotic medicines, and antidepressants. You may be told to stop taking these one or more days before your test.   · Have had back surgery or low-back pain  · Have any allergies   What happens during a myelogram?  · You will change into a hospital gown.  · X-rays of your spine will be taken.  · Your lower back will be cleaned, covered with drapes, and injected with a numbing medicine.  · Your doctor will advance a thin needle under guidance, usually using fluoroscopy, into your spinal canal space.  · Your doctor will inject contrast fluid (dye) into your spinal canal. The doctor may take out a small amount of spinal fluid.  · Additional X-rays will be taken.  · If you need a CT test, it will follow the X-rays.  What happens after a myelogram?  · Take it easy for the rest of the day, as advised.  · Avoid physical activity, or bending over for 1 to 2 days after the procedure, or as directed by your healthcare provider.  · Lie down with your head raised if you get a headache, or if instructed to do so.  · Drink plenty of water.  · Your provider will discuss the  test results with you at a follow-up appointment.  What are the risks of a myelogram?  · Small risks of pain, bleeding or infection at the injection site or within or around the spinal canal  · Headache  · Injury to a nerve or the spinal cord at the injection site  · X-ray radiation exposure (generally considered to be low risk and safe)  When should I call my healthcare provider?  Call your healthcare provider right away if:  · You have a headache that lasts 2 days or more  · Fever of 100.4 °F (38°C)   · You have lasting pain in your back, or tingling in your groin or legs  · Or, whatever your healthcare provider told you to report based on your medical condition   Date Last Reviewed: 5/1/2017  © 3569-5771 [employer] [address] [place] [address], [place] [address]. All rights reserved. This information is not intended as a substitute for professional medical care. Always follow your healthcare professional's instructions.

## 2020-11-10 NOTE — H&P
Radiology History & Physical      SUBJECTIVE:     Chief Complaint: spinal stenosis    History of Present Illness:  Javier Valles is a 89 y.o. male with suspected neurogenic claudication who presents for lumbar myelogram. Patient has history of previous laminectomies at L3-4 and L4-5. He has held his daily medication Aricept since Saturday 11/7/20.    Past Medical History:   Diagnosis Date    *Atrial flutter 10/3/13    Anticoagulant long-term use     Aspirin only at this time    Arthritis     Atrial fibrillation     Atrial flutter     Blood transfusion     ?    BPH (benign prostatic hypertrophy)     Carotid artery disease     2008: [place] There is 40 - 49% right Internal Carotid stenosis.  There is 20 - 39% left Internal Carotid stenosis.       Chronic kidney disease     Coronary artery disease     DDD (degenerative disc disease) 6/25/2015    Degenerative disc disease     Elevated PSA     Glaucoma     Hyperlipidemia     Hypertension     Lumbar stenosis     lumbar spinal stenosis    NSTEMI (non-ST elevated myocardial infarction) 11/3/2013    Pacemaker 11/2013    Peripheral neuropathy     - S/P right ILIAC stent 1993;      Retinal detachment     Squamous cell carcinoma     left leg    Syncope and collapse: orthostatic with hypotension 10/9/2015     Past Surgical History:   Procedure Laterality Date    C[facility]AC [facility]HETERIZATION      [facility]    CARDIAC ELECTROPHYSIOLOGY MAPPING AND ABLATION  11/2016    CARDIAC PACEMAKER PLACEMENT  11/2016    CATARACT EXTRACTION W/  INTRAOCULAR LENS IMPLANT Left 1997        CORONARY ARTERY BYPASS GRAFT  1991    ENUCLEATION Right 1949    EPIDURAL STEROID INJECTION N/A 7/17/2019    Procedure: INJECTION, STEROID, EPIDURAL, CAUDAL  cleared to hold eliquis 4 days per Dr. Ruiz;  Surgeon: James Hodges MD;  Location: [facility] PAIN T;  Service: Pain Management;  Laterality: N/A;    EPIDURAL STEROID INJECTION N/A 5/27/2020    Procedure: INJECTION, STEROID,  EPIDURAL, CAUDALW/CATH need consent , clear to hold Eliquis 48 hrs per ordering ;  Surgeon: James Hodges MD;  Location: [facility] [place] PAIN MGT;  Service: Pain Management;  Laterality: N/A;    INJECTION OF ANESTHETIC AGENT AROUND MEDIAL BRANCH NERVES INNERVATING LUMBAR FACET JOINT Bilateral 12/18/2018    Procedure: BLOCK, NERVE, FACET JOINT, LUMBAR, MEDIAL BRANCH;  Surgeon: James Hodges MD;  Location: [facility] PAIN MGT;  Service: Pain Management;  Laterality: Bilateral;    LUMBAR LAMINECTOMY  04/25/2016    RADIOFREQUENCY ABLATION Left 7/22/2020    Procedure: RADIOFREQUENCY ABLATION LEFT L2,3,4,5 consent needed;  Surgeon: James Hodges MD;  Location: [facility] [place] PAIN MGT;  Service: Pain Management;  Laterality: Left;  LEFT RFA L2,3,4,5  consent needed    RETINAL DETACHMENT SURGERY Left 1997    Dr. Cosme    SKIN BIOPSY      SPINAL CORD STIMULATOR TRIAL W/ LAMINOTOMY  10/2017    TONSILLECTOMY         Home Meds:   Prior to Admission medications    Medication Sig Start Date End Date Taking? Authorizing Provider   amiodarone (PACERONE) 200 MG Tab Take 1 tablet (200 mg total) by mouth once daily. 6/1/20   Thiago Gibbs MD   amLODIPine (NORVASC) 5 MG tablet Take 1 tablet (5 mg total) by mouth once daily. 10/1/20   Thiago Gibbs MD   apixaban (ELIQUIS) 2.5 mg Tab Take 1 tablet (2.5 mg total) by mouth 2 (two) times daily. 10/1/20   Alcon Ly MD   ascorbic acid (VITAMIN C) 1000 MG tablet Take 1,000 mg by mouth every morning.     Historical Provider   brimonidine 0.2% (ALPHAGAN) 0.2 % Drop Place 1 drop into the left eye 3 (three) times daily. 8/26/19   Sena Schneider MD   brinzolamide (AZOPT) 1 % ophthalmic suspension Place 1 drop into both eyes 3 (three) times daily. 10/26/18   Sena Schneider MD   co-enzyme Q-10 30 mg capsule Take 30 mg by mouth once daily.     Historical Provider   donepeziL (ARICEPT) 5 MG tablet Take 1 tablet (5 mg total) by mouth every evening. 10/1/20   Thiago BELL  MD Sai   dorzolamide (TRUSOPT) 2 % ophthalmic solution Place 1 drop into both eyes 3 (three) times daily. 9/20/19   Sena Schneider MD   erythromycin (ROMYCIN) ophthalmic ointment Apply to affected eye daily as needed 2/26/19   Sena Schneider MD   FLUCELVAX QUAD 2215-4786, PF, 60 mcg (15 mcg x 4)/0.5 mL Syrg  10/9/19   Historical Provider   FLUZONE HIGHDOSE QUAD 20-21  mcg/0.7 mL Syrg  9/21/20   Historical Provider   ipratropium (ATROVENT) 0.03 % nasal spray USE 1 TO 2 SPRAY(S) IN EACH NOSTRIL TWICE DAILY for rhinorrhea/nasal drip 3/16/20   Nicholas Garcia III, MD   latanoprost 0.005 % ophthalmic solution Place 1 drop into the left eye every evening. 11/8/19   Sena Schneider MD   losartan (COZAAR) 50 MG tablet Take 1 tablet (50 mg total) by mouth 2 (two) times daily. 2/28/20   Tad Curtis MD   lutein 20 mg Cap Take 20 mg by mouth once daily.     Historical Provider   methocarbamoL (ROBAXIN) 500 MG Tab Take 1 tablet by mouth twice daily for 7 days 11/8/20   Thiago Gibbs MD   methylPREDNISolone (MEDROL, BARON,) 4 mg tablet use as directed 10/13/20   Thiago Gibbs MD   MULTIVITAMINS,THER W-MINERALS (VITAMINS & MINERALS ORAL) Take 1 tablet by mouth every morning.     Historical Provider   polycarbophil (FIBERCON) 625 mg tablet Take 625 mg by mouth 2 (two) times daily.    Historical Provider   rosuvastatin (CRESTOR) 20 MG tablet Take 1 tablet (20 mg total) by mouth every evening. 1/7/19   Tad Curtis MD   sertraline (ZOLOFT) 50 MG tablet Take 1 tablet (50 mg total) by mouth once daily. 6/1/20   Thiago Gibbs MD     Anticoagulants/Antiplatelets: Eliquis, held since 11/7    Allergies:   Review of patient's allergies indicates:   Allergen Reactions    Atorvastatin      Myositis/elevated CPK    Pravastatin      Severe myalgias/elevated CPK    Statins-hmg-coa reductase inhibitors      Muscle pain and loss of muscle    Ace inhibitors      Cough     Sedation  History:  N/A    Review of Systems:   Hematological: no known coagulopathies  Respiratory: no shortness of breath  Cardiovascular: no chest pain  Gastrointestinal: no abdominal pain  Genito-Urinary: no dysuria  Musculoskeletal: negative  Neurological: no TIA or stroke symptoms         OBJECTIVE:       Physical Exam:  ASA: 3  Mallampati: N/A    General: no acute distress  Mental Status: alert and oriented to person, place and time  HEENT: normocephalic, atraumatic; eye prosthesis  Chest: unlabored breathing  Heart: regular heart rate  Abdomen: nondistended  Extremity: moves all extremities    Laboratory  Lab Results   Component Value Date    INR 1.0 11/01/2017       Lab Results   Component Value Date    WBC 5.53 05/12/2020    HGB 11.7 (L) 05/12/2020    HCT 38.1 (L) 05/12/2020     (H) 05/12/2020    PLT 89 (L) 05/12/2020      Lab Results   Component Value Date    GLU 92 06/24/2020     06/24/2020    K 4.8 06/24/2020     (H) 06/24/2020    CO2 22 (L) 06/24/2020    BUN 36 (H) 06/24/2020    CREATININE 1.8 (H) 11/09/2020    CALCIUM 9.7 06/24/2020    MG 2.2 04/08/2015    ALT 19 09/25/2019    AST 24 09/25/2019    ALBUMIN 4.1 11/11/2019    BILITOT 0.7 09/25/2019    BILIDIR 0.5 (H) 11/01/2013       ASSESSMENT/PLAN:     Sedation Plan: local only  Patient will undergo lumbar puncture with myelogram.    Alfredito Delgado MD PGY-III  Interventional Radiology  [facility]

## 2020-11-11 ENCOUNTER — TELEPHONE (OUTPATIENT)
Dept: NEUROSURGERY | Facility: CLINIC | Age: 85
End: 2020-11-11

## 2020-11-11 ENCOUNTER — PATIENT MESSAGE (OUTPATIENT)
Dept: NEUROSURGERY | Facility: CLINIC | Age: 85
End: 2020-11-11

## 2020-11-11 NOTE — TELEPHONE ENCOUNTER
----- Message from Andrzej Toribio MD sent at 11/11/2020  8:02 AM CST -----  Please let him know that I have reviewed his CT myelogram.  He does not have severe central canal stenosis.  He does have a L4-5 spondylolisthesis with severe foraminal stenosis.  We can attempt bilateral L4-5 transforaminal epidural steroid injections to see if this would give him pain relief as a test injection prior to surgical intervention.  As can be she wants to proceed with the spinal injection.

## 2020-11-13 ENCOUNTER — TELEPHONE (OUTPATIENT)
Dept: NEUROSURGERY | Facility: CLINIC | Age: 85
End: 2020-11-13

## 2020-11-13 ENCOUNTER — OFFICE VISIT (OUTPATIENT)
Dept: INTERNAL MEDICINE | Facility: CLINIC | Age: 85
End: 2020-11-13
Payer: MEDICARE

## 2020-11-13 ENCOUNTER — OFFICE VISIT (OUTPATIENT)
Dept: WOUND CARE | Facility: CLINIC | Age: 85
End: 2020-11-13
Payer: MEDICARE

## 2020-11-13 VITALS
OXYGEN SATURATION: 98 % | SYSTOLIC BLOOD PRESSURE: 118 MMHG | DIASTOLIC BLOOD PRESSURE: 82 MMHG | HEART RATE: 79 BPM | BODY MASS INDEX: 26.37 KG/M2 | WEIGHT: 174 LBS | HEIGHT: 68 IN

## 2020-11-13 VITALS
TEMPERATURE: 97 F | HEART RATE: 51 BPM | WEIGHT: 174.19 LBS | DIASTOLIC BLOOD PRESSURE: 55 MMHG | SYSTOLIC BLOOD PRESSURE: 124 MMHG | BODY MASS INDEX: 26.4 KG/M2 | HEIGHT: 68 IN

## 2020-11-13 DIAGNOSIS — S51.811A SKIN TEAR OF RIGHT FOREARM WITHOUT COMPLICATION, INITIAL ENCOUNTER: ICD-10-CM

## 2020-11-13 DIAGNOSIS — T14.8XXA SKIN AVULSION: Primary | ICD-10-CM

## 2020-11-13 DIAGNOSIS — T14.8XXA SKIN AVULSION: ICD-10-CM

## 2020-11-13 PROCEDURE — 99999 PR PBB SHADOW E&M-EST. PATIENT-LVL V: ICD-10-PCS | Mod: PBBFAC,HCNC,, | Performed by: INTERNAL MEDICINE

## 2020-11-13 PROCEDURE — 99204 OFFICE O/P NEW MOD 45 MIN: CPT | Mod: HCNC,S$GLB,, | Performed by: NURSE PRACTITIONER

## 2020-11-13 PROCEDURE — 99999 PR PBB SHADOW E&M-EST. PATIENT-LVL V: CPT | Mod: PBBFAC,HCNC,, | Performed by: NURSE PRACTITIONER

## 2020-11-13 PROCEDURE — 1159F PR MEDICATION LIST DOCUMENTED IN MEDICAL RECORD: ICD-10-PCS | Mod: HCNC,S$GLB,, | Performed by: INTERNAL MEDICINE

## 2020-11-13 PROCEDURE — 1125F PR PAIN SEVERITY QUANTIFIED, PAIN PRESENT: ICD-10-PCS | Mod: HCNC,S$GLB,, | Performed by: NURSE PRACTITIONER

## 2020-11-13 PROCEDURE — 1126F AMNT PAIN NOTED NONE PRSNT: CPT | Mod: HCNC,S$GLB,, | Performed by: INTERNAL MEDICINE

## 2020-11-13 PROCEDURE — 3288F FALL RISK ASSESSMENT DOCD: CPT | Mod: HCNC,CPTII,S$GLB, | Performed by: NURSE PRACTITIONER

## 2020-11-13 PROCEDURE — 99213 PR OFFICE/OUTPT VISIT, EST, LEVL III, 20-29 MIN: ICD-10-PCS | Mod: HCNC,S$GLB,, | Performed by: INTERNAL MEDICINE

## 2020-11-13 PROCEDURE — 99213 OFFICE O/P EST LOW 20 MIN: CPT | Mod: HCNC,S$GLB,, | Performed by: INTERNAL MEDICINE

## 2020-11-13 PROCEDURE — 1157F ADVNC CARE PLAN IN RCRD: CPT | Mod: HCNC,S$GLB,, | Performed by: INTERNAL MEDICINE

## 2020-11-13 PROCEDURE — 99999 PR PBB SHADOW E&M-EST. PATIENT-LVL V: CPT | Mod: PBBFAC,HCNC,, | Performed by: INTERNAL MEDICINE

## 2020-11-13 PROCEDURE — 1159F PR MEDICATION LIST DOCUMENTED IN MEDICAL RECORD: ICD-10-PCS | Mod: HCNC,S$GLB,, | Performed by: NURSE PRACTITIONER

## 2020-11-13 PROCEDURE — 1159F MED LIST DOCD IN RCRD: CPT | Mod: HCNC,S$GLB,, | Performed by: INTERNAL MEDICINE

## 2020-11-13 PROCEDURE — 1157F PR ADVANCE CARE PLAN OR EQUIV PRESENT IN MEDICAL RECORD: ICD-10-PCS | Mod: HCNC,S$GLB,, | Performed by: INTERNAL MEDICINE

## 2020-11-13 PROCEDURE — 1101F PT FALLS ASSESS-DOCD LE1/YR: CPT | Mod: HCNC,CPTII,S$GLB, | Performed by: INTERNAL MEDICINE

## 2020-11-13 PROCEDURE — 99204 PR OFFICE/OUTPT VISIT, NEW, LEVL IV, 45-59 MIN: ICD-10-PCS | Mod: HCNC,S$GLB,, | Performed by: NURSE PRACTITIONER

## 2020-11-13 PROCEDURE — 1101F PR PT FALLS ASSESS DOC 0-1 FALLS W/OUT INJ PAST YR: ICD-10-PCS | Mod: HCNC,CPTII,S$GLB, | Performed by: INTERNAL MEDICINE

## 2020-11-13 PROCEDURE — 99999 PR PBB SHADOW E&M-EST. PATIENT-LVL V: ICD-10-PCS | Mod: PBBFAC,HCNC,, | Performed by: NURSE PRACTITIONER

## 2020-11-13 PROCEDURE — 3288F PR FALLS RISK ASSESSMENT DOCUMENTED: ICD-10-PCS | Mod: HCNC,CPTII,S$GLB, | Performed by: INTERNAL MEDICINE

## 2020-11-13 PROCEDURE — 1126F PR PAIN SEVERITY QUANTIFIED, NO PAIN PRESENT: ICD-10-PCS | Mod: HCNC,S$GLB,, | Performed by: INTERNAL MEDICINE

## 2020-11-13 PROCEDURE — 3288F FALL RISK ASSESSMENT DOCD: CPT | Mod: HCNC,CPTII,S$GLB, | Performed by: INTERNAL MEDICINE

## 2020-11-13 PROCEDURE — 1125F AMNT PAIN NOTED PAIN PRSNT: CPT | Mod: HCNC,S$GLB,, | Performed by: NURSE PRACTITIONER

## 2020-11-13 PROCEDURE — 1101F PR PT FALLS ASSESS DOC 0-1 FALLS W/OUT INJ PAST YR: ICD-10-PCS | Mod: HCNC,CPTII,S$GLB, | Performed by: NURSE PRACTITIONER

## 2020-11-13 PROCEDURE — 1101F PT FALLS ASSESS-DOCD LE1/YR: CPT | Mod: HCNC,CPTII,S$GLB, | Performed by: NURSE PRACTITIONER

## 2020-11-13 PROCEDURE — 1157F ADVNC CARE PLAN IN RCRD: CPT | Mod: HCNC,S$GLB,, | Performed by: NURSE PRACTITIONER

## 2020-11-13 PROCEDURE — 1157F PR ADVANCE CARE PLAN OR EQUIV PRESENT IN MEDICAL RECORD: ICD-10-PCS | Mod: HCNC,S$GLB,, | Performed by: NURSE PRACTITIONER

## 2020-11-13 PROCEDURE — 1159F MED LIST DOCD IN RCRD: CPT | Mod: HCNC,S$GLB,, | Performed by: NURSE PRACTITIONER

## 2020-11-13 PROCEDURE — 3288F PR FALLS RISK ASSESSMENT DOCUMENTED: ICD-10-PCS | Mod: HCNC,CPTII,S$GLB, | Performed by: NURSE PRACTITIONER

## 2020-11-13 RX ORDER — MEGESTROL ACETATE 40 MG/ML
200 SUSPENSION ORAL DAILY
Qty: 150 ML | Refills: 0 | Status: SHIPPED | OUTPATIENT
Start: 2020-11-13 | End: 2021-01-26

## 2020-11-13 NOTE — TELEPHONE ENCOUNTER
Spoke to the patient and he would like to proceed with the bilateral JOMAR. Can you please out the order in for this.

## 2020-11-13 NOTE — PROGRESS NOTES
MEDICAL HISTORY:   Coronary artery disease with previous bypass surgery, stents in 2013, and then a  subsequent non-ST MI due to in-stent stenosis of obtuse marginal for which angioplasty was performed.  Hypertension.  Hyperlipidemia.  Peripheral vascular disease with stent of the right iliac.  Venous Reflux  Chronic kidney disease with secondary hyperparathyroid.  BPH with elevated PSA.  Carotid artery disease   Osteoarthritis of the knees  Arrhythmia disorder for which he is status post pacemaker and defibrillator for sick sinus syndrome, status post radiofrequency ablation for atrial flutter, and on amiodarone for atrial flutter/fibrillation/PVCs.  Gout.  Bilateral inguinal hernia repair.  Left knee surgery.  Glaucoma.     SOCIAL HISTORY:  Tobacco use, none.  Alcohol use, a couple of drinks a day.     CURRENT MEDICATIONS:  Amiodarone 200 mg daily.  Amlodipine 5 mg daily.   Atrovent nasal spray as needed.  Losartan 50 mg daily.  Crestor 20 mg daily.  Eliquis 2.5 mg twice a day  Aricept 5 mg  Sertraline 50 mg      89-year-old male  Five days ago when he was throwing a ball with his dog, he hit his forearm on a chair, in part of the skin came off.  It has been bandaged off and on for few time since then.  There has been some bleeding.  No pain      He recently underwent a CT myelogram is known to have neural foraminal stenosis and and the recommendation for transforaminal epidural steroid injection was discussed         Is examination  Weight 174  Pulse 80  Blood pressure 120/82  Is a regular skin avulsion involving the right forearm  Some of the excess skin was taking off with a scissor   Mild bleeding  No skin erythema induration around at    Impression  Skin avulsion    Plan  Wound was wrapped in referred to the wound care clinic      As an addendum he mentioned that he has not really had much of an appetite.  There is no abdominal pain or any discomfort with eating

## 2020-11-13 NOTE — PROGRESS NOTES
Subjective:       Patient ID: Javier Valles is a 89 y.o. male.    Chief Complaint: Wound Check    Wound Check    This is a 89 year old male referred by Dr. Gibbs for evaluation and management of a skin tear to the right forearm. This occurred a few days ago when he was tossing a ball to his new puppy and caught the arm on the side of a plastic chair. He is on Eliquis and had a good deal of bleeding from the injury.  He has been using triple antibiotic ointment on the wound daily.  When he was seen by Dr. Gibbs this morning some dead skin was removed. He is afebrile. He denies increased redness, swelling or purulent drainage. His pain level is 8/10.    Patient Active Problem List   Diagnosis    Hereditary and idiopathic peripheral neuropathy    Peripheral vascular disease    Hypertension    Hyperlipidemia    Peripheral neuropathy    Carotid artery stenosis·There is 60-69% Asymptomatic right Internal Carotid Stenosis.    Degenerative disc disease    Elevated PSA    Myositis    Statin-induced myositis    Chronic kidney disease, stage 3 (moderate)    Gout, unspecified    Acquired cyst of kidney    Idiopathic progressive polyneuropathy    Atherosclerosis of aorta    Epiretinal membrane, left eye    Posterior vitreous detachment of left eye    Pseudophakia of left eye    Prosthetic eye globe    Open-angle glaucoma    Retinal tear    Rhegmatogenous retinal detachment    History of Urinary retention    Biventricular cardiac pacemaker in situ    SSS (sick sinus syndrome): s/p PPM    Macular edema, cystoid    Lamellar macular hole    Spondylosis without myelopathy    Lumbar degenerative disc disease    Gait apraxia    Contusion of rib on left side    Obesity, Class I, BMI 30-34.9    Coronary artery disease involving native coronary artery of native heart without angina pectoris    S/P CABG (coronary artery bypass graft)    Varicose veins of both lower extremities- Rt more than Left     Edema    Thrombocytopenia    Total bilirubin, elevated    Encounter for postoperative wound check    Peripheral venous insufficiency    Typical atrial flutter    Current use of long term anticoagulation    Status post catheter ablation of atrial flutter    Bilateral carpal tunnel syndrome    Lumbar spinal stenosis    Osteoarthritis of spine with radiculopathy, lumbar region    Cardiomyopathy, ischemic: Prior MI, CABG, EF 40-45%    Post laminectomy syndrome    PVC (premature ventricular contraction)    Chronic pain syndrome    Bilateral foot-drop    Macrocytic anemia    Elevated serum immunoglobulin free light chain level    Status post insertion of spinal cord stimulator    Chronic pain    Spinal stenosis    Skin tear of right forearm without complication     Past Medical History:   Diagnosis Date    *Atrial flutter 10/3/13    Anticoagulant long-term use     Aspirin only at this time    Arthritis     Atrial fibrillation     Atrial flutter     Blood transfusion     ?    BPH (benign prostatic hypertrophy)     Carotid artery disease     2008: [place] There is 40 - 49% right Internal Carotid stenosis.  There is 20 - 39% left Internal Carotid stenosis.       Chronic kidney disease     Coronary artery disease     DDD (degenerative disc disease) 6/25/2015    Degenerative disc disease     Elevated PSA     Glaucoma     Hyperlipidemia     Hypertension     Lumbar stenosis     lumbar spinal stenosis    NSTEMI (non-ST elevated myocardial infarction) 11/3/2013    Pacemaker 11/2013    Peripheral neuropathy     - S/P right ILIAC stent 1993;      Retinal detachment     Squamous cell carcinoma     left leg    Syncope and collapse: orthostatic with hypotension 10/9/2015     Past Surgical History:   Procedure Laterality Date    [facility]      [facility]    CARDIAC ELECTROPHYSIOLOGY MAPPING AND ABLATION  11/2016    CARDIAC PACEMAKER PLACEMENT  11/2016    CATARACT EXTRACTION W/   INTRAOCULAR LENS IMPLANT Left 1997        CORONARY ARTERY BYPASS GRAFT  1991    ENUCLEATION Right 1949    EPIDURAL STEROID INJECTION N/A 7/17/2019    Procedure: INJECTION, STEROID, EPIDURAL, CAUDAL  cleared to hold eliquis 4 days per Dr. Ruiz;  Surgeon: James Hodges MD;  Location: [facility] [place] PAIN MGT;  Service: Pain Management;  Laterality: N/A;    EPIDURAL STEROID INJECTION N/A 5/27/2020    Procedure: INJECTION, STEROID, EPIDURAL, CAUDALW/CATH need consent , clear to hold Eliquis 48 hrs per ordering ;  Surgeon: James Hodges MD;  Location: [facility] [place] PAIN MGT;  Service: Pain Management;  Laterality: N/A;    INJECTION OF ANESTHETIC AGENT AROUND MEDIAL BRANCH NERVES INNERVATING LUMBAR FACET JOINT Bilateral 12/18/2018    Procedure: BLOCK, NERVE, FACET JOINT, LUMBAR, MEDIAL BRANCH;  Surgeon: James Hodges MD;  Location: [facility] PAIN MGT;  Service: Pain Management;  Laterality: Bilateral;    LUMBAR LAMINECTOMY  04/25/2016    MYELOGRAPHY N/A 11/10/2020    Procedure: Myelogram;  Surgeon: [facility] Provider;  Location: [facility] OR [address] [place] [address];  Service: Radiology;  Laterality: N/A;    RADIOFREQUENCY ABLATION Left 7/22/2020    Procedure: RADIOFREQUENCY ABLATION LEFT L2,3,4,5 consent needed;  Surgeon: James Hodges MD;  Location: [facility] [place] PAIN T;  Service: Pain Management;  Laterality: Left;  LEFT RFA L2,3,4,5  consent needed    RETINAL DETACHMENT SURGERY Left 1997    Dr. Cosme    SKIN BIOPSY      SPINAL CORD STIMULATOR TRIAL W/ LAMINOTOMY  10/2017    TONSILLECTOMY       Review of Systems   Constitutional: Negative for chills, diaphoresis and fever.   HENT: Positive for hearing loss and rhinorrhea. Negative for postnasal drip, sinus pressure, sneezing, sore throat, tinnitus and trouble swallowing.    Eyes: Negative for visual disturbance.   Respiratory: Positive for shortness of breath. Negative for apnea, cough and wheezing.    Cardiovascular: Positive for leg swelling. Negative for chest pain  and palpitations.   Gastrointestinal: Negative for constipation, diarrhea, nausea and vomiting.   Genitourinary: Negative for difficulty urinating, dysuria, frequency and hematuria.   Musculoskeletal: Positive for back pain and neck stiffness. Negative for arthralgias and joint swelling.   Skin: Positive for wound.   Neurological: Negative for dizziness, weakness, light-headedness and headaches.   Hematological: Bruises/bleeds easily.   Psychiatric/Behavioral: Positive for decreased concentration, dysphoric mood and sleep disturbance. Negative for confusion. The patient is not nervous/anxious.        Objective:      Physical Exam  Vitals signs and nursing note reviewed.   Constitutional:       General: He is not in acute distress.     Appearance: He is well-developed. He is not diaphoretic.   HENT:      Head: Normocephalic and atraumatic.   Eyes:      General: No scleral icterus.        Right eye: No discharge.         Left eye: No discharge.      Conjunctiva/sclera: Conjunctivae normal.      Pupils: Pupils are equal, round, and reactive to light.   Neck:      Musculoskeletal: Neck supple.      Vascular: No JVD.   Cardiovascular:      Rate and Rhythm: Normal rate and regular rhythm.      Heart sounds: Normal heart sounds. No murmur. No friction rub. No gallop.    Pulmonary:      Effort: Pulmonary effort is normal. No respiratory distress.      Breath sounds: Normal breath sounds. No wheezing or rales.   Abdominal:      General: Bowel sounds are normal. There is no distension.      Palpations: Abdomen is soft.      Tenderness: There is no abdominal tenderness.   Musculoskeletal: Normal range of motion.         General: No tenderness.        Arms:    Skin:     General: Skin is warm and dry.      Findings: No erythema or rash.   Neurological:      Mental Status: He is alert and oriented to person, place, and time.   Psychiatric:         Behavior: Behavior normal.         Thought Content: Thought content normal.          Cognition and Memory: He exhibits impaired recent memory.         Judgment: Judgment normal.               Assessment:       1. Skin avulsion    2. Skin tear of right forearm without complication, initial encounter               Plan:            Cleanse wound with dove soap and water and dry thoroughly.  Apply medihoney gel daily to wound, cover with adaptic and cotton gauze and secure with roll gauze.  Tubigrip to hold dressing in place.  Written and verbal instructions given to patient.  Return to clinic in 3-4 weeks.

## 2020-11-13 NOTE — LETTER
November 13, 2020      Thiago Gibbs MD  [address]  [facility] [address]           [facility] 5th Fl  [address]  [facility] [address]  Phone: [phone]          Patient: Javier Valles   MR Number: [id number]   YOB: 1930   Date of Visit: 11/13/2020       Dear Dr. Thiago Gibbs:    Thank you for referring Javier Valles to me for evaluation. Attached you will find relevant portions of my assessment and plan of care.    If you have questions, please do not hesitate to call me. I look forward to following Javier Valles along with you.    Sincerely,    Ashley Fonseca, [employer]    Enclosure  CC:  No Recipients    If you would like to receive this communication electronically, please contact [email or web address] or [phone] to request more information on [employer] Link access.    For providers and/or their staff who would like to refer a patient to [facility], please contact us through our one-stop-shop provider referral line, Jennifer Staton, at [phone].    If you feel you have received this communication in error or would no longer like to receive these types of communications, please e-mail [email or web address]

## 2020-11-13 NOTE — PATIENT INSTRUCTIONS
Wound Care  Taking proper care of your wound will help it heal. Your healthcare provider may show you how to clean and dress the wound. He or she will also explain how to tell if the wound is healing normally. Here are the basic steps:      A wound that's not healing normally may be dark in color or have white streaks.    Wash Your Hands  · Use liquid soap and lather for 2 minutes. Scrub between your fingers and under your nails.  · Rinse with warm water, keeping your fingers pointing down.  · Use a paper towel to dry your hands and to turn off the faucet.  Remove the Used Dressing  · Set up your supplies.  · Put on disposable gloves if youre dressing a wound for someone else or your wound is infected.  · Gently take off the old dressing. If you have a drain or tube in the wound, be careful not to pull on it.  · Loosen the tape by pulling gently toward the wound.  · Remove the dressing one layer at a time and put it in a plastic bag.  · Remove your gloves.  Inspect and Dress the Wound  · Each time you change the dressing, inspect the wound carefully to be sure its healing normally.  · Wash your hands again. Put on a new pair of gloves if youre dressing a wound for someone else or if your wound is infected.  · Clean and dress the wound as directed by your doctor or nurse. If you have a drain or tube, be careful not to pull on it.  · Put all supplies in a plastic bag; seal the bag and put it in the trash.  · Be sure to wash your hands again.  Call your healthcare provider if you see any of the following signs of a problem:   · Bleeding that soaks the dressing  · Pink fluid weeping from the wound  · Increased drainage or drainage that is yellow, yellow-green, or foul-smelling  · Increased swelling or pain, or redness or swelling in the skin around the wound  · A change in the color of the wound  · An increase in the size of the wound  · A fever over 101.0°F, increased fatigue, or a loss of appetite.       ©  6314-3359 [facility], [address] [place] [address], [place] [address]. All rights reserved. This information is not intended as a substitute for professional medical care. Always follow your healthcare professional's instructions.    You may shower using a mild soap such as Dove.  Irrigate the wound with lukewarm water for 5 minutes and dry thoroughly.  Apply medihoney gel to the wound, cover with vaseline gauze and cotton gauze,  and secure with roll gauze.  Use flexnet to hold dressing in place.  Change dressing daily.  Report any signs of infection.

## 2020-11-16 ENCOUNTER — TELEPHONE (OUTPATIENT)
Dept: PAIN MEDICINE | Facility: CLINIC | Age: 85
End: 2020-11-16

## 2020-11-16 ENCOUNTER — PATIENT OUTREACH (OUTPATIENT)
Dept: OTHER | Facility: OTHER | Age: 85
End: 2020-11-16

## 2020-11-16 ENCOUNTER — PATIENT MESSAGE (OUTPATIENT)
Dept: WOUND CARE | Facility: CLINIC | Age: 85
End: 2020-11-16

## 2020-11-16 ENCOUNTER — TELEPHONE (OUTPATIENT)
Dept: NEUROSURGERY | Facility: CLINIC | Age: 85
End: 2020-11-16

## 2020-11-16 DIAGNOSIS — M48.061 FORAMINAL STENOSIS OF LUMBAR REGION: Primary | ICD-10-CM

## 2020-11-16 DIAGNOSIS — M54.16 LUMBAR RADICULOPATHY: ICD-10-CM

## 2020-11-16 NOTE — TELEPHONE ENCOUNTER
Left message for pt to return call to schedule Bilateral TFESI L4-5 with Dr. Villarreal. (Book as a 30 minutes procedure on the procedure schedule)

## 2020-11-16 NOTE — PROGRESS NOTES
Digital Medicine: Health  Follow-Up    The history is provided by the patient.             Reason for review: Blood pressure at goal        Topics Covered on Call: Low BP reading    Additional Follow-up details: Follow up completed with the patient. He stated that he is doing well overall. He is unsure why his pressure was low on 11/4 (88/54). He denies symptoms and his reading was back to normal range when he retook it a few hours later. He will reach out if this starts to become a trend or if any other concerns arise.             Diet-Not assessed          Physical Activity-Not assessed    Medication Adherence-Medication adherence was assessed.      Substance, Sleep, Stress-Not assessed      Continue current diet/physical activity routine.       Addressed patient questions and patient has my contact information if needed prior to next outreach. Patient verbalizes understanding.      Explained the importance of self-monitoring and medication adherence. Encouraged the patient to communicate with their health  for lifestyle modifications to help improve or maintain a healthy lifestyle.               There are no preventive care reminders to display for this patient.      Last 5 Patient Entered Readings                                      Current 30 Day Average: 132/76     Recent Readings 11/12/2020 11/4/2020 11/4/2020 10/25/2020 10/23/2020    SBP (mmHg) 113 126 88 125 164    DBP (mmHg) 59 70 54 87 108    Pulse 49 51 52 55 51               
No

## 2020-11-17 ENCOUNTER — TELEPHONE (OUTPATIENT)
Dept: PAIN MEDICINE | Facility: OTHER | Age: 85
End: 2020-11-17

## 2020-11-17 ENCOUNTER — TELEPHONE (OUTPATIENT)
Dept: NEUROSURGERY | Facility: CLINIC | Age: 85
End: 2020-11-17

## 2020-11-17 ENCOUNTER — PATIENT MESSAGE (OUTPATIENT)
Dept: PAIN MEDICINE | Facility: OTHER | Age: 85
End: 2020-11-17

## 2020-11-17 ENCOUNTER — TELEPHONE (OUTPATIENT)
Dept: ELECTROPHYSIOLOGY | Facility: CLINIC | Age: 85
End: 2020-11-17

## 2020-11-17 DIAGNOSIS — M54.16 LUMBAR RADICULOPATHY: Primary | ICD-10-CM

## 2020-11-17 NOTE — TELEPHONE ENCOUNTER
Eneida,     Mr. Valles is cleared, per policy, to hold Eliquis for 96 hours prior to his procedure and to resume post procedure at the performing surgeon's discretion. Please do not hesitate to reach out for any other questions or concerns.    Sincerely,  Danyelle Contreras, RN  [phone]

## 2020-11-17 NOTE — TELEPHONE ENCOUNTER
----- Message from Kenna Khalil MA sent at 11/17/2020 10:49 AM CST -----  Regarding: FW: Request to hold Eliquis    ----- Message -----  From: Eneida Gross LPN  Sent: 11/17/2020  10:18 AM CST  To: Malachi Rondon Staff  Subject: Request to hold Eliquis                          Good morning,  Patient is scheduled to have a procedure, Bilateral L4/5 TF JOMAR on 11/25/20. Staff requesting to hold Eliquis for 3 days prior to procedure. Please advise.  Thank you,  Eneida

## 2020-11-17 NOTE — TELEPHONE ENCOUNTER
----- Message from Karoline Arzate sent at 11/17/2020  9:28 AM CST -----  Type:  Patient Returning Call    Who Called: Patient  Who Left Message for Patient: unknown  Does the patient know what this is regarding?: schedule procedure  Would the patient rather a call back or a response via [facility]? Call back  Best Call Back Number: [phone]  Additional Information: n/a

## 2020-11-17 NOTE — TELEPHONE ENCOUNTER
Patient Calls  Danyelle Contreras RN routed conversation to You 3 minutes ago (1:34 PM)          Danyelle Contreras RN 3 minutes ago (1:34 PM)     Eneida   Mr. Valles is cleared, per policy, to hold Eliquis for 96 hours prior to his procedure and to resume post procedure at the performing surgeon's discretion. Please do not hesitate to reach out for any other questions or concerns.   Sincerely,   Danyelle Contreras RN   [phone]        Documentation             Danyelle Contreras RN 4 minutes ago (1:34 PM)     ----- Message from Kenna Khalil MA sent at 11/17/2020 10:49 AM CST -----   Regarding: FW: Request to hold Eliquis   ----- Message -----   From: Eneida Gross LPN   Sent: 11/17/2020 10:18 AM CST   To: Malachi Rondon Staff   Subject: Request to hold Eliquis   Good morning,   Patient is scheduled to have a procedure, Bilateral L4/5 TF JOMAR on 11/25/20. Staff requesting to hold Eliquis for 3 days prior to procedure. Please advise.   Thank you,   Eneida         Documentation                              Recent Patient Communication     Last Update Reason Specialty       Today -- Cardiology     [facility] Arrhythmia Danyelle Contreras Closed     Today -- Pain Medicine     [facility] Pain Management Negrita, Generic Provider Open     Today -- Neurosurgery     [facility] Neurosurgery Andrzej Toribio Closed     Today -- Pain Medicine     [facility] Pain Management Eneida Gross Closed     Today -- Pain Medicine     [facility] Pain Management Cheri Villarreal Jr Closed    There are additional recent communications with this patient. View the rest in Chart Review.

## 2020-11-18 ENCOUNTER — CLINICAL SUPPORT (OUTPATIENT)
Dept: CARDIOLOGY | Facility: HOSPITAL | Age: 85
End: 2020-11-18
Payer: MEDICARE

## 2020-11-18 DIAGNOSIS — Z95.0 PRESENCE OF CARDIAC PACEMAKER: ICD-10-CM

## 2020-11-18 DIAGNOSIS — I49.5 SICK SINUS SYNDROME: ICD-10-CM

## 2020-11-18 DIAGNOSIS — I48.91 UNSPECIFIED ATRIAL FIBRILLATION: ICD-10-CM

## 2020-11-18 DIAGNOSIS — I25.5 ISCHEMIC CARDIOMYOPATHY: ICD-10-CM

## 2020-11-23 ENCOUNTER — PATIENT MESSAGE (OUTPATIENT)
Dept: PAIN MEDICINE | Facility: OTHER | Age: 85
End: 2020-11-23

## 2020-11-25 ENCOUNTER — TELEPHONE (OUTPATIENT)
Dept: ELECTROPHYSIOLOGY | Facility: CLINIC | Age: 85
End: 2020-11-25

## 2020-11-25 ENCOUNTER — HOSPITAL ENCOUNTER (OUTPATIENT)
Facility: OTHER | Age: 85
Discharge: HOME | End: 2020-11-25
Attending: ANESTHESIOLOGY | Admitting: ANESTHESIOLOGY
Payer: MEDICARE

## 2020-11-25 ENCOUNTER — TELEPHONE (OUTPATIENT)
Dept: PAIN MEDICINE | Facility: CLINIC | Age: 85
End: 2020-11-25

## 2020-11-25 VITALS
WEIGHT: 170 LBS | SYSTOLIC BLOOD PRESSURE: 135 MMHG | BODY MASS INDEX: 25.76 KG/M2 | DIASTOLIC BLOOD PRESSURE: 67 MMHG | HEIGHT: 68 IN | RESPIRATION RATE: 14 BRPM | OXYGEN SATURATION: 97 % | TEMPERATURE: 98 F | HEART RATE: 50 BPM

## 2020-11-25 DIAGNOSIS — M54.17 LUMBOSACRAL RADICULOPATHY: Primary | ICD-10-CM

## 2020-11-25 DIAGNOSIS — I49.5 SSS (SICK SINUS SYNDROME): Primary | ICD-10-CM

## 2020-11-25 DIAGNOSIS — M51.36 DDD (DEGENERATIVE DISC DISEASE), LUMBAR: ICD-10-CM

## 2020-11-25 DIAGNOSIS — G89.29 CHRONIC PAIN: ICD-10-CM

## 2020-11-25 PROCEDURE — 64483 NJX AA&/STRD TFRM EPI L/S 1: CPT | Mod: 50,HCNC,, | Performed by: ANESTHESIOLOGY

## 2020-11-25 PROCEDURE — 64483 NJX AA&/STRD TFRM EPI L/S 1: CPT | Mod: 50,HCNC | Performed by: ANESTHESIOLOGY

## 2020-11-25 PROCEDURE — 99152 PR MOD CONSCIOUS SEDATION, SAME PHYS, 5+ YRS, FIRST 15 MIN: ICD-10-PCS | Mod: HCNC,,, | Performed by: ANESTHESIOLOGY

## 2020-11-25 PROCEDURE — 25500020 PHARM REV CODE 255: Mod: HCNC | Performed by: ANESTHESIOLOGY

## 2020-11-25 PROCEDURE — 99152 MOD SED SAME PHYS/QHP 5/>YRS: CPT | Mod: HCNC,,, | Performed by: ANESTHESIOLOGY

## 2020-11-25 PROCEDURE — 25000003 PHARM REV CODE 250: Mod: HCNC | Performed by: ANESTHESIOLOGY

## 2020-11-25 PROCEDURE — 99152 MOD SED SAME PHYS/QHP 5/>YRS: CPT | Mod: HCNC | Performed by: ANESTHESIOLOGY

## 2020-11-25 PROCEDURE — 64483 PR EPIDURAL INJ, ANES/STEROID, TRANSFORAMINAL, LUMB/SACR, SNGL LEVL: ICD-10-PCS | Mod: 50,HCNC,, | Performed by: ANESTHESIOLOGY

## 2020-11-25 PROCEDURE — 63600175 PHARM REV CODE 636 W HCPCS: Mod: HCNC | Performed by: ANESTHESIOLOGY

## 2020-11-25 PROCEDURE — 25000003 PHARM REV CODE 250: Mod: HCNC | Performed by: STUDENT IN AN ORGANIZED HEALTH CARE EDUCATION/TRAINING PROGRAM

## 2020-11-25 RX ORDER — FENTANYL CITRATE 50 UG/ML
INJECTION, SOLUTION INTRAMUSCULAR; INTRAVENOUS
Status: DISCONTINUED | OUTPATIENT
Start: 2020-11-25 | End: 2020-11-25 | Stop reason: HOSPADM

## 2020-11-25 RX ORDER — SODIUM CHLORIDE 9 MG/ML
500 INJECTION, SOLUTION INTRAVENOUS CONTINUOUS
Status: DISCONTINUED | OUTPATIENT
Start: 2020-11-25 | End: 2020-11-25 | Stop reason: HOSPADM

## 2020-11-25 RX ORDER — DEXAMETHASONE SODIUM PHOSPHATE 10 MG/ML
INJECTION INTRAMUSCULAR; INTRAVENOUS
Status: DISCONTINUED | OUTPATIENT
Start: 2020-11-25 | End: 2020-11-25 | Stop reason: HOSPADM

## 2020-11-25 RX ORDER — LIDOCAINE HYDROCHLORIDE 5 MG/ML
INJECTION, SOLUTION INFILTRATION; INTRAVENOUS
Status: DISCONTINUED | OUTPATIENT
Start: 2020-11-25 | End: 2020-11-25 | Stop reason: HOSPADM

## 2020-11-25 RX ORDER — LIDOCAINE HYDROCHLORIDE 10 MG/ML
INJECTION INFILTRATION; PERINEURAL
Status: DISCONTINUED | OUTPATIENT
Start: 2020-11-25 | End: 2020-11-25 | Stop reason: HOSPADM

## 2020-11-25 RX ORDER — MIDAZOLAM HYDROCHLORIDE 1 MG/ML
INJECTION INTRAMUSCULAR; INTRAVENOUS
Status: DISCONTINUED | OUTPATIENT
Start: 2020-11-25 | End: 2020-11-25 | Stop reason: HOSPADM

## 2020-11-25 RX ADMIN — SODIUM CHLORIDE 500 ML: 0.9 INJECTION, SOLUTION INTRAVENOUS at 01:11

## 2020-11-25 NOTE — H&P
HPI  Patient presenting for Procedure(s) (LRB):  LUMBAR TRANSFORAMINAL BILATERAL L4/5 DIRECT REFERRAL (Bilateral)     Patient on Anti-coagulation Yes Eliquis Held since 11/20/20.    No health changes since previous encounter    Past Medical History:   Diagnosis Date    *Atrial flutter 10/3/13    Anticoagulant long-term use     Aspirin only at this time    Arthritis     Atrial fibrillation     Atrial flutter     Blood transfusion     ?    BPH (benign prostatic hypertrophy)     Carotid artery disease     2008: [place] There is 40 - 49% right Internal Carotid stenosis.  There is 20 - 39% left Internal Carotid stenosis.       Chronic kidney disease     Coronary artery disease     DDD (degenerative disc disease) 6/25/2015    Degenerative disc disease     Elevated PSA     Glaucoma     Hyperlipidemia     Hypertension     Lumbar stenosis     lumbar spinal stenosis    NSTEMI (non-ST elevated myocardial infarction) 11/3/2013    Pacemaker 11/2013    Peripheral neuropathy     - S/P right ILIAC stent 1993;      Retinal detachment     Squamous cell carcinoma     left leg    Syncope and collapse: orthostatic with hypotension 10/9/2015     Past Surgical History:   Procedure Laterality Date    [facility]TER[facility]ATION      [facility]    CARDIAC ELECTROPHYSIOLOGY MAPPING AND ABLATION  11/2016    CARDIAC PACEMAKER PLACEMENT  11/2016    CATARACT EXTRACTION W/  INTRAOCULAR LENS IMPLANT Left 1997        CORONARY ARTERY BYPASS GRAFT  1991    ENUCLEATION Right 1949    EPIDURAL STEROID INJECTION N/A 7/17/2019    Procedure: INJECTION, STEROID, EPIDURAL, CAUDAL  cleared to hold eliquis 4 days per Dr. Ruiz;  Surgeon: James Hodges MD;  Location: [facility] PAIN MGT;  Service: Pain Management;  Laterality: N/A;    EPIDURAL STEROID INJECTION N/A 5/27/2020    Procedure: INJECTION, STEROID, EPIDURAL, CAUDALW/CATH need consent , clear to hold Eliquis 48 hrs per ordering ;  Surgeon: James Hodges MD;  Location:  "[facility] PAIN MGT;  Service: Pain Management;  Laterality: N/A;    INJECTION OF ANESTHETIC AGENT AROUND MEDIAL BRANCH NERVES INNERVATING LUMBAR FACET JOINT Bilateral 12/18/2018    Procedure: BLOCK, NERVE, FACET JOINT, LUMBAR, MEDIAL BRANCH;  Surgeon: James Hodges MD;  Location: [facility] PAIN MGT;  Service: Pain Management;  Laterality: Bilateral;    LUMBAR LAMINECTOMY  04/25/2016    MYELOGRAPHY N/A 11/10/2020    Procedure: Myelogram;  Surgeon: [facility] Diagnostic Provider;  Location: [facility] OR [address] [place] [address];  Service: Radiology;  Laterality: N/A;    RADIOFREQUENCY ABLATION Left 7/22/2020    Procedure: RADIOFREQUENCY ABLATION LEFT L2,3,4,5 consent needed;  Surgeon: James Hodges MD;  Location: [facility] PAIN MGT;  Service: Pain Management;  Laterality: Left;  LEFT RFA L2,3,4,5  consent needed    RETINAL DETACHMENT SURGERY Left 1997    Dr. Cosme    SKIN BIOPSY      SPINAL CORD STIMULATOR TRIAL W/ LAMINOTOMY  10/2017    TONSILLECTOMY       Review of patient's allergies indicates:   Allergen Reactions    Atorvastatin      Myositis/elevated CPK    Pravastatin      Severe myalgias/elevated CPK    Statins-hmg-coa reductase inhibitors      Muscle pain and loss of muscle    Ace inhibitors      Cough      No current facility-administered medications for this encounter.        PMHx, PSHx, Allergies, Medications reviewed in epic    ROS negative except pain complaints in HPI    OBJECTIVE:    BP (!) 170/73 (BP Location: Right arm, Patient Position: Sitting)   Pulse (!) 53   Temp 98.2 °F (36.8 °C) (Oral)   Resp 17   Ht 5' 8" (1.727 m)   Wt 77.1 kg (170 lb)   SpO2 98%   BMI 25.85 kg/m²     PHYSICAL EXAMINATION:    GENERAL: Well appearing, in no acute distress, alert and oriented x3.  PSYCH:  Mood and affect appropriate.  SKIN: Skin color, texture, turgor normal, no rashes or lesions which will impact the procedure.  CV: RRR with palpation of the radial artery.  PULM: No evidence of respiratory difficulty, symmetric chest " rise. Clear to auscultation.  NEURO: Cranial nerves grossly intact.    Plan:    Proceed with procedure as planned Procedure(s) (LRB):  LUMBAR TRANSFORAMINAL BILATERAL L4/5 DIRECT REFERRAL (Bilateral)    Salomon Torres  11/25/2020

## 2020-11-25 NOTE — TELEPHONE ENCOUNTER
----- Message from Jessi Washington sent at 11/25/2020  8:42 AM CST -----  Name of Who is Calling: MARGRET MARTIN  What is the request in detail: The patient is calling to get the time he suppose to come in for his procedure today. Please advise Can the clinic reply by [facility]: Yes What Number to Call Back if not in [facility]: [phone]

## 2020-11-25 NOTE — DISCHARGE SUMMARY
Discharge Note  Short Stay      SUMMARY     Admit Date: 11/25/2020    Attending Physician: James Hodges      Discharge Physician: James Hodges      Discharge Date: 11/25/2020 2:34 PM    Procedure(s) (LRB):  LUMBAR TRANSFORAMINAL BILATERAL L4/5 DIRECT REFERRAL (Bilateral)    Final Diagnosis: Lumbar radiculopathy [M54.16]    Disposition: Home or self care    Patient Instructions:   Discharge Medication List as of 11/25/2020  1:58 PM      CONTINUE these medications which have NOT CHANGED    Details   amiodarone (PACERONE) 200 MG Tab Take 1 tablet (200 mg total) by mouth once daily., Starting Mon 6/1/2020, Normal      amLODIPine (NORVASC) 5 MG tablet Take 1 tablet (5 mg total) by mouth once daily., Starting Thu 10/1/2020, Normal      apixaban (ELIQUIS) 2.5 mg Tab Take 1 tablet (2.5 mg total) by mouth 2 (two) times daily., Starting Thu 10/1/2020, Normal      ascorbic acid (VITAMIN C) 1000 MG tablet Take 1,000 mg by mouth every morning. , Until Discontinued, Historical Med      brimonidine 0.2% (ALPHAGAN) 0.2 % Drop Place 1 drop into the left eye 3 (three) times daily., Starting Mon 8/26/2019, Normal      brinzolamide (AZOPT) 1 % ophthalmic suspension Place 1 drop into both eyes 3 (three) times daily., Starting Fri 10/26/2018, Historical Med      co-enzyme Q-10 30 mg capsule Take 30 mg by mouth once daily. , Until Discontinued, Historical Med      donepeziL (ARICEPT) 5 MG tablet Take 1 tablet (5 mg total) by mouth every evening., Starting Thu 10/1/2020, Normal      dorzolamide (TRUSOPT) 2 % ophthalmic solution Place 1 drop into both eyes 3 (three) times daily., Starting Fri 9/20/2019, Normal      erythromycin (ROMYCIN) ophthalmic ointment Apply to affected eye daily as needed, Normal      FLUCELVAX QUAD 7086-4408, PF, 60 mcg (15 mcg x 4)/0.5 mL Syrg Starting Wed 10/9/2019, Historical Med      FLUZONE HIGHDOSE QUAD 20-21  mcg/0.7 mL Syrg Starting Mon 9/21/2020, Historical Med      ipratropium (ATROVENT) 0.03  % nasal spray USE 1 TO 2 SPRAY(S) IN EACH NOSTRIL TWICE DAILY for rhinorrhea/nasal drip, Normal      latanoprost 0.005 % ophthalmic solution Place 1 drop into the left eye every evening., Starting Fri 11/8/2019, Normal      losartan (COZAAR) 50 MG tablet Take 1 tablet (50 mg total) by mouth 2 (two) times daily., Starting Fri 2/28/2020, Normal      lutein 20 mg Cap Take 20 mg by mouth once daily. , Until Discontinued, Historical Med      megestroL (MEGACE) 400 mg/10 mL (40 mg/mL) Susp Take 5 mLs (200 mg total) by mouth once daily., Starting Fri 11/13/2020, Until Sun 12/13/2020, Normal      methocarbamoL (ROBAXIN) 500 MG Tab Take 1 tablet by mouth twice daily for 7 days, Normal      methylPREDNISolone (MEDROL, BARON,) 4 mg tablet use as directed, Normal      MULTIVITAMINS,THER W-MINERALS (VITAMINS & MINERALS ORAL) Take 1 tablet by mouth every morning. , Until Discontinued, Historical Med      polycarbophil (FIBERCON) 625 mg tablet Take 625 mg by mouth 2 (two) times daily., Historical Med      rosuvastatin (CRESTOR) 20 MG tablet Take 1 tablet (20 mg total) by mouth every evening., Starting Mon 1/7/2019, Normal      sertraline (ZOLOFT) 50 MG tablet Take 1 tablet (50 mg total) by mouth once daily., Starting Mon 6/1/2020, Normal                 Discharge Diagnosis: Lumbar radiculopathy [M54.16]  Condition on Discharge: Stable with no complications to procedure   Diet on Discharge: Same as before.  Activity: as per instruction sheet.  Discharge to: Home with a responsible adult.  Follow up: 2-4 weeks       Please call my office or pager at [phone] if experienced any weakness or loss of sensation, fever > 101.5, pain uncontrolled with oral medications, persistent nausea/vomiting/or diarrhea, redness or drainage from the incisions, or any other worrisome concerns. If physician on call was not reached or could not communicate with our office for any reason please go to the nearest emergency department

## 2020-11-25 NOTE — DISCHARGE INSTRUCTIONS
Thank you for allowing us to care for you today. You may receive a survey about the care we provided. Your feedback is valuable and helps us provide excellent care throughout the community.     Home Care Instructions for Pain Management:    1. DIET:   You may resume your normal diet today.   2. BATHING:   You may shower with luke warm water. No tub baths or anything that will soak injection sites under water for the next 24 hours.  3. DRESSING:   You may remove your bandage today.   4. ACTIVITY LEVEL:   You may resume your normal activities 24 hrs after your procedure. Nothing strenuous today.  5. MEDICATIONS:   You may resume your normal medications today. To restart blood thinners, ask your doctor.  6. DRIVING    If you have received any sedatives by mouth today, you may not drive for 12 hours.    If you have received any sedation through your IV, you may not drive for 24 hrs.   7. SPECIAL INSTRUCTIONS:   No heat to the injection site for 24 hrs including, hot bath or shower, heating pad, moist heat, or hot tubs.    Use ice pack to injection site for any pain or discomfort.  Apply ice packs for 20 minute intervals as needed.    IF you have diabetes, be sure to monitor your blood sugar more closely. IF your injection contained steroids your blood sugar levels may become higher than normal.    If you are still having pain upon discharge:  Your pain may improve over the next 48 hours. The anesthetic (numbing medication) works immediately to 48 hours. IF your injection contained a steroid (anti-inflammatory medication), it takes approximately 3 days to start feeling relief and 7-10 days to see your greatest results from the medication. It is possible you may need subsequent injections. This would be discussed at your follow up appointment with pain management or your referring doctor.    Please call the PAIN MANAGEMENT office at [phone] or ON CALL pager at [phone] if you experienced any:   -Weakness or  loss of sensation  -Fever > 101.5  -Pain uncontrolled with oral medications   -Persistent nausea, vomiting, or diarrhea  -Redness or drainage from the injection sites, or any other worrisome concerns.   If physician on call was not reached or could not communicate with our office for any reason please go to the nearest emergency department.    Recovery After Procedural Sedation (Adult)   You have been given medicine by vein to make you sleep during your surgery. This may have included both a pain medicine and sleeping medicine. Most of the effects have worn off. But you may still have some drowsiness for the next 6 to 8 hours.  Home care  Follow these guidelines when you get home:  · For the next 8 hours, you should be watched by a responsible adult. This person should make sure your condition is not getting worse.  · Don't drink any alcohol for the next 24 hours.  · Don't drive, operate dangerous machinery, or make important business or personal decisions during the next 24 hours.  · To prevent injury or falls, use caution when standing and walking for at least 24 hours after your procedure.  Note: Your healthcare provider may tell you not to take any medicine by mouth for pain or sleep in the next 4 hours. These medicines may react with the medicines you were given in the hospital. This could cause a much stronger response than usual.  Follow-up care  Follow up with your healthcare provider if you are not alert and back to your usual level of activity within 12 hours.  When to seek medical advice  Call your healthcare provider right away if any of these occur:  · Drowsiness gets worse  · Weakness or dizziness gets worse  · Repeated vomiting  · You can't be awakened  · Fever  · New rash  [employer] last reviewed this educational content on 9/1/2019 © 2000-2020 [employer], "[employer] [address] [place] [address], [place] [address]. All rights reserved. This information is not intended as a substitute for professional  medical care. Always follow your healthcare professional's instructions.

## 2020-11-25 NOTE — OP NOTE
Patient Name: Javier Valles  MRN: [id number]    INFORMED CONSENT: The procedure, risks, benefits and options were discussed with patient. There are no contraindications to the procedure. The patient expressed understanding and agreed to proceed. The personnel performing the procedure was discussed. I verify that I personally obtained Javier's consent prior to the start of the procedure and the signed consent can be found on the patient's chart.    Procedure Date: 11/25/2020    Anesthesia: Topical    Pre Procedure diagnosis: Lumbar radiculopathy [M54.16]  1. Lumbosacral radiculopathy    2. DDD (degenerative disc disease), lumbar    3. Chronic pain      Post-Procedure diagnosis: SAME      Sedation: Yes - Fentanyl 50 mcg and Midazolam 2 mg    PROCEDURE:Bilateral L4/5   TRANSFORAMINAL EPIDURAL STEROID INJECTION        DESCRIPTION OF PROCEDURE: The patient was brought to the procedure room. After performing time out IV access was obtained prior to the procedure. The patient was positioned prone on the fluoroscopy table. Continuous hemodynamic monitoring was initiated including blood pressure, EKG, and pulse oximetry. . The skin was prepped with chlorhexidine three times and draped in a sterile fashion. Skin anesthesia was achieved using 3 mL of lidocaine 1% over the respective injection site.     An oblique fluoroscopic view was obtained, with the superior articular process of the inferior vertebral body aligned with the pedicle. The tip of a 22-gauge 3.5-inch Quincke-type spinal needle was advanced toward the 6 oclock position of the pedicle under intermittent fluoroscopic guidance. Confirmation of proper needle position was made with AP, oblique, and lateral fluoroscopic views. Negative aspiration for blood or CSF was confirmed. 2 mL of Omnipaque 300 was injected. Live fluoroscopic imaging revealed a clear outline of the spinal nerve with proximal spread of agent through the neural foramen into the anterior epidural  space. A total combination of 3 mL of Lidocaine 0.5% and 10 mg decadron was injected at each level. Contrast spread was noted from L4 to L5 level. There was no pain on injection. The needle was removed and bleeding was nil.  A sterile dressing was applied. Javier was taken back to the recovery room for further observation.     Blood Loss: Nill  Specimen: None    James Hodges MD

## 2020-11-30 ENCOUNTER — PATIENT MESSAGE (OUTPATIENT)
Dept: CARDIOLOGY | Facility: CLINIC | Age: 85
End: 2020-11-30

## 2020-11-30 ENCOUNTER — PATIENT MESSAGE (OUTPATIENT)
Dept: INTERNAL MEDICINE | Facility: CLINIC | Age: 85
End: 2020-11-30

## 2020-11-30 DIAGNOSIS — E78.5 HYPERLIPIDEMIA, UNSPECIFIED HYPERLIPIDEMIA TYPE: ICD-10-CM

## 2020-11-30 RX ORDER — ROSUVASTATIN CALCIUM 20 MG/1
20 TABLET, COATED ORAL NIGHTLY
Qty: 90 TABLET | Refills: 3 | Status: CANCELLED | OUTPATIENT
Start: 2020-11-30

## 2020-11-30 RX ORDER — SERTRALINE HYDROCHLORIDE 50 MG/1
50 TABLET, FILM COATED ORAL DAILY
Qty: 90 TABLET | Refills: 1 | Status: SHIPPED | OUTPATIENT
Start: 2020-11-30 | End: 2021-04-13

## 2020-12-03 ENCOUNTER — OFFICE VISIT (OUTPATIENT)
Dept: DERMATOLOGY | Facility: CLINIC | Age: 85
End: 2020-12-03
Payer: MEDICARE

## 2020-12-03 VITALS — WEIGHT: 170 LBS | BODY MASS INDEX: 25.85 KG/M2

## 2020-12-03 DIAGNOSIS — Z85.828 HISTORY OF SKIN CANCER: ICD-10-CM

## 2020-12-03 DIAGNOSIS — L57.0 ACTINIC KERATOSIS: Primary | ICD-10-CM

## 2020-12-03 DIAGNOSIS — L81.4 LENTIGINES: ICD-10-CM

## 2020-12-03 PROCEDURE — 1101F PT FALLS ASSESS-DOCD LE1/YR: CPT | Mod: HCNC,CPTII,S$GLB, | Performed by: DERMATOLOGY

## 2020-12-03 PROCEDURE — 3288F FALL RISK ASSESSMENT DOCD: CPT | Mod: HCNC,CPTII,S$GLB, | Performed by: DERMATOLOGY

## 2020-12-03 PROCEDURE — 99999 PR PBB SHADOW E&M-EST. PATIENT-LVL III: CPT | Mod: PBBFAC,HCNC,, | Performed by: DERMATOLOGY

## 2020-12-03 PROCEDURE — 17000 DESTRUCT PREMALG LESION: CPT | Mod: HCNC,S$GLB,, | Performed by: DERMATOLOGY

## 2020-12-03 PROCEDURE — 99212 OFFICE O/P EST SF 10 MIN: CPT | Mod: 25,HCNC,S$GLB, | Performed by: DERMATOLOGY

## 2020-12-03 PROCEDURE — 17003 DESTRUCTION, PREMALIGNANT LESIONS; SECOND THROUGH 14 LESIONS: ICD-10-PCS | Mod: HCNC,S$GLB,, | Performed by: DERMATOLOGY

## 2020-12-03 PROCEDURE — 1159F MED LIST DOCD IN RCRD: CPT | Mod: HCNC,S$GLB,, | Performed by: DERMATOLOGY

## 2020-12-03 PROCEDURE — 1157F ADVNC CARE PLAN IN RCRD: CPT | Mod: HCNC,S$GLB,, | Performed by: DERMATOLOGY

## 2020-12-03 PROCEDURE — 1159F PR MEDICATION LIST DOCUMENTED IN MEDICAL RECORD: ICD-10-PCS | Mod: HCNC,S$GLB,, | Performed by: DERMATOLOGY

## 2020-12-03 PROCEDURE — 17003 DESTRUCT PREMALG LES 2-14: CPT | Mod: HCNC,S$GLB,, | Performed by: DERMATOLOGY

## 2020-12-03 PROCEDURE — 99999 PR PBB SHADOW E&M-EST. PATIENT-LVL III: ICD-10-PCS | Mod: PBBFAC,HCNC,, | Performed by: DERMATOLOGY

## 2020-12-03 PROCEDURE — 1126F AMNT PAIN NOTED NONE PRSNT: CPT | Mod: HCNC,S$GLB,, | Performed by: DERMATOLOGY

## 2020-12-03 PROCEDURE — 1126F PR PAIN SEVERITY QUANTIFIED, NO PAIN PRESENT: ICD-10-PCS | Mod: HCNC,S$GLB,, | Performed by: DERMATOLOGY

## 2020-12-03 PROCEDURE — 17000 PR DESTRUCTION(LASER SURGERY,CRYOSURGERY,CHEMOSURGERY),PREMALIGNANT LESIONS,FIRST LESION: ICD-10-PCS | Mod: HCNC,S$GLB,, | Performed by: DERMATOLOGY

## 2020-12-03 PROCEDURE — 3288F PR FALLS RISK ASSESSMENT DOCUMENTED: ICD-10-PCS | Mod: HCNC,CPTII,S$GLB, | Performed by: DERMATOLOGY

## 2020-12-03 PROCEDURE — 1101F PR PT FALLS ASSESS DOC 0-1 FALLS W/OUT INJ PAST YR: ICD-10-PCS | Mod: HCNC,CPTII,S$GLB, | Performed by: DERMATOLOGY

## 2020-12-03 PROCEDURE — 99212 PR OFFICE/OUTPT VISIT, EST, LEVL II, 10-19 MIN: ICD-10-PCS | Mod: 25,HCNC,S$GLB, | Performed by: DERMATOLOGY

## 2020-12-03 PROCEDURE — 1157F PR ADVANCE CARE PLAN OR EQUIV PRESENT IN MEDICAL RECORD: ICD-10-PCS | Mod: HCNC,S$GLB,, | Performed by: DERMATOLOGY

## 2020-12-03 NOTE — PROGRESS NOTES
Subjective:       Patient ID:  Javier Valles is a 89 y.o. male who presents for   Chief Complaint   Patient presents with    Follow-up     PDT scalp     Sp PDT scalp, relates it peeled, worked well.       Review of Systems   Constitutional: Negative for fever, chills, weight loss, weight gain, fatigue, night sweats and malaise.   Skin: Positive for wears hat. Negative for daily sunscreen use and activity-related sunscreen use.   Hematologic/Lymphatic: Bruises/bleeds easily.        Objective:    Physical Exam   Constitutional: He appears well-developed and well-nourished. No distress.   Neurological: He is alert and oriented to person, place, and time. He is not disoriented.   Psychiatric: He has a normal mood and affect.   Skin:   Areas Examined (abnormalities noted in diagram):   Scalp / Hair Palpated and Inspected  Head / Face Inspection Performed              Diagram Legend     Erythematous scaling macule/papule c/w actinic keratosis       Vascular papule c/w angioma      Pigmented verrucoid papule/plaque c/w seborrheic keratosis      Yellow umbilicated papule c/w sebaceous hyperplasia      Irregularly shaped tan macule c/w lentigo     1-2 mm smooth white papules consistent with Milia      Movable subcutaneous cyst with punctum c/w epidermal inclusion cyst      Subcutaneous movable cyst c/w pilar cyst      Firm pink to brown papule c/w dermatofibroma      Pedunculated fleshy papule(s) c/w skin tag(s)      Evenly pigmented macule c/w junctional nevus     Mildly variegated pigmented, slightly irregular-bordered macule c/w mildly atypical nevus      Flesh colored to evenly pigmented papule c/w intradermal nevus       Pink pearly papule/plaque c/w basal cell carcinoma      Erythematous hyperkeratotic cursted plaque c/w SCC      Surgical scar with no sign of skin cancer recurrence      Open and closed comedones      Inflammatory papules and pustules      Verrucoid papule consistent consistent with wart      Erythematous eczematous patches and plaques     Dystrophic onycholytic nail with subungual debris c/w onychomycosis     Umbilicated papule    Erythematous-base heme-crusted tan verrucoid plaque consistent with inflamed seborrheic keratosis     Erythematous Silvery Scaling Plaque c/w Psoriasis     See annotation      Assessment / Plan:        Actinic keratosis   Cryosurgery Procedure Note    Verbal consent from the patient is obtained and the patient is aware of the precancerous quality and need for treatment of these lesions. Liquid nitrogen cryosurgery is applied to the 2 actinic keratoses, as detailed in the physical exam, to produce a freeze injury.      Lentigines      History of skin cancer  Comments:  scc left leg             Follow up in about 3 months (around 3/3/2021).

## 2020-12-08 ENCOUNTER — TELEPHONE (OUTPATIENT)
Dept: ELECTROPHYSIOLOGY | Facility: CLINIC | Age: 85
End: 2020-12-08

## 2020-12-08 DIAGNOSIS — Z95.0 CARDIAC PACEMAKER IN SITU: Primary | ICD-10-CM

## 2020-12-08 DIAGNOSIS — I49.5 SSS (SICK SINUS SYNDROME): ICD-10-CM

## 2020-12-11 ENCOUNTER — TELEPHONE (OUTPATIENT)
Dept: ELECTROPHYSIOLOGY | Facility: CLINIC | Age: 85
End: 2020-12-11

## 2020-12-11 ENCOUNTER — CLINICAL SUPPORT (OUTPATIENT)
Dept: CARDIOLOGY | Facility: HOSPITAL | Age: 85
End: 2020-12-11
Attending: INTERNAL MEDICINE
Payer: MEDICARE

## 2020-12-11 ENCOUNTER — OFFICE VISIT (OUTPATIENT)
Dept: OPHTHALMOLOGY | Facility: CLINIC | Age: 85
End: 2020-12-11
Payer: MEDICARE

## 2020-12-11 DIAGNOSIS — H43.812 POSTERIOR VITREOUS DETACHMENT OF LEFT EYE: ICD-10-CM

## 2020-12-11 DIAGNOSIS — H35.342 LAMELLAR MACULAR HOLE OF LEFT EYE: ICD-10-CM

## 2020-12-11 DIAGNOSIS — Z95.0 CARDIAC PACEMAKER IN SITU: ICD-10-CM

## 2020-12-11 DIAGNOSIS — H35.372 EPIRETINAL MEMBRANE, LEFT EYE: ICD-10-CM

## 2020-12-11 DIAGNOSIS — H40.1122 PRIMARY OPEN-ANGLE GLAUCOMA, LEFT EYE, MODERATE STAGE: Primary | ICD-10-CM

## 2020-12-11 DIAGNOSIS — Z96.1 PSEUDOPHAKIA OF LEFT EYE: ICD-10-CM

## 2020-12-11 DIAGNOSIS — H57.03 PERSISTENT MIOSIS: ICD-10-CM

## 2020-12-11 DIAGNOSIS — Z97.0 PROSTHETIC EYE GLOBE: ICD-10-CM

## 2020-12-11 DIAGNOSIS — I49.5 SSS (SICK SINUS SYNDROME): ICD-10-CM

## 2020-12-11 PROCEDURE — 1157F ADVNC CARE PLAN IN RCRD: CPT | Mod: HCNC,S$GLB,, | Performed by: OPHTHALMOLOGY

## 2020-12-11 PROCEDURE — 1126F PR PAIN SEVERITY QUANTIFIED, NO PAIN PRESENT: ICD-10-PCS | Mod: HCNC,S$GLB,, | Performed by: OPHTHALMOLOGY

## 2020-12-11 PROCEDURE — 1101F PR PT FALLS ASSESS DOC 0-1 FALLS W/OUT INJ PAST YR: ICD-10-PCS | Mod: HCNC,CPTII,S$GLB, | Performed by: OPHTHALMOLOGY

## 2020-12-11 PROCEDURE — 3288F PR FALLS RISK ASSESSMENT DOCUMENTED: ICD-10-PCS | Mod: HCNC,CPTII,S$GLB, | Performed by: OPHTHALMOLOGY

## 2020-12-11 PROCEDURE — 1126F AMNT PAIN NOTED NONE PRSNT: CPT | Mod: HCNC,S$GLB,, | Performed by: OPHTHALMOLOGY

## 2020-12-11 PROCEDURE — 92012 INTRM OPH EXAM EST PATIENT: CPT | Mod: HCNC,S$GLB,, | Performed by: OPHTHALMOLOGY

## 2020-12-11 PROCEDURE — 92133 CPTRZD OPH DX IMG PST SGM ON: CPT | Mod: HCNC,S$GLB,, | Performed by: OPHTHALMOLOGY

## 2020-12-11 PROCEDURE — 93281 PM DEVICE PROGR EVAL MULTI: CPT | Mod: 26,HCNC,, | Performed by: INTERNAL MEDICINE

## 2020-12-11 PROCEDURE — 1101F PT FALLS ASSESS-DOCD LE1/YR: CPT | Mod: HCNC,CPTII,S$GLB, | Performed by: OPHTHALMOLOGY

## 2020-12-11 PROCEDURE — 93281 CARDIAC DEVICE CHECK - IN CLINIC & HOSPITAL: ICD-10-PCS | Mod: 26,HCNC,, | Performed by: INTERNAL MEDICINE

## 2020-12-11 PROCEDURE — 92012 PR EYE EXAM, EST PATIENT,INTERMED: ICD-10-PCS | Mod: HCNC,S$GLB,, | Performed by: OPHTHALMOLOGY

## 2020-12-11 PROCEDURE — 1157F PR ADVANCE CARE PLAN OR EQUIV PRESENT IN MEDICAL RECORD: ICD-10-PCS | Mod: HCNC,S$GLB,, | Performed by: OPHTHALMOLOGY

## 2020-12-11 PROCEDURE — 92133 HEIDELBERG RETINA TOMOGRAPHY (HRT) - OU - BOTH EYES: ICD-10-PCS | Mod: HCNC,S$GLB,, | Performed by: OPHTHALMOLOGY

## 2020-12-11 PROCEDURE — 3288F FALL RISK ASSESSMENT DOCD: CPT | Mod: HCNC,CPTII,S$GLB, | Performed by: OPHTHALMOLOGY

## 2020-12-11 PROCEDURE — 99999 PR PBB SHADOW E&M-EST. PATIENT-LVL III: ICD-10-PCS | Mod: PBBFAC,HCNC,, | Performed by: OPHTHALMOLOGY

## 2020-12-11 PROCEDURE — 99999 PR PBB SHADOW E&M-EST. PATIENT-LVL III: CPT | Mod: PBBFAC,HCNC,, | Performed by: OPHTHALMOLOGY

## 2020-12-11 PROCEDURE — 93281 PM DEVICE PROGR EVAL MULTI: CPT | Mod: HCNC

## 2020-12-11 RX ORDER — DORZOLAMIDE HCL 20 MG/ML
1 SOLUTION/ DROPS OPHTHALMIC 3 TIMES DAILY
Qty: 30 ML | Refills: 3 | Status: SHIPPED | OUTPATIENT
Start: 2020-12-11

## 2020-12-11 RX ORDER — LATANOPROST 50 UG/ML
1 SOLUTION/ DROPS OPHTHALMIC NIGHTLY
Qty: 15 ML | Refills: 3 | Status: SHIPPED | OUTPATIENT
Start: 2020-12-11 | End: 2021-01-04 | Stop reason: SDUPTHER

## 2020-12-11 NOTE — PROGRESS NOTES
HPI     DLS: 8/28/20    Pt here HRT review; (Dilated pt for his HRT test due to small pupils.)     Meds;   Brimonidine TID OS   Dorzolimide TID  OS   Latanoprost QHS OS     1) POAG   2) PCIOL OS   3) Prosthesis OD   4) ERM OS   5) Hx Rhegmatogenous RD OS   6) Psuedo Mac Hole OS     Last edited by Mariya Dixon on 12/11/2020  8:36 AM. (History)            Assessment /Plan     For exam results, see Encounter Report.    Primary open-angle glaucoma, left eye, moderate stage    Epiretinal membrane, left eye    Lamellar macular hole of left eye    Posterior vitreous detachment of left eye    Pseudophakia of left eye    Prosthetic eye globe    Persistent miosis        Old pt of Dr. Goodrich - now sees Jaime     1. POAG   Followed at [facility] since 1997   followed by Sonia for 30 years  First HVF 2014  gtts started - [place] - 2012  First photos - ? 1997 - for RD os     Glaucoma meds -  latanoprost qhs os / brimonidine os tid / dorzolamide os tid   H/O adverse rxn to glaucoma drops - H/O bradycardia - but now has a pace maker - ? Use of BB   LASERS - SLT os 5/25/2017 (270 degrees - too narrow inf.) (( good resp 16--> 11)   GLAUCOMA SURGERIES none  OTHER EYE SURGERIES - prosthesis od - (2/2 injury - 1940's) // Pnematic retinopexy OS 1997 - [place] // PC iol os - [place]  CDR - prosthesis / 0.75- 0.8  Tbase  - ?? On gtts when started here  Tmax  - ?? Off gtts   Ttarget  - ?? 13 or less if possible // had a disc heme with IOP 15-18   HVF 6  test 2014 to 2020 OS:  ? Paracentral defect with early SAD/IAD  Gonio  +3-4 S/T/N // very narrow inf os   CCT - xx/492  OCT 2 test 2014 to 2016- RNFL - OD:not done // OS:dec s/bord T  HRT 5 test 2015 -  2020 -  MR - prosthesis / high std de - even post dilation os    (high std dev os)   Disc photos - mult old slides 1997 - 2006 // 2015, 2017 - w/ IT disc heme - OIS    Ttoday - prosthetic // 13  Test done today gonio / IOP/ HRT (post dilation os)     2.  disc heme os    See photos  4/17/2017 - occurred with IOP's of  13-18   Resolved by 10/2/107    3.  Monocular precautions  - prothetic od    4.  erm os // lamellar hole   - stable, follows with philippe  -on nevanac q day os - feels it helps vision - ?     5.  Hx of rhegmatogenous rrd os  - flat, follows with philippe    6.  Mac hole os - lamellar   - monitor     7. PC IOL OS     8. - ? S/p yag cap - posterior capsule open     9. Vit floaters OS    Increase in floaters os - will refer back to retina - jaime     10. Small pupil os     11. In past Dr Goodrich has filled out form allowing him to get a drivers license - may need again soon     12. 3/16/2020 - Prosthesis OD    Done many decades ago    C/O some irritation - feels like sand, and some discharge   Prosthesis removed and socket examined by the oculoplastic staff   Socket ok, conj in tact // no breakdown / mild discharge   Refer back to ocularists to see if prosthesis can be smoothed / polished or re-fit      Pt on metoprolol xl    PLAN   Glaucoma os - monocular   Cont  xalatan  Cont brimonidine  Cont dorz tid    IOP is  at goal// he had disc heme - rec lower IOP - consider slt os (( ?? Target 14))   SLT os - 5/25/2017 - S/T/N (too narrow inf. ) - steroid taper (( good resp 16-->11)     - (?  BB candidate - now has a pacemaker, but has a H/O low heart rate / and/or arrythmia)     -continue EES ointment od - prosthesis - prn irritation     saw [facility] - 2/28/2020 for new glasses -BCVA os is 20/80 to 20/100 os    11/11/2019 - pt is no longer driving       Pt occasionally has to get steroid injections for back pain - so will need to monitor that - may be a cause of elevated IOP from time to time      F/U 4 months IOP // with HVF - os and DFE / and OCT   Reviewed Jaime notes -   SEEN 6/16/2020 - stable - is to F/U 1 year     I have seen and personally examined the patient.  I agree with the findings, assessment and plan of the resident and/or fellow.     Sena Schneider MD

## 2021-01-04 ENCOUNTER — PATIENT MESSAGE (OUTPATIENT)
Dept: OPHTHALMOLOGY | Facility: CLINIC | Age: 86
End: 2021-01-04

## 2021-01-04 DIAGNOSIS — H40.1122 PRIMARY OPEN-ANGLE GLAUCOMA, LEFT EYE, MODERATE STAGE: ICD-10-CM

## 2021-01-04 RX ORDER — LATANOPROST 50 UG/ML
1 SOLUTION/ DROPS OPHTHALMIC NIGHTLY
Qty: 15 ML | Refills: 3 | Status: SHIPPED | OUTPATIENT
Start: 2021-01-04 | End: 2021-09-10 | Stop reason: SDUPTHER

## 2021-01-05 ENCOUNTER — HOSPITAL ENCOUNTER (OUTPATIENT)
Dept: CARDIOLOGY | Facility: CLINIC | Age: 86
Discharge: HOME | End: 2021-01-05
Payer: MEDICARE

## 2021-01-05 ENCOUNTER — OFFICE VISIT (OUTPATIENT)
Dept: ELECTROPHYSIOLOGY | Facility: CLINIC | Age: 86
End: 2021-01-05
Payer: MEDICARE

## 2021-01-05 VITALS
HEIGHT: 67 IN | WEIGHT: 172.19 LBS | BODY MASS INDEX: 27.02 KG/M2 | DIASTOLIC BLOOD PRESSURE: 70 MMHG | HEART RATE: 50 BPM | SYSTOLIC BLOOD PRESSURE: 118 MMHG

## 2021-01-05 DIAGNOSIS — I49.5 SSS (SICK SINUS SYNDROME): ICD-10-CM

## 2021-01-05 DIAGNOSIS — Z95.1 S/P CABG (CORONARY ARTERY BYPASS GRAFT): ICD-10-CM

## 2021-01-05 DIAGNOSIS — I48.3 TYPICAL ATRIAL FLUTTER: Primary | ICD-10-CM

## 2021-01-05 DIAGNOSIS — Z98.890 STATUS POST CATHETER ABLATION OF ATRIAL FLUTTER: ICD-10-CM

## 2021-01-05 DIAGNOSIS — I25.5 CARDIOMYOPATHY, ISCHEMIC: Chronic | ICD-10-CM

## 2021-01-05 PROCEDURE — 93005 RHYTHM STRIP: ICD-10-PCS | Mod: HCNC,S$GLB,, | Performed by: INTERNAL MEDICINE

## 2021-01-05 PROCEDURE — 1100F PR PT FALLS ASSESS DOC 2+ FALLS/FALL W/INJURY/YR: ICD-10-PCS | Mod: HCNC,CPTII,S$GLB, | Performed by: INTERNAL MEDICINE

## 2021-01-05 PROCEDURE — 3288F PR FALLS RISK ASSESSMENT DOCUMENTED: ICD-10-PCS | Mod: HCNC,CPTII,S$GLB, | Performed by: INTERNAL MEDICINE

## 2021-01-05 PROCEDURE — 1100F PTFALLS ASSESS-DOCD GE2>/YR: CPT | Mod: HCNC,CPTII,S$GLB, | Performed by: INTERNAL MEDICINE

## 2021-01-05 PROCEDURE — 1157F ADVNC CARE PLAN IN RCRD: CPT | Mod: HCNC,S$GLB,, | Performed by: INTERNAL MEDICINE

## 2021-01-05 PROCEDURE — 1157F PR ADVANCE CARE PLAN OR EQUIV PRESENT IN MEDICAL RECORD: ICD-10-PCS | Mod: HCNC,S$GLB,, | Performed by: INTERNAL MEDICINE

## 2021-01-05 PROCEDURE — 99214 OFFICE O/P EST MOD 30 MIN: CPT | Mod: HCNC,S$GLB,, | Performed by: INTERNAL MEDICINE

## 2021-01-05 PROCEDURE — 1125F AMNT PAIN NOTED PAIN PRSNT: CPT | Mod: HCNC,S$GLB,, | Performed by: INTERNAL MEDICINE

## 2021-01-05 PROCEDURE — 93005 ELECTROCARDIOGRAM TRACING: CPT | Mod: HCNC,S$GLB,, | Performed by: INTERNAL MEDICINE

## 2021-01-05 PROCEDURE — 1125F PR PAIN SEVERITY QUANTIFIED, PAIN PRESENT: ICD-10-PCS | Mod: HCNC,S$GLB,, | Performed by: INTERNAL MEDICINE

## 2021-01-05 PROCEDURE — 1159F PR MEDICATION LIST DOCUMENTED IN MEDICAL RECORD: ICD-10-PCS | Mod: HCNC,S$GLB,, | Performed by: INTERNAL MEDICINE

## 2021-01-05 PROCEDURE — 99999 PR PBB SHADOW E&M-EST. PATIENT-LVL III: ICD-10-PCS | Mod: PBBFAC,HCNC,, | Performed by: INTERNAL MEDICINE

## 2021-01-05 PROCEDURE — 99999 PR PBB SHADOW E&M-EST. PATIENT-LVL III: CPT | Mod: PBBFAC,HCNC,, | Performed by: INTERNAL MEDICINE

## 2021-01-05 PROCEDURE — 99499 RISK ADDL DX/OHS AUDIT: ICD-10-PCS | Mod: S$GLB,,, | Performed by: INTERNAL MEDICINE

## 2021-01-05 PROCEDURE — 99499 UNLISTED E&M SERVICE: CPT | Mod: S$GLB,,, | Performed by: INTERNAL MEDICINE

## 2021-01-05 PROCEDURE — 99214 PR OFFICE/OUTPT VISIT, EST, LEVL IV, 30-39 MIN: ICD-10-PCS | Mod: HCNC,S$GLB,, | Performed by: INTERNAL MEDICINE

## 2021-01-05 PROCEDURE — 3288F FALL RISK ASSESSMENT DOCD: CPT | Mod: HCNC,CPTII,S$GLB, | Performed by: INTERNAL MEDICINE

## 2021-01-05 PROCEDURE — 93010 ELECTROCARDIOGRAM REPORT: CPT | Mod: HCNC,S$GLB,, | Performed by: INTERNAL MEDICINE

## 2021-01-05 PROCEDURE — 1159F MED LIST DOCD IN RCRD: CPT | Mod: HCNC,S$GLB,, | Performed by: INTERNAL MEDICINE

## 2021-01-05 PROCEDURE — 93010 RHYTHM STRIP: ICD-10-PCS | Mod: HCNC,S$GLB,, | Performed by: INTERNAL MEDICINE

## 2021-01-07 ENCOUNTER — PATIENT OUTREACH (OUTPATIENT)
Dept: OTHER | Facility: OTHER | Age: 86
End: 2021-01-07

## 2021-01-08 ENCOUNTER — TELEPHONE (OUTPATIENT)
Dept: ELECTROPHYSIOLOGY | Facility: CLINIC | Age: 86
End: 2021-01-08

## 2021-01-14 ENCOUNTER — IMMUNIZATION (OUTPATIENT)
Dept: INTERNAL MEDICINE | Facility: CLINIC | Age: 86
End: 2021-01-14
Payer: MEDICARE

## 2021-01-14 DIAGNOSIS — Z23 NEED FOR VACCINATION: ICD-10-CM

## 2021-01-14 PROCEDURE — 91300 COVID-19, MRNA, LNP-S, PF, 30 MCG/0.3 ML DOSE VACCINE: CPT | Mod: PBBFAC | Performed by: INTERNAL MEDICINE

## 2021-01-19 ENCOUNTER — PATIENT MESSAGE (OUTPATIENT)
Dept: INTERNAL MEDICINE | Facility: CLINIC | Age: 86
End: 2021-01-19

## 2021-01-22 ENCOUNTER — PATIENT MESSAGE (OUTPATIENT)
Dept: INTERNAL MEDICINE | Facility: CLINIC | Age: 86
End: 2021-01-22

## 2021-01-26 ENCOUNTER — HOSPITAL ENCOUNTER (OUTPATIENT)
Dept: RADIOLOGY | Facility: HOSPITAL | Age: 86
Discharge: HOME | End: 2021-01-26
Attending: INTERNAL MEDICINE
Payer: MEDICARE

## 2021-01-26 ENCOUNTER — OFFICE VISIT (OUTPATIENT)
Dept: INTERNAL MEDICINE | Facility: CLINIC | Age: 86
End: 2021-01-26
Payer: MEDICARE

## 2021-01-26 VITALS
DIASTOLIC BLOOD PRESSURE: 72 MMHG | HEART RATE: 70 BPM | SYSTOLIC BLOOD PRESSURE: 126 MMHG | OXYGEN SATURATION: 98 % | HEIGHT: 67 IN | WEIGHT: 176 LBS | BODY MASS INDEX: 27.62 KG/M2

## 2021-01-26 DIAGNOSIS — E78.2 MIXED HYPERLIPIDEMIA: ICD-10-CM

## 2021-01-26 DIAGNOSIS — I10 ESSENTIAL HYPERTENSION: ICD-10-CM

## 2021-01-26 DIAGNOSIS — I49.5 SSS (SICK SINUS SYNDROME): ICD-10-CM

## 2021-01-26 DIAGNOSIS — M65.9 TENOSYNOVITIS OF HAND: ICD-10-CM

## 2021-01-26 DIAGNOSIS — G47.10 HYPERSOMNOLENCE: ICD-10-CM

## 2021-01-26 DIAGNOSIS — I25.810 CORONARY ARTERY DISEASE INVOLVING CORONARY BYPASS GRAFT OF NATIVE HEART WITHOUT ANGINA PECTORIS: Primary | ICD-10-CM

## 2021-01-26 DIAGNOSIS — N18.32 STAGE 3B CHRONIC KIDNEY DISEASE: ICD-10-CM

## 2021-01-26 DIAGNOSIS — J31.0 CHRONIC RHINITIS: ICD-10-CM

## 2021-01-26 DIAGNOSIS — I25.5 ISCHEMIC CARDIOMYOPATHY: ICD-10-CM

## 2021-01-26 DIAGNOSIS — F32.A DEPRESSION, UNSPECIFIED DEPRESSION TYPE: ICD-10-CM

## 2021-01-26 DIAGNOSIS — M48.062 SPINAL STENOSIS OF LUMBAR REGION WITH NEUROGENIC CLAUDICATION: ICD-10-CM

## 2021-01-26 PROCEDURE — 1101F PT FALLS ASSESS-DOCD LE1/YR: CPT | Mod: HCNC,CPTII,S$GLB, | Performed by: INTERNAL MEDICINE

## 2021-01-26 PROCEDURE — 1125F AMNT PAIN NOTED PAIN PRSNT: CPT | Mod: HCNC,S$GLB,, | Performed by: INTERNAL MEDICINE

## 2021-01-26 PROCEDURE — 73130 X-RAY EXAM OF HAND: CPT | Mod: 26,HCNC,LT, | Performed by: RADIOLOGY

## 2021-01-26 PROCEDURE — 99214 OFFICE O/P EST MOD 30 MIN: CPT | Mod: HCNC,S$GLB,, | Performed by: INTERNAL MEDICINE

## 2021-01-26 PROCEDURE — 1101F PR PT FALLS ASSESS DOC 0-1 FALLS W/OUT INJ PAST YR: ICD-10-PCS | Mod: HCNC,CPTII,S$GLB, | Performed by: INTERNAL MEDICINE

## 2021-01-26 PROCEDURE — 99499 UNLISTED E&M SERVICE: CPT | Mod: S$GLB,,, | Performed by: INTERNAL MEDICINE

## 2021-01-26 PROCEDURE — 99999 PR PBB SHADOW E&M-EST. PATIENT-LVL IV: CPT | Mod: PBBFAC,HCNC,, | Performed by: INTERNAL MEDICINE

## 2021-01-26 PROCEDURE — 73130 XR HAND COMPLETE 3 VIEW LEFT: ICD-10-PCS | Mod: 26,HCNC,LT, | Performed by: RADIOLOGY

## 2021-01-26 PROCEDURE — 1157F ADVNC CARE PLAN IN RCRD: CPT | Mod: HCNC,S$GLB,, | Performed by: INTERNAL MEDICINE

## 2021-01-26 PROCEDURE — 99499 RISK ADDL DX/OHS AUDIT: ICD-10-PCS | Mod: S$GLB,,, | Performed by: INTERNAL MEDICINE

## 2021-01-26 PROCEDURE — 1125F PR PAIN SEVERITY QUANTIFIED, PAIN PRESENT: ICD-10-PCS | Mod: HCNC,S$GLB,, | Performed by: INTERNAL MEDICINE

## 2021-01-26 PROCEDURE — 1159F MED LIST DOCD IN RCRD: CPT | Mod: HCNC,S$GLB,, | Performed by: INTERNAL MEDICINE

## 2021-01-26 PROCEDURE — 73130 X-RAY EXAM OF HAND: CPT | Mod: TC,HCNC,LT

## 2021-01-26 PROCEDURE — 1157F PR ADVANCE CARE PLAN OR EQUIV PRESENT IN MEDICAL RECORD: ICD-10-PCS | Mod: HCNC,S$GLB,, | Performed by: INTERNAL MEDICINE

## 2021-01-26 PROCEDURE — 1159F PR MEDICATION LIST DOCUMENTED IN MEDICAL RECORD: ICD-10-PCS | Mod: HCNC,S$GLB,, | Performed by: INTERNAL MEDICINE

## 2021-01-26 PROCEDURE — 3288F PR FALLS RISK ASSESSMENT DOCUMENTED: ICD-10-PCS | Mod: HCNC,CPTII,S$GLB, | Performed by: INTERNAL MEDICINE

## 2021-01-26 PROCEDURE — 99214 PR OFFICE/OUTPT VISIT, EST, LEVL IV, 30-39 MIN: ICD-10-PCS | Mod: HCNC,S$GLB,, | Performed by: INTERNAL MEDICINE

## 2021-01-26 PROCEDURE — 3288F FALL RISK ASSESSMENT DOCD: CPT | Mod: HCNC,CPTII,S$GLB, | Performed by: INTERNAL MEDICINE

## 2021-01-26 PROCEDURE — 99999 PR PBB SHADOW E&M-EST. PATIENT-LVL IV: ICD-10-PCS | Mod: PBBFAC,HCNC,, | Performed by: INTERNAL MEDICINE

## 2021-01-26 RX ORDER — AZELASTINE 1 MG/ML
1 SPRAY, METERED NASAL 2 TIMES DAILY
Qty: 30 ML | Refills: 0 | Status: SHIPPED | OUTPATIENT
Start: 2021-01-26 | End: 2021-11-30

## 2021-01-28 ENCOUNTER — PATIENT MESSAGE (OUTPATIENT)
Dept: CARDIOLOGY | Facility: CLINIC | Age: 86
End: 2021-01-28

## 2021-01-29 ENCOUNTER — TELEPHONE (OUTPATIENT)
Dept: CARDIOLOGY | Facility: CLINIC | Age: 86
End: 2021-01-29

## 2021-02-03 ENCOUNTER — PATIENT MESSAGE (OUTPATIENT)
Dept: INTERNAL MEDICINE | Facility: CLINIC | Age: 86
End: 2021-02-03

## 2021-02-04 ENCOUNTER — DOCUMENTATION ONLY (OUTPATIENT)
Dept: INTERNAL MEDICINE | Facility: CLINIC | Age: 86
End: 2021-02-04

## 2021-02-04 ENCOUNTER — IMMUNIZATION (OUTPATIENT)
Dept: INTERNAL MEDICINE | Facility: CLINIC | Age: 86
End: 2021-02-04
Payer: MEDICARE

## 2021-02-04 DIAGNOSIS — D53.9 MACROCYTIC ANEMIA: Primary | ICD-10-CM

## 2021-02-04 DIAGNOSIS — Z23 NEED FOR VACCINATION: Primary | ICD-10-CM

## 2021-02-04 DIAGNOSIS — R79.89 ELEVATED BRAIN NATRIURETIC PEPTIDE (BNP) LEVEL: ICD-10-CM

## 2021-02-04 DIAGNOSIS — D69.6 THROMBOCYTOPENIA: ICD-10-CM

## 2021-02-04 DIAGNOSIS — R06.02 SOB (SHORTNESS OF BREATH): ICD-10-CM

## 2021-02-04 PROCEDURE — 0002A PR IMMUNIZ ADMIN, SARS-COV-2 COVID-19 VACC, 30MCG/0.3ML, 2ND DOSE: CPT | Mod: PBBFAC | Performed by: INTERNAL MEDICINE

## 2021-02-04 PROCEDURE — 91300 PR SARS-COV- 2 COVID-19 VACCINE, NO PRSV, 30MCG/0.3ML, IM: CPT | Mod: PBBFAC | Performed by: INTERNAL MEDICINE

## 2021-02-04 RX ADMIN — RNA INGREDIENT BNT-162B2 0.3 ML: 0.23 INJECTION, SUSPENSION INTRAMUSCULAR at 03:02

## 2021-02-05 ENCOUNTER — OFFICE VISIT (OUTPATIENT)
Dept: HEMATOLOGY/ONCOLOGY | Facility: CLINIC | Age: 86
End: 2021-02-05
Payer: MEDICARE

## 2021-02-05 ENCOUNTER — PATIENT MESSAGE (OUTPATIENT)
Dept: INTERNAL MEDICINE | Facility: CLINIC | Age: 86
End: 2021-02-05

## 2021-02-05 ENCOUNTER — TELEPHONE (OUTPATIENT)
Dept: HEMATOLOGY/ONCOLOGY | Facility: CLINIC | Age: 86
End: 2021-02-05

## 2021-02-05 ENCOUNTER — HOSPITAL ENCOUNTER (OUTPATIENT)
Dept: CARDIOLOGY | Facility: HOSPITAL | Age: 86
Discharge: HOME | End: 2021-02-05
Attending: INTERNAL MEDICINE
Payer: MEDICARE

## 2021-02-05 VITALS
TEMPERATURE: 98 F | HEART RATE: 50 BPM | RESPIRATION RATE: 12 BRPM | HEIGHT: 67 IN | DIASTOLIC BLOOD PRESSURE: 70 MMHG | SYSTOLIC BLOOD PRESSURE: 167 MMHG | OXYGEN SATURATION: 100 % | BODY MASS INDEX: 27.57 KG/M2 | WEIGHT: 175.69 LBS

## 2021-02-05 VITALS
BODY MASS INDEX: 27.62 KG/M2 | HEIGHT: 67 IN | DIASTOLIC BLOOD PRESSURE: 52 MMHG | WEIGHT: 176 LBS | SYSTOLIC BLOOD PRESSURE: 110 MMHG | HEART RATE: 50 BPM

## 2021-02-05 DIAGNOSIS — Z79.01 CURRENT USE OF LONG TERM ANTICOAGULATION: ICD-10-CM

## 2021-02-05 DIAGNOSIS — I25.10 CORONARY ARTERY DISEASE INVOLVING NATIVE CORONARY ARTERY OF NATIVE HEART WITHOUT ANGINA PECTORIS: ICD-10-CM

## 2021-02-05 DIAGNOSIS — D69.6 THROMBOCYTOPENIA: ICD-10-CM

## 2021-02-05 DIAGNOSIS — R79.89 ELEVATED BRAIN NATRIURETIC PEPTIDE (BNP) LEVEL: ICD-10-CM

## 2021-02-05 DIAGNOSIS — D53.9 MACROCYTIC ANEMIA: ICD-10-CM

## 2021-02-05 DIAGNOSIS — R06.02 SOB (SHORTNESS OF BREATH): ICD-10-CM

## 2021-02-05 DIAGNOSIS — G60.9 HEREDITARY AND IDIOPATHIC PERIPHERAL NEUROPATHY: Primary | ICD-10-CM

## 2021-02-05 LAB
ASCENDING AORTA: 3.4 CM
AV INDEX (PROSTH): 0.63
AV MEAN GRADIENT: 7 MMHG
AV PEAK GRADIENT: 15 MMHG
AV VALVE AREA: 2.65 CM2
AV VELOCITY RATIO: 0.59
BSA FOR ECHO PROCEDURE: 1.94 M2
CV ECHO LV RWT: 0.43 CM
DOP CALC AO PEAK VEL: 1.91 M/S
DOP CALC AO VTI: 39.73 CM
DOP CALC LVOT AREA: 4.2 CM2
DOP CALC LVOT DIAMETER: 2.31 CM
DOP CALC LVOT PEAK VEL: 1.13 M/S
DOP CALC LVOT STROKE VOLUME: 105.43 CM3
DOP CALCLVOT PEAK VEL VTI: 25.17 CM
E/E' RATIO: 23 M/S
ECHO LV POSTERIOR WALL: 1.07 CM (ref 0.6–1.1)
FRACTIONAL SHORTENING: 32 % (ref 28–44)
INTERVENTRICULAR SEPTUM: 0.91 CM (ref 0.6–1.1)
LA MAJOR: 7.31 CM
LA MINOR: 7.38 CM
LA WIDTH: 5.94 CM
LEFT ATRIUM SIZE: 5.53 CM
LEFT ATRIUM VOLUME INDEX MOD: 104.7 ML/M2
LEFT ATRIUM VOLUME INDEX: 106.8 ML/M2
LEFT ATRIUM VOLUME MOD: 201.01 CM3
LEFT ATRIUM VOLUME: 205.07 CM3
LEFT INTERNAL DIMENSION IN SYSTOLE: 3.41 CM (ref 2.1–4)
LEFT VENTRICLE DIASTOLIC VOLUME INDEX: 62 ML/M2
LEFT VENTRICLE DIASTOLIC VOLUME: 119.04 ML
LEFT VENTRICLE MASS INDEX: 94 G/M2
LEFT VENTRICLE SYSTOLIC VOLUME INDEX: 24.8 ML/M2
LEFT VENTRICLE SYSTOLIC VOLUME: 47.66 ML
LEFT VENTRICULAR INTERNAL DIMENSION IN DIASTOLE: 5.01 CM (ref 3.5–6)
LEFT VENTRICULAR MASS: 180.13 G
LV LATERAL E/E' RATIO: 23 M/S
LV SEPTAL E/E' RATIO: 23 M/S
MV PEAK E VEL: 0.92 M/S
PISA TR MAX VEL: 3.9 M/S
RA MAJOR: 6.27 CM
RA PRESSURE: 8 MMHG
RA WIDTH: 5.37 CM
RIGHT VENTRICULAR END-DIASTOLIC DIMENSION: 4.89 CM
RV TISSUE DOPPLER FREE WALL SYSTOLIC VELOCITY 1 (APICAL 4 CHAMBER VIEW): 10.16 CM/S
SINUS: 3.31 CM
STJ: 3.04 CM
TDI LATERAL: 0.04 M/S
TDI SEPTAL: 0.04 M/S
TDI: 0.04 M/S
TR MAX PG: 61 MMHG
TRICUSPID ANNULAR PLANE SYSTOLIC EXCURSION: 1.51 CM
TV REST PULMONARY ARTERY PRESSURE: 69 MMHG

## 2021-02-05 PROCEDURE — 1157F PR ADVANCE CARE PLAN OR EQUIV PRESENT IN MEDICAL RECORD: ICD-10-PCS | Mod: HCNC,S$GLB,, | Performed by: INTERNAL MEDICINE

## 2021-02-05 PROCEDURE — 1159F PR MEDICATION LIST DOCUMENTED IN MEDICAL RECORD: ICD-10-PCS | Mod: HCNC,S$GLB,, | Performed by: INTERNAL MEDICINE

## 2021-02-05 PROCEDURE — 93306 TTE W/DOPPLER COMPLETE: CPT | Mod: HCNC

## 2021-02-05 PROCEDURE — 1125F PR PAIN SEVERITY QUANTIFIED, PAIN PRESENT: ICD-10-PCS | Mod: HCNC,S$GLB,, | Performed by: INTERNAL MEDICINE

## 2021-02-05 PROCEDURE — 93306 ECHO (CUPID ONLY): ICD-10-PCS | Mod: 26,HCNC,, | Performed by: INTERNAL MEDICINE

## 2021-02-05 PROCEDURE — 99499 UNLISTED E&M SERVICE: CPT | Mod: S$GLB,,, | Performed by: INTERNAL MEDICINE

## 2021-02-05 PROCEDURE — 1100F PR PT FALLS ASSESS DOC 2+ FALLS/FALL W/INJURY/YR: ICD-10-PCS | Mod: HCNC,CPTII,S$GLB, | Performed by: INTERNAL MEDICINE

## 2021-02-05 PROCEDURE — 1125F AMNT PAIN NOTED PAIN PRSNT: CPT | Mod: HCNC,S$GLB,, | Performed by: INTERNAL MEDICINE

## 2021-02-05 PROCEDURE — 1100F PTFALLS ASSESS-DOCD GE2>/YR: CPT | Mod: HCNC,CPTII,S$GLB, | Performed by: INTERNAL MEDICINE

## 2021-02-05 PROCEDURE — 1157F ADVNC CARE PLAN IN RCRD: CPT | Mod: HCNC,S$GLB,, | Performed by: INTERNAL MEDICINE

## 2021-02-05 PROCEDURE — 99999 PR PBB SHADOW E&M-EST. PATIENT-LVL IV: CPT | Mod: PBBFAC,HCNC,, | Performed by: INTERNAL MEDICINE

## 2021-02-05 PROCEDURE — 99214 PR OFFICE/OUTPT VISIT, EST, LEVL IV, 30-39 MIN: ICD-10-PCS | Mod: HCNC,S$GLB,, | Performed by: INTERNAL MEDICINE

## 2021-02-05 PROCEDURE — 3288F FALL RISK ASSESSMENT DOCD: CPT | Mod: HCNC,CPTII,S$GLB, | Performed by: INTERNAL MEDICINE

## 2021-02-05 PROCEDURE — 3288F PR FALLS RISK ASSESSMENT DOCUMENTED: ICD-10-PCS | Mod: HCNC,CPTII,S$GLB, | Performed by: INTERNAL MEDICINE

## 2021-02-05 PROCEDURE — 93306 TTE W/DOPPLER COMPLETE: CPT | Mod: 26,HCNC,, | Performed by: INTERNAL MEDICINE

## 2021-02-05 PROCEDURE — 99999 PR PBB SHADOW E&M-EST. PATIENT-LVL IV: ICD-10-PCS | Mod: PBBFAC,HCNC,, | Performed by: INTERNAL MEDICINE

## 2021-02-05 PROCEDURE — 99214 OFFICE O/P EST MOD 30 MIN: CPT | Mod: HCNC,S$GLB,, | Performed by: INTERNAL MEDICINE

## 2021-02-05 PROCEDURE — 99499 RISK ADDL DX/OHS AUDIT: ICD-10-PCS | Mod: S$GLB,,, | Performed by: INTERNAL MEDICINE

## 2021-02-05 PROCEDURE — 1159F MED LIST DOCD IN RCRD: CPT | Mod: HCNC,S$GLB,, | Performed by: INTERNAL MEDICINE

## 2021-02-07 ENCOUNTER — PATIENT MESSAGE (OUTPATIENT)
Dept: HEMATOLOGY/ONCOLOGY | Facility: CLINIC | Age: 86
End: 2021-02-07

## 2021-02-08 DIAGNOSIS — D53.9 MACROCYTIC ANEMIA: Primary | ICD-10-CM

## 2021-02-09 ENCOUNTER — OFFICE VISIT (OUTPATIENT)
Dept: HEMATOLOGY/ONCOLOGY | Facility: CLINIC | Age: 86
End: 2021-02-09
Payer: MEDICARE

## 2021-02-09 ENCOUNTER — LAB VISIT (OUTPATIENT)
Dept: LAB | Facility: HOSPITAL | Age: 86
End: 2021-02-09
Payer: MEDICARE

## 2021-02-09 VITALS
HEART RATE: 50 BPM | DIASTOLIC BLOOD PRESSURE: 65 MMHG | BODY MASS INDEX: 27.3 KG/M2 | OXYGEN SATURATION: 98 % | RESPIRATION RATE: 17 BRPM | WEIGHT: 174.25 LBS | SYSTOLIC BLOOD PRESSURE: 143 MMHG

## 2021-02-09 DIAGNOSIS — E78.5 HYPERLIPIDEMIA, UNSPECIFIED HYPERLIPIDEMIA TYPE: Chronic | ICD-10-CM

## 2021-02-09 DIAGNOSIS — D53.9 MACROCYTIC ANEMIA: ICD-10-CM

## 2021-02-09 DIAGNOSIS — D69.6 THROMBOCYTOPENIA: ICD-10-CM

## 2021-02-09 DIAGNOSIS — Z79.01 CURRENT USE OF LONG TERM ANTICOAGULATION: Primary | ICD-10-CM

## 2021-02-09 DIAGNOSIS — D50.9 IRON DEFICIENCY ANEMIA, UNSPECIFIED IRON DEFICIENCY ANEMIA TYPE: ICD-10-CM

## 2021-02-09 PROCEDURE — 1101F PR PT FALLS ASSESS DOC 0-1 FALLS W/OUT INJ PAST YR: ICD-10-PCS | Mod: HCNC,CPTII,S$GLB, | Performed by: INTERNAL MEDICINE

## 2021-02-09 PROCEDURE — 3288F PR FALLS RISK ASSESSMENT DOCUMENTED: ICD-10-PCS | Mod: HCNC,CPTII,S$GLB, | Performed by: INTERNAL MEDICINE

## 2021-02-09 PROCEDURE — 99214 OFFICE O/P EST MOD 30 MIN: CPT | Mod: HCNC,S$GLB,, | Performed by: INTERNAL MEDICINE

## 2021-02-09 PROCEDURE — 1126F PR PAIN SEVERITY QUANTIFIED, NO PAIN PRESENT: ICD-10-PCS | Mod: HCNC,S$GLB,, | Performed by: INTERNAL MEDICINE

## 2021-02-09 PROCEDURE — 36415 COLL VENOUS BLD VENIPUNCTURE: CPT | Mod: HCNC

## 2021-02-09 PROCEDURE — 1101F PT FALLS ASSESS-DOCD LE1/YR: CPT | Mod: HCNC,CPTII,S$GLB, | Performed by: INTERNAL MEDICINE

## 2021-02-09 PROCEDURE — 99499 RISK ADDL DX/OHS AUDIT: ICD-10-PCS | Mod: S$GLB,,, | Performed by: INTERNAL MEDICINE

## 2021-02-09 PROCEDURE — 1126F AMNT PAIN NOTED NONE PRSNT: CPT | Mod: HCNC,S$GLB,, | Performed by: INTERNAL MEDICINE

## 2021-02-09 PROCEDURE — 1159F PR MEDICATION LIST DOCUMENTED IN MEDICAL RECORD: ICD-10-PCS | Mod: HCNC,S$GLB,, | Performed by: INTERNAL MEDICINE

## 2021-02-09 PROCEDURE — 1157F ADVNC CARE PLAN IN RCRD: CPT | Mod: HCNC,S$GLB,, | Performed by: INTERNAL MEDICINE

## 2021-02-09 PROCEDURE — 1157F PR ADVANCE CARE PLAN OR EQUIV PRESENT IN MEDICAL RECORD: ICD-10-PCS | Mod: HCNC,S$GLB,, | Performed by: INTERNAL MEDICINE

## 2021-02-09 PROCEDURE — 99999 PR PBB SHADOW E&M-EST. PATIENT-LVL V: ICD-10-PCS | Mod: PBBFAC,HCNC,, | Performed by: INTERNAL MEDICINE

## 2021-02-09 PROCEDURE — 99999 PR PBB SHADOW E&M-EST. PATIENT-LVL V: CPT | Mod: PBBFAC,HCNC,, | Performed by: INTERNAL MEDICINE

## 2021-02-09 PROCEDURE — 1159F MED LIST DOCD IN RCRD: CPT | Mod: HCNC,S$GLB,, | Performed by: INTERNAL MEDICINE

## 2021-02-09 PROCEDURE — 99214 PR OFFICE/OUTPT VISIT, EST, LEVL IV, 30-39 MIN: ICD-10-PCS | Mod: HCNC,S$GLB,, | Performed by: INTERNAL MEDICINE

## 2021-02-09 PROCEDURE — 99499 UNLISTED E&M SERVICE: CPT | Mod: S$GLB,,, | Performed by: INTERNAL MEDICINE

## 2021-02-09 PROCEDURE — 3288F FALL RISK ASSESSMENT DOCD: CPT | Mod: HCNC,CPTII,S$GLB, | Performed by: INTERNAL MEDICINE

## 2021-02-09 PROCEDURE — 82668 ASSAY OF ERYTHROPOIETIN: CPT | Mod: HCNC

## 2021-02-09 RX ORDER — SODIUM CHLORIDE 0.9 % (FLUSH) 0.9 %
10 SYRINGE (ML) INJECTION
Status: CANCELLED | OUTPATIENT
Start: 2021-02-23

## 2021-02-09 RX ORDER — HEPARIN 100 UNIT/ML
500 SYRINGE INTRAVENOUS
Status: CANCELLED | OUTPATIENT
Start: 2021-02-16

## 2021-02-09 RX ORDER — SODIUM CHLORIDE 0.9 % (FLUSH) 0.9 %
10 SYRINGE (ML) INJECTION
Status: CANCELLED | OUTPATIENT
Start: 2021-02-16

## 2021-02-09 RX ORDER — HEPARIN 100 UNIT/ML
500 SYRINGE INTRAVENOUS
Status: CANCELLED | OUTPATIENT
Start: 2021-02-23

## 2021-02-11 LAB — EPO SERPL-ACNC: 150.2 MIU/ML (ref 2.6–18.5)

## 2021-02-12 ENCOUNTER — PATIENT MESSAGE (OUTPATIENT)
Dept: HEMATOLOGY/ONCOLOGY | Facility: CLINIC | Age: 86
End: 2021-02-12

## 2021-02-12 ENCOUNTER — PATIENT MESSAGE (OUTPATIENT)
Dept: CARDIOLOGY | Facility: CLINIC | Age: 86
End: 2021-02-12

## 2021-02-12 ENCOUNTER — PATIENT MESSAGE (OUTPATIENT)
Dept: ELECTROPHYSIOLOGY | Facility: CLINIC | Age: 86
End: 2021-02-12

## 2021-02-19 ENCOUNTER — INFUSION (OUTPATIENT)
Dept: INFUSION THERAPY | Facility: HOSPITAL | Age: 86
End: 2021-02-19
Attending: INTERNAL MEDICINE
Payer: MEDICARE

## 2021-02-19 VITALS
DIASTOLIC BLOOD PRESSURE: 63 MMHG | HEART RATE: 50 BPM | RESPIRATION RATE: 18 BRPM | TEMPERATURE: 99 F | SYSTOLIC BLOOD PRESSURE: 133 MMHG

## 2021-02-19 DIAGNOSIS — D50.9 IRON DEFICIENCY ANEMIA, UNSPECIFIED IRON DEFICIENCY ANEMIA TYPE: Primary | ICD-10-CM

## 2021-02-19 PROCEDURE — 25000003 PHARM REV CODE 250: Performed by: INTERNAL MEDICINE

## 2021-02-19 PROCEDURE — 63600175 PHARM REV CODE 636 W HCPCS: Mod: JG | Performed by: INTERNAL MEDICINE

## 2021-02-19 PROCEDURE — 96374 THER/PROPH/DIAG INJ IV PUSH: CPT

## 2021-02-19 RX ORDER — SODIUM CHLORIDE 0.9 % (FLUSH) 0.9 %
10 SYRINGE (ML) INJECTION
Status: DISCONTINUED | OUTPATIENT
Start: 2021-02-19 | End: 2021-02-19 | Stop reason: HOSPADM

## 2021-02-19 RX ORDER — HEPARIN 100 UNIT/ML
500 SYRINGE INTRAVENOUS
Status: DISCONTINUED | OUTPATIENT
Start: 2021-02-19 | End: 2021-02-19 | Stop reason: HOSPADM

## 2021-02-19 RX ADMIN — SODIUM CHLORIDE: 0.9 INJECTION, SOLUTION INTRAVENOUS at 01:02

## 2021-02-19 RX ADMIN — FERRIC CARBOXYMALTOSE INJECTION 750 MG: 50 INJECTION, SOLUTION INTRAVENOUS at 01:02

## 2021-02-20 ENCOUNTER — CLINICAL SUPPORT (OUTPATIENT)
Dept: CARDIOLOGY | Facility: HOSPITAL | Age: 86
End: 2021-02-20
Payer: MEDICARE

## 2021-02-20 DIAGNOSIS — Z95.0 PRESENCE OF CARDIAC PACEMAKER: ICD-10-CM

## 2021-02-20 PROCEDURE — 93294 REM INTERROG EVL PM/LDLS PM: CPT | Mod: ,,, | Performed by: INTERNAL MEDICINE

## 2021-02-20 PROCEDURE — 93294 CARDIAC DEVICE CHECK - REMOTE: ICD-10-PCS | Mod: ,,, | Performed by: INTERNAL MEDICINE

## 2021-02-20 PROCEDURE — 93296 REM INTERROG EVL PM/IDS: CPT | Performed by: INTERNAL MEDICINE

## 2021-02-23 ENCOUNTER — OFFICE VISIT (OUTPATIENT)
Dept: ORTHOPEDICS | Facility: CLINIC | Age: 86
End: 2021-02-23
Payer: MEDICARE

## 2021-02-23 VITALS — WEIGHT: 172.31 LBS | BODY MASS INDEX: 27.04 KG/M2 | HEIGHT: 67 IN

## 2021-02-23 DIAGNOSIS — M65.332 TRIGGER FINGER, LEFT MIDDLE FINGER: Primary | ICD-10-CM

## 2021-02-23 DIAGNOSIS — M65.342 ACQUIRED TRIGGER FINGER OF LEFT RING FINGER: ICD-10-CM

## 2021-02-23 DIAGNOSIS — M65.9 TENOSYNOVITIS OF HAND: ICD-10-CM

## 2021-02-23 PROCEDURE — 1159F MED LIST DOCD IN RCRD: CPT | Mod: S$GLB,,, | Performed by: ORTHOPAEDIC SURGERY

## 2021-02-23 PROCEDURE — 1157F PR ADVANCE CARE PLAN OR EQUIV PRESENT IN MEDICAL RECORD: ICD-10-PCS | Mod: S$GLB,,, | Performed by: ORTHOPAEDIC SURGERY

## 2021-02-23 PROCEDURE — 1157F ADVNC CARE PLAN IN RCRD: CPT | Mod: S$GLB,,, | Performed by: ORTHOPAEDIC SURGERY

## 2021-02-23 PROCEDURE — 20550 TENDON SHEATH: ICD-10-PCS | Mod: LT,S$GLB,, | Performed by: ORTHOPAEDIC SURGERY

## 2021-02-23 PROCEDURE — 99203 OFFICE O/P NEW LOW 30 MIN: CPT | Mod: 25,S$GLB,, | Performed by: ORTHOPAEDIC SURGERY

## 2021-02-23 PROCEDURE — 1125F PR PAIN SEVERITY QUANTIFIED, PAIN PRESENT: ICD-10-PCS | Mod: S$GLB,,, | Performed by: ORTHOPAEDIC SURGERY

## 2021-02-23 PROCEDURE — 1159F PR MEDICATION LIST DOCUMENTED IN MEDICAL RECORD: ICD-10-PCS | Mod: S$GLB,,, | Performed by: ORTHOPAEDIC SURGERY

## 2021-02-23 PROCEDURE — 99999 PR PBB SHADOW E&M-EST. PATIENT-LVL IV: ICD-10-PCS | Mod: PBBFAC,,, | Performed by: ORTHOPAEDIC SURGERY

## 2021-02-23 PROCEDURE — 99999 PR PBB SHADOW E&M-EST. PATIENT-LVL IV: CPT | Mod: PBBFAC,,, | Performed by: ORTHOPAEDIC SURGERY

## 2021-02-23 PROCEDURE — 3288F PR FALLS RISK ASSESSMENT DOCUMENTED: ICD-10-PCS | Mod: CPTII,S$GLB,, | Performed by: ORTHOPAEDIC SURGERY

## 2021-02-23 PROCEDURE — 99203 PR OFFICE/OUTPT VISIT, NEW, LEVL III, 30-44 MIN: ICD-10-PCS | Mod: 25,S$GLB,, | Performed by: ORTHOPAEDIC SURGERY

## 2021-02-23 PROCEDURE — 1100F PR PT FALLS ASSESS DOC 2+ FALLS/FALL W/INJURY/YR: ICD-10-PCS | Mod: CPTII,S$GLB,, | Performed by: ORTHOPAEDIC SURGERY

## 2021-02-23 PROCEDURE — 3288F FALL RISK ASSESSMENT DOCD: CPT | Mod: CPTII,S$GLB,, | Performed by: ORTHOPAEDIC SURGERY

## 2021-02-23 PROCEDURE — 20550 NJX 1 TENDON SHEATH/LIGAMENT: CPT | Mod: LT,S$GLB,, | Performed by: ORTHOPAEDIC SURGERY

## 2021-02-23 PROCEDURE — 1125F AMNT PAIN NOTED PAIN PRSNT: CPT | Mod: S$GLB,,, | Performed by: ORTHOPAEDIC SURGERY

## 2021-02-23 PROCEDURE — 1100F PTFALLS ASSESS-DOCD GE2>/YR: CPT | Mod: CPTII,S$GLB,, | Performed by: ORTHOPAEDIC SURGERY

## 2021-02-23 RX ORDER — TRIAMCINOLONE ACETONIDE 40 MG/ML
40 INJECTION, SUSPENSION INTRA-ARTICULAR; INTRAMUSCULAR
Status: DISCONTINUED | OUTPATIENT
Start: 2021-02-23 | End: 2021-02-23 | Stop reason: HOSPADM

## 2021-02-23 RX ADMIN — TRIAMCINOLONE ACETONIDE 40 MG: 40 INJECTION, SUSPENSION INTRA-ARTICULAR; INTRAMUSCULAR at 03:02

## 2021-02-26 ENCOUNTER — INFUSION (OUTPATIENT)
Dept: INFUSION THERAPY | Facility: HOSPITAL | Age: 86
End: 2021-02-26
Attending: INTERNAL MEDICINE
Payer: MEDICARE

## 2021-02-26 ENCOUNTER — PES CALL (OUTPATIENT)
Dept: ADMINISTRATIVE | Facility: CLINIC | Age: 86
End: 2021-02-26

## 2021-02-26 VITALS
RESPIRATION RATE: 17 BRPM | HEART RATE: 50 BPM | DIASTOLIC BLOOD PRESSURE: 77 MMHG | SYSTOLIC BLOOD PRESSURE: 169 MMHG | OXYGEN SATURATION: 98 % | TEMPERATURE: 98 F

## 2021-02-26 DIAGNOSIS — D50.9 IRON DEFICIENCY ANEMIA, UNSPECIFIED IRON DEFICIENCY ANEMIA TYPE: Primary | ICD-10-CM

## 2021-02-26 PROCEDURE — 63600175 PHARM REV CODE 636 W HCPCS: Mod: JG,HCNC

## 2021-02-26 PROCEDURE — 25000003 PHARM REV CODE 250: Mod: HCNC

## 2021-02-26 PROCEDURE — 25000003 PHARM REV CODE 250: Performed by: INTERNAL MEDICINE

## 2021-02-26 PROCEDURE — 63600175 PHARM REV CODE 636 W HCPCS: Mod: JG | Performed by: INTERNAL MEDICINE

## 2021-02-26 PROCEDURE — 96365 THER/PROPH/DIAG IV INF INIT: CPT

## 2021-02-26 RX ORDER — SODIUM CHLORIDE 0.9 % (FLUSH) 0.9 %
10 SYRINGE (ML) INJECTION
Status: DISCONTINUED | OUTPATIENT
Start: 2021-02-26 | End: 2021-02-26 | Stop reason: HOSPADM

## 2021-02-26 RX ORDER — HEPARIN 100 UNIT/ML
500 SYRINGE INTRAVENOUS
Status: DISCONTINUED | OUTPATIENT
Start: 2021-02-26 | End: 2021-02-26 | Stop reason: HOSPADM

## 2021-02-26 RX ADMIN — SODIUM CHLORIDE: 9 INJECTION, SOLUTION INTRAVENOUS at 01:02

## 2021-02-26 RX ADMIN — FERRIC CARBOXYMALTOSE INJECTION 750 MG: 50 INJECTION, SOLUTION INTRAVENOUS at 01:02

## 2021-03-04 ENCOUNTER — TELEPHONE (OUTPATIENT)
Dept: CARDIOLOGY | Facility: HOSPITAL | Age: 86
End: 2021-03-04

## 2021-03-07 ENCOUNTER — PATIENT MESSAGE (OUTPATIENT)
Dept: INTERNAL MEDICINE | Facility: CLINIC | Age: 86
End: 2021-03-07

## 2021-03-09 ENCOUNTER — PATIENT MESSAGE (OUTPATIENT)
Dept: ADMINISTRATIVE | Facility: OTHER | Age: 86
End: 2021-03-09

## 2021-03-10 ENCOUNTER — OFFICE VISIT (OUTPATIENT)
Dept: INTERNAL MEDICINE | Facility: CLINIC | Age: 86
End: 2021-03-10
Payer: MEDICARE

## 2021-03-10 ENCOUNTER — LAB VISIT (OUTPATIENT)
Dept: LAB | Facility: HOSPITAL | Age: 86
End: 2021-03-10
Attending: INTERNAL MEDICINE
Payer: MEDICARE

## 2021-03-10 VITALS
BODY MASS INDEX: 26.47 KG/M2 | SYSTOLIC BLOOD PRESSURE: 116 MMHG | WEIGHT: 169 LBS | OXYGEN SATURATION: 99 % | HEART RATE: 52 BPM | DIASTOLIC BLOOD PRESSURE: 70 MMHG

## 2021-03-10 DIAGNOSIS — D50.9 IRON DEFICIENCY ANEMIA, UNSPECIFIED IRON DEFICIENCY ANEMIA TYPE: Primary | ICD-10-CM

## 2021-03-10 DIAGNOSIS — D53.9 MACROCYTIC ANEMIA: ICD-10-CM

## 2021-03-10 DIAGNOSIS — D50.9 IRON DEFICIENCY ANEMIA, UNSPECIFIED IRON DEFICIENCY ANEMIA TYPE: ICD-10-CM

## 2021-03-10 DIAGNOSIS — D64.9 CHRONIC ANEMIA: ICD-10-CM

## 2021-03-10 DIAGNOSIS — D69.6 THROMBOCYTOPENIA: ICD-10-CM

## 2021-03-10 DIAGNOSIS — R10.30 LOWER ABDOMINAL PAIN: ICD-10-CM

## 2021-03-10 LAB
ANION GAP SERPL CALC-SCNC: 4 MMOL/L (ref 8–16)
BASO STIPL BLD QL SMEAR: ABNORMAL
BASOPHILS # BLD AUTO: 0.08 K/UL (ref 0–0.2)
BASOPHILS NFR BLD: 2.1 % (ref 0–1.9)
BUN SERPL-MCNC: 25 MG/DL (ref 8–23)
CALCIUM SERPL-MCNC: 9 MG/DL (ref 8.7–10.5)
CHLORIDE SERPL-SCNC: 117 MMOL/L (ref 95–110)
CO2 SERPL-SCNC: 22 MMOL/L (ref 23–29)
CREAT SERPL-MCNC: 1.7 MG/DL (ref 0.5–1.4)
DIFFERENTIAL METHOD: ABNORMAL
EOSINOPHIL # BLD AUTO: 0.1 K/UL (ref 0–0.5)
EOSINOPHIL NFR BLD: 3.7 % (ref 0–8)
ERYTHROCYTE [DISTWIDTH] IN BLOOD BY AUTOMATED COUNT: 20.2 % (ref 11.5–14.5)
EST. GFR  (AFRICAN AMERICAN): 40.2 ML/MIN/1.73 M^2
EST. GFR  (NON AFRICAN AMERICAN): 34.7 ML/MIN/1.73 M^2
FERRITIN SERPL-MCNC: 604 NG/ML (ref 20–300)
GLUCOSE SERPL-MCNC: 87 MG/DL (ref 70–110)
HCT VFR BLD AUTO: 29 % (ref 40–54)
HGB BLD-MCNC: 8.7 G/DL (ref 14–18)
IMM GRANULOCYTES # BLD AUTO: 0.01 K/UL (ref 0–0.04)
IMM GRANULOCYTES NFR BLD AUTO: 0.3 % (ref 0–0.5)
IRON SERPL-MCNC: 86 UG/DL (ref 45–160)
LYMPHOCYTES # BLD AUTO: 0.6 K/UL (ref 1–4.8)
LYMPHOCYTES NFR BLD: 16.4 % (ref 18–48)
MCH RBC QN AUTO: 35.7 PG (ref 27–31)
MCHC RBC AUTO-ENTMCNC: 30 G/DL (ref 32–36)
MCV RBC AUTO: 119 FL (ref 82–98)
MONOCYTES # BLD AUTO: 0.5 K/UL (ref 0.3–1)
MONOCYTES NFR BLD: 12.2 % (ref 4–15)
NEUTROPHILS # BLD AUTO: 2.5 K/UL (ref 1.8–7.7)
NEUTROPHILS NFR BLD: 65.3 % (ref 38–73)
NRBC BLD-RTO: 0 /100 WBC
PLATELET # BLD AUTO: 83 K/UL (ref 150–350)
PLATELET BLD QL SMEAR: ABNORMAL
PMV BLD AUTO: 14.2 FL (ref 9.2–12.9)
POTASSIUM SERPL-SCNC: 4.3 MMOL/L (ref 3.5–5.1)
RBC # BLD AUTO: 2.44 M/UL (ref 4.6–6.2)
RETICS/RBC NFR AUTO: 3.1 % (ref 0.4–2)
SATURATED IRON: 25 % (ref 20–50)
SCHISTOCYTES BLD QL SMEAR: ABNORMAL
SODIUM SERPL-SCNC: 143 MMOL/L (ref 136–145)
TOTAL IRON BINDING CAPACITY: 339 UG/DL (ref 250–450)
TRANSFERRIN SERPL-MCNC: 229 MG/DL (ref 200–375)
WBC # BLD AUTO: 3.78 K/UL (ref 3.9–12.7)
WBC TOXIC VACUOLES BLD QL SMEAR: PRESENT

## 2021-03-10 PROCEDURE — 3288F PR FALLS RISK ASSESSMENT DOCUMENTED: ICD-10-PCS | Mod: CPTII,S$GLB,, | Performed by: INTERNAL MEDICINE

## 2021-03-10 PROCEDURE — 99214 OFFICE O/P EST MOD 30 MIN: CPT | Mod: S$GLB,,, | Performed by: INTERNAL MEDICINE

## 2021-03-10 PROCEDURE — 1157F ADVNC CARE PLAN IN RCRD: CPT | Mod: S$GLB,,, | Performed by: INTERNAL MEDICINE

## 2021-03-10 PROCEDURE — 1157F PR ADVANCE CARE PLAN OR EQUIV PRESENT IN MEDICAL RECORD: ICD-10-PCS | Mod: S$GLB,,, | Performed by: INTERNAL MEDICINE

## 2021-03-10 PROCEDURE — 99214 PR OFFICE/OUTPT VISIT, EST, LEVL IV, 30-39 MIN: ICD-10-PCS | Mod: S$GLB,,, | Performed by: INTERNAL MEDICINE

## 2021-03-10 PROCEDURE — 36415 COLL VENOUS BLD VENIPUNCTURE: CPT | Performed by: INTERNAL MEDICINE

## 2021-03-10 PROCEDURE — 1159F MED LIST DOCD IN RCRD: CPT | Mod: S$GLB,,, | Performed by: INTERNAL MEDICINE

## 2021-03-10 PROCEDURE — 85025 COMPLETE CBC W/AUTO DIFF WBC: CPT | Performed by: INTERNAL MEDICINE

## 2021-03-10 PROCEDURE — 99999 PR PBB SHADOW E&M-EST. PATIENT-LVL III: ICD-10-PCS | Mod: PBBFAC,,, | Performed by: INTERNAL MEDICINE

## 2021-03-10 PROCEDURE — 3288F FALL RISK ASSESSMENT DOCD: CPT | Mod: CPTII,S$GLB,, | Performed by: INTERNAL MEDICINE

## 2021-03-10 PROCEDURE — 99999 PR PBB SHADOW E&M-EST. PATIENT-LVL III: CPT | Mod: PBBFAC,,, | Performed by: INTERNAL MEDICINE

## 2021-03-10 PROCEDURE — 82728 ASSAY OF FERRITIN: CPT | Performed by: INTERNAL MEDICINE

## 2021-03-10 PROCEDURE — 1101F PT FALLS ASSESS-DOCD LE1/YR: CPT | Mod: CPTII,S$GLB,, | Performed by: INTERNAL MEDICINE

## 2021-03-10 PROCEDURE — 1101F PR PT FALLS ASSESS DOC 0-1 FALLS W/OUT INJ PAST YR: ICD-10-PCS | Mod: CPTII,S$GLB,, | Performed by: INTERNAL MEDICINE

## 2021-03-10 PROCEDURE — 99499 UNLISTED E&M SERVICE: CPT | Mod: S$GLB,,, | Performed by: INTERNAL MEDICINE

## 2021-03-10 PROCEDURE — 99499 RISK ADDL DX/OHS AUDIT: ICD-10-PCS | Mod: S$GLB,,, | Performed by: INTERNAL MEDICINE

## 2021-03-10 PROCEDURE — 85045 AUTOMATED RETICULOCYTE COUNT: CPT | Performed by: INTERNAL MEDICINE

## 2021-03-10 PROCEDURE — 80048 BASIC METABOLIC PNL TOTAL CA: CPT | Performed by: INTERNAL MEDICINE

## 2021-03-10 PROCEDURE — 83540 ASSAY OF IRON: CPT | Performed by: INTERNAL MEDICINE

## 2021-03-10 PROCEDURE — 1159F PR MEDICATION LIST DOCUMENTED IN MEDICAL RECORD: ICD-10-PCS | Mod: S$GLB,,, | Performed by: INTERNAL MEDICINE

## 2021-03-13 ENCOUNTER — PATIENT MESSAGE (OUTPATIENT)
Dept: INTERNAL MEDICINE | Facility: CLINIC | Age: 86
End: 2021-03-13

## 2021-03-13 DIAGNOSIS — D63.8 ANEMIA, CHRONIC DISEASE: ICD-10-CM

## 2021-03-13 DIAGNOSIS — D50.9 IRON DEFICIENCY ANEMIA, UNSPECIFIED IRON DEFICIENCY ANEMIA TYPE: Primary | ICD-10-CM

## 2021-03-22 ENCOUNTER — HOSPITAL ENCOUNTER (OUTPATIENT)
Dept: RADIOLOGY | Facility: HOSPITAL | Age: 86
Discharge: HOME | End: 2021-03-22
Attending: INTERNAL MEDICINE
Payer: MEDICARE

## 2021-03-22 DIAGNOSIS — R10.30 LOWER ABDOMINAL PAIN: ICD-10-CM

## 2021-03-22 DIAGNOSIS — D50.9 IRON DEFICIENCY ANEMIA, UNSPECIFIED IRON DEFICIENCY ANEMIA TYPE: ICD-10-CM

## 2021-03-22 DIAGNOSIS — D53.9 MACROCYTIC ANEMIA: ICD-10-CM

## 2021-03-22 DIAGNOSIS — D64.9 CHRONIC ANEMIA: ICD-10-CM

## 2021-03-22 DIAGNOSIS — D69.6 THROMBOCYTOPENIA: ICD-10-CM

## 2021-03-22 PROCEDURE — 76700 US EXAM ABDOM COMPLETE: CPT | Mod: 26,,, | Performed by: RADIOLOGY

## 2021-03-22 PROCEDURE — 76700 US ABDOMEN COMPLETE: ICD-10-PCS | Mod: 26,,, | Performed by: RADIOLOGY

## 2021-03-22 PROCEDURE — 76700 US EXAM ABDOM COMPLETE: CPT | Mod: TC

## 2021-03-23 ENCOUNTER — PATIENT MESSAGE (OUTPATIENT)
Dept: OTHER | Facility: OTHER | Age: 86
End: 2021-03-23

## 2021-04-08 ENCOUNTER — PATIENT MESSAGE (OUTPATIENT)
Dept: INTERNAL MEDICINE | Facility: CLINIC | Age: 86
End: 2021-04-08

## 2021-04-08 ENCOUNTER — OFFICE VISIT (OUTPATIENT)
Dept: HEMATOLOGY/ONCOLOGY | Facility: CLINIC | Age: 86
End: 2021-04-08
Payer: MEDICARE

## 2021-04-08 ENCOUNTER — LAB VISIT (OUTPATIENT)
Dept: LAB | Facility: HOSPITAL | Age: 86
End: 2021-04-08
Attending: INTERNAL MEDICINE
Payer: MEDICARE

## 2021-04-08 ENCOUNTER — PATIENT MESSAGE (OUTPATIENT)
Dept: CARDIOLOGY | Facility: CLINIC | Age: 86
End: 2021-04-08

## 2021-04-08 VITALS
OXYGEN SATURATION: 97 % | BODY MASS INDEX: 25.76 KG/M2 | HEIGHT: 67 IN | SYSTOLIC BLOOD PRESSURE: 169 MMHG | WEIGHT: 164.13 LBS | RESPIRATION RATE: 16 BRPM | DIASTOLIC BLOOD PRESSURE: 71 MMHG | HEART RATE: 52 BPM

## 2021-04-08 DIAGNOSIS — D53.9 MACROCYTIC ANEMIA: ICD-10-CM

## 2021-04-08 DIAGNOSIS — R48.2 GAIT APRAXIA: ICD-10-CM

## 2021-04-08 DIAGNOSIS — I70.0 ATHEROSCLEROSIS OF AORTA: ICD-10-CM

## 2021-04-08 DIAGNOSIS — Z79.01 CURRENT USE OF LONG TERM ANTICOAGULATION: ICD-10-CM

## 2021-04-08 DIAGNOSIS — D69.6 THROMBOCYTOPENIA: ICD-10-CM

## 2021-04-08 DIAGNOSIS — D50.9 IRON DEFICIENCY ANEMIA, UNSPECIFIED IRON DEFICIENCY ANEMIA TYPE: ICD-10-CM

## 2021-04-08 DIAGNOSIS — G62.9 PERIPHERAL POLYNEUROPATHY: Primary | ICD-10-CM

## 2021-04-08 LAB
ANISOCYTOSIS BLD QL SMEAR: SLIGHT
BASOPHILS # BLD AUTO: 0.11 K/UL (ref 0–0.2)
BASOPHILS NFR BLD: 2.7 % (ref 0–1.9)
BURR CELLS BLD QL SMEAR: ABNORMAL
DIFFERENTIAL METHOD: ABNORMAL
EOSINOPHIL # BLD AUTO: 0.2 K/UL (ref 0–0.5)
EOSINOPHIL NFR BLD: 5.1 % (ref 0–8)
ERYTHROCYTE [DISTWIDTH] IN BLOOD BY AUTOMATED COUNT: 18.6 % (ref 11.5–14.5)
FERRITIN SERPL-MCNC: 333 NG/ML (ref 20–300)
HCT VFR BLD AUTO: 31.1 % (ref 40–54)
HGB BLD-MCNC: 9.6 G/DL (ref 14–18)
HYPOCHROMIA BLD QL SMEAR: ABNORMAL
IMM GRANULOCYTES # BLD AUTO: 0.01 K/UL (ref 0–0.04)
IMM GRANULOCYTES NFR BLD AUTO: 0.2 % (ref 0–0.5)
IRON SERPL-MCNC: 108 UG/DL (ref 45–160)
LYMPHOCYTES # BLD AUTO: 1 K/UL (ref 1–4.8)
LYMPHOCYTES NFR BLD: 24 % (ref 18–48)
MCH RBC QN AUTO: 36.1 PG (ref 27–31)
MCHC RBC AUTO-ENTMCNC: 30.9 G/DL (ref 32–36)
MCV RBC AUTO: 117 FL (ref 82–98)
MONOCYTES # BLD AUTO: 0.5 K/UL (ref 0.3–1)
MONOCYTES NFR BLD: 13 % (ref 4–15)
NEUTROPHILS # BLD AUTO: 2.3 K/UL (ref 1.8–7.7)
NEUTROPHILS NFR BLD: 55 % (ref 38–73)
NRBC BLD-RTO: 0 /100 WBC
OVALOCYTES BLD QL SMEAR: ABNORMAL
PLATELET # BLD AUTO: 84 K/UL (ref 150–450)
PMV BLD AUTO: 13.8 FL (ref 9.2–12.9)
POIKILOCYTOSIS BLD QL SMEAR: SLIGHT
POLYCHROMASIA BLD QL SMEAR: ABNORMAL
RBC # BLD AUTO: 2.66 M/UL (ref 4.6–6.2)
RETICS/RBC NFR AUTO: 1.9 % (ref 0.4–2)
SATURATED IRON: 37 % (ref 20–50)
SCHISTOCYTES BLD QL SMEAR: ABNORMAL
TOTAL IRON BINDING CAPACITY: 293 UG/DL (ref 250–450)
TRANSFERRIN SERPL-MCNC: 198 MG/DL (ref 200–375)
WBC # BLD AUTO: 4.09 K/UL (ref 3.9–12.7)

## 2021-04-08 PROCEDURE — 85025 COMPLETE CBC W/AUTO DIFF WBC: CPT | Performed by: INTERNAL MEDICINE

## 2021-04-08 PROCEDURE — 1159F MED LIST DOCD IN RCRD: CPT | Mod: S$GLB,,, | Performed by: INTERNAL MEDICINE

## 2021-04-08 PROCEDURE — 1125F AMNT PAIN NOTED PAIN PRSNT: CPT | Mod: S$GLB,,, | Performed by: INTERNAL MEDICINE

## 2021-04-08 PROCEDURE — 83540 ASSAY OF IRON: CPT | Performed by: INTERNAL MEDICINE

## 2021-04-08 PROCEDURE — 1101F PR PT FALLS ASSESS DOC 0-1 FALLS W/OUT INJ PAST YR: ICD-10-PCS | Mod: CPTII,S$GLB,, | Performed by: INTERNAL MEDICINE

## 2021-04-08 PROCEDURE — 1125F PR PAIN SEVERITY QUANTIFIED, PAIN PRESENT: ICD-10-PCS | Mod: S$GLB,,, | Performed by: INTERNAL MEDICINE

## 2021-04-08 PROCEDURE — 1101F PT FALLS ASSESS-DOCD LE1/YR: CPT | Mod: CPTII,S$GLB,, | Performed by: INTERNAL MEDICINE

## 2021-04-08 PROCEDURE — 99499 UNLISTED E&M SERVICE: CPT | Mod: S$GLB,,, | Performed by: INTERNAL MEDICINE

## 2021-04-08 PROCEDURE — 99214 OFFICE O/P EST MOD 30 MIN: CPT | Mod: S$GLB,,, | Performed by: INTERNAL MEDICINE

## 2021-04-08 PROCEDURE — 36415 COLL VENOUS BLD VENIPUNCTURE: CPT | Performed by: INTERNAL MEDICINE

## 2021-04-08 PROCEDURE — 85045 AUTOMATED RETICULOCYTE COUNT: CPT | Performed by: INTERNAL MEDICINE

## 2021-04-08 PROCEDURE — 1157F ADVNC CARE PLAN IN RCRD: CPT | Mod: S$GLB,,, | Performed by: INTERNAL MEDICINE

## 2021-04-08 PROCEDURE — 82728 ASSAY OF FERRITIN: CPT | Performed by: INTERNAL MEDICINE

## 2021-04-08 PROCEDURE — 99214 PR OFFICE/OUTPT VISIT, EST, LEVL IV, 30-39 MIN: ICD-10-PCS | Mod: S$GLB,,, | Performed by: INTERNAL MEDICINE

## 2021-04-08 PROCEDURE — 1157F PR ADVANCE CARE PLAN OR EQUIV PRESENT IN MEDICAL RECORD: ICD-10-PCS | Mod: S$GLB,,, | Performed by: INTERNAL MEDICINE

## 2021-04-08 PROCEDURE — 99999 PR PBB SHADOW E&M-EST. PATIENT-LVL III: ICD-10-PCS | Mod: PBBFAC,,, | Performed by: INTERNAL MEDICINE

## 2021-04-08 PROCEDURE — 1159F PR MEDICATION LIST DOCUMENTED IN MEDICAL RECORD: ICD-10-PCS | Mod: S$GLB,,, | Performed by: INTERNAL MEDICINE

## 2021-04-08 PROCEDURE — 99999 PR PBB SHADOW E&M-EST. PATIENT-LVL III: CPT | Mod: PBBFAC,,, | Performed by: INTERNAL MEDICINE

## 2021-04-08 PROCEDURE — 3288F PR FALLS RISK ASSESSMENT DOCUMENTED: ICD-10-PCS | Mod: CPTII,S$GLB,, | Performed by: INTERNAL MEDICINE

## 2021-04-08 PROCEDURE — 3288F FALL RISK ASSESSMENT DOCD: CPT | Mod: CPTII,S$GLB,, | Performed by: INTERNAL MEDICINE

## 2021-04-08 PROCEDURE — 99499 RISK ADDL DX/OHS AUDIT: ICD-10-PCS | Mod: S$GLB,,, | Performed by: INTERNAL MEDICINE

## 2021-04-09 ENCOUNTER — CLINICAL SUPPORT (OUTPATIENT)
Dept: OPHTHALMOLOGY | Facility: CLINIC | Age: 86
End: 2021-04-09
Payer: MEDICARE

## 2021-04-09 ENCOUNTER — OFFICE VISIT (OUTPATIENT)
Dept: OPHTHALMOLOGY | Facility: CLINIC | Age: 86
End: 2021-04-09
Payer: MEDICARE

## 2021-04-09 DIAGNOSIS — H43.812 POSTERIOR VITREOUS DETACHMENT OF LEFT EYE: ICD-10-CM

## 2021-04-09 DIAGNOSIS — H35.342 LAMELLAR MACULAR HOLE OF LEFT EYE: ICD-10-CM

## 2021-04-09 DIAGNOSIS — Z96.1 PSEUDOPHAKIA OF LEFT EYE: ICD-10-CM

## 2021-04-09 DIAGNOSIS — H57.03 PERSISTENT MIOSIS: ICD-10-CM

## 2021-04-09 DIAGNOSIS — Z97.0 PROSTHETIC EYE GLOBE: ICD-10-CM

## 2021-04-09 DIAGNOSIS — H40.1122 PRIMARY OPEN-ANGLE GLAUCOMA, LEFT EYE, MODERATE STAGE: Primary | ICD-10-CM

## 2021-04-09 DIAGNOSIS — H35.372 EPIRETINAL MEMBRANE, LEFT EYE: ICD-10-CM

## 2021-04-09 PROCEDURE — 92133 POSTERIOR SEGMENT OCT OPTIC NERVE(OCULAR COHERENCE TOMOGRAPHY) - OU - BOTH EYES: ICD-10-PCS | Mod: S$GLB,,, | Performed by: OPHTHALMOLOGY

## 2021-04-09 PROCEDURE — 1101F PR PT FALLS ASSESS DOC 0-1 FALLS W/OUT INJ PAST YR: ICD-10-PCS | Mod: CPTII,S$GLB,, | Performed by: OPHTHALMOLOGY

## 2021-04-09 PROCEDURE — 92014 PR EYE EXAM, EST PATIENT,COMPREHESV: ICD-10-PCS | Mod: S$GLB,,, | Performed by: OPHTHALMOLOGY

## 2021-04-09 PROCEDURE — 92083 HUMPHREY VISUAL FIELD - OU - BOTH EYES: ICD-10-PCS | Mod: S$GLB,,, | Performed by: OPHTHALMOLOGY

## 2021-04-09 PROCEDURE — 1126F AMNT PAIN NOTED NONE PRSNT: CPT | Mod: S$GLB,,, | Performed by: OPHTHALMOLOGY

## 2021-04-09 PROCEDURE — 99999 PR PBB SHADOW E&M-EST. PATIENT-LVL III: CPT | Mod: PBBFAC,,, | Performed by: OPHTHALMOLOGY

## 2021-04-09 PROCEDURE — 99999 PR PBB SHADOW E&M-EST. PATIENT-LVL III: ICD-10-PCS | Mod: PBBFAC,,, | Performed by: OPHTHALMOLOGY

## 2021-04-09 PROCEDURE — 92014 COMPRE OPH EXAM EST PT 1/>: CPT | Mod: S$GLB,,, | Performed by: OPHTHALMOLOGY

## 2021-04-09 PROCEDURE — 3288F FALL RISK ASSESSMENT DOCD: CPT | Mod: CPTII,S$GLB,, | Performed by: OPHTHALMOLOGY

## 2021-04-09 PROCEDURE — 1101F PT FALLS ASSESS-DOCD LE1/YR: CPT | Mod: CPTII,S$GLB,, | Performed by: OPHTHALMOLOGY

## 2021-04-09 PROCEDURE — 92133 CPTRZD OPH DX IMG PST SGM ON: CPT | Mod: S$GLB,,, | Performed by: OPHTHALMOLOGY

## 2021-04-09 PROCEDURE — 3288F PR FALLS RISK ASSESSMENT DOCUMENTED: ICD-10-PCS | Mod: CPTII,S$GLB,, | Performed by: OPHTHALMOLOGY

## 2021-04-09 PROCEDURE — 1126F PR PAIN SEVERITY QUANTIFIED, NO PAIN PRESENT: ICD-10-PCS | Mod: S$GLB,,, | Performed by: OPHTHALMOLOGY

## 2021-04-09 PROCEDURE — 1157F ADVNC CARE PLAN IN RCRD: CPT | Mod: S$GLB,,, | Performed by: OPHTHALMOLOGY

## 2021-04-09 PROCEDURE — 92083 EXTENDED VISUAL FIELD XM: CPT | Mod: S$GLB,,, | Performed by: OPHTHALMOLOGY

## 2021-04-09 PROCEDURE — 1157F PR ADVANCE CARE PLAN OR EQUIV PRESENT IN MEDICAL RECORD: ICD-10-PCS | Mod: S$GLB,,, | Performed by: OPHTHALMOLOGY

## 2021-04-09 RX ORDER — DONEPEZIL HYDROCHLORIDE 5 MG/1
5 TABLET, FILM COATED ORAL NIGHTLY
Qty: 90 TABLET | Refills: 1 | Status: SHIPPED | OUTPATIENT
Start: 2021-04-09 | End: 2021-09-30 | Stop reason: SDUPTHER

## 2021-04-27 ENCOUNTER — TELEPHONE (OUTPATIENT)
Dept: HEMATOLOGY/ONCOLOGY | Facility: CLINIC | Age: 86
End: 2021-04-27

## 2021-04-27 DIAGNOSIS — D50.9 IRON DEFICIENCY ANEMIA, UNSPECIFIED IRON DEFICIENCY ANEMIA TYPE: ICD-10-CM

## 2021-04-27 DIAGNOSIS — R53.83 FATIGUE, UNSPECIFIED TYPE: Primary | ICD-10-CM

## 2021-04-28 ENCOUNTER — NURSE TRIAGE (OUTPATIENT)
Dept: ADMINISTRATIVE | Facility: CLINIC | Age: 86
End: 2021-04-28

## 2021-04-28 ENCOUNTER — LAB VISIT (OUTPATIENT)
Dept: LAB | Facility: HOSPITAL | Age: 86
End: 2021-04-28
Payer: MEDICARE

## 2021-04-28 ENCOUNTER — PES CALL (OUTPATIENT)
Dept: ADMINISTRATIVE | Facility: CLINIC | Age: 86
End: 2021-04-28

## 2021-04-28 DIAGNOSIS — D53.9 MACROCYTIC ANEMIA: ICD-10-CM

## 2021-04-28 DIAGNOSIS — R53.83 FATIGUE, UNSPECIFIED TYPE: ICD-10-CM

## 2021-04-28 DIAGNOSIS — D50.9 IRON DEFICIENCY ANEMIA, UNSPECIFIED IRON DEFICIENCY ANEMIA TYPE: ICD-10-CM

## 2021-04-28 DIAGNOSIS — D69.6 THROMBOCYTOPENIA: ICD-10-CM

## 2021-04-28 LAB
ALBUMIN SERPL BCP-MCNC: 3.7 G/DL (ref 3.5–5.2)
ALP SERPL-CCNC: 97 U/L (ref 55–135)
ALT SERPL W/O P-5'-P-CCNC: 19 U/L (ref 10–44)
ANION GAP SERPL CALC-SCNC: 6 MMOL/L (ref 8–16)
AST SERPL-CCNC: 33 U/L (ref 10–40)
BASOPHILS # BLD AUTO: 0.07 K/UL (ref 0–0.2)
BASOPHILS NFR BLD: 2.1 % (ref 0–1.9)
BILIRUB SERPL-MCNC: 1.3 MG/DL (ref 0.1–1)
BUN SERPL-MCNC: 31 MG/DL (ref 8–23)
CALCIUM SERPL-MCNC: 8.9 MG/DL (ref 8.7–10.5)
CHLORIDE SERPL-SCNC: 115 MMOL/L (ref 95–110)
CO2 SERPL-SCNC: 21 MMOL/L (ref 23–29)
CREAT SERPL-MCNC: 1.7 MG/DL (ref 0.5–1.4)
DIFFERENTIAL METHOD: ABNORMAL
EOSINOPHIL # BLD AUTO: 0.2 K/UL (ref 0–0.5)
EOSINOPHIL NFR BLD: 5.2 % (ref 0–8)
ERYTHROCYTE [DISTWIDTH] IN BLOOD BY AUTOMATED COUNT: 18 % (ref 11.5–14.5)
EST. GFR  (AFRICAN AMERICAN): 40.2 ML/MIN/1.73 M^2
EST. GFR  (NON AFRICAN AMERICAN): 34.7 ML/MIN/1.73 M^2
FERRITIN SERPL-MCNC: 226 NG/ML (ref 20–300)
GLUCOSE SERPL-MCNC: 116 MG/DL (ref 70–110)
HCT VFR BLD AUTO: 29.9 % (ref 40–54)
HGB BLD-MCNC: 9.3 G/DL (ref 14–18)
IMM GRANULOCYTES # BLD AUTO: 0.01 K/UL (ref 0–0.04)
IMM GRANULOCYTES NFR BLD AUTO: 0.3 % (ref 0–0.5)
IRON SERPL-MCNC: 87 UG/DL (ref 45–160)
LDH SERPL L TO P-CCNC: 201 U/L (ref 110–260)
LYMPHOCYTES # BLD AUTO: 0.8 K/UL (ref 1–4.8)
LYMPHOCYTES NFR BLD: 24.2 % (ref 18–48)
MCH RBC QN AUTO: 35.8 PG (ref 27–31)
MCHC RBC AUTO-ENTMCNC: 31.1 G/DL (ref 32–36)
MCV RBC AUTO: 115 FL (ref 82–98)
MONOCYTES # BLD AUTO: 0.4 K/UL (ref 0.3–1)
MONOCYTES NFR BLD: 11.8 % (ref 4–15)
NEUTROPHILS # BLD AUTO: 1.9 K/UL (ref 1.8–7.7)
NEUTROPHILS NFR BLD: 56.4 % (ref 38–73)
NRBC BLD-RTO: 0 /100 WBC
PHOSPHATE SERPL-MCNC: 2.5 MG/DL (ref 2.7–4.5)
PLATELET # BLD AUTO: 78 K/UL (ref 150–450)
PMV BLD AUTO: 12.9 FL (ref 9.2–12.9)
POTASSIUM SERPL-SCNC: 4.8 MMOL/L (ref 3.5–5.1)
PROT SERPL-MCNC: 6.2 G/DL (ref 6–8.4)
RBC # BLD AUTO: 2.6 M/UL (ref 4.6–6.2)
RETICS/RBC NFR AUTO: 1.5 % (ref 0.4–2)
SATURATED IRON: 30 % (ref 20–50)
SODIUM SERPL-SCNC: 142 MMOL/L (ref 136–145)
TOTAL IRON BINDING CAPACITY: 287 UG/DL (ref 250–450)
TRANSFERRIN SERPL-MCNC: 194 MG/DL (ref 200–375)
WBC # BLD AUTO: 3.3 K/UL (ref 3.9–12.7)

## 2021-04-28 PROCEDURE — 85025 COMPLETE CBC W/AUTO DIFF WBC: CPT | Performed by: INTERNAL MEDICINE

## 2021-04-28 PROCEDURE — 85045 AUTOMATED RETICULOCYTE COUNT: CPT | Performed by: INTERNAL MEDICINE

## 2021-04-28 PROCEDURE — 80053 COMPREHEN METABOLIC PANEL: CPT | Performed by: INTERNAL MEDICINE

## 2021-04-28 PROCEDURE — 83540 ASSAY OF IRON: CPT | Performed by: INTERNAL MEDICINE

## 2021-04-28 PROCEDURE — 36415 COLL VENOUS BLD VENIPUNCTURE: CPT | Performed by: INTERNAL MEDICINE

## 2021-04-28 PROCEDURE — 82728 ASSAY OF FERRITIN: CPT | Performed by: INTERNAL MEDICINE

## 2021-04-28 PROCEDURE — 84100 ASSAY OF PHOSPHORUS: CPT | Performed by: INTERNAL MEDICINE

## 2021-04-28 PROCEDURE — 83615 LACTATE (LD) (LDH) ENZYME: CPT | Performed by: INTERNAL MEDICINE

## 2021-05-26 ENCOUNTER — TELEPHONE (OUTPATIENT)
Dept: PAIN MEDICINE | Facility: CLINIC | Age: 86
End: 2021-05-26

## 2021-05-27 ENCOUNTER — TELEPHONE (OUTPATIENT)
Dept: HEMATOLOGY/ONCOLOGY | Facility: CLINIC | Age: 86
End: 2021-05-27

## 2021-05-27 ENCOUNTER — OFFICE VISIT (OUTPATIENT)
Dept: PAIN MEDICINE | Facility: CLINIC | Age: 86
End: 2021-05-27
Attending: ANESTHESIOLOGY
Payer: MEDICARE

## 2021-05-27 VITALS
DIASTOLIC BLOOD PRESSURE: 58 MMHG | TEMPERATURE: 98 F | HEIGHT: 67 IN | BODY MASS INDEX: 25.6 KG/M2 | HEART RATE: 49 BPM | RESPIRATION RATE: 18 BRPM | SYSTOLIC BLOOD PRESSURE: 109 MMHG | WEIGHT: 163.13 LBS

## 2021-05-27 DIAGNOSIS — M54.17 LUMBOSACRAL RADICULOPATHY: Primary | ICD-10-CM

## 2021-05-27 DIAGNOSIS — M47.26 OSTEOARTHRITIS OF SPINE WITH RADICULOPATHY, LUMBAR REGION: ICD-10-CM

## 2021-05-27 DIAGNOSIS — M51.36 DDD (DEGENERATIVE DISC DISEASE), LUMBAR: ICD-10-CM

## 2021-05-27 DIAGNOSIS — M96.1 POSTLAMINECTOMY SYNDROME OF LUMBAR REGION: ICD-10-CM

## 2021-05-27 DIAGNOSIS — G89.4 CHRONIC PAIN SYNDROME: ICD-10-CM

## 2021-05-27 PROCEDURE — 1157F PR ADVANCE CARE PLAN OR EQUIV PRESENT IN MEDICAL RECORD: ICD-10-PCS | Mod: S$GLB,,, | Performed by: ANESTHESIOLOGY

## 2021-05-27 PROCEDURE — 1101F PR PT FALLS ASSESS DOC 0-1 FALLS W/OUT INJ PAST YR: ICD-10-PCS | Mod: CPTII,S$GLB,, | Performed by: ANESTHESIOLOGY

## 2021-05-27 PROCEDURE — 99214 OFFICE O/P EST MOD 30 MIN: CPT | Mod: GC,S$GLB,, | Performed by: ANESTHESIOLOGY

## 2021-05-27 PROCEDURE — 3288F PR FALLS RISK ASSESSMENT DOCUMENTED: ICD-10-PCS | Mod: CPTII,S$GLB,, | Performed by: ANESTHESIOLOGY

## 2021-05-27 PROCEDURE — 1159F PR MEDICATION LIST DOCUMENTED IN MEDICAL RECORD: ICD-10-PCS | Mod: S$GLB,,, | Performed by: ANESTHESIOLOGY

## 2021-05-27 PROCEDURE — 99999 PR PBB SHADOW E&M-EST. PATIENT-LVL IV: ICD-10-PCS | Mod: PBBFAC,,, | Performed by: ANESTHESIOLOGY

## 2021-05-27 PROCEDURE — 99999 PR PBB SHADOW E&M-EST. PATIENT-LVL IV: CPT | Mod: PBBFAC,,, | Performed by: ANESTHESIOLOGY

## 2021-05-27 PROCEDURE — 1125F PR PAIN SEVERITY QUANTIFIED, PAIN PRESENT: ICD-10-PCS | Mod: S$GLB,,, | Performed by: ANESTHESIOLOGY

## 2021-05-27 PROCEDURE — 99214 PR OFFICE/OUTPT VISIT, EST, LEVL IV, 30-39 MIN: ICD-10-PCS | Mod: GC,S$GLB,, | Performed by: ANESTHESIOLOGY

## 2021-05-27 PROCEDURE — 1159F MED LIST DOCD IN RCRD: CPT | Mod: S$GLB,,, | Performed by: ANESTHESIOLOGY

## 2021-05-27 PROCEDURE — 3288F FALL RISK ASSESSMENT DOCD: CPT | Mod: CPTII,S$GLB,, | Performed by: ANESTHESIOLOGY

## 2021-05-27 PROCEDURE — 1125F AMNT PAIN NOTED PAIN PRSNT: CPT | Mod: S$GLB,,, | Performed by: ANESTHESIOLOGY

## 2021-05-27 PROCEDURE — 99499 RISK ADDL DX/OHS AUDIT: ICD-10-PCS | Mod: S$GLB,,, | Performed by: ANESTHESIOLOGY

## 2021-05-27 PROCEDURE — 1101F PT FALLS ASSESS-DOCD LE1/YR: CPT | Mod: CPTII,S$GLB,, | Performed by: ANESTHESIOLOGY

## 2021-05-27 PROCEDURE — 99499 UNLISTED E&M SERVICE: CPT | Mod: S$GLB,,, | Performed by: ANESTHESIOLOGY

## 2021-05-27 PROCEDURE — 1157F ADVNC CARE PLAN IN RCRD: CPT | Mod: S$GLB,,, | Performed by: ANESTHESIOLOGY

## 2021-05-27 RX ORDER — HYDROCODONE BITARTRATE AND ACETAMINOPHEN 5; 325 MG/1; MG/1
1 TABLET ORAL
Qty: 30 TABLET | Refills: 0 | Status: SHIPPED | OUTPATIENT
Start: 2021-05-27 | End: 2021-06-03 | Stop reason: SDUPTHER

## 2021-05-28 ENCOUNTER — OFFICE VISIT (OUTPATIENT)
Dept: HEMATOLOGY/ONCOLOGY | Facility: CLINIC | Age: 86
End: 2021-05-28
Payer: MEDICARE

## 2021-05-28 ENCOUNTER — TELEPHONE (OUTPATIENT)
Dept: PAIN MEDICINE | Facility: CLINIC | Age: 86
End: 2021-05-28

## 2021-05-28 ENCOUNTER — LAB VISIT (OUTPATIENT)
Dept: LAB | Facility: HOSPITAL | Age: 86
End: 2021-05-28
Attending: INTERNAL MEDICINE
Payer: MEDICARE

## 2021-05-28 VITALS
DIASTOLIC BLOOD PRESSURE: 71 MMHG | BODY MASS INDEX: 25.21 KG/M2 | SYSTOLIC BLOOD PRESSURE: 147 MMHG | WEIGHT: 160.63 LBS | TEMPERATURE: 98 F | OXYGEN SATURATION: 98 % | HEIGHT: 67 IN | RESPIRATION RATE: 12 BRPM | HEART RATE: 51 BPM

## 2021-05-28 DIAGNOSIS — I10 ESSENTIAL HYPERTENSION: ICD-10-CM

## 2021-05-28 DIAGNOSIS — D53.9 MACROCYTIC ANEMIA: ICD-10-CM

## 2021-05-28 DIAGNOSIS — D69.6 THROMBOCYTOPENIA: ICD-10-CM

## 2021-05-28 DIAGNOSIS — I70.0 ATHEROSCLEROSIS OF AORTA: Primary | ICD-10-CM

## 2021-05-28 DIAGNOSIS — Z79.01 CURRENT USE OF LONG TERM ANTICOAGULATION: ICD-10-CM

## 2021-05-28 DIAGNOSIS — N18.30 STAGE 3 CHRONIC KIDNEY DISEASE, UNSPECIFIED WHETHER STAGE 3A OR 3B CKD: ICD-10-CM

## 2021-05-28 DIAGNOSIS — D50.9 IRON DEFICIENCY ANEMIA, UNSPECIFIED IRON DEFICIENCY ANEMIA TYPE: ICD-10-CM

## 2021-05-28 DIAGNOSIS — R76.8 ELEVATED SERUM IMMUNOGLOBULIN FREE LIGHT CHAIN LEVEL: ICD-10-CM

## 2021-05-28 DIAGNOSIS — E78.5 HYPERLIPIDEMIA, UNSPECIFIED HYPERLIPIDEMIA TYPE: Chronic | ICD-10-CM

## 2021-05-28 LAB
BASOPHILS # BLD AUTO: 0.07 K/UL (ref 0–0.2)
BASOPHILS NFR BLD: 1.7 % (ref 0–1.9)
DIFFERENTIAL METHOD: ABNORMAL
EOSINOPHIL # BLD AUTO: 0.2 K/UL (ref 0–0.5)
EOSINOPHIL NFR BLD: 4.3 % (ref 0–8)
ERYTHROCYTE [DISTWIDTH] IN BLOOD BY AUTOMATED COUNT: 17.2 % (ref 11.5–14.5)
FERRITIN SERPL-MCNC: 205 NG/ML (ref 20–300)
HCT VFR BLD AUTO: 30.2 % (ref 40–54)
HGB BLD-MCNC: 9.7 G/DL (ref 14–18)
IMM GRANULOCYTES # BLD AUTO: 0.03 K/UL (ref 0–0.04)
IMM GRANULOCYTES NFR BLD AUTO: 0.7 % (ref 0–0.5)
IRON SERPL-MCNC: 83 UG/DL (ref 45–160)
LYMPHOCYTES # BLD AUTO: 0.8 K/UL (ref 1–4.8)
LYMPHOCYTES NFR BLD: 18.4 % (ref 18–48)
MCH RBC QN AUTO: 36.6 PG (ref 27–31)
MCHC RBC AUTO-ENTMCNC: 32.1 G/DL (ref 32–36)
MCV RBC AUTO: 114 FL (ref 82–98)
MONOCYTES # BLD AUTO: 0.6 K/UL (ref 0.3–1)
MONOCYTES NFR BLD: 14.3 % (ref 4–15)
NEUTROPHILS # BLD AUTO: 2.5 K/UL (ref 1.8–7.7)
NEUTROPHILS NFR BLD: 60.6 % (ref 38–73)
NRBC BLD-RTO: 0 /100 WBC
PLATELET # BLD AUTO: 69 K/UL (ref 150–450)
PMV BLD AUTO: 14.2 FL (ref 9.2–12.9)
RBC # BLD AUTO: 2.65 M/UL (ref 4.6–6.2)
RETICS/RBC NFR AUTO: 1.5 % (ref 0.4–2)
SATURATED IRON: 31 % (ref 20–50)
TOTAL IRON BINDING CAPACITY: 271 UG/DL (ref 250–450)
TRANSFERRIN SERPL-MCNC: 183 MG/DL (ref 200–375)
WBC # BLD AUTO: 4.14 K/UL (ref 3.9–12.7)

## 2021-05-28 PROCEDURE — 1125F PR PAIN SEVERITY QUANTIFIED, PAIN PRESENT: ICD-10-PCS | Mod: S$GLB,,, | Performed by: INTERNAL MEDICINE

## 2021-05-28 PROCEDURE — 36415 COLL VENOUS BLD VENIPUNCTURE: CPT | Performed by: INTERNAL MEDICINE

## 2021-05-28 PROCEDURE — 85045 AUTOMATED RETICULOCYTE COUNT: CPT | Performed by: INTERNAL MEDICINE

## 2021-05-28 PROCEDURE — 1101F PR PT FALLS ASSESS DOC 0-1 FALLS W/OUT INJ PAST YR: ICD-10-PCS | Mod: CPTII,S$GLB,, | Performed by: INTERNAL MEDICINE

## 2021-05-28 PROCEDURE — 99999 PR PBB SHADOW E&M-EST. PATIENT-LVL V: ICD-10-PCS | Mod: PBBFAC,,, | Performed by: INTERNAL MEDICINE

## 2021-05-28 PROCEDURE — 1101F PT FALLS ASSESS-DOCD LE1/YR: CPT | Mod: CPTII,S$GLB,, | Performed by: INTERNAL MEDICINE

## 2021-05-28 PROCEDURE — 3288F FALL RISK ASSESSMENT DOCD: CPT | Mod: CPTII,S$GLB,, | Performed by: INTERNAL MEDICINE

## 2021-05-28 PROCEDURE — 1159F MED LIST DOCD IN RCRD: CPT | Mod: S$GLB,,, | Performed by: INTERNAL MEDICINE

## 2021-05-28 PROCEDURE — 82728 ASSAY OF FERRITIN: CPT | Performed by: INTERNAL MEDICINE

## 2021-05-28 PROCEDURE — 1159F PR MEDICATION LIST DOCUMENTED IN MEDICAL RECORD: ICD-10-PCS | Mod: S$GLB,,, | Performed by: INTERNAL MEDICINE

## 2021-05-28 PROCEDURE — 99499 UNLISTED E&M SERVICE: CPT | Mod: S$GLB,,, | Performed by: INTERNAL MEDICINE

## 2021-05-28 PROCEDURE — 1157F ADVNC CARE PLAN IN RCRD: CPT | Mod: S$GLB,,, | Performed by: INTERNAL MEDICINE

## 2021-05-28 PROCEDURE — 85025 COMPLETE CBC W/AUTO DIFF WBC: CPT | Performed by: INTERNAL MEDICINE

## 2021-05-28 PROCEDURE — 3288F PR FALLS RISK ASSESSMENT DOCUMENTED: ICD-10-PCS | Mod: CPTII,S$GLB,, | Performed by: INTERNAL MEDICINE

## 2021-05-28 PROCEDURE — 83540 ASSAY OF IRON: CPT | Performed by: INTERNAL MEDICINE

## 2021-05-28 PROCEDURE — 99999 PR PBB SHADOW E&M-EST. PATIENT-LVL V: CPT | Mod: PBBFAC,,, | Performed by: INTERNAL MEDICINE

## 2021-05-28 PROCEDURE — 99499 RISK ADDL DX/OHS AUDIT: ICD-10-PCS | Mod: S$GLB,,, | Performed by: INTERNAL MEDICINE

## 2021-05-28 PROCEDURE — 1157F PR ADVANCE CARE PLAN OR EQUIV PRESENT IN MEDICAL RECORD: ICD-10-PCS | Mod: S$GLB,,, | Performed by: INTERNAL MEDICINE

## 2021-05-28 PROCEDURE — 99215 OFFICE O/P EST HI 40 MIN: CPT | Mod: S$GLB,,, | Performed by: INTERNAL MEDICINE

## 2021-05-28 PROCEDURE — 1125F AMNT PAIN NOTED PAIN PRSNT: CPT | Mod: S$GLB,,, | Performed by: INTERNAL MEDICINE

## 2021-05-28 PROCEDURE — 99215 PR OFFICE/OUTPT VISIT, EST, LEVL V, 40-54 MIN: ICD-10-PCS | Mod: S$GLB,,, | Performed by: INTERNAL MEDICINE

## 2021-06-01 ENCOUNTER — PATIENT MESSAGE (OUTPATIENT)
Dept: PAIN MEDICINE | Facility: CLINIC | Age: 86
End: 2021-06-01

## 2021-06-03 ENCOUNTER — TELEPHONE (OUTPATIENT)
Dept: PAIN MEDICINE | Facility: CLINIC | Age: 86
End: 2021-06-03

## 2021-06-03 DIAGNOSIS — G89.4 CHRONIC PAIN SYNDROME: Primary | ICD-10-CM

## 2021-06-04 ENCOUNTER — PATIENT MESSAGE (OUTPATIENT)
Dept: PAIN MEDICINE | Facility: CLINIC | Age: 86
End: 2021-06-04

## 2021-06-04 RX ORDER — HYDROCODONE BITARTRATE AND ACETAMINOPHEN 5; 325 MG/1; MG/1
1 TABLET ORAL
Qty: 30 TABLET | Refills: 0 | Status: SHIPPED | OUTPATIENT
Start: 2021-06-04 | End: 2021-06-25 | Stop reason: SDUPTHER

## 2021-06-09 ENCOUNTER — PATIENT MESSAGE (OUTPATIENT)
Dept: PAIN MEDICINE | Facility: CLINIC | Age: 86
End: 2021-06-09

## 2021-06-11 ENCOUNTER — PATIENT MESSAGE (OUTPATIENT)
Dept: PAIN MEDICINE | Facility: CLINIC | Age: 86
End: 2021-06-11

## 2021-06-14 ENCOUNTER — OFFICE VISIT (OUTPATIENT)
Dept: SPINE | Facility: CLINIC | Age: 86
End: 2021-06-14
Attending: ANESTHESIOLOGY
Payer: MEDICARE

## 2021-06-14 DIAGNOSIS — M51.36 DDD (DEGENERATIVE DISC DISEASE), LUMBAR: ICD-10-CM

## 2021-06-14 DIAGNOSIS — D69.6 THROMBOCYTOPENIA: ICD-10-CM

## 2021-06-14 DIAGNOSIS — M96.1 POSTLAMINECTOMY SYNDROME OF LUMBAR REGION: ICD-10-CM

## 2021-06-14 DIAGNOSIS — M54.50 CHRONIC MIDLINE LOW BACK PAIN WITHOUT SCIATICA: Primary | ICD-10-CM

## 2021-06-14 DIAGNOSIS — G62.9 PERIPHERAL POLYNEUROPATHY: ICD-10-CM

## 2021-06-14 DIAGNOSIS — Z79.01 CURRENT USE OF LONG TERM ANTICOAGULATION: ICD-10-CM

## 2021-06-14 DIAGNOSIS — M48.062 SPINAL STENOSIS OF LUMBAR REGION WITH NEUROGENIC CLAUDICATION: ICD-10-CM

## 2021-06-14 DIAGNOSIS — G89.29 CHRONIC MIDLINE LOW BACK PAIN WITHOUT SCIATICA: Primary | ICD-10-CM

## 2021-06-14 PROCEDURE — 99499 RISK ADDL DX/OHS AUDIT: ICD-10-PCS | Mod: 95,,, | Performed by: ANESTHESIOLOGY

## 2021-06-14 PROCEDURE — 1159F PR MEDICATION LIST DOCUMENTED IN MEDICAL RECORD: ICD-10-PCS | Mod: 95,,, | Performed by: ANESTHESIOLOGY

## 2021-06-14 PROCEDURE — 99214 OFFICE O/P EST MOD 30 MIN: CPT | Mod: 95,GC,, | Performed by: ANESTHESIOLOGY

## 2021-06-14 PROCEDURE — 1159F MED LIST DOCD IN RCRD: CPT | Mod: 95,,, | Performed by: ANESTHESIOLOGY

## 2021-06-14 PROCEDURE — 1157F ADVNC CARE PLAN IN RCRD: CPT | Mod: 95,,, | Performed by: ANESTHESIOLOGY

## 2021-06-14 PROCEDURE — 99499 UNLISTED E&M SERVICE: CPT | Mod: 95,,, | Performed by: ANESTHESIOLOGY

## 2021-06-14 PROCEDURE — 99214 PR OFFICE/OUTPT VISIT, EST, LEVL IV, 30-39 MIN: ICD-10-PCS | Mod: 95,GC,, | Performed by: ANESTHESIOLOGY

## 2021-06-14 PROCEDURE — 1157F PR ADVANCE CARE PLAN OR EQUIV PRESENT IN MEDICAL RECORD: ICD-10-PCS | Mod: 95,,, | Performed by: ANESTHESIOLOGY

## 2021-06-23 ENCOUNTER — PATIENT MESSAGE (OUTPATIENT)
Dept: PAIN MEDICINE | Facility: CLINIC | Age: 86
End: 2021-06-23

## 2021-06-24 ENCOUNTER — OFFICE VISIT (OUTPATIENT)
Dept: PAIN MEDICINE | Facility: CLINIC | Age: 86
End: 2021-06-24
Payer: MEDICARE

## 2021-06-24 VITALS
TEMPERATURE: 97 F | RESPIRATION RATE: 18 BRPM | HEIGHT: 67 IN | HEART RATE: 50 BPM | SYSTOLIC BLOOD PRESSURE: 137 MMHG | DIASTOLIC BLOOD PRESSURE: 84 MMHG | WEIGHT: 158.94 LBS | BODY MASS INDEX: 24.94 KG/M2

## 2021-06-24 DIAGNOSIS — Z51.81 ENCOUNTER FOR THERAPEUTIC DRUG MONITORING: Primary | ICD-10-CM

## 2021-06-24 DIAGNOSIS — M96.1 POSTLAMINECTOMY SYNDROME OF LUMBAR REGION: ICD-10-CM

## 2021-06-24 DIAGNOSIS — M54.16 LUMBAR RADICULOPATHY: ICD-10-CM

## 2021-06-24 DIAGNOSIS — M47.26 OSTEOARTHRITIS OF SPINE WITH RADICULOPATHY, LUMBAR REGION: ICD-10-CM

## 2021-06-24 DIAGNOSIS — G89.4 CHRONIC PAIN SYNDROME: ICD-10-CM

## 2021-06-24 DIAGNOSIS — M48.062 SPINAL STENOSIS OF LUMBAR REGION WITH NEUROGENIC CLAUDICATION: ICD-10-CM

## 2021-06-24 DIAGNOSIS — Z79.01 CURRENT USE OF LONG TERM ANTICOAGULATION: ICD-10-CM

## 2021-06-24 DIAGNOSIS — M54.17 LUMBOSACRAL RADICULOPATHY: ICD-10-CM

## 2021-06-24 PROCEDURE — 1101F PR PT FALLS ASSESS DOC 0-1 FALLS W/OUT INJ PAST YR: ICD-10-PCS | Mod: CPTII,S$GLB,, | Performed by: NURSE PRACTITIONER

## 2021-06-24 PROCEDURE — 99999 PR PBB SHADOW E&M-EST. PATIENT-LVL V: ICD-10-PCS | Mod: PBBFAC,,, | Performed by: NURSE PRACTITIONER

## 2021-06-24 PROCEDURE — 1125F AMNT PAIN NOTED PAIN PRSNT: CPT | Mod: S$GLB,,, | Performed by: NURSE PRACTITIONER

## 2021-06-24 PROCEDURE — 1159F MED LIST DOCD IN RCRD: CPT | Mod: S$GLB,,, | Performed by: NURSE PRACTITIONER

## 2021-06-24 PROCEDURE — 3288F FALL RISK ASSESSMENT DOCD: CPT | Mod: CPTII,S$GLB,, | Performed by: NURSE PRACTITIONER

## 2021-06-24 PROCEDURE — 1159F PR MEDICATION LIST DOCUMENTED IN MEDICAL RECORD: ICD-10-PCS | Mod: S$GLB,,, | Performed by: NURSE PRACTITIONER

## 2021-06-24 PROCEDURE — 1101F PT FALLS ASSESS-DOCD LE1/YR: CPT | Mod: CPTII,S$GLB,, | Performed by: NURSE PRACTITIONER

## 2021-06-24 PROCEDURE — 99213 PR OFFICE/OUTPT VISIT, EST, LEVL III, 20-29 MIN: ICD-10-PCS | Mod: S$GLB,,, | Performed by: NURSE PRACTITIONER

## 2021-06-24 PROCEDURE — 1125F PR PAIN SEVERITY QUANTIFIED, PAIN PRESENT: ICD-10-PCS | Mod: S$GLB,,, | Performed by: NURSE PRACTITIONER

## 2021-06-24 PROCEDURE — 99999 PR PBB SHADOW E&M-EST. PATIENT-LVL V: CPT | Mod: PBBFAC,,, | Performed by: NURSE PRACTITIONER

## 2021-06-24 PROCEDURE — 99213 OFFICE O/P EST LOW 20 MIN: CPT | Mod: S$GLB,,, | Performed by: NURSE PRACTITIONER

## 2021-06-24 PROCEDURE — 99499 RISK ADDL DX/OHS AUDIT: ICD-10-PCS | Mod: S$GLB,,, | Performed by: NURSE PRACTITIONER

## 2021-06-24 PROCEDURE — 1157F ADVNC CARE PLAN IN RCRD: CPT | Mod: S$GLB,,, | Performed by: NURSE PRACTITIONER

## 2021-06-24 PROCEDURE — 80307 DRUG TEST PRSMV CHEM ANLYZR: CPT | Performed by: NURSE PRACTITIONER

## 2021-06-24 PROCEDURE — 1157F PR ADVANCE CARE PLAN OR EQUIV PRESENT IN MEDICAL RECORD: ICD-10-PCS | Mod: S$GLB,,, | Performed by: NURSE PRACTITIONER

## 2021-06-24 PROCEDURE — 99499 UNLISTED E&M SERVICE: CPT | Mod: S$GLB,,, | Performed by: NURSE PRACTITIONER

## 2021-06-24 PROCEDURE — 3288F PR FALLS RISK ASSESSMENT DOCUMENTED: ICD-10-PCS | Mod: CPTII,S$GLB,, | Performed by: NURSE PRACTITIONER

## 2021-06-25 DIAGNOSIS — G89.4 CHRONIC PAIN SYNDROME: ICD-10-CM

## 2021-06-28 ENCOUNTER — TELEPHONE (OUTPATIENT)
Dept: PAIN MEDICINE | Facility: CLINIC | Age: 86
End: 2021-06-28

## 2021-06-28 RX ORDER — HYDROCODONE BITARTRATE AND ACETAMINOPHEN 5; 325 MG/1; MG/1
1 TABLET ORAL EVERY 12 HOURS PRN
Qty: 75 TABLET | Refills: 0 | Status: SHIPPED | OUTPATIENT
Start: 2021-06-28 | End: 2021-07-28

## 2021-06-30 LAB
6MAM UR QL: NOT DETECTED
7AMINOCLONAZEPAM UR QL: NOT DETECTED
A-OH ALPRAZ UR QL: NOT DETECTED
ALPHA-OH-MIDAZOLAM: NOT DETECTED
ALPRAZ UR QL: NOT DETECTED
AMPHET UR QL SCN: NOT DETECTED
ANNOTATION COMMENT IMP: NORMAL
ANNOTATION COMMENT IMP: NORMAL
BARBITURATES UR QL: NOT DETECTED
BUPRENORPHINE UR QL: NOT DETECTED
BZE UR QL: NOT DETECTED
CARBOXYTHC UR QL: NOT DETECTED
CARISOPRODOL UR QL: NOT DETECTED
CLONAZEPAM UR QL: NOT DETECTED
CODEINE UR QL: NOT DETECTED
CREAT UR-MCNC: 189.4 MG/DL (ref 20–400)
DIAZEPAM UR QL: NOT DETECTED
ETHYL GLUCURONIDE UR QL: PRESENT
FENTANYL UR QL: NOT DETECTED
GABAPENTIN: NOT DETECTED
HYDROCODONE UR QL: PRESENT
HYDROMORPHONE UR QL: PRESENT
LORAZEPAM UR QL: NOT DETECTED
MDA UR QL: NOT DETECTED
MDEA UR QL: NOT DETECTED
MDMA UR QL: NOT DETECTED
ME-PHENIDATE UR QL: NOT DETECTED
METHADONE UR QL: NOT DETECTED
METHAMPHET UR QL: NOT DETECTED
MIDAZOLAM UR QL SCN: NOT DETECTED
MORPHINE UR QL: NOT DETECTED
NALOXONE: NOT DETECTED
NORBUPRENORPHINE UR QL CFM: NOT DETECTED
NORDIAZEPAM UR QL: NOT DETECTED
NORFENTANYL UR QL: NOT DETECTED
NORHYDROCODONE UR QL CFM: PRESENT
NORMEPERIDINE UR QL CFM: NOT DETECTED
NOROXYCODONE UR QL CFM: NOT DETECTED
NOROXYMORPHONE UR QL SCN: NOT DETECTED
OXAZEPAM UR QL: NOT DETECTED
OXYCODONE UR QL: NOT DETECTED
OXYMORPHONE UR QL: NOT DETECTED
PATHOLOGY STUDY: NORMAL
PCP UR QL: NOT DETECTED
PHENTERMINE UR QL: NOT DETECTED
PREGABALIN: NOT DETECTED
SERVICE CMNT-IMP: NORMAL
TAPENTADOL UR QL SCN: NOT DETECTED
TAPENTADOL-O-SULF: NOT DETECTED
TEMAZEPAM UR QL: NOT DETECTED
TRAMADOL UR QL: NOT DETECTED
ZOLPIDEM METABOLITE: NOT DETECTED
ZOLPIDEM UR QL: NOT DETECTED

## 2021-07-01 ENCOUNTER — PATIENT MESSAGE (OUTPATIENT)
Dept: PAIN MEDICINE | Facility: CLINIC | Age: 86
End: 2021-07-01

## 2021-07-27 ENCOUNTER — OFFICE VISIT (OUTPATIENT)
Dept: OPHTHALMOLOGY | Facility: CLINIC | Age: 86
End: 2021-07-27
Payer: MEDICARE

## 2021-07-27 DIAGNOSIS — H35.342 LAMELLAR MACULAR HOLE OF LEFT EYE: Primary | ICD-10-CM

## 2021-07-27 DIAGNOSIS — H43.812 POSTERIOR VITREOUS DETACHMENT OF LEFT EYE: ICD-10-CM

## 2021-07-27 DIAGNOSIS — H35.372 EPIRETINAL MEMBRANE, LEFT EYE: ICD-10-CM

## 2021-07-27 DIAGNOSIS — H33.312 RETINAL TEAR OF LEFT EYE: ICD-10-CM

## 2021-07-27 PROCEDURE — 92134 CPTRZ OPH DX IMG PST SGM RTA: CPT | Mod: S$GLB,,, | Performed by: OPHTHALMOLOGY

## 2021-07-27 PROCEDURE — 92201 OPSCPY EXTND RTA DRAW UNI/BI: CPT | Mod: S$GLB,,, | Performed by: OPHTHALMOLOGY

## 2021-07-27 PROCEDURE — 92134 POSTERIOR SEGMENT OCT RETINA (OCULAR COHERENCE TOMOGRAPHY)-BOTH EYES: ICD-10-PCS | Mod: S$GLB,,, | Performed by: OPHTHALMOLOGY

## 2021-07-27 PROCEDURE — 1160F PR REVIEW ALL MEDS BY PRESCRIBER/CLIN PHARMACIST DOCUMENTED: ICD-10-PCS | Mod: CPTII,S$GLB,, | Performed by: OPHTHALMOLOGY

## 2021-07-27 PROCEDURE — 99999 PR PBB SHADOW E&M-EST. PATIENT-LVL II: CPT | Mod: PBBFAC,,, | Performed by: OPHTHALMOLOGY

## 2021-07-27 PROCEDURE — 1157F ADVNC CARE PLAN IN RCRD: CPT | Mod: CPTII,S$GLB,, | Performed by: OPHTHALMOLOGY

## 2021-07-27 PROCEDURE — 1159F MED LIST DOCD IN RCRD: CPT | Mod: CPTII,S$GLB,, | Performed by: OPHTHALMOLOGY

## 2021-07-27 PROCEDURE — 1160F RVW MEDS BY RX/DR IN RCRD: CPT | Mod: CPTII,S$GLB,, | Performed by: OPHTHALMOLOGY

## 2021-07-27 PROCEDURE — 92014 PR EYE EXAM, EST PATIENT,COMPREHESV: ICD-10-PCS | Mod: S$GLB,,, | Performed by: OPHTHALMOLOGY

## 2021-07-27 PROCEDURE — 1159F PR MEDICATION LIST DOCUMENTED IN MEDICAL RECORD: ICD-10-PCS | Mod: CPTII,S$GLB,, | Performed by: OPHTHALMOLOGY

## 2021-07-27 PROCEDURE — 92014 COMPRE OPH EXAM EST PT 1/>: CPT | Mod: S$GLB,,, | Performed by: OPHTHALMOLOGY

## 2021-07-27 PROCEDURE — 99999 PR PBB SHADOW E&M-EST. PATIENT-LVL II: ICD-10-PCS | Mod: PBBFAC,,, | Performed by: OPHTHALMOLOGY

## 2021-07-27 PROCEDURE — 92201 PR OPHTHALMOSCOPY, EXT, W/RET DRAW/SCLERAL DEPR, I&R, UNI/BI: ICD-10-PCS | Mod: S$GLB,,, | Performed by: OPHTHALMOLOGY

## 2021-07-27 PROCEDURE — 1157F PR ADVANCE CARE PLAN OR EQUIV PRESENT IN MEDICAL RECORD: ICD-10-PCS | Mod: CPTII,S$GLB,, | Performed by: OPHTHALMOLOGY

## 2021-07-30 ENCOUNTER — PATIENT MESSAGE (OUTPATIENT)
Dept: SPINE | Facility: CLINIC | Age: 86
End: 2021-07-30

## 2021-07-30 DIAGNOSIS — M48.062 SPINAL STENOSIS OF LUMBAR REGION WITH NEUROGENIC CLAUDICATION: Primary | ICD-10-CM

## 2021-07-30 RX ORDER — HYDROCODONE BITARTRATE AND ACETAMINOPHEN 7.5; 325 MG/1; MG/1
1 TABLET ORAL EVERY 6 HOURS PRN
Qty: 75 TABLET | Refills: 0 | Status: CANCELLED | OUTPATIENT
Start: 2021-07-30

## 2021-07-30 RX ORDER — HYDROCODONE BITARTRATE AND ACETAMINOPHEN 5; 325 MG/1; MG/1
1 TABLET ORAL 3 TIMES DAILY PRN
Qty: 75 TABLET | Refills: 0 | Status: SHIPPED | OUTPATIENT
Start: 2021-07-30 | End: 2021-08-24 | Stop reason: SDUPTHER

## 2021-08-10 ENCOUNTER — OFFICE VISIT (OUTPATIENT)
Dept: URGENT CARE | Facility: CLINIC | Age: 86
End: 2021-08-10
Payer: MEDICARE

## 2021-08-10 VITALS
TEMPERATURE: 97 F | SYSTOLIC BLOOD PRESSURE: 170 MMHG | RESPIRATION RATE: 20 BRPM | HEIGHT: 67 IN | HEART RATE: 54 BPM | WEIGHT: 158 LBS | DIASTOLIC BLOOD PRESSURE: 79 MMHG | OXYGEN SATURATION: 100 % | BODY MASS INDEX: 24.8 KG/M2
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
## 2021-08-10 DIAGNOSIS — L03.114 CELLULITIS OF LEFT HAND: ICD-10-CM

## 2021-08-10 DIAGNOSIS — Z79.01 CURRENT USE OF LONG TERM ANTICOAGULATION: ICD-10-CM

## 2021-08-10 PROCEDURE — 96372 THER/PROPH/DIAG INJ SC/IM: CPT | Mod: S$GLB,,, | Performed by: EMERGENCY MEDICINE

## 2021-08-10 PROCEDURE — 1157F ADVNC CARE PLAN IN RCRD: CPT | Mod: CPTII,S$GLB,, | Performed by: PHYSICIAN ASSISTANT

## 2021-08-10 PROCEDURE — 1157F PR ADVANCE CARE PLAN OR EQUIV PRESENT IN MEDICAL RECORD: ICD-10-PCS | Mod: CPTII,S$GLB,, | Performed by: PHYSICIAN ASSISTANT

## 2021-08-10 PROCEDURE — 99214 OFFICE O/P EST MOD 30 MIN: CPT | Mod: 25,S$GLB,, | Performed by: PHYSICIAN ASSISTANT

## 2021-08-10 PROCEDURE — 1159F PR MEDICATION LIST DOCUMENTED IN MEDICAL RECORD: ICD-10-PCS | Mod: CPTII,S$GLB,, | Performed by: PHYSICIAN ASSISTANT

## 2021-08-10 PROCEDURE — 99214 PR OFFICE/OUTPT VISIT, EST, LEVL IV, 30-39 MIN: ICD-10-PCS | Mod: 25,S$GLB,, | Performed by: PHYSICIAN ASSISTANT

## 2021-08-10 PROCEDURE — 1159F MED LIST DOCD IN RCRD: CPT | Mod: CPTII,S$GLB,, | Performed by: PHYSICIAN ASSISTANT

## 2021-08-10 PROCEDURE — 96372 PR INJECTION,THERAP/PROPH/DIAG2ST, IM OR SUBCUT: ICD-10-PCS | Mod: S$GLB,,, | Performed by: EMERGENCY MEDICINE

## 2021-08-10 RX ORDER — CEFTRIAXONE 500 MG/1
500 INJECTION, POWDER, FOR SOLUTION INTRAMUSCULAR; INTRAVENOUS
Status: COMPLETED | OUTPATIENT
Start: 2021-08-10 | End: 2021-08-10

## 2021-08-10 RX ORDER — TRIAMCINOLONE ACETONIDE 40 MG/ML
INJECTION, SUSPENSION INTRA-ARTICULAR; INTRAMUSCULAR
COMMUNITY
Start: 2021-02-23 | End: 2021-10-19

## 2021-08-10 RX ORDER — DOXYCYCLINE 100 MG/1
100 CAPSULE ORAL 2 TIMES DAILY
Qty: 14 CAPSULE | Refills: 0 | Status: SHIPPED | OUTPATIENT
Start: 2021-08-10 | End: 2021-08-17

## 2021-08-10 RX ORDER — MUPIROCIN 20 MG/G
OINTMENT TOPICAL 2 TIMES DAILY
Qty: 1 TUBE | Refills: 0 | Status: SHIPPED | OUTPATIENT
Start: 2021-08-10 | End: 2021-08-17

## 2021-08-10 RX ORDER — FERRIC CARBOXYMALTOSE 50 MG/ML
INJECTION, SOLUTION INTRAVENOUS
COMMUNITY
Start: 2021-02-26 | End: 2021-10-19

## 2021-08-10 RX ORDER — CEFTRIAXONE 1 G/1
1 INJECTION, POWDER, FOR SOLUTION INTRAMUSCULAR; INTRAVENOUS
Status: DISCONTINUED | OUTPATIENT
Start: 2021-08-10 | End: 2021-08-10

## 2021-08-10 RX ADMIN — CEFTRIAXONE 500 MG: 500 INJECTION, POWDER, FOR SOLUTION INTRAMUSCULAR; INTRAVENOUS at 06:08

## 2021-08-16 ENCOUNTER — TELEPHONE (OUTPATIENT)
Dept: OPHTHALMOLOGY | Facility: CLINIC | Age: 86
End: 2021-08-16

## 2021-08-23 ENCOUNTER — TELEPHONE (OUTPATIENT)
Dept: PAIN MEDICINE | Facility: CLINIC | Age: 86
End: 2021-08-23

## 2021-08-23 ENCOUNTER — PATIENT MESSAGE (OUTPATIENT)
Dept: PAIN MEDICINE | Facility: CLINIC | Age: 86
End: 2021-08-23

## 2021-08-24 ENCOUNTER — HOSPITAL ENCOUNTER (OUTPATIENT)
Dept: CARDIOLOGY | Facility: CLINIC | Age: 86
Discharge: HOME | End: 2021-08-24
Payer: MEDICARE

## 2021-08-24 ENCOUNTER — OFFICE VISIT (OUTPATIENT)
Dept: ELECTROPHYSIOLOGY | Facility: CLINIC | Age: 86
End: 2021-08-24
Payer: MEDICARE

## 2021-08-24 ENCOUNTER — CLINICAL SUPPORT (OUTPATIENT)
Dept: CARDIOLOGY | Facility: HOSPITAL | Age: 86
End: 2021-08-24
Attending: INTERNAL MEDICINE
Payer: MEDICARE

## 2021-08-24 ENCOUNTER — OFFICE VISIT (OUTPATIENT)
Dept: PAIN MEDICINE | Facility: CLINIC | Age: 86
End: 2021-08-24
Payer: MEDICARE

## 2021-08-24 VITALS
DIASTOLIC BLOOD PRESSURE: 52 MMHG | WEIGHT: 160.69 LBS | HEIGHT: 67 IN | SYSTOLIC BLOOD PRESSURE: 116 MMHG | HEART RATE: 58 BPM | BODY MASS INDEX: 25.22 KG/M2

## 2021-08-24 DIAGNOSIS — I25.10 CORONARY ARTERY DISEASE INVOLVING NATIVE CORONARY ARTERY OF NATIVE HEART WITHOUT ANGINA PECTORIS: Primary | ICD-10-CM

## 2021-08-24 DIAGNOSIS — Z95.1 S/P CABG (CORONARY ARTERY BYPASS GRAFT): ICD-10-CM

## 2021-08-24 DIAGNOSIS — R53.81 PHYSICAL DECONDITIONING: Primary | ICD-10-CM

## 2021-08-24 DIAGNOSIS — G89.4 CHRONIC PAIN SYNDROME: ICD-10-CM

## 2021-08-24 DIAGNOSIS — I49.5 SSS (SICK SINUS SYNDROME): ICD-10-CM

## 2021-08-24 DIAGNOSIS — M47.816 LUMBAR SPONDYLOSIS: ICD-10-CM

## 2021-08-24 DIAGNOSIS — I48.3 TYPICAL ATRIAL FLUTTER: ICD-10-CM

## 2021-08-24 DIAGNOSIS — Z79.01 CURRENT USE OF LONG TERM ANTICOAGULATION: ICD-10-CM

## 2021-08-24 DIAGNOSIS — I49.3 PVC (PREMATURE VENTRICULAR CONTRACTION): ICD-10-CM

## 2021-08-24 DIAGNOSIS — M47.22 OSTEOARTHRITIS OF SPINE WITH RADICULOPATHY, CERVICAL REGION: ICD-10-CM

## 2021-08-24 DIAGNOSIS — Z95.0 CARDIAC PACEMAKER IN SITU: ICD-10-CM

## 2021-08-24 DIAGNOSIS — M48.062 SPINAL STENOSIS OF LUMBAR REGION WITH NEUROGENIC CLAUDICATION: ICD-10-CM

## 2021-08-24 PROCEDURE — 99214 OFFICE O/P EST MOD 30 MIN: CPT | Mod: S$GLB,,, | Performed by: INTERNAL MEDICINE

## 2021-08-24 PROCEDURE — 1125F PR PAIN SEVERITY QUANTIFIED, PAIN PRESENT: ICD-10-PCS | Mod: CPTII,S$GLB,, | Performed by: INTERNAL MEDICINE

## 2021-08-24 PROCEDURE — 99213 OFFICE O/P EST LOW 20 MIN: CPT | Mod: 95,,, | Performed by: NURSE PRACTITIONER

## 2021-08-24 PROCEDURE — 1125F AMNT PAIN NOTED PAIN PRSNT: CPT | Mod: CPTII,S$GLB,, | Performed by: INTERNAL MEDICINE

## 2021-08-24 PROCEDURE — 93010 ELECTROCARDIOGRAM REPORT: CPT | Mod: S$GLB,,, | Performed by: INTERNAL MEDICINE

## 2021-08-24 PROCEDURE — 1157F ADVNC CARE PLAN IN RCRD: CPT | Mod: CPTII,S$GLB,, | Performed by: INTERNAL MEDICINE

## 2021-08-24 PROCEDURE — 1160F RVW MEDS BY RX/DR IN RCRD: CPT | Mod: CPTII,95,, | Performed by: NURSE PRACTITIONER

## 2021-08-24 PROCEDURE — 93005 ELECTROCARDIOGRAM TRACING: CPT | Mod: S$GLB,,, | Performed by: INTERNAL MEDICINE

## 2021-08-24 PROCEDURE — 1160F PR REVIEW ALL MEDS BY PRESCRIBER/CLIN PHARMACIST DOCUMENTED: ICD-10-PCS | Mod: CPTII,95,, | Performed by: NURSE PRACTITIONER

## 2021-08-24 PROCEDURE — 99999 PR PBB SHADOW E&M-EST. PATIENT-LVL III: CPT | Mod: PBBFAC,,, | Performed by: INTERNAL MEDICINE

## 2021-08-24 PROCEDURE — 1159F PR MEDICATION LIST DOCUMENTED IN MEDICAL RECORD: ICD-10-PCS | Mod: CPTII,95,, | Performed by: NURSE PRACTITIONER

## 2021-08-24 PROCEDURE — 1159F PR MEDICATION LIST DOCUMENTED IN MEDICAL RECORD: ICD-10-PCS | Mod: CPTII,S$GLB,, | Performed by: INTERNAL MEDICINE

## 2021-08-24 PROCEDURE — 1157F PR ADVANCE CARE PLAN OR EQUIV PRESENT IN MEDICAL RECORD: ICD-10-PCS | Mod: CPTII,95,, | Performed by: NURSE PRACTITIONER

## 2021-08-24 PROCEDURE — 93281 PM DEVICE PROGR EVAL MULTI: CPT

## 2021-08-24 PROCEDURE — 3288F PR FALLS RISK ASSESSMENT DOCUMENTED: ICD-10-PCS | Mod: CPTII,S$GLB,, | Performed by: INTERNAL MEDICINE

## 2021-08-24 PROCEDURE — 93281 PM DEVICE PROGR EVAL MULTI: CPT | Mod: 26,,, | Performed by: INTERNAL MEDICINE

## 2021-08-24 PROCEDURE — 93010 RHYTHM STRIP: ICD-10-PCS | Mod: S$GLB,,, | Performed by: INTERNAL MEDICINE

## 2021-08-24 PROCEDURE — 99499 UNLISTED E&M SERVICE: CPT | Mod: 95,,, | Performed by: NURSE PRACTITIONER

## 2021-08-24 PROCEDURE — 1101F PT FALLS ASSESS-DOCD LE1/YR: CPT | Mod: CPTII,S$GLB,, | Performed by: INTERNAL MEDICINE

## 2021-08-24 PROCEDURE — 3288F FALL RISK ASSESSMENT DOCD: CPT | Mod: CPTII,S$GLB,, | Performed by: INTERNAL MEDICINE

## 2021-08-24 PROCEDURE — 99214 PR OFFICE/OUTPT VISIT, EST, LEVL IV, 30-39 MIN: ICD-10-PCS | Mod: S$GLB,,, | Performed by: INTERNAL MEDICINE

## 2021-08-24 PROCEDURE — 1159F MED LIST DOCD IN RCRD: CPT | Mod: CPTII,S$GLB,, | Performed by: INTERNAL MEDICINE

## 2021-08-24 PROCEDURE — 1157F ADVNC CARE PLAN IN RCRD: CPT | Mod: CPTII,95,, | Performed by: NURSE PRACTITIONER

## 2021-08-24 PROCEDURE — 93005 RHYTHM STRIP: ICD-10-PCS | Mod: S$GLB,,, | Performed by: INTERNAL MEDICINE

## 2021-08-24 PROCEDURE — 99499 RISK ADDL DX/OHS AUDIT: ICD-10-PCS | Mod: 95,,, | Performed by: NURSE PRACTITIONER

## 2021-08-24 PROCEDURE — 1159F MED LIST DOCD IN RCRD: CPT | Mod: CPTII,95,, | Performed by: NURSE PRACTITIONER

## 2021-08-24 PROCEDURE — 99213 PR OFFICE/OUTPT VISIT, EST, LEVL III, 20-29 MIN: ICD-10-PCS | Mod: 95,,, | Performed by: NURSE PRACTITIONER

## 2021-08-24 PROCEDURE — 1101F PR PT FALLS ASSESS DOC 0-1 FALLS W/OUT INJ PAST YR: ICD-10-PCS | Mod: CPTII,S$GLB,, | Performed by: INTERNAL MEDICINE

## 2021-08-24 PROCEDURE — 99999 PR PBB SHADOW E&M-EST. PATIENT-LVL III: ICD-10-PCS | Mod: PBBFAC,,, | Performed by: INTERNAL MEDICINE

## 2021-08-24 PROCEDURE — 1157F PR ADVANCE CARE PLAN OR EQUIV PRESENT IN MEDICAL RECORD: ICD-10-PCS | Mod: CPTII,S$GLB,, | Performed by: INTERNAL MEDICINE

## 2021-08-24 PROCEDURE — 93281 CARDIAC DEVICE CHECK - IN CLINIC & HOSPITAL: ICD-10-PCS | Mod: 26,,, | Performed by: INTERNAL MEDICINE

## 2021-08-24 PROCEDURE — 93284 PRGRMG EVAL IMPLANTABLE DFB: CPT

## 2021-08-24 RX ORDER — HYDROCODONE BITARTRATE AND ACETAMINOPHEN 5; 325 MG/1; MG/1
1 TABLET ORAL 3 TIMES DAILY PRN
Qty: 75 TABLET | Refills: 0 | Status: SHIPPED | OUTPATIENT
Start: 2021-08-24 | End: 2022-01-11

## 2021-08-27 ENCOUNTER — TELEPHONE (OUTPATIENT)
Dept: HEMATOLOGY/ONCOLOGY | Facility: CLINIC | Age: 86
End: 2021-08-27

## 2021-09-02 ENCOUNTER — PATIENT OUTREACH (OUTPATIENT)
Dept: ADMINISTRATIVE | Facility: OTHER | Age: 86
End: 2021-09-02

## 2021-09-10 DIAGNOSIS — H40.1122 PRIMARY OPEN-ANGLE GLAUCOMA, LEFT EYE, MODERATE STAGE: ICD-10-CM

## 2021-09-10 RX ORDER — LATANOPROST 50 UG/ML
1 SOLUTION/ DROPS OPHTHALMIC NIGHTLY
Qty: 15 ML | Refills: 3 | Status: SHIPPED | OUTPATIENT
Start: 2021-09-10

## 2021-09-13 ENCOUNTER — OFFICE VISIT (OUTPATIENT)
Dept: OPHTHALMOLOGY | Facility: CLINIC | Age: 86
End: 2021-09-13
Payer: MEDICARE

## 2021-09-13 DIAGNOSIS — H35.342 LAMELLAR MACULAR HOLE OF LEFT EYE: ICD-10-CM

## 2021-09-13 DIAGNOSIS — H40.1122 PRIMARY OPEN-ANGLE GLAUCOMA, LEFT EYE, MODERATE STAGE: Primary | ICD-10-CM

## 2021-09-13 DIAGNOSIS — H35.372 EPIRETINAL MEMBRANE, LEFT EYE: ICD-10-CM

## 2021-09-13 DIAGNOSIS — H33.312 RETINAL TEAR OF LEFT EYE: ICD-10-CM

## 2021-09-13 DIAGNOSIS — Z96.1 PSEUDOPHAKIA OF LEFT EYE: ICD-10-CM

## 2021-09-13 DIAGNOSIS — H43.812 POSTERIOR VITREOUS DETACHMENT OF LEFT EYE: ICD-10-CM

## 2021-09-13 DIAGNOSIS — Z97.0 PROSTHETIC EYE GLOBE: ICD-10-CM

## 2021-09-13 PROCEDURE — 1159F MED LIST DOCD IN RCRD: CPT | Mod: CPTII,S$GLB,, | Performed by: OPHTHALMOLOGY

## 2021-09-13 PROCEDURE — 1157F PR ADVANCE CARE PLAN OR EQUIV PRESENT IN MEDICAL RECORD: ICD-10-PCS | Mod: CPTII,S$GLB,, | Performed by: OPHTHALMOLOGY

## 2021-09-13 PROCEDURE — 3288F PR FALLS RISK ASSESSMENT DOCUMENTED: ICD-10-PCS | Mod: CPTII,S$GLB,, | Performed by: OPHTHALMOLOGY

## 2021-09-13 PROCEDURE — 92012 PR EYE EXAM, EST PATIENT,INTERMED: ICD-10-PCS | Mod: S$GLB,,, | Performed by: OPHTHALMOLOGY

## 2021-09-13 PROCEDURE — 1100F PR PT FALLS ASSESS DOC 2+ FALLS/FALL W/INJURY/YR: ICD-10-PCS | Mod: CPTII,S$GLB,, | Performed by: OPHTHALMOLOGY

## 2021-09-13 PROCEDURE — 92020 GONIOSCOPY: CPT | Mod: S$GLB,,, | Performed by: OPHTHALMOLOGY

## 2021-09-13 PROCEDURE — 3288F FALL RISK ASSESSMENT DOCD: CPT | Mod: CPTII,S$GLB,, | Performed by: OPHTHALMOLOGY

## 2021-09-13 PROCEDURE — 92020 PR SPECIAL EYE EVAL,GONIOSCOPY: ICD-10-PCS | Mod: S$GLB,,, | Performed by: OPHTHALMOLOGY

## 2021-09-13 PROCEDURE — 1160F PR REVIEW ALL MEDS BY PRESCRIBER/CLIN PHARMACIST DOCUMENTED: ICD-10-PCS | Mod: CPTII,S$GLB,, | Performed by: OPHTHALMOLOGY

## 2021-09-13 PROCEDURE — 1100F PTFALLS ASSESS-DOCD GE2>/YR: CPT | Mod: CPTII,S$GLB,, | Performed by: OPHTHALMOLOGY

## 2021-09-13 PROCEDURE — 92012 INTRM OPH EXAM EST PATIENT: CPT | Mod: S$GLB,,, | Performed by: OPHTHALMOLOGY

## 2021-09-13 PROCEDURE — 1126F PR PAIN SEVERITY QUANTIFIED, NO PAIN PRESENT: ICD-10-PCS | Mod: CPTII,S$GLB,, | Performed by: OPHTHALMOLOGY

## 2021-09-13 PROCEDURE — 99999 PR PBB SHADOW E&M-EST. PATIENT-LVL III: CPT | Mod: PBBFAC,,, | Performed by: OPHTHALMOLOGY

## 2021-09-13 PROCEDURE — 1159F PR MEDICATION LIST DOCUMENTED IN MEDICAL RECORD: ICD-10-PCS | Mod: CPTII,S$GLB,, | Performed by: OPHTHALMOLOGY

## 2021-09-13 PROCEDURE — 1160F RVW MEDS BY RX/DR IN RCRD: CPT | Mod: CPTII,S$GLB,, | Performed by: OPHTHALMOLOGY

## 2021-09-13 PROCEDURE — 99999 PR PBB SHADOW E&M-EST. PATIENT-LVL III: ICD-10-PCS | Mod: PBBFAC,,, | Performed by: OPHTHALMOLOGY

## 2021-09-13 PROCEDURE — 1157F ADVNC CARE PLAN IN RCRD: CPT | Mod: CPTII,S$GLB,, | Performed by: OPHTHALMOLOGY

## 2021-09-13 PROCEDURE — 1126F AMNT PAIN NOTED NONE PRSNT: CPT | Mod: CPTII,S$GLB,, | Performed by: OPHTHALMOLOGY

## 2021-09-14 ENCOUNTER — LAB VISIT (OUTPATIENT)
Dept: LAB | Facility: HOSPITAL | Age: 86
End: 2021-09-14
Payer: MEDICARE

## 2021-09-14 DIAGNOSIS — D53.9 MACROCYTIC ANEMIA: ICD-10-CM

## 2021-09-14 DIAGNOSIS — D69.6 THROMBOCYTOPENIA: ICD-10-CM

## 2021-09-14 LAB
ACANTHOCYTES BLD QL SMEAR: PRESENT
ANISOCYTOSIS BLD QL SMEAR: SLIGHT
BASOPHILS # BLD AUTO: 0.09 K/UL (ref 0–0.2)
BASOPHILS NFR BLD: 3.4 % (ref 0–1.9)
BURR CELLS BLD QL SMEAR: ABNORMAL
DACRYOCYTES BLD QL SMEAR: ABNORMAL
DIFFERENTIAL METHOD: ABNORMAL
EOSINOPHIL # BLD AUTO: 0.2 K/UL (ref 0–0.5)
EOSINOPHIL NFR BLD: 8.8 % (ref 0–8)
ERYTHROCYTE [DISTWIDTH] IN BLOOD BY AUTOMATED COUNT: 16.7 % (ref 11.5–14.5)
FERRITIN SERPL-MCNC: 108 NG/ML (ref 20–300)
HCT VFR BLD AUTO: 28.2 % (ref 40–54)
HGB BLD-MCNC: 9.2 G/DL (ref 14–18)
IMM GRANULOCYTES # BLD AUTO: 0.01 K/UL (ref 0–0.04)
IMM GRANULOCYTES NFR BLD AUTO: 0.4 % (ref 0–0.5)
IRON SERPL-MCNC: 85 UG/DL (ref 45–160)
LDH SERPL L TO P-CCNC: 209 U/L (ref 110–260)
LYMPHOCYTES # BLD AUTO: 0.7 K/UL (ref 1–4.8)
LYMPHOCYTES NFR BLD: 26.8 % (ref 18–48)
MCH RBC QN AUTO: 37.2 PG (ref 27–31)
MCHC RBC AUTO-ENTMCNC: 32.6 G/DL (ref 32–36)
MCV RBC AUTO: 114 FL (ref 82–98)
MONOCYTES # BLD AUTO: 0.4 K/UL (ref 0.3–1)
MONOCYTES NFR BLD: 13.4 % (ref 4–15)
NEUTROPHILS # BLD AUTO: 1.2 K/UL (ref 1.8–7.7)
NEUTROPHILS NFR BLD: 47.2 % (ref 38–73)
NRBC BLD-RTO: 0 /100 WBC
OVALOCYTES BLD QL SMEAR: ABNORMAL
PLATELET # BLD AUTO: 78 K/UL (ref 150–450)
PLATELET BLD QL SMEAR: ABNORMAL
PMV BLD AUTO: 13.7 FL (ref 9.2–12.9)
POIKILOCYTOSIS BLD QL SMEAR: SLIGHT
RBC # BLD AUTO: 2.47 M/UL (ref 4.6–6.2)
RETICS/RBC NFR AUTO: 1.7 % (ref 0.4–2)
SATURATED IRON: 26 % (ref 20–50)
SCHISTOCYTES BLD QL SMEAR: ABNORMAL
SCHISTOCYTES BLD QL SMEAR: PRESENT
TOTAL IRON BINDING CAPACITY: 321 UG/DL (ref 250–450)
TRANSFERRIN SERPL-MCNC: 217 MG/DL (ref 200–375)
WBC # BLD AUTO: 2.61 K/UL (ref 3.9–12.7)

## 2021-09-14 PROCEDURE — 84466 ASSAY OF TRANSFERRIN: CPT | Performed by: INTERNAL MEDICINE

## 2021-09-14 PROCEDURE — 83615 LACTATE (LD) (LDH) ENZYME: CPT | Performed by: INTERNAL MEDICINE

## 2021-09-14 PROCEDURE — 85045 AUTOMATED RETICULOCYTE COUNT: CPT | Performed by: INTERNAL MEDICINE

## 2021-09-14 PROCEDURE — 85025 COMPLETE CBC W/AUTO DIFF WBC: CPT | Performed by: INTERNAL MEDICINE

## 2021-09-14 PROCEDURE — 36415 COLL VENOUS BLD VENIPUNCTURE: CPT | Performed by: INTERNAL MEDICINE

## 2021-09-14 PROCEDURE — 82728 ASSAY OF FERRITIN: CPT | Performed by: INTERNAL MEDICINE

## 2021-09-22 ENCOUNTER — CLINICAL SUPPORT (OUTPATIENT)
Dept: CARDIOLOGY | Facility: HOSPITAL | Age: 86
End: 2021-09-22
Payer: MEDICARE

## 2021-09-22 DIAGNOSIS — I48.91 UNSPECIFIED ATRIAL FIBRILLATION: ICD-10-CM

## 2021-09-22 DIAGNOSIS — I49.5 SICK SINUS SYNDROME: ICD-10-CM

## 2021-09-22 DIAGNOSIS — Z95.0 PRESENCE OF CARDIAC PACEMAKER: ICD-10-CM

## 2021-09-22 DIAGNOSIS — Z95.810 PRESENCE OF AUTOMATIC (IMPLANTABLE) CARDIAC DEFIBRILLATOR: ICD-10-CM

## 2021-09-22 PROCEDURE — 93294 CARDIAC DEVICE CHECK - REMOTE: ICD-10-PCS | Mod: HCNC,,, | Performed by: INTERNAL MEDICINE

## 2021-09-22 PROCEDURE — 93296 REM INTERROG EVL PM/IDS: CPT | Mod: HCNC | Performed by: INTERNAL MEDICINE

## 2021-09-22 PROCEDURE — 93294 REM INTERROG EVL PM/LDLS PM: CPT | Mod: HCNC,,, | Performed by: INTERNAL MEDICINE

## 2021-09-28 ENCOUNTER — IMMUNIZATION (OUTPATIENT)
Dept: INTERNAL MEDICINE | Facility: CLINIC | Age: 86
End: 2021-09-28
Payer: MEDICARE

## 2021-09-28 DIAGNOSIS — Z23 NEED FOR VACCINATION: Primary | ICD-10-CM

## 2021-09-28 PROCEDURE — 91300 COVID-19, MRNA, LNP-S, PF, 30 MCG/0.3 ML DOSE VACCINE: CPT | Mod: HCNC,PBBFAC | Performed by: INTERNAL MEDICINE

## 2021-09-28 PROCEDURE — 0003A COVID-19, MRNA, LNP-S, PF, 30 MCG/0.3 ML DOSE VACCINE: CPT | Mod: HCNC,CV19,PBBFAC | Performed by: INTERNAL MEDICINE

## 2021-10-05 ENCOUNTER — PATIENT MESSAGE (OUTPATIENT)
Dept: ADMINISTRATIVE | Facility: OTHER | Age: 86
End: 2021-10-05

## 2021-10-07 ENCOUNTER — TELEPHONE (OUTPATIENT)
Dept: HEMATOLOGY/ONCOLOGY | Facility: CLINIC | Age: 86
End: 2021-10-07

## 2021-10-07 ENCOUNTER — PATIENT MESSAGE (OUTPATIENT)
Dept: HEMATOLOGY/ONCOLOGY | Facility: CLINIC | Age: 86
End: 2021-10-07

## 2021-10-13 ENCOUNTER — PATIENT MESSAGE (OUTPATIENT)
Dept: ADMINISTRATIVE | Facility: OTHER | Age: 86
End: 2021-10-13
Payer: MEDICARE

## 2021-10-18 ENCOUNTER — PATIENT MESSAGE (OUTPATIENT)
Dept: INTERNAL MEDICINE | Facility: CLINIC | Age: 86
End: 2021-10-18
Payer: MEDICARE

## 2021-10-19 ENCOUNTER — OFFICE VISIT (OUTPATIENT)
Dept: INTERNAL MEDICINE | Facility: CLINIC | Age: 86
End: 2021-10-19
Payer: MEDICARE

## 2021-10-19 ENCOUNTER — LAB VISIT (OUTPATIENT)
Dept: LAB | Facility: HOSPITAL | Age: 86
End: 2021-10-19
Attending: INTERNAL MEDICINE
Payer: MEDICARE

## 2021-10-19 VITALS
WEIGHT: 156 LBS | OXYGEN SATURATION: 97 % | DIASTOLIC BLOOD PRESSURE: 74 MMHG | HEIGHT: 67 IN | BODY MASS INDEX: 24.48 KG/M2 | HEART RATE: 64 BPM | SYSTOLIC BLOOD PRESSURE: 130 MMHG

## 2021-10-19 DIAGNOSIS — D69.6 THROMBOCYTOPENIA: ICD-10-CM

## 2021-10-19 DIAGNOSIS — E78.2 MIXED HYPERLIPIDEMIA: ICD-10-CM

## 2021-10-19 DIAGNOSIS — M48.062 SPINAL STENOSIS OF LUMBAR REGION WITH NEUROGENIC CLAUDICATION: ICD-10-CM

## 2021-10-19 DIAGNOSIS — I10 ESSENTIAL HYPERTENSION: ICD-10-CM

## 2021-10-19 DIAGNOSIS — I25.5 ISCHEMIC CARDIOMYOPATHY: ICD-10-CM

## 2021-10-19 DIAGNOSIS — I25.810 CORONARY ARTERY DISEASE INVOLVING CORONARY BYPASS GRAFT OF NATIVE HEART WITHOUT ANGINA PECTORIS: ICD-10-CM

## 2021-10-19 DIAGNOSIS — D64.9 CHRONIC ANEMIA: ICD-10-CM

## 2021-10-19 DIAGNOSIS — D64.9 CHRONIC ANEMIA: Primary | ICD-10-CM

## 2021-10-19 LAB
ALBUMIN SERPL BCP-MCNC: 3.9 G/DL (ref 3.5–5.2)
ALP SERPL-CCNC: 81 U/L (ref 55–135)
ALT SERPL W/O P-5'-P-CCNC: 17 U/L (ref 10–44)
ANION GAP SERPL CALC-SCNC: 6 MMOL/L (ref 8–16)
AST SERPL-CCNC: 29 U/L (ref 10–40)
BILIRUB SERPL-MCNC: 1.6 MG/DL (ref 0.1–1)
BUN SERPL-MCNC: 24 MG/DL (ref 10–30)
CALCIUM SERPL-MCNC: 9.6 MG/DL (ref 8.7–10.5)
CHLORIDE SERPL-SCNC: 110 MMOL/L (ref 95–110)
CHOLEST SERPL-MCNC: 90 MG/DL (ref 120–199)
CHOLEST/HDLC SERPL: 2 {RATIO} (ref 2–5)
CO2 SERPL-SCNC: 24 MMOL/L (ref 23–29)
CREAT SERPL-MCNC: 1.4 MG/DL (ref 0.5–1.4)
EST. GFR  (AFRICAN AMERICAN): 50.4 ML/MIN/1.73 M^2
EST. GFR  (NON AFRICAN AMERICAN): 43.6 ML/MIN/1.73 M^2
GLUCOSE SERPL-MCNC: 88 MG/DL (ref 70–110)
HDLC SERPL-MCNC: 44 MG/DL (ref 40–75)
HDLC SERPL: 48.9 % (ref 20–50)
LDLC SERPL CALC-MCNC: 37 MG/DL (ref 63–159)
NONHDLC SERPL-MCNC: 46 MG/DL
POTASSIUM SERPL-SCNC: 4.2 MMOL/L (ref 3.5–5.1)
PROT SERPL-MCNC: 6.5 G/DL (ref 6–8.4)
SODIUM SERPL-SCNC: 140 MMOL/L (ref 136–145)
TRIGL SERPL-MCNC: 45 MG/DL (ref 30–150)
TSH SERPL DL<=0.005 MIU/L-ACNC: 3.02 UIU/ML (ref 0.4–4)
TSH SERPL DL<=0.005 MIU/L-ACNC: 3.02 UIU/ML (ref 0.4–4)

## 2021-10-19 PROCEDURE — 36415 COLL VENOUS BLD VENIPUNCTURE: CPT | Mod: HCNC | Performed by: INTERNAL MEDICINE

## 2021-10-19 PROCEDURE — 80053 COMPREHEN METABOLIC PANEL: CPT | Mod: HCNC | Performed by: INTERNAL MEDICINE

## 2021-10-19 PROCEDURE — 1159F PR MEDICATION LIST DOCUMENTED IN MEDICAL RECORD: ICD-10-PCS | Mod: HCNC,CPTII,S$GLB, | Performed by: INTERNAL MEDICINE

## 2021-10-19 PROCEDURE — 99999 PR PBB SHADOW E&M-EST. PATIENT-LVL III: ICD-10-PCS | Mod: PBBFAC,HCNC,, | Performed by: INTERNAL MEDICINE

## 2021-10-19 PROCEDURE — 84443 ASSAY THYROID STIM HORMONE: CPT | Mod: HCNC | Performed by: INTERNAL MEDICINE

## 2021-10-19 PROCEDURE — 99214 OFFICE O/P EST MOD 30 MIN: CPT | Mod: HCNC,S$GLB,, | Performed by: INTERNAL MEDICINE

## 2021-10-19 PROCEDURE — 3288F PR FALLS RISK ASSESSMENT DOCUMENTED: ICD-10-PCS | Mod: HCNC,CPTII,S$GLB, | Performed by: INTERNAL MEDICINE

## 2021-10-19 PROCEDURE — 1126F AMNT PAIN NOTED NONE PRSNT: CPT | Mod: HCNC,CPTII,S$GLB, | Performed by: INTERNAL MEDICINE

## 2021-10-19 PROCEDURE — 99214 PR OFFICE/OUTPT VISIT, EST, LEVL IV, 30-39 MIN: ICD-10-PCS | Mod: HCNC,S$GLB,, | Performed by: INTERNAL MEDICINE

## 2021-10-19 PROCEDURE — 1157F PR ADVANCE CARE PLAN OR EQUIV PRESENT IN MEDICAL RECORD: ICD-10-PCS | Mod: HCNC,CPTII,S$GLB, | Performed by: INTERNAL MEDICINE

## 2021-10-19 PROCEDURE — 99499 UNLISTED E&M SERVICE: CPT | Mod: HCNC,S$GLB,, | Performed by: INTERNAL MEDICINE

## 2021-10-19 PROCEDURE — 1157F ADVNC CARE PLAN IN RCRD: CPT | Mod: HCNC,CPTII,S$GLB, | Performed by: INTERNAL MEDICINE

## 2021-10-19 PROCEDURE — 80061 LIPID PANEL: CPT | Mod: HCNC | Performed by: INTERNAL MEDICINE

## 2021-10-19 PROCEDURE — 1126F PR PAIN SEVERITY QUANTIFIED, NO PAIN PRESENT: ICD-10-PCS | Mod: HCNC,CPTII,S$GLB, | Performed by: INTERNAL MEDICINE

## 2021-10-19 PROCEDURE — 99499 RISK ADDL DX/OHS AUDIT: ICD-10-PCS | Mod: HCNC,S$GLB,, | Performed by: INTERNAL MEDICINE

## 2021-10-19 PROCEDURE — 3288F FALL RISK ASSESSMENT DOCD: CPT | Mod: HCNC,CPTII,S$GLB, | Performed by: INTERNAL MEDICINE

## 2021-10-19 PROCEDURE — 1101F PR PT FALLS ASSESS DOC 0-1 FALLS W/OUT INJ PAST YR: ICD-10-PCS | Mod: HCNC,CPTII,S$GLB, | Performed by: INTERNAL MEDICINE

## 2021-10-19 PROCEDURE — 1159F MED LIST DOCD IN RCRD: CPT | Mod: HCNC,CPTII,S$GLB, | Performed by: INTERNAL MEDICINE

## 2021-10-19 PROCEDURE — 1101F PT FALLS ASSESS-DOCD LE1/YR: CPT | Mod: HCNC,CPTII,S$GLB, | Performed by: INTERNAL MEDICINE

## 2021-10-19 PROCEDURE — 99999 PR PBB SHADOW E&M-EST. PATIENT-LVL III: CPT | Mod: PBBFAC,HCNC,, | Performed by: INTERNAL MEDICINE

## 2021-11-05 ENCOUNTER — OFFICE VISIT (OUTPATIENT)
Dept: DERMATOLOGY | Facility: CLINIC | Age: 86
End: 2021-11-05
Payer: MEDICARE

## 2021-11-05 DIAGNOSIS — L72.0 RUPTURED EPITHELIAL INCLUSION CYST: Primary | ICD-10-CM

## 2021-11-05 PROCEDURE — 3288F PR FALLS RISK ASSESSMENT DOCUMENTED: ICD-10-PCS | Mod: CPTII,S$GLB,, | Performed by: DERMATOLOGY

## 2021-11-05 PROCEDURE — 99499 UNLISTED E&M SERVICE: CPT | Mod: S$GLB,,, | Performed by: DERMATOLOGY

## 2021-11-05 PROCEDURE — 1101F PR PT FALLS ASSESS DOC 0-1 FALLS W/OUT INJ PAST YR: ICD-10-PCS | Mod: CPTII,S$GLB,, | Performed by: DERMATOLOGY

## 2021-11-05 PROCEDURE — 3288F FALL RISK ASSESSMENT DOCD: CPT | Mod: CPTII,S$GLB,, | Performed by: DERMATOLOGY

## 2021-11-05 PROCEDURE — 1126F PR PAIN SEVERITY QUANTIFIED, NO PAIN PRESENT: ICD-10-PCS | Mod: CPTII,S$GLB,, | Performed by: DERMATOLOGY

## 2021-11-05 PROCEDURE — 1101F PT FALLS ASSESS-DOCD LE1/YR: CPT | Mod: CPTII,S$GLB,, | Performed by: DERMATOLOGY

## 2021-11-05 PROCEDURE — 88304 PR  SURG PATH,LEVEL III: ICD-10-PCS | Mod: 26,,, | Performed by: PATHOLOGY

## 2021-11-05 PROCEDURE — 10061 I&D ABSCESS COMP/MULTIPLE: CPT | Mod: S$GLB,,, | Performed by: DERMATOLOGY

## 2021-11-05 PROCEDURE — 88304 TISSUE EXAM BY PATHOLOGIST: CPT | Mod: 26,,, | Performed by: PATHOLOGY

## 2021-11-05 PROCEDURE — 1159F PR MEDICATION LIST DOCUMENTED IN MEDICAL RECORD: ICD-10-PCS | Mod: CPTII,S$GLB,, | Performed by: DERMATOLOGY

## 2021-11-05 PROCEDURE — 1157F ADVNC CARE PLAN IN RCRD: CPT | Mod: CPTII,S$GLB,, | Performed by: DERMATOLOGY

## 2021-11-05 PROCEDURE — 88304 TISSUE EXAM BY PATHOLOGIST: CPT | Performed by: PATHOLOGY

## 2021-11-05 PROCEDURE — 1126F AMNT PAIN NOTED NONE PRSNT: CPT | Mod: CPTII,S$GLB,, | Performed by: DERMATOLOGY

## 2021-11-05 PROCEDURE — 99999 PR PBB SHADOW E&M-EST. PATIENT-LVL III: ICD-10-PCS | Mod: PBBFAC,,, | Performed by: DERMATOLOGY

## 2021-11-05 PROCEDURE — 99499 NO LOS: ICD-10-PCS | Mod: S$GLB,,, | Performed by: DERMATOLOGY

## 2021-11-05 PROCEDURE — 1159F MED LIST DOCD IN RCRD: CPT | Mod: CPTII,S$GLB,, | Performed by: DERMATOLOGY

## 2021-11-05 PROCEDURE — 10061 PR DRAIN SKIN ABSCESS COMPLIC: ICD-10-PCS | Mod: S$GLB,,, | Performed by: DERMATOLOGY

## 2021-11-05 PROCEDURE — 1157F PR ADVANCE CARE PLAN OR EQUIV PRESENT IN MEDICAL RECORD: ICD-10-PCS | Mod: CPTII,S$GLB,, | Performed by: DERMATOLOGY

## 2021-11-05 PROCEDURE — 99999 PR PBB SHADOW E&M-EST. PATIENT-LVL III: CPT | Mod: PBBFAC,,, | Performed by: DERMATOLOGY

## 2021-11-10 ENCOUNTER — LAB VISIT (OUTPATIENT)
Dept: LAB | Facility: HOSPITAL | Age: 86
End: 2021-11-10
Attending: INTERNAL MEDICINE
Payer: MEDICARE

## 2021-11-10 ENCOUNTER — TELEPHONE (OUTPATIENT)
Dept: DERMATOLOGY | Facility: CLINIC | Age: 86
End: 2021-11-10
Payer: MEDICARE

## 2021-11-10 ENCOUNTER — OFFICE VISIT (OUTPATIENT)
Dept: HEMATOLOGY/ONCOLOGY | Facility: CLINIC | Age: 86
End: 2021-11-10
Payer: MEDICARE

## 2021-11-10 VITALS
SYSTOLIC BLOOD PRESSURE: 170 MMHG | OXYGEN SATURATION: 99 % | WEIGHT: 160.69 LBS | BODY MASS INDEX: 25.22 KG/M2 | RESPIRATION RATE: 16 BRPM | HEART RATE: 57 BPM | DIASTOLIC BLOOD PRESSURE: 74 MMHG | HEIGHT: 67 IN

## 2021-11-10 DIAGNOSIS — D53.9 MACROCYTIC ANEMIA: ICD-10-CM

## 2021-11-10 DIAGNOSIS — G62.9 PERIPHERAL POLYNEUROPATHY: Primary | ICD-10-CM

## 2021-11-10 DIAGNOSIS — D50.9 IRON DEFICIENCY ANEMIA, UNSPECIFIED IRON DEFICIENCY ANEMIA TYPE: ICD-10-CM

## 2021-11-10 DIAGNOSIS — D70.8 OTHER NEUTROPENIA: ICD-10-CM

## 2021-11-10 DIAGNOSIS — D69.6 THROMBOCYTOPENIA: ICD-10-CM

## 2021-11-10 DIAGNOSIS — N18.30 STAGE 3 CHRONIC KIDNEY DISEASE, UNSPECIFIED WHETHER STAGE 3A OR 3B CKD: ICD-10-CM

## 2021-11-10 LAB
ACANTHOCYTES BLD QL SMEAR: PRESENT
ANISOCYTOSIS BLD QL SMEAR: SLIGHT
BASOPHILS # BLD AUTO: 0.09 K/UL (ref 0–0.2)
BASOPHILS NFR BLD: 2.8 % (ref 0–1.9)
DIFFERENTIAL METHOD: ABNORMAL
EOSINOPHIL # BLD AUTO: 0.2 K/UL (ref 0–0.5)
EOSINOPHIL NFR BLD: 6.6 % (ref 0–8)
ERYTHROCYTE [DISTWIDTH] IN BLOOD BY AUTOMATED COUNT: 16.5 % (ref 11.5–14.5)
FERRITIN SERPL-MCNC: 99 NG/ML (ref 20–300)
HCT VFR BLD AUTO: 28.5 % (ref 40–54)
HGB BLD-MCNC: 9.2 G/DL (ref 14–18)
HYPOCHROMIA BLD QL SMEAR: ABNORMAL
IMM GRANULOCYTES # BLD AUTO: 0.01 K/UL (ref 0–0.04)
IMM GRANULOCYTES NFR BLD AUTO: 0.3 % (ref 0–0.5)
IRON SERPL-MCNC: 91 UG/DL (ref 45–160)
LDH SERPL L TO P-CCNC: 201 U/L (ref 110–260)
LYMPHOCYTES # BLD AUTO: 0.9 K/UL (ref 1–4.8)
LYMPHOCYTES NFR BLD: 29.2 % (ref 18–48)
MCH RBC QN AUTO: 37.1 PG (ref 27–31)
MCHC RBC AUTO-ENTMCNC: 32.3 G/DL (ref 32–36)
MCV RBC AUTO: 115 FL (ref 82–98)
MONOCYTES # BLD AUTO: 0.5 K/UL (ref 0.3–1)
MONOCYTES NFR BLD: 15.1 % (ref 4–15)
NEUTROPHILS # BLD AUTO: 1.5 K/UL (ref 1.8–7.7)
NEUTROPHILS NFR BLD: 46 % (ref 38–73)
NRBC BLD-RTO: 0 /100 WBC
OVALOCYTES BLD QL SMEAR: ABNORMAL
PLATELET # BLD AUTO: 72 K/UL (ref 150–450)
PLATELET BLD QL SMEAR: ABNORMAL
PMV BLD AUTO: 14.5 FL (ref 9.2–12.9)
POIKILOCYTOSIS BLD QL SMEAR: SLIGHT
RBC # BLD AUTO: 2.48 M/UL (ref 4.6–6.2)
RETICS/RBC NFR AUTO: 1.8 % (ref 0.4–2)
SATURATED IRON: 27 % (ref 20–50)
SCHISTOCYTES BLD QL SMEAR: ABNORMAL
SCHISTOCYTES BLD QL SMEAR: PRESENT
TARGETS BLD QL SMEAR: ABNORMAL
TOTAL IRON BINDING CAPACITY: 340 UG/DL (ref 250–450)
TRANSFERRIN SERPL-MCNC: 230 MG/DL (ref 200–375)
WBC # BLD AUTO: 3.18 K/UL (ref 3.9–12.7)

## 2021-11-10 PROCEDURE — 99999 PR PBB SHADOW E&M-EST. PATIENT-LVL III: CPT | Mod: PBBFAC,,, | Performed by: INTERNAL MEDICINE

## 2021-11-10 PROCEDURE — 3288F PR FALLS RISK ASSESSMENT DOCUMENTED: ICD-10-PCS | Mod: CPTII,S$GLB,, | Performed by: INTERNAL MEDICINE

## 2021-11-10 PROCEDURE — 1101F PT FALLS ASSESS-DOCD LE1/YR: CPT | Mod: CPTII,S$GLB,, | Performed by: INTERNAL MEDICINE

## 2021-11-10 PROCEDURE — 1157F ADVNC CARE PLAN IN RCRD: CPT | Mod: CPTII,S$GLB,, | Performed by: INTERNAL MEDICINE

## 2021-11-10 PROCEDURE — 99499 UNLISTED E&M SERVICE: CPT | Mod: HCNC,S$GLB,, | Performed by: INTERNAL MEDICINE

## 2021-11-10 PROCEDURE — 99999 PR PBB SHADOW E&M-EST. PATIENT-LVL III: ICD-10-PCS | Mod: PBBFAC,,, | Performed by: INTERNAL MEDICINE

## 2021-11-10 PROCEDURE — 1125F AMNT PAIN NOTED PAIN PRSNT: CPT | Mod: CPTII,S$GLB,, | Performed by: INTERNAL MEDICINE

## 2021-11-10 PROCEDURE — 1159F MED LIST DOCD IN RCRD: CPT | Mod: CPTII,S$GLB,, | Performed by: INTERNAL MEDICINE

## 2021-11-10 PROCEDURE — 99215 PR OFFICE/OUTPT VISIT, EST, LEVL V, 40-54 MIN: ICD-10-PCS | Mod: S$GLB,,, | Performed by: INTERNAL MEDICINE

## 2021-11-10 PROCEDURE — 36415 COLL VENOUS BLD VENIPUNCTURE: CPT | Performed by: INTERNAL MEDICINE

## 2021-11-10 PROCEDURE — 85025 COMPLETE CBC W/AUTO DIFF WBC: CPT | Performed by: INTERNAL MEDICINE

## 2021-11-10 PROCEDURE — 99499 RISK ADDL DX/OHS AUDIT: ICD-10-PCS | Mod: HCNC,S$GLB,, | Performed by: INTERNAL MEDICINE

## 2021-11-10 PROCEDURE — 99215 OFFICE O/P EST HI 40 MIN: CPT | Mod: S$GLB,,, | Performed by: INTERNAL MEDICINE

## 2021-11-10 PROCEDURE — 1101F PR PT FALLS ASSESS DOC 0-1 FALLS W/OUT INJ PAST YR: ICD-10-PCS | Mod: CPTII,S$GLB,, | Performed by: INTERNAL MEDICINE

## 2021-11-10 PROCEDURE — 85045 AUTOMATED RETICULOCYTE COUNT: CPT | Performed by: INTERNAL MEDICINE

## 2021-11-10 PROCEDURE — 1125F PR PAIN SEVERITY QUANTIFIED, PAIN PRESENT: ICD-10-PCS | Mod: CPTII,S$GLB,, | Performed by: INTERNAL MEDICINE

## 2021-11-10 PROCEDURE — 1159F PR MEDICATION LIST DOCUMENTED IN MEDICAL RECORD: ICD-10-PCS | Mod: CPTII,S$GLB,, | Performed by: INTERNAL MEDICINE

## 2021-11-10 PROCEDURE — 84466 ASSAY OF TRANSFERRIN: CPT | Performed by: INTERNAL MEDICINE

## 2021-11-10 PROCEDURE — 1157F PR ADVANCE CARE PLAN OR EQUIV PRESENT IN MEDICAL RECORD: ICD-10-PCS | Mod: CPTII,S$GLB,, | Performed by: INTERNAL MEDICINE

## 2021-11-10 PROCEDURE — 82728 ASSAY OF FERRITIN: CPT | Performed by: INTERNAL MEDICINE

## 2021-11-10 PROCEDURE — 3288F FALL RISK ASSESSMENT DOCD: CPT | Mod: CPTII,S$GLB,, | Performed by: INTERNAL MEDICINE

## 2021-11-10 PROCEDURE — 83615 LACTATE (LD) (LDH) ENZYME: CPT | Performed by: INTERNAL MEDICINE

## 2021-11-16 ENCOUNTER — PATIENT MESSAGE (OUTPATIENT)
Dept: ELECTROPHYSIOLOGY | Facility: CLINIC | Age: 86
End: 2021-11-16
Payer: MEDICARE

## 2021-11-16 DIAGNOSIS — I48.3 TYPICAL ATRIAL FLUTTER: ICD-10-CM

## 2021-11-17 LAB
FINAL PATHOLOGIC DIAGNOSIS: NORMAL
GROSS: NORMAL
Lab: NORMAL
MICROSCOPIC EXAM: NORMAL

## 2021-11-18 ENCOUNTER — PATIENT MESSAGE (OUTPATIENT)
Dept: INTERNAL MEDICINE | Facility: CLINIC | Age: 86
End: 2021-11-18
Payer: MEDICARE

## 2021-11-18 ENCOUNTER — OFFICE VISIT (OUTPATIENT)
Dept: DERMATOLOGY | Facility: CLINIC | Age: 86
End: 2021-11-18
Payer: MEDICARE

## 2021-11-18 DIAGNOSIS — D48.5 NEOPLASM OF UNCERTAIN BEHAVIOR OF SKIN: Primary | ICD-10-CM

## 2021-11-18 PROCEDURE — 3288F FALL RISK ASSESSMENT DOCD: CPT | Mod: CPTII,S$GLB,, | Performed by: DERMATOLOGY

## 2021-11-18 PROCEDURE — 99999 PR PBB SHADOW E&M-EST. PATIENT-LVL III: ICD-10-PCS | Mod: PBBFAC,,, | Performed by: DERMATOLOGY

## 2021-11-18 PROCEDURE — 11102 PR TANGENTIAL BIOPSY, SKIN, SINGLE LESION: ICD-10-PCS | Mod: S$GLB,,, | Performed by: DERMATOLOGY

## 2021-11-18 PROCEDURE — 1157F ADVNC CARE PLAN IN RCRD: CPT | Mod: CPTII,S$GLB,, | Performed by: DERMATOLOGY

## 2021-11-18 PROCEDURE — 88305 TISSUE EXAM BY PATHOLOGIST: CPT | Mod: 26,,, | Performed by: PATHOLOGY

## 2021-11-18 PROCEDURE — 99999 PR PBB SHADOW E&M-EST. PATIENT-LVL III: CPT | Mod: PBBFAC,,, | Performed by: DERMATOLOGY

## 2021-11-18 PROCEDURE — 99499 NO LOS: ICD-10-PCS | Mod: S$GLB,,, | Performed by: DERMATOLOGY

## 2021-11-18 PROCEDURE — 1159F MED LIST DOCD IN RCRD: CPT | Mod: CPTII,S$GLB,, | Performed by: DERMATOLOGY

## 2021-11-18 PROCEDURE — 11102 TANGNTL BX SKIN SINGLE LES: CPT | Mod: S$GLB,,, | Performed by: DERMATOLOGY

## 2021-11-18 PROCEDURE — 1101F PR PT FALLS ASSESS DOC 0-1 FALLS W/OUT INJ PAST YR: ICD-10-PCS | Mod: CPTII,S$GLB,, | Performed by: DERMATOLOGY

## 2021-11-18 PROCEDURE — 1126F AMNT PAIN NOTED NONE PRSNT: CPT | Mod: CPTII,S$GLB,, | Performed by: DERMATOLOGY

## 2021-11-18 PROCEDURE — 1157F PR ADVANCE CARE PLAN OR EQUIV PRESENT IN MEDICAL RECORD: ICD-10-PCS | Mod: CPTII,S$GLB,, | Performed by: DERMATOLOGY

## 2021-11-18 PROCEDURE — 1159F PR MEDICATION LIST DOCUMENTED IN MEDICAL RECORD: ICD-10-PCS | Mod: CPTII,S$GLB,, | Performed by: DERMATOLOGY

## 2021-11-18 PROCEDURE — 99499 UNLISTED E&M SERVICE: CPT | Mod: S$GLB,,, | Performed by: DERMATOLOGY

## 2021-11-18 PROCEDURE — 88305 TISSUE EXAM BY PATHOLOGIST: CPT | Performed by: PATHOLOGY

## 2021-11-18 PROCEDURE — 1126F PR PAIN SEVERITY QUANTIFIED, NO PAIN PRESENT: ICD-10-PCS | Mod: CPTII,S$GLB,, | Performed by: DERMATOLOGY

## 2021-11-18 PROCEDURE — 88305 TISSUE EXAM BY PATHOLOGIST: ICD-10-PCS | Mod: 26,,, | Performed by: PATHOLOGY

## 2021-11-18 PROCEDURE — 1101F PT FALLS ASSESS-DOCD LE1/YR: CPT | Mod: CPTII,S$GLB,, | Performed by: DERMATOLOGY

## 2021-11-18 PROCEDURE — 3288F PR FALLS RISK ASSESSMENT DOCUMENTED: ICD-10-PCS | Mod: CPTII,S$GLB,, | Performed by: DERMATOLOGY

## 2021-11-20 ENCOUNTER — PATIENT MESSAGE (OUTPATIENT)
Dept: ADMINISTRATIVE | Facility: OTHER | Age: 86
End: 2021-11-20
Payer: MEDICARE

## 2021-11-23 LAB
FINAL PATHOLOGIC DIAGNOSIS: NORMAL
GROSS: NORMAL
Lab: NORMAL
MICROSCOPIC EXAM: NORMAL

## 2021-11-29 PROBLEM — I27.20 HYPERTENSIVE PULMONARY VASCULAR DISEASE: Status: ACTIVE | Noted: 2021-11-29

## 2021-11-30 ENCOUNTER — OFFICE VISIT (OUTPATIENT)
Dept: CARDIOLOGY | Facility: CLINIC | Age: 86
End: 2021-11-30
Payer: MEDICARE

## 2021-11-30 VITALS
DIASTOLIC BLOOD PRESSURE: 65 MMHG | HEART RATE: 53 BPM | SYSTOLIC BLOOD PRESSURE: 150 MMHG | OXYGEN SATURATION: 99 % | HEIGHT: 67 IN | BODY MASS INDEX: 25.01 KG/M2 | WEIGHT: 159.38 LBS

## 2021-11-30 DIAGNOSIS — I27.20 PULMONARY HYPERTENSION: Primary | ICD-10-CM

## 2021-11-30 DIAGNOSIS — M60.9 STATIN-INDUCED MYOSITIS: ICD-10-CM

## 2021-11-30 DIAGNOSIS — Z95.0 BIVENTRICULAR CARDIAC PACEMAKER IN SITU: ICD-10-CM

## 2021-11-30 DIAGNOSIS — T46.6X5A STATIN-INDUCED MYOSITIS: ICD-10-CM

## 2021-11-30 DIAGNOSIS — I25.10 CORONARY ARTERY DISEASE INVOLVING NATIVE CORONARY ARTERY OF NATIVE HEART WITHOUT ANGINA PECTORIS: ICD-10-CM

## 2021-11-30 DIAGNOSIS — I65.21 STENOSIS OF RIGHT CAROTID ARTERY: ICD-10-CM

## 2021-11-30 DIAGNOSIS — I25.5 CARDIOMYOPATHY, ISCHEMIC: Primary | Chronic | ICD-10-CM

## 2021-11-30 DIAGNOSIS — I49.5 SSS (SICK SINUS SYNDROME): ICD-10-CM

## 2021-11-30 DIAGNOSIS — G62.9 PERIPHERAL POLYNEUROPATHY: ICD-10-CM

## 2021-11-30 DIAGNOSIS — I27.20 HYPERTENSIVE PULMONARY VASCULAR DISEASE: ICD-10-CM

## 2021-11-30 DIAGNOSIS — Z95.1 S/P CABG (CORONARY ARTERY BYPASS GRAFT): ICD-10-CM

## 2021-11-30 DIAGNOSIS — I73.9 PERIPHERAL VASCULAR DISEASE: ICD-10-CM

## 2021-11-30 DIAGNOSIS — N18.31 STAGE 3A CHRONIC KIDNEY DISEASE: ICD-10-CM

## 2021-11-30 DIAGNOSIS — I10 PRIMARY HYPERTENSION: ICD-10-CM

## 2021-11-30 DIAGNOSIS — I70.0 ATHEROSCLEROSIS OF AORTA: ICD-10-CM

## 2021-11-30 DIAGNOSIS — R48.2 GAIT APRAXIA: ICD-10-CM

## 2021-11-30 DIAGNOSIS — E78.2 MIXED HYPERLIPIDEMIA: Chronic | ICD-10-CM

## 2021-11-30 DIAGNOSIS — Z98.890 STATUS POST CATHETER ABLATION OF ATRIAL FLUTTER: ICD-10-CM

## 2021-11-30 DIAGNOSIS — I25.5 CARDIOMYOPATHY, ISCHEMIC: Primary | ICD-10-CM

## 2021-11-30 PROCEDURE — 1157F ADVNC CARE PLAN IN RCRD: CPT | Mod: CPTII,S$GLB,, | Performed by: INTERNAL MEDICINE

## 2021-11-30 PROCEDURE — 99999 PR PBB SHADOW E&M-EST. PATIENT-LVL III: ICD-10-PCS | Mod: PBBFAC,,, | Performed by: INTERNAL MEDICINE

## 2021-11-30 PROCEDURE — 1157F PR ADVANCE CARE PLAN OR EQUIV PRESENT IN MEDICAL RECORD: ICD-10-PCS | Mod: CPTII,S$GLB,, | Performed by: INTERNAL MEDICINE

## 2021-11-30 PROCEDURE — 99499 UNLISTED E&M SERVICE: CPT | Mod: S$GLB,,, | Performed by: INTERNAL MEDICINE

## 2021-11-30 PROCEDURE — 99214 OFFICE O/P EST MOD 30 MIN: CPT | Mod: S$GLB,,, | Performed by: INTERNAL MEDICINE

## 2021-11-30 PROCEDURE — 99499 RISK ADDL DX/OHS AUDIT: ICD-10-PCS | Mod: S$GLB,,, | Performed by: INTERNAL MEDICINE

## 2021-11-30 PROCEDURE — 99214 PR OFFICE/OUTPT VISIT, EST, LEVL IV, 30-39 MIN: ICD-10-PCS | Mod: S$GLB,,, | Performed by: INTERNAL MEDICINE

## 2021-11-30 PROCEDURE — 99999 PR PBB SHADOW E&M-EST. PATIENT-LVL III: CPT | Mod: PBBFAC,,, | Performed by: INTERNAL MEDICINE

## 2021-12-08 ENCOUNTER — TELEPHONE (OUTPATIENT)
Dept: TRANSPLANT | Facility: CLINIC | Age: 86
End: 2021-12-08
Payer: MEDICARE

## 2021-12-16 ENCOUNTER — PATIENT MESSAGE (OUTPATIENT)
Dept: CARDIOLOGY | Facility: CLINIC | Age: 86
End: 2021-12-16
Payer: MEDICARE

## 2021-12-16 DIAGNOSIS — E78.5 HYPERLIPIDEMIA, UNSPECIFIED HYPERLIPIDEMIA TYPE: ICD-10-CM

## 2021-12-16 RX ORDER — ROSUVASTATIN CALCIUM 20 MG/1
20 TABLET, COATED ORAL NIGHTLY
Qty: 90 TABLET | Refills: 3 | Status: SHIPPED | OUTPATIENT
Start: 2021-12-16

## 2021-12-21 ENCOUNTER — CLINICAL SUPPORT (OUTPATIENT)
Dept: CARDIOLOGY | Facility: HOSPITAL | Age: 86
End: 2021-12-21
Payer: MEDICARE

## 2021-12-21 DIAGNOSIS — I49.5 SICK SINUS SYNDROME: ICD-10-CM

## 2021-12-21 DIAGNOSIS — Z95.0 PRESENCE OF CARDIAC PACEMAKER: ICD-10-CM

## 2021-12-21 PROCEDURE — 93296 REM INTERROG EVL PM/IDS: CPT | Mod: HCNC | Performed by: INTERNAL MEDICINE

## 2021-12-21 PROCEDURE — 93294 REM INTERROG EVL PM/LDLS PM: CPT | Mod: HCNC,,, | Performed by: INTERNAL MEDICINE

## 2021-12-21 PROCEDURE — 93294 CARDIAC DEVICE CHECK - REMOTE: ICD-10-PCS | Mod: HCNC,,, | Performed by: INTERNAL MEDICINE

## 2021-12-28 ENCOUNTER — OFFICE VISIT (OUTPATIENT)
Dept: OPHTHALMOLOGY | Facility: CLINIC | Age: 86
End: 2021-12-28
Payer: MEDICARE

## 2021-12-28 ENCOUNTER — PATIENT MESSAGE (OUTPATIENT)
Dept: OPHTHALMOLOGY | Facility: CLINIC | Age: 86
End: 2021-12-28

## 2021-12-28 ENCOUNTER — HOSPITAL ENCOUNTER (INPATIENT)
Facility: HOSPITAL | Age: 86
LOS: 2 days | Discharge: HOME | DRG: 378 | End: 2022-01-01
Attending: EMERGENCY MEDICINE | Admitting: HOSPITALIST
Payer: MEDICARE

## 2021-12-28 DIAGNOSIS — K62.5 RECTAL BLEEDING: ICD-10-CM

## 2021-12-28 DIAGNOSIS — H43.812 POSTERIOR VITREOUS DETACHMENT OF LEFT EYE: ICD-10-CM

## 2021-12-28 DIAGNOSIS — Z96.1 PSEUDOPHAKIA OF LEFT EYE: ICD-10-CM

## 2021-12-28 DIAGNOSIS — D50.9 IRON DEFICIENCY ANEMIA, UNSPECIFIED IRON DEFICIENCY ANEMIA TYPE: ICD-10-CM

## 2021-12-28 DIAGNOSIS — H35.342 LAMELLAR MACULAR HOLE OF LEFT EYE: ICD-10-CM

## 2021-12-28 DIAGNOSIS — I25.5 CARDIOMYOPATHY, ISCHEMIC: Chronic | ICD-10-CM

## 2021-12-28 DIAGNOSIS — I10 PRIMARY HYPERTENSION: ICD-10-CM

## 2021-12-28 DIAGNOSIS — K92.2 ACUTE GI BLEEDING: ICD-10-CM

## 2021-12-28 DIAGNOSIS — H33.312 RETINAL TEAR OF LEFT EYE: ICD-10-CM

## 2021-12-28 DIAGNOSIS — H35.372 EPIRETINAL MEMBRANE, LEFT EYE: ICD-10-CM

## 2021-12-28 DIAGNOSIS — Z97.0 PROSTHETIC EYE GLOBE: ICD-10-CM

## 2021-12-28 DIAGNOSIS — D62 ACUTE BLOOD LOSS ANEMIA: ICD-10-CM

## 2021-12-28 DIAGNOSIS — I48.3 TYPICAL ATRIAL FLUTTER: ICD-10-CM

## 2021-12-28 DIAGNOSIS — K92.1 HEMATOCHEZIA: Primary | ICD-10-CM

## 2021-12-28 DIAGNOSIS — H40.1122 PRIMARY OPEN-ANGLE GLAUCOMA, LEFT EYE, MODERATE STAGE: Primary | ICD-10-CM

## 2021-12-28 DIAGNOSIS — R07.9 CHEST PAIN: ICD-10-CM

## 2021-12-28 DIAGNOSIS — N18.32 STAGE 3B CHRONIC KIDNEY DISEASE: ICD-10-CM

## 2021-12-28 DIAGNOSIS — E78.00 PURE HYPERCHOLESTEROLEMIA: ICD-10-CM

## 2021-12-28 DIAGNOSIS — H40.1130 PRIMARY OPEN ANGLE GLAUCOMA OF BOTH EYES, UNSPECIFIED GLAUCOMA STAGE: ICD-10-CM

## 2021-12-28 LAB
ABO + RH BLD: NORMAL
ALBUMIN SERPL BCP-MCNC: 4 G/DL (ref 3.5–5.2)
ALP SERPL-CCNC: 97 U/L (ref 55–135)
ALT SERPL W/O P-5'-P-CCNC: 14 U/L (ref 10–44)
ANION GAP SERPL CALC-SCNC: 7 MMOL/L (ref 8–16)
ANISOCYTOSIS BLD QL SMEAR: SLIGHT
APTT BLDCRRT: 26.5 SEC (ref 21–32)
AST SERPL-CCNC: 27 U/L (ref 10–40)
BASO STIPL BLD QL SMEAR: ABNORMAL
BASOPHILS # BLD AUTO: 0.12 K/UL (ref 0–0.2)
BASOPHILS NFR BLD: 2.8 % (ref 0–1.9)
BILIRUB SERPL-MCNC: 2.7 MG/DL (ref 0.1–1)
BLD GP AB SCN CELLS X3 SERPL QL: NORMAL
BUN SERPL-MCNC: 34 MG/DL (ref 6–30)
BUN SERPL-MCNC: 35 MG/DL (ref 10–30)
BURR CELLS BLD QL SMEAR: ABNORMAL
CALCIUM SERPL-MCNC: 10.4 MG/DL (ref 8.7–10.5)
CHLORIDE SERPL-SCNC: 112 MMOL/L (ref 95–110)
CHLORIDE SERPL-SCNC: 114 MMOL/L (ref 95–110)
CO2 SERPL-SCNC: 20 MMOL/L (ref 23–29)
CREAT SERPL-MCNC: 1.5 MG/DL (ref 0.5–1.4)
CREAT SERPL-MCNC: 1.5 MG/DL (ref 0.5–1.4)
CTP QC/QA: YES
DIFFERENTIAL METHOD: ABNORMAL
EOSINOPHIL # BLD AUTO: 0.2 K/UL (ref 0–0.5)
EOSINOPHIL NFR BLD: 4.3 % (ref 0–8)
ERYTHROCYTE [DISTWIDTH] IN BLOOD BY AUTOMATED COUNT: 16.7 % (ref 11.5–14.5)
EST. GFR  (AFRICAN AMERICAN): 46.4 ML/MIN/1.73 M^2
EST. GFR  (NON AFRICAN AMERICAN): 40.1 ML/MIN/1.73 M^2
GIANT PLATELETS BLD QL SMEAR: PRESENT
GLUCOSE SERPL-MCNC: 109 MG/DL (ref 70–110)
GLUCOSE SERPL-MCNC: 111 MG/DL (ref 70–110)
HCT VFR BLD AUTO: 28.2 % (ref 40–54)
HCT VFR BLD CALC: 31 %PCV (ref 36–54)
HGB BLD-MCNC: 8.9 G/DL (ref 14–18)
HYPOCHROMIA BLD QL SMEAR: ABNORMAL
IMM GRANULOCYTES # BLD AUTO: 0.02 K/UL (ref 0–0.04)
IMM GRANULOCYTES NFR BLD AUTO: 0.5 % (ref 0–0.5)
INR PPP: 1.1 (ref 0.8–1.2)
LYMPHOCYTES # BLD AUTO: 1.1 K/UL (ref 1–4.8)
LYMPHOCYTES NFR BLD: 25.1 % (ref 18–48)
MCH RBC QN AUTO: 36.6 PG (ref 27–31)
MCHC RBC AUTO-ENTMCNC: 31.6 G/DL (ref 32–36)
MCV RBC AUTO: 116 FL (ref 82–98)
MONOCYTES # BLD AUTO: 0.6 K/UL (ref 0.3–1)
MONOCYTES NFR BLD: 13.5 % (ref 4–15)
NEUTROPHILS # BLD AUTO: 2.3 K/UL (ref 1.8–7.7)
NEUTROPHILS NFR BLD: 53.8 % (ref 38–73)
NRBC BLD-RTO: 0 /100 WBC
OVALOCYTES BLD QL SMEAR: ABNORMAL
PLATELET # BLD AUTO: 93 K/UL (ref 150–450)
PLATELET BLD QL SMEAR: ABNORMAL
PMV BLD AUTO: ABNORMAL FL (ref 9.2–12.9)
POC IONIZED CALCIUM: 1.46 MMOL/L (ref 1.06–1.42)
POC TCO2 (MEASURED): 19 MMOL/L (ref 23–29)
POIKILOCYTOSIS BLD QL SMEAR: SLIGHT
POLYCHROMASIA BLD QL SMEAR: ABNORMAL
POTASSIUM BLD-SCNC: 4 MMOL/L (ref 3.5–5.1)
POTASSIUM SERPL-SCNC: 3.9 MMOL/L (ref 3.5–5.1)
PROT SERPL-MCNC: 6.9 G/DL (ref 6–8.4)
PROTHROMBIN TIME: 12.4 SEC (ref 9–12.5)
RBC # BLD AUTO: 2.43 M/UL (ref 4.6–6.2)
SAMPLE: ABNORMAL
SARS-COV-2 RDRP RESP QL NAA+PROBE: NEGATIVE
SCHISTOCYTES BLD QL SMEAR: ABNORMAL
SMUDGE CELLS BLD QL SMEAR: PRESENT
SODIUM BLD-SCNC: 143 MMOL/L (ref 136–145)
SODIUM SERPL-SCNC: 139 MMOL/L (ref 136–145)
WBC # BLD AUTO: 4.23 K/UL (ref 3.9–12.7)

## 2021-12-28 PROCEDURE — 99291 PR CRITICAL CARE, E/M 30-74 MINUTES: ICD-10-PCS | Mod: HCNC,GC,CS, | Performed by: EMERGENCY MEDICINE

## 2021-12-28 PROCEDURE — 86920 COMPATIBILITY TEST SPIN: CPT | Mod: HCNC | Performed by: STUDENT IN AN ORGANIZED HEALTH CARE EDUCATION/TRAINING PROGRAM

## 2021-12-28 PROCEDURE — 1160F RVW MEDS BY RX/DR IN RCRD: CPT | Mod: HCNC,CPTII,S$GLB, | Performed by: OPHTHALMOLOGY

## 2021-12-28 PROCEDURE — 99291 CRITICAL CARE FIRST HOUR: CPT | Mod: HCNC,GC,CS, | Performed by: EMERGENCY MEDICINE

## 2021-12-28 PROCEDURE — 1160F PR REVIEW ALL MEDS BY PRESCRIBER/CLIN PHARMACIST DOCUMENTED: ICD-10-PCS | Mod: HCNC,CPTII,S$GLB, | Performed by: OPHTHALMOLOGY

## 2021-12-28 PROCEDURE — 85610 PROTHROMBIN TIME: CPT | Mod: HCNC | Performed by: EMERGENCY MEDICINE

## 2021-12-28 PROCEDURE — 85025 COMPLETE CBC W/AUTO DIFF WBC: CPT | Mod: HCNC | Performed by: EMERGENCY MEDICINE

## 2021-12-28 PROCEDURE — 80053 COMPREHEN METABOLIC PANEL: CPT | Mod: HCNC | Performed by: EMERGENCY MEDICINE

## 2021-12-28 PROCEDURE — U0002 COVID-19 LAB TEST NON-CDC: HCPCS | Mod: HCNC | Performed by: HOSPITALIST

## 2021-12-28 PROCEDURE — 86850 RBC ANTIBODY SCREEN: CPT | Mod: HCNC | Performed by: EMERGENCY MEDICINE

## 2021-12-28 PROCEDURE — 1159F PR MEDICATION LIST DOCUMENTED IN MEDICAL RECORD: ICD-10-PCS | Mod: HCNC,CPTII,S$GLB, | Performed by: OPHTHALMOLOGY

## 2021-12-28 PROCEDURE — 80047 BASIC METABLC PNL IONIZED CA: CPT | Mod: HCNC

## 2021-12-28 PROCEDURE — 86920 COMPATIBILITY TEST SPIN: CPT | Mod: HCNC | Performed by: HOSPITALIST

## 2021-12-28 PROCEDURE — 25000003 PHARM REV CODE 250: Mod: HCNC | Performed by: STUDENT IN AN ORGANIZED HEALTH CARE EDUCATION/TRAINING PROGRAM

## 2021-12-28 PROCEDURE — 99285 EMERGENCY DEPT VISIT HI MDM: CPT | Mod: 25,HCNC

## 2021-12-28 PROCEDURE — 1157F ADVNC CARE PLAN IN RCRD: CPT | Mod: HCNC,CPTII,S$GLB, | Performed by: OPHTHALMOLOGY

## 2021-12-28 PROCEDURE — 85730 THROMBOPLASTIN TIME PARTIAL: CPT | Mod: HCNC | Performed by: EMERGENCY MEDICINE

## 2021-12-28 PROCEDURE — 99220 PR INITIAL OBSERVATION CARE,LEVL III: CPT | Mod: HCNC,,, | Performed by: PHYSICIAN ASSISTANT

## 2021-12-28 PROCEDURE — 1157F PR ADVANCE CARE PLAN OR EQUIV PRESENT IN MEDICAL RECORD: ICD-10-PCS | Mod: HCNC,CPTII,S$GLB, | Performed by: OPHTHALMOLOGY

## 2021-12-28 PROCEDURE — C9113 INJ PANTOPRAZOLE SODIUM, VIA: HCPCS | Mod: HCNC | Performed by: HOSPITALIST

## 2021-12-28 PROCEDURE — 1159F MED LIST DOCD IN RCRD: CPT | Mod: HCNC,CPTII,S$GLB, | Performed by: OPHTHALMOLOGY

## 2021-12-28 PROCEDURE — 99220 PR INITIAL OBSERVATION CARE,LEVL III: ICD-10-PCS | Mod: HCNC,,, | Performed by: PHYSICIAN ASSISTANT

## 2021-12-28 PROCEDURE — 96374 THER/PROPH/DIAG INJ IV PUSH: CPT | Mod: 59,HCNC

## 2021-12-28 PROCEDURE — 99999 PR PBB SHADOW E&M-EST. PATIENT-LVL I: CPT | Mod: PBBFAC,HCNC,, | Performed by: OPHTHALMOLOGY

## 2021-12-28 PROCEDURE — 25500020 PHARM REV CODE 255: Mod: HCNC | Performed by: EMERGENCY MEDICINE

## 2021-12-28 PROCEDURE — P9016 RBC LEUKOCYTES REDUCED: HCPCS | Mod: HCNC | Performed by: STUDENT IN AN ORGANIZED HEALTH CARE EDUCATION/TRAINING PROGRAM

## 2021-12-28 PROCEDURE — 99999 PR PBB SHADOW E&M-EST. PATIENT-LVL I: ICD-10-PCS | Mod: PBBFAC,HCNC,, | Performed by: OPHTHALMOLOGY

## 2021-12-28 PROCEDURE — 92012 PR EYE EXAM, EST PATIENT,INTERMED: ICD-10-PCS | Mod: HCNC,S$GLB,, | Performed by: OPHTHALMOLOGY

## 2021-12-28 PROCEDURE — 63600175 PHARM REV CODE 636 W HCPCS: Mod: HCNC | Performed by: HOSPITALIST

## 2021-12-28 PROCEDURE — G0378 HOSPITAL OBSERVATION PER HR: HCPCS | Mod: HCNC

## 2021-12-28 PROCEDURE — 36430 TRANSFUSION BLD/BLD COMPNT: CPT | Mod: HCNC

## 2021-12-28 PROCEDURE — 92012 INTRM OPH EXAM EST PATIENT: CPT | Mod: HCNC,S$GLB,, | Performed by: OPHTHALMOLOGY

## 2021-12-28 RX ORDER — HYDROCODONE BITARTRATE AND ACETAMINOPHEN 500; 5 MG/1; MG/1
TABLET ORAL
Status: DISCONTINUED | OUTPATIENT
Start: 2021-12-28 | End: 2021-12-31

## 2021-12-28 RX ORDER — LANOLIN ALCOHOL/MO/W.PET/CERES
800 CREAM (GRAM) TOPICAL
Status: DISCONTINUED | OUTPATIENT
Start: 2021-12-28 | End: 2022-01-01 | Stop reason: HOSPADM

## 2021-12-28 RX ORDER — BISACODYL 10 MG
10 SUPPOSITORY, RECTAL RECTAL DAILY PRN
Status: DISCONTINUED | OUTPATIENT
Start: 2021-12-28 | End: 2022-01-01 | Stop reason: HOSPADM

## 2021-12-28 RX ORDER — LATANOPROST 50 UG/ML
1 SOLUTION/ DROPS OPHTHALMIC NIGHTLY
Status: DISCONTINUED | OUTPATIENT
Start: 2021-12-29 | End: 2022-01-01 | Stop reason: HOSPADM

## 2021-12-28 RX ORDER — SODIUM CHLORIDE 0.9 % (FLUSH) 0.9 %
10 SYRINGE (ML) INJECTION EVERY 8 HOURS PRN
Status: DISCONTINUED | OUTPATIENT
Start: 2021-12-28 | End: 2022-01-01 | Stop reason: HOSPADM

## 2021-12-28 RX ORDER — IBUPROFEN 200 MG
16 TABLET ORAL
Status: DISCONTINUED | OUTPATIENT
Start: 2021-12-28 | End: 2022-01-01 | Stop reason: HOSPADM

## 2021-12-28 RX ORDER — BRIMONIDINE TARTRATE 2 MG/ML
1 SOLUTION/ DROPS OPHTHALMIC 3 TIMES DAILY
Status: DISCONTINUED | OUTPATIENT
Start: 2021-12-29 | End: 2022-01-01 | Stop reason: HOSPADM

## 2021-12-28 RX ORDER — DORZOLAMIDE HCL 20 MG/ML
1 SOLUTION/ DROPS OPHTHALMIC 3 TIMES DAILY
Status: DISCONTINUED | OUTPATIENT
Start: 2021-12-28 | End: 2022-01-01 | Stop reason: HOSPADM

## 2021-12-28 RX ORDER — PANTOPRAZOLE SODIUM 40 MG/10ML
80 INJECTION, POWDER, LYOPHILIZED, FOR SOLUTION INTRAVENOUS ONCE
Status: COMPLETED | OUTPATIENT
Start: 2021-12-28 | End: 2021-12-28

## 2021-12-28 RX ORDER — TALC
6 POWDER (GRAM) TOPICAL NIGHTLY PRN
Status: DISCONTINUED | OUTPATIENT
Start: 2021-12-28 | End: 2022-01-01 | Stop reason: HOSPADM

## 2021-12-28 RX ORDER — GLUCAGON 1 MG
1 KIT INJECTION
Status: DISCONTINUED | OUTPATIENT
Start: 2021-12-28 | End: 2022-01-01 | Stop reason: HOSPADM

## 2021-12-28 RX ORDER — NALOXONE HCL 0.4 MG/ML
0.02 VIAL (ML) INJECTION
Status: DISCONTINUED | OUTPATIENT
Start: 2021-12-28 | End: 2022-01-01 | Stop reason: HOSPADM

## 2021-12-28 RX ORDER — POLYETHYLENE GLYCOL 3350 17 G/17G
17 POWDER, FOR SOLUTION ORAL DAILY PRN
Status: DISCONTINUED | OUTPATIENT
Start: 2021-12-28 | End: 2022-01-01 | Stop reason: HOSPADM

## 2021-12-28 RX ORDER — ACETAMINOPHEN 325 MG/1
650 TABLET ORAL EVERY 4 HOURS PRN
Status: DISCONTINUED | OUTPATIENT
Start: 2021-12-28 | End: 2022-01-01 | Stop reason: HOSPADM

## 2021-12-28 RX ORDER — PANTOPRAZOLE SODIUM 40 MG/10ML
40 INJECTION, POWDER, LYOPHILIZED, FOR SOLUTION INTRAVENOUS 2 TIMES DAILY
Status: DISCONTINUED | OUTPATIENT
Start: 2021-12-29 | End: 2021-12-29

## 2021-12-28 RX ORDER — IPRATROPIUM BROMIDE AND ALBUTEROL SULFATE 2.5; .5 MG/3ML; MG/3ML
3 SOLUTION RESPIRATORY (INHALATION) EVERY 4 HOURS PRN
Status: DISCONTINUED | OUTPATIENT
Start: 2021-12-28 | End: 2022-01-01 | Stop reason: HOSPADM

## 2021-12-28 RX ORDER — IBUPROFEN 200 MG
24 TABLET ORAL
Status: DISCONTINUED | OUTPATIENT
Start: 2021-12-28 | End: 2022-01-01 | Stop reason: HOSPADM

## 2021-12-28 RX ADMIN — IOHEXOL 100 ML: 350 INJECTION, SOLUTION INTRAVENOUS at 07:12

## 2021-12-28 RX ADMIN — SODIUM CHLORIDE: 0.9 INJECTION, SOLUTION INTRAVENOUS at 06:12

## 2021-12-28 RX ADMIN — PANTOPRAZOLE SODIUM 80 MG: 40 INJECTION, POWDER, FOR SOLUTION INTRAVENOUS at 11:12

## 2021-12-28 NOTE — ED TRIAGE NOTES
Pt had 3 episodes of rectal bleeding, pt endorses there is bleeding even when not sitting down to make a BM. Pt is taking eliquis. Pt denies any pain.

## 2021-12-29 ENCOUNTER — ANESTHESIA EVENT (OUTPATIENT)
Dept: ENDOSCOPY | Facility: HOSPITAL | Age: 86
DRG: 378 | End: 2021-12-29
Payer: MEDICARE

## 2021-12-29 PROBLEM — K92.2 ACUTE GI BLEEDING: Status: ACTIVE | Noted: 2021-12-29

## 2021-12-29 LAB
ALBUMIN SERPL BCP-MCNC: 3.1 G/DL (ref 3.5–5.2)
ALP SERPL-CCNC: 72 U/L (ref 55–135)
ALT SERPL W/O P-5'-P-CCNC: 10 U/L (ref 10–44)
ANION GAP SERPL CALC-SCNC: 6 MMOL/L (ref 8–16)
AST SERPL-CCNC: 21 U/L (ref 10–40)
BASOPHILS # BLD AUTO: 0.05 K/UL (ref 0–0.2)
BASOPHILS # BLD AUTO: 0.07 K/UL (ref 0–0.2)
BASOPHILS # BLD AUTO: 0.07 K/UL (ref 0–0.2)
BASOPHILS # BLD AUTO: 0.09 K/UL (ref 0–0.2)
BASOPHILS NFR BLD: 1.5 % (ref 0–1.9)
BASOPHILS NFR BLD: 1.6 % (ref 0–1.9)
BASOPHILS NFR BLD: 1.7 % (ref 0–1.9)
BASOPHILS NFR BLD: 1.9 % (ref 0–1.9)
BILIRUB SERPL-MCNC: 3.5 MG/DL (ref 0.1–1)
BLD PROD TYP BPU: NORMAL
BLOOD UNIT EXPIRATION DATE: NORMAL
BLOOD UNIT TYPE CODE: 5100
BLOOD UNIT TYPE CODE: 5100
BLOOD UNIT TYPE CODE: 6200
BLOOD UNIT TYPE: NORMAL
BUN SERPL-MCNC: 34 MG/DL (ref 10–30)
CALCIUM SERPL-MCNC: 9.3 MG/DL (ref 8.7–10.5)
CHLORIDE SERPL-SCNC: 116 MMOL/L (ref 95–110)
CO2 SERPL-SCNC: 18 MMOL/L (ref 23–29)
CODING SYSTEM: NORMAL
CREAT SERPL-MCNC: 1.3 MG/DL (ref 0.5–1.4)
DIFFERENTIAL METHOD: ABNORMAL
DISPENSE STATUS: NORMAL
EOSINOPHIL # BLD AUTO: 0.1 K/UL (ref 0–0.5)
EOSINOPHIL # BLD AUTO: 0.2 K/UL (ref 0–0.5)
EOSINOPHIL NFR BLD: 1.7 % (ref 0–8)
EOSINOPHIL NFR BLD: 3.8 % (ref 0–8)
EOSINOPHIL NFR BLD: 3.8 % (ref 0–8)
EOSINOPHIL NFR BLD: 4.2 % (ref 0–8)
ERYTHROCYTE [DISTWIDTH] IN BLOOD BY AUTOMATED COUNT: 18.9 % (ref 11.5–14.5)
ERYTHROCYTE [DISTWIDTH] IN BLOOD BY AUTOMATED COUNT: 19.3 % (ref 11.5–14.5)
ERYTHROCYTE [DISTWIDTH] IN BLOOD BY AUTOMATED COUNT: 23.6 % (ref 11.5–14.5)
ERYTHROCYTE [DISTWIDTH] IN BLOOD BY AUTOMATED COUNT: 23.7 % (ref 11.5–14.5)
EST. GFR  (AFRICAN AMERICAN): 55.1 ML/MIN/1.73 M^2
EST. GFR  (NON AFRICAN AMERICAN): 47.7 ML/MIN/1.73 M^2
GLUCOSE SERPL-MCNC: 75 MG/DL (ref 70–110)
GLUCOSE SERPL-MCNC: 82 MG/DL (ref 70–110)
HCT VFR BLD AUTO: 19.7 % (ref 40–54)
HCT VFR BLD AUTO: 22.1 % (ref 40–54)
HCT VFR BLD AUTO: 23.4 % (ref 40–54)
HCT VFR BLD AUTO: 24.5 % (ref 40–54)
HGB BLD-MCNC: 6.2 G/DL (ref 14–18)
HGB BLD-MCNC: 7 G/DL (ref 14–18)
HGB BLD-MCNC: 7.4 G/DL (ref 14–18)
HGB BLD-MCNC: 7.8 G/DL (ref 14–18)
IMM GRANULOCYTES # BLD AUTO: 0.01 K/UL (ref 0–0.04)
IMM GRANULOCYTES # BLD AUTO: 0.04 K/UL (ref 0–0.04)
IMM GRANULOCYTES NFR BLD AUTO: 0.2 % (ref 0–0.5)
IMM GRANULOCYTES NFR BLD AUTO: 0.3 % (ref 0–0.5)
IMM GRANULOCYTES NFR BLD AUTO: 0.3 % (ref 0–0.5)
IMM GRANULOCYTES NFR BLD AUTO: 0.7 % (ref 0–0.5)
LYMPHOCYTES # BLD AUTO: 0.6 K/UL (ref 1–4.8)
LYMPHOCYTES # BLD AUTO: 0.7 K/UL (ref 1–4.8)
LYMPHOCYTES # BLD AUTO: 0.7 K/UL (ref 1–4.8)
LYMPHOCYTES # BLD AUTO: 0.8 K/UL (ref 1–4.8)
LYMPHOCYTES NFR BLD: 19.2 % (ref 18–48)
LYMPHOCYTES NFR BLD: 19.3 % (ref 18–48)
LYMPHOCYTES NFR BLD: 23.7 % (ref 18–48)
LYMPHOCYTES NFR BLD: 9.9 % (ref 18–48)
MCH RBC QN AUTO: 34 PG (ref 27–31)
MCH RBC QN AUTO: 34.4 PG (ref 27–31)
MCH RBC QN AUTO: 35.1 PG (ref 27–31)
MCH RBC QN AUTO: 36 PG (ref 27–31)
MCHC RBC AUTO-ENTMCNC: 31.5 G/DL (ref 32–36)
MCHC RBC AUTO-ENTMCNC: 31.6 G/DL (ref 32–36)
MCHC RBC AUTO-ENTMCNC: 31.7 G/DL (ref 32–36)
MCHC RBC AUTO-ENTMCNC: 31.8 G/DL (ref 32–36)
MCV RBC AUTO: 107 FL (ref 82–98)
MCV RBC AUTO: 108 FL (ref 82–98)
MCV RBC AUTO: 111 FL (ref 82–98)
MCV RBC AUTO: 115 FL (ref 82–98)
MONOCYTES # BLD AUTO: 0.4 K/UL (ref 0.3–1)
MONOCYTES # BLD AUTO: 0.6 K/UL (ref 0.3–1)
MONOCYTES # BLD AUTO: 0.6 K/UL (ref 0.3–1)
MONOCYTES # BLD AUTO: 0.7 K/UL (ref 0.3–1)
MONOCYTES NFR BLD: 10.4 % (ref 4–15)
MONOCYTES NFR BLD: 13.1 % (ref 4–15)
MONOCYTES NFR BLD: 15 % (ref 4–15)
MONOCYTES NFR BLD: 15.2 % (ref 4–15)
NEUTROPHILS # BLD AUTO: 1.7 K/UL (ref 1.8–7.7)
NEUTROPHILS # BLD AUTO: 2.2 K/UL (ref 1.8–7.7)
NEUTROPHILS # BLD AUTO: 2.6 K/UL (ref 1.8–7.7)
NEUTROPHILS # BLD AUTO: 4.6 K/UL (ref 1.8–7.7)
NEUTROPHILS NFR BLD: 57.4 % (ref 38–73)
NEUTROPHILS NFR BLD: 59.5 % (ref 38–73)
NEUTROPHILS NFR BLD: 59.8 % (ref 38–73)
NEUTROPHILS NFR BLD: 75.8 % (ref 38–73)
NRBC BLD-RTO: 0 /100 WBC
NUM UNITS TRANS PACKED RBC: NORMAL
NUM UNITS TRANS PACKED RBC: NORMAL
PLATELET # BLD AUTO: 57 K/UL (ref 150–450)
PLATELET # BLD AUTO: 61 K/UL (ref 150–450)
PLATELET # BLD AUTO: 66 K/UL (ref 150–450)
PLATELET # BLD AUTO: 67 K/UL (ref 150–450)
PMV BLD AUTO: 13.1 FL (ref 9.2–12.9)
PMV BLD AUTO: 14 FL (ref 9.2–12.9)
PMV BLD AUTO: 14.4 FL (ref 9.2–12.9)
PMV BLD AUTO: ABNORMAL FL (ref 9.2–12.9)
POCT GLUCOSE: 75 MG/DL (ref 70–110)
POTASSIUM SERPL-SCNC: 4.1 MMOL/L (ref 3.5–5.1)
PROT SERPL-MCNC: 5 G/DL (ref 6–8.4)
RBC # BLD AUTO: 1.72 M/UL (ref 4.6–6.2)
RBC # BLD AUTO: 2.06 M/UL (ref 4.6–6.2)
RBC # BLD AUTO: 2.11 M/UL (ref 4.6–6.2)
RBC # BLD AUTO: 2.27 M/UL (ref 4.6–6.2)
SODIUM SERPL-SCNC: 140 MMOL/L (ref 136–145)
TRANS ERYTHROCYTES VOL PATIENT: NORMAL ML
WBC # BLD AUTO: 2.91 K/UL (ref 3.9–12.7)
WBC # BLD AUTO: 3.68 K/UL (ref 3.9–12.7)
WBC # BLD AUTO: 4.33 K/UL (ref 3.9–12.7)
WBC # BLD AUTO: 6.06 K/UL (ref 3.9–12.7)

## 2021-12-29 PROCEDURE — C9113 INJ PANTOPRAZOLE SODIUM, VIA: HCPCS | Mod: HCNC | Performed by: HOSPITALIST

## 2021-12-29 PROCEDURE — 63600175 PHARM REV CODE 636 W HCPCS: Mod: HCNC | Performed by: HOSPITALIST

## 2021-12-29 PROCEDURE — G0378 HOSPITAL OBSERVATION PER HR: HCPCS | Mod: HCNC

## 2021-12-29 PROCEDURE — 99284 PR EMERGENCY DEPT VISIT,LEVEL IV: ICD-10-PCS | Mod: HCNC,GC,, | Performed by: INTERNAL MEDICINE

## 2021-12-29 PROCEDURE — 25000003 PHARM REV CODE 250: Mod: HCNC | Performed by: HOSPITALIST

## 2021-12-29 PROCEDURE — 25000003 PHARM REV CODE 250: Mod: HCNC | Performed by: STUDENT IN AN ORGANIZED HEALTH CARE EDUCATION/TRAINING PROGRAM

## 2021-12-29 PROCEDURE — 96365 THER/PROPH/DIAG IV INF INIT: CPT | Mod: HCNC

## 2021-12-29 PROCEDURE — 82962 GLUCOSE BLOOD TEST: CPT | Mod: HCNC

## 2021-12-29 PROCEDURE — 36430 TRANSFUSION BLD/BLD COMPNT: CPT | Mod: HCNC

## 2021-12-29 PROCEDURE — 96366 THER/PROPH/DIAG IV INF ADDON: CPT | Mod: HCNC

## 2021-12-29 PROCEDURE — 85025 COMPLETE CBC W/AUTO DIFF WBC: CPT | Mod: 91,HCNC | Performed by: HOSPITALIST

## 2021-12-29 PROCEDURE — 99225 PR SUBSEQUENT OBSERVATION CARE,LEVEL II: ICD-10-PCS | Mod: HCNC,GC,, | Performed by: HOSPITALIST

## 2021-12-29 PROCEDURE — 80053 COMPREHEN METABOLIC PANEL: CPT | Mod: HCNC | Performed by: PHYSICIAN ASSISTANT

## 2021-12-29 PROCEDURE — 25000003 PHARM REV CODE 250: Mod: HCNC | Performed by: PHYSICIAN ASSISTANT

## 2021-12-29 PROCEDURE — 99225 PR SUBSEQUENT OBSERVATION CARE,LEVEL II: CPT | Mod: HCNC,GC,, | Performed by: HOSPITALIST

## 2021-12-29 PROCEDURE — P9021 RED BLOOD CELLS UNIT: HCPCS | Mod: HCNC | Performed by: STUDENT IN AN ORGANIZED HEALTH CARE EDUCATION/TRAINING PROGRAM

## 2021-12-29 PROCEDURE — 99284 EMERGENCY DEPT VISIT MOD MDM: CPT | Mod: HCNC,GC,, | Performed by: INTERNAL MEDICINE

## 2021-12-29 PROCEDURE — 85025 COMPLETE CBC W/AUTO DIFF WBC: CPT | Mod: 91,HCNC | Performed by: PHYSICIAN ASSISTANT

## 2021-12-29 PROCEDURE — 36415 COLL VENOUS BLD VENIPUNCTURE: CPT | Mod: HCNC | Performed by: PHYSICIAN ASSISTANT

## 2021-12-29 PROCEDURE — 96366 THER/PROPH/DIAG IV INF ADDON: CPT

## 2021-12-29 RX ORDER — POLYETHYLENE GLYCOL 3350, SODIUM SULFATE ANHYDROUS, SODIUM BICARBONATE, SODIUM CHLORIDE, POTASSIUM CHLORIDE 236; 22.74; 6.74; 5.86; 2.97 G/4L; G/4L; G/4L; G/4L; G/4L
4000 POWDER, FOR SOLUTION ORAL ONCE
Status: COMPLETED | OUTPATIENT
Start: 2021-12-29 | End: 2021-12-29

## 2021-12-29 RX ORDER — HYDROCODONE BITARTRATE AND ACETAMINOPHEN 500; 5 MG/1; MG/1
TABLET ORAL
Status: DISCONTINUED | OUTPATIENT
Start: 2021-12-29 | End: 2021-12-31

## 2021-12-29 RX ADMIN — PANTOPRAZOLE SODIUM 8 MG/HR: 40 INJECTION, POWDER, FOR SOLUTION INTRAVENOUS at 08:12

## 2021-12-29 RX ADMIN — BRIMONIDINE TARTRATE 1 DROP: 2 SOLUTION OPHTHALMIC at 07:12

## 2021-12-29 RX ADMIN — DORZOLAMIDE HYDROCHLORIDE 1 DROP: 20 SOLUTION/ DROPS OPHTHALMIC at 09:12

## 2021-12-29 RX ADMIN — DORZOLAMIDE HYDROCHLORIDE 1 DROP: 20 SOLUTION/ DROPS OPHTHALMIC at 03:12

## 2021-12-29 RX ADMIN — DORZOLAMIDE HYDROCHLORIDE 1 DROP: 20 SOLUTION/ DROPS OPHTHALMIC at 08:12

## 2021-12-29 RX ADMIN — PANTOPRAZOLE SODIUM 8 MG/HR: 40 INJECTION, POWDER, FOR SOLUTION INTRAVENOUS at 11:12

## 2021-12-29 RX ADMIN — POLYETHYLENE GLYCOL 3350, SODIUM SULFATE ANHYDROUS, SODIUM BICARBONATE, SODIUM CHLORIDE, POTASSIUM CHLORIDE 4000 ML: 236; 22.74; 6.74; 5.86; 2.97 POWDER, FOR SOLUTION ORAL at 08:12

## 2021-12-29 RX ADMIN — BRIMONIDINE TARTRATE 1 DROP: 2 SOLUTION OPHTHALMIC at 08:12

## 2021-12-29 RX ADMIN — LATANOPROST 1 DROP: 50 SOLUTION OPHTHALMIC at 09:12

## 2021-12-29 RX ADMIN — BRIMONIDINE TARTRATE 1 DROP: 2 SOLUTION OPHTHALMIC at 05:12

## 2021-12-29 RX ADMIN — BRIMONIDINE TARTRATE 1 DROP: 2 SOLUTION OPHTHALMIC at 12:12

## 2021-12-29 RX ADMIN — LATANOPROST 1 DROP: 50 SOLUTION OPHTHALMIC at 12:12

## 2021-12-29 RX ADMIN — LATANOPROST 1 DROP: 50 SOLUTION OPHTHALMIC at 03:12

## 2021-12-29 NOTE — SUBJECTIVE & OBJECTIVE
Past Medical History:   Diagnosis Date    *Atrial flutter 10/3/13    Anticoagulant long-term use     Aspirin only at this time    Arthritis     Atrial fibrillation     Atrial flutter     Blood transfusion     ?    BPH (benign prostatic hypertrophy)     Carotid artery disease     2008: [place] There is 40 - 49% right Internal Carotid stenosis.  There is 20 - 39% left Internal Carotid stenosis.       Chronic kidney disease     Coronary artery disease     DDD (degenerative disc disease) 6/25/2015    Degenerative disc disease     Elevated PSA     Glaucoma     Hyperlipidemia     Hypertension     Lumbar stenosis     lumbar spinal stenosis    NSTEMI (non-ST elevated myocardial infarction) 11/3/2013    Pacemaker 11/2013    Peripheral neuropathy     - S/P right ILIAC stent 1993;      Retinal detachment     Squamous cell carcinoma     left leg    Syncope and collapse: orthostatic with hypotension 10/9/2015       Past Surgical History:   Procedure Laterality Date    [facility]      [facility]    CARDIAC ELECTROPHYSIOLOGY MAPPING AND ABLATION  11/2016    CARDIAC PACEMAKER PLACEMENT  11/2016    CATARACT EXTRACTION W/  INTRAOCULAR LENS IMPLANT Left 1997        CORONARY ARTERY BYPASS GRAFT  1991    ENUCLEATION Right 1949    EPIDURAL STEROID INJECTION N/A 7/17/2019    Procedure: INJECTION, STEROID, EPIDURAL, CAUDAL  cleared to hold eliquis 4 days per Dr. Ruiz;  Surgeon: James Hodges MD;  Location: [facility]IN [facility]GT;  Service: Pain Management;  Laterality: N/A;    EPIDURAL STEROID INJECTION N/A 5/27/2020    Procedure: INJECTION, STEROID, EPIDURAL, CAUDALW/CATH need consent , clear to hold Eliquis 48 hrs per ordering ;  Surgeon: James Hodges MD;  Location: [facility]IN [facility]GT;  Service: Pain Management;  Laterality: N/A;    INJECTION OF ANESTHETIC AGENT AROUND MEDIAL BRANCH NERVES INNERVATING LUMBAR FACET JOINT Bilateral 12/18/2018    Procedure: BLOCK, NERVE, FACET JOINT, LUMBAR,  MEDIAL BRANCH;  Surgeon: James Hodges MD;  Location: [facility];  Service: Pain Management;  Laterality: Bilateral;    LUMBAR LAMINECTOMY  2016    MYELOGRAPHY N/A 11/10/2020    Procedure: Myelogram;  Surgeon: Mercedes Diagnostic Provider;  [facility];  Service: Radiology;  Laterality: N/A;    RADIOFREQUENCY ABLATION Left 2020    Procedure: RADIOFREQUENCY ABLATION LEFT L2,3,4,5 consent needed;  Surgeon: aJmes Hodges MD;  Location: [facility] PAIN [facility]GT;  Service: Pain Management;  Laterality: Left;  LEFT RFA L2,3,4,5  consent needed    RETINAL DETACHMENT SURGERY Left     Dr. Cosme    SKIN BIOPSY      SPINAL CORD STIMULATOR TRIAL W/ LAMINOTOMY  10/2017    TONSILLECTOMY      TRANSFORAMINAL EPIDURAL INJECTION OF STEROID Bilateral 2020    Procedure: LUMBAR TRANSFORAMINAL BILATERAL L4/5 DIRECT REFERRAL;  Surgeon: James Hodges MD;  Location: [facility]IN [facility]GT;  Service: Pain Management;  Laterality: Bilateral;  NEEDS CONSENT, ELIQUIS CLEARANCE IN CHART       Review of patient's allergies indicates:   Allergen Reactions    Atorvastatin      Myositis/elevated CPK    Pravastatin      Severe myalgias/elevated CPK    Statins-hmg-coa reductase inhibitors      Muscle pain and loss of muscle    Ace inhibitors      Cough     Family History     Problem Relation (Age of Onset)    Clotting disorder Mother    Diverticulitis Son    Hypertension Mother    Stroke Mother (69)        Tobacco Use    Smoking status: Former Smoker     Quit date: 1963     Years since quittin.4    Smokeless tobacco: Never Used   Substance and Sexual Activity    Alcohol use: Yes     Alcohol/week: 3.0 standard drinks     Types: 1 Glasses of wine, 1 Cans of beer, 1 Shots of liquor per week     Comment: 2 a day    Drug use: No    Sexual activity: Yes     Partners: Female     Review of Systems   Constitutional: Negative for appetite change, chills, fatigue and fever.   HENT: Negative for  congestion, sore throat and trouble swallowing.    Eyes: Negative for photophobia, pain and discharge.   Respiratory: Negative for cough and shortness of breath.    Cardiovascular: Negative for chest pain and palpitations.   Gastrointestinal: Positive for blood in stool. Negative for abdominal distention, abdominal pain, diarrhea, nausea, rectal pain and vomiting.   Genitourinary: Negative for difficulty urinating.   Musculoskeletal: Negative for back pain, myalgias and neck pain.   Skin: Negative for pallor and rash.   Neurological: Negative for light-headedness, numbness and headaches.     Objective:     Vital Signs (Most Recent):  Temp: 97.8 °F (36.6 °C) (12/29/21 0803)  Pulse: (!) 52 (12/29/21 0803)  Resp: 14 (12/29/21 0518)  BP: 133/60 (12/29/21 0803)  SpO2: 99 % (12/29/21 0803) Vital Signs (24h Range):  Temp:  [97.3 °F (36.3 °C)-98.6 °F (37 °C)] 97.8 °F (36.6 °C)  Pulse:  [50-91] 52  Resp:  [14-19] 14  SpO2:  [96 %-100 %] 99 %  BP: (104-195)/(56-79) 133/60     Weight: 72.1 kg (159 lb) (12/28/21 1641)  Body mass index is 24.9 kg/m².      Intake/Output Summary (Last 24 hours) at 12/29/2021 0818  Last data filed at 12/29/2021 0253  Gross per 24 hour   Intake 700 ml   Output 450 ml   Net 250 ml       Lines/Drains/Airways     Peripheral Intravenous Line                 Peripheral IV - Single Lumen 12/28/21 1800 18 G Posterior;Right Forearm <1 day         Peripheral IV - Single Lumen 12/28/21 1817 20 G Left Antecubital <1 day                Physical Exam  Constitutional:       Appearance: Normal appearance. He is not ill-appearing.   HENT:      Mouth/Throat:      Mouth: Oropharynx is clear and moist and mucous membranes are normal.   Eyes:      General: No scleral icterus.     Conjunctiva/sclera: Conjunctivae normal.   Cardiovascular:      Rate and Rhythm: Normal rate and regular rhythm.      Pulses: Normal pulses.      Heart sounds: Normal heart sounds.   Pulmonary:      Effort: Pulmonary effort is normal. No  respiratory distress.      Breath sounds: Normal breath sounds.   Chest:      Comments: Well-healed surgical scar in sternal midline.   Abdominal:      General: Bowel sounds are normal. There is no distension.      Palpations: Abdomen is soft.      Tenderness: There is no abdominal tenderness.      Comments: Bright red blood with clots and stool particles on rectal exam.    Skin:     General: Skin is warm and dry.      Findings: No bruising or rash.   Neurological:      Mental Status: He is alert and oriented to person, place, and time.         Significant Labs:  All pertinent lab results from the last 24 hours have been reviewed.    Significant Imaging:  Imaging results within the past 24 hours have been reviewed.

## 2021-12-29 NOTE — ASSESSMENT & PLAN NOTE
- takes crestor at home, not on formulary  - patient has had severe rxn with lipitor and pravastatin  - discussed with wife about bringing in home medications

## 2021-12-29 NOTE — ED NOTES
"Dr. Dorsey at bedside to evaluate patient's episode of heavy GI bleeding ~1L melena.  Pt bp was 84/57, improved to 90s/50s.  Pt to be given stat blood transfusion over three hours. CBC to be drawn now.  Pt describes having a sensation of "listlessness" and pressure 8/10 to lower abdomen.  Plan of care and risks discussed with patient and daughter regarding blood transfusion.  Pt reports no issues with transfusion from yesterday.  "

## 2021-12-29 NOTE — ED NOTES
Following blood transfusions, patient is not displaying any s/s of reaction.  No rigors, fever, shortness of breath.

## 2021-12-29 NOTE — CARE UPDATE
RAPID RESPONSE NURSE PROACTIVE ROUNDING NOTE       Time of Visit: 10:20    Admit Date: 2021  LOS: 0  Code Status: Full Code   Date of Visit: 2021  : 1930  Age: 89 y.o.  Sex: male  Race: White  Bed: [facility]:   MRN: [id number]  Was the patient discharged from an ICU this admission? No   Was the patient discharged from a PACU within last 24 hours? No   Did the patient receive conscious sedation/general anesthesia in last 24 hours? No  Was the patient in the ED within the past 24 hours? Yes  Was the patient on NIPPV within the past 24 hours? No   Attending Physician: Artemio Dorsey MD  Primary Service: [facility]   Time spent at the bedside: 15 -30 min    SITUATION    Notified by charge RN via phone call.  Reason for alert: hypotension  Called to evaluate the patient for Circulatory    BACKGROUND     Why is the patient in the hospital?: Acute GI bleeding    Patient has a past medical history of *Atrial flutter, Anticoagulant long-term use, Arthritis, Atrial fibrillation, Atrial flutter, Blood transfusion, BPH (benign prostatic hypertrophy), Carotid artery disease, Chronic kidney disease, Coronary artery disease, DDD (degenerative disc disease), Degenerative disc disease, Elevated PSA, Glaucoma, Hyperlipidemia, Hypertension, Lumbar stenosis, NSTEMI (non-ST elevated myocardial infarction), Pacemaker, Peripheral neuropathy, Retinal detachment, Squamous cell carcinoma, and Syncope and collapse: orthostatic with hypotension.    Last Vitals:  Temp: 97.8 °F (36.6 °C) ( 08)  Pulse: 52 ( 08)  Resp: 14 ( 0518)  BP: 133/60 ( 0803)  SpO2: 99 % ( 08)    24 Hours Vitals Range:  Temp:  [97.3 °F (36.3 °C)-98.6 °F (37 °C)]   Pulse:  [50-91]   Resp:  [14-19]   BP: (104-195)/(56-79)   SpO2:  [96 %-100 %]     Labs:  Recent Labs     21  1809 21  1818 21  0218   WBC 4.23  --  2.91*   HGB 8.9*  --  7.4*   HCT 28.2* 31* 23.4*   PLT 93*  --  57*       Recent Labs      12/28/21  1809 12/29/21  0218    140   K 3.9 4.1   * 116*   CO2 20* 18*   CREATININE 1.5* 1.3   * 82        No results for input(s): PH, PCO2, PO2, HCO3, POCSATURATED, BE in the last 72 hours.     ASSESSMENT    Physical Exam  HENT:      Head: Normocephalic.      Mouth/Throat:      Mouth: Mucous membranes are dry.   Eyes:      Pupils: Pupils are equal, round, and reactive to light.   Cardiovascular:      Pulses: Normal pulses.      Heart sounds: Normal heart sounds.   Pulmonary:      Breath sounds: Normal breath sounds.   Abdominal:      General: Bowel sounds are normal.      Comments: Copious amount of blood noted to rectum.    Skin:     Capillary Refill: Capillary refill takes less than 2 seconds.   Neurological:      Mental Status: He is alert. Mental status is at baseline.         INTERVENTIONS    Obtained pending CBC. Assisted primary RN in release of blood products from lab.  Repeat v/s HR 50, /60, SpO2 99% on room air.     RECOMMENDATIONS    Continue to closely monitor hemodynamics. Trend H/h and transfuse prn. Consider colonoscopy.     PROVIDER ESCALATION    Yes/No  no    Orders received and case discussed with NA.    Disposition: Remain in room EDOU12.    FOLLOW-UP    charge RNEsteban updated on plan of care. Instructed to call the Rapid Response Nurse, Iglesia Lopez RN at [phone] for additional questions or concerns.

## 2021-12-29 NOTE — SUBJECTIVE & OBJECTIVE
Past Medical History:   Diagnosis Date    *Atrial flutter 10/3/13    Anticoagulant long-term use     Aspirin only at this time    Arthritis     Atrial fibrillation     Atrial flutter     Blood transfusion     ?    BPH (benign prostatic hypertrophy)     Carotid artery disease     2008: [place] There is 40 - 49% right Internal Carotid stenosis.  There is 20 - 39% left Internal Carotid stenosis.       Chronic kidney disease     Coronary artery disease     DDD (degenerative disc disease) 6/25/2015    Degenerative disc disease     Elevated PSA     Glaucoma     Hyperlipidemia     Hypertension     Lumbar stenosis     lumbar spinal stenosis    NSTEMI (non-ST elevated myocardial infarction) 11/3/2013    Pacemaker 11/2013    Peripheral neuropathy     - S/P right ILIAC stent 1993;      Retinal detachment     Squamous cell carcinoma     left leg    Syncope and collapse: orthostatic with hypotension 10/9/2015       Past Surgical History:   Procedure Laterality Date    [facility]      [facility]    CARDIAC ELECTROPHYSIOLOGY MAPPING AND ABLATION  11/2016    CARDIAC PACEMAKER PLACEMENT  11/2016    CATARACT EXTRACTION W/  INTRAOCULAR LENS IMPLANT Left 1997        CORONARY ARTERY BYPASS GRAFT  1991    ENUCLEATION Right 1949    EPIDURAL STEROID INJECTION N/A 7/17/2019    Procedure: INJECTION, STEROID, EPIDURAL, CAUDAL  cleared to hold eliquis 4 days per Dr. Ruiz;  Surgeon: James Hodges MD;  Location: [facility] PAIN MGT;  Service: Pain Management;  Laterality: N/A;    EPIDURAL STEROID INJECTION N/A 5/27/2020    Procedure: INJECTION, STEROID, EPIDURAL, CAUDALW/CATH need consent , clear to hold Eliquis 48 hrs per ordering ;  Surgeon: James Hodges MD;  Location: [facility] PAIN MGT;  Service: Pain Management;  Laterality: N/A;    INJECTION OF ANESTHETIC AGENT AROUND MEDIAL BRANCH NERVES INNERVATING LUMBAR FACET JOINT Bilateral 12/18/2018    Procedure: BLOCK, NERVE, FACET JOINT, LUMBAR,  MEDIAL BRANCH;  Surgeon: James Hodges MD;  Location: [facility] PAIN [facility]GT;  Service: Pain Management;  Laterality: Bilateral;    LUMBAR LAMINECTOMY  04/25/2016    MYELOGRAPHY N/A 11/10/2020    Procedure: Myelogram;  Surgeon: Mercedes Diagnostic Provider;  [facility];  Service: Radiology;  Laterality: N/A;    RADIOFREQUENCY ABLATION Left 7/22/2020    Procedure: RADIOFREQUENCY ABLATION LEFT L2,3,4,5 consent needed;  Surgeon: James Hodges MD;  Location: [facility];  Service: Pain Management;  Laterality: Left;  LEFT RFA L2,3,4,5  consent needed    RETINAL DETACHMENT SURGERY Left 1997    Dr. Cosme    SKIN BIOPSY      SPINAL CORD STIMULATOR TRIAL W/ LAMINOTOMY  10/2017    TONSILLECTOMY      TRANSFORAMINAL EPIDURAL INJECTION OF STEROID Bilateral 11/25/2020    Procedure: LUMBAR TRANSFORAMINAL BILATERAL L4/5 DIRECT REFERRAL;  Surgeon: James Hodges MD;  Location: [facility];  Service: Pain Management;  Laterality: Bilateral;  NEEDS CONSENT, ELIQUIS CLEARANCE IN CHART       Review of patient's allergies indicates:   Allergen Reactions    Atorvastatin      Myositis/elevated CPK    Pravastatin      Severe myalgias/elevated CPK    Statins-hmg-coa reductase inhibitors      Muscle pain and loss of muscle    Ace inhibitors      Cough       No current facility-administered medications on file prior to encounter.     Current Outpatient Medications on File Prior to Encounter   Medication Sig    apixaban (ELIQUIS) 2.5 mg Tab Take 1 tablet (2.5 mg total) by mouth 2 (two) times daily.    brimonidine 0.2% (ALPHAGAN) 0.2 % Drop INSTILL 1 DROP INTO LEFT EYE THREE TIMES DAILY    dorzolamide (TRUSOPT) 2 % ophthalmic solution Place 1 drop into both eyes 3 (three) times daily.    HYDROcodone-acetaminophen (NORCO) 5-325 mg per tablet Take 1 tablet by mouth 3 (three) times daily as needed for Pain.    latanoprost 0.005 % ophthalmic solution Place 1 drop into the left eye every evening.    losartan  (COZAAR) 50 MG tablet Take 1 tablet (50 mg total) by mouth 2 (two) times daily.    rosuvastatin (CRESTOR) 20 MG tablet Take 1 tablet (20 mg total) by mouth every evening.     Family History     Problem Relation (Age of Onset)    Clotting disorder Mother    Diverticulitis Son    Hypertension Mother    Stroke Mother (69)        Tobacco Use    Smoking status: Former Smoker     Quit date: 1963     Years since quittin.4    Smokeless tobacco: Never Used   Substance and Sexual Activity    Alcohol use: Yes     Alcohol/week: 3.0 standard drinks     Types: 1 Glasses of wine, 1 Cans of beer, 1 Shots of liquor per week     Comment: 2 a day    Drug use: No    Sexual activity: Yes     Partners: Female     Review of Systems   Constitutional: Negative for activity change, chills and fever.   HENT: Negative for rhinorrhea, sinus pressure and trouble swallowing.    Eyes: Negative for photophobia and visual disturbance.   Respiratory: Negative for chest tightness, shortness of breath and wheezing.    Cardiovascular: Negative for chest pain, palpitations and leg swelling.   Gastrointestinal: Positive for blood in stool. Negative for abdominal pain, constipation, diarrhea, nausea and vomiting.   Genitourinary: Negative for dysuria, frequency, hematuria and urgency.   Musculoskeletal: Negative for arthralgias, back pain and gait problem.   Skin: Negative for color change and rash.   Neurological: Negative for dizziness, syncope, weakness, light-headedness, numbness and headaches.   Psychiatric/Behavioral: Negative for agitation and confusion. The patient is not nervous/anxious.      Objective:     Vital Signs (Most Recent):  Temp: 97.9 °F (36.6 °C) (21 1155)  Pulse: (!) 54 (21 1155)  Resp: 18 (21 1155)  BP: 128/60 (21 1155)  SpO2: 100 % (21 1155) Vital Signs (24h Range):  Temp:  [97.3 °F (36.3 °C)-98.6 °F (37 °C)] 97.9 °F (36.6 °C)  Pulse:  [49-91] 54  Resp:  [14-19] 18  SpO2:  [96 %-100 %]  100 %  BP: ()/(54-79) 128/60     Weight: 72.1 kg (159 lb)  Body mass index is 24.9 kg/m².    Physical Exam  Vitals and nursing note reviewed.   Constitutional:       General: He is not in acute distress.     Appearance: Normal appearance. He is not ill-appearing.      Comments: Bedpan on bedside with significant amount of dark/bright blood with small amount of feces   HENT:      Head: Normocephalic and atraumatic.      Right Ear: External ear normal.      Left Ear: External ear normal.   Eyes:      Extraocular Movements: Extraocular movements intact.      Conjunctiva/sclera: Conjunctivae normal.      Pupils: Pupils are equal, round, and reactive to light.   Cardiovascular:      Rate and Rhythm: Normal rate and regular rhythm.      Pulses: Normal pulses.      Heart sounds: Normal heart sounds. No murmur heard.      Pulmonary:      Effort: Pulmonary effort is normal. No respiratory distress.      Breath sounds: Normal breath sounds.   Abdominal:      General: Abdomen is flat. Bowel sounds are normal.      Tenderness: There is no abdominal tenderness.   Musculoskeletal:         General: Normal range of motion.      Cervical back: Normal range of motion and neck supple.      Right lower leg: No edema.      Left lower leg: No edema.   Skin:     General: Skin is warm and dry.      Capillary Refill: Capillary refill takes less than 2 seconds.      Coloration: Skin is not jaundiced.   Neurological:      General: No focal deficit present.      Mental Status: He is alert and oriented to person, place, and time. Mental status is at baseline.      Cranial Nerves: No cranial nerve deficit.   Psychiatric:         Mood and Affect: Mood normal.         Behavior: Behavior normal.           CRANIAL NERVES     CN III, IV, VI   Pupils are equal, round, and reactive to light.       Significant Labs:   All pertinent labs within the past 24 hours have been reviewed.  BMP:   Recent Labs   Lab 12/29/21  0218   GLU 82      K 4.1    *   CO2 18*   BUN 34*   CREATININE 1.3   CALCIUM 9.3     CBC:   Recent Labs   Lab 12/28/21  1809 12/28/21 1818 12/29/21 0218 12/29/21  1032   WBC 4.23  --  2.91* 3.68*   HGB 8.9*  --  7.4* 6.2*   HCT 28.2* 31* 23.4* 19.7*   PLT 93*  --  57* 61*     CMP:   Recent Labs   Lab 12/28/21 1809 12/29/21 0218    140   K 3.9 4.1   * 116*   CO2 20* 18*   * 82   BUN 35* 34*   CREATININE 1.5* 1.3   CALCIUM 10.4 9.3   PROT 6.9 5.0*   ALBUMIN 4.0 3.1*   BILITOT 2.7* 3.5*   ALKPHOS 97 72   AST 27 21   ALT 14 10   ANIONGAP 7* 6*   EGFRNONAA 40.1* 47.7*       Significant Imaging: I have reviewed all pertinent imaging results/findings within the past 24 hours.

## 2021-12-29 NOTE — ED PROVIDER NOTES
Encounter Date: 12/28/2021       History     Chief Complaint   Patient presents with    Rectal Bleeding     Pt reports to ED w/ complaints of rectal bleeding starting this AM. Pt reports blood thinner use      89-year-old male with PMH of a flutter on Eliquis, hypertension, and hyperlipidemia presents with rectal bleeding.  Rectal bleeding is new since 10:00 a.m., acute onset, intermittent, large volume, without obvious aggravating or relieving factors, and associated with generalized weakness.  Patient states there is bleeding even when he is not attempting to make a bowel movement.  He takes Eliquis.  He denies any pain.  He denies fever, chills, cough, congestion, chest pain, shortness of breath, abdominal pain, nausea, vomiting, dysuria, hematuria, black stools, diarrhea, and constipation. Denies prior need for blood transfusions. Denies prior episodes of rectal bleeding.  History of tobacco smoking, reports occasional alcohol use.        Review of patient's allergies indicates:   Allergen Reactions    Atorvastatin      Myositis/elevated CPK    Pravastatin      Severe myalgias/elevated CPK    Statins-hmg-coa reductase inhibitors      Muscle pain and loss of muscle    Ace inhibitors      Cough     Past Medical History:   Diagnosis Date    *Atrial flutter 10/3/13    Anticoagulant long-term use     Aspirin only at this time    Arthritis     Atrial fibrillation     Atrial flutter     Blood transfusion     ?    BPH (benign prostatic hypertrophy)     Carotid artery disease     2008: [place] There is 40 - 49% right Internal Carotid stenosis.  There is 20 - 39% left Internal Carotid stenosis.       Chronic kidney disease     Coronary artery disease     DDD (degenerative disc disease) 6/25/2015    Degenerative disc disease     Elevated PSA     Glaucoma     Hyperlipidemia     Hypertension     Lumbar stenosis     lumbar spinal stenosis    NSTEMI (non-ST elevated myocardial infarction) 11/3/2013     Pacemaker 11/2013    Peripheral neuropathy     - S/P right ILIAC stent 1993;      Retinal detachment     Squamous cell carcinoma     left leg    Syncope and collapse: orthostatic with hypotension 10/9/2015     Past Surgical History:   Procedure Laterality Date    CARDIAC CATHETERIZATION      [facility]    CARDIAC ELECTROPHYSIOLOGY MAPPING AND ABLATION  11/2016    CARDIAC PACEMAKER PLACEMENT  11/2016    CATARACT EXTRACTION W/  INTRAOCULAR LENS IMPLANT Left 1997        CORONARY ARTERY BYPASS GRAFT  1991    ENUCLEATION Right 1949    EPIDURAL STEROID INJECTION N/A 7/17/2019    Procedure: INJECTION, STEROID, EPIDURAL, CAUDAL  cleared to hold eliquis 4 days per Dr. Ruiz;  Surgeon: James Hodges MD;  Location: [facility] PAIN [facility]GT;  Service: Pain Management;  Laterality: N/A;    EPIDURAL STEROID INJECTION N/A 5/27/2020    Procedure: INJECTION, STEROID, EPIDURAL, CAUDALW/CATH need consent , clear to hold Eliquis 48 hrs per ordering ;  Surgeon: James Hodges MD;  Location: [facility] PAIN [facility]GT;  Service: Pain Management;  Laterality: N/A;    INJECTION OF ANESTHETIC AGENT AROUND MEDIAL BRANCH NERVES INNERVATING LUMBAR FACET JOINT Bilateral 12/18/2018    Procedure: BLOCK, NERVE, FACET JOINT, LUMBAR, MEDIAL BRANCH;  Surgeon: James Hodges MD;  Location: [facility] PAIN [facility]GT;  Service: Pain Management;  Laterality: Bilateral;    LUMBAR LAMINECTOMY  04/25/2016    MYELOGRAPHY N/A 11/10/2020    Procedure: Myelogram;  Surgeon: [facility] Diagnostic Provider;  Location: [facility] OR [address] [place] [address];  Service: Radiology;  Laterality: N/A;    RADIOFREQUENCY ABLATION Left 7/22/2020    Procedure: RADIOFREQUENCY ABLATION LEFT L2,3,4,5 consent needed;  Surgeon: James Hodges MD;  Location: [facility] PAIN [facility]GT;  Service: Pain Management;  Laterality: Left;  LEFT RFA L2,3,4,5  consent needed    RETINAL DETACHMENT SURGERY Left 1997    Dr. Cosme    SKIN BIOPSY      SPINAL CORD STIMULATOR TRIAL W/ LAMINOTOMY  10/2017    TONSILLECTOMY       TRANSFORAMINAL EPIDURAL INJECTION OF STEROID Bilateral 2020    Procedure: LUMBAR TRANSFORAMINAL BILATERAL L4/5 DIRECT REFERRAL;  Surgeon: James Hodges MD;  Location: [facility];  Service: Pain Management;  Laterality: Bilateral;  NEEDS CONSENT, ELIQUIS CLEARANCE IN CHART     Family History   Problem Relation Age of Onset    Stroke Mother 69    Clotting disorder Mother         per patient no clotting disorder    Hypertension Mother     Diverticulitis Son     Cancer Neg Hx     Diabetes Neg Hx     Heart disease Neg Hx     Glaucoma Neg Hx     Amblyopia Neg Hx     Blindness Neg Hx     Cataracts Neg Hx     Macular degeneration Neg Hx     Retinal detachment Neg Hx     Strabismus Neg Hx      Social History     Tobacco Use    Smoking status: Former Smoker     Quit date: 1963     Years since quittin.4    Smokeless tobacco: Never Used   Substance Use Topics    Alcohol use: Yes     Alcohol/week: 3.0 standard drinks     Types: 1 Glasses of wine, 1 Cans of beer, 1 Shots of liquor per week     Comment: 2 a day    Drug use: No     Review of Systems   Constitutional: Positive for fatigue. Negative for chills and fever.   HENT: Negative for congestion and sore throat.    Eyes: Negative for pain and visual disturbance.   Respiratory: Negative for cough and shortness of breath.    Cardiovascular: Negative for chest pain and leg swelling.   Gastrointestinal: Positive for anal bleeding and blood in stool. Negative for abdominal pain, constipation, diarrhea, nausea and vomiting.   Endocrine: Negative for polydipsia and polyuria.   Genitourinary: Negative for dysuria and hematuria.   Musculoskeletal: Negative for arthralgias and back pain.   Skin: Negative for color change and rash.   Neurological: Negative for dizziness, syncope, weakness and headaches.       Physical Exam     Initial Vitals   BP Pulse Resp Temp SpO2   21 1643 21 1641 21 1641 21 1641 21 1641   (!)  104/59 61 19 97.3 °F (36.3 °C) 99 %      MAP       --                Physical Exam    Nursing note and vitals reviewed.  Constitutional: He appears well-developed and well-nourished. He is not diaphoretic.   Upon my arrival to the room, patient is sitting on a bed pan, actively bleeding into it ( he has already filled one).  He is in no acute distress otherwise   HENT:   Head: Normocephalic and atraumatic.   Eyes: Conjunctivae and EOM are normal. Pupils are equal, round, and reactive to light.   Neck: Neck supple.   Normal range of motion.  Cardiovascular: Normal rate, regular rhythm, normal heart sounds and intact distal pulses.   No murmur heard.  Pulmonary/Chest: Breath sounds normal. No respiratory distress. He has no wheezes. He has no rhonchi. He has no rales.   Abdominal: Abdomen is soft. He exhibits no distension. There is no abdominal tenderness.   No abdominal tenderness to palpation diffusely There is no rebound and no guarding.   Genitourinary:    Genitourinary Comments: Bright red blood coming from rectum     Musculoskeletal:         General: No tenderness or edema.      Cervical back: Normal range of motion and neck supple.     Neurological: He is alert and oriented to person, place, and time.   Skin: Skin is warm and dry. Capillary refill takes less than 2 seconds. There is pallor.         ED Course   Procedures  Labs Reviewed   CBC W/ AUTO DIFFERENTIAL - Abnormal; Notable for the following components:       Result Value    RBC 2.43 (*)     Hemoglobin 8.9 (*)     Hematocrit 28.2 (*)      (*)     MCH 36.6 (*)     MCHC 31.6 (*)     RDW 16.7 (*)     Platelets 93 (*)     Basophil % 2.8 (*)     Platelet Estimate Decreased (*)     All other components within normal limits   COMPREHENSIVE METABOLIC PANEL - Abnormal; Notable for the following components:    Chloride 112 (*)     CO2 20 (*)     Glucose 111 (*)     BUN 35 (*)     Creatinine 1.5 (*)     Total Bilirubin 2.7 (*)     Anion Gap 7 (*)     eGFR  if  46.4 (*)     eGFR if non  40.1 (*)     All other components within normal limits   ISTAT PROCEDURE - Abnormal; Notable for the following components:    POC BUN 34 (*)     POC Creatinine 1.5 (*)     POC Chloride 114 (*)     POC TCO2 (MEASURED) 19 (*)     POC Ionized Calcium 1.46 (*)     POC Hematocrit 31 (*)     All other components within normal limits   APTT   PROTIME-INR   SARS-COV-2 RDRP GENE   POCT GLUCOSE, HAND-HELD DEVICE   TYPE & SCREEN   ISTAT CHEM8   PREPARE RBC SOFT          Imaging Results          CTA Abdomen and Pelvis (Final result)  Result time 12/28/21 21:08:30    Final result by Antwan Gaviria MD (12/28/21 21:08:30)                 Impression:      1. No evidence of intraluminal contrast extravasation to suggest acute gastrointestinal bleed.  2. Significant atherosclerotic vascular disease.  3. Stable multilevel degenerative changes of the spine compared to 11/10/2020.  4. Prostatomegaly.  Suggest correlation with PSA.  5. Colonic diverticulosis.  6. Small layering pleural effusions bilaterally.  7. Additional findings as above.    Electronically signed by resident: Silvana Iqbal  Date:    12/28/2021  Time:    20:17    Electronically signed by: Antwan Gaviria MD  Date:    12/28/2021  Time:    21:08             Narrative:    EXAMINATION:  CTA ABDOMEN AND PELVIS    CLINICAL HISTORY:  GI bleed;    TECHNIQUE:  Initial noncontrast  images were obtained of the abdomen and pelvis.  CT axial angiography images were then obtained from the lung bases to the pubic symphysis following the intravenous administration of 100 of Omnipaque 350with delayed images obtained per GI bleeding protocol.  Sagittal and coronal reformats were provided.    COMPARISON:  CT lumbar spine 11/10/2020, 05/18/2020    Thoracolumbar radiograph 05/12/2020    Chest radiograph 11/01/2017    FINDINGS:  Heart: Cardiomegaly.  Partially visualized pacemaker leads.    Lung bases: Symmetrically  expanded.  Small layering pleural effusions bilaterally.    Liver: Normal in size and attenuation without focal hepatic abnormality.    Gallbladder: Normal in appearance without evidence for cholecystitis.    Bile Ducts: No intra or extrahepatic biliary ductal dilation.    Pancreas: Mild fatty atrophy of the pancreatic body and tail.  No pancreatic mass lesion or peripancreatic inflammatory change.    Spleen: Unremarkable.    Adrenals: Unremarkable.    Kidneys/ Ureters: Normal in size and location.  Kidneys enhance and excrete normally.  Bilateral subcentimeter cortical hypodensities, too small to characterize.  (Small bilateral renal cysts identified on recent abdominal ultrasound 03/22/2021.)  No nephrolithiasis.  No hydroureteronephrosis.    Bladder: Smooth contours without bladder wall thickening.    Reproductive organs: Prostatomegaly.    GI Tract/Mesentery: The stomach is unremarkable.  Visualized loops of small and large bowel are normal in caliber without evidence for obstruction or inflammation. Scattered colonic diverticula.  No evidence of intraluminal contrast to suggest GI bleed.    Peritoneal Space: No abdominopelvic ascites, intraperitoneal free air, or significant adenopathy.    Abdominal wall/extraperitoneal soft tissues: Spinal stimulator in the left posterior lower back soft tissue.  The leads enter the spinal canal in the thoracic spine, outside the field of view..    Vasculature: Abdominal aorta is normal in caliber, and contour.  Tortuous abdominal aorta.  There is significant calcific atherosclerosis.  Calcific atherosclerosis at the origins of both renal arteries without high-grade stenosis.    Bones: Mild leftward curvature of the lumbar spine.  Postoperative changes of left L3-4 and L4-5 hemilaminectomies.  Advanced multilevel degenerative changes of the spine, stable compared to CT lumbar spine 11/10/2020.  No acute fracture or bone destructive process.                                  Medications   0.9%  NaCl infusion (for blood administration) ( Intravenous Given 12/28/21 1816)   pantoprazole injection 80 mg (has no administration in time range)   pantoprazole injection 40 mg (has no administration in time range)   sodium chloride 0.9% flush 10 mL (has no administration in time range)   magnesium oxide tablet 800 mg (has no administration in time range)   magnesium oxide tablet 800 mg (has no administration in time range)   acetaminophen tablet 650 mg (has no administration in time range)   polyethylene glycol packet 17 g (has no administration in time range)   bisacodyL suppository 10 mg (has no administration in time range)   glucose chewable tablet 16 g (has no administration in time range)   glucose chewable tablet 24 g (has no administration in time range)   dextrose 50% injection 12.5 g (has no administration in time range)   dextrose 50% injection 25 g (has no administration in time range)   glucagon (human recombinant) injection 1 mg (has no administration in time range)   albuterol-ipratropium 2.5 mg-0.5 mg/3 mL nebulizer solution 3 mL (has no administration in time range)   melatonin tablet 6 mg (has no administration in time range)   naloxone 0.4 mg/mL injection 0.02 mg (has no administration in time range)   brimonidine 0.2% ophthalmic solution 1 drop (has no administration in time range)   dorzolamide 2 % ophthalmic solution 1 drop (has no administration in time range)   latanoprost 0.005 % ophthalmic solution 1 drop (has no administration in time range)   iohexoL (OMNIPAQUE 350) injection 100 mL (100 mLs Intravenous Given 12/28/21 1948)     Medical Decision Making:   Initial Assessment:   89-year-old male with PMH of a flutter on Eliquis, hypertension, and hyperlipidemia presents with 1 day history of bleeding from the rectum, initial blood pressure is borderline hypertensive around 100 systolic and patient is actively bleeding large volumes of blood, so I will obtain workup and  transfuse 1 unit of uncross matched blood.  Differential Diagnosis:   Lower GI bleed, colitis, angiodysplasia, hemorrhoids, upper GI bleed  Clinical Tests:   Lab Tests: Ordered and Reviewed  Radiological Study: Ordered and Reviewed  Medical Tests: Ordered and Reviewed  ED Management:   See ED course             ED Course as of 12/28/21 2268   Tue Dec 28, 2021   1815 I will order 1 unit of O-negative uncross matched blood [BD]   1819 I spoke with GI who states they just want a CT a and B re-contacted if positive.  They report there is nothing emergent for them to do at this time. They would hold off on k centra at this time.  [BD]   1840 ISTAT PROCEDURE(!)  Chem8 shows mild renal impairment with BUN 34 and Cr 1.5. Hct 31. Pt okay for CTA [BD]   1911 CBC auto differential(!)  CC appears stable with hemoglobin 8.9 and hematocrit 28.  Mild thrombocytopenia at 93 without leukocytosis. Given the acuity of the patient's bleeding, it is possible the hgb hasn't caught up yet [BD]   1919 Comprehensive metabolic panel(!)  CMP appears baseline without significant electrolyte derangement, impaired renal function, or elevated LFTs [BD]   2111 CTA Abdomen and Pelvis  CTA negative for active bleed.  [BD]   2117 Will admit the patient to  for GI consult and H/H trending. He remains hemodynamically stable at this time.  [BD]   2252 Pt has been admitted to . He remains hemodynamically stable.  [BD]      ED Course User Index  [BD] Mamadou Clinton MD             Clinical Impression:   Final diagnoses:  [K92.1] Hematochezia          ED Disposition Condition    Observation               Mamadou Clinton MD  Resident  12/28/21 [id number]

## 2021-12-29 NOTE — RESPIRATORY THERAPY
RAPID RESPONSE RESPIRATORY THERAPY PROACTIVE NOTE           Time of visit: 1022     Code Status: Full Code   : 1930  Bed: [facility]/EDOU12:   MRN: [id number]  Time spent at the bedside: < 15 min    SITUATION    Evaluated patient for: Wellbeing at request of ED     BACKGROUND    Why is the patient in the hospital?: Acute GI bleeding    Patient has a past medical history of *Atrial flutter, Anticoagulant long-term use, Arthritis, Atrial fibrillation, Atrial flutter, Blood transfusion, BPH (benign prostatic hypertrophy), Carotid artery disease, Chronic kidney disease, Coronary artery disease, DDD (degenerative disc disease), Degenerative disc disease, Elevated PSA, Glaucoma, Hyperlipidemia, Hypertension, Lumbar stenosis, NSTEMI (non-ST elevated myocardial infarction), Pacemaker, Peripheral neuropathy, Retinal detachment, Squamous cell carcinoma, and Syncope and collapse: orthostatic with hypotension.    24 Hours Vitals Range:  Temp:  [97.3 °F (36.3 °C)-98.6 °F (37 °C)]   Pulse:  [50-91]   Resp:  [14-19]   BP: (104-195)/(56-79)   SpO2:  [96 %-100 %]     Labs:    Recent Labs     21  1809 21  0218    140   K 3.9 4.1   * 116*   CO2 20* 18*   CREATININE 1.5* 1.3   * 82        No results for input(s): PH, PCO2, PO2, HCO3, POCSATURATED, BE in the last 72 hours.    ASSESSMENT/INTERVENTIONS    Upon arrival in room pt resting comfortably in bed with no Respiratory needs.    Last VS   Temp: 97.8 °F (36.6 °C) (803)  Pulse: 52 (803)  Resp: 14 (518)  BP: 133/60 (803)  SpO2: 99 % (803)    Level of Consciousness: Level of Consciousness (AVPU): alert  Respiratory Effort:   Expansion/Accessory Muscle Usage:    All Lung Field Breath Sounds:    O2 Device/Concentration:  ,  , O2 Device (Oxygen Therapy): room air  NIPPV: No Surgical airway: No  ETCO2 monitored:    Ambu at bedside:      Active Orders   Respiratory Care    Inhalation Treatment Q4H PRN     Frequency: Q4H PRN      Number of Occurrences: Until Specified    Oxygen PRN     Frequency: PRN     Number of Occurrences: Until Specified     Order Comments: Face mask       Order Questions:      Device type: Low flow      Device: Nasal Cannula (1- 5 Liters)      LPM: 2      Titrate O2 per Oxygen Titration Protocol: Yes      To maintain SpO2 goal of: >= 90%      Notify MD of: Inability to achieve desired SpO2; Sudden change in patient status and requires 20% increase in FiO2; Patient requires >60% FiO2       RECOMMENDATIONS    We recommend: RRT Recs: Continue POC per primary team.    ESCALATION        FOLLOW-UP    Please call back the Rapid Response RT, Jeffrey Bae RRT at x [phone] for any questions or concerns.

## 2021-12-29 NOTE — ASSESSMENT & PLAN NOTE
- Keep patient NPO  - s/p 1 unit pRBCs transfused in ED given acute episodes of bleeding  - Follow H/H with CBC q4h  - Transfuse as needed for Hgb <7 or <8 with acute bleeding  - Type and screen and blood consent  - IV Protonix 40mg BID  - IVF hydration   - 2 large bore IV access  - anti-emetics as needed  - GI consulted, appreciate recs

## 2021-12-29 NOTE — PROGRESS NOTES
[facility] - Emergency Dept  [facility] Medicine  Progress Note    Patient Name: Javier Valles  MRN: [id number]  Patient Class: OP- Observation   Admission Date: 12/28/2021  Length of Stay: 0 days  Attending Physician: Artemio Dorsey MD  Primary Care Provider: Thiago Gibbs MD        Subjective:     Principal Problem:Acute GI bleeding        HPI:  Javire Valles is a 89 y.o. male with PMHx significant for A fib with Eliquis, CAD, CKD, HLD, HTN, glaucoma admitted to hospital medicine for lower GI bleed with associated nausea. Reports 3 episodes of rectal bleeding earlier today. He is unsure if there were feces with the episodes. Patient has an additional episode of bleeding in the ED. Denies previous episodes. He is unsure of his last colonoscopy, but remembers getting a notice in the mail about being due for one about 2 years ago. States that previous colonoscopies have revealed non cancerous polyps that were removed. Denies pain with episodes, vomiting, abdominal pain, fever/chills, CP, SOB, lightheadedness, dizziness, diarrhea, constipation, urinary symptoms.     In the ED, /59 on arrival but increased to 182/78 after intervention with IVF and pRBCs. HR 51. Hgb/Hct 8.9/28.2. PT/INR 12.4/1.1. Cr 1.5 (~BL). T bili 2.7. CTA abdomen/pelvis with no evidence of acute GI bleed.         Overview/Hospital Course:  Javier Valles is a 89 y.o. male with PMHx significant for A fib with Eliquis, CAD, CKD, HLD, HTN, glaucoma admitted to hospital medicine for lower GI bleed with associated nausea. Reports 3 episodes of rectal bleeding earlier  He is unsure if there were feces with the episodes. Patient has an additional episode of bleeding in the ED. Denies previous episodes. He is unsure of his last colonoscopy, but remembers getting a notice in the mail about being due for one about 2 years ago. States that previous colonoscopies have revealed non cancerous polyps that were removed. Denies pain with episodes,  vomiting, abdominal pain, fever/chills, CP, SOB, lightheadedness, dizziness, diarrhea, constipation, urinary symptoms.     In the ED, /59 on arrival but increased to 182/78 after intervention with IVF and pRBCs. HR 51. Hgb/Hct 8.9/28.2. PT/INR 12.4/1.1. Cr 1.5 (~BL). T bili 2.7. CTA abdomen/pelvis with no evidence of acute GI bleed.   GI planing for colonoscopy in AM.he has again another dark stool today earlier.HH is dropped,,transfusing another pack RBC.contacted GI,no plan for intervention today.      Past Medical History:   Diagnosis Date    *Atrial flutter 10/3/13    Anticoagulant long-term use     Aspirin only at this time    Arthritis     Atrial fibrillation     Atrial flutter     Blood transfusion     ?    BPH (benign prostatic hypertrophy)     Carotid artery disease     2008: [place] There is 40 - 49% right Internal Carotid stenosis.  There is 20 - 39% left Internal Carotid stenosis.       Chronic kidney disease     Coronary artery disease     DDD (degenerative disc disease) 6/25/2015    Degenerative disc disease     Elevated PSA     Glaucoma     Hyperlipidemia     Hypertension     Lumbar stenosis     lumbar spinal stenosis    NSTEMI (non-ST elevated myocardial infarction) 11/3/2013    Pacemaker 11/2013    Peripheral neuropathy     - S/P right ILIAC stent 1993;      Retinal detachment     Squamous cell carcinoma     left leg    Syncope and collapse: orthostatic with hypotension 10/9/2015       Past Surgical History:   Procedure Laterality Date    CARDIAC CATHETERIZATION      [facility] [place]    CARDIAC ELECTROPHYSIOLOGY MAPPING AND ABLATION  11/2016    CARDIAC PACEMAKER PLACEMENT  11/2016    CATARACT EXTRACTION W/  INTRAOCULAR LENS IMPLANT Left 1997        CORONARY ARTERY BYPASS GRAFT  1991    ENUCLEATION Right 1949    EPIDURAL STEROID INJECTION N/A 7/17/2019    Procedure: INJECTION, STEROID, EPIDURAL, CAUDAL  cleared to hold eliquis 4 days per Dr. Ruiz;  Surgeon: James CARDOZA  MD Tracie;  Location: [facility] PAIN MGT;  Service: Pain Management;  Laterality: N/A;    EPIDURAL STEROID INJECTION N/A 5/27/2020    Procedure: INJECTION, STEROID, EPIDURAL, CAUDALW/CATH need consent , clear to hold Eliquis 48 hrs per ordering ;  Surgeon: James Hodges MD;  Location: [facility] PAIN [facility]GT;  Service: Pain Management;  Laterality: N/A;    INJECTION OF ANESTHETIC AGENT AROUND MEDIAL BRANCH NERVES INNERVATING LUMBAR FACET JOINT Bilateral 12/18/2018    Procedure: BLOCK, NERVE, FACET JOINT, LUMBAR, MEDIAL BRANCH;  Surgeon: James Hodges MD;  Location: [facility] PAIN [facility];  Service: Pain Management;  Laterality: Bilateral;    LUMBAR LAMINECTOMY  04/25/2016    MYELOGRAPHY N/A 11/10/2020    Procedure: Myelogram;  Surgeon: Mercedes Diagnostic Provider;  Location: [facility] OR [address] [place] [address];  Service: Radiology;  Laterality: N/A;    RADIOFREQUENCY ABLATION Left 7/22/2020    Procedure: RADIOFREQUENCY ABLATION LEFT L2,3,4,5 consent needed;  Surgeon: James Hodges MD;  Location: [facility] PAIN MGT;  Service: Pain Management;  Laterality: Left;  LEFT RFA L2,3,4,5  consent needed    RETINAL DETACHMENT SURGERY Left 1997    Dr. Cosme    SKIN BIOPSY      SPINAL CORD STIMULATOR TRIAL W/ LAMINOTOMY  10/2017    TONSILLECTOMY      TRANSFORAMINAL EPIDURAL INJECTION OF STEROID Bilateral 11/25/2020    Procedure: LUMBAR TRANSFORAMINAL BILATERAL L4/5 DIRECT REFERRAL;  Surgeon: James Hodges MD;  Location: [facility] PAIN [facility]GT;  Service: Pain Management;  Laterality: Bilateral;  NEEDS CONSENT, ELIQUIS CLEARANCE IN CHART       Review of patient's allergies indicates:   Allergen Reactions    Atorvastatin      Myositis/elevated CPK    Pravastatin      Severe myalgias/elevated CPK    Statins-hmg-coa reductase inhibitors      Muscle pain and loss of muscle    Ace inhibitors      Cough       No current facility-administered medications on file prior to encounter.     Current Outpatient Medications on File Prior to Encounter   Medication  Sig    apixaban (ELIQUIS) 2.5 mg Tab Take 1 tablet (2.5 mg total) by mouth 2 (two) times daily.    brimonidine 0.2% (ALPHAGAN) 0.2 % Drop INSTILL 1 DROP INTO LEFT EYE THREE TIMES DAILY    dorzolamide (TRUSOPT) 2 % ophthalmic solution Place 1 drop into both eyes 3 (three) times daily.    HYDROcodone-acetaminophen (NORCO) 5-325 mg per tablet Take 1 tablet by mouth 3 (three) times daily as needed for Pain.    latanoprost 0.005 % ophthalmic solution Place 1 drop into the left eye every evening.    losartan (COZAAR) 50 MG tablet Take 1 tablet (50 mg total) by mouth 2 (two) times daily.    rosuvastatin (CRESTOR) 20 MG tablet Take 1 tablet (20 mg total) by mouth every evening.     Family History     Problem Relation (Age of Onset)    Clotting disorder Mother    Diverticulitis Son    Hypertension Mother    Stroke Mother (69)        Tobacco Use    Smoking status: Former Smoker     Quit date: 1963     Years since quittin.4    Smokeless tobacco: Never Used   Substance and Sexual Activity    Alcohol use: Yes     Alcohol/week: 3.0 standard drinks     Types: 1 Glasses of wine, 1 Cans of beer, 1 Shots of liquor per week     Comment: 2 a day    Drug use: No    Sexual activity: Yes     Partners: Female     Review of Systems   Constitutional: Negative for activity change, chills and fever.   HENT: Negative for rhinorrhea, sinus pressure and trouble swallowing.    Eyes: Negative for photophobia and visual disturbance.   Respiratory: Negative for chest tightness, shortness of breath and wheezing.    Cardiovascular: Negative for chest pain, palpitations and leg swelling.   Gastrointestinal: Positive for blood in stool. Negative for abdominal pain, constipation, diarrhea, nausea and vomiting.   Genitourinary: Negative for dysuria, frequency, hematuria and urgency.   Musculoskeletal: Negative for arthralgias, back pain and gait problem.   Skin: Negative for color change and rash.   Neurological: Negative for  dizziness, syncope, weakness, light-headedness, numbness and headaches.   Psychiatric/Behavioral: Negative for agitation and confusion. The patient is not nervous/anxious.      Objective:     Vital Signs (Most Recent):  Temp: 97.9 °F (36.6 °C) (12/29/21 1155)  Pulse: (!) 54 (12/29/21 1155)  Resp: 18 (12/29/21 1155)  BP: 128/60 (12/29/21 1155)  SpO2: 100 % (12/29/21 1155) Vital Signs (24h Range):  Temp:  [97.3 °F (36.3 °C)-98.6 °F (37 °C)] 97.9 °F (36.6 °C)  Pulse:  [49-91] 54  Resp:  [14-19] 18  SpO2:  [96 %-100 %] 100 %  BP: ()/(54-79) 128/60     Weight: 72.1 kg (159 lb)  Body mass index is 24.9 kg/m².    Physical Exam  Vitals and nursing note reviewed.   Constitutional:       General: He is not in acute distress.     Appearance: Normal appearance. He is not ill-appearing.      Comments: Bedpan on bedside with significant amount of dark/bright blood with small amount of feces   HENT:      Head: Normocephalic and atraumatic.      Right Ear: External ear normal.      Left Ear: External ear normal.   Eyes:      Extraocular Movements: Extraocular movements intact.      Conjunctiva/sclera: Conjunctivae normal.      Pupils: Pupils are equal, round, and reactive to light.   Cardiovascular:      Rate and Rhythm: Normal rate and regular rhythm.      Pulses: Normal pulses.      Heart sounds: Normal heart sounds. No murmur heard.      Pulmonary:      Effort: Pulmonary effort is normal. No respiratory distress.      Breath sounds: Normal breath sounds.   Abdominal:      General: Abdomen is flat. Bowel sounds are normal.      Tenderness: There is no abdominal tenderness.   Musculoskeletal:         General: Normal range of motion.      Cervical back: Normal range of motion and neck supple.      Right lower leg: No edema.      Left lower leg: No edema.   Skin:     General: Skin is warm and dry.      Capillary Refill: Capillary refill takes less than 2 seconds.      Coloration: Skin is not jaundiced.   Neurological:       General: No focal deficit present.      Mental Status: He is alert and oriented to person, place, and time. Mental status is at baseline.      Cranial Nerves: No cranial nerve deficit.   Psychiatric:         Mood and Affect: Mood normal.         Behavior: Behavior normal.           CRANIAL NERVES     CN III, IV, VI   Pupils are equal, round, and reactive to light.       Significant Labs:   All pertinent labs within the past 24 hours have been reviewed.  BMP:   Recent Labs   Lab 12/29/21 0218   GLU 82      K 4.1   *   CO2 18*   BUN 34*   CREATININE 1.3   CALCIUM 9.3     CBC:   Recent Labs   Lab 12/28/21  1809 12/28/21  1818 12/29/21 0218 12/29/21  1032   WBC 4.23  --  2.91* 3.68*   HGB 8.9*  --  7.4* 6.2*   HCT 28.2* 31* 23.4* 19.7*   PLT 93*  --  57* 61*     CMP:   Recent Labs   Lab 12/28/21 1809 12/29/21 0218    140   K 3.9 4.1   * 116*   CO2 20* 18*   * 82   BUN 35* 34*   CREATININE 1.5* 1.3   CALCIUM 10.4 9.3   PROT 6.9 5.0*   ALBUMIN 4.0 3.1*   BILITOT 2.7* 3.5*   ALKPHOS 97 72   AST 27 21   ALT 14 10   ANIONGAP 7* 6*   EGFRNONAA 40.1* 47.7*       Significant Imaging: I have reviewed all pertinent imaging results/findings within the past 24 hours.      Assessment/Plan:      * Acute GI bleeding  - Keep patient NPO  - s/p 1 unit pRBCs transfused in ED given acute episodes of bleeding  - Follow H/H with CBC q4h  - Transfuse as needed for Hgb <7 or <8 with acute bleeding  - Type and screen and blood consent  - IV Protonix 40mg BID  - IVF hydration   - 2 large bore IV access  - anti-emetics as needed  - GI consulted, appreciate recs,  GI planing for colonoscopy in AM.he has again another dark stool today earlier.HH is dropped,,transfusing another pack RBC.contacted GI,no plan for intervention today.      Cardiomyopathy, ischemic: Prior MI, CABG, EF 40-45%  - holding eliquis in setting of GIB    Open-angle glaucoma  - stable, saw outpatient ophtho this AM  - continue home  gtt    Chronic kidney disease, stage 3 (moderate)  - at baseline  - avoid nephrotoxic medications  - continue to monitor    Hyperlipidemia  - takes crestor at home, not on formulary  - patient has had severe rxn with lipitor and pravastatin  - discussed with wife about bringing in home medications     Hypertension  - hypotensive on arrival that resolved with IVF and pRBCs  - will hold losartan for now as patient has had several episodes of bleeding  - monitor       VTE Risk Mitigation (From admission, onward)         Ordered     IP VTE HIGH RISK PATIENT  Once         12/28/21 2213     Reason for No Pharmacological VTE Prophylaxis  Once        Question:  Reasons:  Answer:  Active Bleeding    12/28/21 2213     Place sequential compression device  Until discontinued         12/28/21 2211                Discharge Planning   DINORA:      Code Status: Full Code   Is the patient medically ready for discharge?:     Reason for patient still in hospital (select all that apply): Patient trending condition                     Artemio Dorsey MD  Department of Hospital Medicine   [facility] - Emergency Dept

## 2021-12-29 NOTE — ED NOTES
Pt to and from CT with this RN, no issue during transport or while in CT, accompanied by this RN with blood still transfusing. Pt's BP appears to go btw normal and high. MD notified, as pt's wife was concerned. She states he is due his normal BP medication. MD aware.

## 2021-12-29 NOTE — ED NOTES
I-STAT Chem-8+ Results:   Value Reference Range   Sodium 143 136-145 mmol/L   Potassium  4.0 3.5-5.1 mmol/L   Chloride 114  mmol/L   Ionized Calcium 1.46 1.06-1.42 mmol/L   CO2 (measured) 19 23-29 mmol/L   Glucose 109  mg/dL   BUN 34 6-30 mg/dL   Creatinine 1.5 0.5-1.4 mg/dL   Hematocrit 31 36-54%

## 2021-12-29 NOTE — ASSESSMENT & PLAN NOTE
- Keep patient NPO  - s/p 1 unit pRBCs transfused in ED given acute episodes of bleeding  - Follow H/H with CBC q4h  - Transfuse as needed for Hgb <7 or <8 with acute bleeding  - Type and screen and blood consent  - IV Protonix 40mg BID  - IVF hydration   - 2 large bore IV access  - anti-emetics as needed  - GI consulted, appreciate recs,  GI planing for colonoscopy in AM.he has again another dark stool today earlier.HH is dropped,,transfusing another pack RBC.contacted GI,no plan for intervention today.

## 2021-12-29 NOTE — HOSPITAL COURSE
Hgb/Hct 8.9/28.2. PT/INR 12.4/1.1. Cr 1.5 (~BL). T bili 2.7 on admit. CTA abdomen/pelvis with no evidence of acute GI bleed. Patient transfused 1 unit of PRBCs on 12/29. GI consulted and recommended colonscopy to evaluate source of GI bleed and felt to be a lower source of bleeding. Patient had prep done and had C-scope done on 12/30 and showed:  - Preparation of the colon was poor.   - Hemorrhoids found on perianal exam.   - Diverticulosis in the sigmoid colon.   - The examined portion of the ileum was normal.   - The examination was otherwise normal.   - No specimens collected.      As per GI after colonoscopy:   -Okay to resume clear liquid diet and monitor for continued hematochezia and Hgb trend.   -If further evidence of bleeding, will consider upper endoscopy.   -Continue to hold NSAIDS, antiplatelet, and anticoagulation.   -Continue to trend CBC and transfuse for Hgb <7 and platelets <50.   - We will continue to follow.     Hgb .8 on 12/31 and transfused 1 unit of PRBCs. Hgb 7.3 on 1/1. Patient with no further bleeding noted. Patient has a known anemia and thrombocytopenia and seen by Hematology and suspect could have a underlying bone marrow disorder but felt due to his age and other co-morbid conditions Hematology did not believe patient would benefit from a bone marrow biopsy as even if found to have underlying bone marrow disorder would not be a candidate for any treatment and thus recommended no further work-up or evaluation and just monitoring. Patient discharged home in good condition on 1/1. Patient discharged home on Ferrous sulfate 325 mg po daily to treat anemia and Protonix 40 mg po daily for next 90 days in case upper GI source of bleeding. Ambulatory referral sent to GI clinic for hospital follow-up. Patient and wife both advised if patient has recurrent rectal bleeding to return to ED. Discussed with patient and wife about resuming Eliquis as there is no contraindication due to fact no active  bleeding identified with patient but both stated they wanted to hold Eliquis for now and discuss with their cardiologist about the Eliquis as both expressed concern about recurrent bleeding.

## 2021-12-29 NOTE — CONSULTS
[facility]  Gastroenterology  Consult Note    Patient Name: Javier Valles  MRN: [id number]  Admission Date: 12/28/2021  Hospital Length of Stay: 0 days  Code Status: Full Code   Attending Provider: Artemio Dorsey MD   Consulting Provider: Ochoa Chan MD  Primary Care Physician: Thiago Gibbs MD  Principal Problem:Acute GI bleeding    Inpatient consult to Gastroenterology  Consult performed by: Ochoa Chan MD  Consult ordered by: Mamadou Clinton MD        Subjective:     HPI:  Mr. Javier Valles is a 89 year old male for whom GI is consulted with concern for GI bleeding. He has a PMH significant for atrial fibrillation (on Apixaban), CAD (status post PCI in 11/2013), ischemic cardiomyopathy with HFpEF (based on ECHO from 02/2021), pulmonary hypertension, and sick sinus syndrome (status post pacemaker).     He reports onset of multiple episodes of rectal bleeding on 12/28 associated with onset of nausea after leaving an ophthalmology appointment. He is unsure of whether bleeding was associated with passage of stool or just blood. He denies symptom association with abdominal pain, nausea, vomiting, dizziness, lightheadedness, or preceding NSAID usage. He denies prior abdominal surgeries. He reports his last colonoscopy was >10 years ago, but does not recall results.     Hospital Course: On arrival, vital signs normal on room air. Labs notable for anemia (Hgb 8.9 from 9.2 on 11/10), thrombocytopenia, and elevated BUN (35). CTA A/P was without active contrast extravasation, but did suggest underlying diverticulosis. He was admitted to hospital medicine and GI consulted.     Most Recent Endoscopic Procedures:   -Colonoscopy in 12/2005 showing sigmoid diverticulosis, internal hemorrhoids, and internal hemorrhoids (see [facility] documents).       Past Medical History:   Diagnosis Date    *Atrial flutter 10/3/13    Anticoagulant long-term use     Aspirin only at this time    Arthritis      Atrial fibrillation     Atrial flutter     Blood transfusion     ?    BPH (benign prostatic hypertrophy)     Carotid artery disease     2008: [place] There is 40 - 49% right Internal Carotid stenosis.  There is 20 - 39% left Internal Carotid stenosis.       Chronic kidney disease     Coronary artery disease     DDD (degenerative disc disease) 6/25/2015    Degenerative disc disease     Elevated PSA     Glaucoma     Hyperlipidemia     Hypertension     Lumbar stenosis     lumbar spinal stenosis    NSTEMI (non-ST elevated myocardial infarction) 11/3/2013    Pacemaker 11/2013    Peripheral neuropathy     - S/P right ILIAC stent 1993;      Retinal detachment     Squamous cell carcinoma     left leg    Syncope and collapse: orthostatic with hypotension 10/9/2015       Past Surgical History:   Procedure Laterality Date    [facility]    CARDIAC ELECTROPHYSIOLOGY MAPPING AND ABLATION  11/2016    CARDIAC PACEMAKER PLACEMENT  11/2016    CATARACT EXTRACTION W/  INTRAOCULAR LENS IMPLANT Left 1997        CORONARY ARTERY BYPASS GRAFT  1991    ENUCLEATION Right 1949    EPIDURAL STEROID INJECTION N/A 7/17/2019    Procedure: INJECTION, STEROID, EPIDURAL, CAUDAL  cleared to hold eliquis 4 days per Dr. Ruiz;  Surgeon: James Hodges MD;  Location: [facility] PAIN MGT;  Service: Pain Management;  Laterality: N/A;    EPIDURAL STEROID INJECTION N/A 5/27/2020    Procedure: INJECTION, STEROID, EPIDURAL, CAUDALW/CATH need consent , clear to hold Eliquis 48 hrs per ordering ;  Surgeon: James Hodges MD;  Location: [facility] PAIN MGT;  Service: Pain Management;  Laterality: N/A;    INJECTION OF ANESTHETIC AGENT AROUND MEDIAL BRANCH NERVES INNERVATING LUMBAR FACET JOINT Bilateral 12/18/2018    Procedure: BLOCK, NERVE, FACET JOINT, LUMBAR, MEDIAL BRANCH;  Surgeon: James Hodges MD;  Location: [facility] PAIN [facility]GT;  Service: Pain Management;  Laterality: Bilateral;    LUMBAR LAMINECTOMY   2016    MYELOGRAPHY N/A 11/10/2020    Procedure: Myelogram;  Surgeon: Mercedes Diagnostic Provider;  [facility];  Service: Radiology;  Laterality: N/A;    RADIOFREQUENCY ABLATION Left 2020    Procedure: RADIOFREQUENCY ABLATION LEFT L2,3,4,5 consent needed;  Surgeon: James Hodges MD;  Location: [facility] PAIN MGT;  Service: Pain Management;  Laterality: Left;  LEFT RFA L2,3,4,5  consent needed    RETINAL DETACHMENT SURGERY Left     Dr. Cosme    SKIN BIOPSY      SPINAL CORD STIMULATOR TRIAL W/ LAMINOTOMY  10/2017    TONSILLECTOMY      TRANSFORAMINAL EPIDURAL INJECTION OF STEROID Bilateral 2020    Procedure: LUMBAR TRANSFORAMINAL BILATERAL L4/5 DIRECT REFERRAL;  Surgeon: James Hodges MD;  Location: [facility] PAIN MGT;  Service: Pain Management;  Laterality: Bilateral;  NEEDS CONSENT, ELIQUIS CLEARANCE IN CHART       Review of patient's allergies indicates:   Allergen Reactions    Atorvastatin      Myositis/elevated CPK    Pravastatin      Severe myalgias/elevated CPK    Statins-hmg-coa reductase inhibitors      Muscle pain and loss of muscle    Ace inhibitors      Cough     Family History     Problem Relation (Age of Onset)    Clotting disorder Mother    Diverticulitis Son    Hypertension Mother    Stroke Mother (69)        Tobacco Use    Smoking status: Former Smoker     Quit date: 1963     Years since quittin.4    Smokeless tobacco: Never Used   Substance and Sexual Activity    Alcohol use: Yes     Alcohol/week: 3.0 standard drinks     Types: 1 Glasses of wine, 1 Cans of beer, 1 Shots of liquor per week     Comment: 2 a day    Drug use: No    Sexual activity: Yes     Partners: Female     Review of Systems   Constitutional: Negative for appetite change, chills, fatigue and fever.   HENT: Negative for congestion, sore throat and trouble swallowing.    Eyes: Negative for photophobia, pain and discharge.   Respiratory: Negative for cough and shortness of  breath.    Cardiovascular: Negative for chest pain and palpitations.   Gastrointestinal: Positive for blood in stool. Negative for abdominal distention, abdominal pain, diarrhea, nausea, rectal pain and vomiting.   Genitourinary: Negative for difficulty urinating.   Musculoskeletal: Negative for back pain, myalgias and neck pain.   Skin: Negative for pallor and rash.   Neurological: Negative for light-headedness, numbness and headaches.     Objective:     Vital Signs (Most Recent):  Temp: 97.8 °F (36.6 °C) (12/29/21 0803)  Pulse: (!) 52 (12/29/21 0803)  Resp: 14 (12/29/21 0518)  BP: 133/60 (12/29/21 0803)  SpO2: 99 % (12/29/21 0803) Vital Signs (24h Range):  Temp:  [97.3 °F (36.3 °C)-98.6 °F (37 °C)] 97.8 °F (36.6 °C)  Pulse:  [50-91] 52  Resp:  [14-19] 14  SpO2:  [96 %-100 %] 99 %  BP: (104-195)/(56-79) 133/60     Weight: 72.1 kg (159 lb) (12/28/21 1641)  Body mass index is 24.9 kg/m².      Intake/Output Summary (Last 24 hours) at 12/29/2021 0818  Last data filed at 12/29/2021 0253  Gross per 24 hour   Intake 700 ml   Output 450 ml   Net 250 ml       Lines/Drains/Airways     Peripheral Intravenous Line                 Peripheral IV - Single Lumen 12/28/21 1800 18 G Posterior;Right Forearm <1 day         Peripheral IV - Single Lumen 12/28/21 1817 20 G Left Antecubital <1 day                Physical Exam  Constitutional:       Appearance: Normal appearance. He is not ill-appearing.   HENT:      Mouth/Throat:      Mouth: Oropharynx is clear and moist and mucous membranes are normal.   Eyes:      General: No scleral icterus.     Conjunctiva/sclera: Conjunctivae normal.   Cardiovascular:      Rate and Rhythm: Normal rate and regular rhythm.      Pulses: Normal pulses.      Heart sounds: Normal heart sounds.   Pulmonary:      Effort: Pulmonary effort is normal. No respiratory distress.      Breath sounds: Normal breath sounds.   Chest:      Comments: Well-healed surgical scar in sternal midline.   Abdominal:       General: Bowel sounds are normal. There is no distension.      Palpations: Abdomen is soft.      Tenderness: There is no abdominal tenderness.      Comments: Bright red blood with clots and stool particles on rectal exam.    Skin:     General: Skin is warm and dry.      Findings: No bruising or rash.   Neurological:      Mental Status: He is alert and oriented to person, place, and time.         Significant Labs:  All pertinent lab results from the last 24 hours have been reviewed.    Significant Imaging:  Imaging results within the past 24 hours have been reviewed.    Assessment/Plan:     * Acute GI bleeding  This is a 89 year old male with a PMH significant for atrial fibrillation (on Apixaban), CAD (status post PCI in 11/2013), diverticulosis, ischemic cardiomyopathy with HFpEF (based on ECHO from 02/2021), pulmonary hypertension, and sick sinus syndrome (status post pacemaker) who presented on 12/28 with acute onset of hematochezia without hemodynamic instability. There is no evidence of active bleeding on CTA A/P. Presentation suspected to be secondary to bleeding from prior sigmoid diverticulosis.     Our recommendations are as follows:      Maintain IV access with at least 2 large IVs (18 gauge or larger)   Continued IVF resuscitation as tolerated with attention to active co-morbidities.    RBC transfusion as indicated if Hgb <7 g/dL.    Please correct any coagulopathy with platelets and FFP to a goal of platelets >50K and INR <2.0.    Discontinue all antiplatelet, anti-coagulation, and NSAID products.    Clear liquid diet today and keep NPO for 12/30.    Plan for colonoscopy on 12/30. Bowel prep to be ordered by GI fellow.         Thank you for your consult. I will follow-up with patient. Please contact us if you have any additional questions.    Ochoa Chan MD  Gastroenterology  [facility] Dept

## 2021-12-29 NOTE — H&P
[facility] - Emergency Dept  [facility] Medicine  History & Physical    Patient Name: Javier Valles  MRN: [id number]  Patient Class: OP- Observation  Admission Date: 12/28/2021  Attending Physician: Artemio Dorsey MD   Primary Care Provider: Thiago Gibbs MD         Patient information was obtained from patient, spouse/SO and ER records.     Subjective:     Principal Problem:Acute GI bleeding    Chief Complaint:   Chief Complaint   Patient presents with    Rectal Bleeding     Pt reports to ED w/ complaints of rectal bleeding starting this AM. Pt reports blood thinner use         HPI: Javier Valles is a 89 y.o. male with PMHx significant for A fib with Eliquis, CAD, CKD, HLD, HTN, glaucoma admitted to hospital medicine for lower GI bleed with associated nausea. Reports 3 episodes of rectal bleeding earlier today. He is unsure if there were feces with the episodes. Patient has an additional episode of bleeding in the ED. Denies previous episodes. He is unsure of his last colonoscopy, but remembers getting a notice in the mail about being due for one about 2 years ago. States that previous colonoscopies have revealed non cancerous polyps that were removed. Denies pain with episodes, vomiting, abdominal pain, fever/chills, CP, SOB, lightheadedness, dizziness, diarrhea, constipation, urinary symptoms.     In the ED, /59 on arrival but increased to 182/78 after intervention with IVF and pRBCs. HR 51. Hgb/Hct 8.9/28.2. PT/INR 12.4/1.1. Cr 1.5 (~BL). T bili 2.7. CTA abdomen/pelvis with no evidence of acute GI bleed.         Past Medical History:   Diagnosis Date    *Atrial flutter 10/3/13    Anticoagulant long-term use     Aspirin only at this time    Arthritis     Atrial fibrillation     Atrial flutter     Blood transfusion     ?    BPH (benign prostatic hypertrophy)     Carotid artery disease     2008: [place] There is 40 - 49% right Internal Carotid stenosis.  There is 20 - 39% left Internal Carotid  stenosis.       Chronic kidney disease     Coronary artery disease     DDD (degenerative disc disease) 6/25/2015    Degenerative disc disease     Elevated PSA     Glaucoma     Hyperlipidemia     Hypertension     Lumbar stenosis     lumbar spinal stenosis    NSTEMI (non-ST elevated myocardial infarction) 11/3/2013    Pacemaker 11/2013    Peripheral neuropathy     - S/P right ILIAC stent 1993;      Retinal detachment     Squamous cell carcinoma     left leg    Syncope and collapse: orthostatic with hypotension 10/9/2015       Past Surgical History:   Procedure Laterality Date    [facility]IZATION      [facility]    CARDIAC ELECTROPHYSIOLOGY MAPPING AND ABLATION  11/2016    CARDIAC PACEMAKER PLACEMENT  11/2016    CATARACT EXTRACTION W/  INTRAOCULAR LENS IMPLANT Left 1997        CORONARY ARTERY BYPASS GRAFT  1991    ENUCLEATION Right 1949    EPIDURAL STEROID INJECTION N/A 7/17/2019    Procedure: INJECTION, STEROID, EPIDURAL, CAUDAL  cleared to hold eliquis 4 days per Dr. Ruiz;  Surgeon: James Hodges MD;  Location: [facility] PAIN MGT;  Service: Pain Management;  Laterality: N/A;    EPIDURAL STEROID INJECTION N/A 5/27/2020    Procedure: INJECTION, STEROID, EPIDURAL, CAUDALW/CATH need consent , clear to hold Eliquis 48 hrs per ordering ;  Surgeon: James Hodges MD;  Location: [facility] PAIN MGT;  Service: Pain Management;  Laterality: N/A;    INJECTION OF ANESTHETIC AGENT AROUND MEDIAL BRANCH NERVES INNERVATING LUMBAR FACET JOINT Bilateral 12/18/2018    Procedure: BLOCK, NERVE, FACET JOINT, LUMBAR, MEDIAL BRANCH;  Surgeon: James Hodges MD;  Location: [facility] PAIN MGT;  Service: Pain Management;  Laterality: Bilateral;    LUMBAR LAMINECTOMY  04/25/2016    MYELOGRAPHY N/A 11/10/2020    Procedure: Myelogram;  Surgeon: [facility] Diagnostic Provider;  Location: [facility] OR [address] [place] [address];  Service: Radiology;  Laterality: N/A;    RADIOFREQUENCY ABLATION Left 7/22/2020    Procedure: RADIOFREQUENCY  ABLATION LEFT L2,3,4,5 consent needed;  Surgeon: James Hodges MD;  Location: [facility] PAIN [facility]GT;  Service: Pain Management;  Laterality: Left;  LEFT RFA L2,3,4,5  consent needed    RETINAL DETACHMENT SURGERY Left 1997    Dr. Cosme    SKIN BIOPSY      SPINAL CORD STIMULATOR TRIAL W/ LAMINOTOMY  10/2017    TONSILLECTOMY      TRANSFORAMINAL EPIDURAL INJECTION OF STEROID Bilateral 11/25/2020    Procedure: LUMBAR TRANSFORAMINAL BILATERAL L4/5 DIRECT REFERRAL;  Surgeon: James Hodges MD;  Location: [facility] PAIN [facility]GT;  Service: Pain Management;  Laterality: Bilateral;  NEEDS CONSENT, ELIQUIS CLEARANCE IN CHART       Review of patient's allergies indicates:   Allergen Reactions    Atorvastatin      Myositis/elevated CPK    Pravastatin      Severe myalgias/elevated CPK    Statins-hmg-coa reductase inhibitors      Muscle pain and loss of muscle    Ace inhibitors      Cough       No current facility-administered medications on file prior to encounter.     Current Outpatient Medications on File Prior to Encounter   Medication Sig    apixaban (ELIQUIS) 2.5 mg Tab Take 1 tablet (2.5 mg total) by mouth 2 (two) times daily.    brimonidine 0.2% (ALPHAGAN) 0.2 % Drop INSTILL 1 DROP INTO LEFT EYE THREE TIMES DAILY    dorzolamide (TRUSOPT) 2 % ophthalmic solution Place 1 drop into both eyes 3 (three) times daily.    HYDROcodone-acetaminophen (NORCO) 5-325 mg per tablet Take 1 tablet by mouth 3 (three) times daily as needed for Pain.    latanoprost 0.005 % ophthalmic solution Place 1 drop into the left eye every evening.    losartan (COZAAR) 50 MG tablet Take 1 tablet (50 mg total) by mouth 2 (two) times daily.    rosuvastatin (CRESTOR) 20 MG tablet Take 1 tablet (20 mg total) by mouth every evening.     Family History     Problem Relation (Age of Onset)    Clotting disorder Mother    Diverticulitis Son    Hypertension Mother    Stroke Mother (69)        Tobacco Use    Smoking status: Former Smoker     Quit date:  1963     Years since quittin.4    Smokeless tobacco: Never Used   Substance and Sexual Activity    Alcohol use: Yes     Alcohol/week: 3.0 standard drinks     Types: 1 Glasses of wine, 1 Cans of beer, 1 Shots of liquor per week     Comment: 2 a day    Drug use: No    Sexual activity: Yes     Partners: Female     Review of Systems   Constitutional: Negative for activity change, chills and fever.   HENT: Negative for rhinorrhea, sinus pressure and trouble swallowing.    Eyes: Negative for photophobia and visual disturbance.   Respiratory: Negative for chest tightness, shortness of breath and wheezing.    Cardiovascular: Negative for chest pain, palpitations and leg swelling.   Gastrointestinal: Positive for blood in stool and nausea. Negative for abdominal pain, constipation, diarrhea and vomiting.   Genitourinary: Negative for dysuria, frequency, hematuria and urgency.   Musculoskeletal: Negative for arthralgias, back pain and gait problem.   Skin: Negative for color change and rash.   Neurological: Negative for dizziness, syncope, weakness, light-headedness, numbness and headaches.   Psychiatric/Behavioral: Negative for agitation and confusion. The patient is not nervous/anxious.      Objective:     Vital Signs (Most Recent):  Temp: 98.2 °F (36.8 °C) (21)  Pulse: (!) 51 (21)  Resp: 17 (21)  BP: (!) 182/78 (21)  SpO2: 99 % (21) Vital Signs (24h Range):  Temp:  [97.3 °F (36.3 °C)-98.2 °F (36.8 °C)] 98.2 °F (36.8 °C)  Pulse:  [50-91] 51  Resp:  [16-19] 17  SpO2:  [96 %-100 %] 99 %  BP: (104-195)/(56-79) 182/78     Weight: 72.1 kg (159 lb)  Body mass index is 24.9 kg/m².    Physical Exam  Vitals and nursing note reviewed.   Constitutional:       General: He is not in acute distress.     Appearance: Normal appearance. He is not ill-appearing.      Comments: Bedpan on bedside with significant amount of dark/bright blood with small amount of feces   HENT:       Head: Normocephalic and atraumatic.      Right Ear: External ear normal.      Left Ear: External ear normal.   Eyes:      Extraocular Movements: Extraocular movements intact.      Conjunctiva/sclera: Conjunctivae normal.      Pupils: Pupils are equal, round, and reactive to light.   Cardiovascular:      Rate and Rhythm: Normal rate and regular rhythm.      Pulses: Normal pulses.      Heart sounds: Normal heart sounds. No murmur heard.      Pulmonary:      Effort: Pulmonary effort is normal. No respiratory distress.      Breath sounds: Normal breath sounds.   Abdominal:      General: Abdomen is flat. Bowel sounds are normal.      Tenderness: There is no abdominal tenderness.   Musculoskeletal:         General: Normal range of motion.      Cervical back: Normal range of motion and neck supple.      Right lower leg: No edema.      Left lower leg: No edema.   Skin:     General: Skin is warm and dry.      Capillary Refill: Capillary refill takes less than 2 seconds.      Coloration: Skin is not jaundiced.   Neurological:      General: No focal deficit present.      Mental Status: He is alert and oriented to person, place, and time. Mental status is at baseline.      Cranial Nerves: No cranial nerve deficit.   Psychiatric:         Mood and Affect: Mood normal.         Behavior: Behavior normal.           CRANIAL NERVES     CN III, IV, VI   Pupils are equal, round, and reactive to light.       Significant Labs:   All pertinent labs within the past 24 hours have been reviewed.  BMP:   Recent Labs   Lab 12/28/21  1809   *      K 3.9   *   CO2 20*   BUN 35*   CREATININE 1.5*   CALCIUM 10.4     CBC:   Recent Labs   Lab 12/28/21  1809 12/28/21  1818   WBC 4.23  --    HGB 8.9*  --    HCT 28.2* 31*   PLT 93*  --      CMP:   Recent Labs   Lab 12/28/21  1809      K 3.9   *   CO2 20*   *   BUN 35*   CREATININE 1.5*   CALCIUM 10.4   PROT 6.9   ALBUMIN 4.0   BILITOT 2.7*   ALKPHOS 97   AST 27    ALT 14   ANIONGAP 7*   EGFRNONAA 40.1*       Significant Imaging: I have reviewed all pertinent imaging results/findings within the past 24 hours.    Assessment/Plan:     * Acute GI bleeding  - Keep patient NPO  - s/p 1 unit pRBCs transfused in ED given acute episodes of bleeding  - Follow H/H with CBC q4h  - Transfuse as needed for Hgb <7 or <8 with acute bleeding  - Type and screen and blood consent  - IV Protonix 40mg BID  - IVF hydration   - 2 large bore IV access  - anti-emetics as needed  - GI consulted, appreciate recs      Cardiomyopathy, ischemic: Prior MI, CABG, EF 40-45%  - holding eliquis in setting of GIB    Open-angle glaucoma  - stable, saw outpatient ophtho this AM  - continue home gtt    Chronic kidney disease, stage 3 (moderate)  - at baseline  - avoid nephrotoxic medications  - continue to monitor    Hyperlipidemia  - takes crestor at home, not on formulary  - patient has had severe rxn with lipitor and pravastatin  - discussed with wife about bringing in home medications     Hypertension  - hypotensive on arrival that resolved with IVF and pRBCs  - will hold losartan for now as patient has had several episodes of bleeding  - monitor       VTE Risk Mitigation (From admission, onward)         Ordered     IP VTE HIGH RISK PATIENT  Once         12/28/21 2213     Reason for No Pharmacological VTE Prophylaxis  Once        Question:  Reasons:  Answer:  Active Bleeding    12/28/21 2213     Place sequential compression device  Until discontinued         12/28/21 2211                   ALISA TapiaC  Department of Hospital Medicine   [facility]

## 2021-12-29 NOTE — ASSESSMENT & PLAN NOTE
- hypotensive on arrival that resolved with IVF and pRBCs  - will hold losartan for now as patient has had several episodes of bleeding  - monitor

## 2021-12-29 NOTE — ED NOTES
Assumed care. Received report from Lauri VILLASENOR. Patient resting in stretcher and is in NAD at this time. Pt is awake alert and oriented x4, VSS. Pt denies pain at this time. Pt updated on POC. Bed low and locked, SR up x2, call bell in patient reach. Pt remains on continuous cardiac monitor, continuous pulse ox, and auto BP cuff. Pt voices no needs at this time.    Per offgoing RN, only 1 unit of blood was given, MD ordered for second unit to be returned to blood bank.

## 2021-12-29 NOTE — ED NOTES
Report given to JACKLYN Figueroa.  Pt has blood transfusing at this time.  Pt was just cleaned, changed, repositioned.  Daughter remains at bedside, no needs requested at this time.

## 2021-12-29 NOTE — SUBJECTIVE & OBJECTIVE
Past Medical History:   Diagnosis Date    *Atrial flutter 10/3/13    Anticoagulant long-term use     Aspirin only at this time    Arthritis     Atrial fibrillation     Atrial flutter     Blood transfusion     ?    BPH (benign prostatic hypertrophy)     Carotid artery disease     2008: [place] There is 40 - 49% right Internal Carotid stenosis.  There is 20 - 39% left Internal Carotid stenosis.       Chronic kidney disease     Coronary artery disease     DDD (degenerative disc disease) 6/25/2015    Degenerative disc disease     Elevated PSA     Glaucoma     Hyperlipidemia     Hypertension     Lumbar stenosis     lumbar spinal stenosis    NSTEMI (non-ST elevated myocardial infarction) 11/3/2013    Pacemaker 11/2013    Peripheral neuropathy     - S/P right ILIAC stent 1993;      Retinal detachment     Squamous cell carcinoma     left leg    Syncope and collapse: orthostatic with hypotension 10/9/2015       Past Surgical History:   Procedure Laterality Date    C[facility]AC [facility]HETERIZATION      [facility]    CARDIAC ELECTROPHYSIOLOGY MAPPING AND ABLATION  11/2016    CARDIAC PACEMAKER PLACEMENT  11/2016    CATARACT EXTRACTION W/  INTRAOCULAR LENS IMPLANT Left 1997        CORONARY ARTERY BYPASS GRAFT  1991    ENUCLEATION Right 1949    EPIDURAL STEROID INJECTION N/A 7/17/2019    Procedure: INJECTION, STEROID, EPIDURAL, CAUDAL  cleared to hold eliquis 4 days per Dr. Ruiz;  Surgeon: James Hodges MD;  Location: [facility] PAIN MGT;  Service: Pain Management;  Laterality: N/A;    EPIDURAL STEROID INJECTION N/A 5/27/2020    Procedure: INJECTION, STEROID, EPIDURAL, CAUDALW/CATH need consent , clear to hold Eliquis 48 hrs per ordering ;  Surgeon: James Hodges MD;  Location: [facility] PAIN MGT;  Service: Pain Management;  Laterality: N/A;    INJECTION OF ANESTHETIC AGENT AROUND MEDIAL BRANCH NERVES INNERVATING LUMBAR FACET JOINT Bilateral 12/18/2018    Procedure: BLOCK, NERVE, FACET JOINT, LUMBAR,  MEDIAL BRANCH;  Surgeon: James Hodges MD;  Location: [facility] PAIN MGT;  Service: Pain Management;  Laterality: Bilateral;    LUMBAR LAMINECTOMY  04/25/2016    MYELOGRAPHY N/A 11/10/2020    Procedure: Myelogram;  Surgeon: Mercedes Diagnostic Provider;  [facility];  Service: Radiology;  Laterality: N/A;    RADIOFREQUENCY ABLATION Left 7/22/2020    Procedure: RADIOFREQUENCY ABLATION LEFT L2,3,4,5 consent needed;  Surgeon: James Hodges MD;  Location: [facility];  Service: Pain Management;  Laterality: Left;  LEFT RFA L2,3,4,5  consent needed    RETINAL DETACHMENT SURGERY Left 1997    Dr. Cosme    SKIN BIOPSY      SPINAL CORD STIMULATOR TRIAL W/ LAMINOTOMY  10/2017    TONSILLECTOMY      TRANSFORAMINAL EPIDURAL INJECTION OF STEROID Bilateral 11/25/2020    Procedure: LUMBAR TRANSFORAMINAL BILATERAL L4/5 DIRECT REFERRAL;  Surgeon: James Hodges MD;  Location: [facility] PAIN MGT;  Service: Pain Management;  Laterality: Bilateral;  NEEDS CONSENT, ELIQUIS CLEARANCE IN CHART       Review of patient's allergies indicates:   Allergen Reactions    Atorvastatin      Myositis/elevated CPK    Pravastatin      Severe myalgias/elevated CPK    Statins-hmg-coa reductase inhibitors      Muscle pain and loss of muscle    Ace inhibitors      Cough       No current facility-administered medications on file prior to encounter.     Current Outpatient Medications on File Prior to Encounter   Medication Sig    apixaban (ELIQUIS) 2.5 mg Tab Take 1 tablet (2.5 mg total) by mouth 2 (two) times daily.    brimonidine 0.2% (ALPHAGAN) 0.2 % Drop INSTILL 1 DROP INTO LEFT EYE THREE TIMES DAILY    dorzolamide (TRUSOPT) 2 % ophthalmic solution Place 1 drop into both eyes 3 (three) times daily.    HYDROcodone-acetaminophen (NORCO) 5-325 mg per tablet Take 1 tablet by mouth 3 (three) times daily as needed for Pain.    latanoprost 0.005 % ophthalmic solution Place 1 drop into the left eye every evening.    losartan  (COZAAR) 50 MG tablet Take 1 tablet (50 mg total) by mouth 2 (two) times daily.    rosuvastatin (CRESTOR) 20 MG tablet Take 1 tablet (20 mg total) by mouth every evening.     Family History     Problem Relation (Age of Onset)    Clotting disorder Mother    Diverticulitis Son    Hypertension Mother    Stroke Mother (69)        Tobacco Use    Smoking status: Former Smoker     Quit date: 1963     Years since quittin.4    Smokeless tobacco: Never Used   Substance and Sexual Activity    Alcohol use: Yes     Alcohol/week: 3.0 standard drinks     Types: 1 Glasses of wine, 1 Cans of beer, 1 Shots of liquor per week     Comment: 2 a day    Drug use: No    Sexual activity: Yes     Partners: Female     Review of Systems   Constitutional: Negative for activity change, chills and fever.   HENT: Negative for rhinorrhea, sinus pressure and trouble swallowing.    Eyes: Negative for photophobia and visual disturbance.   Respiratory: Negative for chest tightness, shortness of breath and wheezing.    Cardiovascular: Negative for chest pain, palpitations and leg swelling.   Gastrointestinal: Positive for blood in stool and nausea. Negative for abdominal pain, constipation, diarrhea and vomiting.   Genitourinary: Negative for dysuria, frequency, hematuria and urgency.   Musculoskeletal: Negative for arthralgias, back pain and gait problem.   Skin: Negative for color change and rash.   Neurological: Negative for dizziness, syncope, weakness, light-headedness, numbness and headaches.   Psychiatric/Behavioral: Negative for agitation and confusion. The patient is not nervous/anxious.      Objective:     Vital Signs (Most Recent):  Temp: 98.2 °F (36.8 °C) (21)  Pulse: (!) 51 (21)  Resp: 17 (21)  BP: (!) 182/78 (21)  SpO2: 99 % (21) Vital Signs (24h Range):  Temp:  [97.3 °F (36.3 °C)-98.2 °F (36.8 °C)] 98.2 °F (36.8 °C)  Pulse:  [50-91] 51  Resp:  [16-19] 17  SpO2:  [96  %-100 %] 99 %  BP: (104-195)/(56-79) 182/78     Weight: 72.1 kg (159 lb)  Body mass index is 24.9 kg/m².    Physical Exam  Vitals and nursing note reviewed.   Constitutional:       General: He is not in acute distress.     Appearance: Normal appearance. He is not ill-appearing.      Comments: Bedpan on bedside with significant amount of dark/bright blood with small amount of feces   HENT:      Head: Normocephalic and atraumatic.      Right Ear: External ear normal.      Left Ear: External ear normal.   Eyes:      Extraocular Movements: Extraocular movements intact.      Conjunctiva/sclera: Conjunctivae normal.      Pupils: Pupils are equal, round, and reactive to light.   Cardiovascular:      Rate and Rhythm: Normal rate and regular rhythm.      Pulses: Normal pulses.      Heart sounds: Normal heart sounds. No murmur heard.      Pulmonary:      Effort: Pulmonary effort is normal. No respiratory distress.      Breath sounds: Normal breath sounds.   Abdominal:      General: Abdomen is flat. Bowel sounds are normal.      Tenderness: There is no abdominal tenderness.   Musculoskeletal:         General: Normal range of motion.      Cervical back: Normal range of motion and neck supple.      Right lower leg: No edema.      Left lower leg: No edema.   Skin:     General: Skin is warm and dry.      Capillary Refill: Capillary refill takes less than 2 seconds.      Coloration: Skin is not jaundiced.   Neurological:      General: No focal deficit present.      Mental Status: He is alert and oriented to person, place, and time. Mental status is at baseline.      Cranial Nerves: No cranial nerve deficit.   Psychiatric:         Mood and Affect: Mood normal.         Behavior: Behavior normal.           CRANIAL NERVES     CN III, IV, VI   Pupils are equal, round, and reactive to light.       Significant Labs:   All pertinent labs within the past 24 hours have been reviewed.  BMP:   Recent Labs   Lab 12/28/21  1809   *   NA  139   K 3.9   *   CO2 20*   BUN 35*   CREATININE 1.5*   CALCIUM 10.4     CBC:   Recent Labs   Lab 12/28/21  1809 12/28/21  1818   WBC 4.23  --    HGB 8.9*  --    HCT 28.2* 31*   PLT 93*  --      CMP:   Recent Labs   Lab 12/28/21  1809      K 3.9   *   CO2 20*   *   BUN 35*   CREATININE 1.5*   CALCIUM 10.4   PROT 6.9   ALBUMIN 4.0   BILITOT 2.7*   ALKPHOS 97   AST 27   ALT 14   ANIONGAP 7*   EGFRNONAA 40.1*       Significant Imaging: I have reviewed all pertinent imaging results/findings within the past 24 hours.

## 2021-12-29 NOTE — ASSESSMENT & PLAN NOTE
This is a 89 year old male with a PMH significant for atrial fibrillation (on Apixaban), CAD (status post PCI in 11/2013), diverticulosis, ischemic cardiomyopathy with HFpEF (based on ECHO from 02/2021), pulmonary hypertension, and sick sinus syndrome (status post pacemaker) who presented on 12/28 with acute onset of hematochezia without hemodynamic instability. There is no evidence of active bleeding on CTA A/P. Presentation suspected to be secondary to bleeding from prior sigmoid diverticulosis.     Our recommendations are as follows:      Maintain IV access with at least 2 large IVs (18 gauge or larger)   Continued IVF resuscitation as tolerated with attention to active co-morbidities.    RBC transfusion as indicated if Hgb <7 g/dL.    Please correct any coagulopathy with platelets and FFP to a goal of platelets >50K and INR <2.0.    Discontinue all antiplatelet, anti-coagulation, and NSAID products.    Clear liquid diet today and keep NPO for 12/30.    Plan for colonoscopy on 12/30. Bowel prep to be ordered by GI fellow.

## 2021-12-29 NOTE — HPI
Javier Valles is a 89 y.o. male with PMHx significant for A fib with Eliquis, CAD, CKD, HLD, HTN, glaucoma admitted to hospital medicine for lower GI bleed with associated nausea. Reports 3 episodes of rectal bleeding earlier today. He is unsure if there were feces with the episodes. Patient has an additional episode of bleeding in the ED. Denies previous episodes. He is unsure of his last colonoscopy, but remembers getting a notice in the mail about being due for one about 2 years ago. States that previous colonoscopies have revealed non cancerous polyps that were removed. Denies pain with episodes, vomiting, abdominal pain, fever/chills, CP, SOB, lightheadedness, dizziness, diarrhea, constipation, urinary symptoms.     In the ED, /59 on arrival but increased to 182/78 after intervention with IVF and pRBCs. HR 51. Hgb/Hct 8.9/28.2. PT/INR 12.4/1.1. Cr 1.5 (~BL). T bili 2.7. CTA abdomen/pelvis with no evidence of acute GI bleed.

## 2021-12-29 NOTE — ED NOTES
Patient identifiers verified and correct for [facility]   C/C: Patient resting in bed, Blood Product infusing, Alert and oriented x 4.  APPEARANCE: awake and alert in NAD.  SKIN: warm, dry and intact. No breakdown or bruising.  MUSCULOSKELETAL: Patient moving all extremities spontaneously, no obvious swelling or deformities noted. Ambulates independently.  RESPIRATORY: Denies shortness of breath.Respirations unlabored.   CARDIAC: Denies CP, 2+ distal pulses; no peripheral edema  ABDOMEN: GI bleeding acute, large bowel movement, Denies nausea  : voids spontaneously, denies difficulty  Neurologic: AAO x 4; follows commands equal strength in all extremities; denies numbness/tingling. Denies dizziness

## 2021-12-29 NOTE — ED NOTES
Pt actively bleeding while changing into gown in room.  2 bedpans full with dark red blood. Pt had episode of emesis, as well.

## 2021-12-29 NOTE — H&P (VIEW-ONLY)
[facility] - Emergency Dept  Gastroenterology  Consult Note    Patient Name: Javier Valles  MRN: [id number]  Admission Date: 12/28/2021  Hospital Length of Stay: 0 days  Code Status: Full Code   Attending Provider: Artemio Dorsey MD   Consulting Provider: Ochoa Chan MD  Primary Care Physician: Thiago Gibbs MD  Principal Problem:Acute GI bleeding    Inpatient consult to Gastroenterology  Consult performed by: Ochoa Chan MD  Consult ordered by: Mamadou Clinton MD        Subjective:     HPI:  Mr. Javier Valles is a 89 year old male for whom GI is consulted with concern for GI bleeding. He has a PMH significant for atrial fibrillation (on Apixaban), CAD (status post PCI in 11/2013), ischemic cardiomyopathy with HFpEF (based on ECHO from 02/2021), pulmonary hypertension, and sick sinus syndrome (status post pacemaker).     He reports onset of multiple episodes of rectal bleeding on 12/28 associated with onset of nausea after leaving an ophthalmology appointment. He is unsure of whether bleeding was associated with passage of stool or just blood. He denies symptom association with abdominal pain, nausea, vomiting, dizziness, lightheadedness, or preceding NSAID usage. He denies prior abdominal surgeries. He reports his last colonoscopy was >10 years ago, but does not recall results.     Hospital Course: On arrival, vital signs normal on room air. Labs notable for anemia (Hgb 8.9 from 9.2 on 11/10), thrombocytopenia, and elevated BUN (35). CTA A/P was without active contrast extravasation, but did suggest underlying diverticulosis. He was admitted to hospital medicine and GI consulted.     Most Recent Endoscopic Procedures:   -Colonoscopy in 12/2005 showing sigmoid diverticulosis, internal hemorrhoids, and internal hemorrhoids (see [facility] documents).       Past Medical History:   Diagnosis Date    *Atrial flutter 10/3/13    Anticoagulant long-term use     Aspirin only at this time    Arthritis      Atrial fibrillation     Atrial flutter     Blood transfusion     ?    BPH (benign prostatic hypertrophy)     Carotid artery disease     2008: [place] There is 40 - 49% right Internal Carotid stenosis.  There is 20 - 39% left Internal Carotid stenosis.       Chronic kidney disease     Coronary artery disease     DDD (degenerative disc disease) 6/25/2015    Degenerative disc disease     Elevated PSA     Glaucoma     Hyperlipidemia     Hypertension     Lumbar stenosis     lumbar spinal stenosis    NSTEMI (non-ST elevated myocardial infarction) 11/3/2013    Pacemaker 11/2013    Peripheral neuropathy     - S/P right ILIAC stent 1993;      Retinal detachment     Squamous cell carcinoma     left leg    Syncope and collapse: orthostatic with hypotension 10/9/2015       Past Surgical History:   Procedure Laterality Date    [facility]    CARDIAC ELECTROPHYSIOLOGY MAPPING AND ABLATION  11/2016    CARDIAC PACEMAKER PLACEMENT  11/2016    CATARACT EXTRACTION W/  INTRAOCULAR LENS IMPLANT Left 1997        CORONARY ARTERY BYPASS GRAFT  1991    ENUCLEATION Right 1949    EPIDURAL STEROID INJECTION N/A 7/17/2019    Procedure: INJECTION, STEROID, EPIDURAL, CAUDAL  cleared to hold eliquis 4 days per Dr. Ruiz;  Surgeon: James Hodges MD;  Location: [facility] PAIN MGT;  Service: Pain Management;  Laterality: N/A;    EPIDURAL STEROID INJECTION N/A 5/27/2020    Procedure: INJECTION, STEROID, EPIDURAL, CAUDALW/CATH need consent , clear to hold Eliquis 48 hrs per ordering ;  Surgeon: James Hodges MD;  Location: [facility] PAIN MGT;  Service: Pain Management;  Laterality: N/A;    INJECTION OF ANESTHETIC AGENT AROUND MEDIAL BRANCH NERVES INNERVATING LUMBAR FACET JOINT Bilateral 12/18/2018    Procedure: BLOCK, NERVE, FACET JOINT, LUMBAR, MEDIAL BRANCH;  Surgeon: James Hodges MD;  Location: [facility] PAIN MGT;  Service: Pain Management;  Laterality: Bilateral;    LUMBAR LAMINECTOMY   2016    MYELOGRAPHY N/A 11/10/2020    Procedure: Myelogram;  Surgeon: Mercedes Diagnostic Provider;  [facility];  Service: Radiology;  Laterality: N/A;    RADIOFREQUENCY ABLATION Left 2020    Procedure: RADIOFREQUENCY ABLATION LEFT L2,3,4,5 consent needed;  Surgeon: James Hodges MD;  Location: [facility] PAIN MGT;  Service: Pain Management;  Laterality: Left;  LEFT RFA L2,3,4,5  consent needed    RETINAL DETACHMENT SURGERY Left     Dr. Cosme    SKIN BIOPSY      SPINAL CORD STIMULATOR TRIAL W/ LAMINOTOMY  10/2017    TONSILLECTOMY      TRANSFORAMINAL EPIDURAL INJECTION OF STEROID Bilateral 2020    Procedure: LUMBAR TRANSFORAMINAL BILATERAL L4/5 DIRECT REFERRAL;  Surgeon: James Hodges MD;  Location: [facility] PAIN MGT;  Service: Pain Management;  Laterality: Bilateral;  NEEDS CONSENT, ELIQUIS CLEARANCE IN CHART       Review of patient's allergies indicates:   Allergen Reactions    Atorvastatin      Myositis/elevated CPK    Pravastatin      Severe myalgias/elevated CPK    Statins-hmg-coa reductase inhibitors      Muscle pain and loss of muscle    Ace inhibitors      Cough     Family History     Problem Relation (Age of Onset)    Clotting disorder Mother    Diverticulitis Son    Hypertension Mother    Stroke Mother (69)        Tobacco Use    Smoking status: Former Smoker     Quit date: 1963     Years since quittin.4    Smokeless tobacco: Never Used   Substance and Sexual Activity    Alcohol use: Yes     Alcohol/week: 3.0 standard drinks     Types: 1 Glasses of wine, 1 Cans of beer, 1 Shots of liquor per week     Comment: 2 a day    Drug use: No    Sexual activity: Yes     Partners: Female     Review of Systems   Constitutional: Negative for appetite change, chills, fatigue and fever.   HENT: Negative for congestion, sore throat and trouble swallowing.    Eyes: Negative for photophobia, pain and discharge.   Respiratory: Negative for cough and shortness of  breath.    Cardiovascular: Negative for chest pain and palpitations.   Gastrointestinal: Positive for blood in stool. Negative for abdominal distention, abdominal pain, diarrhea, nausea, rectal pain and vomiting.   Genitourinary: Negative for difficulty urinating.   Musculoskeletal: Negative for back pain, myalgias and neck pain.   Skin: Negative for pallor and rash.   Neurological: Negative for light-headedness, numbness and headaches.     Objective:     Vital Signs (Most Recent):  Temp: 97.8 °F (36.6 °C) (12/29/21 0803)  Pulse: (!) 52 (12/29/21 0803)  Resp: 14 (12/29/21 0518)  BP: 133/60 (12/29/21 0803)  SpO2: 99 % (12/29/21 0803) Vital Signs (24h Range):  Temp:  [97.3 °F (36.3 °C)-98.6 °F (37 °C)] 97.8 °F (36.6 °C)  Pulse:  [50-91] 52  Resp:  [14-19] 14  SpO2:  [96 %-100 %] 99 %  BP: (104-195)/(56-79) 133/60     Weight: 72.1 kg (159 lb) (12/28/21 1641)  Body mass index is 24.9 kg/m².      Intake/Output Summary (Last 24 hours) at 12/29/2021 0818  Last data filed at 12/29/2021 0253  Gross per 24 hour   Intake 700 ml   Output 450 ml   Net 250 ml       Lines/Drains/Airways     Peripheral Intravenous Line                 Peripheral IV - Single Lumen 12/28/21 1800 18 G Posterior;Right Forearm <1 day         Peripheral IV - Single Lumen 12/28/21 1817 20 G Left Antecubital <1 day                Physical Exam  Constitutional:       Appearance: Normal appearance. He is not ill-appearing.   HENT:      Mouth/Throat:      Mouth: Oropharynx is clear and moist and mucous membranes are normal.   Eyes:      General: No scleral icterus.     Conjunctiva/sclera: Conjunctivae normal.   Cardiovascular:      Rate and Rhythm: Normal rate and regular rhythm.      Pulses: Normal pulses.      Heart sounds: Normal heart sounds.   Pulmonary:      Effort: Pulmonary effort is normal. No respiratory distress.      Breath sounds: Normal breath sounds.   Chest:      Comments: Well-healed surgical scar in sternal midline.   Abdominal:       General: Bowel sounds are normal. There is no distension.      Palpations: Abdomen is soft.      Tenderness: There is no abdominal tenderness.      Comments: Bright red blood with clots and stool particles on rectal exam.    Skin:     General: Skin is warm and dry.      Findings: No bruising or rash.   Neurological:      Mental Status: He is alert and oriented to person, place, and time.         Significant Labs:  All pertinent lab results from the last 24 hours have been reviewed.    Significant Imaging:  Imaging results within the past 24 hours have been reviewed.    Assessment/Plan:     * Acute GI bleeding  This is a 89 year old male with a PMH significant for atrial fibrillation (on Apixaban), CAD (status post PCI in 11/2013), diverticulosis, ischemic cardiomyopathy with HFpEF (based on ECHO from 02/2021), pulmonary hypertension, and sick sinus syndrome (status post pacemaker) who presented on 12/28 with acute onset of hematochezia without hemodynamic instability. There is no evidence of active bleeding on CTA A/P. Presentation suspected to be secondary to bleeding from prior sigmoid diverticulosis.     Our recommendations are as follows:      Maintain IV access with at least 2 large IVs (18 gauge or larger)   Continued IVF resuscitation as tolerated with attention to active co-morbidities.    RBC transfusion as indicated if Hgb <7 g/dL.    Please correct any coagulopathy with platelets and FFP to a goal of platelets >50K and INR <2.0.    Discontinue all antiplatelet, anti-coagulation, and NSAID products.    Clear liquid diet today and keep NPO for 12/30.    Plan for colonoscopy on 12/30. Bowel prep to be ordered by GI fellow.         Thank you for your consult. I will follow-up with patient. Please contact us if you have any additional questions.    Ochoa Chan MD  Gastroenterology  [facility] - Emergency Dept

## 2021-12-30 ENCOUNTER — ANESTHESIA (OUTPATIENT)
Dept: ENDOSCOPY | Facility: HOSPITAL | Age: 86
DRG: 378 | End: 2021-12-30
Payer: MEDICARE

## 2021-12-30 PROBLEM — D62 ACUTE BLOOD LOSS ANEMIA: Status: ACTIVE | Noted: 2021-12-30

## 2021-12-30 LAB
ANISOCYTOSIS BLD QL SMEAR: SLIGHT
BASOPHILS # BLD AUTO: 0.08 K/UL (ref 0–0.2)
BASOPHILS NFR BLD: 2.1 % (ref 0–1.9)
BURR CELLS BLD QL SMEAR: ABNORMAL
DIFFERENTIAL METHOD: ABNORMAL
EOSINOPHIL # BLD AUTO: 0.2 K/UL (ref 0–0.5)
EOSINOPHIL NFR BLD: 5.1 % (ref 0–8)
ERYTHROCYTE [DISTWIDTH] IN BLOOD BY AUTOMATED COUNT: 23.6 % (ref 11.5–14.5)
GIANT PLATELETS BLD QL SMEAR: PRESENT
HCT VFR BLD AUTO: 22.2 % (ref 40–54)
HGB BLD-MCNC: 7 G/DL (ref 14–18)
HYPOCHROMIA BLD QL SMEAR: ABNORMAL
IMM GRANULOCYTES # BLD AUTO: 0.01 K/UL (ref 0–0.04)
IMM GRANULOCYTES NFR BLD AUTO: 0.3 % (ref 0–0.5)
LYMPHOCYTES # BLD AUTO: 0.9 K/UL (ref 1–4.8)
LYMPHOCYTES NFR BLD: 23.4 % (ref 18–48)
MCH RBC QN AUTO: 33.8 PG (ref 27–31)
MCHC RBC AUTO-ENTMCNC: 31.5 G/DL (ref 32–36)
MCV RBC AUTO: 107 FL (ref 82–98)
MONOCYTES # BLD AUTO: 0.6 K/UL (ref 0.3–1)
MONOCYTES NFR BLD: 14.6 % (ref 4–15)
NEUTROPHILS # BLD AUTO: 2.1 K/UL (ref 1.8–7.7)
NEUTROPHILS NFR BLD: 54.5 % (ref 38–73)
NRBC BLD-RTO: 0 /100 WBC
OVALOCYTES BLD QL SMEAR: ABNORMAL
PLATELET # BLD AUTO: 75 K/UL (ref 150–450)
PLATELET BLD QL SMEAR: ABNORMAL
PMV BLD AUTO: 13.2 FL (ref 9.2–12.9)
POCT GLUCOSE: 86 MG/DL (ref 70–110)
POIKILOCYTOSIS BLD QL SMEAR: ABNORMAL
POLYCHROMASIA BLD QL SMEAR: ABNORMAL
RBC # BLD AUTO: 2.07 M/UL (ref 4.6–6.2)
SPHEROCYTES BLD QL SMEAR: ABNORMAL
WBC # BLD AUTO: 3.76 K/UL (ref 3.9–12.7)

## 2021-12-30 PROCEDURE — 99239 PR HOSPITAL DISCHARGE DAY,>30 MIN: ICD-10-PCS | Mod: HCNC,,, | Performed by: INTERNAL MEDICINE

## 2021-12-30 PROCEDURE — 20600001 HC STEP DOWN PRIVATE ROOM: Mod: HCNC

## 2021-12-30 PROCEDURE — D9220A PRA ANESTHESIA: ICD-10-PCS | Mod: HCNC,CRNA,, | Performed by: REGISTERED NURSE

## 2021-12-30 PROCEDURE — D9220A PRA ANESTHESIA: Mod: HCNC,ANES,, | Performed by: ANESTHESIOLOGY

## 2021-12-30 PROCEDURE — 37000009 HC ANESTHESIA EA ADD 15 MINS: Mod: HCNC | Performed by: INTERNAL MEDICINE

## 2021-12-30 PROCEDURE — D9220A PRA ANESTHESIA: ICD-10-PCS | Mod: HCNC,ANES,, | Performed by: ANESTHESIOLOGY

## 2021-12-30 PROCEDURE — 63600175 PHARM REV CODE 636 W HCPCS: Mod: HCNC | Performed by: REGISTERED NURSE

## 2021-12-30 PROCEDURE — 85025 COMPLETE CBC W/AUTO DIFF WBC: CPT | Mod: HCNC | Performed by: PHYSICIAN ASSISTANT

## 2021-12-30 PROCEDURE — 25000003 PHARM REV CODE 250: Mod: HCNC | Performed by: REGISTERED NURSE

## 2021-12-30 PROCEDURE — 63600175 PHARM REV CODE 636 W HCPCS: Mod: HCNC | Performed by: HOSPITALIST

## 2021-12-30 PROCEDURE — 45378 DIAGNOSTIC COLONOSCOPY: CPT | Mod: HCNC,GC,, | Performed by: INTERNAL MEDICINE

## 2021-12-30 PROCEDURE — C9113 INJ PANTOPRAZOLE SODIUM, VIA: HCPCS | Mod: HCNC | Performed by: HOSPITALIST

## 2021-12-30 PROCEDURE — 45378 PR COLONOSCOPY,DIAGNOSTIC: ICD-10-PCS | Mod: HCNC,GC,, | Performed by: INTERNAL MEDICINE

## 2021-12-30 PROCEDURE — D9220A PRA ANESTHESIA: Mod: HCNC,CRNA,, | Performed by: REGISTERED NURSE

## 2021-12-30 PROCEDURE — 25000003 PHARM REV CODE 250: Mod: HCNC | Performed by: HOSPITALIST

## 2021-12-30 PROCEDURE — 96366 THER/PROPH/DIAG IV INF ADDON: CPT

## 2021-12-30 PROCEDURE — 37000008 HC ANESTHESIA 1ST 15 MINUTES: Mod: HCNC | Performed by: INTERNAL MEDICINE

## 2021-12-30 PROCEDURE — 36415 COLL VENOUS BLD VENIPUNCTURE: CPT | Mod: HCNC | Performed by: PHYSICIAN ASSISTANT

## 2021-12-30 PROCEDURE — 99239 HOSP IP/OBS DSCHRG MGMT >30: CPT | Mod: HCNC,,, | Performed by: INTERNAL MEDICINE

## 2021-12-30 PROCEDURE — 45378 DIAGNOSTIC COLONOSCOPY: CPT | Mod: HCNC | Performed by: INTERNAL MEDICINE

## 2021-12-30 RX ORDER — ETOMIDATE 2 MG/ML
INJECTION INTRAVENOUS
Status: DISCONTINUED | OUTPATIENT
Start: 2021-12-30 | End: 2021-12-30

## 2021-12-30 RX ORDER — PROPOFOL 10 MG/ML
VIAL (ML) INTRAVENOUS
Status: DISCONTINUED | OUTPATIENT
Start: 2021-12-30 | End: 2021-12-30

## 2021-12-30 RX ORDER — SODIUM CHLORIDE 0.9 % (FLUSH) 0.9 %
3 SYRINGE (ML) INJECTION
Status: DISCONTINUED | OUTPATIENT
Start: 2021-12-30 | End: 2021-12-30 | Stop reason: HOSPADM

## 2021-12-30 RX ORDER — PROPOFOL 10 MG/ML
VIAL (ML) INTRAVENOUS CONTINUOUS PRN
Status: DISCONTINUED | OUTPATIENT
Start: 2021-12-30 | End: 2021-12-30

## 2021-12-30 RX ORDER — LIDOCAINE HYDROCHLORIDE 20 MG/ML
INJECTION INTRAVENOUS
Status: DISCONTINUED | OUTPATIENT
Start: 2021-12-30 | End: 2021-12-30

## 2021-12-30 RX ADMIN — PANTOPRAZOLE SODIUM 8 MG/HR: 40 INJECTION, POWDER, FOR SOLUTION INTRAVENOUS at 08:12

## 2021-12-30 RX ADMIN — SODIUM CHLORIDE: 0.9 INJECTION, SOLUTION INTRAVENOUS at 12:12

## 2021-12-30 RX ADMIN — DORZOLAMIDE HYDROCHLORIDE 1 DROP: 20 SOLUTION/ DROPS OPHTHALMIC at 02:12

## 2021-12-30 RX ADMIN — ETOMIDATE 8 MG: 2 INJECTION, SOLUTION INTRAVENOUS at 12:12

## 2021-12-30 RX ADMIN — LATANOPROST 1 DROP: 50 SOLUTION OPHTHALMIC at 08:12

## 2021-12-30 RX ADMIN — BRIMONIDINE TARTRATE 1 DROP: 2 SOLUTION OPHTHALMIC at 02:12

## 2021-12-30 RX ADMIN — PANTOPRAZOLE SODIUM 8 MG/HR: 40 INJECTION, POWDER, FOR SOLUTION INTRAVENOUS at 05:12

## 2021-12-30 RX ADMIN — DORZOLAMIDE HYDROCHLORIDE 1 DROP: 20 SOLUTION/ DROPS OPHTHALMIC at 08:12

## 2021-12-30 RX ADMIN — BRIMONIDINE TARTRATE 1 DROP: 2 SOLUTION OPHTHALMIC at 09:12

## 2021-12-30 RX ADMIN — PANTOPRAZOLE SODIUM 8 MG/HR: 40 INJECTION, POWDER, FOR SOLUTION INTRAVENOUS at 02:12

## 2021-12-30 RX ADMIN — Medication 50 MCG/KG/MIN: at 12:12

## 2021-12-30 RX ADMIN — LIDOCAINE HYDROCHLORIDE 100 MG: 20 INJECTION, SOLUTION INTRAVENOUS at 12:12

## 2021-12-30 RX ADMIN — BRIMONIDINE TARTRATE 1 DROP: 2 SOLUTION OPHTHALMIC at 08:12

## 2021-12-30 RX ADMIN — PROPOFOL 20 MG: 10 INJECTION, EMULSION INTRAVENOUS at 12:12

## 2021-12-30 RX ADMIN — DORZOLAMIDE HYDROCHLORIDE 1 DROP: 20 SOLUTION/ DROPS OPHTHALMIC at 09:12

## 2021-12-30 NOTE — ASSESSMENT & PLAN NOTE
· Controlled. Patient is at baseline renal function at present.   · Need to avoid any nephrotoxic agents such as NSAIDS, IV contrast dye or aminoglycosides unless necessary while patient is hospitalized.  · Will renally adjust medications based on patient's creatinine clearance.   · Strict I+Os.

## 2021-12-30 NOTE — PROGRESS NOTES
[facility] - On[facility] ([facility])  [facility]  Progress Note    Patient Name: Javier Valles  MRN: [id number]  Patient Class: IP- Inpatient   Admission Date: 12/28/2021  Length of Stay: 0 days  Attending Physician: Machelle Alatorre MD  Primary Care Provider: Thiago Gibbs MD        Subjective:     Principal Problem:Acute GI bleeding        HPI:  Javier Valles is a 89 y.o. male with PMHx significant for A fib with Eliquis, CAD, CKD, HLD, HTN, glaucoma admitted to hospital medicine for lower GI bleed with associated nausea. Reports 3 episodes of rectal bleeding earlier today. He is unsure if there were feces with the episodes. Patient has an additional episode of bleeding in the ED. Denies previous episodes. He is unsure of his last colonoscopy, but remembers getting a notice in the mail about being due for one about 2 years ago. States that previous colonoscopies have revealed non cancerous polyps that were removed. Denies pain with episodes, vomiting, abdominal pain, fever/chills, CP, SOB, lightheadedness, dizziness, diarrhea, constipation, urinary symptoms.     In the ED, /59 on arrival but increased to 182/78 after intervention with IVF and pRBCs. HR 51. Hgb/Hct 8.9/28.2. PT/INR 12.4/1.1. Cr 1.5 (~BL). T bili 2.7. CTA abdomen/pelvis with no evidence of acute GI bleed.         Overview/Hospital Course:  Hgb/Hct 8.9/28.2. PT/INR 12.4/1.1. Cr 1.5 (~BL). T bili 2.7 on admit. CTA abdomen/pelvis with no evidence of acute GI bleed. Patient transfused 1 unit of PRBCs on 12/29. GI consulted and recommended colonscopy to evaluate source of GI bleed and felt to be a lower source of bleeding. Patient had prep done and had C-scope done on 12/30 and showed:  - Preparation of the colon was poor.   - Hemorrhoids found on perianal exam.   - Diverticulosis in the sigmoid colon.   - The examined portion of the ileum was normal.   - The examination was otherwise normal.   - No specimens collected.      As per GI after  colonoscopy:   -Okay to resume clear liquid diet and monitor for continued hematochezia and Hgb trend.   -If further evidence of bleeding, will consider upper endoscopy.   -Continue to hold NSAIDS, antiplatelet, and anticoagulation.   -Continue to trend CBC and transfuse for Hgb <7 and platelets <50.   - We will continue to follow.       Interval History: Patient transfused 1 unit of PRBCs on 12/29 and last Hgb this am stable at 7.0. Patient with no further overt GI bleeding. Patient's wife and daughter at bedside this afternoon and patient just returned from C-scope. Discussed results with family of C-scope and GI plan. Patient with no obvious source of GI bleeding noted on C-scope so GI states okay to start clear liquids and continue to monitor H/H closely and monitor for further GI bleeding. If has further bleeding then plan EGD and if not then just monitor for now. Wife and daughter aware of plan. Also discussed plan with patient but he is still groggy from C-scope earlier so not sure how much he was able to understand. Patient to remain in hospital for at least another 24-48 hours for observation for further bleeding and possible EGD if bleeding continues.     Review of Systems   Constitutional: Negative for fever.   Respiratory: Negative for cough and shortness of breath.    Cardiovascular: Negative for chest pain and leg swelling.   Gastrointestinal: Positive for blood in stool. Negative for abdominal pain, diarrhea, nausea and vomiting.   Musculoskeletal: Negative for arthralgias.   Allergic/Immunologic: Negative for immunocompromised state.   Neurological: Negative for dizziness and light-headedness.   Psychiatric/Behavioral: Negative for agitation and confusion.     Objective:     Vital Signs (Most Recent):  Temp: 97.9 °F (36.6 °C) (12/30/21 1430)  Pulse: 62 (12/30/21 1430)  Resp: 15 (12/30/21 1430)  BP: 157/70 (12/30/21 1430)  SpO2: 98 % (12/30/21 1430) on room air Vital Signs (24h Range):  Temp:  [97.4  °F (36.3 °C)-98.8 °F (37.1 °C)] 97.9 °F (36.6 °C)  Pulse:  [49-85] 52  Resp:  [12-18] 15  SpO2:  [95 %-100 %] 98 %  BP: (120-160)/(59-73) 157/70     Weight: 70.3 kg (155 lb)  Body mass index is 24.28 kg/m².    Intake/Output Summary (Last 24 hours) at 12/30/2021 1528  Last data filed at 12/30/2021 1303  Gross per 24 hour   Intake 4100 ml   Output --   Net 4100 ml      Physical Exam  Vitals and nursing note reviewed.   Constitutional:       General: He is not in acute distress.     Appearance: Normal appearance. He is well-developed, normal weight and well-nourished. He is not ill-appearing.   HENT:      Mouth/Throat:      Mouth: Oropharynx is clear and moist.   Eyes:      Conjunctiva/sclera: Conjunctivae normal.   Cardiovascular:      Rate and Rhythm: Normal rate and regular rhythm.      Heart sounds: Normal heart sounds. No murmur heard.  No friction rub. No gallop.    Pulmonary:      Effort: Pulmonary effort is normal. No respiratory distress.      Breath sounds: Normal breath sounds. No wheezing or rales.   Abdominal:      General: Abdomen is flat. Bowel sounds are normal. There is no distension.      Palpations: Abdomen is soft.      Tenderness: There is no abdominal tenderness. There is no guarding.   Musculoskeletal:         General: No edema.   Skin:     Capillary Refill: Capillary refill takes less than 2 seconds.      Findings: No erythema or rash.   Neurological:      Mental Status: He is alert and oriented to person, place, and time.   Psychiatric:         Mood and Affect: Mood and affect and mood normal.         Behavior: Behavior normal.         Significant Labs:   CBC:   Recent Labs   Lab 12/29/21  1337 12/29/21  1928 12/30/21  0455   WBC 6.06 4.33 3.76*   HGB 7.8* 7.0* 7.0*   HCT 24.5* 22.1* 22.2*   PLT 66* 67* 75*     CMP:   Recent Labs   Lab 12/28/21  1809 12/29/21  0218    140   K 3.9 4.1   * 116*   CO2 20* 18*   * 82   BUN 35* 34*   CREATININE 1.5* 1.3   CALCIUM 10.4 9.3   PROT  6.9 5.0*   ALBUMIN 4.0 3.1*   BILITOT 2.7* 3.5*   ALKPHOS 97 72   AST 27 21   ALT 14 10   ANIONGAP 7* 6*   EGFRNONAA 40.1* 47.7*       Significant Imaging: I have reviewed all pertinent imaging results/findings within the past 24 hours.      Assessment/Plan:      * Acute GI bleeding  Acute blood loss anemia  - Monitor for further bleeding as per GI On 12/30 and continue to monitor H/H. If further bleeding then plan EGD if no further bleeding then will just monitor. Patient jseica for clear liquid diet so ordered for 12/30 as per GI.   - Patient s/p 1 unit pRBCs transfused in ED given acute episodes of bleeding on 12/28 and received additional 1 unit of PRBCs on 12/29 for acute GI bleed.   - Follow H/H with CBC every 8 hours and transfuse if Hgb < 7 or < 8 and symptomatic.   - Continue Protonix for now as per GI.   - 2 large bore IV access  - GI consulted and colonoscopy recommended and done on 12/30 and showed:  - Preparation of the colon was poor.   - Hemorrhoids found on perianal exam.   - Diverticulosis in the sigmoid colon.   - The examined portion of the ileum was normal.   - The examination was otherwise normal.   - No specimens collected.     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 Lst echo with EF 60% with diastolic dysfunction.     Echo Color Flow Doppler? Yes    Interpretation Summary  · The left ventricle is normal in size with concentric remodeling and low normal systolic function. The estimated ejection fraction is 50%.  · Moderate right ventricular enlargement with mildly reduced right ventricular systolic function.  · Left ventricular diastolic dysfunction.  · Severe biatrial enlargement.  · Moderate to severe tricuspid regurgitation.  · Moderate mitral regurgitation.  · The estimated PA systolic pressure is 69 mmHg. The mean PA pressure is estimated at 44mm Hg (based on pulmonic insufficiency peak velocity).  ·  Intermediate central venous pressure (8 mmHg).  Home Cozaar on hold due to active GI bleed. Apixiban on hold for cardiomyopathy. Monitor clinical status closely. Monitor on telemetry. Patient is off CHF pathway.  Monitor strict Is&Os and daily weights.  Place on fluid restriction of 1.5 L.        Primary hypertension  · Patient's blood pressure is controlled here in the hospital over past 24 hours.   · Goal for blood pressure is SBP < 150 and DBP < 90 as patient > or = 60 years of age with no diabetes or advanced kidney disease based on JNC 8 guidelines.   · On no meds for BP and needs none at this time. Home Cozaar on hold due to GI bleed.   · Plan is to monitor patient's blood pressure routinely while patient is hospitalized.     Stage 3b chronic kidney disease  · Controlled. Patient is at baseline renal function at present.   · Need to avoid any nephrotoxic agents such as NSAIDS, IV contrast dye or aminoglycosides unless necessary while patient is hospitalized.  · Will renally adjust medications based on patient's creatinine clearance.   · Strict I+Os.       Pure hypercholesterolemia  - Chronic and controlled. Patient takes Crestor at home, not on formulary.  - Patient has had severe reaction with Lipitor and Pravastatin that are on formulary so will hold Crestor in hospital and can resume on hospital discharge.       Primary open angle glaucoma (POAG) of both eyes  Chronic and controlled. Continue home eye drops and Brimonidine, Dorzolamide and Latanoprost. In hospital.         VTE Risk Mitigation (From admission, onward)         Ordered     IP VTE HIGH RISK PATIENT  Once         12/28/21 2213     Reason for No Pharmacological VTE Prophylaxis  Once        Question:  Reasons:  Answer:  Active Bleeding    12/28/21 2213     Place sequential compression device  Until discontinued         12/28/21 2211                Discharge Planning   DINORA: 1/1/2022     Code Status: Full Code   Is the patient medically ready for  discharge?: No    Reason for patient still in hospital (select all that apply): Patient trending condition               Machelle Alatorre MD  Department of Hospital Medicine   [facility] - Oncology ([facility])

## 2021-12-30 NOTE — PROVATION PATIENT INSTRUCTIONS
Discharge Summary/Instructions after an Endoscopic Procedure  Patient Name: Javier Campbell MRN: [id number]  Patient YOB: 1930 Thursday, December 30, 2021  Noreen Osman MD  Dear patient,  As a result of recent federal legislation (The Federal Cures Act), you may   receive lab or pathology results from your procedure in your [facility]   account before your physician is able to contact you. Your physician or   their representative will relay the results to you with their   recommendations at their soonest availability.  Thank you,  RESTRICTIONS:  During your procedure today, you received medications for sedation.  These   medications may affect your judgment, balance and coordination.  Therefore,   for 24 hours, you have the following restrictions:   - DO NOT drive a car, operate machinery, make legal/financial decisions,   sign important papers or drink alcohol.    ACTIVITY:  Today: no heavy lifting, straining or running due to procedural   sedation/anesthesia.  The following day: return to full activity including work.  DIET:  Eat and drink normally unless instructed otherwise.     TREATMENT FOR COMMON SIDE EFFECTS:  - Mild abdominal pain, nausea, belching, bloating or excessive gas:  rest,   eat lightly and use a heating pad.  - Sore Throat: treat with throat lozenges and/or gargle with warm salt   water.  - Because air was used during the procedure, expelling large amounts of air   from your rectum or belching is normal.  - If a bowel prep was taken, you may not have a bowel movement for 1-3 days.    This is normal.  SYMPTOMS TO WATCH FOR AND REPORT TO YOUR PHYSICIAN:  1. Abdominal pain or bloating, other than gas cramps.  2. Chest pain.  3. Back pain.  4. Signs of infection such as: chills or fever occurring within 24 hours   after the procedure.  5. Rectal bleeding, which would show as bright red, maroon, or black stools.   (A tablespoon of blood from the rectum is not serious, especially if    hemorrhoids are present.)  6. Vomiting.  7. Weakness or dizziness.  GO DIRECTLY TO THE NEAREST EMERGENCY ROOM IF YOU HAVE ANY OF THE FOLLOWING:      Difficulty breathing              Chills and/or fever over 101 F   Persistent vomiting and/or vomiting blood   Severe abdominal pain   Severe chest pain   Black, tarry stools   Bleeding- more than one tablespoon   Any other symptom or condition that you feel may need urgent attention  Your doctor recommends these additional instructions:  If any biopsies were taken, your doctors clinic will contact you in 1 to 2   weeks with any results.  - Return patient to hospital [facility] for ongoing care.   - Clear liquid diet. Monitor hemoglobin and bowel movements.   - Continue present medications.   - Repeat colonoscopy is not recommended due to current age (66 years or   older) for surveillance.  For questions, problems or results please call your physician - Noreen Osman MD at Work:  [phone].  [facility] [place], EMERGENCY ROOM PHONE NUMBER: [phone]  IF A COMPLICATION OR EMERGENCY SITUATION ARISES AND YOU ARE UNABLE TO REACH   YOUR PHYSICIAN - GO DIRECTLY TO THE EMERGENCY ROOM.  Noreen Osman MD  12/30/2021 12:50:04 PM  This report has been verified and signed electronically.  Dear patient,  As a result of recent federal legislation (The Federal Cures Act), you may   receive lab or pathology results from your procedure in your [facility]   account before your physician is able to contact you. Your physician or   their representative will relay the results to you with their   recommendations at their soonest availability.  Thank you,  PROVATION

## 2021-12-30 NOTE — ASSESSMENT & PLAN NOTE
Patient with known cardiomyopathy but has improved with medical treatment. Lst echo with EF 60% with diastolic dysfunction.     Echo Color Flow Doppler? Yes    Interpretation Summary  · The left ventricle is normal in size with concentric remodeling and low normal systolic function. The estimated ejection fraction is 50%.  · Moderate right ventricular enlargement with mildly reduced right ventricular systolic function.  · Left ventricular diastolic dysfunction.  · Severe biatrial enlargement.  · Moderate to severe tricuspid regurgitation.  · Moderate mitral regurgitation.  · The estimated PA systolic pressure is 69 mmHg. The mean PA pressure is estimated at 44mm Hg (based on pulmonic insufficiency peak velocity).  · Intermediate central venous pressure (8 mmHg).  Home Cozaar on hold due to active GI bleed. Apixiban on hold for cardiomyopathy. Monitor clinical status closely. Monitor on telemetry. Patient is off CHF pathway.  Monitor strict Is&Os and daily weights.  Place on fluid restriction of 1.5 L.

## 2021-12-30 NOTE — PLAN OF CARE
Patient is progressing and involved with plan of care,communicating needs throughout shift. Up with stand by assist to BSC. Daughter at bedside. Plan for pt to have colonoscopy this morning. Pt completed Golytely w/ several dark red bloody BM's. NPO, voiding without difficulty. No c/o pain. IV Protonix continued, Telemetry monitored. (Pt Sinus Ahmet high 40's to low 50's) All other vitals stable; no acute events this shift. Pt. Remaining free from falls or injury throughout shift; bed in lowest position; side rails up X2; call light within reach; pt instructed to call for assistance as needed - verbalized understanding. Q2h rounding on patient. Will continue to monitor.

## 2021-12-30 NOTE — SUBJECTIVE & OBJECTIVE
Interval History: Patient transfused 1 unit of PRBCs on 12/29 and last Hgb this am stable at 7.0. Patient with no further overt GI bleeding. Patient's wife and daughter at bedside this afternoon and patient just returned from C-scope. Discussed results with family of C-scope and GI plan. Patient with no obvious source of GI bleeding noted on C-scope so GI states okay to start clear liquids and continue to monitor H/H closely and monitor for further GI bleeding. If has further bleeding then plan EGD and if not then just monitor for now. Wife and daughter aware of plan. Also discussed plan with patient but he is still groggy from C-scope earlier so not sure how much he was able to understand. Patient to remain in hospital for at least another 24-48 hours for observation for further bleeding and possible EGD if bleeding continues.     Review of Systems   Constitutional: Negative for fever.   Respiratory: Negative for cough and shortness of breath.    Cardiovascular: Negative for chest pain and leg swelling.   Gastrointestinal: Positive for blood in stool. Negative for abdominal pain, diarrhea, nausea and vomiting.   Musculoskeletal: Negative for arthralgias.   Allergic/Immunologic: Negative for immunocompromised state.   Neurological: Negative for dizziness and light-headedness.   Psychiatric/Behavioral: Negative for agitation and confusion.     Objective:     Vital Signs (Most Recent):  Temp: 97.9 °F (36.6 °C) (12/30/21 1430)  Pulse: 62 (12/30/21 1430)  Resp: 15 (12/30/21 1430)  BP: 157/70 (12/30/21 1430)  SpO2: 98 % (12/30/21 1430) on room air Vital Signs (24h Range):  Temp:  [97.4 °F (36.3 °C)-98.8 °F (37.1 °C)] 97.9 °F (36.6 °C)  Pulse:  [49-85] 52  Resp:  [12-18] 15  SpO2:  [95 %-100 %] 98 %  BP: (120-160)/(59-73) 157/70     Weight: 70.3 kg (155 lb)  Body mass index is 24.28 kg/m².    Intake/Output Summary (Last 24 hours) at 12/30/2021 1528  Last data filed at 12/30/2021 1303  Gross per 24 hour   Intake 4100 ml    Output --   Net 4100 ml      Physical Exam  Vitals and nursing note reviewed.   Constitutional:       General: He is not in acute distress.     Appearance: Normal appearance. He is well-developed, normal weight and well-nourished. He is not ill-appearing.   HENT:      Mouth/Throat:      Mouth: Oropharynx is clear and moist.   Eyes:      Conjunctiva/sclera: Conjunctivae normal.   Cardiovascular:      Rate and Rhythm: Normal rate and regular rhythm.      Heart sounds: Normal heart sounds. No murmur heard.  No friction rub. No gallop.    Pulmonary:      Effort: Pulmonary effort is normal. No respiratory distress.      Breath sounds: Normal breath sounds. No wheezing or rales.   Abdominal:      General: Abdomen is flat. Bowel sounds are normal. There is no distension.      Palpations: Abdomen is soft.      Tenderness: There is no abdominal tenderness. There is no guarding.   Musculoskeletal:         General: No edema.   Skin:     Capillary Refill: Capillary refill takes less than 2 seconds.      Findings: No erythema or rash.   Neurological:      Mental Status: He is alert and oriented to person, place, and time.   Psychiatric:         Mood and Affect: Mood and affect and mood normal.         Behavior: Behavior normal.         Significant Labs:   CBC:   Recent Labs   Lab 12/29/21  1337 12/29/21  1928 12/30/21  0455   WBC 6.06 4.33 3.76*   HGB 7.8* 7.0* 7.0*   HCT 24.5* 22.1* 22.2*   PLT 66* 67* 75*     CMP:   Recent Labs   Lab 12/28/21  1809 12/29/21  0218    140   K 3.9 4.1   * 116*   CO2 20* 18*   * 82   BUN 35* 34*   CREATININE 1.5* 1.3   CALCIUM 10.4 9.3   PROT 6.9 5.0*   ALBUMIN 4.0 3.1*   BILITOT 2.7* 3.5*   ALKPHOS 97 72   AST 27 21   ALT 14 10   ANIONGAP 7* 6*   EGFRNONAA 40.1* 47.7*       Significant Imaging: I have reviewed all pertinent imaging results/findings within the past 24 hours.

## 2021-12-30 NOTE — PLAN OF CARE
Patient admitted to floor. Planned colonoscopy tomorrow with go lightly starting tonight . Vss and afebrile No active bleeding on admission. BSC provided .

## 2021-12-30 NOTE — ASSESSMENT & PLAN NOTE
· Patient's blood pressure is controlled here in the hospital over past 24 hours.   · Goal for blood pressure is SBP < 150 and DBP < 90 as patient > or = 60 years of age with no diabetes or advanced kidney disease based on JNC 8 guidelines.   · On no meds for BP and needs none at this time. Home Cozaar on hold due to GI bleed.   · Plan is to monitor patient's blood pressure routinely while patient is hospitalized.

## 2021-12-30 NOTE — PLAN OF CARE
Problem: Bleeding (Gastrointestinal Bleeding)  Goal: Hemostasis  Outcome: Ongoing, Progressing  POC reviewed with patient and family at bedside. Today patient had a colonoscopy . On return to floor , no bleed noted , continuing to monitor. Full liquid diet ordered . Will observe for bleeding and labs prior to discharge .  Discharge  not set at this time

## 2021-12-30 NOTE — ASSESSMENT & PLAN NOTE
Acute blood loss anemia  - Monitor for further bleeding as per GI On 12/30 and continue to monitor H/H. If further bleeding then plan EGD if no further bleeding then will just monitor. Patient jesica for clear liquid diet so ordered for 12/30 as per GI.   - Patient s/p 1 unit pRBCs transfused in ED given acute episodes of bleeding on 12/28 and received additional 1 unit of PRBCs on 12/29 for acute GI bleed.   - Follow H/H with CBC every 8 hours and transfuse if Hgb < 7 or < 8 and symptomatic.   - Continue Protonix for now as per GI.   - 2 large bore IV access  - GI consulted and colonoscopy recommended and done on 12/30 and showed:  - Preparation of the colon was poor.   - Hemorrhoids found on perianal exam.   - Diverticulosis in the sigmoid colon.   - The examined portion of the ileum was normal.   - The examination was otherwise normal.   - No specimens collected.

## 2021-12-30 NOTE — ASSESSMENT & PLAN NOTE
- Chronic and controlled. Patient takes Crestor at home, not on formulary.  - Patient has had severe reaction with Lipitor and Pravastatin that are on formulary so will hold Crestor in hospital and can resume on hospital discharge.

## 2021-12-30 NOTE — NURSING
Patient check for GI bleeding . No bleed noted . Patient resting after procedure. Physcian  in to update family and give plan of care .

## 2021-12-30 NOTE — INTERVAL H&P NOTE
The patient has been examined and the H&P has been reviewed:    I concur with the findings and no changes have occurred since H&P was written.    Procedure risks, benefits and alternative options discussed and understood by patient/family.          Active Hospital Problems    Diagnosis  POA    *Acute GI bleeding [K92.2]  Yes    Acute blood loss anemia [D62]  Yes    Cardiomyopathy, ischemic: Prior MI, CABG, EF 40-45% [I25.5]  Yes     Chronic    Primary open angle glaucoma (POAG) of both eyes [H40.1130]  Yes    Stage 3b chronic kidney disease [N18.32]  Yes     Component      Latest Ref Rng & Units 6/27/2018 11/1/2017 6/12/2017 11/29/2016                Sodium      136 - 145 mmol/L 144 141 141 141   Potassium      3.5 - 5.1 mmol/L 5.1 4.3 5.2 (H) 4.1   Chloride      95 - 110 mmol/L 112 (H) 107 109 112 (H)   CO2      23 - 29 mmol/L 25 25 24 24   Glucose      70 - 110 mg/dL 97 79 76 88   BUN, Bld      8 - 23 mg/dL 32 (H) 27 (H) 27 (H) 22   Creatinine      0.5 - 1.4 mg/dL 2.0 (H) 1.8 (H) 1.8 (H) 1.5 (H)   Calcium      8.7 - 10.5 mg/dL 10.5 10.3 10.0 9.4   Anion Gap      8 - 16 mmol/L 7 (L) 9 8 5 (L)   eGFR if African American      >60 mL/min/1.73 m:2 33.5 (A) 38.3 (A) 38.3 (A) 48.0 (A)   eGFR if non African American      >60 mL/min/1.73 m:2 28.9 (A) 33.1 (A) 33.1 (A) 41.6 (A)     Component      Latest Ref Rng & Units 10/27/2016 5/6/2016 5/5/2016 4/30/2016              8:34 AM   Sodium      136 - 145 mmol/L 141 138 139 140   Potassium      3.5 - 5.1 mmol/L 4.3 4.4 4.2 4.8   Chloride      95 - 110 mmol/L 109 107 107 108   CO2      23 - 29 mmol/L 23 25 25 23   Glucose      70 - 110 mg/dL 89 100 75 111 (H)   BUN, Bld      8 - 23 mg/dL 20 28 (H) 36 (H) 21   Creatinine      0.5 - 1.4 mg/dL 1.7 (H) 1.4 1.6 (H) 1.2   Calcium      8.7 - 10.5 mg/dL 9.5 9.1 9.2 10.0   Anion Gap      8 - 16 mmol/L 9 6 (L) 7 (L) 9   eGFR if African American      >60 mL/min/1.73 m:2 41.3 (A) 52.6 (A) 44.7 (A) >60.0   eGFR if non African American       >60 mL/min/1.73 m:2 35.7 (A) 45.5 (A) 38.7 (A) 54.8 (A)     Component      Latest Ref Rng & Units 4/30/2016 4/29/2016 4/28/2016 4/26/2016           8:34 AM      Sodium      136 - 145 mmol/L 140 139 136 134 (L)   Potassium      3.5 - 5.1 mmol/L 4.8 4.2 4.2 4.4   Chloride      95 - 110 mmol/L 108 108 106 104   CO2      23 - 29 mmol/L 23 24 20 (L) 23   Glucose      70 - 110 mg/dL 111 (H) 144 (H) 178 (H) 122 (H)   BUN, Bld      8 - 23 mg/dL 21 18 17 25 (H)   Creatinine      0.5 - 1.4 mg/dL 1.2 1.3 1.4 1.7 (H)   Calcium      8.7 - 10.5 mg/dL 10.0 9.8 9.8 9.2   Anion Gap      8 - 16 mmol/L 9 7 (L) 10 7 (L)   eGFR if African American      >60 mL/min/1.73 m:2 >60.0 57.5 (A) 52.6 (A) 41.6 (A)   eGFR if non African American      >60 mL/min/1.73 m:2 54.8 (A) 49.8 (A) 45.5 (A) 36.0 (A)     Component      Latest Ref Rng & Units 4/21/2016 3/1/2016 2/22/2016 12/28/2015                Sodium      136 - 145 mmol/L 142 139 142 142   Potassium      3.5 - 5.1 mmol/L 4.3 4.0 4.0 3.8   Chloride      95 - 110 mmol/L 109 105 110 108   CO2      23 - 29 mmol/L 26 25 24 27   Glucose      70 - 110 mg/dL 83 85 91 127 (H)   BUN, Bld      8 - 23 mg/dL 27 (H) 22 22 30 (H)   Creatinine      0.5 - 1.4 mg/dL 1.7 (H) 1.5 (H) 1.5 (H) 1.8 (H)   Calcium      8.7 - 10.5 mg/dL 10.1 10.6 (H) 10.1 9.8   Anion Gap      8 - 16 mmol/L 7 (L) 9 8 7 (L)   eGFR if African American      >60 mL/min/1.73 m:2 41.6 (A) 48.4 (A) 48 (A) 38.8 (A)   eGFR if non African American      >60 mL/min/1.73 m:2 36.0 (A) 41.8 (A) 42 (A) 33.6 (A)     Component      Latest Ref Rng & Units 10/16/2015 10/2/2015 9/29/2015 5/27/2015                Sodium      136 - 145 mmol/L 145 142 143 141   Potassium      3.5 - 5.1 mmol/L 4.0 4.3 4.0 5.0   Chloride      95 - 110 mmol/L 109 106 108 107   CO2      23 - 29 mmol/L 28 26 29 26   Glucose      70 - 110 mg/dL 96 105 75 88   BUN, Bld      8 - 23 mg/dL 21 43 (H) 42 (H) 34 (H)   Creatinine      0.5 - 1.4 mg/dL 1.7 (H) 1.9 (H) 2.4 (H) 1.9 (H)    Calcium      8.7 - 10.5 mg/dL 9.6 10.5 9.9 9.1   Anion Gap      8 - 16 mmol/L 8 10 6 (L) 8   eGFR if African American      >60 mL/min/1.73 m:2 41.6 (A) 36.4 (A) 27.4 (A) 36.4 (A)   eGFR if non African American      >60 mL/min/1.73 m:2 36.0 (A) 31.4 (A) 23.7 (A) 31.4 (A)     Component      Latest Ref Rng & Units 4/8/2015 3/30/2015 12/15/2014 5/26/2014                Sodium      136 - 145 mmol/L 141 142 143 141   Potassium      3.5 - 5.1 mmol/L 4.0 3.7 4.4 4.6   Chloride      95 - 110 mmol/L 107 106 110 109   CO2      23 - 29 mmol/L 24 26 23 21 (L)   Glucose      70 - 110 mg/dL 107 92 80 106   BUN, Bld      8 - 23 mg/dL 33 (H) 32 (H) 27 (H) 20   Creatinine      0.5 - 1.4 mg/dL 1.9 (H) 2.1 (H) 1.5 (H) 1.7 (H)   Calcium      8.7 - 10.5 mg/dL 9.7 9.7 9.7 9.2   Anion Gap      8 - 16 mmol/L 10 10 10 11   eGFR if African American      >60 mL/min/1.73 m:2 36.6 (A) 32.4 (A) 48.7 (A) 41.9 (A)   eGFR if non African American      >60 mL/min/1.73 m:2 31.7 (A) 28.1 (A) 42.1 (A) 36.2 (A)     Component      Latest Ref Rng & Units 3/25/2014 11/19/2013 11/18/2013 11/17/2013                Sodium      136 - 145 mmol/L 140 135 (L) 137 134 (L)   Potassium      3.5 - 5.1 mmol/L 4.0 4.6 4.4 4.6   Chloride      95 - 110 mmol/L 109 103 104 99   CO2      23 - 29 mmol/L 24 24 24 23   Glucose      70 - 110 mg/dL 91 81 90 95   BUN, Bld      8 - 23 mg/dL 26 (H) 33 (H) 32 (H) 41 (H)   Creatinine      0.5 - 1.4 mg/dL 2.1 (H) 1.7 (H) 1.7 (H) 1.8 (H)   Calcium      8.7 - 10.5 mg/dL 9.3 9.3 9.2 9.1   Anion Gap      8 - 16 mmol/L 7 (L) 8 9 12   eGFR if African American      >60 mL/min/1.73 m:2 32.7 (A) 42.2 (A) 42.2 (A) 39.4 (A)   eGFR if non African American      >60 mL/min/1.73 m:2 28.3 (A) 36.5 (A) 36.5 (A) 34.0 (A)     Component      Latest Ref Rng & Units 11/16/2013 11/15/2013 11/14/2013 11/13/2013                Sodium      136 - 145 mmol/L 134 (L) 137 139 138   Potassium      3.5 - 5.1 mmol/L 4.0 3.8 3.3 (L) 3.8   Chloride      95 - 110 mmol/L  99 101 102 104   CO2      23 - 29 mmol/L 26 26 25 24   Glucose      70 - 110 mg/dL 88 96 100 85   BUN, Bld      8 - 23 mg/dL 39 (H) 39 (H) 35 (H) 27 (H)   Creatinine      0.5 - 1.4 mg/dL 2.0 (H) 2.2 (H) 2.0 (H) 1.7 (H)   Calcium      8.7 - 10.5 mg/dL 9.0 9.0 9.0 9.1   Anion Gap      8 - 16 mmol/L 9 10 12 10   eGFR if African American      >60 mL/min/1.73 m:2 34.7 (A) 30.9 (A) 34.7 (A) 42.2 (A)   eGFR if non African American      >60 mL/min/1.73 m:2 30.0 (A) 26.7 (A) 30.0 (A) 36.5 (A)     Component      Latest Ref Rng & Units 11/12/2013 11/11/2013 11/10/2013 11/10/2013             9:10 PM  5:28 AM   Sodium      136 - 145 mmol/L 135 (L) 135 (L)  137   Potassium      3.5 - 5.1 mmol/L 3.9 4.0 4.6 3.4 (L)   Chloride      95 - 110 mmol/L 101 102  104   CO2      23 - 29 mmol/L 23 22 (L)  23   Glucose      70 - 110 mg/dL 105 97  99   BUN, Bld      8 - 23 mg/dL 24 (H) 24 (H)  20   Creatinine      0.5 - 1.4 mg/dL 1.6 (H) 1.5 (H)  1.6 (H)   Calcium      8.7 - 10.5 mg/dL 9.2 8.8  8.7   Anion Gap      8 - 16 mmol/L 11 11  10   eGFR if African American      >60 mL/min/1.73 m:2 45.4 (A) 49.1 (A)  45.4 (A)   eGFR if non African American      >60 mL/min/1.73 m:2 39.3 (A) 42.4 (A)  39.3 (A)     Component      Latest Ref Rng & Units 11/9/2013 11/9/2013 11/8/2013 11/8/2013           5:37 AM  5:37 AM  4:37 AM  4:37 AM   Sodium      136 - 145 mmol/L 138 138 136 136   Potassium      3.5 - 5.1 mmol/L 3.2 (L) 3.2 (L) 4.0 4.0   Chloride      95 - 110 mmol/L 104 104 107 107   CO2      23 - 29 mmol/L 20 (L) 20 (L) 20 (L) 20 (L)   Glucose      70 - 110 mg/dL 113 (H) 113 (H) 99 99   BUN, Bld      8 - 23 mg/dL 21 21 20 20   Creatinine      0.5 - 1.4 mg/dL 1.7 (H) 1.7 (H) 1.6 (H) 1.6 (H)   Calcium      8.7 - 10.5 mg/dL 9.1 9.1 9.2 9.2   Anion Gap      8 - 16 mmol/L 14 14 9 9   eGFR if African American      >60 mL/min/1.73 m:2 42.2 (A) 42.2 (A) 45.4 (A) 45.4 (A)   eGFR if non African American      >60 mL/min/1.73 m:2 36.5 (A) 36.5 (A) 39.3 (A) 39.3  (A)     Component      Latest Ref Rng & Units 11/7/2013 11/7/2013 11/6/2013 11/6/2013           5:30 AM  5:30 AM  3:51 AM  3:51 AM   Sodium      136 - 145 mmol/L 137 137 138 138   Potassium      3.5 - 5.1 mmol/L 3.6 3.6 3.6 3.6   Chloride      95 - 110 mmol/L 110 110 114 (H) 114 (H)   CO2      23 - 29 mmol/L 15 (L) 15 (L) 17 (L) 17 (L)   Glucose      70 - 110 mg/dL 105 105 90 90   BUN, Bld      8 - 23 mg/dL 18 18 20 20   Creatinine      0.5 - 1.4 mg/dL 1.5 (H) 1.5 (H) 1.3 1.3   Calcium      8.7 - 10.5 mg/dL 9.3 9.3 8.2 (L) 8.2 (L)   Anion Gap      8 - 16 mmol/L 12 12 7 (L) 7 (L)   eGFR if African American      >60 mL/min/1.73 m:2 49.1 (A) 49.1 (A) 58.3 (A) 58.3 (A)   eGFR if non African American      >60 mL/min/1.73 m:2 42.4 (A) 42.4 (A) 50.5 (A) 50.5 (A)     Component      Latest Ref Rng & Units 11/5/2013 11/5/2013 11/4/2013 11/4/2013           2:13 AM  2:13 AM  3:46 PM  3:34 AM   Sodium      136 - 145 mmol/L 137 137 139 141   Potassium      3.5 - 5.1 mmol/L 3.5 3.5 3.9 3.7   Chloride      95 - 110 mmol/L 113 (H) 113 (H) 111 (H) 113 (H)   CO2      23 - 29 mmol/L 17 (L) 17 (L) 19 (L) 21 (L)   Glucose      70 - 110 mg/dL 86 86 110 84   BUN, Bld      8 - 23 mg/dL 22 22 23 19   Creatinine      0.5 - 1.4 mg/dL 1.4 1.4 1.5 (H) 1.4   Calcium      8.7 - 10.5 mg/dL 8.0 (L) 8.0 (L) 8.1 (L) 8.3 (L)   Anion Gap      8 - 16 mmol/L 7 (L) 7 (L) 9 7 (L)   eGFR if African American      >60 mL/min/1.73 m:2 53.3 (A) 53.3 (A) 49.1 (A) 53.3 (A)   eGFR if non African American      >60 mL/min/1.73 m:2 46.1 (A) 46.1 (A) 42.4 (A) 46.1 (A)     Component      Latest Ref Rng & Units 11/3/2013 11/3/2013 11/3/2013 11/3/2013           5:12 PM 11:41 AM  5:32 AM  3:15 AM   Sodium      136 - 145 mmol/L 143 141 142 140   Potassium      3.5 - 5.1 mmol/L 4.3 4.0 3.5 3.6   Chloride      95 - 110 mmol/L 113 (H) 113 (H) 112 (H) 111 (H)   CO2      23 - 29 mmol/L 17 (L) 16 (L) 19 (L) 20 (L)   Glucose      70 - 110 mg/dL 87 101 109 112 (H)   BUN, Bld      8  - 23 mg/dL 19 17 15 14   Creatinine      0.5 - 1.4 mg/dL 1.4 1.3 1.2 1.2   Calcium      8.7 - 10.5 mg/dL 9.1 8.8 8.9 8.9   Anion Gap      8 - 16 mmol/L 13 12 11 9   eGFR if African American      >60 mL/min/1.73 m:2 53.3 (A) 58.3 (A) >60.0 >60.0   eGFR if non African American      >60 mL/min/1.73 m:2 46.1 (A) 50.5 (A) 55.6 (A) 55.6 (A)     Component      Latest Ref Rng & Units 11/2/2013 11/2/2013 11/2/2013 11/2/2013          11:33 PM 11:33 PM  5:38 PM 12:15 PM   Sodium      136 - 145 mmol/L 143 143 141 140   Potassium      3.5 - 5.1 mmol/L 3.7 3.7 3.3 (L) 3.1 (L)   Chloride      95 - 110 mmol/L 111 (H) 111 (H) 111 (H) 109   CO2      23 - 29 mmol/L 19 (L) 19 (L) 20 (L) 19 (L)   Glucose      70 - 110 mg/dL 101 101 111 (H) 180 (H)   BUN, Bld      8 - 23 mg/dL 16 16 16 17   Creatinine      0.5 - 1.4 mg/dL 1.2 1.2 1.3 1.4   Calcium      8.7 - 10.5 mg/dL 9.3 9.3 9.4 9.4   Anion Gap      8 - 16 mmol/L 13 13 10 12   eGFR if African American      >60 mL/min/1.73 m:2 >60.0 >60.0 58.3 (A) 53.3 (A)   eGFR if non African American      >60 mL/min/1.73 m:2 55.6 (A) 55.6 (A) 50.5 (A) 46.1 (A)     Component      Latest Ref Rng & Units 11/2/2013 11/2/2013 11/2/2013 11/1/2013           5:40 AM  2:58 AM 12:23 AM  6:21 PM   Sodium      136 - 145 mmol/L 142 140 137 139   Potassium      3.5 - 5.1 mmol/L 3.5 3.3 (L) 3.4 (L) 3.4 (L)   Chloride      95 - 110 mmol/L 112 (H) 109 107 107   CO2      23 - 29 mmol/L 16 (L) 20 (L) 18 (L) 22 (L)   Glucose      70 - 110 mg/dL 170 (H) 169 (H) 156 (H) 125 (H)   BUN, Bld      8 - 23 mg/dL 18 19 20 22   Creatinine      0.5 - 1.4 mg/dL 1.4 1.4 1.4 1.7 (H)   Calcium      8.7 - 10.5 mg/dL 9.8 9.9 9.3 9.0   Anion Gap      8 - 16 mmol/L 14 11 12 10   eGFR if African American      >60 mL/min/1.73 m:2 53.3 (A) 53.3 (A) 53.3 (A) 42.2 (A)   eGFR if non African American      >60 mL/min/1.73 m:2 46.1 (A) 46.1 (A) 46.1 (A) 36.5 (A)     Component      Latest Ref Rng & Units 11/1/2013 11/1/2013 11/1/2013 10/29/2013            4:21 PM  8:28 AM  8:28 AM    Sodium      136 - 145 mmol/L 138 139 139 142   Potassium      3.5 - 5.1 mmol/L 3.5 3.4 (L) 3.4 (L) 4.4   Chloride      95 - 110 mmol/L 105 106 106 105   CO2      23 - 29 mmol/L 20 (L) 22 (L) 22 (L) 27   Glucose      70 - 110 mg/dL 182 (H) 99 99 177 (H)   BUN, Bld      8 - 23 mg/dL 20 20 20 22   Creatinine      0.5 - 1.4 mg/dL 1.6 (H) 1.5 (H) 1.5 (H) 1.7 (H)   Calcium      8.7 - 10.5 mg/dL 9.1 9.8 9.8 10.0   Anion Gap      8 - 16 mmol/L 13 11 11 10   eGFR if African American      >60 mL/min/1.73 m:2 45.4 (A) 49.1 (A) 49.1 (A) 42.2 (A)   eGFR if non African American      >60 mL/min/1.73 m:2 39.3 (A) 42.4 (A) 42.4 (A) 36.5 (A)     Component      Latest Ref Rng & Units 10/3/2013 10/3/2013 9/30/2013          10:52 AM 10:52 AM    Sodium      136 - 145 mmol/L 142 142 142   Potassium      3.5 - 5.1 mmol/L 4.5 4.5 4.8   Chloride      95 - 110 mmol/L 106 106 106   CO2      23 - 29 mmol/L 31 (H) 31 (H) 28   Glucose      70 - 110 mg/dL 95 95 88   BUN, Bld      8 - 23 mg/dL 32 (H) 32 (H) 32 (H)   Creatinine      0.5 - 1.4 mg/dL 1.8 (H) 1.8 (H) 1.9 (H)   Calcium      8.7 - 10.5 mg/dL 9.9 9.9 10.4   Anion Gap      8 - 16 mmol/L 5 (L) 5 (L) 8   eGFR if African American      >60 mL/min/1.73 m:2 39.4 (A) 39.4 (A) 36.9 (A)   eGFR if non African American      >60 mL/min/1.73 m:2 34.0 (A) 34.0 (A) 31.9 (A)     IMO Regulatory Update 10/1/2020        Pure hypercholesterolemia [E78.00]  Yes     Component      Latest Ref Rng & Units 6/27/2018 6/6/2017 11/29/2016 10/2/2015   Cholesterol      120 - 199 mg/dL 124 128 120 164   Triglycerides      30 - 150 mg/dL 44 50 86 59   HDL      40 - 75 mg/dL 61 52 36 (L) 68   LDL Cholesterol      63.0 - 159.0 mg/dL 54.2 (L) 66.0 66.8 84.2   HDL/Chol Ratio      20.0 - 50.0 % 49.2 40.6 30.0 41.5   Total Cholesterol/HDL Ratio      2.0 - 5.0 2.0 2.5 3.3 2.4   Non-HDL Cholesterol      mg/dL 63 76 84 96     Component      Latest Ref Rng & Units 4/8/2015 9/24/2014 10/3/2013    Cholesterol      120 - 199 mg/dL 117 (L) 142 219 (H)   Triglycerides      30 - 150 mg/dL 90 87 96   HDL      40 - 75 mg/dL 36 (L) 48 31 (L)   LDL Cholesterol      63.0 - 159.0 mg/dL 63.0 76.6 168.8 (H)   HDL/Chol Ratio      20.0 - 50.0 % 30.8 33.8 14.2 (L)   Total Cholesterol/HDL Ratio      2.0 - 5.0 3.3 3.0 7.1 (H)   Non-HDL Cholesterol      mg/dL 81 94 188         Primary hypertension [I10]  Yes      Resolved Hospital Problems   No resolved problems to display.

## 2021-12-30 NOTE — TREATMENT PLAN
Post-Procedure Gastroenterology Treatment Plan:     Javier Valles is status post colonoscopy with the following findings:     - Preparation of the colon was poor.   - Hemorrhoids found on perianal exam.   - Diverticulosis in the sigmoid colon.   - The examined portion of the ileum was normal.   - The examination was otherwise normal.   - No specimens collected.       Our recommendations are as follows:     -Okay to resume clear liquid diet and monitor for continued hematochezia and Hgb trend.   -If further evidence of bleeding, will consider upper endoscopy.   -Continue to hold NSAIDS, antiplatelet, and anticoagulation.   -Continue to trend CBC and transfuse for Hgb <7 and platelets <50.   - We will continue to follow.       Ochoa Chan MD, PGY-IV  Gastroenterology Fellow  [facility]

## 2021-12-30 NOTE — ASSESSMENT & PLAN NOTE
Chronic and controlled. Continue home eye drops and Brimonidine, Dorzolamide and Latanoprost. In hospital.

## 2021-12-31 LAB
ANISOCYTOSIS BLD QL SMEAR: ABNORMAL
BASOPHILS # BLD AUTO: 0.08 K/UL (ref 0–0.2)
BASOPHILS NFR BLD: 2.4 % (ref 0–1.9)
BLD PROD TYP BPU: NORMAL
BLOOD UNIT EXPIRATION DATE: NORMAL
BLOOD UNIT TYPE CODE: 6200
BLOOD UNIT TYPE: NORMAL
BURR CELLS BLD QL SMEAR: ABNORMAL
CODING SYSTEM: NORMAL
DIFFERENTIAL METHOD: ABNORMAL
DISPENSE STATUS: NORMAL
EOSINOPHIL # BLD AUTO: 0.2 K/UL (ref 0–0.5)
EOSINOPHIL NFR BLD: 6 % (ref 0–8)
ERYTHROCYTE [DISTWIDTH] IN BLOOD BY AUTOMATED COUNT: 23.3 % (ref 11.5–14.5)
HCT VFR BLD AUTO: 21.3 % (ref 40–54)
HGB BLD-MCNC: 6.8 G/DL (ref 14–18)
IMM GRANULOCYTES # BLD AUTO: 0.01 K/UL (ref 0–0.04)
IMM GRANULOCYTES NFR BLD AUTO: 0.3 % (ref 0–0.5)
LYMPHOCYTES # BLD AUTO: 0.8 K/UL (ref 1–4.8)
LYMPHOCYTES NFR BLD: 24.2 % (ref 18–48)
MCH RBC QN AUTO: 34.5 PG (ref 27–31)
MCHC RBC AUTO-ENTMCNC: 31.9 G/DL (ref 32–36)
MCV RBC AUTO: 108 FL (ref 82–98)
MONOCYTES # BLD AUTO: 0.5 K/UL (ref 0.3–1)
MONOCYTES NFR BLD: 15.8 % (ref 4–15)
NEUTROPHILS # BLD AUTO: 1.7 K/UL (ref 1.8–7.7)
NEUTROPHILS NFR BLD: 51.3 % (ref 38–73)
NRBC BLD-RTO: 0 /100 WBC
OVALOCYTES BLD QL SMEAR: ABNORMAL
PLATELET # BLD AUTO: 83 K/UL (ref 150–450)
PMV BLD AUTO: 13.6 FL (ref 9.2–12.9)
POCT GLUCOSE: 132 MG/DL (ref 70–110)
POIKILOCYTOSIS BLD QL SMEAR: SLIGHT
POLYCHROMASIA BLD QL SMEAR: ABNORMAL
RBC # BLD AUTO: 1.97 M/UL (ref 4.6–6.2)
SCHISTOCYTES BLD QL SMEAR: ABNORMAL
TRANS ERYTHROCYTES VOL PATIENT: NORMAL ML
WBC # BLD AUTO: 3.35 K/UL (ref 3.9–12.7)

## 2021-12-31 PROCEDURE — 99232 SBSQ HOSP IP/OBS MODERATE 35: CPT | Mod: HCNC,,, | Performed by: INTERNAL MEDICINE

## 2021-12-31 PROCEDURE — P9021 RED BLOOD CELLS UNIT: HCPCS | Mod: HCNC | Performed by: HOSPITALIST

## 2021-12-31 PROCEDURE — 36415 COLL VENOUS BLD VENIPUNCTURE: CPT | Mod: HCNC | Performed by: INTERNAL MEDICINE

## 2021-12-31 PROCEDURE — C9113 INJ PANTOPRAZOLE SODIUM, VIA: HCPCS | Mod: HCNC | Performed by: HOSPITALIST

## 2021-12-31 PROCEDURE — 25000003 PHARM REV CODE 250: Mod: HCNC | Performed by: HOSPITALIST

## 2021-12-31 PROCEDURE — 25000003 PHARM REV CODE 250: Mod: HCNC | Performed by: INTERNAL MEDICINE

## 2021-12-31 PROCEDURE — 36430 TRANSFUSION BLD/BLD COMPNT: CPT | Mod: HCNC

## 2021-12-31 PROCEDURE — 63600175 PHARM REV CODE 636 W HCPCS: Mod: HCNC | Performed by: HOSPITALIST

## 2021-12-31 PROCEDURE — 99900035 HC TECH TIME PER 15 MIN (STAT): Mod: HCNC

## 2021-12-31 PROCEDURE — 99232 PR SUBSEQUENT HOSPITAL CARE,LEVL II: ICD-10-PCS | Mod: HCNC,,, | Performed by: INTERNAL MEDICINE

## 2021-12-31 PROCEDURE — 85025 COMPLETE CBC W/AUTO DIFF WBC: CPT | Mod: HCNC | Performed by: INTERNAL MEDICINE

## 2021-12-31 PROCEDURE — 20600001 HC STEP DOWN PRIVATE ROOM: Mod: HCNC

## 2021-12-31 PROCEDURE — 94761 N-INVAS EAR/PLS OXIMETRY MLT: CPT | Mod: HCNC

## 2021-12-31 RX ORDER — HYDROCODONE BITARTRATE AND ACETAMINOPHEN 500; 5 MG/1; MG/1
TABLET ORAL
Status: DISCONTINUED | OUTPATIENT
Start: 2021-12-31 | End: 2022-01-01 | Stop reason: HOSPADM

## 2021-12-31 RX ORDER — PANTOPRAZOLE SODIUM 40 MG/1
40 TABLET, DELAYED RELEASE ORAL DAILY
Status: DISCONTINUED | OUTPATIENT
Start: 2021-12-31 | End: 2022-01-01 | Stop reason: HOSPADM

## 2021-12-31 RX ADMIN — BRIMONIDINE TARTRATE 1 DROP: 2 SOLUTION OPHTHALMIC at 03:12

## 2021-12-31 RX ADMIN — PANTOPRAZOLE SODIUM 40 MG: 40 TABLET, DELAYED RELEASE ORAL at 09:12

## 2021-12-31 RX ADMIN — PANTOPRAZOLE SODIUM 8 MG/HR: 40 INJECTION, POWDER, FOR SOLUTION INTRAVENOUS at 03:12

## 2021-12-31 RX ADMIN — LATANOPROST 1 DROP: 50 SOLUTION OPHTHALMIC at 08:12

## 2021-12-31 RX ADMIN — PANTOPRAZOLE SODIUM 8 MG/HR: 40 INJECTION, POWDER, FOR SOLUTION INTRAVENOUS at 08:12

## 2021-12-31 RX ADMIN — BRIMONIDINE TARTRATE 1 DROP: 2 SOLUTION OPHTHALMIC at 08:12

## 2021-12-31 RX ADMIN — DORZOLAMIDE HYDROCHLORIDE 1 DROP: 20 SOLUTION/ DROPS OPHTHALMIC at 03:12

## 2021-12-31 RX ADMIN — DORZOLAMIDE HYDROCHLORIDE 1 DROP: 20 SOLUTION/ DROPS OPHTHALMIC at 08:12

## 2021-12-31 NOTE — ASSESSMENT & PLAN NOTE
Acute blood loss anemia  - 12/31: patient with no further melena. Hgb 6.8. Plan to transfuse 1 unit of PRBCs today with goal of Hgb >7. Recheck Hgb in am. Continue to monitor for any signs of bleeding. IV Protonix stopped and patient switched to oral Protonix 40 mg po daily.   - Monitor for further bleeding as per GI On 12/30 and continue to monitor H/H. If further bleeding then plan EGD if no further bleeding then will just monitor. Patient jesica for clear liquid diet so ordered for 12/30 as per GI.   - Patient s/p 1 unit pRBCs transfused in ED given acute episodes of bleeding on 12/28 and received additional 1 unit of PRBCs on 12/29 for acute GI bleed.   - Follow H/H with CBC every 8 hours and transfuse if Hgb < 7 or < 8 and symptomatic.   - Continue Protonix for now as per GI.   - 2 large bore IV access  - GI consulted and colonoscopy recommended and done on 12/30 and showed:  - Preparation of the colon was poor.   - Hemorrhoids found on perianal exam.   - Diverticulosis in the sigmoid colon.   - The examined portion of the ileum was normal.   - The examination was otherwise normal.   - No specimens collected.

## 2021-12-31 NOTE — PLAN OF CARE
POC reviewed with patient and wife at bedside; no acute events this shift. Afebrile. VSS on room air. Transitioned Protonix from continuous IV to PO. 1 unit RBC given for Hgb 6.8; tolerated well without premeds. No PRNs needed. Urinal within reach; up to commode with x1 assist; no BM today. Bed bath given. Tolerating regular diet and eating % of meals. All needs addressed. Will continue to monitor with frequent rounds and clustered care to promote rest.

## 2021-12-31 NOTE — NURSING
Patient check  No GI bleeding. Patient awake alert. Eating full liquid diet . Urinal provided . BSC at bedside

## 2021-12-31 NOTE — PLAN OF CARE
Continuous Protonix drip No bleeding noted, tolerating full liquid diet, Patient voids, No BM for night shift so far. Maintain patient safety,continuous education.

## 2021-12-31 NOTE — TREATMENT PLAN
Gastroenterology Treatment Plan    Javier Valles is a 89 y.o. male admitted to hospital 12/28/2021 (Hospital Day: 4) due to Acute GI bleeding.     Interval History  No further bleeding noted per nursing team. On bedside encounter, patient reported feeling well and was tolerating clear liquid diet. He denies abdominal pain, nausea, and vomiting. No bowel movements overnight. Vital signs stable on room air. Labs notable for Hgb downtrended to 6.8 from 7 yesterday.     Objective  Temp:  [97.4 °F (36.3 °C)-98.7 °F (37.1 °C)] 98.7 °F (37.1 °C) (12/31 0749)  Pulse:  [49-70] 55 (12/31 0749)  BP: (119-161)/(60-73) 161/72 (12/31 0749)  Resp:  [12-20] 16 (12/31 0749)  SpO2:  [94 %-100 %] 95 % (12/31 0749)    General: Alert, Oriented x3, no distress  Abdomen: Normoactive bowel sounds. Non-distended. Rectal exam without hemorrhoids and fissures; rectal vault empty, however no blood noted. Normal tympany. Soft. Non-tender. No peritoneal signs.    Laboratory    Recent Labs   Lab 12/29/21  1928 12/30/21  0455 12/31/21  0932   HGB 7.0* 7.0* 6.8*         Assessment and Plan  This is a 89 year old male with a PMH significant for atrial fibrillation (on Apixaban), CAD (status post PCI in 11/2013), diverticulosis, ischemic cardiomyopathy with HFpEF (based on ECHO from 02/2021), pulmonary hypertension, and sick sinus syndrome (status post pacemaker) who presented on 12/28 with acute onset of hematochezia without hemodynamic instability. There was no evidence of active bleeding on CTA A/P. He is status post colonoscopy on 12/30 showing diverticulosis, but without active bleeding. He has no obvious signs of GI bleeding since procedure and rectal exam today was normal, however Hgb remains unchanged at 7.     Our recommendations are as follows:     -Continue clear liquid diet and monitor for signs of recurrent GI bleeding.   -Avoid NSAID products, and continue to hold anticoagulation.   -Continue to transfuse for Hgb <7 and platelets <50.    -If Hgb remains unimproved tomorrow, will need to consider EGD for further evaluation.   -Consider ordering hemolysis work-up given bilirubin elevated on admission.   - We will continue to follow.    Thank you for involving us in the care of Javier Valles. Please call with any additional questions, concerns or changes in the patient's clinical status.        Ochoa Chan MD, PGY-IV  Gastroenterology Fellow  [facility]

## 2021-12-31 NOTE — NURSING
Patient HR drop  Check leads new battery in box. Patient paced at  50?  Patient cardiologist is Dr Ly here at [facility]

## 2021-12-31 NOTE — ASSESSMENT & PLAN NOTE
· Patient's blood pressure is controlled here in the hospital over past 24 hours.   · Goal for blood pressure is SBP < 150 and DBP < 90 as patient > or = 60 years of age with no diabetes or advanced kidney disease based on JNC 8 guidelines.   · On no meds for BP and needs none at this time. Home Cozaar on hold due to GI bleed. Plan to resume home meds on discharge.   · Plan is to monitor patient's blood pressure routinely while patient is hospitalized.

## 2021-12-31 NOTE — PROGRESS NOTES
[facility] - Oncology ([facility])  [facility] Medicine  Progress Note    Patient Name: Javier Valles  MRN: [id number]  Patient Class: IP- Inpatient   Admission Date: 12/28/2021  Length of Stay: 1 days  Attending Physician: Machelle Alatorre MD  Primary Care Provider: Thiago Gibbs MD        Subjective:     Principal Problem:Acute GI bleeding        HPI:  Javier Valles is a 89 y.o. male with PMHx significant for A fib with Eliquis, CAD, CKD, HLD, HTN, glaucoma admitted to hospital medicine for lower GI bleed with associated nausea. Reports 3 episodes of rectal bleeding earlier today. He is unsure if there were feces with the episodes. Patient has an additional episode of bleeding in the ED. Denies previous episodes. He is unsure of his last colonoscopy, but remembers getting a notice in the mail about being due for one about 2 years ago. States that previous colonoscopies have revealed non cancerous polyps that were removed. Denies pain with episodes, vomiting, abdominal pain, fever/chills, CP, SOB, lightheadedness, dizziness, diarrhea, constipation, urinary symptoms.     In the ED, /59 on arrival but increased to 182/78 after intervention with IVF and pRBCs. HR 51. Hgb/Hct 8.9/28.2. PT/INR 12.4/1.1. Cr 1.5 (~BL). T bili 2.7. CTA abdomen/pelvis with no evidence of acute GI bleed.         Overview/Hospital Course:  Hgb/Hct 8.9/28.2. PT/INR 12.4/1.1. Cr 1.5 (~BL). T bili 2.7 on admit. CTA abdomen/pelvis with no evidence of acute GI bleed. Patient transfused 1 unit of PRBCs on 12/29. GI consulted and recommended colonscopy to evaluate source of GI bleed and felt to be a lower source of bleeding. Patient had prep done and had C-scope done on 12/30 and showed:  - Preparation of the colon was poor.   - Hemorrhoids found on perianal exam.   - Diverticulosis in the sigmoid colon.   - The examined portion of the ileum was normal.   - The examination was otherwise normal.   - No specimens collected.      As per GI after  colonoscopy:   -Okay to resume clear liquid diet and monitor for continued hematochezia and Hgb trend.   -If further evidence of bleeding, will consider upper endoscopy.   -Continue to hold NSAIDS, antiplatelet, and anticoagulation.   -Continue to trend CBC and transfuse for Hgb <7 and platelets <50.   - We will continue to follow.       Interval History: Patient with no further melena or BRBPR. Hgb this am 6.8 and plan to transfuse 1 unit of PRBCs today. Hgb was 7.0 yesterday so not large drop in Hgb. Wife upset as hoping to go home today even though I had discussed with her and patient plan was to monitor in hospital today and hopeful discharge tomorrow, 1/1 as long as Hgb stable and further signs of GI bleeding. Wife upset as it is New Year's lan and she states at their age not sure how many more they will have. I told her if everything is stable in am and no bleeding can discharge tomorrow and at least be able to enjoy New Year's Day.     Review of Systems   Constitutional: Negative for fever.   Respiratory: Negative for cough and shortness of breath.    Cardiovascular: Negative for chest pain and leg swelling.   Gastrointestinal: Negative for abdominal pain, blood in stool, diarrhea and nausea.   Musculoskeletal: Negative for arthralgias.   Neurological: Negative for dizziness.   Psychiatric/Behavioral: Negative for agitation and confusion.     Objective:     Vital Signs (Most Recent):  Temp: 97.4 °F (36.3 °C) (12/31/21 1520)  Pulse: 50 (12/31/21 1520)  Resp: 15 (12/31/21 1520)  BP: 133/62 (12/31/21 1520)  SpO2: 95 % (12/31/21 1520) on room air Vital Signs (24h Range):  Temp:  [97.4 °F (36.3 °C)-98.7 °F (37.1 °C)] 97.4 °F (36.3 °C)  Pulse:  [50-55] 50  Resp:  [15-20] 15  SpO2:  [94 %-99 %] 95 %  BP: (114-161)/(53-72) 133/62     Weight: 70.3 kg (155 lb)  Body mass index is 24.28 kg/m².    Intake/Output Summary (Last 24 hours) at 12/31/2021 5901  Last data filed at 12/31/2021 1458  Gross per 24 hour   Intake 436  ml   Output 850 ml   Net -414 ml      Physical Exam  Vitals and nursing note reviewed.   Constitutional:       General: He is not in acute distress.     Appearance: Normal appearance. He is well-developed and normal weight. He is not ill-appearing.   Eyes:      Conjunctiva/sclera: Conjunctivae normal.   Cardiovascular:      Rate and Rhythm: Normal rate and regular rhythm.      Heart sounds: Normal heart sounds. No murmur heard.  No friction rub. No gallop.    Pulmonary:      Effort: Pulmonary effort is normal. No respiratory distress.      Breath sounds: Normal breath sounds. No wheezing or rales.   Abdominal:      General: Abdomen is flat. Bowel sounds are normal. There is no distension.      Palpations: Abdomen is soft.      Tenderness: There is no abdominal tenderness. There is no guarding.   Skin:     Capillary Refill: Capillary refill takes less than 2 seconds.      Findings: No erythema or rash.   Neurological:      Mental Status: He is alert and oriented to person, place, and time.   Psychiatric:         Mood and Affect: Mood normal.         Behavior: Behavior normal.         Significant Labs:   CBC:   Recent Labs   Lab 12/29/21  1928 12/30/21  0455 12/31/21  0932   WBC 4.33 3.76* 3.35*   HGB 7.0* 7.0* 6.8*   HCT 22.1* 22.2* 21.3*   PLT 67* 75* 83*     Significant Imaging: I have reviewed all pertinent imaging results/findings within the past 24 hours.      Assessment/Plan:      * Acute GI bleeding  Acute blood loss anemia  - 12/31: patient with no further melena. Hgb 6.8. Plan to transfuse 1 unit of PRBCs today with goal of Hgb >7. Recheck Hgb in am. Continue to monitor for any signs of bleeding. IV Protonix stopped and patient switched to oral Protonix 40 mg po daily.   - Monitor for further bleeding as per GI On 12/30 and continue to monitor H/H. If further bleeding then plan EGD if no further bleeding then will just monitor. Patient jesica for clear liquid diet so ordered for 12/30 as per GI.   - Patient s/p  1 unit pRBCs transfused in ED given acute episodes of bleeding on 12/28 and received additional 1 unit of PRBCs on 12/29 for acute GI bleed.   - Follow H/H with CBC every 8 hours and transfuse if Hgb < 7 or < 8 and symptomatic.   - Continue Protonix for now as per GI.   - 2 large bore IV access  - GI consulted and colonoscopy recommended and done on 12/30 and showed:  - Preparation of the colon was poor.   - Hemorrhoids found on perianal exam.   - Diverticulosis in the sigmoid colon.   - The examined portion of the ileum was normal.   - The examination was otherwise normal.   - No specimens collected.     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 Lst echo with EF 60% with diastolic dysfunction.     Echo Color Flow Doppler? Yes    Interpretation Summary  · The left ventricle is normal in size with concentric remodeling and low normal systolic function. The estimated ejection fraction is 50%.  · Moderate right ventricular enlargement with mildly reduced right ventricular systolic function.  · Left ventricular diastolic dysfunction.  · Severe biatrial enlargement.  · Moderate to severe tricuspid regurgitation.  · Moderate mitral regurgitation.  · The estimated PA systolic pressure is 69 mmHg. The mean PA pressure is estimated at 44mm Hg (based on pulmonic insufficiency peak velocity).  · Intermediate central venous pressure (8 mmHg).  Home Cozaar on hold due to active GI bleed. Apixiban on hold for cardiomyopathy. Monitor clinical status closely. Monitor on telemetry. Patient is off CHF pathway.  Monitor strict Is&Os and daily weights.  Place on fluid restriction of 1.5 L.        Primary hypertension  · Patient's blood pressure is controlled here in the hospital over past 24 hours.   · Goal for blood pressure is SBP < 150 and DBP < 90 as patient > or = 60 years of age with no diabetes or advanced kidney disease based on JNC 8  guidelines.   · On no meds for BP and needs none at this time. Home Cozaar on hold due to GI bleed. Plan to resume home meds on discharge.   · Plan is to monitor patient's blood pressure routinely while patient is hospitalized.     Stage 3b chronic kidney disease  · Controlled. Patient is at baseline renal function at present.   · Need to avoid any nephrotoxic agents such as NSAIDS, IV contrast dye or aminoglycosides unless necessary while patient is hospitalized.  · Will renally adjust medications based on patient's creatinine clearance.   · Strict I+Os.       Pure hypercholesterolemia  - Chronic and controlled. Patient takes Crestor at home, not on formulary.  - Patient has had severe reaction with Lipitor and Pravastatin that are on formulary so will hold Crestor in hospital and can resume on hospital discharge.       Primary open angle glaucoma (POAG) of both eyes  Chronic and controlled. Continue home eye drops and Brimonidine, Dorzolamide and Latanoprost. In hospital.         VTE Risk Mitigation (From admission, onward)         Ordered     IP VTE HIGH RISK PATIENT  Once         12/28/21 2213     Reason for No Pharmacological VTE Prophylaxis  Once        Question:  Reasons:  Answer:  Active Bleeding    12/28/21 2213     Place sequential compression device  Until discontinued         12/28/21 2211                Discharge Planning   DINORA: 1/1/2022     Code Status: Full Code   Is the patient medically ready for discharge?: No    Reason for patient still in hospital (select all that apply): Patient trending condition           Machelle Alatorre MD  Department of Hospital Medicine   [facility] - Oncology ([facility])

## 2021-12-31 NOTE — SUBJECTIVE & OBJECTIVE
Interval History: Patient with no further melena or BRBPR. Hgb this am 6.8 and plan to transfuse 1 unit of PRBCs today. Hgb was 7.0 yesterday so not large drop in Hgb. Wife upset as hoping to go home today even though I had discussed with her and patient plan was to monitor in hospital today and hopeful discharge tomorrow, 1/1 as long as Hgb stable and further signs of GI bleeding. Wife upset as it is New Year's lan and she states at their age not sure how many more they will have. I told her if everything is stable in am and no bleeding can discharge tomorrow and at least be able to enjoy New Year's Day.     Review of Systems   Constitutional: Negative for fever.   Respiratory: Negative for cough and shortness of breath.    Cardiovascular: Negative for chest pain and leg swelling.   Gastrointestinal: Negative for abdominal pain, blood in stool, diarrhea and nausea.   Musculoskeletal: Negative for arthralgias.   Neurological: Negative for dizziness.   Psychiatric/Behavioral: Negative for agitation and confusion.     Objective:     Vital Signs (Most Recent):  Temp: 97.4 °F (36.3 °C) (12/31/21 1520)  Pulse: 50 (12/31/21 1520)  Resp: 15 (12/31/21 1520)  BP: 133/62 (12/31/21 1520)  SpO2: 95 % (12/31/21 1520) on room air Vital Signs (24h Range):  Temp:  [97.4 °F (36.3 °C)-98.7 °F (37.1 °C)] 97.4 °F (36.3 °C)  Pulse:  [50-55] 50  Resp:  [15-20] 15  SpO2:  [94 %-99 %] 95 %  BP: (114-161)/(53-72) 133/62     Weight: 70.3 kg (155 lb)  Body mass index is 24.28 kg/m².    Intake/Output Summary (Last 24 hours) at 12/31/2021 1748  Last data filed at 12/31/2021 1458  Gross per 24 hour   Intake 436 ml   Output 850 ml   Net -414 ml      Physical Exam  Vitals and nursing note reviewed.   Constitutional:       General: He is not in acute distress.     Appearance: Normal appearance. He is well-developed and normal weight. He is not ill-appearing.   Eyes:      Conjunctiva/sclera: Conjunctivae normal.   Cardiovascular:      Rate and  Rhythm: Normal rate and regular rhythm.      Heart sounds: Normal heart sounds. No murmur heard.  No friction rub. No gallop.    Pulmonary:      Effort: Pulmonary effort is normal. No respiratory distress.      Breath sounds: Normal breath sounds. No wheezing or rales.   Abdominal:      General: Abdomen is flat. Bowel sounds are normal. There is no distension.      Palpations: Abdomen is soft.      Tenderness: There is no abdominal tenderness. There is no guarding.   Skin:     Capillary Refill: Capillary refill takes less than 2 seconds.      Findings: No erythema or rash.   Neurological:      Mental Status: He is alert and oriented to person, place, and time.   Psychiatric:         Mood and Affect: Mood normal.         Behavior: Behavior normal.         Significant Labs:   CBC:   Recent Labs   Lab 12/29/21  1928 12/30/21  0455 12/31/21  0932   WBC 4.33 3.76* 3.35*   HGB 7.0* 7.0* 6.8*   HCT 22.1* 22.2* 21.3*   PLT 67* 75* 83*     Significant Imaging: I have reviewed all pertinent imaging results/findings within the past 24 hours.

## 2022-01-01 VITALS
SYSTOLIC BLOOD PRESSURE: 143 MMHG | TEMPERATURE: 98 F | HEIGHT: 67 IN | OXYGEN SATURATION: 95 % | WEIGHT: 155 LBS | RESPIRATION RATE: 18 BRPM | HEART RATE: 52 BPM | DIASTOLIC BLOOD PRESSURE: 64 MMHG | BODY MASS INDEX: 24.33 KG/M2

## 2022-01-01 LAB
ANISOCYTOSIS BLD QL SMEAR: SLIGHT
BASOPHILS # BLD AUTO: 0.06 K/UL (ref 0–0.2)
BASOPHILS NFR BLD: 1.4 % (ref 0–1.9)
BURR CELLS BLD QL SMEAR: ABNORMAL
DIFFERENTIAL METHOD: ABNORMAL
EOSINOPHIL # BLD AUTO: 0.2 K/UL (ref 0–0.5)
EOSINOPHIL NFR BLD: 5.2 % (ref 0–8)
ERYTHROCYTE [DISTWIDTH] IN BLOOD BY AUTOMATED COUNT: 25.8 % (ref 11.5–14.5)
HCT VFR BLD AUTO: 23.9 % (ref 40–54)
HGB BLD-MCNC: 7.3 G/DL (ref 14–18)
HYPOCHROMIA BLD QL SMEAR: ABNORMAL
IMM GRANULOCYTES # BLD AUTO: 0.02 K/UL (ref 0–0.04)
IMM GRANULOCYTES NFR BLD AUTO: 0.5 % (ref 0–0.5)
LYMPHOCYTES # BLD AUTO: 0.8 K/UL (ref 1–4.8)
LYMPHOCYTES NFR BLD: 18.5 % (ref 18–48)
MCH RBC QN AUTO: 32.7 PG (ref 27–31)
MCHC RBC AUTO-ENTMCNC: 30.5 G/DL (ref 32–36)
MCV RBC AUTO: 107 FL (ref 82–98)
MONOCYTES # BLD AUTO: 0.7 K/UL (ref 0.3–1)
MONOCYTES NFR BLD: 15.2 % (ref 4–15)
NEUTROPHILS # BLD AUTO: 2.5 K/UL (ref 1.8–7.7)
NEUTROPHILS NFR BLD: 59.2 % (ref 38–73)
NRBC BLD-RTO: 0 /100 WBC
OVALOCYTES BLD QL SMEAR: ABNORMAL
PLATELET # BLD AUTO: 73 K/UL (ref 150–450)
PMV BLD AUTO: 14.1 FL (ref 9.2–12.9)
POCT GLUCOSE: 125 MG/DL (ref 70–110)
POIKILOCYTOSIS BLD QL SMEAR: ABNORMAL
POLYCHROMASIA BLD QL SMEAR: ABNORMAL
RBC # BLD AUTO: 2.23 M/UL (ref 4.6–6.2)
SCHISTOCYTES BLD QL SMEAR: ABNORMAL
WBC # BLD AUTO: 4.27 K/UL (ref 3.9–12.7)

## 2022-01-01 PROCEDURE — 25000003 PHARM REV CODE 250: Mod: HCNC | Performed by: INTERNAL MEDICINE

## 2022-01-01 PROCEDURE — 1111F DSCHRG MED/CURRENT MED MERGE: CPT | Mod: HCNC,CPTII,, | Performed by: INTERNAL MEDICINE

## 2022-01-01 PROCEDURE — 36415 COLL VENOUS BLD VENIPUNCTURE: CPT | Mod: HCNC | Performed by: INTERNAL MEDICINE

## 2022-01-01 PROCEDURE — 85025 COMPLETE CBC W/AUTO DIFF WBC: CPT | Mod: HCNC | Performed by: INTERNAL MEDICINE

## 2022-01-01 PROCEDURE — 99239 PR HOSPITAL DISCHARGE DAY,>30 MIN: ICD-10-PCS | Mod: HCNC,,, | Performed by: INTERNAL MEDICINE

## 2022-01-01 PROCEDURE — 99239 HOSP IP/OBS DSCHRG MGMT >30: CPT | Mod: HCNC,,, | Performed by: INTERNAL MEDICINE

## 2022-01-01 PROCEDURE — 1111F PR DISCHARGE MEDS RECONCILED W/ CURRENT OUTPATIENT MED LIST: ICD-10-PCS | Mod: HCNC,CPTII,, | Performed by: INTERNAL MEDICINE

## 2022-01-01 RX ORDER — PANTOPRAZOLE SODIUM 40 MG/1
40 TABLET, DELAYED RELEASE ORAL DAILY
Qty: 30 TABLET | Refills: 2 | Status: SHIPPED | OUTPATIENT
Start: 2022-01-02 | End: 2022-04-02

## 2022-01-01 RX ORDER — FERROUS SULFATE 325(65) MG
325 TABLET ORAL
Qty: 30 TABLET | Refills: 3 | Status: SHIPPED | OUTPATIENT
Start: 2022-01-01 | End: 2022-01-11

## 2022-01-01 RX ADMIN — DORZOLAMIDE HYDROCHLORIDE 1 DROP: 20 SOLUTION/ DROPS OPHTHALMIC at 09:01

## 2022-01-01 RX ADMIN — BRIMONIDINE TARTRATE 1 DROP: 2 SOLUTION OPHTHALMIC at 09:01

## 2022-01-01 RX ADMIN — PANTOPRAZOLE SODIUM 40 MG: 40 TABLET, DELAYED RELEASE ORAL at 09:01

## 2022-01-01 NOTE — PROGRESS NOTES
Road Test  Oxygen-Patient tolerating room air, no distress.  Ambulation-Patient is back to baseline using walker when ambulating.   Devices-Patient going home with no devices  Tolerating-Regular diet.  Elimination-Patient voiding without difficulty.  Self Care-Performs self care without assistance.  Teaching-Verbal and written discharge teaching given.     Peripheral IV removed, catheter intact, dressed with dry gauze and coban. No bleeding present. Patient going home. Discharge paperwork discussed. Medications reviewed. Patient has all belongings and has no questions at this time.

## 2022-01-02 NOTE — ASSESSMENT & PLAN NOTE
Chronic and controlled. Continue home eye drops and Brimonidine, Dorzolamide and Latanoprost in hospital and on home discharge.

## 2022-01-02 NOTE — ASSESSMENT & PLAN NOTE
· Controlled. Patient is at baseline renal function at present.   · Need to avoid any nephrotoxic agents such as NSAIDS, IV contrast dye or aminoglycosides unless necessary while patient is hospitalized.

## 2022-01-02 NOTE — ASSESSMENT & PLAN NOTE
Patient with known cardiomyopathy but has improved with medical treatment. Lst echo with EF 60% with diastolic dysfunction.     Echo Color Flow Doppler? Yes    Interpretation Summary  · The left ventricle is normal in size with concentric remodeling and low normal systolic function. The estimated ejection fraction is 50%.  · Moderate right ventricular enlargement with mildly reduced right ventricular systolic function.  · Left ventricular diastolic dysfunction.  · Severe biatrial enlargement.  · Moderate to severe tricuspid regurgitation.  · Moderate mitral regurgitation.  · The estimated PA systolic pressure is 69 mmHg. The mean PA pressure is estimated at 44mm Hg (based on pulmonic insufficiency peak velocity).  · Intermediate central venous pressure (8 mmHg).  Home Cozaar on hold due to active GI bleed. Apixiban on hold for cardiomyopathy. Patient to resume Cozaar on discharge and patient wished to discuss resuming Apixiban with his cardiologist so to hold on discharge until can discuss with his cardiologist.

## 2022-01-02 NOTE — ASSESSMENT & PLAN NOTE
Acute blood loss anemia  - 1/1: Hgb 7.3. Patient has a chronic anemia and asymptomatic and patient has had no further signs of bleeding and no more bloody BMs so discharged home with ambulatory referral to GI clinic. Patient discharged on Ferrous sulfate 325 mg po daily to treat anemia and Protonix 40 mg po daily for GI bleed.   - 12/31: patient with no further melena. Hgb 6.8. Plan to transfuse 1 unit of PRBCs today with goal of Hgb >7. Recheck Hgb in am. Continue to monitor for any signs of bleeding. IV Protonix stopped and patient switched to oral Protonix 40 mg po daily.   - Monitor for further bleeding as per GI On 12/30 and continue to monitor H/H. If further bleeding then plan EGD if no further bleeding then will just monitor. Patient jesica for clear liquid diet so ordered for 12/30 as per GI.   - Patient s/p 1 unit pRBCs transfused in ED given acute episodes of bleeding on 12/28 and received additional 1 unit of PRBCs on 12/29 for acute GI bleed.   - Follow H/H with CBC every 8 hours and transfuse if Hgb < 7 or < 8 and symptomatic.   - Continue Protonix for now as per GI.   - 2 large bore IV access  - GI consulted and colonoscopy recommended and done on 12/30 and showed:  - Preparation of the colon was poor.   - Hemorrhoids found on perianal exam.   - Diverticulosis in the sigmoid colon.   - The examined portion of the ileum was normal.   - The examination was otherwise normal.   - No specimens collected.

## 2022-01-02 NOTE — DISCHARGE SUMMARY
[facility] - Oncology ([facility])  Hospital Medicine  Discharge Summary      Patient Name: Javier Valles  MRN: [id number]  Patient Class: IP- Inpatient  Admission Date: 12/28/2021  Hospital Length of Stay: 2 days  Discharge Date and Time: 1/1/2022 12:09 PM  Attending Physician: Machelle Alatorre MD   Discharging Provider: Machelle Alatorre MD  Primary Care Provider: Thiago Gibbs MD      HPI:   Javier Valles is a 89 y.o. male with PMHx significant for A fib with Eliquis, CAD, CKD, HLD, HTN, glaucoma admitted to hospital medicine for lower GI bleed with associated nausea. Reports 3 episodes of rectal bleeding earlier today. He is unsure if there were feces with the episodes. Patient has an additional episode of bleeding in the ED. Denies previous episodes. He is unsure of his last colonoscopy, but remembers getting a notice in the mail about being due for one about 2 years ago. States that previous colonoscopies have revealed non cancerous polyps that were removed. Denies pain with episodes, vomiting, abdominal pain, fever/chills, CP, SOB, lightheadedness, dizziness, diarrhea, constipation, urinary symptoms.     In the ED, /59 on arrival but increased to 182/78 after intervention with IVF and pRBCs. HR 51. Hgb/Hct 8.9/28.2. PT/INR 12.4/1.1. Cr 1.5 (~BL). T bili 2.7. CTA abdomen/pelvis with no evidence of acute GI bleed.          12/30/2021  Procedure(s) (LRB):  COLONOSCOPY (N/A)    Surgeon(s):  Noreen Osman MD      Hospital Course:   Hgb/Hct 8.9/28.2. PT/INR 12.4/1.1. Cr 1.5 (~BL). T bili 2.7 on admit. CTA abdomen/pelvis with no evidence of acute GI bleed. Patient transfused 1 unit of PRBCs on 12/29. GI consulted and recommended colonscopy to evaluate source of GI bleed and felt to be a lower source of bleeding. Patient had prep done and had C-scope done on 12/30 and showed:  - Preparation of the colon was poor.   - Hemorrhoids found on perianal exam.   - Diverticulosis in the sigmoid colon.   - The examined  portion of the ileum was normal.   - The examination was otherwise normal.   - No specimens collected.      As per GI after colonoscopy:   -Okay to resume clear liquid diet and monitor for continued hematochezia and Hgb trend.   -If further evidence of bleeding, will consider upper endoscopy.   -Continue to hold NSAIDS, antiplatelet, and anticoagulation.   -Continue to trend CBC and transfuse for Hgb <7 and platelets <50.   - We will continue to follow.     Hgb .8 on 12/31 and transfused 1 unit of PRBCs. Hgb 7.3 on 1/1. Patient with no further bleeding noted. Patient has a known anemia and thrombocytopenia and seen by Hematology and suspect could have a underlying bone marrow disorder but felt due to his age and other co-morbid conditions Hematology did not believe patient would benefit from a bone marrow biopsy as even if found to have underlying bone marrow disorder would not be a candidate for any treatment and thus recommended no further work-up or evaluation and just monitoring. Patient discharged home in good condition on 1/1. Patient discharged home on Ferrous sulfate 325 mg po daily to treat anemia and Protonix 40 mg po daily for next 90 days in case upper GI source of bleeding. Ambulatory referral sent to GI clinic for hospital follow-up. Patient and wife both advised if patient has recurrent rectal bleeding to return to ED. Discussed with patient and wife about resuming Eliquis as there is no contraindication due to fact no active bleeding identified with patient but both stated they wanted to hold Eliquis for now and discuss with their cardiologist about the Eliquis as both expressed concern about recurrent bleeding.        Goals of Care Treatment Preferences:  Code Status: Full Code    Living Will: Yes              Consults:   Consults (From admission, onward)        Status Ordering Provider     Inpatient consult to Gastroenterology  Once        Provider:  (Not yet assigned)    Completed JUAN PECK           * Acute GI bleeding  Acute blood loss anemia  - 1/1: Hgb 7.3. Patient has a chronic anemia and asymptomatic and patient has had no further signs of bleeding and no more bloody BMs so discharged home with ambulatory referral to GI clinic. Patient discharged on Ferrous sulfate 325 mg po daily to treat anemia and Protonix 40 mg po daily for GI bleed.   - 12/31: patient with no further melena. Hgb 6.8. Plan to transfuse 1 unit of PRBCs today with goal of Hgb >7. Recheck Hgb in am. Continue to monitor for any signs of bleeding. IV Protonix stopped and patient switched to oral Protonix 40 mg po daily.   - Monitor for further bleeding as per GI On 12/30 and continue to monitor H/H. If further bleeding then plan EGD if no further bleeding then will just monitor. Patient jesica for clear liquid diet so ordered for 12/30 as per GI.   - Patient s/p 1 unit pRBCs transfused in ED given acute episodes of bleeding on 12/28 and received additional 1 unit of PRBCs on 12/29 for acute GI bleed.   - Follow H/H with CBC every 8 hours and transfuse if Hgb < 7 or < 8 and symptomatic.   - Continue Protonix for now as per GI.   - 2 large bore IV access  - GI consulted and colonoscopy recommended and done on 12/30 and showed:  - Preparation of the colon was poor.   - Hemorrhoids found on perianal exam.   - Diverticulosis in the sigmoid colon.   - The examined portion of the ileum was normal.   - The examination was otherwise normal.   - No specimens collected.     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 Lst echo with EF 60% with diastolic dysfunction.     Echo Color Flow Doppler? Yes    Interpretation Summary  · The left ventricle is normal in size with concentric remodeling and low normal systolic function. The estimated ejection fraction is 50%.  · Moderate right ventricular enlargement with mildly reduced right ventricular systolic function.  ·  Left ventricular diastolic dysfunction.  · Severe biatrial enlargement.  · Moderate to severe tricuspid regurgitation.  · Moderate mitral regurgitation.  · The estimated PA systolic pressure is 69 mmHg. The mean PA pressure is estimated at 44mm Hg (based on pulmonic insufficiency peak velocity).  · Intermediate central venous pressure (8 mmHg).  Home Cozaar on hold due to active GI bleed. Apixiban on hold for cardiomyopathy. Patient to resume Cozaar on discharge and patient wished to discuss resuming Apixiban with his cardiologist so to hold on discharge until can discuss with his cardiologist.         Primary hypertension  · Patient's blood pressure is controlled here in the hospital.  · Goal for blood pressure is SBP < 150 and DBP < 90 as patient > or = 60 years of age with no diabetes or advanced kidney disease based on JNC 8 guidelines.   · On no meds for BP and needs none at this time. Home Cozaar on hold due to GI bleed. Plan to resume home meds on discharge.       Stage 3b chronic kidney disease  · Controlled. Patient is at baseline renal function at present.   · Need to avoid any nephrotoxic agents such as NSAIDS, IV contrast dye or aminoglycosides unless necessary while patient is hospitalized.      Pure hypercholesterolemia  - Chronic and controlled. Patient takes Crestor at home, not on formulary.  - Patient has had severe reaction with Lipitor and Pravastatin that are on formulary so will hold Crestor in hospital and can resume on hospital discharge.       Primary open angle glaucoma (POAG) of both eyes  Chronic and controlled. Continue home eye drops and Brimonidine, Dorzolamide and Latanoprost in hospital and on home discharge.      Final Active Diagnoses:    Diagnosis Date Noted POA    PRINCIPAL PROBLEM:  Acute GI bleeding [K92.2] 12/29/2021 Yes    Cardiomyopathy, ischemic: Prior MI, CABG, EF 40-45% [I25.5] 06/07/2017 Yes     Chronic    Primary hypertension [I10]  Yes    Stage 3b chronic kidney  disease [N18.32] 11/27/2012 Yes    Pure hypercholesterolemia [E78.00]  Yes    Primary open angle glaucoma (POAG) of both eyes [H40.1130] 10/28/2013 Yes    Acute blood loss anemia [D62] 12/30/2021 Yes      Problems Resolved During this Admission:       Discharged Condition: good    Disposition: Home or Self Care    Follow Up:  Future Appointments   Date Time Provider Department Center   2/3/2022  9:00 AM Rosario Landry MD [facility] PAL MED [facility]   2/4/2022  7:50 AM LAB, [facility] CANCER BLDG [facility]   3/21/2022 10:00 AM HOME MONITOR DEVICE CHECK, [facility] ARRHPRO [facility]   4/25/2022  9:45 AM PERIMETRY, [facility] OPHTHAL [facility]   4/25/2022 10:00 AM Sena Schneider MD [facility] OPHTHAL [facility]   4/29/2022  7:50 AM LAB, [facility]ER BLDG [facility]   7/22/2022  7:50 AM LAB, [facility]ER BLDG [facility]   10/14/2022  7:50 AM LAB, [facility]CER BLDG [facility]         Patient Instructions:      Ambulatory referral/consult to Gastroenterology   Standing Status: Future   Referral Priority: Routine Referral Type: Consultation   Referral Reason: Specialty Services Required   Requested Specialty: Gastroenterology     Diet Cardiac     Notify your health care provider if you experience any of the following:  temperature >100.4     Notify your health care provider if you experience any of the following:  persistent dizziness, light-headedness, or visual disturbances     Notify your health care provider if you experience any of the following:  increased confusion or weakness     Notify your health care provider if you experience any of the following:  worsening rash     Notify your health care provider if you experience any of the following:  severe persistent headache     Notify your health care provider if you experience any of the following:  redness, tenderness, or signs of infection (pain, swelling, redness, odor or green/yellow discharge around incision  site)     Notify your health care provider if you experience any of the following:  persistent nausea and vomiting or diarrhea     Notify your health care provider if you experience any of the following:  severe uncontrolled pain     Notify your health care provider if you experience any of the following:  difficulty breathing or increased cough     Activity as tolerated       Significant Diagnostic Studies: Labs:   CMP No results for input(s): NA, K, CL, CO2, GLU, BUN, CREATININE, CALCIUM, PROT, ALBUMIN, BILITOT, ALKPHOS, AST, ALT, ANIONGAP, ESTGFRAFRICA, EGFRNONAA in the last 48 hours. and CBC   Recent Labs   Lab 12/31/21  0932 12/31/21  0932 01/01/22  0401   WBC 3.35*  --  4.27   HGB 6.8*  --  7.3*   HCT 21.3*   < > 23.9*   PLT 83*  --  73*    < > = values in this interval not displayed.       Pending Diagnostic Studies:     None         Medications:  Reconciled Home Medications:      Medication List      START taking these medications    ferrous sulfate 325 mg (65 mg iron) Tab tablet  Commonly known as: FEOSOL  Take 1 tablet (325 mg total) by mouth daily with breakfast.     pantoprazole 40 MG tablet  Commonly known as: PROTONIX  Take 1 tablet (40 mg total) by mouth once daily.  Start taking on: January 2, 2022        CHANGE how you take these medications    apixaban 2.5 mg Tab  Commonly known as: ELIQUIS  Take 1 tablet (2.5 mg total) by mouth 2 (two) times daily. Hold this medication until you talk to your Cardiologist.  What changed: additional instructions        CONTINUE taking these medications    brimonidine 0.2% 0.2 % Drop  Commonly known as: ALPHAGAN  INSTILL 1 DROP INTO LEFT EYE THREE TIMES DAILY     dorzolamide 2 % ophthalmic solution  Commonly known as: TRUSOPT  Place 1 drop into both eyes 3 (three) times daily.     HYDROcodone-acetaminophen 5-325 mg per tablet  Commonly known as: NORCO  Take 1 tablet by mouth 3 (three) times daily as needed for Pain.     latanoprost 0.005 % ophthalmic solution  Place  1 drop into the left eye every evening.     losartan 50 MG tablet  Commonly known as: COZAAR  Take 1 tablet (50 mg total) by mouth 2 (two) times daily.     rosuvastatin 20 MG tablet  Commonly known as: CRESTOR  Take 1 tablet (20 mg total) by mouth every evening.            Indwelling Lines/Drains at time of discharge:   Lines/Drains/Airways     None                 Time spent on the discharge of patient: 31 minutes         Machelle Alatorre MD  Department of Hospital Medicine  [facility] - Oncology ([facility])

## 2022-01-02 NOTE — ASSESSMENT & PLAN NOTE
· Patient's blood pressure is controlled here in the hospital.  · Goal for blood pressure is SBP < 150 and DBP < 90 as patient > or = 60 years of age with no diabetes or advanced kidney disease based on JNC 8 guidelines.   · On no meds for BP and needs none at this time. Home Cozaar on hold due to GI bleed. Plan to resume home meds on discharge.

## 2022-01-04 ENCOUNTER — PATIENT OUTREACH (OUTPATIENT)
Dept: ADMINISTRATIVE | Facility: CLINIC | Age: 87
End: 2022-01-04
Payer: MEDICARE

## 2022-01-04 ENCOUNTER — TELEPHONE (OUTPATIENT)
Dept: PALLIATIVE MEDICINE | Facility: CLINIC | Age: 87
End: 2022-01-04
Payer: MEDICARE

## 2022-01-04 NOTE — TELEPHONE ENCOUNTER
Attempted to reach regarding rescheduling appointment scheduled for feb 3rd left voicemail for a call back

## 2022-01-04 NOTE — PLAN OF CARE
[facility] - Oncology (Hospital)  Discharge Final Note    Primary Care Provider: Thiago Gibbs MD    Expected Discharge Date: 1/1/2022    Final Discharge Note (most recent)       Final Note - 01/04/22 0811          Final Note    Assessment Type Final Discharge Note     Anticipated Discharge Disposition Home or Self Care                   Future Appointments   Date Time Provider Department Center   2/3/2022  9:00 AM Rosario Landry MD [facility] PAL MED [facility]   2/4/2022  7:50 AM LAB, [facility] CANCER BLDG [facility]   3/21/2022 10:00 AM HOME MONITOR DEVICE CHECK, [facility] ARRHPRO [facility]   4/25/2022  9:45 AM PERIMETRY, [facility] P[facility]   4/25/2022 10:00 AM Sena Schneider MD [facility] OPHTHAL [facility]   4/29/2022  7:50 AM LAB, [facility]ER BLDG [facility]   7/22/2022  7:50 AM LAB, [facility]ER BLDG [facility]   10/14/2022  7:50 AM LAB, [facility]ER BLDG [facility]         Important Message from Medicare

## 2022-01-05 ENCOUNTER — TELEPHONE (OUTPATIENT)
Dept: PALLIATIVE MEDICINE | Facility: CLINIC | Age: 87
End: 2022-01-05
Payer: MEDICARE

## 2022-01-07 ENCOUNTER — PATIENT MESSAGE (OUTPATIENT)
Dept: ADMINISTRATIVE | Facility: OTHER | Age: 87
End: 2022-01-07
Payer: MEDICARE

## 2022-01-07 DIAGNOSIS — I49.5 SSS (SICK SINUS SYNDROME): ICD-10-CM

## 2022-01-07 DIAGNOSIS — Z95.0 CARDIAC PACEMAKER IN SITU: Primary | ICD-10-CM

## 2022-01-15 ENCOUNTER — LAB VISIT (OUTPATIENT)
Dept: LAB | Facility: HOSPITAL | Age: 87
End: 2022-01-15
Attending: INTERNAL MEDICINE
Payer: MEDICARE

## 2022-01-15 ENCOUNTER — OFFICE VISIT (OUTPATIENT)
Dept: INTERNAL MEDICINE | Facility: CLINIC | Age: 87
End: 2022-01-15
Payer: MEDICARE

## 2022-01-15 VITALS
BODY MASS INDEX: 24.33 KG/M2 | HEART RATE: 48 BPM | DIASTOLIC BLOOD PRESSURE: 83 MMHG | WEIGHT: 155 LBS | HEIGHT: 67 IN | SYSTOLIC BLOOD PRESSURE: 134 MMHG

## 2022-01-15 DIAGNOSIS — D64.9 CHRONIC ANEMIA: ICD-10-CM

## 2022-01-15 DIAGNOSIS — I25.5 ISCHEMIC CARDIOMYOPATHY: ICD-10-CM

## 2022-01-15 DIAGNOSIS — K92.2 LGI BLEED: Primary | ICD-10-CM

## 2022-01-15 DIAGNOSIS — K92.2 LGI BLEED: ICD-10-CM

## 2022-01-15 LAB
ANION GAP SERPL CALC-SCNC: 7 MMOL/L (ref 8–16)
ANISOCYTOSIS BLD QL SMEAR: SLIGHT
BASOPHILS # BLD AUTO: 0.12 K/UL (ref 0–0.2)
BASOPHILS NFR BLD: 3.5 % (ref 0–1.9)
BNP SERPL-MCNC: 1378 PG/ML (ref 0–99)
BUN SERPL-MCNC: 32 MG/DL (ref 10–30)
BURR CELLS BLD QL SMEAR: ABNORMAL
CALCIUM SERPL-MCNC: 10.1 MG/DL (ref 8.7–10.5)
CHLORIDE SERPL-SCNC: 118 MMOL/L (ref 95–110)
CO2 SERPL-SCNC: 19 MMOL/L (ref 23–29)
CREAT SERPL-MCNC: 1.6 MG/DL (ref 0.5–1.4)
DIFFERENTIAL METHOD: ABNORMAL
DOHLE BOD BLD QL SMEAR: PRESENT
EOSINOPHIL # BLD AUTO: 0.2 K/UL (ref 0–0.5)
EOSINOPHIL NFR BLD: 5.6 % (ref 0–8)
ERYTHROCYTE [DISTWIDTH] IN BLOOD BY AUTOMATED COUNT: 27.1 % (ref 11.5–14.5)
EST. GFR  (AFRICAN AMERICAN): 42.9 ML/MIN/1.73 M^2
EST. GFR  (NON AFRICAN AMERICAN): 37.1 ML/MIN/1.73 M^2
FERRITIN SERPL-MCNC: 91 NG/ML (ref 20–300)
GIANT PLATELETS BLD QL SMEAR: PRESENT
GLUCOSE SERPL-MCNC: 117 MG/DL (ref 70–110)
HCT VFR BLD AUTO: 29.1 % (ref 40–54)
HGB BLD-MCNC: 8.7 G/DL (ref 14–18)
IMM GRANULOCYTES # BLD AUTO: 0.01 K/UL (ref 0–0.04)
IMM GRANULOCYTES NFR BLD AUTO: 0.3 % (ref 0–0.5)
IRON SERPL-MCNC: 125 UG/DL (ref 45–160)
LYMPHOCYTES # BLD AUTO: 0.8 K/UL (ref 1–4.8)
LYMPHOCYTES NFR BLD: 23.2 % (ref 18–48)
MCH RBC QN AUTO: 34.8 PG (ref 27–31)
MCHC RBC AUTO-ENTMCNC: 29.9 G/DL (ref 32–36)
MCV RBC AUTO: 116 FL (ref 82–98)
MONOCYTES # BLD AUTO: 0.6 K/UL (ref 0.3–1)
MONOCYTES NFR BLD: 17.1 % (ref 4–15)
NEUTROPHILS # BLD AUTO: 1.7 K/UL (ref 1.8–7.7)
NEUTROPHILS NFR BLD: 50.3 % (ref 38–73)
NRBC BLD-RTO: 0 /100 WBC
OVALOCYTES BLD QL SMEAR: ABNORMAL
PLATELET # BLD AUTO: 101 K/UL (ref 150–450)
PLATELET BLD QL SMEAR: ABNORMAL
PMV BLD AUTO: 13.8 FL (ref 9.2–12.9)
POIKILOCYTOSIS BLD QL SMEAR: SLIGHT
POTASSIUM SERPL-SCNC: 4.5 MMOL/L (ref 3.5–5.1)
RBC # BLD AUTO: 2.5 M/UL (ref 4.6–6.2)
SATURATED IRON: 33 % (ref 20–50)
SODIUM SERPL-SCNC: 144 MMOL/L (ref 136–145)
TOTAL IRON BINDING CAPACITY: 377 UG/DL (ref 250–450)
TRANSFERRIN SERPL-MCNC: 255 MG/DL (ref 200–375)
WBC # BLD AUTO: 3.4 K/UL (ref 3.9–12.7)

## 2022-01-15 PROCEDURE — 1159F MED LIST DOCD IN RCRD: CPT | Mod: HCNC,CPTII,S$GLB, | Performed by: INTERNAL MEDICINE

## 2022-01-15 PROCEDURE — 99999 PR PBB SHADOW E&M-EST. PATIENT-LVL III: CPT | Mod: PBBFAC,HCNC,, | Performed by: INTERNAL MEDICINE

## 2022-01-15 PROCEDURE — 99214 OFFICE O/P EST MOD 30 MIN: CPT | Mod: HCNC,S$GLB,, | Performed by: INTERNAL MEDICINE

## 2022-01-15 PROCEDURE — 99999 PR PBB SHADOW E&M-EST. PATIENT-LVL III: ICD-10-PCS | Mod: PBBFAC,HCNC,, | Performed by: INTERNAL MEDICINE

## 2022-01-15 PROCEDURE — 1111F PR DISCHARGE MEDS RECONCILED W/ CURRENT OUTPATIENT MED LIST: ICD-10-PCS | Mod: HCNC,CPTII,S$GLB, | Performed by: INTERNAL MEDICINE

## 2022-01-15 PROCEDURE — 84466 ASSAY OF TRANSFERRIN: CPT | Mod: HCNC | Performed by: INTERNAL MEDICINE

## 2022-01-15 PROCEDURE — 1126F AMNT PAIN NOTED NONE PRSNT: CPT | Mod: HCNC,CPTII,S$GLB, | Performed by: INTERNAL MEDICINE

## 2022-01-15 PROCEDURE — 1160F PR REVIEW ALL MEDS BY PRESCRIBER/CLIN PHARMACIST DOCUMENTED: ICD-10-PCS | Mod: HCNC,CPTII,S$GLB, | Performed by: INTERNAL MEDICINE

## 2022-01-15 PROCEDURE — 1101F PT FALLS ASSESS-DOCD LE1/YR: CPT | Mod: HCNC,CPTII,S$GLB, | Performed by: INTERNAL MEDICINE

## 2022-01-15 PROCEDURE — 3288F PR FALLS RISK ASSESSMENT DOCUMENTED: ICD-10-PCS | Mod: HCNC,CPTII,S$GLB, | Performed by: INTERNAL MEDICINE

## 2022-01-15 PROCEDURE — 80048 BASIC METABOLIC PNL TOTAL CA: CPT | Mod: HCNC | Performed by: INTERNAL MEDICINE

## 2022-01-15 PROCEDURE — 83880 ASSAY OF NATRIURETIC PEPTIDE: CPT | Mod: HCNC | Performed by: INTERNAL MEDICINE

## 2022-01-15 PROCEDURE — 36415 COLL VENOUS BLD VENIPUNCTURE: CPT | Mod: HCNC | Performed by: INTERNAL MEDICINE

## 2022-01-15 PROCEDURE — 3288F FALL RISK ASSESSMENT DOCD: CPT | Mod: HCNC,CPTII,S$GLB, | Performed by: INTERNAL MEDICINE

## 2022-01-15 PROCEDURE — 82728 ASSAY OF FERRITIN: CPT | Mod: HCNC | Performed by: INTERNAL MEDICINE

## 2022-01-15 PROCEDURE — 99499 RISK ADDL DX/OHS AUDIT: ICD-10-PCS | Mod: S$GLB,,, | Performed by: INTERNAL MEDICINE

## 2022-01-15 PROCEDURE — 1160F RVW MEDS BY RX/DR IN RCRD: CPT | Mod: HCNC,CPTII,S$GLB, | Performed by: INTERNAL MEDICINE

## 2022-01-15 PROCEDURE — 1157F PR ADVANCE CARE PLAN OR EQUIV PRESENT IN MEDICAL RECORD: ICD-10-PCS | Mod: HCNC,CPTII,S$GLB, | Performed by: INTERNAL MEDICINE

## 2022-01-15 PROCEDURE — 99214 PR OFFICE/OUTPT VISIT, EST, LEVL IV, 30-39 MIN: ICD-10-PCS | Mod: HCNC,S$GLB,, | Performed by: INTERNAL MEDICINE

## 2022-01-15 PROCEDURE — 1157F ADVNC CARE PLAN IN RCRD: CPT | Mod: HCNC,CPTII,S$GLB, | Performed by: INTERNAL MEDICINE

## 2022-01-15 PROCEDURE — 1111F DSCHRG MED/CURRENT MED MERGE: CPT | Mod: HCNC,CPTII,S$GLB, | Performed by: INTERNAL MEDICINE

## 2022-01-15 PROCEDURE — 85025 COMPLETE CBC W/AUTO DIFF WBC: CPT | Mod: HCNC | Performed by: INTERNAL MEDICINE

## 2022-01-15 PROCEDURE — 1101F PR PT FALLS ASSESS DOC 0-1 FALLS W/OUT INJ PAST YR: ICD-10-PCS | Mod: HCNC,CPTII,S$GLB, | Performed by: INTERNAL MEDICINE

## 2022-01-15 PROCEDURE — 1126F PR PAIN SEVERITY QUANTIFIED, NO PAIN PRESENT: ICD-10-PCS | Mod: HCNC,CPTII,S$GLB, | Performed by: INTERNAL MEDICINE

## 2022-01-15 PROCEDURE — 99499 UNLISTED E&M SERVICE: CPT | Mod: S$GLB,,, | Performed by: INTERNAL MEDICINE

## 2022-01-15 PROCEDURE — 1159F PR MEDICATION LIST DOCUMENTED IN MEDICAL RECORD: ICD-10-PCS | Mod: HCNC,CPTII,S$GLB, | Performed by: INTERNAL MEDICINE

## 2022-01-15 NOTE — PROGRESS NOTES
MEDICAL HISTORY:   Coronary artery disease with previous bypass surgery, stents in 2013, and then a  subsequent non-ST MI due to in-stent stenosis of obtuse marginal for which angioplasty was performed.  Carotid artery disease  Hypertension.  Hyperlipidemia.  Left ventricular dysfunction,  Peripheral vascular disease with stent of the right iliac.  Venous Reflux  Chronic kidney disease with secondary hyperparathyroid.  BPH with elevated PSA.  Carotid artery disease   Osteoarthritis of the knees  Arrhythmia disorder for which he is status post pacemaker and defibrillator for sick sinus syndrome, status post radiofrequency ablation for atrial flutter, and on amiodarone for atrial flutter/fibrillation/PVCs.  Gout.  Bilateral inguinal hernia repair.  Left knee surgery.  Glaucoma.     SOCIAL HISTORY:  Tobacco use, none.  Alcohol use, a couple of drinks a day.     MEDICATIONS: .  Losartan 50 mg daily.  Crestor 20 mg daily.        Eliquis 2.5 mg twice a day, on hold        89-year-old male  Follow-up visit  Hospital admit December 28 to of November 1st way presented with rectal bleeding.  His hemoglobin went down to 6.8 in ventral required 4 units of blood.  CTA of the abdomen revealed no acute changes in had colonoscopy which revealed diverticulosis but no active bleeding.  Pantoprazole was added, and Eliquis was put on hold    On January 11th he call the office, complained diarrhea since his discharge.  It was elected to put a hold on pantoprazole.  He did not report any abdominal pain heartburn indigestion.    Since then there has been improvement with bowel function.  There is no rectal bleeding the stools are now soft or loose but not watery      His main complaint is getting out of breath easily with any exertion activity.  He reports no chest pain or abdominal pain    Examination  Weight 154 lb  Pulse 60 irregular  Blood pressure 130/82  Chest clear breath sounds  Heart regular rate rhythm with ectopy  Abdominal  exam soft nontender no masses  Mucous membranes pale    Impression  Lower GI bleed  Anemia  Dyspnea on exertion with history of arrhythmia and left ventricular dysfunction    Plan  Will need repeat CBC basic metabolic profile.  Arrange BNP and iron studies  Continue with iron supplementation      Addendum  He was evaluated by hematology oncology in early November.  He has a history of chronic macrocytic anemia with thrombocytopenia and f paraprotein.  There was a discussion on on workup, whether to do a bone marrow biopsy for further evaluation but it was elected not to pursue this only because if something was noted there will not be any treatment

## 2022-01-18 ENCOUNTER — PATIENT MESSAGE (OUTPATIENT)
Dept: CARDIOLOGY | Facility: CLINIC | Age: 87
End: 2022-01-18
Payer: MEDICARE

## 2022-01-18 ENCOUNTER — PATIENT MESSAGE (OUTPATIENT)
Dept: INTERNAL MEDICINE | Facility: CLINIC | Age: 87
End: 2022-01-18
Payer: MEDICARE

## 2022-01-18 DIAGNOSIS — I50.9 HEART FAILURE, UNSPECIFIED HF CHRONICITY, UNSPECIFIED HEART FAILURE TYPE: Primary | ICD-10-CM

## 2022-01-19 ENCOUNTER — DOCUMENTATION ONLY (OUTPATIENT)
Dept: CARDIAC CATH/INVASIVE PROCEDURES | Facility: HOSPITAL | Age: 87
End: 2022-01-19
Payer: MEDICARE

## 2022-01-19 DIAGNOSIS — I25.10 CORONARY ARTERY DISEASE INVOLVING NATIVE CORONARY ARTERY OF NATIVE HEART WITHOUT ANGINA PECTORIS: Primary | ICD-10-CM

## 2022-01-19 RX ORDER — FUROSEMIDE 20 MG/1
20 TABLET ORAL DAILY
Qty: 10 TABLET | Refills: 0 | Status: SHIPPED | OUTPATIENT
Start: 2022-01-19 | End: 2022-01-29

## 2022-01-19 NOTE — PROGRESS NOTES
The test results reviewed.  The degree of anemia is improved.  bnp is higher and still has difficulty with breathing.  It was recommended present to the emergency room but he defers.  Lasix 20 mg once a day was sent in for least 10 days will arrange chest x-ray and ECG

## 2022-01-19 NOTE — PROGRESS NOTES
Received a call from Mr. Almeida wife today that he was having respiratory difficulties. We had discussed a plan of care at his last clinic visit and they had chosen to pursue palliative care guidance for end of life care. Unfortunately, he has not had the opportunity to have that visit yet. I spoke with them on the phone and they continue to want to avoid hospitalization, and while Dr. Grove has asked them to get a CXR and ECG, they don't see the point of further testing. I communicated with Dr. Pat who has engaged home hospice, who will visit with them today.

## 2022-01-20 ENCOUNTER — PATIENT MESSAGE (OUTPATIENT)
Dept: INTERNAL MEDICINE | Facility: CLINIC | Age: 87
End: 2022-01-20
Payer: MEDICARE

## 2022-02-01 ENCOUNTER — TELEPHONE (OUTPATIENT)
Dept: CARDIOLOGY | Facility: HOSPITAL | Age: 87
End: 2022-02-01
Payer: MEDICARE

## 2022-02-01 ENCOUNTER — TELEPHONE (OUTPATIENT)
Dept: ELECTROPHYSIOLOGY | Facility: CLINIC | Age: 87
End: 2022-02-01
Payer: MEDICARE

## 2022-02-01 NOTE — TELEPHONE ENCOUNTER
Attempted to contact the pt on this am in relation to his [employer] Pacemaker having reached LATHA.  However, this was prior to seeing the notes that he is now under home Hospice.  Message was left on the VM.      Of note, the Device RN has informed Dr. Ly and his RN of pt's current Hospice Status.

## 2022-02-01 NOTE — TELEPHONE ENCOUNTER
The patients' CIED has reached ELECTIVE REPLACEMENT.     Current Device  and Model: Quadra Allure CRT-P    Date of LATHA, if known: 1/30/22    Is this replacement indicator early or unexpected due to an advisory:  No    Battery Voltage (if available): 2.62 V    Pacemaker Dependent: No, last documented in clinic check states SB w/ CHB, junctional escape was present    Anticoagulation Status: Eliquis    Last EF: 50% (2/5/21)    Model of Leads: RA & RV: Tendril 1888TC   LV: Quartet 1458Q    Any known lead recalls:  No    Leads MRI compatible:  Only LV lead is MRI compatible      *RN coordinator notified for scheduling; device coordinator notified to contact patient

## 2022-02-01 NOTE — TELEPHONE ENCOUNTER
Pt's wife returned my call on this afternoon and informed of pt's now under Home Hospice.  Informed the wife that a message has been sent to Dr. Ly letting him know of the pt's Hospice status.  Also informed the wife to please contact the Device Clinic should she have any additional questions and/or concerns.  The wife expressed appreciation for all of the care given to the pt.

## 2022-03-02 ENCOUNTER — TELEPHONE (OUTPATIENT)
Dept: CARDIOLOGY | Facility: HOSPITAL | Age: 87
End: 2022-03-02
Payer: MEDICARE

## 2022-03-02 NOTE — TELEPHONE ENCOUNTER
Pt daughter contacted the Device Clinic on this am to inform that the pt has passed away.  [employer]lin remote Pacemaker home monitor return kit requested from [employer].    See flowsheets for vital signs and complete assessments.    Pertinent Assessments: Ox4, but lethargic. Flat affect and slow to respond. Generalized, severe edema and abdomen distended. Pure wick in place tonight with good UOP.   Major Shift Events: Uneventful.  Treatment Plan: strict I&Os, lactulose, diuretics per nephrology, monitor labs closely. Nephrology/GI following.  Bedside Nurse: Cha Raymond RN

## 2022-03-21 ENCOUNTER — CLINICAL SUPPORT (OUTPATIENT)
Dept: CARDIOLOGY | Facility: HOSPITAL | Age: 87
End: 2022-03-21
Payer: MEDICARE

## 2022-03-21 DIAGNOSIS — Z95.0 PRESENCE OF CARDIAC PACEMAKER: ICD-10-CM

## 2022-03-21 PROCEDURE — 93296 REM INTERROG EVL PM/IDS: CPT | Performed by: INTERNAL MEDICINE

## 2023-09-26 NOTE — NURSING TRANSFER
Nursing Transfer Note      12/30/2021     Reason patient is being transferred: d/c criteria met     Transfer To: 824    Transfer via stretcher    Transfer with cardiac monitoring and called telemetry room to verify patient on monitor and going back to his room     Transported by [facility] with ticket to ride     Medicines sent: none    Any special needs or follow-up needed: speaking with md     Chart send with patient: Yes    Notified: spouse and called report to 8th floor nurse     Patient reassessed at: see epic  (date, time)    Upon arrival to floor: to room no complaints no distress noted.   I already addressed the study. No sure why not in notes.

## 2024-03-06 NOTE — PROGRESS NOTES
HPI     Blur ou at dist, x mos, no assoc pain or red, no relief over time,   constant  Due for drivers license in May     Last edited by Reji Hampton, OD on 3/2/2018  1:23 PM. (History)            Assessment /Plan     For exam results, see Encounter Report.    Myopia of both eyes with astigmatism and presbyopia      1. Spec Rx given. Different lens options discussed with patient. RTC 1 year refraction.                   Detail Level: Detailed Detail Level: Zone

## 2024-04-23 NOTE — PROGRESS NOTES
Subjective    Patient ID: Segundo    Chief Complaint:   Chief Complaint   Patient presents with    Right Knee - Results, Follow-up     Review MRI results     History of present illness    39-year-old gentleman presenting to clinic today for MRI follow-up right knee.  He continues to have difficulty with twisting and squatting motions.  Most of his pain seems to be around the patellofemoral region.  He has been on light duty at work avoiding these movements.  Gets occasional clicking still.  No instability reported.  Ambulating without assistance.      Past medical , Surgical, Family and social history reviewed.      Physical exam  General: No acute distress and breathing comfortably.  Patient is pleasant and cooperative with the examination.    Extremity  Right knee is any rashly intact.  Range of motion 0 to 125 degrees.  Minimal edema.  Mild tender to palpation medial lateral border right patella.  Mild crepitus.  No calf swelling or tenderness.  Compartments are soft.  Capillary refill within normal limits.  No tenderness palpation over the IT band today.  No instability.    Diagnostics  [ none]  MR knee right wo IV contrast    Result Date: 4/18/2024  Interpreted By:  Rusty Gomes, STUDY: MR KNEE RIGHT WO IV CONTRAST;   INDICATION: Signs/Symptoms:pain.   COMPARISON: Plain film radiographs of the right knee performed on April 17   ACCESSION NUMBER(S): [id number]   ORDERING CLINICIAN: GILMAR BARRETO   TECHNIQUE: Routine multiplanar multisequential MRI of the right knee without contrast.   FINDINGS: Lateral meniscus intact.   Medial meniscus intact.   Anterior cruciate ligament normal.   Posterior cruciate ligament normal.   Extensor tendons normal.   Collateral ligaments are normal.   Minimal medial and patellofemoral compartment osteoarthritis with some partial depth fissuring of the medial femoral articular surface in the medial patellar facet cartilage. No full-thickness chondral defects are seen.   No  Last 5 Patient Entered Readings                                      Current 30 Day Average: 128/70     Recent Readings 11/15/2018 11/13/2018 11/8/2018 11/5/2018 10/29/2018    SBP (mmHg) 130 149 124 114 135    DBP (mmHg) 70 73 71 66 72    Pulse 49 55 50 52 55            Digital Medicine: Health  Follow Up    Left voicemail to follow up with Mr. Javier Valles.  Current BP average 128/70 mmHg is at goal, <130/80.         sizeable effusion.   No evidence of other focal fluid collection.   No evidence of fracture.   No marrow edema.   No marrow replacement process.       Minimal medial and patellofemoral osteoarthritis right knee.   No evidence of other internal derangement.   Signed by: Rusty Gomes 4/18/2024 12:24 PM Dictation workstation:   [id number]    XR knee right 4+ views    Result Date: 4/17/2024  Interpreted By:  Gilmar Rosario, STUDY: XR KNEE RIGHT 4+ VIEWS; 4/17/2024 8:08 am   INDICATION: Signs/Symptoms:pain.   ACCESSION NUMBER(S): [id number]   ORDERING CLINICIAN: GILMAR ROSARIO   FINDINGS: Right knee weightbearing four views. Mild knee effusion mild medial joint space narrowing no evidence of fracture dislocation or other bony abnormality     Signed by: Gilmar Rosario 4/17/2024 8:09 AM Dictation workstation:   [id number]       Procedure  .Inj/Asp: Right Knee on 4/23/2024 9:51 AM  Indications: pain and diagnostic evaluation  Details: 22 G needle, anteromedial approach  Medications: 4 mL lidocaine 10 mg/mL (1 %); 2 mL betamethasone acet,sod phos 6 mg/mL  Outcome: tolerated well, no immediate complications  Procedure, treatment alternatives, risks and benefits explained, specific risks discussed. Consent was given by the patient. Immediately prior to procedure a time out was called to verify the correct patient, procedure, equipment, support staff and site/side marked as required. Patient was prepped and draped in the usual sterile fashion.         Assessment  Right knee patellofemoral syndrome  Right knee patellofemoral osteoarthritis, minimal    Treatment plan  1.  At this time we had a long discussion today in regards to continue conservative treatment.  2.  We proceeded with a right knee cortisone injection today he tolerated this well.  Postinjection instructions were discussed with the patient.  We discussed the use of free sport lateral stabilizing knee brace versus a knee sleeve and given the nature of  his job we went ahead and proceeded with fitting him for a free sport.  Also provided home exercise program knee conditioning to help work on quad strengthening.  We also went ahead and updated his return to work note stating work as tolerated with using the brace as needed.  3.  He will follow-up with us in 1 month for reevaluation without x-ray.  For complete plan and/or surgical details, please refer to Dr. Rosario's portion of the split/shared dictation.  4.  All of the patient's questions were answered.    Orders Placed This Encounter    Inj/Asp: Right Knee    Knee Brace, Hinged       This note was prepared using voice recognition software.  The details of this note are correct and have been reviewed, and corrected to the best of my ability.  Some grammatical areas may persist related to the Dragon software    Chuck Khan PA-C, [facility]  Orthopedic [place]    [phone]    In a face-to-face encounter performed today, I Gonzalez Rosario MD performed a history and physical examination, discussed pertinent diagnostic studies if indicated, and discussed diagnosis and management strategies with both the patient and the midlevel provider.  I reviewed the midlevel's note and agree with the documented findings and plan of care.  Greater than 50% of the evaluation and treatment decision was performed by the physician myself during today's visit.    39-year-old male here for follow-up of his right knee pain had his MRI he states he still having issues with his knee with popping and clicking some swelling no numbness or tingling.  MRI is reviewed with him at length today.  He has no evidence of instability on examination.  Cortisone injection right knee performed today with his consent.  Knee brace applied home exercise program note to return to work he will follow-up in 1 month.  Patient understands the cortisone may provide temporary relief how much it helps her how long it lasts is  unpredictable.  Cortisone can be repeated after 3 months if symptoms return.      Patient has severe impairment related to her presenting condition.  It is significantly impairing bodily function.  We did discuss surgical and nonsurgical options.    This note was prepared using voice recognition software.  The details of this note are correct and have been reviewed, and corrected to the best of my ability.  Some grammatical areas may persist related to the Dragon software    Gonzalez Rosario MD  Senior Attending Physician  [facility]    [phone]

## (undated) DEVICE — BANDAGE SOFFORM STER 2IN

## (undated) DEVICE — BURR MIS CURVED 3.0MM

## (undated) DEVICE — SEE MEDLINE ITEM 157131

## (undated) DEVICE — CLOSURE SKIN STERI STRIP 1/2X4

## (undated) DEVICE — PACK UPPER EXTREMITY BAPTIST

## (undated) DEVICE — BLADE SCALP OPHTL BEVEL STR

## (undated) DEVICE — DRESSING LEUKOPLAST FLEX 1X3IN

## (undated) DEVICE — DRAPE STERI-DRAPE 1000 17X11IN

## (undated) DEVICE — GLOVE BIOGEL SKINSENSE PI 7.5

## (undated) DEVICE — DRESSING MEPILEX BORDER 4 X 4

## (undated) DEVICE — NDL HYPO REG 25G X 1 1/2

## (undated) DEVICE — CHLORAPREP W TINT 26ML APPL

## (undated) DEVICE — DRESSING TRANS 4X4 3/4

## (undated) DEVICE — KIT SPINAL PATIENT CARE JACK

## (undated) DEVICE — BANDAGE KLING 3

## (undated) DEVICE — SUT MONOCRYL 4-0 PS-2

## (undated) DEVICE — SEE MEDLINE ITEM 152487

## (undated) DEVICE — SEE MEDLINE ITEM 146268

## (undated) DEVICE — DRAPE C-ARM/MOBILE XRAY 44X80

## (undated) DEVICE — NDL SPINAL SPINOCAN 22GX3.5

## (undated) DEVICE — GAUZE SPONGE 4X4 12PLY

## (undated) DEVICE — GLOVE SURGICAL LATEX SZ 6

## (undated) DEVICE — SEE MEDLINE ITEM 157117

## (undated) DEVICE — UNDERGLOVES BIOGEL PI SIZE 7.5

## (undated) DEVICE — ELECTRODE REM PLYHSV RETURN 9

## (undated) DEVICE — PAD PREP 50/CA

## (undated) DEVICE — PAD CAST SPECIALIST STRL 3

## (undated) DEVICE — TOWEL OR NONABSORB ADH 17X26

## (undated) DEVICE — ADHESIVE DERMABOND ADVANCED

## (undated) DEVICE — BANDAGE ADHESIVE

## (undated) DEVICE — DRAPE INCISE IOBAN 2 23X23IN

## (undated) DEVICE — BANDAGE ACE NON LATEX 2IN

## (undated) DEVICE — SEE MEDLINE ITEM 157148

## (undated) DEVICE — SPONGE GAUZE 16PLY 4X4

## (undated) DEVICE — SUT 0 VICRYL / UR6 (J603)

## (undated) DEVICE — APPLICATOR CHLORAPREP ORN 26ML

## (undated) DEVICE — BANDAGE ELASTIC 2X5 VELCRO ST

## (undated) DEVICE — DRESSING XEROFORM FOIL PK 1X8

## (undated) DEVICE — DRAPE C-ARMOR EQUIPMENT COVER

## (undated) DEVICE — ALCOHOL 70% ISOP W/GREEN 16OZ

## (undated) DEVICE — ELECTRODE BLADE INSULATED 1 IN

## (undated) DEVICE — SEE MEDLINE ITEM 157150

## (undated) DEVICE — FORCEP BIPOLAR ADSON 1MM TIP

## (undated) DEVICE — PAD CAST SPECIALIST STRL 4

## (undated) DEVICE — SUPPORT ULNA NERVE PROTECTOR

## (undated) DEVICE — DRESSING TRANS 4X4 TEGADERM

## (undated) DEVICE — SOL 9P NACL IRR PIC IL

## (undated) DEVICE — SUT MONOCYRL 4-0 PS2 UND

## (undated) DEVICE — SEE MEDLINE ITEM 146347

## (undated) DEVICE — UNDERGLOVES BIOGEL PI SZ 6 LF

## (undated) DEVICE — GLOVE SURGICAL LATEX SZ 7

## (undated) DEVICE — NDL SPINAL 18GX3.5 SPINOCAN

## (undated) DEVICE — SUT VICRYL 2 0 CT 2

## (undated) DEVICE — DRESSING TELFA N ADH 3X8

## (undated) DEVICE — SUT MCRYL PLUS 4-0 PS2 27IN

## (undated) DEVICE — ADHESIVE MASTISOL VIAL 48/BX

## (undated) DEVICE — DRAPE LEICA MICROSCOPE 48X120

## (undated) DEVICE — IMMOBILIZER HAND ALUMINUM LRG

## (undated) DEVICE — SYR B-D DISP CONTROL 10CC100/C

## (undated) DEVICE — DRESSING TEGADERM 2 3/8 X 2.75

## (undated) DEVICE — COVER OVERHEAD SURG LT BLUE

## (undated) DEVICE — SEE MEDLINE ITEM 156905

## (undated) DEVICE — SEE MEDLINE ITEM 152622